# Patient Record
Sex: FEMALE | Race: WHITE | HISPANIC OR LATINO | Employment: OTHER | URBAN - METROPOLITAN AREA
[De-identification: names, ages, dates, MRNs, and addresses within clinical notes are randomized per-mention and may not be internally consistent; named-entity substitution may affect disease eponyms.]

---

## 2017-01-17 ENCOUNTER — GENERIC CONVERSION - ENCOUNTER (OUTPATIENT)
Dept: OTHER | Facility: OTHER | Age: 82
End: 2017-01-17

## 2017-01-31 ENCOUNTER — GENERIC CONVERSION - ENCOUNTER (OUTPATIENT)
Dept: OTHER | Facility: OTHER | Age: 82
End: 2017-01-31

## 2017-02-06 ENCOUNTER — GENERIC CONVERSION - ENCOUNTER (OUTPATIENT)
Dept: OTHER | Facility: OTHER | Age: 82
End: 2017-02-06

## 2017-10-09 ENCOUNTER — APPOINTMENT (OUTPATIENT)
Dept: LAB | Facility: CLINIC | Age: 82
End: 2017-10-09
Payer: MEDICARE

## 2017-10-09 ENCOUNTER — ALLSCRIPTS OFFICE VISIT (OUTPATIENT)
Dept: OTHER | Facility: OTHER | Age: 82
End: 2017-10-09

## 2017-10-09 ENCOUNTER — TRANSCRIBE ORDERS (OUTPATIENT)
Dept: LAB | Facility: CLINIC | Age: 82
End: 2017-10-09

## 2017-10-09 DIAGNOSIS — E03.9 MYXEDEMA HEART DISEASE: ICD-10-CM

## 2017-10-09 DIAGNOSIS — I10 ESSENTIAL HYPERTENSION, MALIGNANT: ICD-10-CM

## 2017-10-09 DIAGNOSIS — D64.9 ANEMIA: ICD-10-CM

## 2017-10-09 DIAGNOSIS — E78.5 HYPERLIPIDEMIA, UNSPECIFIED HYPERLIPIDEMIA TYPE: ICD-10-CM

## 2017-10-09 DIAGNOSIS — I10 ESSENTIAL (PRIMARY) HYPERTENSION: ICD-10-CM

## 2017-10-09 DIAGNOSIS — D64.3 OTHER SIDEROBLASTIC ANEMIA (HCC): Primary | ICD-10-CM

## 2017-10-09 DIAGNOSIS — D64.3 OTHER SIDEROBLASTIC ANEMIA (HCC): ICD-10-CM

## 2017-10-09 DIAGNOSIS — I51.9 MYXEDEMA HEART DISEASE: ICD-10-CM

## 2017-10-09 DIAGNOSIS — E03.9 HYPOTHYROIDISM: ICD-10-CM

## 2017-10-09 DIAGNOSIS — E78.5 HYPERLIPIDEMIA: ICD-10-CM

## 2017-10-09 LAB
ALBUMIN SERPL BCP-MCNC: 3.6 G/DL (ref 3.5–5)
ALP SERPL-CCNC: 96 U/L (ref 46–116)
ALT SERPL W P-5'-P-CCNC: 22 U/L (ref 12–78)
ANION GAP SERPL CALCULATED.3IONS-SCNC: 7 MMOL/L (ref 4–13)
AST SERPL W P-5'-P-CCNC: 29 U/L (ref 5–45)
BASOPHILS # BLD AUTO: 0.02 THOUSANDS/ΜL (ref 0–0.1)
BASOPHILS NFR BLD AUTO: 0 % (ref 0–1)
BILIRUB DIRECT SERPL-MCNC: 0.12 MG/DL (ref 0–0.2)
BILIRUB SERPL-MCNC: 0.39 MG/DL (ref 0.2–1)
BUN SERPL-MCNC: 18 MG/DL (ref 5–25)
CALCIUM SERPL-MCNC: 9.5 MG/DL (ref 8.3–10.1)
CHLORIDE SERPL-SCNC: 106 MMOL/L (ref 100–108)
CHOLEST SERPL-MCNC: 127 MG/DL (ref 50–200)
CO2 SERPL-SCNC: 27 MMOL/L (ref 21–32)
CREAT SERPL-MCNC: 1.22 MG/DL (ref 0.6–1.3)
EOSINOPHIL # BLD AUTO: 0.64 THOUSAND/ΜL (ref 0–0.61)
EOSINOPHIL NFR BLD AUTO: 8 % (ref 0–6)
ERYTHROCYTE [DISTWIDTH] IN BLOOD BY AUTOMATED COUNT: 13.8 % (ref 11.6–15.1)
FERRITIN SERPL-MCNC: 32 NG/ML (ref 8–388)
FOLATE SERPL-MCNC: >20 NG/ML (ref 3.1–17.5)
GFR SERPL CREATININE-BSD FRML MDRD: 42 ML/MIN/1.73SQ M
GLUCOSE P FAST SERPL-MCNC: 88 MG/DL (ref 65–99)
HCT VFR BLD AUTO: 34.9 % (ref 34.8–46.1)
HDLC SERPL-MCNC: 75 MG/DL (ref 40–60)
HGB BLD-MCNC: 11.5 G/DL (ref 11.5–15.4)
IRON SERPL-MCNC: 50 UG/DL (ref 50–170)
LDLC SERPL CALC-MCNC: 22 MG/DL (ref 0–100)
LYMPHOCYTES # BLD AUTO: 1.23 THOUSANDS/ΜL (ref 0.6–4.47)
LYMPHOCYTES NFR BLD AUTO: 16 % (ref 14–44)
MCH RBC QN AUTO: 30.6 PG (ref 26.8–34.3)
MCHC RBC AUTO-ENTMCNC: 33 G/DL (ref 31.4–37.4)
MCV RBC AUTO: 93 FL (ref 82–98)
MONOCYTES # BLD AUTO: 0.76 THOUSAND/ΜL (ref 0.17–1.22)
MONOCYTES NFR BLD AUTO: 10 % (ref 4–12)
NEUTROPHILS # BLD AUTO: 5.1 THOUSANDS/ΜL (ref 1.85–7.62)
NEUTS SEG NFR BLD AUTO: 66 % (ref 43–75)
NRBC BLD AUTO-RTO: 0 /100 WBCS
PLATELET # BLD AUTO: 197 THOUSANDS/UL (ref 149–390)
PMV BLD AUTO: 11.8 FL (ref 8.9–12.7)
POTASSIUM SERPL-SCNC: 4.6 MMOL/L (ref 3.5–5.3)
PROT SERPL-MCNC: 7.6 G/DL (ref 6.4–8.2)
RBC # BLD AUTO: 3.76 MILLION/UL (ref 3.81–5.12)
SODIUM SERPL-SCNC: 140 MMOL/L (ref 136–145)
TRIGL SERPL-MCNC: 151 MG/DL
TSH SERPL DL<=0.05 MIU/L-ACNC: 0.25 UIU/ML (ref 0.36–3.74)
VIT B12 SERPL-MCNC: 498 PG/ML (ref 100–900)
WBC # BLD AUTO: 7.77 THOUSAND/UL (ref 4.31–10.16)

## 2017-10-09 PROCEDURE — 83540 ASSAY OF IRON: CPT

## 2017-10-09 PROCEDURE — 82746 ASSAY OF FOLIC ACID SERUM: CPT

## 2017-10-09 PROCEDURE — 36415 COLL VENOUS BLD VENIPUNCTURE: CPT

## 2017-10-09 PROCEDURE — 84443 ASSAY THYROID STIM HORMONE: CPT

## 2017-10-09 PROCEDURE — 85025 COMPLETE CBC W/AUTO DIFF WBC: CPT

## 2017-10-09 PROCEDURE — 80048 BASIC METABOLIC PNL TOTAL CA: CPT

## 2017-10-09 PROCEDURE — 82607 VITAMIN B-12: CPT

## 2017-10-09 PROCEDURE — 82728 ASSAY OF FERRITIN: CPT

## 2017-10-09 PROCEDURE — 80076 HEPATIC FUNCTION PANEL: CPT

## 2017-10-09 PROCEDURE — 80061 LIPID PANEL: CPT

## 2017-10-10 NOTE — PROGRESS NOTES
Assessment  1  Anemia (285 9) (D64 9)   2  Anxiety (300 00) (F41 9)   3  Chronic kidney disease (585 9) (N18 9)   4  Encounter for preventive health examination (V70 0) (Z00 00)   5  Hypothyroidism (244 9) (E03 9)   6  Hypertension (401 9) (I10)   7  Hyperlipidemia (272 4) (E78 5)    Plan  Anemia    · Ferrous Sulfate 325 (65 Fe) MG Oral Tablet; TAKE 1 TABLET DAILY AS DIRECTED  GERD without esophagitis    · RaNITidine HCl - 150 MG Oral Tablet  Health Maintenance    · Aspirin 81 MG Oral Tablet Chewable; take 1 tablet by mouth every day  Hypothyroidism    · From  Levothyroxine Sodium 75 MCG Oral Tablet TAKE 1 TABLET DAILY To Levothyroxine  Sodium 50 MCG Oral Tablet take 1 tablet by mouth every day  Screening for genitourinary condition    · *VB - Urinary Incontinence Screen (Dx Z13 89 Screen for UI); Status:Complete;   Done: 70YTL9332  09:40AM    Discussion/Summary  Discussion Summary:   Anemia with history of AVMs she is status post hospitalization in January which required blood transfusion, EGD colonoscopy she had refused capsule endoscopy  She's not had blood work since his hospitalization so we will check labs today follow up any abnormal continue iron supplement  kidney disease check BMP today  following with psychiatry  she is off Zantac and omeprazole I recommend she go back on either or she has omeprazole at home she will restart this  stable on current medication  check labs follow up any abnormal  check TSH  Chief Complaint  Chief Complaint Free Text Note Form: Reestablish care      History of Present Illness  HPI: Patient had lived in [place] for the past yearJanuary she started to develop substernal chest pain and shortness of breath she went to the hospital she had profound anemia she did have blood transfusion she had EGD colonoscopy found to have a mild colitis, reflux and AVMs  has not had any further blood work since that hospitalizationlonger taking PPI but she is taking her iron once dailyhome blood pressuresthat she is back in town she may follow up with psychiatry      Review of Systems  Complete-Female:   Constitutional: No fever, no chills, feels well, no tiredness, no recent weight gain or weight loss  Eyes: No complaints of eye pain, no red eyes, no eyesight problems, no discharge, no dry eyes, no itching of eyes  ENT: no complaints of earache, no loss of hearing, no nose bleeds, no nasal discharge, no sore throat, no hoarseness  Cardiovascular: No complaints of slow heart rate, no fast heart rate, no chest pain, no palpitations, no leg claudication, no lower extremity edema  Respiratory: No complaints of shortness of breath, no wheezing, no cough, no SOB on exertion, no orthopnea, no PND  Gastrointestinal: No complaints of abdominal pain, no constipation, no nausea or vomiting, no diarrhea, no bloody stools  Genitourinary: No complaints of dysuria, no incontinence, no pelvic pain, no dysmenorrhea, no vaginal discharge or bleeding  Musculoskeletal: No complaints of arthralgias, no myalgias, no joint swelling or stiffness, no limb pain or swelling  Integumentary: No complaints of skin rash or lesions, no itching, no skin wounds, no breast pain or lump  Neurological: No complaints of headache, no confusion, no convulsions, no numbness, no dizziness or fainting, no tingling, no limb weakness, no difficulty walking  Psychiatric: Not suicidal, no sleep disturbance, no anxiety or depression, no change in personality, no emotional problems  Endocrine: No complaints of proptosis, no hot flashes, no muscle weakness, no deepening of the voice, no feelings of weakness  Hematologic/Lymphatic: No complaints of swollen glands, no swollen glands in the neck, does not bleed easily, does not bruise easily  Active Problems  1  Abnormal blood sugar (790 29) (R73 09)   2  Advance directive discussed with patient (V65 49) (Z71 89)   3  Allergic rhinitis (477 9) (J30 9)   4   Anemia (285 9) (D64 9)   5  Anxiety (300 00) (F41 9)   6  Arthralgia (719 40) (M25 50)   7  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)   8  Bug bite (919 4,E906 4) (W57 XXXA)   9  Chest pain (786 50) (R07 9)   10  Chronic kidney disease (585 9) (N18 9)   11  Colitis (558 9) (K52 9)   12  Conjunctivitis (372 30) (H10 9)   13  Elevated sedimentation rate (790 1) (R70 0)   14  Encounter for screening mammogram for malignant neoplasm of breast (V76 12) (Z12 31)   15  Exertional dyspnea (786 09) (R06 09)   16  Fatigue (780 79) (R53 83)   17  Gait disturbance (781 2) (R26 9)   18  Gallstone (574 20) (K80 20)   19  GERD without esophagitis (530 81) (K21 9)   20  Headache (784 0) (R51)   21  Hyperlipidemia (272 4) (E78 5)   22  Hypertension (401 9) (I10)   23  Hypothyroidism (244 9) (E03 9)   24  Irritable bowel syndrome (564 1) (K58 9)   25  Myalgia And Myositis (729 1)   26  Need for revaccination (V05 9) (Z23)   27  Palpitations (785 1) (R00 2)   28  Pelvic pain in female (625 9) (R10 2)   29  Postmenopausal atrophic vaginitis (627 3) (N95 2)   30  Rib pain on left side (786 50) (R07 81)   31  Scalp irritation (709 9) (R23 8)   32  Screening for genitourinary condition (V81 6) (Z13 89)   33  Symptoms Referable To Multiple Joints (719 69)   34  Urine frequency (788 41) (R35 0)   35  Vitamin D deficiency (268 9) (E55 9)    Past Medical History  1  History of Benign essential hypertension (401 1) (I10)   2  History of Bereavement, uncomplicated (I40 41) (G55 9)   3  History of Fibromyalgia (729 1) (M79 7)   4  History of dizziness (V13 89) (Z87 898)   5  History of fatigue (V13 89) (Z87 898)   6  History of nail disorders (V13 3) (Z87 2)   7  History of Parkinson's disease (V12 49) (Z86 69)   8  History of transient cerebral ischemia (V12 54) (Z86 73)   9  History of Malaise (780 79) (R53 81)   10  History of Nicotine dependence (305 1) (F17 200)   11   History of Palpitations (785 1) (R00 2)  Active Problems And Past Medical History Reviewed: The active problems and past medical history were reviewed and updated today  Surgical History  1  History of Appendectomy   2  History of Hysterectomy   3  History of Tubal Ligation  Surgical History Reviewed: The surgical history was reviewed and updated today  Family History  Mother    1  Family history of Hypertension (V17 49)   2  Family history of Ischemic Stroke (V17 1)  Father    3  Family history of Heart Disease (V17 49)  Family History Reviewed: The family history was reviewed and updated today  Social History   · Denied: Alcohol use   · Former smoker (V15 82) (Y26 461)   · Living Independently With Spouse   · No drug use  Social History Reviewed: The social history was reviewed and updated today  Current Meds   1  Align CAPS; 1 TAB DAILY Recorded   2  ALPRAZolam 0 5 MG Oral Tablet; take 1 tablet daily as needed Recorded   3  Aspir-Lois 325 MG TBEC; 1-2tab when needed Recorded   4  Atenolol 50 MG Oral Tablet; take 1 tablet by mouth once daily; Therapy: 26SVG4756 to (Tana Cho)  Requested for: 02OVD7077; Last Rx:52Fzb7990   Ordered   5  Clobetasol Propionate 0 05 % External Ointment; APPLY SPARINGLY TO AFFECTED AREA(S) TWICE   DAILY; Therapy: 16HCQ5207 to (Last Rx:43Kug3084)  Requested for: 81EQI3619 Ordered   6  Crestor 10 MG Oral Tablet; Take 1 tablet daily; Therapy: 29JPN7903 to (Evaluate:96Psd9236)  Requested for: 94Bxb0742; Last Rx:33Lji3663   Ordered   7  [employer] Fiber Gummies CHEW; 1 tab  Twice a day Recorded   8  Imipramine HCl - 10 MG Oral Tablet; 1 po qd, Dr Tramaine Cristina; Therapy: 88Xof7201 to (Last Rx:76Ebf9176) Ordered   9  Levothyroxine Sodium 75 MCG Oral Tablet; TAKE 1 TABLET DAILY  Requested for: 40GGC6435; Last   Rx:98Qfo8721 Ordered   10  Lisinopril 10 MG Oral Tablet; Therapy: 19KUT0928 to Recorded   11  Lisinopril 5 MG Oral Tablet; Therapy: 48LJC5535 to Recorded   12   Omeprazole 20 MG Oral Capsule Delayed Release; TAKE 1 CAPSULE qod alternating w/ zantac; Therapy: 46JGE6238 to (Last Rx:93Cpb0411)  Requested for: 11Wvm2841 Ordered   13  RaNITidine HCl - 150 MG Oral Tablet; TAKE 1 TABLET 1-2 TIMES A DAY AS NEEDED; Therapy: 40WNH0524 to (Evaluate:37Vvq7758); Last Rx:02Mar2016 Ordered   14  Tylenol 325 MG Oral Tablet; TAKE 1 TO 2 TABLETS EVERY 6 HOURS AS NEEDED Recorded   15  Vitamin D3 2000 UNIT Oral Tablet; 1 PO QD; Therapy: 21SJZ1758 to (Last Rx:31Mfx7522) Ordered  Medication List Reviewed: The medication list was reviewed and updated today  Allergies  1  No Known Drug Allergies  2  Other    Vitals  Vital Signs    Recorded: 20FCS7239 09:41AM   Heart Rate 60   Respiration 12   Systolic 347   Diastolic 66   Height 4 ft 8 75 in   Weight 118 lb 2 oz   BMI Calculated 25 79   BSA Calculated 1 43     Physical Exam    Constitutional   General appearance: No acute distress, well appearing and well nourished  Pulmonary   Respiratory effort: No increased work of breathing or signs of respiratory distress  Auscultation of lungs: Clear to auscultation  Cardiovascular   Auscultation of heart: Normal rate and rhythm, normal S1 and S2, without murmurs  Examination of extremities for edema and/or varicosities: Normal     Abdomen   Abdomen: Non-tender, no masses  Lymphatic   Palpation of lymph nodes in neck: No lymphadenopathy  Musculoskeletal   Gait and station: Normal     Skin   Skin and subcutaneous tissue: Normal without rashes or lesions  Neurologic   Cranial nerves: Cranial nerves 2-12 intact  Results/Data  PHQ-2 Adult Depression Screening 22DOO8563 09:45AM User, Ahs     Test Name Result Flag Reference   PHQ-2 Adult Depression Score 0     Over the last two weeks, how often have you been bothered by any of the following problems?   Little interest or pleasure in doing things: Not at all - 0  Feeling down, depressed, or hopeless: Not at all - 0   PHQ-2 Adult Depression Screening Negative       Falls Risk Assessment (Dx Z13 89 Screen for Neurologic Disorder) 10NHS3718 09:45AM User, Ahs     Test Name Result Flag Reference   Falls Risk      No falls in the past year     *VB - Urinary Incontinence Screen (Dx Z13 89 Screen for UI) 88FDP3638 09:40AM Carlene Lefort     Test Name Result Flag Reference   Urinary Incontinence Assessment 14IXH0878         Health Management  Health Maintenance   * MAMMO SCREENING BILATERAL W CAD; every 1 year; Last 66Vtr1820; Next Due: 90Apa4748;   Overdue    Signatures   Electronically signed by : Villa Ng; Oct  9 2017 10:07AM EST                       (Author)    Electronically signed by : ANDREA Alejandre ; Oct  9 2017  5:59PM EST

## 2017-11-28 ENCOUNTER — APPOINTMENT (OUTPATIENT)
Dept: LAB | Facility: CLINIC | Age: 82
End: 2017-11-28
Payer: MEDICARE

## 2017-11-28 DIAGNOSIS — E03.9 HYPOTHYROIDISM: ICD-10-CM

## 2017-11-28 LAB
T4 FREE SERPL-MCNC: 0.82 NG/DL (ref 0.76–1.46)
TSH SERPL DL<=0.05 MIU/L-ACNC: 5.94 UIU/ML (ref 0.36–3.74)

## 2017-11-28 PROCEDURE — 84443 ASSAY THYROID STIM HORMONE: CPT

## 2017-11-28 PROCEDURE — 84439 ASSAY OF FREE THYROXINE: CPT

## 2017-11-28 PROCEDURE — 36415 COLL VENOUS BLD VENIPUNCTURE: CPT

## 2017-12-28 DIAGNOSIS — R07.9 CHEST PAIN: ICD-10-CM

## 2017-12-28 DIAGNOSIS — R06.02 SHORTNESS OF BREATH: ICD-10-CM

## 2017-12-28 DIAGNOSIS — E03.9 HYPOTHYROIDISM: ICD-10-CM

## 2018-01-09 ENCOUNTER — APPOINTMENT (OUTPATIENT)
Dept: LAB | Facility: CLINIC | Age: 83
End: 2018-01-09
Payer: MEDICARE

## 2018-01-09 ENCOUNTER — ALLSCRIPTS OFFICE VISIT (OUTPATIENT)
Dept: OTHER | Facility: OTHER | Age: 83
End: 2018-01-09

## 2018-01-09 ENCOUNTER — TRANSCRIBE ORDERS (OUTPATIENT)
Dept: LAB | Facility: CLINIC | Age: 83
End: 2018-01-09

## 2018-01-09 DIAGNOSIS — E03.9 HYPOTHYROIDISM: ICD-10-CM

## 2018-01-09 DIAGNOSIS — R07.9 CHEST PAIN: ICD-10-CM

## 2018-01-09 LAB
CK SERPL-CCNC: 113 U/L (ref 26–192)
TROPONIN I SERPL-MCNC: <0.02 NG/ML
TSH SERPL DL<=0.05 MIU/L-ACNC: 1.92 UIU/ML (ref 0.36–3.74)

## 2018-01-09 PROCEDURE — 84443 ASSAY THYROID STIM HORMONE: CPT

## 2018-01-09 PROCEDURE — 36415 COLL VENOUS BLD VENIPUNCTURE: CPT

## 2018-01-09 PROCEDURE — 84484 ASSAY OF TROPONIN QUANT: CPT

## 2018-01-09 PROCEDURE — 82550 ASSAY OF CK (CPK): CPT

## 2018-01-10 ENCOUNTER — GENERIC CONVERSION - ENCOUNTER (OUTPATIENT)
Dept: INTERNAL MEDICINE CLINIC | Facility: CLINIC | Age: 83
End: 2018-01-10

## 2018-01-10 NOTE — PROGRESS NOTES
Assessment   1  Costochondritis (733 6) (M94 0)   2  Chest pain (786 50) (R07 9)   3  Hypothyroidism (244 9) (E03 9)    Plan   Chest pain    · (1) CK (CPK); Status:Active; Requested IUI:74UXZ5462;    · (1) TROPONIN I; Status:Active; Requested RYX:93NRV8684;    · EKG/ECG- POC; Status:Active - Perform Order; Requested JRM:[id number];   Costochondritis    · Aleve 220 MG Oral Tablet; TAKE 1 TABLET EVERY 12 HOURS AS NEEDED    Discussion/Summary      ANXIETY SHE IS SCHEDULED TO SEE PSYCHIATRY TOMORROW SHE FOLLOWED WITH HER THERAPIST TODAY   CHECK TSH CALL WITH RESULTS     CHEST SYMPTOMS WITH A SENSATION OF DREAD AND DOOM EKG OBTAINED IN THE OFFICE WITH Q NOTED IN 1 AND AVL WHICH IS NEW SINCE 06/2015 CONTINUE BABY ASPIRIN CHECK TROPONINS AND REFER TO CARDIOLOGY IF ANY TIME IS WORSENING OF SYMPTOMS SHE SHOULD GO TO THE EMERGENCY ROOM   RIB PAIN LIKELY COSTOCHONDRITIS RECOMMEND WARM COMPRESSES ORIGINALLY HAD RECOMMENDED ALEVE BUT I REVIEWED OLD RECORDS INDICATING SHE SHOULD AVOID NSAIDS SECONDARY TO GI IRRITATION  FOR NOW WARM COMPRESSES TOPICAL AGENTS AND TYLENOL  Chief Complaint   JUST NOT FEELING WELL      History of Present Illness   HPI: PATIENT IS HERE WITH HER DAUGHTER AND HER GRANDSON SHE IS VERY VAGUE IN ANSWERING QUESTIONS THE GRANDSON IS ASKED TO LEAVE AND SHE STATES THAT THIS LIVING SITUATION IS VERY STRESSFUL SHE LIVES WITH HER GRANDSON IT WAS SUPPOSED TO JUST BE HER AND HIM AND NOW HIS A ROLE FRIEND AND HER 3YEAR-OLD DAUGHTER ARE ESSENTIALLY IN THE HOUSE AND IT IS QUITE STRESSFUL FOR HER  SHE SAW HER THERAPIST TODAY AND SHE IS SCHEDULED TO SEE PSYCHIATRY TOMORROW BUT SHE HAD THIS EPISODE WHERE SHE HAD A HEAVINESS IN HER CHEST AND SHE HAD A SENSE OF DOOM AND RED SHE HAD SOME SHORTNESS OF BREATH THAT ONLY LASTED SEVERAL SECONDS AND THEN IT RESOLVED  Review of Systems        Constitutional: No fever, no chills, feels well, no tiredness, no recent weight gain or loss        ENT: no ear ache, no loss of hearing, no nosebleeds or nasal discharge, no sore throat or hoarseness  Cardiovascular: no complaints of slow or fast heart rate, no chest pain, no palpitations, no leg claudication or lower extremity edema  Respiratory: no complaints of shortness of breath, no wheezing, no dyspnea on exertion, no orthopnea or PND  Breasts: no complaints of breast pain, breast lump or nipple discharge  Gastrointestinal: no complaints of abdominal pain, no constipation, no nausea or diarrhea, no vomiting, no bloody stools  Genitourinary: no complaints of dysuria, no incontinence, no pelvic pain, no dysmenorrhea, no vaginal discharge or abnormal vaginal bleeding  Musculoskeletal: no complaints of arthralgia, no myalgia, no joint swelling or stiffness, no limb pain or swelling  Integumentary: no complaints of skin rash or lesion, no itching or dry skin, no skin wounds  Neurological: no complaints of headache, no confusion, no numbness or tingling, no dizziness or fainting  Active Problems   1  Abnormal blood sugar (790 29) (R73 09)   2  Advance directive discussed with patient (V65 49) (Z71 89)   3  Allergic rhinitis (477 9) (J30 9)   4  Anemia (285 9) (D64 9)   5  Anxiety (300 00) (F41 9)   6  Arthralgia (719 40) (M25 50)   7  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)   8  Bug bite (919 4,E906 4) (W57 XXXA)   9  Chest pain (786 50) (R07 9)   10  Chronic kidney disease (585 9) (N18 9)   11  Colitis (558 9) (K52 9)   12  Conjunctivitis (372 30) (H10 9)   13  Costochondritis (733 6) (M94 0)   14  Elevated sedimentation rate (790 1) (R70 0)   15  Encounter for screening mammogram for malignant neoplasm of breast (V76 12)      (Z12 31)   16  Exertional dyspnea (786 09) (R06 09)   17  Fatigue (780 79) (R53 83)   18  Gait disturbance (781 2) (R26 9)   19  Gallstone (574 20) (K80 20)   20  GERD without esophagitis (530 81) (K21 9)   21  Headache (784 0) (R51)   22   Hyperlipidemia (272 4) (E78 5) 23  Hypertension (401 9) (I10)   24  Hypothyroidism (244 9) (E03 9)   25  Irritable bowel syndrome (564 1) (K58 9)   26  Myalgia And Myositis (729 1)   27  Need for influenza vaccination (V04 81) (Z23)   28  Need for revaccination (V05 9) (Z23)   29  Palpitations (785 1) (R00 2)   30  Pelvic pain in female (625 9) (R10 2)   31  Postmenopausal atrophic vaginitis (627 3) (N95 2)   32  Rib pain on left side (786 50) (R07 81)   33  Scalp irritation (709 9) (R23 8)   34  Screening for genitourinary condition (V81 6) (Z13 89)   35  Symptoms Referable To Multiple Joints (719 69)   36  Urine frequency (788 41) (R35 0)   37  Vitamin D deficiency (268 9) (E55 9)    Past Medical History   Active Problems And Past Medical History Reviewed: The active problems and past medical history were reviewed and updated today  Surgical History   Surgical History Reviewed: The surgical history was reviewed and updated today  Social History    · Denied: Alcohol use   · Former smoker (V15 82) (J70 056)   · Living Independently With Spouse   · No drug use  The social history was reviewed and updated today  Family History   Family History Reviewed: The family history was reviewed and updated today  Current Meds    1  Align CAPS; 1 TAB DAILY Recorded   2  ALPRAZolam 0 5 MG Oral Tablet; take 1 tablet daily as needed Recorded   3  Aspirin 81 MG Oral Tablet Chewable; take 1 tablet by mouth every day; Therapy: 45WQA4340 to 763 2200); Last Rx:09Oct2017 Ordered   4  Atenolol 50 MG Oral Tablet; take 1 tablet by mouth once daily; Therapy: 52RMO4961 to (Quinn Taylor)  Requested for: 18RPA8077; Last     Rx:55Gsx6954 Ordered   5  Clobetasol Propionate 0 05 % External Ointment; APPLY SPARINGLY TO AFFECTED     AREA(S) TWICE DAILY; Therapy: 57TEU1685 to (Last Rx:25Pch4935)  Requested for: 02FYW7869 Ordered   6  Crestor 10 MG Oral Tablet; Take 1 tablet daily;      Therapy: 68NIY4628 to (Evaluate:43Rnu7768)  Requested for: 12Wxg2756; Last     Rx:66Ffo9309 Ordered   7  [employer] Fiber Gummies CHEW; 1 tab  Twice a day Recorded   8  Ferrous Sulfate 325 (65 Fe) MG Oral Tablet; TAKE 1 TABLET DAILY AS DIRECTED; Therapy: 15ENZ9650 to (Evaluate:08Nov2017); Last Rx:09Oct2017 Ordered   9  Imipramine HCl - 10 MG Oral Tablet; 1 po qd, Dr Alana Munoz; Therapy: 89Jjf6446 to (Last Rx:49Wdx7030) Ordered   10  Levothyroxine Sodium 50 MCG Oral Tablet; take 1 tablet by mouth every day and 1/2 tab       on sundays  Requested for: 29Nov2017; Last Rx:29Nov2017 Ordered   11  Lisinopril 10 MG Oral Tablet; Take 1 tablet daily; Therapy: 11JHM5163 to (Gudelia Chacko)  Requested for: 05FXT8645; Last      Rx:51Doj4105 Ordered   12  Lisinopril 5 MG Oral Tablet; TAKE 1 TABLET DAILY AS DIRECTED; Therapy: 59GAJ3567 to (Nancy Harrison)  Requested for: 54UUG5560; Last      Rx:05Hpi4695 Ordered   13  Omeprazole 20 MG Oral Capsule Delayed Release; TAKE 1 CAPSULE qod alternating w/      zantac; Therapy: 51ILU9829 to (Last Rx:19Qsl5332)  Requested for: 95Zfh7111 Ordered   14  Tylenol 325 MG Oral Tablet; TAKE 1 TO 2 TABLETS EVERY 6 HOURS AS NEEDED      Recorded   15  Vitamin D3 2000 UNIT Oral Tablet; 1 PO QD; Therapy: 42RAV2986 to (Last Rx:41Ykg4531) Ordered     The medication list was reviewed and updated today  Allergies   1  No Known Drug Allergies  2  Other    Vitals    Recorded: 91APH5910 03:03PM   Heart Rate 71   Systolic 047   Diastolic 70   Height 4 ft 8 75 in   Weight 120 lb 6 oz   BMI Calculated 26 28   BSA Calculated 1 44   O2 Saturation 98     Physical Exam        Constitutional      General appearance: No acute distress, well appearing and well nourished  Eyes      Conjunctiva and lids: No swelling, erythema or discharge  Pulmonary      Respiratory effort: No increased work of breathing or signs of respiratory distress  Auscultation of lungs: Clear to auscultation  Cardiovascular      Auscultation of heart: Normal rate and rhythm, normal S1 and S2, without murmurs  Examination of extremities for edema and/or varicosities: Normal        Abdomen      Abdomen: Non-tender, no masses  Lymphatic      Palpation of lymph nodes in neck: No lymphadenopathy  Musculoskeletal      Gait and station: Normal        Skin      Skin and subcutaneous tissue: Normal without rashes or lesions            Signatures    Electronically signed by : Shawanda Brumfield, [address] [facility] [address]; Jan 9 2018  4:58PM EST                       (Author)     Electronically signed by : Corinne Grams, M D ; Jan 9 2018 10:45PM EST

## 2018-01-11 ENCOUNTER — ALLSCRIPTS OFFICE VISIT (OUTPATIENT)
Dept: OTHER | Facility: OTHER | Age: 83
End: 2018-01-11

## 2018-01-11 NOTE — PROGRESS NOTES
Assessment    1  Chronic kidney disease (585 9) (N18 9)   2  Anxiety (300 00) (F41 9)   3  Anemia (285 9) (D64 9)   4  Hyperlipidemia (272 4) (E78 5)   5  Hypertension (401 9) (I10)   6  Hypothyroidism (244 9) (E03 9)   7  Irritable bowel syndrome (564 1) (K58 9)   8  GERD (gastroesophageal reflux disease) (530 81) (K21 9)   9  Gallstone (574 20) (K80 20)    Plan  GERD (gastroesophageal reflux disease)    · Omeprazole 20 MG Oral Capsule Delayed Release; TAKE 1 CAPSULE Daily  Hypertension    · Atenolol 50 MG Oral Tablet; take 1 tablet by mouth once daily  Rib pain on left side    · Meloxicam 7 5 MG Oral Tablet    Discussion/Summary  Discussion Summary:   Lab data reviewed in detail and compared to prior  Postprandial abdominal pain is of unclear etiology-could represent dyspepsia, peptic ulcer disease, biliary colic as patient has known gallbladder stones 1 4 cm and gallbladder sludge as evidenced by ultrasound in June of 2015, will start by using antacids to see if this alleviates the symptoms, if symptoms fail to improve I would recommend surgical consultation, consideration for cholecystectomy or further testing  Hypertension and hyperlipidemia and hypothyroidism are stable on present regimen    Flank pain with history of scoliosis to be evaluated by Dr Alize Mendoza later this week    Followup here in 3 weeks to review abdominal symptomatology, six-month labs followup  Chief Complaint  Chief Complaint Chronic Condition [facility]: Patient is here today for follow up of chronic conditions described in HPI  History of Present Illness  HPI: Still w/ L flank pains, going to see Dr Alize Mendoza  Now w/ b/l upper quadrant pains  Not limiting sx, disco dancing over weekend  CKD-not taking any nsaids  Not drinking much water  Still very anxious, adjusting to living w/ dgtr  Dr Aury Pathak following, usually taking 3 xanax per day  Notes heartburn, worse after meds  TAking tums on occasion w/ some relief   No dysphagia, melena or change in bm  Review of Systems  Complete-Female:   Constitutional: No fever, no chills, feels well, no tiredness, no recent weight gain or weight loss  Eyes: No complaints of eye pain, no red eyes, no eyesight problems, no discharge, no dry eyes, no itching of eyes  Cardiovascular: No complaints of slow heart rate, no fast heart rate, no chest pain, no palpitations, no leg claudication, no lower extremity edema  Respiratory: No complaints of shortness of breath, no wheezing, no cough, no SOB on exertion, no orthopnea, no PND  Gastrointestinal: as noted in HPI  Genitourinary: No complaints of dysuria, no incontinence, no pelvic pain, no dysmenorrhea, no vaginal discharge or bleeding  Musculoskeletal: arthralgias, but no joint swelling, no myalgias and no joint stiffness  Integumentary: No complaints of skin rash or lesions, no itching, no skin wounds, no breast pain or lump  Neurological: No complaints of headache, no confusion, no convulsions, no numbness, no dizziness or fainting, no tingling, no limb weakness, no difficulty walking  Psychiatric: Not suicidal, no sleep disturbance, no anxiety or depression, no change in personality, no emotional problems  Endocrine: No complaints of proptosis, no hot flashes, no muscle weakness, no deepening of the voice, no feelings of weakness  Hematologic/Lymphatic: No complaints of swollen glands, no swollen glands in the neck, does not bleed easily, does not bruise easily  Active Problems    1  Abnormal blood sugar (790 29) (R73 09)   2  Allergic rhinitis (477 9) (J30 9)   3  Anemia (285 9) (D64 9)   4  Anxiety (300 00) (F41 9)   5  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)   6  Bug bite (919 4,E906 4) (T14 8,W57  XXXA)   7  Chest pain (786 50) (R07 9)   8  Chronic kidney disease (585 9) (N18 9)   9  Colitis (558 9) (K52 9)   10  Conjunctivitis (372 30) (H10 9)   11  Exertional dyspnea (786 09) (R06 09)   12   Gait disturbance (781 2) (R26 9) 13  Gallstone (574 20) (K80 20)   14  GERD (gastroesophageal reflux disease) (530 81) (K21 9)   15  Headache (784 0) (R51)   16  Hyperlipidemia (272 4) (E78 5)   17  Hypertension (401 9) (I10)   18  Hypothyroidism (244 9) (E03 9)   19  Irritable bowel syndrome (564 1) (K58 9)   20  Myalgia And Myositis (729 1)   21  Palpitations (785 1) (R00 2)   22  Rib pain on left side (786 50) (R07 81)   23  Scalp irritation (709 9) (L98 9)   24  Symptoms Referable To Multiple Joints (719 69)   25  Urine frequency (788 41) (R35 0)   26  Vitamin D deficiency (268 9) (E55 9)    Past Medical History    1  History of Benign essential hypertension (401 1) (I10)   2  History of Bereavement, uncomplicated (K27 47) (Z94 5)   3  History of Fibromyalgia (729 1) (M79 7)   4  History of dizziness (V13 89) (Z87 898)   5  History of fatigue (V13 89) (Z87 898)   6  History of nail disorders (V13 3) (Z87 2)   7  History of Parkinson's disease (V12 49) (Z86 69)   8  History of transient cerebral ischemia (V12 54) (Z86 73)   9  History of Malaise (780 79) (R53 81)   10  History of Nicotine dependence (305 1) (F17 200)   11  History of Palpitations (785 1) (R00 2)  Active Problems And Past Medical History Reviewed: The active problems and past medical history were reviewed and updated today  Surgical History    1  History of Appendectomy   2  History of Hysterectomy   3  History of Tubal Ligation  Surgical History Reviewed: The surgical history was reviewed and updated today  Family History    1  Family history of Hypertension (V17 49)   2  Family history of Ischemic Stroke (V17 1)    3  Family history of Heart Disease (V17 49)  Family History Reviewed: The family history was reviewed and updated today  Social History    · Denied: Alcohol use   · Former smoker (V15 82) (V33 623)   · Living Independently With Spouse   · No drug use  Social History Reviewed: The social history was reviewed and updated today        Current Meds   1  Align CAPS; 1 TAB DAILY Recorded   2  ALPRAZolam 0 5 MG Oral Tablet; take 1 tablet daily as needed Recorded   3  Aspir-Lois 325 MG TBEC; 1-2tab when needed Recorded   4  Atenolol 50 MG Oral Tablet; take 1 tablet by mouth once daily; Therapy: 82ELT6485 to (Evaluate:30Jan2016)  Requested for: 35Pkn4694; Last Rx:02Sep2015   Ordered   5  Clobetasol Propionate 0 05 % External Cream; APPLY SPARINGLY TO AFFECTED AREA(S) TWICE   DAILY; Therapy: 76LFX0732 to (Last Rx:56Iym9379)  Requested for: 99VRD6784 Ordered   6  Crestor 10 MG Oral Tablet; take 1 tablet by mouth once daily; Therapy: 03YER8280 to (Evaluate:72Jfh7884)  Requested for: 10Aug2015; Last Rx:10Aug2015   Ordered   7  [employer] Fiber Gummies CHEW; 1 tab  Twice a day Recorded   8  Imipramine HCl - 10 MG Oral Tablet; 1 po qod, Dr Mona Vides; Therapy: 17Sep2014 to (Last Rx:17Sep2014) Ordered   9  Meloxicam 7 5 MG Oral Tablet; Take 1 tablet twice daily as needed; Therapy: 00GSE1520 to (Evaluate:11Khb4283)  Requested for: 07TFU1797; Last Rx:15Jan2016   Ordered   10  Synthroid 50 MCG Oral Tablet (Levothyroxine Sodium); take 1 tab daily x only 6 days a week     Requested for: 67COC3876; Last Rx:15Jan2016 Ordered   11  Tylenol 325 MG Oral Tablet; TAKE 1 TO 2 TABLETS EVERY 6 HOURS AS NEEDED Recorded  Medication List Reviewed: The medication list was reviewed and updated today  Allergies    1  No Known Drug Allergies    2  Other    Vitals  Vital Signs [Data Includes: Current Encounter]    Recorded: [id number] 02:55PM   Heart Rate 78   Respiration 12   Systolic 122   Diastolic 68   Height 4 ft 10 in   Weight 123 lb 4 00 oz   BMI Calculated 25 76   BSA Calculated 1 48     Physical Exam    Constitutional   General appearance: No acute distress, well appearing and well nourished  Eyes   Conjunctiva and lids: No swelling, erythema or discharge      Ears, Nose, Mouth, and Throat   External inspection of ears and nose: Normal     Oropharynx: Normal with no erythema, edema, exudate or lesions  Pulmonary   Respiratory effort: No increased work of breathing or signs of respiratory distress  Auscultation of lungs: Clear to auscultation  Cardiovascular   Auscultation of heart: Normal rate and rhythm, normal S1 and S2, without murmurs  Examination of extremities for edema and/or varicosities: Normal     Carotid pulses: Normal     Abdomen   Abdomen: Non-tender, no masses  Liver and spleen: No hepatomegaly or splenomegaly  Lymphatic   Palpation of lymph nodes in neck: No lymphadenopathy  Neurologic   Cranial nerves: Cranial nerves 2-12 intact      Psychiatric   Orientation to person, place, and time: Normal     Mood and affect: Normal          Signatures   Electronically signed by : ANDREA Peace ; Feb 1 2016  3:34PM EST                       (Author)

## 2018-01-12 NOTE — PROGRESS NOTES
Assessment   1  Exertional shortness of breath (786 05) (R06 02)   2  Hyperlipidemia (272 4) (E78 5)   3  Hypertension (401 9) (I10)   4  Anxiety (300 00) (F41 9)   5  Abnormal EKG (794 31) (R94 31)    Plan    · ECHO COMPLETE WITH CONTRAST IF INDICATED; Status:Hold For - Scheduling;    Requested PGM:63FVT5602; Perform:[facility]; Due:11Jan2019;Ordered;For:Exertional shortness of breath; Ordered By:Arina Sorensen;   · NM MYOCARDIAL PERFUSION SPECT (RX STRESS AND/OR REST); Status:Hold For -    Scheduling; Requested ADR:50VJU9450; Perform:[facility]; Due:11Jan2019;Ordered;For:Exertional shortness of breath; Ordered By:Arina Sorensen; Discussion/Summary      Patient with history of hypertension, hyperlipidemia presents with complaints increased shortness of breath above baseline  She was recently seen by PCP, EKG was performed at that time which showed normal sinus rhythm with new Q-waves in 1 and aVL  given patient's above complaints as well as risk factors, would recommend pharmacological nuclear stress test to evaluate for ischemic etiology does not been done in some time  Likewise, will obtain echocardiogram to evaluate LVEF and status of valves  Signs and symptoms of ACS/ CAD reviewed with patient  Patient advised to report any concerns  Will see patient for follow-up in a few weeks following testing to discuss results  Risk factor modification reviewed  Patient to follow up with PCP  The patient, patient's family was counseled regarding instructions for management,-- risk factor reductions,-- patient and family education,-- impressions  Patient is able to Self-Care  Educational resources provided: Please see discussion summary  Chief Complaint   Chest pain and abnormal EKG      History of Present Illness   HPI: Patient With history of hypertension, hyperlipidemia, hypothyroidism, anxiety presents as referral from PCP   She was recently seen by PCP with complaints of chest pain, EKG was performed which revealed new Q-waves in 1 and aVL  patient today is admitting increased shortness of breath above baseline with activity  Patient also admits to palpitations associated with panic attacks  Patient has headache, usually when she wakes up in the morning  She reports that she has been told that her blood pressure is typically within normal limits  She denies prior history of CAD /mi, arrhythmia, valvular disorder  She had stress test 10/2016 which was negative for evidence ischemia  Review of Systems           Cardiac: no chest pain,-- no fainting/blackouts,-- no signs of swelling-- and-- no syncope/fainting  Skin: No complaints of nonhealing sores or skin rash  Genitourinary: no blood in urine-- and-- no kidney problems      Psychological: anxiety-- and-- panic attacks  General: No complaints of trouble sleeping, lack of energy, fatigue, appetite changes, weight changes, fever, frequent infections, or night sweats  Respiratory: No complaints of shortness of breath, cough with sputum, or wheezing  HEENT: No complaints of serious problems, hearing problems, nose problems, throat problems, or snoring  Gastrointestinal: No complaints of liver problems, nausea, vomiting, heartburn, constipation, bloody stools, diarrhea, problems swallowing, adbominal pain, or rectal bleeding  Hematologic: No complaints of bleeding disorders, anemia, blood clots, or excessive brusing  Neurological: headaches, but-- no weakness-- and-- no dizziness      Musculoskeletal: arthritis, but-- No complaints of arthritis, back pain, or painfull swelling  ROS reviewed  Active Problems   1  Abnormal blood sugar (790 29) (R73 09)   2  Advance directive discussed with patient (V65 49) (Z71 89)   3  Allergic rhinitis (477 9) (J30 9)   4  Anemia (285 9) (D64 9)   5  Anxiety (300 00) (F41 9)   6  Arthralgia (719 40) (M25 50)   7   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)   8  Bug bite (919 4,E906 4) (W57 XXXA)   9  Chest pain (786 50) (R07 9)   10  Chronic kidney disease (585 9) (N18 9)   11  Colitis (558 9) (K52 9)   12  Conjunctivitis (372 30) (H10 9)   13  Costochondritis (733 6) (M94 0)   14  Elevated sedimentation rate (790 1) (R70 0)   15  Encounter for screening mammogram for malignant neoplasm of breast (V76 12)      (Z12 31)   16  Exertional dyspnea (786 09) (R06 09)   17  Fatigue (780 79) (R53 83)   18  Gait disturbance (781 2) (R26 9)   19  Gallstone (574 20) (K80 20)   20  GERD without esophagitis (530 81) (K21 9)   21  Headache (784 0) (R51)   22  Hyperlipidemia (272 4) (E78 5)   23  Hypertension (401 9) (I10)   24  Hypothyroidism (244 9) (E03 9)   25  Irritable bowel syndrome (564 1) (K58 9)   26  Myalgia And Myositis (729 1)   27  Need for influenza vaccination (V04 81) (Z23)   28  Need for revaccination (V05 9) (Z23)   29  Palpitations (785 1) (R00 2)   30  Pelvic pain in female (625 9) (R10 2)   31  Postmenopausal atrophic vaginitis (627 3) (N95 2)   32  Rib pain on left side (786 50) (R07 81)   33  Scalp irritation (709 9) (R23 8)   34  Screening for genitourinary condition (V81 6) (Z13 89)   35  Symptoms Referable To Multiple Joints (719 69)   36  Urine frequency (788 41) (R35 0)   37   Vitamin D deficiency (268 9) (E55 9)    Past Medical History    · History of Benign essential hypertension (401 1) (I10)   · History of Bereavement, uncomplicated (F97 10) (U32 5)   · History of Fibromyalgia (729 1) (M79 7)   · History of dizziness (V13 89) (G86 753)   · History of fatigue (V13 89) (B89 995)   · History of nail disorders (V13 3) (Z87 2)   · History of Parkinson's disease (V12 49) (Z86 69)   · History of transient cerebral ischemia (V12 54) (Z86 73)   · History of Malaise (780 79) (R53 81)   · History of Nicotine dependence (305 1) (F17 200)   · History of Palpitations (785 1) (R00 2)     The active problems and past medical history were reviewed and updated today       Surgical History    · History of Appendectomy   · History of Hysterectomy   · History of Tubal Ligation     The surgical history was reviewed and updated today  Family History   Mother    · Family history of Hypertension (V17 49)   · Family history of Ischemic Stroke (V17 1)  Father    · Family history of Heart Disease (V17 49)     The family history was reviewed and updated today  Social History    · Denied: Alcohol use   · Former smoker (V15 82) (Z72 297)   · Living Independently With Spouse   · No drug use  The social history was reviewed and updated today  Current Meds    1  Align CAPS; 1 TAB DAILY Recorded   2  ALPRAZolam 0 5 MG Oral Tablet; take 1 tablet daily as needed Recorded   3  Aspirin 81 MG Oral Tablet Chewable; take 1 tablet by mouth every day; Therapy: 33BVL3843 to 988 76 832); Last Rx:09Oct2017 Ordered   4  Atenolol 50 MG Oral Tablet; take 1 tablet by mouth once daily; Therapy: 80SRH3997 to (Raymundo Fam)  Requested for: 36PFM7709; Last     Rx:19Fjo1488 Ordered   5  Crestor 10 MG Oral Tablet; Take 1 tablet daily; Therapy: 39OGN7466 to (Evaluate:21Wxg5207)  Requested for: 91Rpm0471; Last     Rx:08Gto2995 Ordered   6  [employer] Fiber Gummies CHEW; 1 tab  Twice a day Recorded   7  Ferrous Sulfate 325 (65 Fe) MG Oral Tablet; TAKE 1 TABLET DAILY AS DIRECTED; Therapy: 85VGL4716 to (Evaluate:08Nov2017); Last Rx:53Sls7573 Ordered   8  Imipramine HCl - 10 MG Oral Tablet; 1 po qd, Dr Chrystal Fernandez; Therapy: 18Sqf7378 to (Last Rx:67Khl7126) Ordered   9  Levothyroxine Sodium 50 MCG Oral Tablet; take 1 tablet by mouth every day and 1/2 tab      on sundays  Requested for: 14Jal2046; Last Rx:29Nov2017 Ordered   10  Lisinopril 10 MG Oral Tablet; Take 1 tablet daily; Therapy: 80TIN4667 to (Tiana Quintana)  Requested for: 45VLJ5540; Last      Rx:17Inx6089 Ordered   11  Lisinopril 5 MG Oral Tablet; TAKE 1 TABLET DAILY AS DIRECTED;       Therapy: 56RUB6594 to (Samantha Escamilla)  Requested for: 70GEJ5560; Last      Rx:74Ogy2699 Ordered   12  Tylenol 325 MG Oral Tablet; TAKE 1 TO 2 TABLETS EVERY 6 HOURS AS NEEDED      Recorded   13  Vitamin D3 2000 UNIT Oral Tablet; 1 PO QD; Therapy: 65WBB8079 to (Last Rx:52Szz1679) Ordered     The medication list was reviewed and updated today  Allergies   1  No Known Drug Allergies  2  Other    Vitals   Signs   Heart Rate: 78  Systolic: 350  Diastolic: 72  Height: 4 ft 8 8 in  Weight: 119 lb 8 0 oz  BMI Calculated: 26 04  BSA Calculated: 1 44  O2 Saturation: 98    Physical Exam        Constitutional      General appearance: No acute distress, well appearing and well nourished  Eyes      Conjunctiva and Sclera examination: Conjunctiva pink, sclera anicteric  Ears, Nose, Mouth, and Throat - Oropharynx: Clear, nares are clear, mucous membranes are moist       Neck      Neck and thyroid: Normal, supple, trachea midline, no thyromegaly  Pulmonary      Respiratory effort: No increased work of breathing or signs of respiratory distress  Auscultation of lungs: Clear to auscultation, no rales, no rhonchi, no wheezing, good air movement  Cardiovascular      Auscultation of heart: Normal rate and rhythm, normal S1 and S2, no murmurs  Carotid pulses: Normal, 2+ bilaterally  Examination of extremities for edema and/or varicosities: Normal        Abdomen      Abdomen: Non-tender and no distention  Musculoskeletal Gait and station: Normal gait  -- Digits and nails: Normal without clubbing or cyanosis  Skin - Skin and subcutaneous tissue: Normal without rashes or lesions  Skin is warm and well perfused, normal turgor  Neurologic - Speech: Normal        Psychiatric - Orientation to person, place, and time: Normal -- Mood and affect: Normal       Future Appointments      Date/Time Provider Specialty Site   02/07/2018 03:00 PM ANDREA Hernandez   Cardiology [address] [place]     Attending Note   Collaborating Physician Note: Collaborating Note: I interviewed and examined the patient,-- I supervised the Advanced Practitioner-- and-- I agree with the Advanced Practitioner note        Signatures    Electronically signed by : Haritha Coley [facility]; Jan 11 2018 11:38AM EST                       (Author)     Electronically signed by : ANDREA Gaston ; Jan 11 2018  3:34PM EST                       (Author)

## 2018-01-13 VITALS
HEART RATE: 60 BPM | HEIGHT: 57 IN | RESPIRATION RATE: 12 BRPM | DIASTOLIC BLOOD PRESSURE: 66 MMHG | SYSTOLIC BLOOD PRESSURE: 124 MMHG | BODY MASS INDEX: 25.48 KG/M2 | WEIGHT: 118.13 LBS

## 2018-01-19 ENCOUNTER — HOSPITAL ENCOUNTER (OUTPATIENT)
Dept: NON INVASIVE DIAGNOSTICS | Facility: CLINIC | Age: 83
Discharge: HOME | End: 2018-01-19
Payer: MEDICARE

## 2018-01-19 ENCOUNTER — GENERIC CONVERSION - ENCOUNTER (OUTPATIENT)
Dept: CARDIOLOGY CLINIC | Facility: CLINIC | Age: 83
End: 2018-01-19

## 2018-01-19 DIAGNOSIS — R06.02 SHORTNESS OF BREATH: ICD-10-CM

## 2018-01-19 LAB
ARRHY DURING EX: NORMAL
CHEST PAIN STATEMENT: NORMAL
MAX DIASTOLIC BP: 76 MMHG
MAX HEART RATE: 93 BPM
MAX PREDICTED HEART RATE: 140 BPM
MAX. SYSTOLIC BP: 138 MMHG
PROTOCOL NAME: NORMAL
REASON FOR TERMINATION: NORMAL
TARGET HR FORMULA: NORMAL
TEST INDICATION: NORMAL
TIME IN EXERCISE PHASE: NORMAL

## 2018-01-19 PROCEDURE — 93017 CV STRESS TEST TRACING ONLY: CPT

## 2018-01-19 PROCEDURE — A9502 TC99M TETROFOSMIN: HCPCS

## 2018-01-19 PROCEDURE — 78452 HT MUSCLE IMAGE SPECT MULT: CPT

## 2018-01-19 RX ADMIN — REGADENOSON 0.4 MG: 0.08 INJECTION, SOLUTION INTRAVENOUS at 13:36

## 2018-01-22 ENCOUNTER — GENERIC CONVERSION - ENCOUNTER (OUTPATIENT)
Dept: OTHER | Facility: OTHER | Age: 83
End: 2018-01-22

## 2018-01-23 VITALS
SYSTOLIC BLOOD PRESSURE: 138 MMHG | OXYGEN SATURATION: 98 % | DIASTOLIC BLOOD PRESSURE: 70 MMHG | HEART RATE: 71 BPM | BODY MASS INDEX: 25.97 KG/M2 | WEIGHT: 120.38 LBS | HEIGHT: 57 IN

## 2018-01-23 VITALS
HEIGHT: 57 IN | DIASTOLIC BLOOD PRESSURE: 72 MMHG | OXYGEN SATURATION: 98 % | HEART RATE: 78 BPM | SYSTOLIC BLOOD PRESSURE: 110 MMHG | BODY MASS INDEX: 25.78 KG/M2 | WEIGHT: 119.5 LBS

## 2018-01-24 NOTE — RESULT NOTES
Verified Results  NM MYOCARDIAL PERFUSION SPECT (RX STRESS AND/OR REST) 67JRG1870 12:06PM Paul Archer Order Number: [id number]    - Patient Instructions: To schedule this appointment, please contact Central Scheduling at [phone]  Test Name Result Flag Reference   NM MYOCARDIAL PERFUSION SPECT (RX STRESS AND/OR REST) (Report)     [address] [place], [address] [place] [address]   [phone]     Rest/Stress Gated SPECT Myocardial Perfusion Imaging After Regadenoson     Patient: Sánchez Yoder   MR number: [id number]   Account number: [de-identified]   : 1935   Age: [de-identified] years   Gender: Female   Status: Outpatient   Location: [facility]   Height: 56 8 in   Weight: 119 lb   BP: 138/ 78 mmHg     Allergies: ALLERGIES NOT ON FILE     Diagnosis: R06 02 - Shortness of breath     Primary Physician: Jena Meraz MD   Interpreting Physician: Haydee Kaur MD   Referring Physician: Haydee Kaur MD   Technician: Jihan MORALES, BA, AART(N)   Group: [facility]   Other: Rosa Huber MS, CCT     INDICATIONS: Detection of CAD     HISTORY: The patient is a [de-identified]year old  female  Chest pain status: no chest pain  Other symptoms: dyspnea of recent onset  Coronary artery disease risk factors: dyslipidemia, hypertension, and post-menopausal state  Cardiovascular   history: transient ischemic attack  Co-morbidity: history of lung disease  Medications: a beta blocker, an ACE inhibitor/ARB, aspirin, a lipid lowering agent, and thyroid medications  REST ECG: Normal sinus rhythm  Normal baseline ECG  PROCEDURE: The study was performed at [address] [place] [address]  A regadenoson infusion pharmacologic stress test was performed  Gated SPECT myocardial perfusion imaging was performed after stress and at rest  Systolic blood pressure   was 138 mmHg, at the start of the study   Diastolic blood pressure was 78 mmHg, at the start of the study  The heart rate was 60 bpm, at the start of the study  Regadenoson protocol:   HR bpm SBP mmHg DBP mmHg   Baseline 60 138 76   Immediate 90 118 64   1 min 88 -- --   2 min 84 136 62   No medications or fluids given  STRESS SUMMARY: Duration of pharmacologic stress was 3 min  Maximal heart rate during stress was 90 bpm  The rate-pressure product for the peak heart rate and blood pressure was 06828  There was no chest pain during stress  The stress test   was terminated due to protocol completion  The stress ECG was negative for ischemia and normal  There were no stress arrhythmias or conduction abnormalities  ISOTOPE ADMINISTRATION:   Resting isotope administration Stress isotope administration   Agent Tetrofosmin Tetrofosmin   Dose 10 81 mCi 32 8 mCi   Date 01/19/2018 01/19/2018   Injection-image interval 30 min 45 min     The radiopharmaceutical was injected at the peak effect of pharmacologic stress  MYOCARDIAL PERFUSION IMAGING:   The image quality was good  Left ventricular size was normal      PERFUSION DEFECTS:   - There were no perfusion defects  GATED SPECT:   Left ventricular ejection fraction was within normal limits by visual estimate  LVEF 70%  There was no left ventricular regional abnormality  SUMMARY:   - Stress results: There was no chest pain during stress  - ECG conclusions: The stress ECG was negative for ischemia and normal    - Perfusion imaging: There were no perfusion defects    - Gated SPECT: Left ventricular ejection fraction was within normal limits by visual estimate  LVEF 70%  There was no left ventricular regional abnormality  IMPRESSIONS: Normal study  Myocardial perfusion imaging was normal at rest and with stress   Left ventricular systolic function was normal      Prepared and signed by     Param Blancas MD   Signed 01/20/2018 59:78:81

## 2018-01-25 ENCOUNTER — HOSPITAL ENCOUNTER (OUTPATIENT)
Dept: NON INVASIVE DIAGNOSTICS | Facility: CLINIC | Age: 83
Discharge: HOME | End: 2018-01-25
Payer: MEDICARE

## 2018-01-25 DIAGNOSIS — R06.02 SHORTNESS OF BREATH: ICD-10-CM

## 2018-01-25 PROCEDURE — 93306 TTE W/DOPPLER COMPLETE: CPT | Performed by: INTERNAL MEDICINE

## 2018-01-25 PROCEDURE — C8929 TTE W OR WO FOL WCON,DOPPLER: HCPCS

## 2018-01-25 RX ADMIN — PERFLUTREN 2 ML/MIN: 6.52 INJECTION, SUSPENSION INTRAVENOUS at 14:56

## 2018-02-06 ENCOUNTER — OFFICE VISIT (OUTPATIENT)
Dept: INTERNAL MEDICINE CLINIC | Facility: CLINIC | Age: 83
End: 2018-02-06
Payer: MEDICARE

## 2018-02-06 ENCOUNTER — LAB (OUTPATIENT)
Dept: LAB | Facility: CLINIC | Age: 83
End: 2018-02-06
Payer: MEDICARE

## 2018-02-06 DIAGNOSIS — R10.11 RIGHT UPPER QUADRANT ABDOMINAL PAIN: ICD-10-CM

## 2018-02-06 DIAGNOSIS — R10.11 RIGHT UPPER QUADRANT ABDOMINAL PAIN: Primary | ICD-10-CM

## 2018-02-06 LAB
ALBUMIN SERPL BCP-MCNC: 4 G/DL (ref 3.5–5)
ALP SERPL-CCNC: 96 U/L (ref 46–116)
ALT SERPL W P-5'-P-CCNC: 30 U/L (ref 12–78)
AMYLASE SERPL-CCNC: 129 IU/L (ref 25–115)
ANION GAP SERPL CALCULATED.3IONS-SCNC: 5 MMOL/L (ref 4–13)
AST SERPL W P-5'-P-CCNC: 31 U/L (ref 5–45)
BASOPHILS # BLD AUTO: 0.02 THOUSANDS/ΜL (ref 0–0.1)
BASOPHILS NFR BLD AUTO: 0 % (ref 0–1)
BILIRUB DIRECT SERPL-MCNC: 0.12 MG/DL (ref 0–0.2)
BILIRUB SERPL-MCNC: 0.29 MG/DL (ref 0.2–1)
BUN SERPL-MCNC: 15 MG/DL (ref 5–25)
CALCIUM SERPL-MCNC: 9.1 MG/DL (ref 8.3–10.1)
CHLORIDE SERPL-SCNC: 105 MMOL/L (ref 100–108)
CO2 SERPL-SCNC: 28 MMOL/L (ref 21–32)
CREAT SERPL-MCNC: 1.16 MG/DL (ref 0.6–1.3)
EOSINOPHIL # BLD AUTO: 0.84 THOUSAND/ΜL (ref 0–0.61)
EOSINOPHIL NFR BLD AUTO: 13 % (ref 0–6)
ERYTHROCYTE [DISTWIDTH] IN BLOOD BY AUTOMATED COUNT: 14.3 % (ref 11.6–15.1)
GFR SERPL CREATININE-BSD FRML MDRD: 45 ML/MIN/1.73SQ M
GLUCOSE SERPL-MCNC: 99 MG/DL (ref 65–140)
HCT VFR BLD AUTO: 33.9 % (ref 34.8–46.1)
HGB BLD-MCNC: 11.3 G/DL (ref 11.5–15.4)
LIPASE SERPL-CCNC: 208 U/L (ref 73–393)
LYMPHOCYTES # BLD AUTO: 1.68 THOUSANDS/ΜL (ref 0.6–4.47)
LYMPHOCYTES NFR BLD AUTO: 26 % (ref 14–44)
MCH RBC QN AUTO: 30.9 PG (ref 26.8–34.3)
MCHC RBC AUTO-ENTMCNC: 33.3 G/DL (ref 31.4–37.4)
MCV RBC AUTO: 93 FL (ref 82–98)
MONOCYTES # BLD AUTO: 0.55 THOUSAND/ΜL (ref 0.17–1.22)
MONOCYTES NFR BLD AUTO: 9 % (ref 4–12)
NEUTROPHILS # BLD AUTO: 3.34 THOUSANDS/ΜL (ref 1.85–7.62)
NEUTS SEG NFR BLD AUTO: 52 % (ref 43–75)
NRBC BLD AUTO-RTO: 0 /100 WBCS
PLATELET # BLD AUTO: 208 THOUSANDS/UL (ref 149–390)
PMV BLD AUTO: 11.1 FL (ref 8.9–12.7)
POTASSIUM SERPL-SCNC: 4.5 MMOL/L (ref 3.5–5.3)
PROT SERPL-MCNC: 7.9 G/DL (ref 6.4–8.2)
RBC # BLD AUTO: 3.66 MILLION/UL (ref 3.81–5.12)
SODIUM SERPL-SCNC: 138 MMOL/L (ref 136–145)
WBC # BLD AUTO: 6.44 THOUSAND/UL (ref 4.31–10.16)

## 2018-02-06 PROCEDURE — 99213 OFFICE O/P EST LOW 20 MIN: CPT | Performed by: NURSE PRACTITIONER

## 2018-02-06 PROCEDURE — 36415 COLL VENOUS BLD VENIPUNCTURE: CPT

## 2018-02-06 PROCEDURE — 80048 BASIC METABOLIC PNL TOTAL CA: CPT

## 2018-02-06 PROCEDURE — 80076 HEPATIC FUNCTION PANEL: CPT

## 2018-02-06 PROCEDURE — 82150 ASSAY OF AMYLASE: CPT

## 2018-02-06 PROCEDURE — 85025 COMPLETE CBC W/AUTO DIFF WBC: CPT

## 2018-02-06 PROCEDURE — 83690 ASSAY OF LIPASE: CPT

## 2018-02-06 RX ORDER — OCTISALATE, AVOBENZONE, HOMOSALATE, AND OCTOCRYLENE 29.4; 29.4; 49; 25.48 MG/ML; MG/ML; MG/ML; MG/ML
1 LOTION TOPICAL DAILY
COMMUNITY

## 2018-02-06 RX ORDER — ROSUVASTATIN CALCIUM 10 MG/1
TABLET, COATED ORAL
COMMUNITY
Start: 2016-10-18 | End: 2018-02-27 | Stop reason: SDUPTHER

## 2018-02-06 RX ORDER — LISINOPRIL 10 MG/1
1 TABLET ORAL DAILY
COMMUNITY
Start: 2017-10-09 | End: 2018-03-08 | Stop reason: SDUPTHER

## 2018-02-06 RX ORDER — ASPIRIN 81 MG/1
81 TABLET, CHEWABLE ORAL DAILY
COMMUNITY
Start: 2017-10-09 | End: 2018-12-11

## 2018-02-06 RX ORDER — LISINOPRIL 5 MG/1
5 TABLET ORAL DAILY
Status: ON HOLD | COMMUNITY
Start: 2017-03-09 | End: 2018-04-04

## 2018-02-06 RX ORDER — ATENOLOL 50 MG/1
TABLET ORAL
COMMUNITY
Start: 2016-10-19 | End: 2018-02-27 | Stop reason: SDUPTHER

## 2018-02-06 RX ORDER — ACETAMINOPHEN 325 MG/1
1-2 TABLET ORAL EVERY 6 HOURS PRN
COMMUNITY

## 2018-02-06 RX ORDER — ALPRAZOLAM 0.5 MG/1
0.5 TABLET ORAL DAILY PRN
COMMUNITY
End: 2021-12-04

## 2018-02-06 RX ORDER — LEVOTHYROXINE SODIUM 0.05 MG/1
50 TABLET ORAL DAILY
COMMUNITY
End: 2018-03-08 | Stop reason: SDUPTHER

## 2018-02-06 RX ORDER — FERROUS SULFATE 325(65) MG
1 TABLET ORAL DAILY
COMMUNITY
Start: 2017-10-09

## 2018-02-06 RX ORDER — IMIPRAMINE HYDROCHLORIDE 10 MG/1
10 TABLET, FILM COATED ORAL
COMMUNITY
Start: 2014-09-17 | End: 2018-05-30 | Stop reason: SDUPTHER

## 2018-02-06 NOTE — PROGRESS NOTES
Assessment/Plan:   Abdominal pain right upper quadrant with history of gallstones  We will check labs and ultrasound as noted above of any time she has worsening symptoms she should go to the emergency room           Subjective:     Patient ID: Amy Bustos is a [de-identified] y o  female  For the last 1-2 weeks patient has been having some stomach issues  She has been having acid come back in her throat  She is having stabbing pains to the right upper abdomen  No change in her bowels  She does have history of gallstones  No actual vomiting  Review of Systems   Constitutional: Positive for appetite change  Negative for chills and diaphoresis  HENT: Negative  Eyes: Negative  Respiratory: Negative  Cardiovascular: Negative  Gastrointestinal: Positive for abdominal distention, abdominal pain and nausea  Negative for diarrhea and vomiting  Genitourinary: Negative for dysuria, flank pain and frequency  Musculoskeletal: Negative  Neurological: Negative  Objective:     Physical Exam   Constitutional: She is oriented to person, place, and time  She appears well-developed and well-nourished  HENT:   Head: Normocephalic and atraumatic  Eyes: Conjunctivae are normal  Pupils are equal, round, and reactive to light  Neck: Normal range of motion  Neck supple  Cardiovascular: Normal rate, regular rhythm and normal heart sounds  Pulmonary/Chest: Effort normal and breath sounds normal    Abdominal: She exhibits distension  There is tenderness  There is guarding  Neurological: She is alert and oriented to person, place, and time  Skin: Skin is warm and dry  She is not diaphoretic

## 2018-02-06 NOTE — PATIENT INSTRUCTIONS
Abdominal pain right upper quadrant with history of gallstones    We will check labs and ultrasound as noted above of any time she has worsening symptoms she should go to the emergency room

## 2018-02-08 ENCOUNTER — TELEPHONE (OUTPATIENT)
Dept: INTERNAL MEDICINE CLINIC | Facility: CLINIC | Age: 83
End: 2018-02-08

## 2018-02-08 ENCOUNTER — TRANSCRIBE ORDERS (OUTPATIENT)
Dept: RADIOLOGY | Facility: CLINIC | Age: 83
End: 2018-02-08

## 2018-02-09 ENCOUNTER — HOSPITAL ENCOUNTER (OUTPATIENT)
Dept: ULTRASOUND IMAGING | Facility: CLINIC | Age: 83
Discharge: HOME | End: 2018-02-09
Payer: MEDICARE

## 2018-02-09 PROCEDURE — 76705 ECHO EXAM OF ABDOMEN: CPT

## 2018-02-14 ENCOUNTER — OFFICE VISIT (OUTPATIENT)
Dept: CARDIOLOGY CLINIC | Facility: CLINIC | Age: 83
End: 2018-02-14
Payer: MEDICARE

## 2018-02-14 VITALS
WEIGHT: 121.8 LBS | HEART RATE: 85 BPM | HEIGHT: 58 IN | OXYGEN SATURATION: 98 % | DIASTOLIC BLOOD PRESSURE: 72 MMHG | SYSTOLIC BLOOD PRESSURE: 144 MMHG | BODY MASS INDEX: 25.57 KG/M2

## 2018-02-14 DIAGNOSIS — F41.9 ANXIETY: ICD-10-CM

## 2018-02-14 DIAGNOSIS — R07.89 CHEST DISCOMFORT: Primary | ICD-10-CM

## 2018-02-14 DIAGNOSIS — R94.31 ABNORMAL EKG: ICD-10-CM

## 2018-02-14 PROCEDURE — 99213 OFFICE O/P EST LOW 20 MIN: CPT | Performed by: INTERNAL MEDICINE

## 2018-02-14 NOTE — PROGRESS NOTES
Cardiology Follow Up    Rylee Gloria  1935  [id number]  [address] [place] [id number]    1  Chest discomfort     2  Anxiety     3  Abnormal EKG       Chief Complaint   Patient presents with    Results       Interval History:  Patient presents for follow-up visit  Patient denies any history of chest pain  Does have some shortness of breath which is stable  Patient also has significant anxiety  She states that she has been compliant with all her present medications  No new complaints otherwise  Patient Active Problem List   Diagnosis    Chest discomfort    Anxiety    Abnormal EKG     No past medical history on file  Social History     Social History    Marital status:      Spouse name: N/A    Number of children: N/A    Years of education: N/A     Occupational History    Not on file  Social History Main Topics    Smoking status: Former Smoker    Smokeless tobacco: Never Used    Alcohol use Not on file    Drug use: Unknown    Sexual activity: Not on file     Other Topics Concern    Not on file     Social History Narrative    No narrative on file      No family history on file  No past surgical history on file      Current Outpatient Prescriptions:     acetaminophen (TYLENOL) 325 mg tablet, Take 1-2 tablets by mouth every 6 (six) hours as needed, Disp: , Rfl:     ALPRAZolam (XANAX) 0 5 mg tablet, Take 1 tablet by mouth daily as needed, Disp: , Rfl:     aspirin 81 mg chewable tablet, Chew 1 tablet daily, Disp: , Rfl:     atenolol (TENORMIN) 50 mg tablet, , Disp: , Rfl:     ferrous sulfate 325 (65 Fe) mg tablet, Take 1 tablet by mouth daily, Disp: , Rfl:     imipramine (TOFRANIL) 10 mg tablet, Take by mouth, Disp: , Rfl:     levothyroxine 50 mcg tablet, Take by mouth, Disp: , Rfl:     lisinopril (ZESTRIL) 10 mg tablet, Take 1 tablet by mouth daily, Disp: , Rfl:     lisinopril (ZESTRIL) 5 mg tablet, , Disp: , Rfl:     Probiotic Product (ALIGN) 4 MG CAPS, Take 1 tablet by mouth daily, Disp: , Rfl:     rosuvastatin (CRESTOR) 10 MG tablet, , Disp: , Rfl:   Allergies   Allergen Reactions    Other      Annotation - [id number]: ANTIDEPRESSANTS       Labs:  Lab on 02/06/2018   Component Date Value    Total Bilirubin 02/06/2018 0 29     Bilirubin, Direct 02/06/2018 0 12     Alkaline Phosphatase 02/06/2018 96     AST 02/06/2018 31     ALT 02/06/2018 30     Total Protein 02/06/2018 7 9     Albumin 02/06/2018 4 0     Amylase 02/06/2018 129*    WBC 02/06/2018 6 44     RBC 02/06/2018 3 66*    Hemoglobin 02/06/2018 11 3*    Hematocrit 02/06/2018 33 9*    MCV 02/06/2018 93     MCH 02/06/2018 30 9     MCHC 02/06/2018 33 3     RDW 02/06/2018 14 3     MPV 02/06/2018 11 1     Platelets 05/54/2923 208     nRBC 02/06/2018 0     Neutrophils Relative 02/06/2018 52     Lymphocytes Relative 02/06/2018 26     Monocytes Relative 02/06/2018 9     Eosinophils Relative 02/06/2018 13*    Basophils Relative 02/06/2018 0     Neutrophils Absolute 02/06/2018 3 34     Lymphocytes Absolute 02/06/2018 1 68     Monocytes Absolute 02/06/2018 0 55     Eosinophils Absolute 02/06/2018 0 84*    Basophils Absolute 02/06/2018 0 02     Sodium 02/06/2018 138     Potassium 02/06/2018 4 5     Chloride 02/06/2018 105     CO2 02/06/2018 28     Anion Gap 02/06/2018 5     BUN 02/06/2018 15     Creatinine 02/06/2018 1 16     Glucose 02/06/2018 99     Calcium 02/06/2018 9 1     eGFR 02/06/2018 45     Lipase 02/06/2018 [address] Outpatient Visit on 01/19/2018   Component Date Value    Protocol Name 01/19/2018 LEXISCAN-SIT     Time In Exercise Phase 01/19/2018 00:03:00     MAX   SYSTOLIC BP 47/11/5038 550     Max Diastolic Bp 85/04/8487 76     Max Heart Rate 01/19/2018 93     Max Predicted Heart Rate 01/19/2018 140     Reason for Termination 01/19/2018 Protocol Complete  Test Indication 2018 SOB     Target Hr Formular 2018 (220 - Age)*85%     Arrhy During Ex 2018 none     Chest Pain Statement 2018 none    Appointment on 2018   Component Date Value    TSH 3RD GENERATON 2018 1 920    Appointment on 2018   Component Date Value    Total CK 2018 113     Troponin I 2018 <0 02      Imaging: Nm Procedure (historical)    Result Date: 2018  Narrative: [address] [place], [address] [place] [address]  [phone]  Rest/Stress Gated SPECT Myocardial Perfusion Imaging After Regadenoson  Patient: Ida Rendon  MR number: [id number]  Account number: [de-identified]  : 1935  Age: [de-identified] years  Gender: Female  Status: Outpatient  Location: [facility]  Height: 56 8 in  Weight: 119 lb  BP: 138/ 78 mmHg  Allergies: ALLERGIES NOT ON FILE  Diagnosis: R06 02 - Shortness of breath  Primary Physician:  Mega Schreiber MD  Interpreting Physician:  Dyana Mix MD  Referring Physician:  Dyana Mix MD  Technician:  Joaquim Lombardo BS, BA, AART(N)  Group:  [facility]  Other:  Kevin Andrews MS, CCT  INDICATIONS: Detection of CAD  HISTORY: The patient is a [de-identified]year old  female  Chest pain status: no chest pain  Other symptoms: dyspnea of recent onset  Coronary artery disease risk factors: dyslipidemia, hypertension, and post-menopausal state  Cardiovascular  history: transient ischemic attack  Co-morbidity: history of lung disease  Medications: a beta blocker, an ACE inhibitor/ARB, aspirin, a lipid lowering agent, and thyroid medications  REST ECG: Normal sinus rhythm  Normal baseline ECG  PROCEDURE: The study was performed at [address] [place] [address]  A regadenoson infusion pharmacologic stress test was performed   Gated SPECT myocardial perfusion imaging was performed after stress and at rest  Systolic blood pressure  was 138 mmHg, at the start of the study  Diastolic blood pressure was 78 mmHg, at the start of the study  The heart rate was 60 bpm, at the start of the study  Regadenoson protocol:  HR bpm SBP mmHg DBP mmHg  Baseline 60 138 76  Immediate 90 118 64  1 min 88 -- --  2 min 84 136 62  No medications or fluids given  STRESS SUMMARY: Duration of pharmacologic stress was 3 min  Maximal heart rate during stress was 90 bpm  The rate-pressure product for the peak heart rate and blood pressure was 59729  There was no chest pain during stress  The stress test  was terminated due to protocol completion  The stress ECG was negative for ischemia and normal  There were no stress arrhythmias or conduction abnormalities  ISOTOPE ADMINISTRATION:  Resting isotope administration Stress isotope administration  Agent Tetrofosmin Tetrofosmin  Dose 10 81 mCi 32 8 mCi  Date 01/19/2018 01/19/2018  Injection-image interval 30 min 45 min  The radiopharmaceutical was injected at the peak effect of pharmacologic stress  MYOCARDIAL PERFUSION IMAGING:  The image quality was good  Left ventricular size was normal   PERFUSION DEFECTS:  -  There were no perfusion defects  GATED SPECT:  Left ventricular ejection fraction was within normal limits by visual estimate  LVEF 70%  There was no left ventricular regional abnormality  SUMMARY:  -  Stress results: There was no chest pain during stress  -  ECG conclusions: The stress ECG was negative for ischemia and normal   -  Perfusion imaging: There were no perfusion defects   -  Gated SPECT: Left ventricular ejection fraction was within normal limits by visual estimate  LVEF 70%  There was no left ventricular regional abnormality  IMPRESSIONS: Normal study  Myocardial perfusion imaging was normal at rest and with stress   Left ventricular systolic function was normal   Prepared and signed by  Cade Cason MD  Signed 01/20/2018 22:07:23    Us Abdomen Limited    Result Date: 2/9/2018  Narrative: RIGHT UPPER QUADRANT ULTRASOUND INDICATION:  46-year-old female, post prandial pain, cholelithiasis COMPARISON:  None  TECHNIQUE:   Real-time ultrasound of the right upper quadrant was performed with a curvilinear transducer with both volumetric sweeps and still imaging techniques  FINDINGS: PANCREAS:  Visualized portions of the pancreas are within normal limits  AORTA AND IVC:  Visualized portions are normal for patient age  LIVER: Size:  Within normal range  The liver measures 12 4 cm in the midclavicular line  Contour:  Surface contour is smooth  Parenchyma:  Echogenicity and echotexture are within normal limits  No evidence of suspicious mass  Limited imaging of the main portal vein shows it to be patent and hepatopetal   BILIARY: The gallbladder is normal in caliber  No wall thickening or pericholecystic fluid  Single large gallstone in the fundus No sonographic Meehan's sign  No intrahepatic biliary dilatation  CBD measures 3 mm  No choledocholithiasis  KIDNEY: Right kidney measures 9 cm  Within normal limits  ASCITES:   None       Impression: Cholelithiasis without evidence of acute cholecystitis Findings personally provided to the cell phone voicemail of Rekhajose angel Octaviano at approximately 11:45 AM, 2/9/2018 Workstation performed: [id number]       Review of Systems:  Review of Systems   REVIEW OF SYSTEMS:  Constitutional:  Denies fever or chills   Eyes:  Denies change in visual acuity   HENT:  Denies nasal congestion or sore throat   Respiratory:  Denies cough or shortness of breath   Cardiovascular:  Denies chest pain or edema   GI:  Denies abdominal pain, nausea, vomiting, bloody stools or diarrhea   :  Denies dysuria, frequency, difficulty in micturition and nocturia  Musculoskeletal:  Denies back pain or joint pain   Neurologic:  Denies headache, focal weakness or sensory changes   Endocrine:  Denies polyuria or polydipsia   Lymphatic:  Denies swollen glands   Psychiatric:   anxiety   /72   Pulse 85   Ht 4' 10" (1 473 m)   Wt 55 2 kg (121 lb 12 8 oz)   SpO2 98%   BMI 25 46 kg/m²     Physical Exam:  Physical Exam   PHYSICAL EXAM:  General:  Patient is not in acute distress   Head: Normocephalic, Atraumatic  HEENT:  Both pupils normal-size atraumatic, normocephalic, nonicteric  Neck:  JVP not raised  Trachea central  No carotid bruit  Respiratory:  normal breath sounds no crackles  no rhonchi  Cardiovascular:  Regular rate and rhythm no S3 no murmurs  GI:  Abdomen soft nontender  No organomegaly  Lymphatic:  No cervical or inguinal lymphadenopathy  Neurologic:  Patient is awake alert, oriented   Grossly nonfocal    Discussion/Summary:  Patient with atypical chest pain  Patient had an echocardiogram which showed normal ejection fraction with no significant valvular abnormalities  Patient also had a pharmacological nuclear stress test which showed no evidence of ischemia with normal ejection fraction  Results of these 2 studies were discussed with patient and family  Sensitivity and specificity also discussed with patient and family  Symptoms to watch out from cardiac standpoint which would indicate the need for further cardiac evaluation including considerations for cardiac catheterization discussed  Follow-up in 6 months  Follow-up with primary care physician

## 2018-02-16 ENCOUNTER — OFFICE VISIT (OUTPATIENT)
Dept: INTERNAL MEDICINE CLINIC | Facility: CLINIC | Age: 83
End: 2018-02-16
Payer: MEDICARE

## 2018-02-16 VITALS
SYSTOLIC BLOOD PRESSURE: 138 MMHG | WEIGHT: 123 LBS | DIASTOLIC BLOOD PRESSURE: 60 MMHG | HEART RATE: 72 BPM | BODY MASS INDEX: 25.71 KG/M2

## 2018-02-16 DIAGNOSIS — Z12.39 SCREENING BREAST EXAMINATION: ICD-10-CM

## 2018-02-16 DIAGNOSIS — K80.20 CALCULUS OF GALLBLADDER WITHOUT CHOLECYSTITIS WITHOUT OBSTRUCTION: Primary | ICD-10-CM

## 2018-02-16 PROCEDURE — 99213 OFFICE O/P EST LOW 20 MIN: CPT | Performed by: NURSE PRACTITIONER

## 2018-02-16 NOTE — PATIENT INSTRUCTIONS
Single gallstone with intermittent right upper abdominal pain nausea and vomiting symptoms are currently resolved  We had discussion in regards to just monitoring symptoms versus surgery evaluation  She would like to get the opinion of her surgeon  Referral made    If any time she has upper abdominal pain nausea vomiting fever she should seek medical attention immediately

## 2018-02-16 NOTE — PROGRESS NOTES
Assessment/Plan:  Single gallstone with intermittent right upper abdominal pain nausea and vomiting symptoms are currently resolved  We had discussion in regards to just monitoring symptoms versus surgery evaluation  She would like to get the opinion of her surgeon  Referral made  If any time she has upper abdominal pain nausea vomiting fever she should seek medical attention immediately         Diagnoses and all orders for this visit:    Calculus of gallbladder without cholecystitis without obstruction  -     Ambulatory referral to General Surgery; Future    Screening breast examination  -     Mammo screening bilateral w 3d & cad; Future          Subjective:      Patient ID: Hodan Calero is a [de-identified] y o  female  Here to review ultrasound  Sx are not resolved        The following portions of the patient's history were reviewed and updated as appropriate: allergies, current medications, past family history, past medical history, past social history, past surgical history and problem list     Review of Systems   Constitutional: Negative for appetite change, chills, diaphoresis, fatigue, fever and unexpected weight change  HENT: Negative for postnasal drip and sneezing  Eyes: Negative for visual disturbance  Respiratory: Negative for chest tightness and shortness of breath  Cardiovascular: Negative for chest pain, palpitations and leg swelling  Gastrointestinal: Negative for abdominal pain and blood in stool  Endocrine: Negative for cold intolerance, heat intolerance, polydipsia, polyphagia and polyuria  Genitourinary: Negative for difficulty urinating, dysuria, frequency and urgency  Musculoskeletal: Negative for arthralgias and myalgias  Skin: Negative for rash and wound  Neurological: Negative for dizziness, weakness, light-headedness and headaches  Hematological: Negative for adenopathy  Psychiatric/Behavioral: Negative for confusion, dysphoric mood and sleep disturbance   The patient is not nervous/anxious  Objective:      /60   Pulse 72   Wt 55 8 kg (123 lb)   BMI 25 71 kg/m²          Physical Exam   Constitutional: She is oriented to person, place, and time  She appears well-developed  No distress  HENT:   Head: Normocephalic and atraumatic  Nose: Nose normal    Mouth/Throat: Oropharynx is clear and moist    Eyes: Conjunctivae and EOM are normal  Pupils are equal, round, and reactive to light  Neck: Normal range of motion  Neck supple  No JVD present  No tracheal deviation present  No thyromegaly present  Cardiovascular: Normal rate, regular rhythm, normal heart sounds and intact distal pulses  Exam reveals no gallop and no friction rub  No murmur heard  Pulmonary/Chest: Effort normal and breath sounds normal  No respiratory distress  She has no wheezes  She has no rales  Abdominal: Soft  Bowel sounds are normal  She exhibits no distension  There is no tenderness  Musculoskeletal: Normal range of motion  She exhibits no edema  Lymphadenopathy:     She has no cervical adenopathy  Neurological: She is alert and oriented to person, place, and time  No cranial nerve deficit  Skin: Skin is warm and dry  No rash noted  She is not diaphoretic  Psychiatric: She has a normal mood and affect   Her behavior is normal  Judgment and thought content normal

## 2018-02-27 ENCOUNTER — HOSPITAL ENCOUNTER (EMERGENCY)
Facility: HOSPITAL | Age: 83
Discharge: HOME | End: 2018-02-27
Attending: EMERGENCY MEDICINE | Admitting: EMERGENCY MEDICINE
Payer: MEDICARE

## 2018-02-27 ENCOUNTER — APPOINTMENT (EMERGENCY)
Dept: CT IMAGING | Facility: HOSPITAL | Age: 83
End: 2018-02-27
Payer: MEDICARE

## 2018-02-27 ENCOUNTER — APPOINTMENT (EMERGENCY)
Dept: RADIOLOGY | Facility: HOSPITAL | Age: 83
End: 2018-02-27
Payer: MEDICARE

## 2018-02-27 VITALS
RESPIRATION RATE: 20 BRPM | OXYGEN SATURATION: 100 % | BODY MASS INDEX: 24.87 KG/M2 | WEIGHT: 119 LBS | SYSTOLIC BLOOD PRESSURE: 124 MMHG | TEMPERATURE: 97.9 F | HEART RATE: 72 BPM | DIASTOLIC BLOOD PRESSURE: 56 MMHG

## 2018-02-27 DIAGNOSIS — R53.83 FATIGUE: ICD-10-CM

## 2018-02-27 DIAGNOSIS — I10 ESSENTIAL HYPERTENSION: Primary | ICD-10-CM

## 2018-02-27 DIAGNOSIS — E78.49 OTHER HYPERLIPIDEMIA: ICD-10-CM

## 2018-02-27 DIAGNOSIS — E86.0 DEHYDRATION: Primary | ICD-10-CM

## 2018-02-27 LAB
ALBUMIN SERPL BCP-MCNC: 3.7 G/DL (ref 3.5–5)
ALP SERPL-CCNC: 94 U/L (ref 46–116)
ALT SERPL W P-5'-P-CCNC: 26 U/L (ref 12–78)
ANION GAP SERPL CALCULATED.3IONS-SCNC: 8 MMOL/L (ref 4–13)
AST SERPL W P-5'-P-CCNC: 26 U/L (ref 5–45)
ATRIAL RATE: 68 BPM
BACTERIA UR QL AUTO: ABNORMAL /HPF
BASOPHILS # BLD AUTO: 0.03 THOUSANDS/ΜL (ref 0–0.1)
BASOPHILS NFR BLD AUTO: 0 % (ref 0–1)
BILIRUB DIRECT SERPL-MCNC: 0.08 MG/DL (ref 0–0.2)
BILIRUB SERPL-MCNC: 0.3 MG/DL (ref 0.2–1)
BILIRUB UR QL STRIP: NEGATIVE
BUN SERPL-MCNC: 17 MG/DL (ref 5–25)
CALCIUM SERPL-MCNC: 9 MG/DL (ref 8.3–10.1)
CHLORIDE SERPL-SCNC: 103 MMOL/L (ref 100–108)
CLARITY UR: CLEAR
CO2 SERPL-SCNC: 27 MMOL/L (ref 21–32)
COLOR UR: YELLOW
CREAT SERPL-MCNC: 1.34 MG/DL (ref 0.6–1.3)
EOSINOPHIL # BLD AUTO: 0.77 THOUSAND/ΜL (ref 0–0.61)
EOSINOPHIL NFR BLD AUTO: 11 % (ref 0–6)
ERYTHROCYTE [DISTWIDTH] IN BLOOD BY AUTOMATED COUNT: 13.9 % (ref 11.6–15.1)
GFR SERPL CREATININE-BSD FRML MDRD: 37 ML/MIN/1.73SQ M
GLUCOSE SERPL-MCNC: 91 MG/DL (ref 65–140)
GLUCOSE UR STRIP-MCNC: NEGATIVE MG/DL
HCT VFR BLD AUTO: 32.3 % (ref 34.8–46.1)
HGB BLD-MCNC: 10.5 G/DL (ref 11.5–15.4)
HGB UR QL STRIP.AUTO: ABNORMAL
KETONES UR STRIP-MCNC: NEGATIVE MG/DL
LACTATE SERPL-SCNC: 0.8 MMOL/L (ref 0.5–2)
LEUKOCYTE ESTERASE UR QL STRIP: NEGATIVE
LIPASE SERPL-CCNC: 248 U/L (ref 73–393)
LYMPHOCYTES # BLD AUTO: 1.76 THOUSANDS/ΜL (ref 0.6–4.47)
LYMPHOCYTES NFR BLD AUTO: 25 % (ref 14–44)
MCH RBC QN AUTO: 30.1 PG (ref 26.8–34.3)
MCHC RBC AUTO-ENTMCNC: 32.5 G/DL (ref 31.4–37.4)
MCV RBC AUTO: 93 FL (ref 82–98)
MONOCYTES # BLD AUTO: 0.8 THOUSAND/ΜL (ref 0.17–1.22)
MONOCYTES NFR BLD AUTO: 11 % (ref 4–12)
NEUTROPHILS # BLD AUTO: 3.76 THOUSANDS/ΜL (ref 1.85–7.62)
NEUTS SEG NFR BLD AUTO: 53 % (ref 43–75)
NITRITE UR QL STRIP: NEGATIVE
NON-SQ EPI CELLS URNS QL MICRO: ABNORMAL /HPF
NRBC BLD AUTO-RTO: 0 /100 WBCS
P AXIS: 41 DEGREES
PH UR STRIP.AUTO: 6.5 [PH] (ref 4.5–8)
PLATELET # BLD AUTO: 182 THOUSANDS/UL (ref 149–390)
PMV BLD AUTO: 9.8 FL (ref 8.9–12.7)
POTASSIUM SERPL-SCNC: 4.4 MMOL/L (ref 3.5–5.3)
PR INTERVAL: 154 MS
PROT SERPL-MCNC: 7.4 G/DL (ref 6.4–8.2)
PROT UR STRIP-MCNC: NEGATIVE MG/DL
QRS AXIS: 12 DEGREES
QRSD INTERVAL: 88 MS
QT INTERVAL: 428 MS
QTC INTERVAL: 455 MS
RBC # BLD AUTO: 3.49 MILLION/UL (ref 3.81–5.12)
RBC #/AREA URNS AUTO: ABNORMAL /HPF
SODIUM SERPL-SCNC: 138 MMOL/L (ref 136–145)
SP GR UR STRIP.AUTO: <=1.005 (ref 1–1.03)
T WAVE AXIS: 30 DEGREES
TROPONIN I SERPL-MCNC: <0.02 NG/ML
UROBILINOGEN UR QL STRIP.AUTO: 0.2 E.U./DL
VENTRICULAR RATE: 68 BPM
WBC # BLD AUTO: 7.13 THOUSAND/UL (ref 4.31–10.16)
WBC #/AREA URNS AUTO: ABNORMAL /HPF

## 2018-02-27 PROCEDURE — 93010 ELECTROCARDIOGRAM REPORT: CPT | Performed by: INTERNAL MEDICINE

## 2018-02-27 PROCEDURE — 81001 URINALYSIS AUTO W/SCOPE: CPT | Performed by: PHYSICIAN ASSISTANT

## 2018-02-27 PROCEDURE — 85025 COMPLETE CBC W/AUTO DIFF WBC: CPT | Performed by: PHYSICIAN ASSISTANT

## 2018-02-27 PROCEDURE — 71046 X-RAY EXAM CHEST 2 VIEWS: CPT

## 2018-02-27 PROCEDURE — 93005 ELECTROCARDIOGRAM TRACING: CPT

## 2018-02-27 PROCEDURE — 96360 HYDRATION IV INFUSION INIT: CPT

## 2018-02-27 PROCEDURE — 83690 ASSAY OF LIPASE: CPT | Performed by: PHYSICIAN ASSISTANT

## 2018-02-27 PROCEDURE — 83605 ASSAY OF LACTIC ACID: CPT | Performed by: PHYSICIAN ASSISTANT

## 2018-02-27 PROCEDURE — 99285 EMERGENCY DEPT VISIT HI MDM: CPT

## 2018-02-27 PROCEDURE — 36415 COLL VENOUS BLD VENIPUNCTURE: CPT | Performed by: PHYSICIAN ASSISTANT

## 2018-02-27 PROCEDURE — 80048 BASIC METABOLIC PNL TOTAL CA: CPT | Performed by: PHYSICIAN ASSISTANT

## 2018-02-27 PROCEDURE — 84484 ASSAY OF TROPONIN QUANT: CPT | Performed by: PHYSICIAN ASSISTANT

## 2018-02-27 PROCEDURE — 80076 HEPATIC FUNCTION PANEL: CPT | Performed by: PHYSICIAN ASSISTANT

## 2018-02-27 PROCEDURE — 74176 CT ABD & PELVIS W/O CONTRAST: CPT

## 2018-02-27 RX ORDER — ROSUVASTATIN CALCIUM 10 MG/1
10 TABLET, COATED ORAL DAILY
Qty: 90 TABLET | Refills: 3 | Status: SHIPPED | OUTPATIENT
Start: 2018-02-27 | End: 2018-05-30 | Stop reason: SDUPTHER

## 2018-02-27 RX ORDER — ATENOLOL 50 MG/1
50 TABLET ORAL DAILY
Qty: 90 TABLET | Refills: 3 | Status: SHIPPED | OUTPATIENT
Start: 2018-02-27 | End: 2018-05-30 | Stop reason: SDUPTHER

## 2018-02-27 RX ADMIN — SODIUM CHLORIDE 1000 ML: 0.9 INJECTION, SOLUTION INTRAVENOUS at 17:10

## 2018-02-27 NOTE — ED PROVIDER NOTES
History  Chief Complaint   Patient presents with    Weakness - Generalized     Pt presents to ER with c/o's feeling weak & drained over the last few days, pt also reports lower back pressure that radiates across into her pelvis & also some nausea  Healthy appearing frail elderly female presents in no distress  82-year-old female here for evaluation of generalized fatigue over the past 3 days  Associate with mild suprapubic pain  Decreased urinary frequency  Intermittent nausea but no vomiting  No difficulty walking  No slurred speech  No headache lightheadedness or dizziness  No chest pain or shortness of breath  She states she does feels very run down  She has had poor p o  intake  She lives with grandson who is been trying to encourage hydration  She denies any fevers or chills  No recent travels traumas or surgeries  She does note that she has a history of gallstones and she is scheduled to have an evaluation to have the gallbladder removed states the pain feels a bit different  Nothing she does makes the pain better or worse          History provided by:  Patient   used: No    Fatigue   Severity:  Mild  Onset quality:  Gradual  Duration:  3 days  Timing:  Constant  Progression:  Unchanged  Chronicity:  New  Context: not alcohol use, not allergies, not change in medication, not decreased sleep, not dehydration, not drug use, not increased activity, not pinched nerve, not recent infection, not stress and not urinary tract infection    Relieved by:  Nothing  Worsened by:  Nothing  Ineffective treatments:  None tried  Associated symptoms: abdominal pain    Associated symptoms: no anorexia, no aphasia, no arthralgias, no ataxia, no chest pain, no cough, no diarrhea, no difficulty walking, no dizziness, no drooling, no dysphagia, no dysuria, no numbness in extremities, no falls, no fever, no foul-smelling urine, no frequency, no headaches, no hematochezia, no lethargy, no loss of consciousness, no melena, no myalgias, no nausea, no near-syncope, no seizures, no sensory-motor deficit, no shortness of breath, no stroke symptoms, no syncope, no urgency, no vision change and no vomiting    Risk factors: no anemia, no congestive heart failure, no coronary artery disease, no diabetes, no excessive menstruation, no family hx of stroke, no heart disease, no neurologic disease, no new medications and no recent stressors        Prior to Admission Medications   Prescriptions Last Dose Informant Patient Reported? Taking? ALPRAZolam (XANAX) 0 5 mg tablet  Self Yes Yes   Sig: Take 1 tablet by mouth daily as needed   Probiotic Product (ALIGN) 4 MG CAPS  Self Yes Yes   Sig: Take 1 tablet by mouth daily   acetaminophen (TYLENOL) 325 mg tablet  Self Yes Yes   Sig: Take 1-2 tablets by mouth every 6 (six) hours as needed   aspirin 81 mg chewable tablet  Self Yes Yes   Sig: Chew 1 tablet daily   atenolol (TENORMIN) 50 mg tablet   No Yes   Sig: Take 1 tablet (50 mg total) by mouth daily   ferrous sulfate 325 (65 Fe) mg tablet  Self Yes Yes   Sig: Take 1 tablet by mouth daily   imipramine (TOFRANIL) 10 mg tablet  Self Yes Yes   Sig: Take 10 mg by mouth 4 (four) times a week     levothyroxine 50 mcg tablet  Self Yes Yes   Sig: Take 50 mcg by mouth daily     lisinopril (ZESTRIL) 10 mg tablet  Self Yes Yes   Sig: Take 1 tablet by mouth daily   lisinopril (ZESTRIL) 5 mg tablet  Self Yes Yes   Sig: Take 5 mg by mouth daily     rosuvastatin (CRESTOR) 10 MG tablet   No Yes   Sig: Take 1 tablet (10 mg total) by mouth daily      Facility-Administered Medications: None       Past Medical History:   Diagnosis Date    Disease of thyroid gland     GERD (gastroesophageal reflux disease)     Hyperlipidemia     Hypertension        Past Surgical History:   Procedure Laterality Date    APPENDECTOMY      HYSTERECTOMY         History reviewed  No pertinent family history    I have reviewed and agree with the history as documented  Social History   Substance Use Topics    Smoking status: Former Smoker    Smokeless tobacco: Never Used    Alcohol use No        Review of Systems   Constitutional: Positive for fatigue  Negative for activity change, appetite change, chills, diaphoresis, fever and unexpected weight change  HENT: Negative for congestion, drooling, rhinorrhea, sinus pressure, sore throat and trouble swallowing  Eyes: Negative for photophobia and visual disturbance  Respiratory: Negative for apnea, cough, choking, chest tightness, shortness of breath, wheezing and stridor  Cardiovascular: Negative for chest pain, palpitations, leg swelling, syncope and near-syncope  Gastrointestinal: Positive for abdominal pain  Negative for abdominal distention, anorexia, blood in stool, constipation, diarrhea, dysphagia, hematochezia, melena, nausea and vomiting  Genitourinary: Negative for decreased urine volume, difficulty urinating, dysuria, enuresis, flank pain, frequency, hematuria and urgency  Musculoskeletal: Negative for arthralgias, falls, myalgias, neck pain and neck stiffness  Skin: Negative for color change, pallor, rash and wound  Allergic/Immunologic: Negative  Neurological: Negative for dizziness, tremors, seizures, loss of consciousness, syncope, weakness, light-headedness, numbness and headaches  Hematological: Negative  Psychiatric/Behavioral: Negative  All other systems reviewed and are negative        Physical Exam  ED Triage Vitals [02/27/18 1621]   Temperature Pulse Respirations Blood Pressure SpO2   97 9 °F (36 6 °C) 65 16 (!) 193/86 100 %      Temp Source Heart Rate Source Patient Position - Orthostatic VS BP Location FiO2 (%)   Oral Monitor Sitting Right arm --      Pain Score       --           Orthostatic Vital Signs  Vitals:    02/27/18 1621 02/27/18 1815   BP: (!) 193/86 164/70   Pulse: 65 73   Patient Position - Orthostatic VS: Sitting Lying       Physical Exam Constitutional: She is oriented to person, place, and time  She appears well-developed and well-nourished  Non-toxic appearance  She does not have a sickly appearance  She does not appear ill  No distress  HENT:   Head: Normocephalic and atraumatic  Eyes: EOM and lids are normal  Pupils are equal, round, and reactive to light  Neck: Normal range of motion  Neck supple  Cardiovascular: Normal rate, regular rhythm, S1 normal, S2 normal, normal heart sounds, intact distal pulses and normal pulses  Exam reveals no gallop, no distant heart sounds, no friction rub and no decreased pulses  No murmur heard  Pulses:       Radial pulses are 2+ on the right side, and 2+ on the left side  Pulmonary/Chest: Effort normal and breath sounds normal  No accessory muscle usage  No apnea, no tachypnea and no bradypnea  No respiratory distress  She has no decreased breath sounds  She has no wheezes  She has no rhonchi  She has no rales  Abdominal: Soft  Normal appearance and bowel sounds are normal  She exhibits no distension and no mass  There is tenderness  There is no rigidity, no rebound and no guarding  No hernia  She has no abdominal tenderness "it just hurts"   Musculoskeletal: Normal range of motion  She exhibits no edema, tenderness or deformity  Neurological: She is alert and oriented to person, place, and time  No cranial nerve deficit  GCS eye subscore is 4  GCS verbal subscore is 5  GCS motor subscore is 6  GCS 15  AAOx3  No focal neuro deficits  CN II-XII grossly intact  Speech normal, no aphasia or dysarthria  No pronator drift  Cerebellar function normal  Finger to nose normal  PERRL  EOMI  Peripheral vision intact  No nystagmus  Upper and lower extremity strength 5/5 through   strength 5/5 b/l  Gross sensation intact b/l  Skin: Skin is warm, dry and intact  No rash noted  She is not diaphoretic  No erythema  No pallor     Psychiatric: Her speech is normal    Nursing note and vitals reviewed  ED Medications  Medications   sodium chloride 0 9 % bolus 1,000 mL (0 mL Intravenous Stopped 2/27/18 1831)       Diagnostic Studies  Results Reviewed     Procedure Component Value Units Date/Time    Troponin I [[id number]]  (Normal) Collected:  02/27/18 1706    Lab Status:  Final result Specimen:  Blood from Arm, Right Updated:  02/27/18 1743     Troponin I <0 02 ng/mL     Narrative:         [employer] Chemistry analyzer 99% cutoff is > 0 04 ng/mL in network labs    o cTnI 99% cutoff is useful only when applied to patients in the clinical setting of myocardial ischemia  o cTnI 99% cutoff should be interpreted in the context of clinical history, ECG findings and possibly cardiac imaging to establish correct diagnosis  o cTnI 99% cutoff may be suggestive but clearly not indicative of a coronary event without the clinical setting of myocardial ischemia  Lactic acid, plasma [[id number]]  (Normal) Collected:  02/27/18 1706    Lab Status:  Final result Specimen:  Blood from Arm, Right Updated:  02/27/18 1743     LACTIC ACID 0 8 mmol/L     Narrative:         Result may be elevated if tourniquet was used during collection      Hepatic function panel [[id number]]  (Normal) Collected:  02/27/18 1706    Lab Status:  Final result Specimen:  Blood from Arm, Right Updated:  02/27/18 1737     Total Bilirubin 0 30 mg/dL      Bilirubin, Direct 0 08 mg/dL      Alkaline Phosphatase 94 U/L      AST 26 U/L      ALT 26 U/L      Total Protein 7 4 g/dL      Albumin 3 7 g/dL     Lipase [[id number]]  (Normal) Collected:  02/27/18 1706    Lab Status:  Final result Specimen:  Blood from Arm, Right Updated:  02/27/18 1737     Lipase 248 u/L     Basic metabolic panel [[id number]]  (Abnormal) Collected:  02/27/18 1706    Lab Status:  Final result Specimen:  Blood from Arm, Right Updated:  02/27/18 1736     Sodium 138 mmol/L      Potassium 4 4 mmol/L      Chloride 103 mmol/L      CO2 27 mmol/L      Anion Gap 8 mmol/L      BUN 17 mg/dL Creatinine 1 34 (H) mg/dL      Glucose 91 mg/dL      Calcium 9 0 mg/dL      eGFR 37 ml/min/1 73sq m     Narrative:         National Kidney Disease Education Program recommendations are as follows:  GFR calculation is accurate only with a steady state creatinine  Chronic Kidney disease less than 60 ml/min/1 73 sq  meters  Kidney failure less than 15 ml/min/1 73 sq  meters      Urine Microscopic [[id number]]  (Abnormal) Collected:  02/27/18 1711    Lab Status:  Final result Specimen:  Urine from Urine, Clean Catch Updated:  02/27/18 1734     RBC, UA 2-4 (A) /hpf      WBC, UA 0-1 (A) /hpf      Epithelial Cells Occasional /hpf      Bacteria, UA Occasional /hpf     UA w Reflex to Microscopic w Reflex to Culture [[id number]]  (Abnormal) Collected:  02/27/18 1711    Lab Status:  Final result Specimen:  Urine from Urine, Clean Catch Updated:  02/27/18 1721     Color, UA Yellow     Clarity, UA Clear     Specific Gravity, UA <=1 005     pH, UA 6 5     Leukocytes, UA Negative     Nitrite, UA Negative     Protein, UA Negative mg/dl      Glucose, UA Negative mg/dl      Ketones, UA Negative mg/dl      Urobilinogen, UA 0 2 E U /dl      Bilirubin, UA Negative     Blood, UA Small (A)    CBC and differential [[id number]]  (Abnormal) Collected:  02/27/18 1706    Lab Status:  Final result Specimen:  Blood from Arm, Right Updated:  02/27/18 1718     WBC 7 13 Thousand/uL      RBC 3 49 (L) Million/uL      Hemoglobin 10 5 (L) g/dL      Hematocrit 32 3 (L) %      MCV 93 fL      MCH 30 1 pg      MCHC 32 5 g/dL      RDW 13 9 %      MPV 9 8 fL      Platelets 198 Thousands/uL      nRBC 0 /100 WBCs      Neutrophils Relative 53 %      Lymphocytes Relative 25 %      Monocytes Relative 11 %      Eosinophils Relative 11 (H) %      Basophils Relative 0 %      Neutrophils Absolute 3 76 Thousands/µL      Lymphocytes Absolute 1 76 Thousands/µL      Monocytes Absolute 0 80 Thousand/µL      Eosinophils Absolute 0 77 (H) Thousand/µL      Basophils Absolute 0 03 Thousands/µL                  XR chest 2 views   ED Interpretation by Maryellen Mortimer, PA-C (02/27 1850)   No acute pulmonary abnormalities  Final Result by Laquita Bajwa MD (02/27 1906)      No acute cardiopulmonary disease  Workstation performed: [id number]         CT abdomen pelvis wo contrast   Final Result by Laquita Bajwa MD (02/27 1814)      1  Cholelithiasis without evidence of acute cholecystitis  2   No evidence of acute abnormality in the abdomen or pelvis  Workstation performed: [id number]                    Procedures  ECG 12 Lead Documentation  Date/Time: 2/27/2018 5:10 PM  Performed by: Brenda Arcos  Authorized by: Jered Haile     Indications / Diagnosis:  Generalized weakness  ECG reviewed by me, the ED Provider: yes    Patient location:  ED  Previous ECG:     Previous ECG:  Unavailable    Comparison to cardiac monitor: Yes    Interpretation:     Interpretation: normal    Quality:     Tracing quality:  Limited by artifact  Rate:     ECG rate:  68    ECG rate assessment: normal    Rhythm:     Rhythm: sinus rhythm    Ectopy:     Ectopy: none    QRS:     QRS axis:  Normal    QRS intervals:  Normal  Conduction:     Conduction: normal    ST segments:     ST segments:  Normal  T waves:     T waves: normal             Phone Contacts  ED Phone Contact    ED Course  ED Course as of Feb 27 1949   Tue Feb 27, 2018   1934 Pt states she feels much better  She is ambulating in department without difficulty  Would like to go home            HEART Risk Score    Flowsheet Row Most Recent Value   History  0 Filed at: 02/27/2018 1949   ECG  0 Filed at: 02/27/2018 1949   Age  2 Filed at: 02/27/2018 1949   Risk Factors  1 Filed at: 02/27/2018 1949   Troponin  0 Filed at: 02/27/2018 1949   Heart Score Risk Calculator   History  0 Filed at: 02/27/2018 1949   ECG  0 Filed at: 02/27/2018 1949   Age  2 Filed at: 02/27/2018 1949   Risk Factors  1 Filed at: 02/27/2018 1949 Troponin  0 Filed at: 02/27/2018 1949   HEART Score  3 Filed at: 02/27/2018 1949   HEART Score  3 Filed at: 02/27/2018 1949                            [facility]  Number of Diagnoses or Management Options  Dehydration: new and requires workup  Fatigue: new and requires workup  Diagnosis management comments: DDx including but not limited to: metabolic abnormality, dehydration, viral illness, anemia, ACS, MI, thyroid disease, intracranial process, other infectious process including UTI  Plan: Cardiac workup  Hydration  Urine dip  dispo pending  CT a/p  Amount and/or Complexity of Data Reviewed  Clinical lab tests: ordered and reviewed  Tests in the radiology section of CPT®: ordered and reviewed  Independent visualization of images, tracings, or specimens: yes    Risk of Complications, Morbidity, and/or Mortality  Presenting problems: moderate  Management options: low  General comments: [de-identified] y/o female with fatigue  She feels much better with IV fluids  Symptoms greatly improved  Mild EVAN  Remainder of lab evaluation is unremarkable  Vitals unremarkable  No pain at this time  She has close follow up  Do not suspect TIA/CVA  Doubt intracranial etiology  She is ambulating without difficulty  Urine with occasional bacteria  Will follow up on culture  Return parameters provided  Pt understands and agrees with plan  Pt lives with grandson and can easily return if symptoms worsen  Patient Progress  Patient progress: stable    CritCare Time    Disposition  Final diagnoses:   Dehydration   Fatigue     Time reflects when diagnosis was documented in both MDM as applicable and the Disposition within this note     Time User Action Codes Description Comment    2/27/2018  7:35 PM Jessica Mckeon Add [E86 0] Dehydration     2/27/2018  7:35 PM Jessica Mckeon Add [R53 83] Fatigue       ED Disposition     ED Disposition Condition Comment    Discharge  Neomia Garza discharge to home/self care      Condition at discharge: Good        Follow-up Information     Follow up With Specialties Details Why Contact Info    Angi Marshall MD Internal Medicine Call to schedule an appointment in 3-5 days for a follow up appointment [address]  [phone]          Patient's Medications   Discharge Prescriptions    No medications on file     No discharge procedures on file      ED Provider  Electronically Signed by           Huan Brandon PA-C  02/27/18 [facility]01 [facility]  02/27/18 9264

## 2018-02-28 ENCOUNTER — OFFICE VISIT (OUTPATIENT)
Dept: INTERNAL MEDICINE CLINIC | Facility: CLINIC | Age: 83
End: 2018-02-28
Payer: MEDICARE

## 2018-02-28 VITALS
HEART RATE: 64 BPM | RESPIRATION RATE: 12 BRPM | BODY MASS INDEX: 25.48 KG/M2 | WEIGHT: 121.4 LBS | DIASTOLIC BLOOD PRESSURE: 70 MMHG | HEIGHT: 58 IN | SYSTOLIC BLOOD PRESSURE: 124 MMHG

## 2018-02-28 DIAGNOSIS — K80.20 CALCULUS OF GALLBLADDER WITHOUT CHOLECYSTITIS WITHOUT OBSTRUCTION: ICD-10-CM

## 2018-02-28 DIAGNOSIS — N17.9 ACUTE KIDNEY INJURY (HCC): Primary | ICD-10-CM

## 2018-02-28 DIAGNOSIS — G45.9 TRANSIENT CEREBRAL ISCHEMIA, UNSPECIFIED TYPE: ICD-10-CM

## 2018-02-28 PROCEDURE — 99214 OFFICE O/P EST MOD 30 MIN: CPT | Performed by: INTERNAL MEDICINE

## 2018-02-28 NOTE — PATIENT INSTRUCTIONS
Word-finding issues -the symptoms are concerning to me for transient ischemic attack  Patient has multiple vascular risk factors, echo and nuclear stress test were reviewed from last month and unremarkable, will check CT of brain as well as carotid ultrasound  Continue anti-platelet therapy with aspirin for now  Acute kidney injury -check comprehensive metabolic panel in about 2 weeks, keep well-hydrated    Cholelithiasis without cholecystitis -it appears she is having intermittent symptoms that could be related to transient obstruction of cystic duct, I agree with surgical consultation  Patient advised to avoid high fat meals

## 2018-02-28 NOTE — PROGRESS NOTES
Assessment/Plan:    No problem-specific Assessment & Plan notes found for this encounter  Diagnoses and all orders for this visit:    Acute kidney injury ([address] )  -     Comprehensive metabolic panel; Future    Transient cerebral ischemia, unspecified type  -     CT head wo contrast; Future  -     VAS carotid complete study; Future    Calculus of gallbladder without cholecystitis without obstruction    Other orders  -     Corn Dextrin (EQL FIBER SUPPLEMENT PO); Take by mouth        Patient Instructions    Word-finding issues -the symptoms are concerning to me for transient ischemic attack  Patient has multiple vascular risk factors, echo and nuclear stress test were reviewed from last month and unremarkable, will check CT of brain as well as carotid ultrasound  Continue anti-platelet therapy with aspirin for now  Acute kidney injury -check comprehensive metabolic panel in about 2 weeks, keep well-hydrated    Cholelithiasis without cholecystitis -it appears she is having intermittent symptoms that could be related to transient obstruction of cystic duct, I agree with surgical consultation  Patient advised to avoid high fat meals  Subjective:      Patient ID: Nash Holguin is a [de-identified] y o  female  Patient is here to follow up ER evaluation yesterday  She states that she woke and had breakfast and was not feeling well so she went to bed and when she woke she had difficulty word finding  She said she knew what she wanted to say but she could not say it  She went to the emergency department was evaluated with chest x-ray and CT of the abdomen and pelvis  CT abdomen pelvis was notable for 2 cm gallstone without evidence of cholecystitis  Blood workup was notable for mild increase in serum creatinine from baseline up to 1 3  Patient was told she was dehydrated, though serum sodium was normal at 138  She denies any recent change in diet and there has been no nausea or vomiting    She says her urine is clear without color  She had workup several weeks ago for right upper quadrant pain and nausea and this gallstone was found  She is having some postprandial right upper quadrant pain and nausea and some reflux as well  She does have appointment scheduled to see Dr Adriane Iglesias for surgical evaluation on March 13th  Her word-finding issue improved  She does get left-sided frontal headaches  She is not having visual disturbance, dysarthria, facial droop, focal numbness, tingling or weakness  The following portions of the patient's history were reviewed and updated as appropriate: allergies, current medications, past family history, past medical history, past social history, past surgical history and problem list     Review of Systems   Constitutional: Negative for appetite change, chills, diaphoresis, fatigue, fever and unexpected weight change  HENT: Negative for congestion, hearing loss and rhinorrhea  Eyes: Negative for visual disturbance  Respiratory: Negative for cough, chest tightness, shortness of breath and wheezing  Cardiovascular: Negative for chest pain, palpitations and leg swelling  Gastrointestinal: Positive for abdominal pain  Negative for blood in stool  Endocrine: Negative for cold intolerance, heat intolerance, polydipsia and polyuria  Genitourinary: Negative for difficulty urinating, dysuria, frequency and urgency  Musculoskeletal: Negative for arthralgias and myalgias  Skin: Negative for rash  Neurological: Positive for speech difficulty and headaches  Negative for dizziness, weakness and light-headedness  Hematological: Does not bruise/bleed easily  Psychiatric/Behavioral: Negative for dysphoric mood and sleep disturbance           Objective:      /70 (BP Location: Left arm, Patient Position: Sitting)   Pulse 64   Resp 12   Ht 4' 10" (1 473 m)   Wt 55 1 kg (121 lb 6 4 oz)   BMI 25 37 kg/m²          Physical Exam   Constitutional: She is oriented to person, place, and time  She appears well-developed and well-nourished  HENT:   Head: Normocephalic and atraumatic  Nose: Nose normal    Mouth/Throat: Oropharynx is clear and moist    Eyes: Conjunctivae and EOM are normal  Pupils are equal, round, and reactive to light  No scleral icterus  Neck: Normal range of motion  Neck supple  No JVD present  No tracheal deviation present  No thyromegaly present  Cardiovascular: Normal rate, regular rhythm and intact distal pulses  Exam reveals no gallop and no friction rub  No murmur heard  Pulmonary/Chest: Effort normal and breath sounds normal  No respiratory distress  She has no wheezes  She has no rales  Abdominal: Soft  Bowel sounds are normal  She exhibits no distension and no mass  There is tenderness  There is no rebound and no guarding  Mild right upper quadrant tenderness to palpation   Musculoskeletal: She exhibits no edema or tenderness  Lymphadenopathy:     She has no cervical adenopathy  Neurological: She is alert and oriented to person, place, and time  No cranial nerve deficit  Skin: Skin is warm and dry  No rash noted  No erythema  Psychiatric: She has a normal mood and affect   Her behavior is normal  Judgment and thought content normal

## 2018-02-28 NOTE — DISCHARGE INSTRUCTIONS
Return to the Emergency Department sooner if increased pain, fever, vomiting, difficulty breathing, rash  Dehydration   WHAT YOU NEED TO KNOW:   Dehydration is a condition that develops when your body does not have enough fluid  You may become dehydrated if you do not drink enough water or lose too much fluid  Fluid loss may also cause loss of electrolytes (minerals), such as sodium  DISCHARGE INSTRUCTIONS:   Return to the emergency department if:   · You have a seizure  · You are confused or cannot think clearly  · You are extremely sleepy, or another person cannot wake you  · You become dizzy or faint when you stand  · You are not able to urinate  · You have trouble breathing  · You have a fast or irregular heartbeat  · Your hands or feet are cold, or your face is pale  Contact your healthcare provider if:   · You have trouble drinking liquids because you are vomiting  · Your symptoms get worse  · You have a fever  · You feel very weak or tired  · You have questions or concerns about your condition or care  Follow up with your healthcare provider as directed:  Write down your questions so you remember to ask them during your visits  Prevent or manage dehydration:   · Drink liquids as directed  Liquids that contain water, sugar, and minerals can help your body hold in fluid and help prevent dehydration  Drink liquids throughout the day, not just when you feel thirsty  Men should drink about 3 liters (13 eight-ounce cups) of liquid each day  Women should drink about 2 liters (9 eight-ounce cups) of liquid each day  Drink even more liquid if you will be outdoors, in the sun for a long time, or exercising  · Stay cool  Limit the time you spend outdoors during the hottest part of the day  Dress in lightweight clothes  · Keep track of how often you urinate  If you urinate less than usual or your urine is darker, drink more liquids    © 2017 [employer] Information is for End User's use only and may not be sold, redistributed or otherwise used for commercial purposes  All illustrations and images included in CareNotes® are the copyrighted property of [employer]  or [employer]  The above information is an  only  It is not intended as medical advice for individual conditions or treatments  Talk to your doctor, nurse or pharmacist before following any medical regimen to see if it is safe and effective for you  Fatigue   WHAT YOU NEED TO KNOW:   Fatigue is mental and physical exhaustion that does not get better with rest  Fatigue may make daily activities difficult or cause extreme sleepiness  It is normal to feel tired sometimes, but long-term fatigue may be a sign of serious illness  DISCHARGE INSTRUCTIONS:   Seek care immediately if:   · You have chest pain  · You have difficulty breathing  Contact your healthcare provider if:   · You have a cough that gets worse, or does not go away  · You see blood in your urine or bowel movement  · You have numbness or tingling around your mouth or in an arm or leg  · You faint, feel dizzy, or have vision changes  · You have swelling in your lymph nodes  · You are a woman and have vaginal bleeding that is not normal for you, or is not expected  · You lose weight without trying, or you have trouble eating  · You feel weak or have muscle pain  · You have pain or swelling in your joints  · You have questions or concerns about your condition or care  Follow up with your healthcare provider as directed: You may need more tests  Your healthcare provider may also refer you to a specialist  Write down your questions so you remember to ask them during your visits  Manage fatigue:   · Keep a fatigue diary  Include anything that makes you feel more tired or less tired  Bring the diary with you to follow-up visits with your provider      · Exercise as directed  Exercise can help you feel more alert  Exercise can also help you manage stress or relieve depression  Try to get at least 30 minutes of exercise most days of the week  · Keep a regular sleep schedule  Go to bed and wake up at the same times every day  Limit naps to 1 hour each day  A nap can improve fatigue, but a long nap may make it harder to go to sleep at night  · Plan and limit your activities  Limit the number of activities such as shopping and cleaning you do each day  If possible, try to spread out your trips throughout the week  Plan ahead so you are not rushing to get something done  Only do activities that you have the energy to complete  Take breaks between activities  Ask for help if you need it  Another person may be able to drive you or help with daily activities  · Eat a variety of healthy foods  Healthy foods include fruits, vegetables, whole-grain breads, low-fat dairy products, beans, lean meats, and fish  Good nutrition can help manage fatigue  · Limit caffeine and alcohol  These can make it difficult to fall or stay asleep  Women should limit alcohol to 1 drink a day  Men should limit alcohol to 2 drinks a day  A drink of alcohol is 12 ounces of beer, 5 ounces of wine, or 1½ ounces of liquor  Ask our healthcare provider how much caffeine is safe for you  · Do not smoke  Nicotine and other chemicals in cigarettes and cigars can cause lung damage and increase fatigue  Ask your healthcare provider for information if you currently smoke and need help to quit  E-cigarettes or smokeless tobacco still contain nicotine  Talk to your healthcare provider before you use these products  © 2017 [facility] Information is for End User's use only and may not be sold, redistributed or otherwise used for commercial purposes  All illustrations and images included in CareNotes® are the copyrighted property of [employer]  or [employer]    The above information is an  only  It is not intended as medical advice for individual conditions or treatments  Talk to your doctor, nurse or pharmacist before following any medical regimen to see if it is safe and effective for you

## 2018-03-06 ENCOUNTER — HOSPITAL ENCOUNTER (OUTPATIENT)
Dept: CT IMAGING | Facility: HOSPITAL | Age: 83
Discharge: HOME | End: 2018-03-06
Attending: INTERNAL MEDICINE
Payer: MEDICARE

## 2018-03-06 ENCOUNTER — HOSPITAL ENCOUNTER (OUTPATIENT)
Dept: ULTRASOUND IMAGING | Facility: HOSPITAL | Age: 83
Discharge: HOME | End: 2018-03-06
Attending: INTERNAL MEDICINE
Payer: MEDICARE

## 2018-03-06 DIAGNOSIS — G45.9 TRANSIENT CEREBRAL ISCHEMIA, UNSPECIFIED TYPE: ICD-10-CM

## 2018-03-06 PROCEDURE — 70450 CT HEAD/BRAIN W/O DYE: CPT

## 2018-03-06 PROCEDURE — 93880 EXTRACRANIAL BILAT STUDY: CPT

## 2018-03-06 PROCEDURE — 93880 EXTRACRANIAL BILAT STUDY: CPT | Performed by: SURGERY

## 2018-03-07 NOTE — PROGRESS NOTES
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)   7  Bug bite (919 4,E906 4) (W57 XXXA)   8  Chest pain (786 50) (R07 9)   9  Chronic kidney disease (585 9) (N18 9)   10  Colitis (558 9) (K52 9)   11  Conjunctivitis (372 30) (H10 9)   12  Elevated sedimentation rate (790 1) (R70 0)   13  Encounter for screening mammogram for malignant neoplasm of breast (V76 12)    (Z12 31)   14  Exertional dyspnea (786 09) (R06 09)   15  Fatigue (780 79) (R53 83)   16  Gait disturbance (781 2) (R26 9)   17  Gallstone (574 20) (K80 20)   18  GERD without esophagitis (530 81) (K21 9)   19  Headache (784 0) (R51)   20  Hyperlipidemia (272 4) (E78 5)   21  Hypertension (401 9) (I10)   22  Hypothyroidism (244 9) (E03 9)   23  Irritable bowel syndrome (564 1) (K58 9)   24  Myalgia And Myositis (729 1)   25  Need for revaccination (V05 9) (Z23)   26  Palpitations (785 1) (R00 2)   27  Pelvic pain in female (625 9) (R10 2)   28  Postmenopausal atrophic vaginitis (627 3) (N95 2)   29  Rib pain on left side (786 50) (R07 81)   30  Scalp irritation (709 9) (R23 8)   31  Symptoms Referable To Multiple Joints (719 69)   32  Urine frequency (788 41) (R35 0)   33  Vitamin D deficiency (268 9) (E53 9)    Immunizations  Influenza --- Aury Majano: 25-Oct-2010; Series2: 01-Sep-2015   PPSV --- Aury He: 11-Jan-2016   Tdap --- Aury He: 02-Jun-2015   Zoster --- Aury Majano: Never     Current Meds   1  Align CAPS; 1 TAB DAILY   2  ALPRAZolam 0 5 MG Oral Tablet; take 1 tablet daily as needed   3  Aspir-Lois 325 MG TBEC; 1-2tab when needed   4  Atenolol 50 MG Oral Tablet; take 1 tablet by mouth once daily   5  Clobetasol Propionate 0 05 % External Ointment; APPLY SPARINGLY TO AFFECTED   AREA(S) TWICE DAILY   6  Crestor 10 MG Oral Tablet; Take 1 tablet daily   7  [employer] Fiber Gummies CHEW; 1 tab  Twice a day   8  Imipramine HCl - 10 MG Oral Tablet; 1 po qd, Dr Cintia Quesada   9  Levothyroxine Sodium 75 MCG Oral Tablet; TAKE 1 TABLET DAILY   10  Omeprazole 20 MG Oral Capsule Delayed Release; TAKE 1 CAPSULE qod alternating w/    zantac   11  RaNITidine HCl - 150 MG Oral Tablet; TAKE 1 TABLET 1-2 TIMES A DAY AS NEEDED   12  Tylenol 325 MG Oral Tablet; TAKE 1 TO 2 TABLETS EVERY 6 HOURS AS NEEDED   13  Vitamin D3 2000 UNIT Oral Tablet; 1 PO QD    Allergies    1  No Known Drug Allergies    2   Other    Signatures   Electronically signed by : ANDREA Iverson ; Aug 18 2017 12:22PM EST

## 2018-03-08 DIAGNOSIS — I10 ESSENTIAL HYPERTENSION: ICD-10-CM

## 2018-03-08 DIAGNOSIS — E03.9 ACQUIRED HYPOTHYROIDISM: Primary | ICD-10-CM

## 2018-03-08 RX ORDER — LEVOTHYROXINE SODIUM 0.05 MG/1
50 TABLET ORAL DAILY
Qty: 90 TABLET | Refills: 3 | Status: SHIPPED | OUTPATIENT
Start: 2018-03-08 | End: 2018-06-13 | Stop reason: SDUPTHER

## 2018-03-08 RX ORDER — LISINOPRIL 10 MG/1
10 TABLET ORAL DAILY
Qty: 90 TABLET | Refills: 0 | Status: SHIPPED | OUTPATIENT
Start: 2018-03-08 | End: 2018-06-13 | Stop reason: SDUPTHER

## 2018-03-13 ENCOUNTER — OFFICE VISIT (OUTPATIENT)
Dept: SURGERY | Facility: CLINIC | Age: 83
End: 2018-03-13
Payer: MEDICARE

## 2018-03-13 VITALS
TEMPERATURE: 98 F | BODY MASS INDEX: 27.24 KG/M2 | HEIGHT: 56 IN | HEART RATE: 66 BPM | SYSTOLIC BLOOD PRESSURE: 126 MMHG | WEIGHT: 121.07 LBS | DIASTOLIC BLOOD PRESSURE: 68 MMHG

## 2018-03-13 DIAGNOSIS — K80.10 CHRONIC CHOLECYSTITIS WITH CALCULUS: Primary | ICD-10-CM

## 2018-03-13 PROCEDURE — 99204 OFFICE O/P NEW MOD 45 MIN: CPT | Performed by: SURGERY

## 2018-03-13 RX ORDER — CEFAZOLIN SODIUM 1 G/3ML
1000 INJECTION, POWDER, FOR SOLUTION INTRAMUSCULAR; INTRAVENOUS
Status: CANCELLED | OUTPATIENT
Start: 2018-03-14 | End: 2018-03-15

## 2018-03-13 RX ORDER — SODIUM CHLORIDE, SODIUM LACTATE, POTASSIUM CHLORIDE, CALCIUM CHLORIDE 600; 310; 30; 20 MG/100ML; MG/100ML; MG/100ML; MG/100ML
125 INJECTION, SOLUTION INTRAVENOUS CONTINUOUS
Qty: 250 ML | Refills: 0 | Status: SHIPPED | OUTPATIENT
Start: 2018-03-13 | End: 2018-03-29

## 2018-03-13 RX ORDER — HEPARIN SODIUM 5000 [USP'U]/ML
5000 INJECTION, SOLUTION INTRAVENOUS; SUBCUTANEOUS
Status: CANCELLED | OUTPATIENT
Start: 2018-03-14 | End: 2018-03-15

## 2018-03-13 NOTE — PROGRESS NOTES
Consult- General Surgery   Myron Alcaraz [de-identified] y o  female MRN: [id number]  Unit/Bed#:  Encounter: [id number]    Assessment/Plan     Assessment:  Symptomatic gallstones, last attack was last night  Plan:  I advised the patient to undergo laparoscopic cholecystectomy with cholangiograms, possible open in the near future  I discussed the operative procedure, risks, benefits and alternatives with the patient, she understood and agreed to proceed  History of Present Illness     HPI:  I had the pleasure of seeing Myron Alcaraz in the office today accompanied by her daughter for evaluation of symptomatic gallstones  The patient is a pleasant [de-identified] y o  female who has been experiencing right upper quadrant abdominal pain, sharp in nature, on and off for several years, associated with mild nausea and after fatty food ingestion, without fever, chills, change in the color of the urine or stool  The patient went to the emergency room over a week ago and CT scan of the abdomen and pelvis revealed 2 cm gallstone  There was no evidence of acute cholecystitis at that time  Review of Systems   Constitutional: Negative for chills and fever  HENT: Negative for nosebleeds and sore throat  Eyes: Negative for pain and discharge  Respiratory: Negative for cough and shortness of breath  Cardiovascular: Negative for chest pain and palpitations  Gastrointestinal:        As per history of present illness  Endocrine: Negative for cold intolerance and heat intolerance  Genitourinary: Negative for dysuria and hematuria  Neurological: Negative for seizures and headaches  Hematological: Negative for adenopathy  Does not bruise/bleed easily  Psychiatric/Behavioral: Negative for confusion  The patient is not nervous/anxious          Historical Information   Past Medical History:   Diagnosis Date    Disease of thyroid gland     GERD (gastroesophageal reflux disease)     Hyperlipidemia     Hypertension      Past Surgical History:   Procedure Laterality Date    APPENDECTOMY      HYSTERECTOMY       Social History   History   Alcohol Use No     History   Drug Use No     History   Smoking Status    Former Smoker   Smokeless Tobacco    Never Used     Family History:   Family History   Problem Relation Age of Onset    Stroke Mother     Heart disease Father        Meds/Allergies   PTA meds:   Cannot display prior to admission medications because the patient has not been admitted in this contact  Allergies   Allergen Reactions    Other      Annotation - [id number]: ANTIDEPRESSANTS       Objective   First Vitals:   @VSFIRST2(5,8,6,7,9,11,14,10:FIRST)@    Current Vitals:   Blood Pressure: 126/68 (03/13/18 1334)  Pulse: 66 (03/13/18 1334)  Temperature: 98 °F (36 7 °C) (03/13/18 1334)  Temp Source: Oral (03/13/18 1334)  Height: 4' 8" (142 2 cm) (03/13/18 1334)  Weight - Scale: 54 9 kg (121 lb 1 1 oz) (03/13/18 1334)    [unfilled]    Invasive Devices          No matching active lines, drains, or airways          Physical Exam   Constitutional: She is oriented to person, place, and time  She appears well-developed and well-nourished  No distress  HENT:   Head: Normocephalic  Mouth/Throat: No oropharyngeal exudate  Eyes: Pupils are equal, round, and reactive to light  No scleral icterus  Neck: Normal range of motion  Neck supple  No thyromegaly present  Cardiovascular: Normal rate and regular rhythm  No murmur heard  Pulmonary/Chest: Effort normal and breath sounds normal  No respiratory distress  Abdominal: Soft  Bowel sounds are normal  She exhibits no mass  There is no tenderness  Musculoskeletal: She exhibits no edema or tenderness  Lymphadenopathy:     She has no cervical adenopathy  Neurological: She is alert and oriented to person, place, and time  No cranial nerve deficit  Skin: Skin is warm and dry  No erythema  Psychiatric: She has a normal mood and affect  Her behavior is normal          Imaging:  I have personally reviewed pertinent reports  EKG, Pathology, and Other Studies: I have personally reviewed pertinent reports  CT scan of the abdomen and pelvis, FINDINGS:     ABDOMEN     LOWER CHEST:  Mild dependent subsegmental atelectasis in the lower lobes  Lungs otherwise clear  Large hiatal hernia  Mural thickening in the distal esophagus, suggesting esophagitis      LIVER/BILIARY TREE:  Unremarkable      GALLBLADDER:  2 cm calcified gallstone  Gallbladder wall normal in thickness  No pericholecystic fluid      SPLEEN:  Unremarkable      PANCREAS:  Unremarkable      ADRENAL GLANDS:  Unremarkable      KIDNEYS/URETERS:  Unremarkable  No hydronephrosis  No calculi      STOMACH AND BOWEL:  Portion of the gastric fundus in a hiatal hernia  Stomach otherwise unremarkable  Small intestine normal in appearance  Diverticulosis in the sigmoid colon without evidence of diverticulitis  Colon otherwise unremarkable      APPENDIX:  No findings to suggest appendicitis  Appendectomy by history      ABDOMINOPELVIC CAVITY: No lymphadenopathy or mass  No ascites  No extraluminal gas      VESSELS:  Unremarkable for patient's age  No aortic aneurysm      PELVIS     REPRODUCTIVE ORGANS:  Post hysterectomy      URINARY BLADDER:  Unremarkable      ABDOMINAL WALL/INGUINAL REGIONS:  Small fat-containing umbilical hernia  Otherwise intact      OSSEOUS STRUCTURES:  No acute fracture or destructive osseous lesion      IMPRESSION:     1  Cholelithiasis without evidence of acute cholecystitis  2   No evidence of acute abnormality in the abdomen or pelvis

## 2018-03-14 PROBLEM — K80.10 CHRONIC CHOLECYSTITIS WITH CALCULUS: Status: ACTIVE | Noted: 2018-03-14

## 2018-03-16 ENCOUNTER — OFFICE VISIT (OUTPATIENT)
Dept: LAB | Facility: HOSPITAL | Age: 83
End: 2018-03-16
Attending: SURGERY
Payer: MEDICARE

## 2018-03-16 ENCOUNTER — APPOINTMENT (OUTPATIENT)
Dept: LAB | Facility: HOSPITAL | Age: 83
End: 2018-03-16
Attending: INTERNAL MEDICINE
Payer: MEDICARE

## 2018-03-16 DIAGNOSIS — K80.10 CHRONIC CHOLECYSTITIS WITH CALCULUS: ICD-10-CM

## 2018-03-16 DIAGNOSIS — N17.9 ACUTE KIDNEY INJURY (HCC): ICD-10-CM

## 2018-03-16 LAB
ALBUMIN SERPL BCP-MCNC: 3.6 G/DL (ref 3.5–5)
ALP SERPL-CCNC: 85 U/L (ref 46–116)
ALT SERPL W P-5'-P-CCNC: 25 U/L (ref 12–78)
ANION GAP SERPL CALCULATED.3IONS-SCNC: 10 MMOL/L (ref 4–13)
AST SERPL W P-5'-P-CCNC: 23 U/L (ref 5–45)
BILIRUB SERPL-MCNC: 0.3 MG/DL (ref 0.2–1)
BUN SERPL-MCNC: 16 MG/DL (ref 5–25)
CALCIUM SERPL-MCNC: 9 MG/DL (ref 8.3–10.1)
CHLORIDE SERPL-SCNC: 104 MMOL/L (ref 100–108)
CO2 SERPL-SCNC: 26 MMOL/L (ref 21–32)
CREAT SERPL-MCNC: 1.36 MG/DL (ref 0.6–1.3)
GFR SERPL CREATININE-BSD FRML MDRD: 37 ML/MIN/1.73SQ M
GLUCOSE SERPL-MCNC: 94 MG/DL (ref 65–140)
POTASSIUM SERPL-SCNC: 4.2 MMOL/L (ref 3.5–5.3)
PROT SERPL-MCNC: 7.3 G/DL (ref 6.4–8.2)
SODIUM SERPL-SCNC: 140 MMOL/L (ref 136–145)

## 2018-03-16 PROCEDURE — 93005 ELECTROCARDIOGRAM TRACING: CPT

## 2018-03-16 PROCEDURE — 36415 COLL VENOUS BLD VENIPUNCTURE: CPT

## 2018-03-16 PROCEDURE — 80053 COMPREHEN METABOLIC PANEL: CPT

## 2018-03-20 LAB
ATRIAL RATE: 72 BPM
P AXIS: 52 DEGREES
PR INTERVAL: 164 MS
QRS AXIS: 7 DEGREES
QRSD INTERVAL: 84 MS
QT INTERVAL: 418 MS
QTC INTERVAL: 457 MS
T WAVE AXIS: 52 DEGREES
VENTRICULAR RATE: 72 BPM

## 2018-03-20 PROCEDURE — 93010 ELECTROCARDIOGRAM REPORT: CPT | Performed by: INTERNAL MEDICINE

## 2018-03-26 ENCOUNTER — ANESTHESIA EVENT (OUTPATIENT)
Dept: PERIOP | Facility: HOSPITAL | Age: 83
DRG: 419 | End: 2018-03-26
Payer: MEDICARE

## 2018-03-27 NOTE — PRE-PROCEDURE INSTRUCTIONS
Pre-Surgery Instructions:   Medication Instructions    acetaminophen (TYLENOL) 325 mg tablet Patient was instructed by Physician and understands   ALPRAZolam (XANAX) 0 5 mg tablet Patient was instructed by Physician and understands   aspirin 81 mg chewable tablet Patient was instructed by Physician and understands   atenolol (TENORMIN) 50 mg tablet Patient was instructed by Physician and understands   Corn Dextrin (EQL FIBER SUPPLEMENT PO) Patient was instructed by Physician and understands   ferrous sulfate 325 (65 Fe) mg tablet Patient was instructed by Physician and understands   imipramine (TOFRANIL) 10 mg tablet Patient was instructed by Physician and understands   levothyroxine 50 mcg tablet Patient was instructed by Physician and understands   lisinopril (ZESTRIL) 10 mg tablet Patient was instructed by Physician and understands   Probiotic Product (ALIGN) 4 MG CAPS Patient was instructed by Physician and understands   rosuvastatin (CRESTOR) 10 MG tablet Patient was instructed by Physician and understands

## 2018-03-28 RX ORDER — CLOBETASOL PROPIONATE 0.5 MG/G
OINTMENT TOPICAL 2 TIMES DAILY
COMMUNITY
Start: 2016-07-19 | End: 2018-06-08

## 2018-03-28 RX ORDER — ACETAMINOPHEN 160 MG
TABLET,DISINTEGRATING ORAL DAILY
COMMUNITY
Start: 2016-07-13 | End: 2018-12-11

## 2018-03-28 RX ORDER — OMEPRAZOLE 20 MG/1
CAPSULE, DELAYED RELEASE ORAL
COMMUNITY
Start: 2016-02-01 | End: 2018-03-29

## 2018-03-29 ENCOUNTER — OFFICE VISIT (OUTPATIENT)
Dept: INTERNAL MEDICINE CLINIC | Facility: CLINIC | Age: 83
End: 2018-03-29
Payer: MEDICARE

## 2018-03-29 VITALS
WEIGHT: 121.4 LBS | SYSTOLIC BLOOD PRESSURE: 110 MMHG | HEART RATE: 64 BPM | DIASTOLIC BLOOD PRESSURE: 70 MMHG | RESPIRATION RATE: 12 BRPM | HEIGHT: 56 IN | BODY MASS INDEX: 27.31 KG/M2

## 2018-03-29 DIAGNOSIS — R70.0 ESR RAISED: ICD-10-CM

## 2018-03-29 DIAGNOSIS — E55.9 VITAMIN D DEFICIENCY: ICD-10-CM

## 2018-03-29 DIAGNOSIS — N18.30 STAGE 3 CHRONIC KIDNEY DISEASE (HCC): ICD-10-CM

## 2018-03-29 DIAGNOSIS — Z01.818 PREOP EXAMINATION: ICD-10-CM

## 2018-03-29 DIAGNOSIS — F41.9 ANXIETY: ICD-10-CM

## 2018-03-29 DIAGNOSIS — I10 ESSENTIAL HYPERTENSION: ICD-10-CM

## 2018-03-29 DIAGNOSIS — E03.9 ACQUIRED HYPOTHYROIDISM: ICD-10-CM

## 2018-03-29 DIAGNOSIS — E78.2 MIXED HYPERLIPIDEMIA: ICD-10-CM

## 2018-03-29 DIAGNOSIS — K21.9 GERD WITHOUT ESOPHAGITIS: ICD-10-CM

## 2018-03-29 DIAGNOSIS — D50.8 OTHER IRON DEFICIENCY ANEMIA: ICD-10-CM

## 2018-03-29 DIAGNOSIS — K80.20 CALCULUS OF GALLBLADDER WITHOUT CHOLECYSTITIS WITHOUT OBSTRUCTION: Primary | ICD-10-CM

## 2018-03-29 PROCEDURE — 99214 OFFICE O/P EST MOD 30 MIN: CPT | Performed by: INTERNAL MEDICINE

## 2018-03-29 NOTE — PROGRESS NOTES
Assessment/Plan:    No problem-specific Assessment & Plan notes found for this encounter  Diagnoses and all orders for this visit:    Calculus of gallbladder without cholecystitis without obstruction    Anxiety    GERD without esophagitis    Acquired hypothyroidism  -     TSH, 3rd generation with T4 reflex; Future    Essential hypertension  -     Uric acid; Future  -     Hemoglobin A1c; Future    Stage 3 chronic kidney disease    Vitamin D deficiency  -     Vitamin D 25 hydroxy; Future    Other iron deficiency anemia  -     Iron Panel; Future  -     Ferritin; Future    Mixed hyperlipidemia  -     Lipid panel; Future  -     Comprehensive metabolic panel; Future  -     CBC; Future    ESR raised  -     SEDIMENTATION RATE (HISTORICAL); Future    Preop examination    Other orders  -     clobetasol (TEMOVATE) 0 05 % ointment; Apply topically 2 (two) times a day  -     Discontinue: omeprazole (PriLOSEC) 20 mg delayed release capsule; Take by mouth  -     Cholecalciferol (VITAMIN D3) 2000 units capsule; Take by mouth daily        Patient Instructions   TIA workup reviewed and unremarkable, patient has remained stable, still with some mild memory impairment, predominantly not able to find words and names  Continue to modify risk factors  Blood pressure remained stable, lipids have been stable on high dose statin     Preoperative evaluation -I see no medical contraindication to proceeding with planned gallbladder surgery next week  Chest x-ray from February, stress test and echocardiogram from January, lab work all reviewed and stable  Complete blood work as ordered for early June and follow-up subsequently  Subjective:      Patient ID: Steve Yates is a [de-identified] y o  female  Feeling well, no new complaints  No further s/sx tia, having more memory issues w/ names and word finding  Scheduled for gb surgery next week             The following portions of the patient's history were reviewed and updated as appropriate: allergies, current medications, past family history, past medical history, past social history, past surgical history and problem list     Review of Systems   Constitutional: Negative for appetite change, chills, diaphoresis, fatigue, fever and unexpected weight change  HENT: Negative for congestion, hearing loss and rhinorrhea  Eyes: Negative for visual disturbance  Respiratory: Negative for cough, chest tightness, shortness of breath and wheezing  Cardiovascular: Negative for chest pain, palpitations and leg swelling  Gastrointestinal: Negative for abdominal pain and blood in stool  Endocrine: Negative for cold intolerance, heat intolerance, polydipsia and polyuria  Genitourinary: Negative for difficulty urinating, dysuria, frequency and urgency  Musculoskeletal: Negative for arthralgias and myalgias  Skin: Negative for rash  Neurological: Positive for speech difficulty  Negative for dizziness, weakness, light-headedness and headaches  Hematological: Does not bruise/bleed easily  Psychiatric/Behavioral: Negative for dysphoric mood and sleep disturbance  Objective:      /70 (BP Location: Left arm, Patient Position: Sitting)   Pulse 64   Resp 12   Ht 4' 8" (1 422 m)   Wt 55 1 kg (121 lb 6 4 oz)   BMI 27 22 kg/m²          Physical Exam   Constitutional: She is oriented to person, place, and time  She appears well-developed and well-nourished  HENT:   Head: Normocephalic and atraumatic  Nose: Nose normal    Mouth/Throat: Oropharynx is clear and moist    Eyes: Conjunctivae and EOM are normal  Pupils are equal, round, and reactive to light  No scleral icterus  Neck: Normal range of motion  Neck supple  No JVD present  No tracheal deviation present  No thyromegaly present  Cardiovascular: Normal rate, regular rhythm and intact distal pulses  Exam reveals no gallop and no friction rub  No murmur heard    Pulmonary/Chest: Effort normal and breath sounds normal  No respiratory distress  She has no wheezes  She has no rales  Abdominal: Soft  Bowel sounds are normal    Musculoskeletal: She exhibits no edema or tenderness  Lymphadenopathy:     She has no cervical adenopathy  Neurological: She is alert and oriented to person, place, and time  No cranial nerve deficit  Skin: Skin is warm and dry  No rash noted  No erythema  Psychiatric: She has a normal mood and affect   Her behavior is normal  Judgment and thought content normal

## 2018-03-29 NOTE — PATIENT INSTRUCTIONS
TIA workup reviewed and unremarkable, patient has remained stable, still with some mild memory impairment, predominantly not able to find words and names  Continue to modify risk factors  Blood pressure remained stable, lipids have been stable on high dose statin     Preoperative evaluation -I see no medical contraindication to proceeding with planned gallbladder surgery next week  Chest x-ray from February, stress test and echocardiogram from January, lab work all reviewed and stable  Complete blood work as ordered for early June and follow-up subsequently

## 2018-04-02 ENCOUNTER — TELEPHONE (OUTPATIENT)
Dept: INTERNAL MEDICINE CLINIC | Facility: CLINIC | Age: 83
End: 2018-04-02

## 2018-04-04 ENCOUNTER — ANESTHESIA (OUTPATIENT)
Dept: PERIOP | Facility: HOSPITAL | Age: 83
DRG: 419 | End: 2018-04-04
Payer: MEDICARE

## 2018-04-04 ENCOUNTER — HOSPITAL ENCOUNTER (INPATIENT)
Facility: HOSPITAL | Age: 83
LOS: 1 days | Discharge: HOME | DRG: 419 | End: 2018-04-06
Attending: SURGERY | Admitting: SURGERY
Payer: MEDICARE

## 2018-04-04 ENCOUNTER — APPOINTMENT (OUTPATIENT)
Dept: RADIOLOGY | Facility: HOSPITAL | Age: 83
DRG: 419 | End: 2018-04-04
Payer: MEDICARE

## 2018-04-04 DIAGNOSIS — K80.10 CHRONIC CHOLECYSTITIS WITH CALCULUS: ICD-10-CM

## 2018-04-04 DIAGNOSIS — N18.9 CHRONIC KIDNEY DISEASE, UNSPECIFIED CKD STAGE: ICD-10-CM

## 2018-04-04 DIAGNOSIS — D50.9 IRON DEFICIENCY ANEMIA, UNSPECIFIED IRON DEFICIENCY ANEMIA TYPE: ICD-10-CM

## 2018-04-04 DIAGNOSIS — F41.9 ANXIETY: Primary | ICD-10-CM

## 2018-04-04 DIAGNOSIS — E03.9 ACQUIRED HYPOTHYROIDISM: ICD-10-CM

## 2018-04-04 DIAGNOSIS — I10 ESSENTIAL HYPERTENSION: ICD-10-CM

## 2018-04-04 PROCEDURE — 47563 LAPARO CHOLECYSTECTOMY/GRAPH: CPT | Performed by: PHYSICIAN ASSISTANT

## 2018-04-04 PROCEDURE — BF00YZZ PLAIN RADIOGRAPHY OF BILE DUCTS USING OTHER CONTRAST: ICD-10-PCS | Performed by: SURGERY

## 2018-04-04 PROCEDURE — 0FT44ZZ RESECTION OF GALLBLADDER, PERCUTANEOUS ENDOSCOPIC APPROACH: ICD-10-PCS | Performed by: SURGERY

## 2018-04-04 PROCEDURE — 88304 TISSUE EXAM BY PATHOLOGIST: CPT | Performed by: PATHOLOGY

## 2018-04-04 PROCEDURE — 99225 PR SBSQ OBSERVATION CARE/DAY 25 MINUTES: CPT | Performed by: NURSE PRACTITIONER

## 2018-04-04 PROCEDURE — 74300 X-RAY BILE DUCTS/PANCREAS: CPT

## 2018-04-04 PROCEDURE — 47563 LAPARO CHOLECYSTECTOMY/GRAPH: CPT | Performed by: SURGERY

## 2018-04-04 RX ORDER — ACETAMINOPHEN 325 MG/1
650 TABLET ORAL EVERY 6 HOURS PRN
Status: DISCONTINUED | OUTPATIENT
Start: 2018-04-04 | End: 2018-04-06 | Stop reason: HOSPADM

## 2018-04-04 RX ORDER — FENTANYL CITRATE 50 UG/ML
INJECTION, SOLUTION INTRAMUSCULAR; INTRAVENOUS AS NEEDED
Status: DISCONTINUED | OUTPATIENT
Start: 2018-04-04 | End: 2018-04-04 | Stop reason: SURG

## 2018-04-04 RX ORDER — ONDANSETRON 2 MG/ML
4 INJECTION INTRAMUSCULAR; INTRAVENOUS ONCE AS NEEDED
Status: DISCONTINUED | OUTPATIENT
Start: 2018-04-04 | End: 2018-04-04 | Stop reason: HOSPADM

## 2018-04-04 RX ORDER — BUPIVACAINE HYDROCHLORIDE 2.5 MG/ML
INJECTION, SOLUTION EPIDURAL; INFILTRATION; INTRACAUDAL AS NEEDED
Status: DISCONTINUED | OUTPATIENT
Start: 2018-04-04 | End: 2018-04-04 | Stop reason: HOSPADM

## 2018-04-04 RX ORDER — SODIUM CHLORIDE, SODIUM LACTATE, POTASSIUM CHLORIDE, CALCIUM CHLORIDE 600; 310; 30; 20 MG/100ML; MG/100ML; MG/100ML; MG/100ML
125 INJECTION, SOLUTION INTRAVENOUS CONTINUOUS
Status: DISCONTINUED | OUTPATIENT
Start: 2018-04-04 | End: 2018-04-04

## 2018-04-04 RX ORDER — IMIPRAMINE HYDROCHLORIDE 10 MG/1
10 TABLET, FILM COATED ORAL
Status: DISCONTINUED | OUTPATIENT
Start: 2018-04-05 | End: 2018-04-06 | Stop reason: HOSPADM

## 2018-04-04 RX ORDER — HEPARIN SODIUM 5000 [USP'U]/ML
5000 INJECTION, SOLUTION INTRAVENOUS; SUBCUTANEOUS
Status: COMPLETED | OUTPATIENT
Start: 2018-04-04 | End: 2018-04-04

## 2018-04-04 RX ORDER — HEPARIN SODIUM 5000 [USP'U]/ML
5000 INJECTION, SOLUTION INTRAVENOUS; SUBCUTANEOUS
Status: SHIPPED | OUTPATIENT
Start: 2018-04-04 | End: 2018-04-05

## 2018-04-04 RX ORDER — MAGNESIUM HYDROXIDE 1200 MG/15ML
LIQUID ORAL AS NEEDED
Status: DISCONTINUED | OUTPATIENT
Start: 2018-04-04 | End: 2018-04-04 | Stop reason: HOSPADM

## 2018-04-04 RX ORDER — HYDRALAZINE HYDROCHLORIDE 20 MG/ML
5 INJECTION INTRAMUSCULAR; INTRAVENOUS EVERY 6 HOURS PRN
Status: DISCONTINUED | OUTPATIENT
Start: 2018-04-04 | End: 2018-04-06 | Stop reason: HOSPADM

## 2018-04-04 RX ORDER — ROSUVASTATIN CALCIUM 10 MG/1
10 TABLET, COATED ORAL DAILY
Status: DISCONTINUED | OUTPATIENT
Start: 2018-04-05 | End: 2018-04-06 | Stop reason: HOSPADM

## 2018-04-04 RX ORDER — METOCLOPRAMIDE HYDROCHLORIDE 5 MG/ML
10 INJECTION INTRAMUSCULAR; INTRAVENOUS ONCE AS NEEDED
Status: DISCONTINUED | OUTPATIENT
Start: 2018-04-04 | End: 2018-04-04 | Stop reason: HOSPADM

## 2018-04-04 RX ORDER — FENTANYL CITRATE/PF 50 MCG/ML
50 SYRINGE (ML) INJECTION
Status: DISCONTINUED | OUTPATIENT
Start: 2018-04-04 | End: 2018-04-04 | Stop reason: HOSPADM

## 2018-04-04 RX ORDER — ROCURONIUM BROMIDE 10 MG/ML
INJECTION, SOLUTION INTRAVENOUS AS NEEDED
Status: DISCONTINUED | OUTPATIENT
Start: 2018-04-04 | End: 2018-04-04 | Stop reason: SURG

## 2018-04-04 RX ORDER — GLYCOPYRROLATE 0.2 MG/ML
INJECTION INTRAMUSCULAR; INTRAVENOUS AS NEEDED
Status: DISCONTINUED | OUTPATIENT
Start: 2018-04-04 | End: 2018-04-04 | Stop reason: SURG

## 2018-04-04 RX ORDER — LEVOTHYROXINE SODIUM 0.05 MG/1
50 TABLET ORAL DAILY
Status: DISCONTINUED | OUTPATIENT
Start: 2018-04-05 | End: 2018-04-06 | Stop reason: HOSPADM

## 2018-04-04 RX ORDER — PROPOFOL 10 MG/ML
INJECTION, EMULSION INTRAVENOUS AS NEEDED
Status: DISCONTINUED | OUTPATIENT
Start: 2018-04-04 | End: 2018-04-04 | Stop reason: SURG

## 2018-04-04 RX ORDER — LIDOCAINE HYDROCHLORIDE 10 MG/ML
INJECTION, SOLUTION INFILTRATION; PERINEURAL AS NEEDED
Status: DISCONTINUED | OUTPATIENT
Start: 2018-04-04 | End: 2018-04-04 | Stop reason: SURG

## 2018-04-04 RX ORDER — HYDROCODONE BITARTRATE AND ACETAMINOPHEN 5; 325 MG/1; MG/1
1 TABLET ORAL EVERY 4 HOURS PRN
Status: DISCONTINUED | OUTPATIENT
Start: 2018-04-04 | End: 2018-04-05

## 2018-04-04 RX ORDER — ONDANSETRON 2 MG/ML
4 INJECTION INTRAMUSCULAR; INTRAVENOUS EVERY 6 HOURS PRN
Status: DISCONTINUED | OUTPATIENT
Start: 2018-04-04 | End: 2018-04-06 | Stop reason: HOSPADM

## 2018-04-04 RX ORDER — ATENOLOL 50 MG/1
50 TABLET ORAL DAILY
Status: DISCONTINUED | OUTPATIENT
Start: 2018-04-05 | End: 2018-04-06 | Stop reason: HOSPADM

## 2018-04-04 RX ORDER — CEFAZOLIN SODIUM 1 G/3ML
1000 INJECTION, POWDER, FOR SOLUTION INTRAMUSCULAR; INTRAVENOUS
Status: SHIPPED | OUTPATIENT
Start: 2018-04-04 | End: 2018-04-05

## 2018-04-04 RX ORDER — SODIUM CHLORIDE, SODIUM LACTATE, POTASSIUM CHLORIDE, CALCIUM CHLORIDE 600; 310; 30; 20 MG/100ML; MG/100ML; MG/100ML; MG/100ML
50 INJECTION, SOLUTION INTRAVENOUS CONTINUOUS
Status: DISCONTINUED | OUTPATIENT
Start: 2018-04-04 | End: 2018-04-05

## 2018-04-04 RX ORDER — EPHEDRINE SULFATE 50 MG/ML
INJECTION, SOLUTION INTRAVENOUS AS NEEDED
Status: DISCONTINUED | OUTPATIENT
Start: 2018-04-04 | End: 2018-04-04 | Stop reason: SURG

## 2018-04-04 RX ORDER — MORPHINE SULFATE 2 MG/ML
1 INJECTION, SOLUTION INTRAMUSCULAR; INTRAVENOUS EVERY 2 HOUR PRN
Status: DISCONTINUED | OUTPATIENT
Start: 2018-04-04 | End: 2018-04-06 | Stop reason: HOSPADM

## 2018-04-04 RX ORDER — ALPRAZOLAM 0.5 MG/1
0.5 TABLET ORAL DAILY PRN
Status: DISCONTINUED | OUTPATIENT
Start: 2018-04-04 | End: 2018-04-06 | Stop reason: HOSPADM

## 2018-04-04 RX ORDER — ONDANSETRON 2 MG/ML
INJECTION INTRAMUSCULAR; INTRAVENOUS AS NEEDED
Status: DISCONTINUED | OUTPATIENT
Start: 2018-04-04 | End: 2018-04-04 | Stop reason: SURG

## 2018-04-04 RX ADMIN — HEPARIN SODIUM 5000 UNITS: 5000 INJECTION, SOLUTION INTRAVENOUS; SUBCUTANEOUS at 07:51

## 2018-04-04 RX ADMIN — DEXAMETHASONE SODIUM PHOSPHATE 10 MG: 10 INJECTION INTRAMUSCULAR; INTRAVENOUS at 08:05

## 2018-04-04 RX ADMIN — ACETAMINOPHEN 650 MG: 325 TABLET, FILM COATED ORAL at 16:18

## 2018-04-04 RX ADMIN — LIDOCAINE HYDROCHLORIDE 50 MG: 10 INJECTION, SOLUTION INFILTRATION; PERINEURAL at 07:59

## 2018-04-04 RX ADMIN — ONDANSETRON 4 MG: 2 INJECTION INTRAMUSCULAR; INTRAVENOUS at 08:51

## 2018-04-04 RX ADMIN — SODIUM CHLORIDE, SODIUM LACTATE, POTASSIUM CHLORIDE, AND CALCIUM CHLORIDE 125 ML/HR: .6; .31; .03; .02 INJECTION, SOLUTION INTRAVENOUS at 07:39

## 2018-04-04 RX ADMIN — FENTANYL CITRATE 25 MCG: 50 INJECTION, SOLUTION INTRAMUSCULAR; INTRAVENOUS at 08:11

## 2018-04-04 RX ADMIN — HYDROCODONE BITARTRATE AND ACETAMINOPHEN 1 TABLET: 5; 325 TABLET ORAL at 18:26

## 2018-04-04 RX ADMIN — SODIUM CHLORIDE, SODIUM LACTATE, POTASSIUM CHLORIDE, AND CALCIUM CHLORIDE 75 ML/HR: .6; .31; .03; .02 INJECTION, SOLUTION INTRAVENOUS at 14:01

## 2018-04-04 RX ADMIN — GLYCOPYRROLATE 0.4 MG: 0.2 INJECTION, SOLUTION INTRAMUSCULAR; INTRAVENOUS at 08:15

## 2018-04-04 RX ADMIN — FENTANYL CITRATE 50 MCG: 50 INJECTION, SOLUTION INTRAMUSCULAR; INTRAVENOUS at 09:22

## 2018-04-04 RX ADMIN — EPHEDRINE SULFATE 10 MG: 50 INJECTION, SOLUTION INTRAMUSCULAR; INTRAVENOUS; SUBCUTANEOUS at 08:14

## 2018-04-04 RX ADMIN — HYDROMORPHONE HYDROCHLORIDE 0.5 MG: 1 INJECTION, SOLUTION INTRAMUSCULAR; INTRAVENOUS; SUBCUTANEOUS at 09:43

## 2018-04-04 RX ADMIN — CEFAZOLIN SODIUM 1000 MG: 2 SOLUTION INTRAVENOUS at 08:05

## 2018-04-04 RX ADMIN — PROPOFOL 120 MG: 10 INJECTION, EMULSION INTRAVENOUS at 07:59

## 2018-04-04 RX ADMIN — ALPRAZOLAM 0.5 MG: 0.5 TABLET ORAL at 21:43

## 2018-04-04 RX ADMIN — FENTANYL CITRATE 50 MCG: 50 INJECTION, SOLUTION INTRAMUSCULAR; INTRAVENOUS at 09:32

## 2018-04-04 RX ADMIN — FENTANYL CITRATE 75 MCG: 50 INJECTION, SOLUTION INTRAMUSCULAR; INTRAVENOUS at 07:59

## 2018-04-04 RX ADMIN — HYDROCODONE BITARTRATE AND ACETAMINOPHEN 1 TABLET: 5; 325 TABLET ORAL at 13:59

## 2018-04-04 RX ADMIN — ROCURONIUM BROMIDE 30 MG: 10 INJECTION INTRAVENOUS at 07:59

## 2018-04-04 RX ADMIN — SODIUM CHLORIDE, SODIUM LACTATE, POTASSIUM CHLORIDE, AND CALCIUM CHLORIDE 75 ML/HR: .6; .31; .03; .02 INJECTION, SOLUTION INTRAVENOUS at 10:05

## 2018-04-04 RX ADMIN — GLYCOPYRROLATE 0.4 MG: 0.2 INJECTION, SOLUTION INTRAMUSCULAR; INTRAVENOUS at 08:51

## 2018-04-04 RX ADMIN — NEOSTIGMINE METHYLSULFATE 2 MG: 1 INJECTION, SOLUTION INTRAMUSCULAR; INTRAVENOUS; SUBCUTANEOUS at 08:51

## 2018-04-04 NOTE — ANESTHESIA PREPROCEDURE EVALUATION
Review of Systems/Medical History  Patient summary reviewed  Chart reviewed      Cardiovascular  EKG reviewed, Hyperlipidemia, Hypertension ,    Pulmonary  Negative pulmonary ROS        GI/Hepatic    GERD ,        Kidney disease CKD, Chronic kidney disease stage 3,        Endo/Other  History of thyroid disease , hypothyroidism,      GYN  Negative gynecology ROS          Hematology  Anemia anemia of chronic disease,     Musculoskeletal       Neurology   Psychology         ECHO 1/28/18    LEFT VENTRICLE: Size was normal  Ejection fraction was estimated to be 65 %  There were no regional wall motion abnormalities  There was mild concentric hypertrophy  DOPPLER: There was an increased relative contribution of atrial  contraction to ventricular filling      RIGHT VENTRICLE: The size was normal  Systolic function was normal  Wall thickness was normal  DOPPLER: Estimated peak pressure was at least 30 mmHg      LEFT ATRIUM: The atrium was mildly dilated      RIGHT ATRIUM: Size was normal      MITRAL VALVE: There was moderate annular calcification  DOPPLER: There was trace regurgitation      AORTIC VALVE: The valve was probably trileaflet  Leaflets exhibited mild calcification  The valve was not well visualized  DOPPLER: There was no evidence for stenosis      TRICUSPID VALVE: The valve structure was normal  There was normal leaflet separation  DOPPLER: The transtricuspid velocity was within the normal range  There was no evidence for stenosis  There was mild regurgitation      PULMONIC VALVE: Leaflets exhibited normal thickness, no calcification, and normal cuspal separation  DOPPLER: The transpulmonic velocity was within the normal range  There was no regurgitation      PERICARDIUM: There was no pericardial effusion   The pericardium was normal in appearance      AORTA: The root exhibited normal size      Lab Results   Component Value Date    WBC 7 13 02/27/2018    HGB 10 5 (L) 02/27/2018    HCT 32 3 (L) 02/27/2018 MCV 93 02/27/2018     02/27/2018     Lab Results   Component Value Date     03/16/2018    K 4 2 03/16/2018     03/16/2018    CO2 26 03/16/2018    ANIONGAP 10 03/16/2018    BUN 16 03/16/2018    CREATININE 1 36 (H) 03/16/2018    GLUCOSE 94 03/16/2018    GLUF 88 10/09/2017    CALCIUM 9 0 03/16/2018    AST 23 03/16/2018    ALT 25 03/16/2018    ALKPHOS 85 03/16/2018    PROT 7 3 03/16/2018    BILITOT 0 30 03/16/2018    EGFR 37 03/16/2018     Lab Results   Component Value Date    CKTOTAL 113 01/09/2018    TROPONINI <0 02 02/27/2018       Physical Exam    Airway    Mallampati score: III  TM Distance: >3 FB  Neck ROM: full     Dental   Comment: edentulous, upper dentures and lower dentures,     Cardiovascular  Rhythm: regular, Rate: normal,     Pulmonary  Breath sounds clear to auscultation,     Other Findings        Anesthesia Plan  ASA Score- 3     Anesthesia Type- general with ASA Monitors  Additional Monitors:   Airway Plan: ETT  Plan Factors-Patient not instructed to abstain from smoking on day of procedure       Induction- intravenous  Postoperative Plan- Plan for postoperative opioid use  Planned trial extubation    Informed Consent- Anesthetic plan and risks discussed with patient  I personally reviewed this patient with the CRNA  Discussed and agreed on the Anesthesia Plan with the CRNA  Rosa Lau

## 2018-04-04 NOTE — ANESTHESIA POSTPROCEDURE EVALUATION
Post-Op Assessment Note      CV Status:  Stable    Mental Status:  Alert and awake    Hydration Status:  Euvolemic    PONV Controlled:  Controlled    Airway Patency:  Patent    Post Op Vitals Reviewed: Yes          Staff: CRNA           /79 (04/04/18 0913)    Temp 99 2 °F (37 3 °C) (04/04/18 0913)    Pulse 76 (04/04/18 0913)   Resp 22 (04/04/18 0913)    SpO2 99 % (04/04/18 0913)

## 2018-04-04 NOTE — PROGRESS NOTES
Patient walked approximately 40 feet in hallway   6:39 PM    ________________________________________      Attempted to ambulate patient, she refused stating she did not feel comfortable walking because she was still so "groggy" from the anesthesia and that we could try a little later

## 2018-04-04 NOTE — H&P (VIEW-ONLY)
Consult- General Surgery   Bethanie Carson [de-identified] y o  female MRN: [id number]  Unit/Bed#:  Encounter: [id number]    Assessment/Plan     Assessment:  Symptomatic gallstones, last attack was last night  Plan:  I advised the patient to undergo laparoscopic cholecystectomy with cholangiograms, possible open in the near future  I discussed the operative procedure, risks, benefits and alternatives with the patient, she understood and agreed to proceed  History of Present Illness     HPI:  I had the pleasure of seeing Bethanie Carson in the office today accompanied by her daughter for evaluation of symptomatic gallstones  The patient is a pleasant [de-identified] y o  female who has been experiencing right upper quadrant abdominal pain, sharp in nature, on and off for several years, associated with mild nausea and after fatty food ingestion, without fever, chills, change in the color of the urine or stool  The patient went to the emergency room over a week ago and CT scan of the abdomen and pelvis revealed 2 cm gallstone  There was no evidence of acute cholecystitis at that time  Review of Systems   Constitutional: Negative for chills and fever  HENT: Negative for nosebleeds and sore throat  Eyes: Negative for pain and discharge  Respiratory: Negative for cough and shortness of breath  Cardiovascular: Negative for chest pain and palpitations  Gastrointestinal:        As per history of present illness  Endocrine: Negative for cold intolerance and heat intolerance  Genitourinary: Negative for dysuria and hematuria  Neurological: Negative for seizures and headaches  Hematological: Negative for adenopathy  Does not bruise/bleed easily  Psychiatric/Behavioral: Negative for confusion  The patient is not nervous/anxious          Historical Information   Past Medical History:   Diagnosis Date    Disease of thyroid gland     GERD (gastroesophageal reflux disease)     Hyperlipidemia     Hypertension      Past Surgical History:   Procedure Laterality Date    APPENDECTOMY      HYSTERECTOMY       Social History   History   Alcohol Use No     History   Drug Use No     History   Smoking Status    Former Smoker   Smokeless Tobacco    Never Used     Family History:   Family History   Problem Relation Age of Onset    Stroke Mother     Heart disease Father        Meds/Allergies   PTA meds:   Cannot display prior to admission medications because the patient has not been admitted in this contact  Allergies   Allergen Reactions    Other      Annotation - [id number]: ANTIDEPRESSANTS       Objective   First Vitals:   @VSFIRST2(5,8,6,7,9,11,14,10:FIRST)@    Current Vitals:   Blood Pressure: 126/68 (03/13/18 1334)  Pulse: 66 (03/13/18 1334)  Temperature: 98 °F (36 7 °C) (03/13/18 1334)  Temp Source: Oral (03/13/18 1334)  Height: 4' 8" (142 2 cm) (03/13/18 1334)  Weight - Scale: 54 9 kg (121 lb 1 1 oz) (03/13/18 1334)    [unfilled]    Invasive Devices          No matching active lines, drains, or airways          Physical Exam   Constitutional: She is oriented to person, place, and time  She appears well-developed and well-nourished  No distress  HENT:   Head: Normocephalic  Mouth/Throat: No oropharyngeal exudate  Eyes: Pupils are equal, round, and reactive to light  No scleral icterus  Neck: Normal range of motion  Neck supple  No thyromegaly present  Cardiovascular: Normal rate and regular rhythm  No murmur heard  Pulmonary/Chest: Effort normal and breath sounds normal  No respiratory distress  Abdominal: Soft  Bowel sounds are normal  She exhibits no mass  There is no tenderness  Musculoskeletal: She exhibits no edema or tenderness  Lymphadenopathy:     She has no cervical adenopathy  Neurological: She is alert and oriented to person, place, and time  No cranial nerve deficit  Skin: Skin is warm and dry  No erythema  Psychiatric: She has a normal mood and affect  Her behavior is normal          Imaging:  I have personally reviewed pertinent reports  EKG, Pathology, and Other Studies: I have personally reviewed pertinent reports  CT scan of the abdomen and pelvis, FINDINGS:     ABDOMEN     LOWER CHEST:  Mild dependent subsegmental atelectasis in the lower lobes  Lungs otherwise clear  Large hiatal hernia  Mural thickening in the distal esophagus, suggesting esophagitis      LIVER/BILIARY TREE:  Unremarkable      GALLBLADDER:  2 cm calcified gallstone  Gallbladder wall normal in thickness  No pericholecystic fluid      SPLEEN:  Unremarkable      PANCREAS:  Unremarkable      ADRENAL GLANDS:  Unremarkable      KIDNEYS/URETERS:  Unremarkable  No hydronephrosis  No calculi      STOMACH AND BOWEL:  Portion of the gastric fundus in a hiatal hernia  Stomach otherwise unremarkable  Small intestine normal in appearance  Diverticulosis in the sigmoid colon without evidence of diverticulitis  Colon otherwise unremarkable      APPENDIX:  No findings to suggest appendicitis  Appendectomy by history      ABDOMINOPELVIC CAVITY: No lymphadenopathy or mass  No ascites  No extraluminal gas      VESSELS:  Unremarkable for patient's age  No aortic aneurysm      PELVIS     REPRODUCTIVE ORGANS:  Post hysterectomy      URINARY BLADDER:  Unremarkable      ABDOMINAL WALL/INGUINAL REGIONS:  Small fat-containing umbilical hernia  Otherwise intact      OSSEOUS STRUCTURES:  No acute fracture or destructive osseous lesion      IMPRESSION:     1  Cholelithiasis without evidence of acute cholecystitis  2   No evidence of acute abnormality in the abdomen or pelvis

## 2018-04-04 NOTE — OP NOTE
OPERATIVE REPORT  PATIENT NAME: Anisa Delatorre    :  1935  MRN: [id number]  Pt Location: [facility] OR ROOM 04    SURGERY DATE: 2018    Surgeon(s) and Role:     * Raj Reynolds MD - Primary     * Kamala Bernabe PA-C - Assisting    Preop Diagnosis:  Chronic cholecystitis with calculus [K80 10]    Post-Op Diagnosis Codes:     * Chronic cholecystitis with calculus [K80 10]    Procedure(s) (LRB):  LAPAROSCOPIC CHOLECYSTECTOMY WITH IOC (N/A)    Specimen(s):  ID Type Source Tests Collected by Time Destination   1 : GALLBLADDER Tissue Gallbladder TISSUE EXAM Raj Reynolds MD 2018 0830        Estimated Blood Loss:   Minimal    Drains:       Anesthesia Type:   General    Operative Indications:  Chronic cholecystitis with calculus [K80 10]  [de-identified] year female complaining of epigastric and right upper quadrant abdominal pain  Ultrasound revealed gallstones  In light of the above the patient was advised to undergo laparoscopic cholecystectomy  Operative Findings: The gallbladder was somewhat distended, with no evidence of thickened wall, with adhesions to the surrounding fatty tissue  Intraoperative cholangiograms failed to reveal any proximal distal filling defects and the contrast went freely into the small bowel  The patient was oozing from the dissection and a small injury to the liver which was controlled with cautery and Surgicel  The rest of the abdominal cavity showed no evidence of inflammatory or neoplastic process  The liver surface appeared normal     Complications:   None    Procedure and Technique:  The patient was identified and she was placed in the operating table in a supine position  After adequate anesthesia induction and satisfactory endotracheal intubation the abdomen was prepped and draped under sterile usual fashion with ChloraPrep  The abdominal wall was elevated with towel clips, an incision was made through the umbilicus and 5 mm trocar was introduced   After verifying the position and the abdomen was insufflated with CO2  After obtaining adequate pneumoperitoneum the scope was advanced and exploration was performed with above findings  11 mm trocar was placed in the epigastric area and 5 mm trocar in the midclavicular and anterior axillary line under direct vision  The fundus of the gallbladder was grasped and pulled towards the right shoulder  All the adhesions were carefully taken down,between the surrounding fatty tissue and gallbladder using the dissector and cautery  The infundibulum of the gallbladder was grasped and pulled towards the right side  The cystic duct was identified, dissected and  stapled distally  An incision was made over the cystic duct with the scissors  Cholangiogram catheter was inserted through a separate stab wound and inserted into the cystic duct and secured with the Endo Clip  Intraoperative cholangiogram was performed with the help of the C-arm with the above findings  The cholangiogram catheter was removed and the cystic duct was stapled proximally ×2 and then divided with scissors  The cystic artery was also identified, dissected, stapled proximally and distally and then divided with scissors  The gallbladder was removed from the gallbladder fossa using electrocautery and the hook  The gallbladder was retrieved through the epigastric port site with the help of the Endo Catch  The abdominal cavity was copiously irrigated with saline solution  The gallbladder fossa was dry but there was some oozing coming from the liver, right next to the gallbladder fossa which was controlled with cautery and Surgicel    The cystic duct and cystic artery were inspected with no evidence of bile leak or bleeding respectively  The ports were removed under direct vision without evidence of bleeding from the abdominal wall  The epigastric port site fascia was closed with 0 Vicryl in an interrupted figure-of-eight fashion   The subcutaneous tissue was infiltrated with 0 25% Marcaine and the skin was closed with a 4-0 Vicryl in an interrupted significant fashion  Sterile dressings were applied  At the end of the case instrument, needles, sponges counts were correct  The patient tolerated the procedure well and then he was transferred to recovery room in a stable conditions       I was present for the entire procedure, A qualified resident physician was not available and A physician assistant was required during the procedure for retraction tissue handling,dissection and suturing    Patient Disposition:  PACU     SIGNATURE: Neli Lewis MD  DATE: April 4, 2018  TIME: 8:56 AM

## 2018-04-05 LAB
ERYTHROCYTE [DISTWIDTH] IN BLOOD BY AUTOMATED COUNT: 13.9 % (ref 11.6–15.1)
HCT VFR BLD AUTO: 29.4 % (ref 34.8–46.1)
HCT VFR BLD AUTO: 32.7 % (ref 34.8–46.1)
HGB BLD-MCNC: 10.5 G/DL (ref 11.5–15.4)
HGB BLD-MCNC: 9.7 G/DL (ref 11.5–15.4)
INR PPP: 1.1 (ref 0.86–1.16)
MCH RBC QN AUTO: 30.4 PG (ref 26.8–34.3)
MCHC RBC AUTO-ENTMCNC: 33 G/DL (ref 31.4–37.4)
MCV RBC AUTO: 92 FL (ref 82–98)
PLATELET # BLD AUTO: 155 THOUSANDS/UL (ref 149–390)
PMV BLD AUTO: 9.9 FL (ref 8.9–12.7)
PROTHROMBIN TIME: 14.5 SECONDS (ref 12.1–14.4)
RBC # BLD AUTO: 3.19 MILLION/UL (ref 3.81–5.12)
WBC # BLD AUTO: 12.78 THOUSAND/UL (ref 4.31–10.16)

## 2018-04-05 PROCEDURE — 85014 HEMATOCRIT: CPT | Performed by: PHYSICIAN ASSISTANT

## 2018-04-05 PROCEDURE — 85027 COMPLETE CBC AUTOMATED: CPT | Performed by: PHYSICIAN ASSISTANT

## 2018-04-05 PROCEDURE — 85610 PROTHROMBIN TIME: CPT | Performed by: SURGERY

## 2018-04-05 PROCEDURE — 99225 PR SBSQ OBSERVATION CARE/DAY 25 MINUTES: CPT | Performed by: INTERNAL MEDICINE

## 2018-04-05 PROCEDURE — 85018 HEMOGLOBIN: CPT | Performed by: PHYSICIAN ASSISTANT

## 2018-04-05 RX ORDER — HYDROCODONE BITARTRATE AND ACETAMINOPHEN 5; 325 MG/1; MG/1
1 TABLET ORAL EVERY 4 HOURS PRN
Status: DISCONTINUED | OUTPATIENT
Start: 2018-04-05 | End: 2018-04-06 | Stop reason: HOSPADM

## 2018-04-05 RX ORDER — LISINOPRIL 10 MG/1
10 TABLET ORAL DAILY
Status: DISCONTINUED | OUTPATIENT
Start: 2018-04-05 | End: 2018-04-06 | Stop reason: HOSPADM

## 2018-04-05 RX ADMIN — HYDROCODONE BITARTRATE AND ACETAMINOPHEN 1 TABLET: 5; 325 TABLET ORAL at 15:17

## 2018-04-05 RX ADMIN — HYDROCODONE BITARTRATE AND ACETAMINOPHEN 1 TABLET: 5; 325 TABLET ORAL at 00:20

## 2018-04-05 RX ADMIN — ATENOLOL 50 MG: 50 TABLET ORAL at 09:07

## 2018-04-05 RX ADMIN — IMIPRAMINE HYDROCHLORIDE 10 MG: 10 TABLET, FILM COATED ORAL at 09:08

## 2018-04-05 RX ADMIN — ROSUVASTATIN CALCIUM 10 MG: 10 TABLET ORAL at 09:08

## 2018-04-05 RX ADMIN — HYDROCODONE BITARTRATE AND ACETAMINOPHEN 1 TABLET: 5; 325 TABLET ORAL at 09:07

## 2018-04-05 RX ADMIN — ALPRAZOLAM 0.5 MG: 0.5 TABLET ORAL at 09:07

## 2018-04-05 RX ADMIN — LEVOTHYROXINE SODIUM 50 MCG: 50 TABLET ORAL at 05:50

## 2018-04-05 NOTE — DISCHARGE INSTRUCTIONS
Follow-up  Call the Surgical office to make appointment for 2 weeks with Dr Maya Banks  [phone]  Make an appointment to follow up with your primary care physician in the next 1-2 weeks  Call the office if you have increased pain not relieved with pain medicine  Call the office if you have a fever,redness, the wound opens up, you have pus draining from your incision  Wound care  You may remove the gauze dressing on 4/6/18  Do not remove the steri-strip tape  If you have Steri-Strips, leave them in place  You may take them off in 5 days  If they fall off on their own, this is okay  Activity  No driving until seen in the office  No driving while taking pain meds  No heavy lifting or strenuous exercise until you are cleared in the surgery office     Shower  You may shower and get incisions wet, wash with soap and water, do not scrub, rinse, and pat dry  No baths, swimming pools, or hot tubs  Medication  If you do not need strong pain medicine you may take Tylenol  Do not take acetaminophen (Tylenol) WITH  pain meds that contain acetaminophen  Take one or the other  Do not exceed more than 4000 mg of acetaminophen in 24 hours or 3000 mg if you have liver disease  If you were given an antibiotic take it until it is finished

## 2018-04-05 NOTE — PROGRESS NOTES
Progress Note - Herve Patel [de-identified] y o  female MRN: [id number]  Unit/Bed#: -01 Encounter: [id number]    Subjective:   Patient is feeling generally well this morning, she rates her pain at a 4/10  She denies nausea or vomiting  She has passed gas but has not had bowel movement  She denies fevers or chills  She has been urinating without dysuria with increased volume related to IV fluids  She denies chest pain, shortness of breath, palpitations, PND, orthopnea, dizziness or lightheadedness  She is tolerating clear liquids, she had a sore throat last night and was unable to swallow a tuna fish sandwich  All other ROS are negative  Objective:   Vitals: Blood pressure 141/65, pulse 81, temperature 98 3 °F (36 8 °C), temperature source Oral, resp  rate 18, height 4' 9" (1 448 m), weight 54 3 kg (119 lb 9 6 oz), SpO2 96 %, not currently breastfeeding  ,Body mass index is 25 88 kg/m²    SPO2 RA Rest    Flowsheet Row Admission (Current) from 4/4/2018 in [address] [place] [address] 2nd Floor Med Surg Unit   SpO2  96 %   SpO2 Activity  At Rest   O2 Device  None (Room air)   O2 Flow Rate  2 L/min        I&O:   Intake/Output Summary (Last 24 hours) at 04/05/18 0802  Last data filed at 04/04/18 1930   Gross per 24 hour   Intake             1580 ml   Output                0 ml   Net             1580 ml         Physical Exam:       General Appearance:    Alert, cooperative, no distress   Head:    Normocephalic, without obvious abnormality, atraumatic   Eyes:    PERRL, conjunctiva/corneas clear, EOM's intact       Nose:   Moist mucous membranes, no drainage or sinus tenderness   Throat:   No tenderness, no exudates   Neck:   Supple, symmetrical, trachea midline, no JVD   Lungs:     Clear to auscultation bilaterally, respirations unlabored   Heart:    Regular rate and rhythm, S1 and S2 normal, no murmur, rub   or gallop   Abdomen: Soft, diffuse minimal tenderness to palpation, positive bowel sounds, no masses, no organomegaly, trocar sites with intact dressing   Extremities:  No pedal edema, calf tenderness  Distal pulses palpable  Neurologic:     CNII-XII intact        Invasive Devices     Peripheral Intravenous Line            Peripheral IV 04/04/18 Left Forearm 1 day                      Social History  reviewed  Family History   Problem Relation Age of Onset    Stroke Mother     Heart disease Father     reviewed    Meds:  Current Facility-Administered Medications   Medication Dose Route Frequency Provider Last Rate Last Dose    acetaminophen (TYLENOL) tablet 650 mg  650 mg Oral Q6H PRN Raciel Sheldon PA-C   650 mg at 04/04/18 1618    ALPRAZolam (XANAX) tablet 0 5 mg  0 5 mg Oral Daily PRN Raciel Sheldon PA-C   0 5 mg at 04/04/18 2143    atenolol (TENORMIN) tablet 50 mg  50 mg Oral Daily Kamala Sheldon PA-C        hydrALAZINE (APRESOLINE) injection 5 mg  5 mg Intravenous Q6H PRN AMIE Marvin        HYDROcodone-acetaminophen (NORCO) 5-325 mg per tablet 1 tablet  1 tablet Oral Q4H PRN Cara Dill PA-C   1 tablet at 04/05/18 0020    imipramine (TOFRANIL) tablet 10 mg  10 mg Oral Once per day on Sun Tue Thu Sat Raciel Sheldon PA-C        lactated ringers infusion  75 mL/hr Intravenous Continuous Raciel Sheldon PA-C 75 mL/hr at 04/04/18 1401 75 mL/hr at 04/04/18 1401    levothyroxine tablet 50 mcg  50 mcg Oral Daily Raciel Sheldon PA-C   50 mcg at 04/05/18 0550    morphine injection 1 mg  1 mg Intravenous Q2H PRN Kamala Sheldon PA-C        ondansetron [facility]) injection 4 mg  4 mg Intravenous Q6H PRN Raciel Sheldon PA-C        rosuvastatin (CRESTOR) tablet 10 mg  10 mg Oral Daily Kamala Sheldon PA-C          Prescriptions Prior to Admission   Medication    acetaminophen (TYLENOL) 325 mg tablet    ALPRAZolam (XANAX) 0 5 mg tablet    aspirin 81 mg chewable tablet    atenolol (TENORMIN) 50 mg tablet    Corn Dextrin (EQL FIBER SUPPLEMENT PO)    ferrous sulfate 325 (65 Fe) mg tablet    imipramine (TOFRANIL) 10 mg tablet    levothyroxine 50 mcg tablet    lisinopril (ZESTRIL) 10 mg tablet    Probiotic Product (ALIGN) 4 MG CAPS    rosuvastatin (CRESTOR) 10 MG tablet    Cholecalciferol (VITAMIN D3) 2000 units capsule    clobetasol (TEMOVATE) 0 05 % ointment       Labs:    Results from last 7 days  Lab Units 04/05/18  0546   WBC Thousand/uL 12 78*   HEMOGLOBIN g/dL 9 7*   HEMATOCRIT % 29 4*   PLATELETS Thousands/uL 155           Invalid input(s): LABALBU  Lab Results   Component Value Date    TROPONINI <0 02 02/27/2018    TROPONINI <0 02 01/09/2018    CKTOTAL 113 01/09/2018    CKTOTAL 60 04/06/2016       Results from last 7 days  Lab Units 04/05/18  0546   INR  1 10     Lab Results   Component Value Date    URINECX No Growth <1000 cfu/mL 04/06/2016       Imaging:  No results found for this or any previous visit  Results for orders placed during the hospital encounter of 02/27/18   XR chest 2 views    Narrative CHEST     INDICATION: cough, weakness    COMPARISON:  January 18, 2016    EXAM PERFORMED/VIEWS:  XR CHEST PA & LATERAL  The frontal view was performed utilizing dual energy radiographic technique  FINDINGS:    Heart normal in size  Moderate-sized hiatal hernia  Pulmonary vessels unremarkable  The lungs are clear  No pneumothorax or pleural effusion  Scoliosis  Imaged bones otherwise unremarkable  Impression No acute cardiopulmonary disease  Workstation performed: [id number]         VTE Pharmacologic Prophylaxis:   SCDs      Code Status:   Level 1 - Full Code    Assessment:  Principal Problem:    Chronic cholecystitis with calculus  Active Problems:    Hypertension    Chronic kidney disease    Disease of thyroid gland  Resolved Problems:    * No resolved hospital problems  *      Plan:  · Hypertension-blood pressure is a bit elevated today, likely related to IV fluids as well as being off lisinopril    Resume lisinopril upon discharge  · Hyperlipidemia-continue rosuvastatin  · Peripheral vascular disease-resume aspirin  · Chronic cholecystitis with gallstones status post cholecystectomy-patient appears stable, further management per surgical team, patient appears medically stable for discharge at this time with recommendations as above        Rayna Carrizales MD  4/5/2018,8:02 AM

## 2018-04-05 NOTE — CONSULTS
Consult- Melanie Little 1935, [de-identified] y o  female MRN: [id number]    Unit/Bed#: -01 Encounter: [id number]    Primary Care Provider: Eleanor Davidson MD   Date and time admitted to hospital: 4/4/2018  6:57 AM  Inpatient consult to Internal Medicine  Consult performed by: Carolina Wilson ordered by: Zechariah Gómez        * Chronic cholecystitis with calculus   Assessment & Plan    · General Surgery Following  · S/P laparoscopic cholecystectomy with IOC  Disease of thyroid gland   Assessment & Plan    · Monitor TSH   · Continue levothyroxine  Chronic kidney disease   Assessment & Plan    · Acute on chronic  · Gentle hydration, Will monitor Kidney function  Hypertension   Assessment & Plan    · Continue Atenolol, Hold Lisinopril secondary to EVAN  VTE Prophylaxis: Enoxaparin (Lovenox)  / sequential compression device     Recommendations for Discharge:  · Follow up with surgical and PCP at discharge  Counseling / Coordination of Care Time: 30 minutes  Greater than 50% of total time spent on patient counseling and coordination of care  Collaboration of Care: Were Recommendations Directly Discussed with Primary Treatment Team? - Yes     History of Present Illness:    Melanie Little is a [de-identified] y o  female who is originally admitted to the general surgery service on 4/4/2018 due to abdominal pain, chronic  Findings include Chronic cholecystitis with calculus  We are consulted for medical management of hypertension, thyroid disease  Currently patient is status post laparoscopic cholecystectomy  She continues to complain of generalized abdominal discomfort, worsen at the right upper quadrant with palpation  She denies nausea, vomiting, diarrhea, constipation, fever or chills  She denies chest pain or shortness of breath  Review of Systems:    Review of Systems   Gastrointestinal: Positive for abdominal pain (Generalized tenderness, more so right upper quadrant  )  All other systems reviewed and are negative  Past Medical and Surgical History:     Past Medical History:   Diagnosis Date    Disease of thyroid gland     GERD (gastroesophageal reflux disease)     Hyperlipidemia     Hypertension        Past Surgical History:   Procedure Laterality Date    APPENDECTOMY      HYSTERECTOMY      CA LAP,CHOLECYSTECTOMY/GRAPH N/A 4/4/2018    Procedure: LAPAROSCOPIC CHOLECYSTECTOMY WITH IOC;  Surgeon: Raj Reynolds MD;  Location: [facility];  Service: General       Meds/Allergies:    all medications and allergies reviewed    Allergies: Allergies   Allergen Reactions    Other Drowsiness     Annotation - [id number]: ANTIDEPRESSANTS       Social History:     Marital Status:     Substance Use History:   History   Alcohol Use No     History   Smoking Status    Former Smoker   Smokeless Tobacco    Never Used     History   Drug Use No       Family History:    non-contributory    Physical Exam:     Vitals:   Blood Pressure: 159/73 (04/04/18 1930)  Pulse: 95 (04/04/18 1930)  Temperature: 97 6 °F (36 4 °C) (04/04/18 1930)  Temp Source: Oral (04/04/18 1930)  Respirations: 18 (04/04/18 1930)  Height: 4' 9" (144 8 cm) (04/04/18 0708)  Weight - Scale: 54 3 kg (119 lb 9 6 oz) (04/04/18 0708)  SpO2: 93 % (04/04/18 1930)    Physical Exam   Constitutional: She is oriented to person, place, and time  She appears well-developed and well-nourished  HENT:   Head: Normocephalic and atraumatic  Neck: Normal range of motion  Neck supple  Cardiovascular: Normal rate, regular rhythm and normal heart sounds  Pulmonary/Chest: Effort normal  No accessory muscle usage  No respiratory distress  She has no decreased breath sounds  She exhibits no tenderness  Abdominal: Soft  Bowel sounds are normal  She exhibits no distension  There is tenderness (Generalized, worsen at right upper quadrant area  )  Musculoskeletal: Normal range of motion  She exhibits no edema or tenderness  Neurological: She is alert and oriented to person, place, and time  Skin: Skin is warm and dry  Psychiatric: She has a normal mood and affect  Her behavior is normal    Nursing note and vitals reviewed  Additional Data:     Lab Results: I have personally reviewed pertinent reports  Invalid input(s): LABALBU          Lab Results   Component Value Date/Time    HGBA1C 6 0 (H) 01/27/2016 10:55 AM       Imaging: I have personally reviewed pertinent reports  XR cholangiogram intraoperative    (Results Pending)       EKG, Pathology, and Other Studies Reviewed on Admission:   · EKG:  None    ** Please Note: This note has been constructed using a voice recognition system   **

## 2018-04-05 NOTE — CASE MANAGEMENT
Initial Clinical Review    Age/Sex: [de-identified] y o  female    Surgery Date: 4/4    Procedure: LAPAROSCOPIC CHOLECYSTECTOMY WITH IOC     Anesthesia:     Admission Orders: Date/Time/Statement: N/A @ N/A     Orders Placed This Encounter   Procedures    Place in Observation     Standing Status:   Standing     Number of Occurrences:   1     Order Specific Question:   Admitting Physician     Answer:   Fifi Aragon [[id number]]     Order Specific Question:   Level of Care     Answer:   Med Surg [16]       Vital Signs: /65 (BP Location: Right arm)   Pulse 81   Temp 98 3 °F (36 8 °C) (Oral)   Resp 18   Ht 4' 9" (1 448 m)   Wt 54 3 kg (119 lb 9 6 oz)   SpO2 96%   Breastfeeding? No   BMI 25 88 kg/m²     Diet:        Diet Orders            Start     Ordered    04/04/18 1400  Diet Regular; Regular House; Lo Fat  Diet effective 1400     Comments:  Advance as tolerated   Question Answer Comment   Diet Type Regular    Regular Regular House    Other Restriction(s): Lo Fat    RD to adjust diet per protocol? No        04/04/18 0922          Mobility: up and OOB as tolerated, ambulate patient    DVT Prophylaxis: SCD's    Pain Control:   Pain Medications             acetaminophen (TYLENOL) 325 mg tablet Take 1-2 tablets by mouth every 6 (six) hours as needed    aspirin 81 mg chewable tablet Chew 81 mg daily      imipramine (TOFRANIL) 10 mg tablet Take 10 mg by mouth 4 (four) times a week            Operative findings: The gallbladder was somewhat distended, with no evidence of thickened wall, with adhesions to the surrounding fatty tissue  Intraoperative cholangiograms failed to reveal any proximal distal filling defects and the contrast went freely into the small bowel  The patient was oozing from the dissection and a small injury to the liver which was controlled with cautery and Surgicel  The rest of the abdominal cavity showed no evidence of inflammatory or neoplastic process    The liver surface appeared normal     Scheduled Meds:  Current Facility-Administered Medications:  acetaminophen 650 mg Oral Q6H PRN Patricia Sheldon PA-C    ALPRAZolam 0 5 mg Oral Daily PRN Patricia Sheldon PA-C    atenolol 50 mg Oral Daily Kamala Sheldon PA-C    hydrALAZINE 5 mg Intravenous Q6H PRN AMIE Marvin    HYDROcodone-acetaminophen 1 tablet Oral Q4H PRN Elly Sol MD    imipramine 10 mg Oral Once per day on Sun Tue Thu Sat Patricia Sheldon PA-C    lactated ringers 75 mL/hr Intravenous Continuous Patricia Sheldon PA-C Last Rate: 75 mL/hr (04/04/18 1401)   levothyroxine 50 mcg Oral Daily Kamala Sheldon PA-C    morphine injection 1 mg Intravenous Q2H PRN Patricia Sheldon PA-C    ondansetron 4 mg Intravenous Q6H PRN Patricia Sheldon PA-C    rosuvastatin 10 mg Oral Daily Kamala Sheldon PA-C      Continuous Infusions:  lactated ringers 75 mL/hr Last Rate: 75 mL/hr (04/04/18 1401)     PRN Meds:   acetaminophen    ALPRAZolam    hydrALAZINE    HYDROcodone-acetaminophen    morphine injection    ondansetron     CBC 4/5  Lab Units 04/05/18  0546   WBC Thousand/uL 12 78*   HEMOGLOBIN g/dL 9 7*   HEMATOCRIT % 29 4*   PLATELETS Thousands/uL 155     Consult internal medicine   =========================================================================  Internal medicine progress note 4/4        * Chronic cholecystitis with calculus   Assessment & Plan     · General Surgery Following  · S/P laparoscopic cholecystectomy with IOC          Disease of thyroid gland   Assessment & Plan     · Monitor TSH   · Continue levothyroxine            Chronic kidney disease   Assessment & Plan     · Acute on chronic    · Gentle hydration, Will monitor Kidney function            Hypertension   Assessment & Plan     · Continue Atenolol, Hold Lisinopril secondary to EVAN                 VTE Prophylaxis: Enoxaparin (Lovenox)  / sequential compression device

## 2018-04-05 NOTE — POST OP PROGRESS NOTES
Progress Note -Surgery PA  Sally Figueroa [de-identified] y o  female MRN: [id number]  Unit/Bed#: -01 Encounter: [id number]      Assessment/Plan   POD #1, laparoscopic cholecystectomy  - hemoglobin 9 7, rechecked later in the day and 10 5  -  tolerating regular diet  No nausea or vomiting   - some abdominal postsurgical pain, appropriate and controlled  - patient states she feels a little weak when she stands up foot is okay when she is up moving around  - no flatus or bowel movement  Afebrile and stable  Hypertension  HLD  GERD  Hypothyroidism    Plan:  Continue with regular diet  Physical therapy evaluate and treat  DVT prophylaxis  Encourage ambulation and out of bed  Colace  Status from a tree 10 times an hour  Tentative discharge later today or tomorrow morning  Continue with medical management per Internal Medicine, appreciate recommendations  Leave dressings in place, they may be removed tomorrow      ______________________________________________________________________    Subjective:   80-year-old female satisfactory post laparoscopic cholecystectomy  She is feeling well with some incisional pain  Office in ambulating and urinating without difficulty although she does say that she has some weakness upon standing  Tolerating a regular diet with no nausea or vomiting  Denies fever or chills, chest pain, shortness of breath, palpitations, confusion, dizziness  Objective:    Vitals:  /65 (BP Location: Right arm)   Pulse 81   Temp 98 3 °F (36 8 °C) (Oral)   Resp 18   Ht 4' 9" (1 448 m)   Wt 54 3 kg (119 lb 9 6 oz)   SpO2 96%   Breastfeeding? No   BMI 25 88 kg/m²     I/Os:  I/O last 3 completed shifts: In: 1776 [P O :480; I V :1100]  Out: -     No intake/output data recorded      Invasive Devices     Peripheral Intravenous Line            Peripheral IV 04/04/18 Left Forearm 1 day                Medications:  Current Facility-Administered Medications   Medication Dose Route Frequency    acetaminophen (TYLENOL) tablet 650 mg  650 mg Oral Q6H PRN    ALPRAZolam (XANAX) tablet 0 5 mg  0 5 mg Oral Daily PRN    atenolol (TENORMIN) tablet 50 mg  50 mg Oral Daily    hydrALAZINE (APRESOLINE) injection 5 mg  5 mg Intravenous Q6H PRN    HYDROcodone-acetaminophen (NORCO) 5-325 mg per tablet 1 tablet  1 tablet Oral Q4H PRN    imipramine (TOFRANIL) tablet 10 mg  10 mg Oral Once per day on Sun Tue Thu Sat    lactated ringers infusion  75 mL/hr Intravenous Continuous    levothyroxine tablet 50 mcg  50 mcg Oral Daily    morphine injection 1 mg  1 mg Intravenous Q2H PRN    ondansetron (ZOFRAN) injection 4 mg  4 mg Intravenous Q6H PRN    rosuvastatin (CRESTOR) tablet 10 mg  10 mg Oral Daily                 Lab Results and Cultures:   CBC with diff:   Lab Results   Component Value Date    WBC 12 78 (H) 04/05/2018    HGB 9 7 (L) 04/05/2018    HCT 29 4 (L) 04/05/2018    MCV 92 04/05/2018     04/05/2018    MCH 30 4 04/05/2018    MCHC 33 0 04/05/2018    RDW 13 9 04/05/2018    MPV 9 9 04/05/2018    NRBC 0 02/27/2018         Coags:   Lab Results   Component Value Date    INR 1 10 04/05/2018          Physical Exam:  General Appearance:    Alert and orientated x 3, cooperative, no distress, appears stated age   Lungs:     Clear to auscultation bilaterally, respirations unlabored, no wheezes    Heart:    Regular rate and rhythm, S1 and S2 normal, no murmur   Abdomen:    Normoactive BS, soft, mild incisional tenderness, non rigid, no masses, no palpated organomegaly  Wound/Dressing:  Dressings in place, clean and dry, no surrounding erythema or fluctuance  Extremities:  Extremities normal, no calf tenderness, no cyanosis or edema   Pulses:   2+ and symmetric all extremities   Skin:   Skin color, texture, turgor normal, no rashes   Neurologic:   CNII-XII intact, normal strength, affect appropriate       Imaging:  No results found      VTE Pharmacologic Prophylaxis: Reason for no pharmacologic prophylaxis intra operative bleeding  VTE Mechanical Prophylaxis: sequential compression device    Michael Tipton PA-C   4/5/2018

## 2018-04-06 VITALS
OXYGEN SATURATION: 95 % | SYSTOLIC BLOOD PRESSURE: 169 MMHG | DIASTOLIC BLOOD PRESSURE: 70 MMHG | TEMPERATURE: 98.5 F | HEIGHT: 57 IN | HEART RATE: 72 BPM | BODY MASS INDEX: 25.8 KG/M2 | RESPIRATION RATE: 18 BRPM | WEIGHT: 119.6 LBS

## 2018-04-06 LAB
ANION GAP SERPL CALCULATED.3IONS-SCNC: 7 MMOL/L (ref 4–13)
BASOPHILS # BLD AUTO: 0.03 THOUSANDS/ΜL (ref 0–0.1)
BASOPHILS NFR BLD AUTO: 0 % (ref 0–1)
BUN SERPL-MCNC: 20 MG/DL (ref 5–25)
CALCIUM SERPL-MCNC: 8.9 MG/DL (ref 8.3–10.1)
CHLORIDE SERPL-SCNC: 105 MMOL/L (ref 100–108)
CO2 SERPL-SCNC: 27 MMOL/L (ref 21–32)
CREAT SERPL-MCNC: 1.2 MG/DL (ref 0.6–1.3)
EOSINOPHIL # BLD AUTO: 0.37 THOUSAND/ΜL (ref 0–0.61)
EOSINOPHIL NFR BLD AUTO: 4 % (ref 0–6)
ERYTHROCYTE [DISTWIDTH] IN BLOOD BY AUTOMATED COUNT: 14.5 % (ref 11.6–15.1)
GFR SERPL CREATININE-BSD FRML MDRD: 43 ML/MIN/1.73SQ M
GLUCOSE SERPL-MCNC: 88 MG/DL (ref 65–140)
HCT VFR BLD AUTO: 26.6 % (ref 34.8–46.1)
HCT VFR BLD AUTO: 31.1 % (ref 34.8–46.1)
HGB BLD-MCNC: 10 G/DL (ref 11.5–15.4)
HGB BLD-MCNC: 8.6 G/DL (ref 11.5–15.4)
LYMPHOCYTES # BLD AUTO: 1.91 THOUSANDS/ΜL (ref 0.6–4.47)
LYMPHOCYTES NFR BLD AUTO: 18 % (ref 14–44)
MCH RBC QN AUTO: 30.4 PG (ref 26.8–34.3)
MCHC RBC AUTO-ENTMCNC: 32.3 G/DL (ref 31.4–37.4)
MCV RBC AUTO: 94 FL (ref 82–98)
MONOCYTES # BLD AUTO: 0.86 THOUSAND/ΜL (ref 0.17–1.22)
MONOCYTES NFR BLD AUTO: 8 % (ref 4–12)
NEUTROPHILS # BLD AUTO: 7.3 THOUSANDS/ΜL (ref 1.85–7.62)
NEUTS SEG NFR BLD AUTO: 70 % (ref 43–75)
NRBC BLD AUTO-RTO: 0 /100 WBCS
PLATELET # BLD AUTO: 143 THOUSANDS/UL (ref 149–390)
PMV BLD AUTO: 10.6 FL (ref 8.9–12.7)
POTASSIUM SERPL-SCNC: 4.1 MMOL/L (ref 3.5–5.3)
RBC # BLD AUTO: 2.83 MILLION/UL (ref 3.81–5.12)
SODIUM SERPL-SCNC: 139 MMOL/L (ref 136–145)
WBC # BLD AUTO: 10.49 THOUSAND/UL (ref 4.31–10.16)

## 2018-04-06 PROCEDURE — 85018 HEMOGLOBIN: CPT | Performed by: INTERNAL MEDICINE

## 2018-04-06 PROCEDURE — 85014 HEMATOCRIT: CPT | Performed by: INTERNAL MEDICINE

## 2018-04-06 PROCEDURE — G8978 MOBILITY CURRENT STATUS: HCPCS

## 2018-04-06 PROCEDURE — 80048 BASIC METABOLIC PNL TOTAL CA: CPT | Performed by: INTERNAL MEDICINE

## 2018-04-06 PROCEDURE — 99232 SBSQ HOSP IP/OBS MODERATE 35: CPT | Performed by: INTERNAL MEDICINE

## 2018-04-06 PROCEDURE — 85025 COMPLETE CBC W/AUTO DIFF WBC: CPT | Performed by: PHYSICIAN ASSISTANT

## 2018-04-06 PROCEDURE — 99024 POSTOP FOLLOW-UP VISIT: CPT | Performed by: PHYSICIAN ASSISTANT

## 2018-04-06 PROCEDURE — G8979 MOBILITY GOAL STATUS: HCPCS

## 2018-04-06 PROCEDURE — 97163 PT EVAL HIGH COMPLEX 45 MIN: CPT

## 2018-04-06 RX ORDER — FERROUS SULFATE 325(65) MG
325 TABLET ORAL
Status: DISCONTINUED | OUTPATIENT
Start: 2018-04-06 | End: 2018-04-06 | Stop reason: HOSPADM

## 2018-04-06 RX ORDER — DOCUSATE SODIUM 100 MG/1
100 CAPSULE, LIQUID FILLED ORAL 2 TIMES DAILY
Status: DISCONTINUED | OUTPATIENT
Start: 2018-04-06 | End: 2018-04-06 | Stop reason: HOSPADM

## 2018-04-06 RX ORDER — HYDROCODONE BITARTRATE AND ACETAMINOPHEN 5; 325 MG/1; MG/1
1 TABLET ORAL EVERY 4 HOURS PRN
Qty: 12 TABLET | Refills: 0 | Status: SHIPPED | OUTPATIENT
Start: 2018-04-06 | End: 2018-04-09

## 2018-04-06 RX ORDER — DOCUSATE SODIUM 100 MG/1
100 CAPSULE, LIQUID FILLED ORAL 2 TIMES DAILY PRN
Qty: 10 CAPSULE | Refills: 0 | Status: SHIPPED | OUTPATIENT
Start: 2018-04-06 | End: 2018-06-08

## 2018-04-06 RX ADMIN — LEVOTHYROXINE SODIUM 50 MCG: 50 TABLET ORAL at 06:22

## 2018-04-06 RX ADMIN — ATENOLOL 50 MG: 50 TABLET ORAL at 08:39

## 2018-04-06 RX ADMIN — FERROUS SULFATE TAB 325 MG (65 MG ELEMENTAL FE) 325 MG: 325 (65 FE) TAB at 08:39

## 2018-04-06 RX ADMIN — LISINOPRIL 10 MG: 10 TABLET ORAL at 08:39

## 2018-04-06 RX ADMIN — HYDROCODONE BITARTRATE AND ACETAMINOPHEN 1 TABLET: 5; 325 TABLET ORAL at 09:19

## 2018-04-06 RX ADMIN — DOCUSATE SODIUM 100 MG: 100 CAPSULE, LIQUID FILLED ORAL at 12:25

## 2018-04-06 RX ADMIN — ROSUVASTATIN CALCIUM 10 MG: 10 TABLET ORAL at 08:39

## 2018-04-06 NOTE — POST OP PROGRESS NOTES
Progress Note -Surgery KYA Padilla [de-identified] y o  female MRN: [id number]  Unit/Bed#: -01 Encounter: [id number]      Assessment/Plan   Assessment/Plan   POD #1, laparoscopic cholecystectomy  - H/H 8 6 will recheck this afternoon; no dizziness, lightheadedness, heart palpitations, headaches  -  tolerating regular diet  No nausea or vomiting   - dressings are remove, incisions clean/dry and intact  - patient feeling better this morning and stronger  Feels okay to go home  - no flatus or bowel movement  Afebrile and stable  Hypertension  HLD  GERD  Hypothyroidism     Plan:  Continue with regular diet  Check h/H this afternoon  Colace BID  IS  Tentative discharge today given H&H is stable    ______________________________________________________________________    Subjective:   Patient feeling well and more stable today  Continues to tolerate diet  No nausea or vomiting, no fever chills  Passing flatus but no bowel movement    Objective:       Vitals:  /70 (BP Location: Right arm)   Pulse 72   Temp 98 5 °F (36 9 °C) (Oral)   Resp 18   Ht 4' 9" (1 448 m)   Wt 54 3 kg (119 lb 9 6 oz)   SpO2 95%   Breastfeeding? No   BMI 25 88 kg/m²     I/Os:  I/O last 3 completed shifts:   In: 1200 [P O :1200]  Out: -     I/O this shift:  In: 360 [P O :360]  Out: -     Invasive Devices     Peripheral Intravenous Line            Peripheral IV 04/04/18 Left Forearm 2 days                Medications:  Current Facility-Administered Medications   Medication Dose Route Frequency    acetaminophen (TYLENOL) tablet 650 mg  650 mg Oral Q6H PRN    ALPRAZolam (XANAX) tablet 0 5 mg  0 5 mg Oral Daily PRN    atenolol (TENORMIN) tablet 50 mg  50 mg Oral Daily    docusate sodium (COLACE) capsule 100 mg  100 mg Oral BID    ferrous sulfate tablet 325 mg  325 mg Oral Daily With Breakfast    hydrALAZINE (APRESOLINE) injection 5 mg  5 mg Intravenous Q6H PRN    HYDROcodone-acetaminophen (NORCO) 5-325 mg per tablet 1 tablet  1 tablet Oral Q4H PRN    imipramine (TOFRANIL) tablet 10 mg  10 mg Oral Once per day on Sun Tue Thu Sat    levothyroxine tablet 50 mcg  50 mcg Oral Daily    lisinopril (ZESTRIL) tablet 10 mg  10 mg Oral Daily    morphine injection 1 mg  1 mg Intravenous Q2H PRN    ondansetron (ZOFRAN) injection 4 mg  4 mg Intravenous Q6H PRN    rosuvastatin (CRESTOR) tablet 10 mg  10 mg Oral Daily                 Lab Results and Cultures:   CBC with diff:   Lab Results   Component Value Date    WBC 10 49 (H) 04/06/2018    HGB 8 6 (L) 04/06/2018    HCT 26 6 (L) 04/06/2018    MCV 94 04/06/2018     (L) 04/06/2018    MCH 30 4 04/06/2018    MCHC 32 3 04/06/2018    RDW 14 5 04/06/2018    MPV 10 6 04/06/2018    NRBC 0 04/06/2018       BMP/CMP:  Lab Results   Component Value Date     04/06/2018     09/10/2015    K 4 1 04/06/2018    K 3 8 09/10/2015     04/06/2018     09/10/2015    CO2 27 04/06/2018    CO2 28 09/10/2015    ANIONGAP 7 04/06/2018    ANIONGAP 6 09/10/2015    BUN 20 04/06/2018    BUN 15 09/10/2015    CREATININE 1 20 04/06/2018    CREATININE 1 14 09/10/2015    GLUCOSE 88 04/06/2018    GLUCOSE 85 09/10/2015    CALCIUM 8 9 04/06/2018    CALCIUM 9 1 09/10/2015    AST 23 03/16/2018    AST 13 09/10/2015    ALT 25 03/16/2018    ALT 17 09/10/2015    ALKPHOS 85 03/16/2018    ALKPHOS 96 09/10/2015    PROT 7 3 03/16/2018    PROT 7 6 10/01/2015    PROT 7 6 09/10/2015    BILITOT 0 30 03/16/2018    BILITOT 0 54 09/10/2015    EGFR 43 04/06/2018           Physical Exam  General Appearance:    Alert and orientated x 3, cooperative, no distress, appears stated age   Lungs:     Clear to auscultation bilaterally, respirations unlabored, no wheezes    Heart:    Regular rate and rhythm, S1 and S2 normal, no murmur   Abdomen:    Normoactive BS, soft, mild tenderness at incision, non rigid, no masses, no palpated organomegaly  Wound/Dressing:  C/d/i, no drainage, no erythema   Extremities:  Extremities normal, no calf tenderness, no cyanosis or edema   Pulses:   2+ and symmetric all extremities   Skin:   Skin color, texture, turgor normal, no rashes   Neurologic:   CNII-XII intact, normal strength, affect appropriate       Imaging:  Xr Cholangiogram Intraoperative    Result Date: 4/6/2018  Impression: No filling defects  Please see procedure report for further details   Workstation performed: [id number]       VTE Pharmacologic Prophylaxis: Enoxaparin (Lovenox)  VTE Mechanical Prophylaxis: sequential compression device    Juan Francisco Lemons PA-C   4/6/2018

## 2018-04-06 NOTE — PHYSICIAN ADVISOR
Current patient class: Observation  The patient is currently on Hospital Day: 2      The patient was admitted to the hospital at N/A on N/A for the following diagnosis:  Chronic cholecystitis with calculus [K80 10]       There is documentation in the medical record of an expected length of stay of at least 2 midnights  The patient is therefore expected to satisfy the 2 midnight benchmark and given the 2 midnight presumption is appropriate for INPATIENT ADMISSION  Given this expectation of a satisfying stay, CMS instructs us that the patient is most often appropriate for inpatient admission under part A provided medical necessity is documented in the chart  After review of the relevant documentation, labs, vital signs and test results, the patient is appropriate for INPATIENT ADMISSION  Admission to the hospital as an inpatient is a complex decision making process which requires the practitioner to consider the patients presenting complaint, history and physical examination and all relevant testing  With this in mind, in this case, the patient was deemed appropriate for INPATIENT ADMISSION  After review of the documentation and testing available at the time of the admission I concur with this clinical determination of medical necessity  Rationale is as follows: The patient is a [de-identified] yrs old Female who presented to the ED at 4/4/2018  6:57 AM with a chief complaint of cholelithiasis  The patient will remain in the hospital for at least a 2nd midnight for continued evaluation and management of right upper quadrant pain, with comorbid conditions including hypertension  Patient has not passed any stool, and continues to require monitoring postoperatively  The patient is [de-identified]years old, does have comorbid conditions, and will satisfy the 2 midnight benchmark  Given this, the patient is appropriate for inpatient admission      The patients vitals on arrival were ED Triage Vitals   Temperature Pulse Respirations Blood Pressure SpO2   04/04/18 0730 04/04/18 0730 04/04/18 0730 04/04/18 0730 04/04/18 0730   97 8 °F (36 6 °C) 66 18 157/66 99 %      Temp Source Heart Rate Source Patient Position - Orthostatic VS BP Location FiO2 (%)   04/04/18 0730 04/04/18 0730 04/04/18 1030 04/04/18 1030 --   Temporal Monitor Lying Right arm       Pain Score       04/04/18 0913       1           Past Medical History:   Diagnosis Date    Disease of thyroid gland     GERD (gastroesophageal reflux disease)     Hyperlipidemia     Hypertension      Past Surgical History:   Procedure Laterality Date    APPENDECTOMY      HYSTERECTOMY      CO LAP,CHOLECYSTECTOMY/GRAPH N/A 4/4/2018    Procedure: LAPAROSCOPIC CHOLECYSTECTOMY WITH IOC;  Surgeon: Dominga Tee MD;  Location: [facility];  Service: General           Consults have been placed to:   IP CONSULT TO INTERNAL MEDICINE    Vitals:    04/04/18 1930 04/04/18 2300 04/05/18 0700 04/05/18 1500   BP: 159/73  141/65 161/70   BP Location: Left arm  Right arm Left arm   Pulse: 95 92 81 76   Resp: 18 18 18 18   Temp: 97 6 °F (36 4 °C) 97 9 °F (36 6 °C) 98 3 °F (36 8 °C) 97 9 °F (36 6 °C)   TempSrc: Oral Oral Oral Oral   SpO2: 93%  96% 98%   Weight:       Height:           Most recent labs:    Recent Labs      04/05/18   0546  04/05/18   1347   WBC  12 78*   --    HGB  9 7*  10 5*   HCT  29 4*  32 7*   PLT  155   --    INR  1 10   --        Scheduled Meds:  Current Facility-Administered Medications:  acetaminophen 650 mg Oral Q6H PRN Kamala Sheldon PA-C   ALPRAZolam 0 5 mg Oral Daily PRN Kamala Sheldon PA-C   atenolol 50 mg Oral Daily Kamala Sheldon PA-C   hydrALAZINE 5 mg Intravenous Q6H PRN AMIE Marvin   HYDROcodone-acetaminophen 1 tablet Oral Q4H PRN Ifrah Gaspar MD   imipramine 10 mg Oral Once per day on Sun Tue Thu Sat Samuel Diana Sheldon PA-C   levothyroxine 50 mcg Oral Daily Kamala Sheldon PA-C   lisinopril 10 mg Oral Daily Kenyatta Adame MD   morphine injection 1 mg Intravenous Q2H PRN Citlaly Sheldon PA-C   ondansetron 4 mg Intravenous Q6H PRN Citlaly Sheldon PA-C   rosuvastatin 10 mg Oral Daily Kamala Sheldon PA-C     Continuous Infusions:   PRN Meds:   acetaminophen    ALPRAZolam    hydrALAZINE    HYDROcodone-acetaminophen    morphine injection    ondansetron    Surgical procedures (if appropriate):  Procedure(s):  LAPAROSCOPIC CHOLECYSTECTOMY WITH IOC

## 2018-04-06 NOTE — PROGRESS NOTES
Progress Note - Sally Figueroa [de-identified] y o  female MRN: [id number]  Unit/Bed#: -01 Encounter: [id number]    Subjective:   Feeling generally well this morning  She still has not moved her bowels but is tolerating a diet without nausea or vomiting  She did pass flatus yesterday  She was evaluated along with PT  She is ambulating independently in the hallway without any chest pain, shortness of breath, dizziness, weakness, lightheadedness  She has not had fevers or chills  She denies dysuria  All other ROS are negative  Objective:   Vitals: Blood pressure 169/70, pulse 72, temperature 98 5 °F (36 9 °C), temperature source Oral, resp  rate 18, height 4' 9" (1 448 m), weight 54 3 kg (119 lb 9 6 oz), SpO2 95 %, not currently breastfeeding  ,Body mass index is 25 88 kg/m²  SPO2 RA Rest    Flowsheet Row Admission (Current) from 4/4/2018 in [facility] 2nd Floor Med Surg Unit   SpO2  95 %   SpO2 Activity  At Rest   O2 Device  None (Room air)   O2 Flow Rate  2 L/min        I&O:   Intake/Output Summary (Last 24 hours) at 04/06/18 0801  Last data filed at 04/05/18 2300   Gross per 24 hour   Intake              960 ml   Output                0 ml   Net              960 ml         Physical Exam:       General Appearance:    Alert, cooperative, no distress   Head:    Normocephalic, without obvious abnormality, atraumatic   Eyes:    PERRL, conjunctiva/corneas clear, EOM's intact               Neck:   Supple, symmetrical, trachea midline, no JVD   Lungs:     Clear to auscultation bilaterally, respirations unlabored   Heart:    Regular rate and rhythm, S1 and S2 normal, no murmur, rub   or gallop   Abdomen: Soft, minimal diffuse tenderness to palpation, positive bowel sounds   Extremities:  No pedal edema, calf tenderness  Distal pulses palpable  Neurologic:     CNII-XII intact        Invasive Devices     Peripheral Intravenous Line            Peripheral IV 04/04/18 Left Forearm 2 days Social History  reviewed  Family History   Problem Relation Age of Onset    Stroke Mother     Heart disease Father     reviewed    Meds:  Current Facility-Administered Medications   Medication Dose Route Frequency Provider Last Rate Last Dose    acetaminophen (TYLENOL) tablet 650 mg  650 mg Oral Q6H PRN Austyn Sheldon PA-C   650 mg at 04/04/18 1618    ALPRAZolam Gayleen Finch) tablet 0 5 mg  0 5 mg Oral Daily PRN Austyn Sheldon PA-C   0 5 mg at 04/05/18 5192    atenolol (TENORMIN) tablet 50 mg  50 mg Oral Daily Austyn Sheldon PA-C   50 mg at 04/05/18 9877    ferrous sulfate tablet 325 mg  325 mg Oral Daily With Breakfast Becca Guzman MD        hydrALAZINE (APRESOLINE) injection 5 mg  5 mg Intravenous Q6H PRN AMIE Marvin        HYDROcodone-acetaminophen (NORCO) 5-325 mg per tablet 1 tablet  1 tablet Oral Q4H PRN Rona Harkins MD   1 tablet at 04/05/18 1517    imipramine (TOFRANIL) tablet 10 mg  10 mg Oral Once per day on Sun Tue Thu Sat Austyn Sheldon PA-C   10 mg at 04/05/18 2882    levothyroxine tablet 50 mcg  50 mcg Oral Daily Austyn Sheldon PA-C   50 mcg at 04/06/18 5096    lisinopril (ZESTRIL) tablet 10 mg  10 mg Oral Daily Becca Guzman MD        morphine injection 1 mg  1 mg Intravenous Q2H PRN Austyn Sheldon PA-C        ondansetron [facility]) injection 4 mg  4 mg Intravenous Q6H PRN Austyn Sheldon PA-C        rosuvastatin (CRESTOR) tablet 10 mg  10 mg Oral Daily Austyn Sheldon PA-C   10 mg at 04/05/18 0908      Prescriptions Prior to Admission   Medication    acetaminophen (TYLENOL) 325 mg tablet    ALPRAZolam (XANAX) 0 5 mg tablet    aspirin 81 mg chewable tablet    atenolol (TENORMIN) 50 mg tablet    Corn Dextrin (EQL FIBER SUPPLEMENT PO)    ferrous sulfate 325 (65 Fe) mg tablet    imipramine (TOFRANIL) 10 mg tablet    levothyroxine 50 mcg tablet    lisinopril (ZESTRIL) 10 mg tablet    Probiotic Product (ALIGN) 4 MG CAPS    rosuvastatin (CRESTOR) 10 MG tablet    Cholecalciferol (VITAMIN D3) 2000 units capsule    clobetasol (TEMOVATE) 0 05 % ointment       Labs:    Results from last 7 days  Lab Units 04/06/18  0453 04/05/18  1347 04/05/18  0546   WBC Thousand/uL 10 49*  --  12 78*   HEMOGLOBIN g/dL 8 6* 10 5* 9 7*   HEMATOCRIT % 26 6* 32 7* 29 4*   PLATELETS Thousands/uL 143*  --  155   NEUTROS PCT % 70  --   --    LYMPHS PCT % 18  --   --    MONOS PCT % 8  --   --    EOS PCT % 4  --   --        Results from last 7 days  Lab Units 04/06/18  0453   SODIUM mmol/L 139   POTASSIUM mmol/L 4 1   CHLORIDE mmol/L 105   CO2 mmol/L 27   BUN mg/dL 20   CREATININE mg/dL 1 20   CALCIUM mg/dL 8 9   GLUCOSE RANDOM mg/dL 88     Lab Results   Component Value Date    TROPONINI <0 02 02/27/2018    TROPONINI <0 02 01/09/2018    CKTOTAL 113 01/09/2018    CKTOTAL 60 04/06/2016       Results from last 7 days  Lab Units 04/05/18  0546   INR  1 10     Lab Results   Component Value Date    URINECX No Growth <1000 cfu/mL 04/06/2016       Imaging:  No results found for this or any previous visit  Results for orders placed during the hospital encounter of 02/27/18   XR chest 2 views    Narrative CHEST     INDICATION: cough, weakness    COMPARISON:  January 18, 2016    EXAM PERFORMED/VIEWS:  XR CHEST PA & LATERAL  The frontal view was performed utilizing dual energy radiographic technique  FINDINGS:    Heart normal in size  Moderate-sized hiatal hernia  Pulmonary vessels unremarkable  The lungs are clear  No pneumothorax or pleural effusion  Scoliosis  Imaged bones otherwise unremarkable  Impression No acute cardiopulmonary disease          Workstation performed: [id number]         VTE Pharmacologic Prophylaxis: Sequential pneumatic compression stocking      Code Status:   Level 1 - Full Code    Assessment:  Principal Problem:    Chronic cholecystitis with calculus  Active Problems:    Hypertension    Chronic kidney disease    Disease of thyroid gland  Resolved Problems:    * No resolved hospital problems  *      Plan:  · Postoperative day 2 status post laparoscopic cholecystectomy-tolerating diet, disposition per surgical team with recommendations as follows:  · Anemia-baseline hemoglobin approximately 10 5  She remains hemodynamically stable however hemoglobin down to 8 6 this morning  Patient has chronic iron deficiency coupled with anemia of chronic kidney disease  She has history of AVMs  She was admitted for severe anemia January 2017 with unremarkable EGD and colonoscopy  She declined capsule endoscopy  She has been on oral iron since that time  I resumed oral iron  I would recommend repeating hemoglobin and hematocrit prior to discharge to assure she is not trending downward and rule out postoperative bleeding    · Hypertension-I ordered lisinopril to be resumed last night however she declined, it will be started again this morning  · Chronic kidney disease-serum creatinine has actually improved likely secondary to postoperative hydration  · PVD-resume aspirin when hemoglobin stable, continue rosuvastatin      Pavel Harry MD  4/6/2018,8:01 AM

## 2018-04-06 NOTE — PLAN OF CARE
Problem: PHYSICAL THERAPY ADULT  Goal: Performs mobility at highest level of function for planned discharge setting  See evaluation for individualized goals  Treatment/Interventions: Functional transfer training, LE strengthening/ROM, Elevations, Therapeutic exercise, Endurance training, Patient/family training, Equipment eval/education, Bed mobility, Gait training, Spoke to nursing, Spoke to MD, Spoke to case management  Equipment Recommended:  (TBD, anticipate none)       See flowsheet documentation for full assessment, interventions and recommendations  Prognosis: Good  Problem List: Decreased strength, Decreased endurance, Impaired balance, Decreased mobility, Decreased skin integrity, Pain  Assessment: Pt is [de-identified] y o  female seen for PT evaluation on 4/6/2018 s/p admit to [facility] on 4/4/2018 w/ Chronic cholecystitis with calculus  Pt presents for laparoscopic cholecystectomy with IOC on 4/4/18  PT consulted to assess pt's functional mobility and d/c needs  Order placed for PT eval and tx, w/ up and OOB as tolerated and ambulate patient order  Performed at least 2 patient identifiers during session: Name and wristband  Comorbidities affecting pt's physical performance at time of assessment include: disease of thyroid gland, GERD, HLD, HTN, chronic anemia, CKD stage 3  PTA, pt was independent w/ all functional mobility w/ no AD/DME, ambulates unrestricted distances and all terrain, has 3 SWETA, lives w/ grandson in 2 (1st floor set up) level home and retired  Personal factors affecting pt at time of IE include: stairs to enter home   Please find objective findings from PT assessment regarding body systems outlined above with impairments and limitations including weakness, impaired balance, decreased endurance, gait deviations, pain and decreased skin integrity, as well as mobility assessment (need for close SBA-CGA assist w/ all phases of mobility when usually ambulating independently and need for cueing for mobility technique)  The following objective measures performed on IE also reveal limitations: Barthel Index: 70/100 and Modified Cory: 2 (slight disability)  Pt's clinical presentation is currently unstable/unpredictable seen in pt's presentation of need for input for task focus and mobility technique, need for SBA-CGA assist w/ all phases of mobility when usually mobilizing independently, post surgical abdominal pain impacting overall mobility status, unstable medical status and trending downward Hgb (8 6)  Pt to benefit from continued PT tx to address deficits as defined above and maximize level of functional independent mobility and consistency  From PT/mobility standpoint, recommendation at time of d/c would be Home PT with family support vs no further PT needs pending progress in order to facilitate return to PLOF  Barriers to Discharge: None     Recommendation: Home PT, Home with family support     PT - OK to Discharge: No    See flowsheet documentation for full assessment

## 2018-04-06 NOTE — PROGRESS NOTES
Progress Note - General Surgery   Compa Padilla [de-identified] y o  female MRN: [id number]  Unit/Bed#: -Greg Encounter: [id number]    Assessment:  Status post laparoscopic cholecystectomy, improved  Anemia most likely dilutional, stable    Plan:  Stable from the surgical standpoint  Check hemoglobin later today if it is stable patient may be discharged home  Subjective/Objective   Chief Complaint:  Incisional pain    Subjective:  Denies any nausea, vomiting, chills or fever    Objective:     Blood pressure 169/70, pulse 72, temperature 98 5 °F (36 9 °C), temperature source Oral, resp  rate 18, height 4' 9" (1 448 m), weight 54 3 kg (119 lb 9 6 oz), SpO2 95 %, not currently breastfeeding  ,Body mass index is 25 88 kg/m²  Intake/Output Summary (Last 24 hours) at 04/06/18 1235  Last data filed at 04/06/18 0900   Gross per 24 hour   Intake             1080 ml   Output                0 ml   Net             1080 ml       Invasive Devices     Peripheral Intravenous Line            Peripheral IV 04/04/18 Left Forearm 2 days                Physical Exam:   Abdomen is sotf, nondistended and mildly tender over the incisions      Lab, Imaging and other studies:  CBC:   Lab Results   Component Value Date    WBC 10 49 (H) 04/06/2018    HGB 8 6 (L) 04/06/2018    HCT 26 6 (L) 04/06/2018    MCV 94 04/06/2018     (L) 04/06/2018    MCH 30 4 04/06/2018    MCHC 32 3 04/06/2018    RDW 14 5 04/06/2018    MPV 10 6 04/06/2018    NRBC 0 04/06/2018   , CMP:   Lab Results   Component Value Date     04/06/2018    K 4 1 04/06/2018     04/06/2018    CO2 27 04/06/2018    ANIONGAP 7 04/06/2018    BUN 20 04/06/2018    CREATININE 1 20 04/06/2018    GLUCOSE 88 04/06/2018    CALCIUM 8 9 04/06/2018    EGFR 43 04/06/2018     VTE Pharmacologic Prophylaxis: Sequential compression device (Venodyne)   VTE Mechanical Prophylaxis: sequential compression device

## 2018-04-06 NOTE — PHYSICAL THERAPY NOTE
Physical Therapy Evaluation     Patient's Name: Rick Wilson    Admitting Diagnosis  Chronic cholecystitis with calculus [K80 10]    Problem List  Patient Active Problem List   Diagnosis    Chest discomfort    Anxiety    Abnormal EKG    Gallstone    Chronic cholecystitis with calculus    Vitamin D deficiency    Irritable bowel syndrome    Iron deficiency anemia    Hypothyroidism    Hypertension    Hyperlipidemia    GERD without esophagitis    ESR raised    Chronic kidney disease    Disease of thyroid gland       Past Medical History  Past Medical History:   Diagnosis Date    Disease of thyroid gland     GERD (gastroesophageal reflux disease)     Hyperlipidemia     Hypertension        Past Surgical History  Past Surgical History:   Procedure Laterality Date    APPENDECTOMY      HYSTERECTOMY      [place] LAP,CHOLECYSTECTOMY/GRAPH N/A 4/4/2018    Procedure: LAPAROSCOPIC CHOLECYSTECTOMY WITH IOC;  Surgeon: Jaylon Ballard MD;  Location: [facility];  Service: General        04/06/18 0740   Note Type   Note type Eval/Treat   Pain Assessment   Pain Assessment 0-10   Pain Score 5   Pain Type Surgical pain   Pain Location Abdomen   Pain Orientation Right   Home Living   Type of [address] [place] [address] Two level;Performs ADLs on one level; Able to live on main level with bedroom/bathroom;Stairs to enter with rails  (1st floor set up, 3 short SWETA)   Bathroom Shower/Tub Tub/shower unit   Bathroom Toilet Standard   Bathroom Equipment (no DME at baseline)   [address] (no AD at baseline)   Prior Function   Level of [place] Independent with ADLs and functional mobility   Lives With Other (Comment)  (grandson 34 y/o)   [address] Help From Family   ADL Assistance Independent   IADLs Independent  (grandson also cooks; splits with grandson)   Falls in the last 6 months 0  (pt does not recall any history of falls)   Vocational Retired  ()   Comments (-) driving  also has 2 dtrs and 1 son that live locally  Pt noting her dtr typically comes over during the day, but she manages on her own  Restrictions/Precautions   Weight Bearing Precautions Per Order No   Braces or Orthoses (none per pt)   Other Precautions Bed Alarm; Chair Alarm; Fall Risk;Pain   General   Additional Pertinent History Pt reporting she ambulated in hallway yesterday without difficulty  No lightheadedness of or dizziness reported at this time  Family/Caregiver Present No   Cognition   Overall Cognitive Status WFL   Arousal/Participation Alert   Orientation Level Oriented X4   Memory Within functional limits   Following Commands Follows all commands and directions without difficulty   Comments pt agreeable to PT IE   RUE Assessment   RUE Assessment WFL   LUE Assessment   LUE Assessment WFL   RLE Assessment   RLE Assessment WFL  (4/5)   LLE Assessment   LLE Assessment WFL  (4/5)   Coordination   Movements are Fluid and Coordinated 1   Sensation WFL   Light Touch   RLE Light Touch Grossly intact  (chronic neuropathy B "toes")   LLE Light Touch Grossly intact  (chronic neuropathy B "toes")   Bed Mobility   Supine to Sit 4  Minimal assistance  (CGA)   Additional items Assist x 1;HOB elevated; Bedrails; Increased time required;Verbal cues   Additional Comments educated on log roll technique   Transfers   Sit to Stand 5  Supervision   Additional items Assist x 1; Armrests; Increased time required;Verbal cues   Stand to Sit 5  Supervision   Additional items Assist x 1; Armrests; Increased time required;Verbal cues   Ambulation/Elevation   Gait pattern Decreased foot clearance; Short stride;Narrow NAVI   Gait Assistance 5  Supervision   Additional items Assist x 1;Verbal cues   Assistive Device None   Distance 100'   Stair Management Assistance Not tested   Balance   Static Sitting Good   Dynamic Sitting Good   Static Standing Good   Dynamic Standing Fair +   Ambulatory Fair +   Endurance Deficit   Endurance Deficit Yes Activity Tolerance   Activity Tolerance Patient limited by fatigue;Patient tolerated treatment well   Medical Staff Made Aware yes spoke to Dr Gaurang Knight  Spoke to Subhash Kumar Dr regarding PT recs   Nurse Made Aware yes, RN Kerrie verbalized pt appropriate to see, made aware of session outcome/recs   Assessment   Prognosis Good   Problem List Decreased strength;Decreased endurance; Impaired balance;Decreased mobility; Decreased skin integrity;Pain   Assessment Pt is [de-identified] y o  female seen for PT evaluation on 4/6/2018 [facility]/ Chronic cholecystitis with calculus  Pt presents for laparoscopic cholecystectomy with IOC on 4/4/18  PT consulted to assess pt's functional mobility and d/c needs  Order placed for PT eval and tx, w/ up and OOB as tolerated and ambulate patient order  Performed at least 2 patient identifiers during session: Name and wristband  Comorbidities affecting pt's physical performance at time of assessment include: disease of thyroid gland, GERD, HLD, HTN, chronic anemia, CKD stage 3  PTA, pt was independent w/ all functional mobility w/ no AD/DME, ambulates unrestricted distances and all terrain, has 3 SWETA, lives w/ grandson in 2 (1st floor set up) level home and retired  Personal factors affecting pt at time of IE include: stairs to enter home  Please find objective findings from PT assessment regarding body systems outlined above with impairments and limitations including weakness, impaired balance, decreased endurance, gait deviations, pain and decreased skin integrity, as well as mobility assessment (need for close SBA-CGA assist w/ all phases of mobility when usually ambulating independently and need for cueing for mobility technique)  The following objective measures performed on IE also reveal limitations: Barthel Index: 70/100 and Modified [place]: 2 (slight disability)   Pt's clinical presentation is currently unstable/unpredictable seen in pt's presentation of need for input for task focus and mobility technique, need for SBA-CGA assist w/ all phases of mobility when usually mobilizing independently, post surgical abdominal pain impacting overall mobility status, unstable medical status and trending downward Hgb (8 6)  Pt to benefit from continued PT tx to address deficits as defined above and maximize level of functional independent mobility and consistency  From PT/mobility standpoint, recommendation at time of d/c would be Home PT with family support vs no further PT needs pending progress in order to facilitate return to PLOF  Barriers to Discharge None   Goals   Patient Goals to return home   STG Expiration Date 04/16/18   Short Term Goal #1 In 7-10 days: Increase bilateral LE strength 1/2 grade to facilitate independent mobility, Perform all bed mobility tasks independently to decrease caregiver burden, Perform all transfers independently to improve independence, Ambulate > 300 ft  with least restrictive assistive device independently w/o LOB and w/ normalized gait pattern 100% of the time, Navigate 3 stairs independently with unilateral handrail to facilitate return to previous living environment, Increase all balance 1/2 grade to decrease risk for falls, Tolerate 4 hr OOB to faciliate upright tolerance and Improve Barthel Index score to 85 or greater to facilitate independence   Treatment Day 0   Plan   Treatment/Interventions Functional transfer training;LE strengthening/ROM; Elevations; Therapeutic exercise; Endurance training;Patient/family training;Equipment eval/education; Bed mobility;Gait training;Spoke to nursing;Spoke to MD;Spoke to case management   PT Frequency 2-3x/wk   Recommendation   Recommendation Home PT; Home with family support   Equipment Recommended (TBD, anticipate none)   PT - OK to Discharge No   Additional Comments Pt to demonstrate consistency with level surface mobility and clear stairs prior to safe d/c disposition home     Modified [place] Scale Modified Cory Scale 2   Barthel Index   Feeding 10   Bathing 5   Grooming Score 5   Dressing Score 10   Bladder Score 10   Bowels Score 10   Toilet Use Score 10   Transfers (Bed/Chair) Score 10   Mobility (Level Surface) Score 0   Stairs Score 0   Barthel Index Score 70         Amy Goldsmith, PT, DPT

## 2018-04-06 NOTE — DISCHARGE SUMMARY
Discharge Summary - Mikaela Cuevas [de-identified] y o  female MRN: [id number]    Unit/Bed#: -01 Encounter: [id number]      Admission Date: 4/4/2018   Discharge Date: 04/06/18    Admitting Diagnosis:  Chronic cholecystitis with calculus [K80 10]  Discharge Diagnoses: Principal Problem:    Chronic cholecystitis with calculus  Active Problems:    Hypertension    Chronic kidney disease    Disease of thyroid gland      Consultations:  Internal medicine for medical management    Procedures Performed:   Laparoscopic cholecystectomy    HPI:  Mikaela Cuevas is an  74-year-old female admitted for observation after laparoscopic cholecystectomy with cholangiogram with Dr Alexander Sr:  Patient underwent a laparoscopic cholecystectomy on 4/4/2018 with Dr Ian Hatfield  She tolerated procedure well but had some intraoperative bleeding and was admitted for observation  Patient the tolerated her diet well and was passing flatus  She was afebrile and vitals were stable throughout her stay  The patient's hemoglobin was 9 7 on postoperative day 1  She was tolerating a regular diet and passing flatus but did feel a little weak  Physical therapy consult was ordered  Repeat hemoglobin on postop day 1 was 10 5  On postop day 2 patient's hemoglobin was 8 6  Patient showed no signs or symptoms of blood loss or anemia  Patient had been on fluids and fluids were discontinued  Patient was feeling stronger as well  Her repeat hemoglobin on the afternoon of postoperative day 2 was 10  Patient was discharged home and instructed to follow up with Dr Ian Hatfield in 2 weeks  She was instructed to call with any other questions or concerns  She was also instructed that she could take Colace to help with bowel movements that she does take a regular stool softener at home  Patient was discharged with pain medication  All questions were answered to the patient's satisfaction and she was in understanding    See H and P for full details of admission and op note  Patient was seen and examined prior to discharge  Please see postoperative progress note from 4/6/2018  Condition at Discharge: good       Discharge instructions/Information to patient and family:   See after visit summary for information provided to patient and family  Post op instructions reviewed with patient and/or family  Pain medication and Colace RX given for discharge  Provisions for Follow-Up Care:  See after visit summary for information related to follow-up care and any pertinent home health orders  Disposition: Home    Planned Readmission: No    Discharge Statement   I spent 25 minutes discharging the patient  This time was spent on the day of discharge  I had direct contact with the patient on the day of discharge  Additional documentation is required if more than 30 minutes were spent on discharge  Discharge Medications:  See after visit summary for reconciled discharge medications provided to patient and family          Signature:   Leopoldo Berlin, PA-C  Date: 4/6/2018 Time: 2:52 PM

## 2018-04-09 ENCOUNTER — TRANSITIONAL CARE MANAGEMENT (OUTPATIENT)
Dept: INTERNAL MEDICINE CLINIC | Facility: CLINIC | Age: 83
End: 2018-04-09

## 2018-04-10 ENCOUNTER — TELEPHONE (OUTPATIENT)
Dept: INTERNAL MEDICINE CLINIC | Facility: CLINIC | Age: 83
End: 2018-04-10

## 2018-04-10 ENCOUNTER — OFFICE VISIT (OUTPATIENT)
Dept: INTERNAL MEDICINE CLINIC | Facility: CLINIC | Age: 83
End: 2018-04-10
Payer: MEDICARE

## 2018-04-10 VITALS
BODY MASS INDEX: 25.88 KG/M2 | SYSTOLIC BLOOD PRESSURE: 110 MMHG | HEART RATE: 74 BPM | DIASTOLIC BLOOD PRESSURE: 60 MMHG | WEIGHT: 119.6 LBS

## 2018-04-10 DIAGNOSIS — K80.10 CHRONIC CHOLECYSTITIS WITH CALCULUS: Primary | ICD-10-CM

## 2018-04-10 PROCEDURE — 99496 TRANSJ CARE MGMT HIGH F2F 7D: CPT | Performed by: NURSE PRACTITIONER

## 2018-04-10 NOTE — TELEPHONE ENCOUNTER
Patient would like to talk to nurse about gull bladder removed on 4/04/2018    Please patient at [phone]

## 2018-04-10 NOTE — PATIENT INSTRUCTIONS
Patient is status post cholecystectomy laparoscopically she is doing well  Lap sites are all clean and dry no evidence of infection there is 1 area with a small irritated area around the puncture site I recommend keeping it open to air  She is scheduled to see surgeon on the 19th

## 2018-04-10 NOTE — PROGRESS NOTES
Assessment/Plan:     Patient is status post cholecystectomy laparoscopically she is doing well  Lap sites are all clean and dry no evidence of infection there is 1 area with a small irritated area around the puncture site I recommend keeping it open to air  She is scheduled to see surgeon on the 19th Diagnoses and all orders for this visit:    Chronic cholecystitis with calculus         Subjective:     Patient ID: Leonel Villanueva is a [de-identified] y o  female  Patient had laparoscopic cholecystectomy last Wednesday she was hospitalized for 2 nights because of drainage  She was not discharged with antibiotics only pain medicine  She is doing well in those regards  She is up moving around  She is passing gas no bowel movement yet poor appetite  No fevers chills on the right upper abdomen there is a small puncture site with a small amount of drainage  She wanted        Review of Systems   Constitutional: Negative for appetite change, chills, diaphoresis, fatigue, fever and unexpected weight change  HENT: Negative for postnasal drip and sneezing  Eyes: Negative for visual disturbance  Respiratory: Negative for chest tightness and shortness of breath  Cardiovascular: Negative for chest pain, palpitations and leg swelling  Gastrointestinal: Negative for abdominal pain and blood in stool  Endocrine: Negative for cold intolerance, heat intolerance, polydipsia, polyphagia and polyuria  Genitourinary: Negative for difficulty urinating, dysuria, frequency and urgency  Musculoskeletal: Negative for arthralgias and myalgias  Skin: Negative for rash and wound  Neurological: Negative for dizziness, weakness, light-headedness and headaches  Hematological: Negative for adenopathy  Psychiatric/Behavioral: Negative for confusion, dysphoric mood and sleep disturbance  The patient is not nervous/anxious  Objective:     Physical Exam   Constitutional: She is oriented to person, place, and time   She appears well-developed  No distress  HENT:   Head: Normocephalic and atraumatic  Nose: Nose normal    Mouth/Throat: Oropharynx is clear and moist    Eyes: Conjunctivae and EOM are normal  Pupils are equal, round, and reactive to light  Neck: Normal range of motion  Neck supple  No JVD present  No tracheal deviation present  No thyromegaly present  Cardiovascular: Normal rate, regular rhythm, normal heart sounds and intact distal pulses  Exam reveals no gallop and no friction rub  No murmur heard  Pulmonary/Chest: Effort normal and breath sounds normal  No respiratory distress  She has no wheezes  She has no rales  Abdominal: Soft  Bowel sounds are normal  She exhibits no distension  There is no tenderness  Puncture sites noted to the right upper abdomen there is a small ecchymosis around the puncture site with an area that is just granulating tissue  No actual drainage   Musculoskeletal: Normal range of motion  She exhibits no edema  Lymphadenopathy:     She has no cervical adenopathy  Neurological: She is alert and oriented to person, place, and time  No cranial nerve deficit  Skin: Skin is warm and dry  No rash noted  She is not diaphoretic  Psychiatric: She has a normal mood and affect  Her behavior is normal  Judgment and thought content normal          Vitals:    04/10/18 1608   BP: 110/60   Pulse: 74   Weight: 54 3 kg (119 lb 9 6 oz)       Transitional Care Management Review:  Simba Gordon is a [de-identified] y o  female here for TCM follow up       During the TCM phone call patient stated:    Date and time hospital follow up call was made:  4/9/2018  4:27 PM  Hospital care reviewed:  Records reviewed  [facility]  Date of admission:  4/4/18  Date of discharge:  4/6/18  Diagnosis:  cholecystectomy  Were the patients medicaitons reviewed and updated:  Yes  Current symptoms:  Incisional pain, Upper abdominal pain  Upper abdominal pain severity:  Mild  Upper abdominal pain onset:  Ongoing  Incisional pain severity:  Mild  Scheduled for follow up?:  Yes  Referrals needed:  Dr Dulce Christiansen, surgeon  Did you obtain your prescribed medications:  Yes  Do you need help managing your perscriptions or medications:  No  Living Arrangements:  Family members  Are you recieving outpatient services:  No  Counseling:  Patient  Counseling topics:  Importance of RX compliance             AMIE Reynolds

## 2018-04-10 NOTE — TELEPHONE ENCOUNTER
I spoke w/ pt and she said she had gall bladder removed and incision has some redness and is oozing  I advised she call the surgeons office (dr Sakshi Mckeon) but pt said she talked to someone from here yesterday, wasn't sure who and I dont see phone encounter   She said her daughter scheduled apt to see felipa today at 4 and she wants to come in to see her

## 2018-04-19 ENCOUNTER — OFFICE VISIT (OUTPATIENT)
Dept: SURGERY | Facility: CLINIC | Age: 83
End: 2018-04-19

## 2018-04-19 VITALS
WEIGHT: 118.4 LBS | RESPIRATION RATE: 17 BRPM | DIASTOLIC BLOOD PRESSURE: 72 MMHG | HEART RATE: 70 BPM | TEMPERATURE: 98.4 F | HEIGHT: 57 IN | SYSTOLIC BLOOD PRESSURE: 122 MMHG | BODY MASS INDEX: 25.55 KG/M2

## 2018-04-19 DIAGNOSIS — Z48.89 POSTOPERATIVE VISIT: Primary | ICD-10-CM

## 2018-04-19 PROCEDURE — 99024 POSTOP FOLLOW-UP VISIT: CPT | Performed by: SURGERY

## 2018-04-19 RX ORDER — AMOXICILLIN AND CLAVULANATE POTASSIUM 875; 125 MG/1; MG/1
1 TABLET, FILM COATED ORAL EVERY 12 HOURS SCHEDULED
Qty: 14 TABLET | Refills: 0 | Status: SHIPPED | OUTPATIENT
Start: 2018-04-19 | End: 2018-04-26

## 2018-04-19 NOTE — PROGRESS NOTES
Postop follow-up- General Surgery   Chaog Gomez [de-identified] y o  female MRN: [id number]  Unit/Bed#:  Encounter: [id number]    Assessment/Plan     Assessment:  The patient is status post laparoscopic cholecystectomy for symptomatic gallstones  The patient is clinically improved  She has redness around 1 of the lateral incisions  Plan:  I advised the patient to use warm compresses in to apply Neosporin ointment to the wound  I will also add empiric antibiotic  She will follow up my office next week  History of Present Illness     HPI:   I had the pleasure of seeing Chago Gomez in the office today accompanied by her daughter for 1st postop follow-up after laparoscopic cholecystectomy for symptomatic gallstones  The patient complains of redness around 1 of the incisions sites on the lateral aspect  Denies having any fever, chills, nausea, vomiting, diarrhea, constipation or urinary symptoms  Review of Systems: The rest of the review of system total of 10 were negative except for the HPI      Historical Information   Past Medical History:   Diagnosis Date    Disease of thyroid gland     GERD (gastroesophageal reflux disease)     Hyperlipidemia     Hypertension      Past Surgical History:   Procedure Laterality Date    APPENDECTOMY      HYSTERECTOMY      [place] LAP,CHOLECYSTECTOMY/GRAPH N/A 4/4/2018    Procedure: LAPAROSCOPIC CHOLECYSTECTOMY WITH IOC;  Surgeon: Lennox Hillock, MD;  Location: [facility];  Service: General     Social History   History   Alcohol Use No     History   Drug Use No     History   Smoking Status    Former Smoker   Smokeless Tobacco    Never Used     Family History:   Family History   Problem Relation Age of Onset    Stroke Mother     Heart disease Father        Meds/Allergies   all medications and allergies reviewed  Allergies   Allergen Reactions    Other Drowsiness     Annotation - [id number]: ANTIDEPRESSANTS       Objective     Physical Exam:  The abdomen is soft, nondistended and nontender  One of the lateral incision appeared to be red without any evidence of drainage with a dry eschar  The rest of the incisions are completely healed  Lab Results:    Case Report   Surgical Pathology Report                         Case: [id number]                                    Authorizing Provider: Bishop Earl MD         Collected:           04/04/2018 0830               Ordering Location:     [facility] Received:            04/04/2018 0913                                      Operating Room                                                                Pathologist:           Barry Stack MD                                                                 Specimen:    Gallbladder, GALLBLADDER                                                                   Final Diagnosis   A  Gallbladder (cholecystectomy):     - Minimal chronic cholecystitis and cholelithiasis  - No dysplasia or malignancy identified     Electronically signed by Barry Stack MD on 4/9/2018 at 10:51 AM

## 2018-04-24 ENCOUNTER — OFFICE VISIT (OUTPATIENT)
Dept: SURGERY | Facility: CLINIC | Age: 83
End: 2018-04-24

## 2018-04-24 VITALS
SYSTOLIC BLOOD PRESSURE: 122 MMHG | HEIGHT: 57 IN | WEIGHT: 121 LBS | TEMPERATURE: 97.7 F | RESPIRATION RATE: 18 BRPM | BODY MASS INDEX: 26.1 KG/M2 | DIASTOLIC BLOOD PRESSURE: 66 MMHG | HEART RATE: 81 BPM

## 2018-04-24 DIAGNOSIS — Z48.89 POSTOPERATIVE VISIT: Primary | ICD-10-CM

## 2018-04-24 PROCEDURE — 99024 POSTOP FOLLOW-UP VISIT: CPT | Performed by: SURGERY

## 2018-04-24 NOTE — PROGRESS NOTES
is aPostop follow-up- General Surgery   Sally Figueroa [de-identified] y o  female MRN: [id number]  Unit/Bed#:  Encounter: [id number]    Assessment/Plan     Assessment:  The patient is status post laparoscopic cholecystectomy for symptomatic gallstones  Nonhealing wound from the right side of the abdomen, improved  No evidence of infection  Plan:  The patient will continue with local wound care and she will return to my office in 1 month  History of Present Illness     HPI:  I had the pleasure of seeing Sally Figueroa in the office today for 2nd postop follow-up after laparoscopic cholecystectomy and nonhealing wound from the right side of the abdomen  She offers no complaints at this time  Review of Systems: The rest of the review of system total of 10 were negative except for the HPI      Historical Information   Past Medical History:   Diagnosis Date    Disease of thyroid gland     GERD (gastroesophageal reflux disease)     Hyperlipidemia     Hypertension      Past Surgical History:   Procedure Laterality Date    APPENDECTOMY      HYSTERECTOMY      [place] LAP,CHOLECYSTECTOMY/GRAPH N/A 4/4/2018    Procedure: LAPAROSCOPIC CHOLECYSTECTOMY WITH IOC;  Surgeon: Piedad Silva MD;  Location: [facility];  Service: General     Social History   History   Alcohol Use No     History   Drug Use No     History   Smoking Status    Former Smoker   Smokeless Tobacco    Never Used     Family History: non-contributory    Meds/Allergies   all medications and allergies reviewed  Allergies   Allergen Reactions    Other Drowsiness     Annotation - [id number]: ANTIDEPRESSANTS       Objective     Current Vitals:   Blood Pressure: 122/66 (04/24/18 1401)  Pulse: 81 (04/24/18 1401)  Temperature: 97 7 °F (36 5 °C) (04/24/18 1401)  Temp Source: Oral (04/24/18 1401)  Respirations: 18 (04/24/18 1401)  Height: 4' 9" (144 8 cm) (04/24/18 1401)  Weight - Scale: 54 9 kg (121 lb) (04/24/18 1401)      Physical Exam:  The abdomen is soft, nondistended and nontender  The port sent incision is healing well with no evidence of infection  The skin is healing well

## 2018-05-24 ENCOUNTER — OFFICE VISIT (OUTPATIENT)
Dept: SURGERY | Facility: CLINIC | Age: 83
End: 2018-05-24

## 2018-05-24 VITALS
TEMPERATURE: 98.3 F | WEIGHT: 121.04 LBS | RESPIRATION RATE: 17 BRPM | DIASTOLIC BLOOD PRESSURE: 68 MMHG | HEIGHT: 57 IN | SYSTOLIC BLOOD PRESSURE: 118 MMHG | HEART RATE: 74 BPM | BODY MASS INDEX: 26.11 KG/M2

## 2018-05-24 DIAGNOSIS — Z48.89 POSTOPERATIVE VISIT: Primary | ICD-10-CM

## 2018-05-24 PROCEDURE — 99024 POSTOP FOLLOW-UP VISIT: CPT | Performed by: SURGERY

## 2018-05-24 NOTE — PROGRESS NOTES
Post-Op Follow Up- General Surgery   Albin Bustamante [de-identified] y o  female MRN: [id number]  Unit/Bed#:  Encounter: [id number]    Assessment/Plan     Assessment:  The patient is status post laparoscopic cholecystectomy for symptomatic gallstones, improved  The wound from the right side of the abdomen a completely healed  Plan:  The patient is discharged from my care and I will be glad to see her if any problem arises in the future  The patient will be referred back to her primary care physician for the rib pain  History of Present Illness     HPI:  I had the pleasure of seeing Albin Bustamante in the office today accompanied by her daughter for follow-up after laparoscopic cholecystectomy  The patient complains of mild discomfort on the right side of the abdomen, radiated to her back specially when laying on the side  The patient woke up from sleep with the pain  She usually has pain similar to this related to the ribs         Historical Information   Past Medical History:   Diagnosis Date    Disease of thyroid gland     GERD (gastroesophageal reflux disease)     Hyperlipidemia     Hypertension      Past Surgical History:   Procedure Laterality Date    APPENDECTOMY      HYSTERECTOMY      [place] LAP,CHOLECYSTECTOMY/GRAPH N/A 4/4/2018    Procedure: LAPAROSCOPIC CHOLECYSTECTOMY WITH IOC;  Surgeon: Amber Quinones MD;  Location: [facility];  Service: General     Social History   History   Alcohol Use No     History   Drug Use No     History   Smoking Status    Former Smoker   Smokeless Tobacco    Never Used     Family History: non-contributory    Meds/Allergies   all medications and allergies reviewed     Current Outpatient Prescriptions:     acetaminophen (TYLENOL) 325 mg tablet, Take 1-2 tablets by mouth every 6 (six) hours as needed, Disp: , Rfl:     ALPRAZolam (XANAX) 0 5 mg tablet, Take 0 5 mg by mouth daily as needed  , Disp: , Rfl:     aspirin 81 mg chewable tablet, Chew 81 mg daily  , Disp: , Rfl:     atenolol (TENORMIN) 50 mg tablet, Take 1 tablet (50 mg total) by mouth daily, Disp: 90 tablet, Rfl: 3    Cholecalciferol (VITAMIN D3) 2000 units capsule, Take by mouth daily, Disp: , Rfl:     clobetasol (TEMOVATE) 0 05 % ointment, Apply topically 2 (two) times a day, Disp: , Rfl:     Corn Dextrin (EQL FIBER SUPPLEMENT PO), Take 1 application by mouth  , Disp: , Rfl:     docusate sodium (COLACE) 100 mg capsule, Take 1 capsule (100 mg total) by mouth 2 (two) times a day as needed for constipation, Disp: 10 capsule, Rfl: 0    ferrous sulfate 325 (65 Fe) mg tablet, Take 1 tablet by mouth daily, Disp: , Rfl:     imipramine (TOFRANIL) 10 mg tablet, Take 10 mg by mouth 4 (four) times a week  , Disp: , Rfl:     levothyroxine 50 mcg tablet, Take 1 tablet (50 mcg total) by mouth daily, Disp: 90 tablet, Rfl: 3    lisinopril (ZESTRIL) 10 mg tablet, Take 1 tablet (10 mg total) by mouth daily (Patient taking differently: Take 10 mg by mouth daily  ), Disp: 90 tablet, Rfl: 0    Probiotic Product (ALIGN) 4 MG CAPS, Take 1 tablet by mouth daily, Disp: , Rfl:     rosuvastatin (CRESTOR) 10 MG tablet, Take 1 tablet (10 mg total) by mouth daily, Disp: 90 tablet, Rfl: 3  Allergies   Allergen Reactions    Other Drowsiness     Annotation - [id number]: ANTIDEPRESSANTS       Objective     Current Vitals:   Blood Pressure: 118/68 (05/24/18 1309)  Pulse: 74 (05/24/18 1309)  Temperature: 98 3 °F (36 8 °C) (05/24/18 1309)  Temp Source: Oral (05/24/18 1309)  Respirations: 17 (05/24/18 1309)  Height: 4' 9" (144 8 cm) (05/24/18 1309)  Weight - Scale: 54 9 kg (121 lb 0 6 oz) (05/24/18 1309)      Invasive Devices          No matching active lines, drains, or airways          Physical Exam:  The abdomen is soft, nondistended and nontender  Incisions are completely healed without evidence of incisional hernia or infection  The patient does have tenderness over the right rib edge  Imaging: I have personally reviewed pertinent reports  Procedure: Xr Cholangiogram Intraoperative    Result Date: 4/6/2018  Narrative: INTRAOPERATIVE CHOLANGIOGRAM INDICATION: Chronic cholecystitis with calculus COMPARISON: Abdomen and pelvic CT from 2/27/2018  IMAGES:  54 FLUOROSCOPY TIME:   8 SECONDS CONTRAST:  5 mL of Omnipaque 240 FINDINGS: Intraoperative cholangiogram was performed  CBD is normal caliber  No persistent filling defects are identified  Osseous and soft tissue detail limited by technique  Impression: No filling defects  Please see procedure report for further details   Workstation performed: [id number]     EKG, Pathology, and Other Studies: I have personally reviewed pertinent films in PACS

## 2018-05-30 DIAGNOSIS — F41.9 ANXIETY: Primary | ICD-10-CM

## 2018-05-30 DIAGNOSIS — E78.49 OTHER HYPERLIPIDEMIA: ICD-10-CM

## 2018-05-30 DIAGNOSIS — I10 ESSENTIAL HYPERTENSION: ICD-10-CM

## 2018-05-30 RX ORDER — ROSUVASTATIN CALCIUM 10 MG/1
10 TABLET, COATED ORAL DAILY
Qty: 90 TABLET | Refills: 3 | Status: SHIPPED | OUTPATIENT
Start: 2018-05-30 | End: 2018-08-22 | Stop reason: SDUPTHER

## 2018-05-30 RX ORDER — IMIPRAMINE HYDROCHLORIDE 10 MG/1
10 TABLET, FILM COATED ORAL
Qty: 90 TABLET | Refills: 3 | Status: SHIPPED | OUTPATIENT
Start: 2018-05-31 | End: 2020-05-19 | Stop reason: SDUPTHER

## 2018-05-30 RX ORDER — ATENOLOL 50 MG/1
50 TABLET ORAL DAILY
Qty: 90 TABLET | Refills: 3 | Status: SHIPPED | OUTPATIENT
Start: 2018-05-30 | End: 2018-08-22 | Stop reason: SDUPTHER

## 2018-05-31 ENCOUNTER — TELEPHONE (OUTPATIENT)
Dept: INTERNAL MEDICINE CLINIC | Facility: CLINIC | Age: 83
End: 2018-05-31

## 2018-05-31 NOTE — TELEPHONE ENCOUNTER
Imipramine    Patient has been taking 3 x's per day for a long time and now the new RX says 4 x's a week  Digeo Mcclain She wants clarification on which it should be

## 2018-06-01 ENCOUNTER — TRANSCRIBE ORDERS (OUTPATIENT)
Dept: LAB | Facility: CLINIC | Age: 83
End: 2018-06-01

## 2018-06-01 ENCOUNTER — APPOINTMENT (OUTPATIENT)
Dept: LAB | Facility: CLINIC | Age: 83
End: 2018-06-01
Payer: MEDICARE

## 2018-06-01 DIAGNOSIS — I10 ESSENTIAL HYPERTENSION, MALIGNANT: Primary | ICD-10-CM

## 2018-06-01 DIAGNOSIS — I10 ESSENTIAL HYPERTENSION: ICD-10-CM

## 2018-06-01 DIAGNOSIS — R70.0 ELEVATED SEDIMENTATION RATE: ICD-10-CM

## 2018-06-01 DIAGNOSIS — I10 ESSENTIAL HYPERTENSION, MALIGNANT: ICD-10-CM

## 2018-06-01 DIAGNOSIS — E78.2 MIXED HYPERLIPIDEMIA: ICD-10-CM

## 2018-06-01 DIAGNOSIS — R70.0 ESR RAISED: ICD-10-CM

## 2018-06-01 DIAGNOSIS — D50.8 OTHER IRON DEFICIENCY ANEMIA: ICD-10-CM

## 2018-06-01 DIAGNOSIS — E03.9 ACQUIRED HYPOTHYROIDISM: ICD-10-CM

## 2018-06-01 DIAGNOSIS — E55.9 VITAMIN D DEFICIENCY: ICD-10-CM

## 2018-06-01 LAB
25(OH)D3 SERPL-MCNC: 17.3 NG/ML (ref 30–100)
ALBUMIN SERPL BCP-MCNC: 3.6 G/DL (ref 3.5–5)
ALP SERPL-CCNC: 85 U/L (ref 46–116)
ALT SERPL W P-5'-P-CCNC: 20 U/L (ref 12–78)
ANION GAP SERPL CALCULATED.3IONS-SCNC: 6 MMOL/L (ref 4–13)
AST SERPL W P-5'-P-CCNC: 25 U/L (ref 5–45)
BILIRUB SERPL-MCNC: 0.36 MG/DL (ref 0.2–1)
BUN SERPL-MCNC: 9 MG/DL (ref 5–25)
CALCIUM SERPL-MCNC: 9.2 MG/DL (ref 8.3–10.1)
CHLORIDE SERPL-SCNC: 107 MMOL/L (ref 100–108)
CHOLEST SERPL-MCNC: 129 MG/DL (ref 50–200)
CO2 SERPL-SCNC: 27 MMOL/L (ref 21–32)
CREAT SERPL-MCNC: 1.24 MG/DL (ref 0.6–1.3)
ERYTHROCYTE [DISTWIDTH] IN BLOOD BY AUTOMATED COUNT: 14.3 % (ref 11.6–15.1)
ERYTHROCYTE [SEDIMENTATION RATE] IN BLOOD: 56 MM/HOUR (ref 0–20)
EST. AVERAGE GLUCOSE BLD GHB EST-MCNC: 97 MG/DL
FERRITIN SERPL-MCNC: 20 NG/ML (ref 8–388)
GFR SERPL CREATININE-BSD FRML MDRD: 41 ML/MIN/1.73SQ M
GLUCOSE P FAST SERPL-MCNC: 91 MG/DL (ref 65–99)
HBA1C MFR BLD: 5 % (ref 4.2–6.3)
HCT VFR BLD AUTO: 32.9 % (ref 34.8–46.1)
HDLC SERPL-MCNC: 78 MG/DL (ref 40–60)
HGB BLD-MCNC: 10.5 G/DL (ref 11.5–15.4)
IRON SATN MFR SERPL: 9 %
IRON SERPL-MCNC: 34 UG/DL (ref 50–170)
LDLC SERPL CALC-MCNC: 31 MG/DL (ref 0–100)
MCH RBC QN AUTO: 30.5 PG (ref 26.8–34.3)
MCHC RBC AUTO-ENTMCNC: 31.9 G/DL (ref 31.4–37.4)
MCV RBC AUTO: 96 FL (ref 82–98)
NONHDLC SERPL-MCNC: 51 MG/DL
PLATELET # BLD AUTO: 190 THOUSANDS/UL (ref 149–390)
PMV BLD AUTO: 11.2 FL (ref 8.9–12.7)
POTASSIUM SERPL-SCNC: 4.1 MMOL/L (ref 3.5–5.3)
PROT SERPL-MCNC: 7.6 G/DL (ref 6.4–8.2)
RBC # BLD AUTO: 3.44 MILLION/UL (ref 3.81–5.12)
SODIUM SERPL-SCNC: 140 MMOL/L (ref 136–145)
T4 FREE SERPL-MCNC: 0.93 NG/DL (ref 0.76–1.46)
TIBC SERPL-MCNC: 364 UG/DL (ref 250–450)
TRIGL SERPL-MCNC: 101 MG/DL
TSH SERPL DL<=0.05 MIU/L-ACNC: 4.43 UIU/ML (ref 0.36–3.74)
URATE SERPL-MCNC: 4.4 MG/DL (ref 2–6.8)
WBC # BLD AUTO: 5.44 THOUSAND/UL (ref 4.31–10.16)

## 2018-06-01 PROCEDURE — 83540 ASSAY OF IRON: CPT

## 2018-06-01 PROCEDURE — 84443 ASSAY THYROID STIM HORMONE: CPT

## 2018-06-01 PROCEDURE — 85027 COMPLETE CBC AUTOMATED: CPT

## 2018-06-01 PROCEDURE — 83550 IRON BINDING TEST: CPT

## 2018-06-01 PROCEDURE — 83036 HEMOGLOBIN GLYCOSYLATED A1C: CPT

## 2018-06-01 PROCEDURE — 82306 VITAMIN D 25 HYDROXY: CPT

## 2018-06-01 PROCEDURE — 84550 ASSAY OF BLOOD/URIC ACID: CPT

## 2018-06-01 PROCEDURE — 84439 ASSAY OF FREE THYROXINE: CPT

## 2018-06-01 PROCEDURE — 85652 RBC SED RATE AUTOMATED: CPT

## 2018-06-01 PROCEDURE — 80061 LIPID PANEL: CPT

## 2018-06-01 PROCEDURE — 80053 COMPREHEN METABOLIC PANEL: CPT

## 2018-06-01 PROCEDURE — 36415 COLL VENOUS BLD VENIPUNCTURE: CPT

## 2018-06-01 PROCEDURE — 82728 ASSAY OF FERRITIN: CPT

## 2018-06-01 NOTE — TELEPHONE ENCOUNTER
I didn't initiate script and only would have refilled what was sent to me to sign  If still seeing Dr Miracle Treadwell, I would appreciate if she contacted him to refill in dose he desires

## 2018-06-01 NOTE — TELEPHONE ENCOUNTER
Message from 5/30 pt requested and med was pended w/ these directions by MA  Spoke w/ pt and she will contact dr Hilda Cantu    Also spoke w/ pharm and notified clarification/refill should come from dr Hilda Cantu

## 2018-06-08 ENCOUNTER — OFFICE VISIT (OUTPATIENT)
Dept: INTERNAL MEDICINE CLINIC | Facility: CLINIC | Age: 83
End: 2018-06-08
Payer: MEDICARE

## 2018-06-08 VITALS
SYSTOLIC BLOOD PRESSURE: 132 MMHG | HEART RATE: 78 BPM | RESPIRATION RATE: 12 BRPM | HEIGHT: 57 IN | BODY MASS INDEX: 25.97 KG/M2 | DIASTOLIC BLOOD PRESSURE: 66 MMHG | WEIGHT: 120.4 LBS

## 2018-06-08 DIAGNOSIS — N18.30 STAGE 3 CHRONIC KIDNEY DISEASE (HCC): ICD-10-CM

## 2018-06-08 DIAGNOSIS — D50.9 IRON DEFICIENCY ANEMIA, UNSPECIFIED IRON DEFICIENCY ANEMIA TYPE: ICD-10-CM

## 2018-06-08 DIAGNOSIS — M94.0 COSTOCHONDRITIS: ICD-10-CM

## 2018-06-08 DIAGNOSIS — L30.9 DERMATITIS: ICD-10-CM

## 2018-06-08 DIAGNOSIS — K58.9 IRRITABLE BOWEL SYNDROME, UNSPECIFIED TYPE: Primary | ICD-10-CM

## 2018-06-08 DIAGNOSIS — R73.01 IMPAIRED FASTING GLUCOSE: ICD-10-CM

## 2018-06-08 DIAGNOSIS — E03.9 ACQUIRED HYPOTHYROIDISM: ICD-10-CM

## 2018-06-08 DIAGNOSIS — F41.9 ANXIETY: ICD-10-CM

## 2018-06-08 DIAGNOSIS — E78.2 MIXED HYPERLIPIDEMIA: ICD-10-CM

## 2018-06-08 DIAGNOSIS — Q27.30 AVM (ARTERIOVENOUS MALFORMATION): ICD-10-CM

## 2018-06-08 DIAGNOSIS — E55.9 VITAMIN D DEFICIENCY: ICD-10-CM

## 2018-06-08 DIAGNOSIS — I10 ESSENTIAL HYPERTENSION: ICD-10-CM

## 2018-06-08 PROBLEM — K80.10 CHRONIC CHOLECYSTITIS WITH CALCULUS: Status: RESOLVED | Noted: 2018-03-14 | Resolved: 2018-06-08

## 2018-06-08 PROCEDURE — 99214 OFFICE O/P EST MOD 30 MIN: CPT | Performed by: INTERNAL MEDICINE

## 2018-06-08 RX ORDER — CLOBETASOL PROPIONATE 0.5 MG/G
OINTMENT TOPICAL 2 TIMES DAILY
Qty: 30 G | Refills: 0 | Status: SHIPPED | OUTPATIENT
Start: 2018-06-08 | End: 2019-03-29

## 2018-06-08 NOTE — PATIENT INSTRUCTIONS
Lab data reviewed in detail in compared to prior     HypertensionAnd hyperlipidemia are well controlled on present regimen     Chronic kidney disease stage 3 is stable with he GFR 41  Avoid nonsteroidal anti-inflammatory drugs like Advil and Aleve  Iron deficiency anemia with history of AVMs on recent colonoscopy -continue with oral iron    Anxiety on imipramine under care of Dr Paras Terrell    Vitamin-D deficiency -doubt whether the vitamin-D supplement has anything to do with the bowels, try to get back on some vitamin-D and contact me if you are having issues    Hypothyroid -take a whole tablet of levothyroxine every day    Costochondritis - pathophysiology discussed-try Tylenol and heating pad as needed, notify me if symptoms worsen or fail to improve over the next several weeks  Routine follow-up after labs in 6 months, sooner as needed

## 2018-06-08 NOTE — PROGRESS NOTES
Assessment/Plan:    Patient Instructions    Lab data reviewed in detail in compared to prior     HypertensionAnd hyperlipidemia are well controlled on present regimen     Chronic kidney disease stage 3 is stable with he GFR 41  Avoid nonsteroidal anti-inflammatory drugs like Advil and Aleve  Iron deficiency anemia with history of AVMs on recent colonoscopy -continue with oral iron    Anxiety on imipramine under care of Dr Miracle Treadwell    Vitamin-D deficiency -doubt whether the vitamin-D supplement has anything to do with the bowels, try to get back on some vitamin-D and contact me if you are having issues    Hypothyroid -take a whole tablet of levothyroxine every day    Costochondritis - pathophysiology discussed-try Tylenol and heating pad as needed, notify me if symptoms worsen or fail to improve over the next several weeks  Routine follow-up after labs in 6 months, sooner as needed  Diagnoses and all orders for this visit:    Irritable bowel syndrome, unspecified type    Acquired hypothyroidism  -     TSH, 3rd generation with T4 reflex; Future    Essential hypertension  -     CBC; Future  -     Comprehensive metabolic panel; Future    Stage 3 chronic kidney disease    Anxiety    Vitamin D deficiency    Iron deficiency anemia, unspecified iron deficiency anemia type  -     Ferritin; Future  -     Iron Panel; Future    Mixed hyperlipidemia  -     Lipid panel; Future    AVM (arteriovenous malformation)    Costochondritis    Impaired fasting glucose  -     Hemoglobin A1c (w/out EAG); Future    Dermatitis  -     clobetasol (TEMOVATE) 0 05 % ointment; Apply topically 2 (two) times a day         Subjective:      Patient ID: Mikaela Cuevas is a [de-identified] y o  female    Feeling well, here w/ Gemma Quseada  Notes pain in back and r ant ribs since surgery in April  HTN/HPL-taking rx as directed, no home bp's  IBS-stopped vit D d/t ? Causing fecal urgency  Anxiety-stable w/ imipramine/xanax     Id a- patient had EGD and colonoscopy January 17, 2017 notable for AVMs in the proximal right colon and cecum          Current Outpatient Prescriptions:     acetaminophen (TYLENOL) 325 mg tablet, Take 1-2 tablets by mouth every 6 (six) hours as needed, Disp: , Rfl:     ALPRAZolam (XANAX) 0 5 mg tablet, Take 0 5 mg by mouth daily as needed  , Disp: , Rfl:     aspirin 81 mg chewable tablet, Chew 81 mg daily  , Disp: , Rfl:     atenolol (TENORMIN) 50 mg tablet, Take 1 tablet (50 mg total) by mouth daily, Disp: 90 tablet, Rfl: 3    Cholecalciferol (VITAMIN D3) 2000 units capsule, Take by mouth daily, Disp: , Rfl:     Corn Dextrin (EQL FIBER SUPPLEMENT PO), Take 1 application by mouth  , Disp: , Rfl:     ferrous sulfate 325 (65 Fe) mg tablet, Take 1 tablet by mouth daily, Disp: , Rfl:     imipramine (TOFRANIL) 10 mg tablet, Take 1 tablet (10 mg total) by mouth 4 (four) times a week, Disp: 90 tablet, Rfl: 3    levothyroxine 50 mcg tablet, Take 1 tablet (50 mcg total) by mouth daily, Disp: 90 tablet, Rfl: 3    lisinopril (ZESTRIL) 10 mg tablet, Take 1 tablet (10 mg total) by mouth daily (Patient taking differently: Take 10 mg by mouth daily  ), Disp: 90 tablet, Rfl: 0    Probiotic Product (ALIGN) 4 MG CAPS, Take 1 tablet by mouth daily, Disp: , Rfl:     rosuvastatin (CRESTOR) 10 MG tablet, Take 1 tablet (10 mg total) by mouth daily, Disp: 90 tablet, Rfl: 3    clobetasol (TEMOVATE) 0 05 % ointment, Apply topically 2 (two) times a day, Disp: 30 g, Rfl: 0    Recent Results (from the past 1008 hour(s))   Vitamin D 25 hydroxy    Collection Time: 06/01/18 11:29 AM   Result Value Ref Range    Vit D, 25-Hydroxy 17 3 (L) 30 0 - 100 0 ng/mL   Uric acid    Collection Time: 06/01/18 11:29 AM   Result Value Ref Range    Uric Acid 4 4 2 0 - 6 8 mg/dL   TSH, 3rd generation with T4 reflex    Collection Time: 06/01/18 11:29 AM   Result Value Ref Range    TSH 3RD GENERATON 4 430 (H) 0 358 - 3 740 uIU/mL   Lipid panel    Collection Time: 06/01/18 11:29 AM   Result Value Ref Range    Cholesterol 129 50 - 200 mg/dL    Triglycerides 101 <=150 mg/dL    HDL, Direct 78 (H) 40 - 60 mg/dL    LDL Calculated 31 0 - 100 mg/dL    Non-HDL-Chol (CHOL-HDL) 51 mg/dl   Comprehensive metabolic panel    Collection Time: 06/01/18 11:29 AM   Result Value Ref Range    Sodium 140 136 - 145 mmol/L    Potassium 4 1 3 5 - 5 3 mmol/L    Chloride 107 100 - 108 mmol/L    CO2 27 21 - 32 mmol/L    Anion Gap 6 4 - 13 mmol/L    BUN 9 5 - 25 mg/dL    Creatinine 1 24 0 60 - 1 30 mg/dL    Glucose, Fasting 91 65 - 99 mg/dL    Calcium 9 2 8 3 - 10 1 mg/dL    AST 25 5 - 45 U/L    ALT 20 12 - 78 U/L    Alkaline Phosphatase 85 46 - 116 U/L    Total Protein 7 6 6 4 - 8 2 g/dL    Albumin 3 6 3 5 - 5 0 g/dL    Total Bilirubin 0 36 0 20 - 1 00 mg/dL    eGFR 41 ml/min/1 73sq m   CBC    Collection Time: 06/01/18 11:29 AM   Result Value Ref Range    WBC 5 44 4 31 - 10 16 Thousand/uL    RBC 3 44 (L) 3 81 - 5 12 Million/uL    Hemoglobin 10 5 (L) 11 5 - 15 4 g/dL    Hematocrit 32 9 (L) 34 8 - 46 1 %    MCV 96 82 - 98 fL    MCH 30 5 26 8 - 34 3 pg    MCHC 31 9 31 4 - 37 4 g/dL    RDW 14 3 11 6 - 15 1 %    Platelets 419 446 - 716 Thousands/uL    MPV 11 2 8 9 - 12 7 fL   Sedimentation rate, automated    Collection Time: 06/01/18 11:29 AM   Result Value Ref Range    Sed Rate 56 (H) 0 - 20 mm/hour   HEMOGLOBIN A1C W/ EAG ESTIMATION    Collection Time: 06/01/18 11:29 AM   Result Value Ref Range    Hemoglobin A1C 5 0 4 2 - 6 3 %    EAG 97 mg/dl   Iron Saturation %    Collection Time: 06/01/18 11:29 AM   Result Value Ref Range    Iron Saturation 9 %    TIBC 364 250 - 450 ug/dL    Iron 34 (L) 50 - 170 ug/dL   Ferritin    Collection Time: 06/01/18 11:29 AM   Result Value Ref Range    Ferritin 20 8 - 388 ng/mL   T4, free    Collection Time: 06/01/18 11:29 AM   Result Value Ref Range    Free T4 0 93 0 76 - 1 46 ng/dL       The following portions of the patient's history were reviewed and updated as appropriate: allergies, current medications, past family history, past medical history, past social history, past surgical history and problem list      Review of Systems   Constitutional: Negative for appetite change, chills, diaphoresis, fatigue, fever and unexpected weight change  HENT: Negative for congestion, hearing loss and rhinorrhea  Eyes: Negative for visual disturbance  Respiratory: Negative for cough, chest tightness, shortness of breath and wheezing  Cardiovascular: Positive for chest pain  Negative for palpitations and leg swelling  Gastrointestinal: Negative for abdominal pain and blood in stool  Endocrine: Negative for cold intolerance, heat intolerance, polydipsia and polyuria  Genitourinary: Negative for difficulty urinating, dysuria, frequency and urgency  Musculoskeletal: Negative for arthralgias and myalgias  Skin: Negative for rash  Neurological: Negative for dizziness, weakness, light-headedness and headaches  Hematological: Does not bruise/bleed easily  Psychiatric/Behavioral: Negative for dysphoric mood and sleep disturbance  Objective:      Vitals:    06/08/18 1320   BP: 132/66   Pulse: 78   Resp: 12          Physical Exam   Constitutional: She is oriented to person, place, and time  She appears well-developed and well-nourished  HENT:   Head: Normocephalic and atraumatic  Nose: Nose normal    Mouth/Throat: Oropharynx is clear and moist    Eyes: Conjunctivae and EOM are normal  Pupils are equal, round, and reactive to light  No scleral icterus  Neck: Normal range of motion  Neck supple  No JVD present  No tracheal deviation present  No thyromegaly present  Cardiovascular: Normal rate, regular rhythm and intact distal pulses  Exam reveals no gallop and no friction rub  No murmur heard  Pulmonary/Chest: Effort normal and breath sounds normal  No respiratory distress  She has no wheezes  She has no rales  Abdominal: Soft   Bowel sounds are normal  She exhibits no distension and no mass  There is tenderness  There is no rebound and no guarding  Tender to palpate bilateral costal margin   Musculoskeletal: She exhibits no edema or tenderness  Lymphadenopathy:     She has no cervical adenopathy  Neurological: She is alert and oriented to person, place, and time  No cranial nerve deficit  Skin: Skin is warm and dry  No rash noted  No erythema  Psychiatric: She has a normal mood and affect   Her behavior is normal  Judgment and thought content normal

## 2018-06-13 DIAGNOSIS — E03.9 ACQUIRED HYPOTHYROIDISM: ICD-10-CM

## 2018-06-13 DIAGNOSIS — I10 ESSENTIAL HYPERTENSION: ICD-10-CM

## 2018-06-13 RX ORDER — LISINOPRIL 10 MG/1
10 TABLET ORAL DAILY
Qty: 90 TABLET | Refills: 3 | Status: SHIPPED | OUTPATIENT
Start: 2018-06-13 | End: 2019-06-19 | Stop reason: SDUPTHER

## 2018-06-13 RX ORDER — LEVOTHYROXINE SODIUM 0.05 MG/1
50 TABLET ORAL DAILY
Qty: 90 TABLET | Refills: 3 | Status: SHIPPED | OUTPATIENT
Start: 2018-06-13 | End: 2019-06-19 | Stop reason: SDUPTHER

## 2018-06-13 NOTE — TELEPHONE ENCOUNTER
Lisinopril 10 mg-take 1 tablet by mouth daily  Levothyroxine 50 mcg tablet-take 1 tablet by mouth daily    Please send to [phone]

## 2018-07-26 ENCOUNTER — APPOINTMENT (EMERGENCY)
Dept: CT IMAGING | Facility: HOSPITAL | Age: 83
End: 2018-07-26
Payer: MEDICARE

## 2018-07-26 ENCOUNTER — HOSPITAL ENCOUNTER (EMERGENCY)
Facility: HOSPITAL | Age: 83
Discharge: HOME | End: 2018-07-27
Attending: EMERGENCY MEDICINE | Admitting: EMERGENCY MEDICINE
Payer: MEDICARE

## 2018-07-26 DIAGNOSIS — R10.13 ACUTE EPIGASTRIC PAIN: Primary | ICD-10-CM

## 2018-07-26 DIAGNOSIS — K44.9 HIATAL HERNIA: ICD-10-CM

## 2018-07-26 LAB
ALBUMIN SERPL BCP-MCNC: 4 G/DL (ref 3.5–5)
ALP SERPL-CCNC: 86 U/L (ref 46–116)
ALT SERPL W P-5'-P-CCNC: 22 U/L (ref 12–78)
ANION GAP SERPL CALCULATED.3IONS-SCNC: 8 MMOL/L (ref 4–13)
AST SERPL W P-5'-P-CCNC: 27 U/L (ref 5–45)
BACTERIA UR QL AUTO: ABNORMAL /HPF
BASOPHILS # BLD AUTO: 0.04 THOUSANDS/ΜL (ref 0–0.1)
BASOPHILS NFR BLD AUTO: 1 % (ref 0–1)
BILIRUB DIRECT SERPL-MCNC: 0.11 MG/DL (ref 0–0.2)
BILIRUB SERPL-MCNC: 0.4 MG/DL (ref 0.2–1)
BILIRUB UR QL STRIP: NEGATIVE
BUN SERPL-MCNC: 15 MG/DL (ref 5–25)
CALCIUM SERPL-MCNC: 9 MG/DL (ref 8.3–10.1)
CHLORIDE SERPL-SCNC: 99 MMOL/L (ref 100–108)
CLARITY UR: CLEAR
CO2 SERPL-SCNC: 26 MMOL/L (ref 21–32)
COLOR UR: ABNORMAL
CREAT SERPL-MCNC: 1.31 MG/DL (ref 0.6–1.3)
EOSINOPHIL # BLD AUTO: 0.63 THOUSAND/ΜL (ref 0–0.61)
EOSINOPHIL NFR BLD AUTO: 8 % (ref 0–6)
ERYTHROCYTE [DISTWIDTH] IN BLOOD BY AUTOMATED COUNT: 13.9 % (ref 11.6–15.1)
GFR SERPL CREATININE-BSD FRML MDRD: 38 ML/MIN/1.73SQ M
GLUCOSE SERPL-MCNC: 94 MG/DL (ref 65–140)
GLUCOSE UR STRIP-MCNC: NEGATIVE MG/DL
HCT VFR BLD AUTO: 34.3 % (ref 34.8–46.1)
HGB BLD-MCNC: 11.4 G/DL (ref 11.5–15.4)
HGB UR QL STRIP.AUTO: ABNORMAL
IMM GRANULOCYTES # BLD AUTO: 0.01 THOUSAND/UL (ref 0–0.2)
IMM GRANULOCYTES NFR BLD AUTO: 0 % (ref 0–2)
KETONES UR STRIP-MCNC: NEGATIVE MG/DL
LACTATE SERPL-SCNC: 1 MMOL/L (ref 0.5–2)
LEUKOCYTE ESTERASE UR QL STRIP: ABNORMAL
LIPASE SERPL-CCNC: 257 U/L (ref 73–393)
LYMPHOCYTES # BLD AUTO: 2.03 THOUSANDS/ΜL (ref 0.6–4.47)
LYMPHOCYTES NFR BLD AUTO: 27 % (ref 14–44)
MCH RBC QN AUTO: 30 PG (ref 26.8–34.3)
MCHC RBC AUTO-ENTMCNC: 33.2 G/DL (ref 31.4–37.4)
MCV RBC AUTO: 90 FL (ref 82–98)
MONOCYTES # BLD AUTO: 0.58 THOUSAND/ΜL (ref 0.17–1.22)
MONOCYTES NFR BLD AUTO: 8 % (ref 4–12)
NEUTROPHILS # BLD AUTO: 4.2 THOUSANDS/ΜL (ref 1.85–7.62)
NEUTS SEG NFR BLD AUTO: 56 % (ref 43–75)
NITRITE UR QL STRIP: NEGATIVE
NON-SQ EPI CELLS URNS QL MICRO: ABNORMAL /HPF
NRBC BLD AUTO-RTO: 0 /100 WBCS
PH UR STRIP.AUTO: 6 [PH] (ref 4.5–8)
PLATELET # BLD AUTO: 163 THOUSANDS/UL (ref 149–390)
PMV BLD AUTO: 9.7 FL (ref 8.9–12.7)
POTASSIUM SERPL-SCNC: 3.6 MMOL/L (ref 3.5–5.3)
PROT SERPL-MCNC: 8 G/DL (ref 6.4–8.2)
PROT UR STRIP-MCNC: NEGATIVE MG/DL
RBC # BLD AUTO: 3.8 MILLION/UL (ref 3.81–5.12)
RBC #/AREA URNS AUTO: ABNORMAL /HPF
SODIUM SERPL-SCNC: 133 MMOL/L (ref 136–145)
SP GR UR STRIP.AUTO: <=1.005 (ref 1–1.03)
TROPONIN I SERPL-MCNC: <0.02 NG/ML
UROBILINOGEN UR QL STRIP.AUTO: 0.2 E.U./DL
WBC # BLD AUTO: 7.49 THOUSAND/UL (ref 4.31–10.16)
WBC #/AREA URNS AUTO: ABNORMAL /HPF

## 2018-07-26 PROCEDURE — 80048 BASIC METABOLIC PNL TOTAL CA: CPT | Performed by: EMERGENCY MEDICINE

## 2018-07-26 PROCEDURE — 85025 COMPLETE CBC W/AUTO DIFF WBC: CPT | Performed by: EMERGENCY MEDICINE

## 2018-07-26 PROCEDURE — 93005 ELECTROCARDIOGRAM TRACING: CPT

## 2018-07-26 PROCEDURE — 96361 HYDRATE IV INFUSION ADD-ON: CPT

## 2018-07-26 PROCEDURE — 84484 ASSAY OF TROPONIN QUANT: CPT | Performed by: EMERGENCY MEDICINE

## 2018-07-26 PROCEDURE — 83605 ASSAY OF LACTIC ACID: CPT | Performed by: EMERGENCY MEDICINE

## 2018-07-26 PROCEDURE — 96375 TX/PRO/DX INJ NEW DRUG ADDON: CPT

## 2018-07-26 PROCEDURE — 74176 CT ABD & PELVIS W/O CONTRAST: CPT

## 2018-07-26 PROCEDURE — 83690 ASSAY OF LIPASE: CPT | Performed by: EMERGENCY MEDICINE

## 2018-07-26 PROCEDURE — 36415 COLL VENOUS BLD VENIPUNCTURE: CPT | Performed by: EMERGENCY MEDICINE

## 2018-07-26 PROCEDURE — 80076 HEPATIC FUNCTION PANEL: CPT | Performed by: EMERGENCY MEDICINE

## 2018-07-26 PROCEDURE — 96374 THER/PROPH/DIAG INJ IV PUSH: CPT

## 2018-07-26 PROCEDURE — 81001 URINALYSIS AUTO W/SCOPE: CPT | Performed by: EMERGENCY MEDICINE

## 2018-07-26 RX ORDER — ONDANSETRON 2 MG/ML
4 INJECTION INTRAMUSCULAR; INTRAVENOUS ONCE
Status: COMPLETED | OUTPATIENT
Start: 2018-07-26 | End: 2018-07-26

## 2018-07-26 RX ADMIN — HYDROMORPHONE HYDROCHLORIDE 0.5 MG: 1 INJECTION, SOLUTION INTRAMUSCULAR; INTRAVENOUS; SUBCUTANEOUS at 22:40

## 2018-07-26 RX ADMIN — SODIUM CHLORIDE 1000 ML: 0.9 INJECTION, SOLUTION INTRAVENOUS at 21:58

## 2018-07-26 RX ADMIN — ONDANSETRON 4 MG: 2 INJECTION INTRAMUSCULAR; INTRAVENOUS at 21:59

## 2018-07-27 VITALS
BODY MASS INDEX: 26.03 KG/M2 | HEIGHT: 58 IN | HEART RATE: 84 BPM | SYSTOLIC BLOOD PRESSURE: 156 MMHG | WEIGHT: 124 LBS | DIASTOLIC BLOOD PRESSURE: 63 MMHG | TEMPERATURE: 98 F | RESPIRATION RATE: 18 BRPM | OXYGEN SATURATION: 97 %

## 2018-07-27 LAB
ATRIAL RATE: 73 BPM
ATRIAL RATE: 74 BPM
P AXIS: 48 DEGREES
P AXIS: 59 DEGREES
PR INTERVAL: 152 MS
PR INTERVAL: 156 MS
QRS AXIS: 15 DEGREES
QRS AXIS: 21 DEGREES
QRSD INTERVAL: 88 MS
QRSD INTERVAL: 90 MS
QT INTERVAL: 430 MS
QT INTERVAL: 430 MS
QTC INTERVAL: 473 MS
QTC INTERVAL: 477 MS
T WAVE AXIS: 30 DEGREES
T WAVE AXIS: 39 DEGREES
TROPONIN I SERPL-MCNC: <0.02 NG/ML
VENTRICULAR RATE: 73 BPM
VENTRICULAR RATE: 74 BPM

## 2018-07-27 PROCEDURE — 36415 COLL VENOUS BLD VENIPUNCTURE: CPT | Performed by: EMERGENCY MEDICINE

## 2018-07-27 PROCEDURE — 96361 HYDRATE IV INFUSION ADD-ON: CPT

## 2018-07-27 PROCEDURE — 93010 ELECTROCARDIOGRAM REPORT: CPT | Performed by: INTERNAL MEDICINE

## 2018-07-27 PROCEDURE — 99284 EMERGENCY DEPT VISIT MOD MDM: CPT

## 2018-07-27 PROCEDURE — 84484 ASSAY OF TROPONIN QUANT: CPT | Performed by: EMERGENCY MEDICINE

## 2018-07-27 RX ORDER — SUCRALFATE ORAL 1 G/10ML
1000 SUSPENSION ORAL ONCE
Status: COMPLETED | OUTPATIENT
Start: 2018-07-27 | End: 2018-07-27

## 2018-07-27 RX ORDER — FAMOTIDINE 20 MG/1
20 TABLET, FILM COATED ORAL ONCE
Status: COMPLETED | OUTPATIENT
Start: 2018-07-27 | End: 2018-07-27

## 2018-07-27 RX ORDER — OMEPRAZOLE 20 MG/1
20 CAPSULE, DELAYED RELEASE ORAL DAILY
Qty: 14 CAPSULE | Refills: 0 | Status: SHIPPED | OUTPATIENT
Start: 2018-07-27 | End: 2018-07-30

## 2018-07-27 RX ADMIN — SUCRALFATE 1000 MG: 1 SUSPENSION ORAL at 01:18

## 2018-07-27 RX ADMIN — FAMOTIDINE 20 MG: 20 TABLET, FILM COATED ORAL at 01:17

## 2018-07-27 NOTE — ED PROVIDER NOTES
History  Chief Complaint   Patient presents with    Abdominal Pain     Pt brought in by [facility]ard for abdominal pain x 3 hours; denies nausea, vomiting and diarrhea  Pt states pain wraps arround her ribs,       History provided by:  Patient   used: No    Abdominal Pain   Pain location:  Epigastric  Pain quality: burning    Pain radiates to:  Does not radiate  Pain severity:  Moderate  Onset quality:  Sudden  Duration:  4 hours  Timing:  Constant  Progression:  Improving  Chronicity:  New  Relieved by:  Nothing  Worsened by:  Nothing  Ineffective treatments:  None tried  Associated symptoms: no chest pain, no cough, no diarrhea, no dysuria, no fatigue, no fever, no nausea, no shortness of breath, no sore throat and no vomiting        Prior to Admission Medications   Prescriptions Last Dose Informant Patient Reported? Taking?    ALPRAZolam (XANAX) 0 5 mg tablet  Self Yes No   Sig: Take 0 5 mg by mouth daily as needed     Cholecalciferol (VITAMIN D3) 2000 units capsule  Self Yes No   Sig: Take by mouth daily   Corn Dextrin (EQL FIBER SUPPLEMENT PO)  Self Yes No   Sig: Take 1 application by mouth     Probiotic Product (ALIGN) 4 MG CAPS  Self Yes No   Sig: Take 1 tablet by mouth daily   acetaminophen (TYLENOL) 325 mg tablet  Self Yes No   Sig: Take 1-2 tablets by mouth every 6 (six) hours as needed   aspirin 81 mg chewable tablet  Self Yes No   Sig: Chew 81 mg daily     atenolol (TENORMIN) 50 mg tablet  Self No No   Sig: Take 1 tablet (50 mg total) by mouth daily   clobetasol (TEMOVATE) 0 05 % ointment   No No   Sig: Apply topically 2 (two) times a day   ferrous sulfate 325 (65 Fe) mg tablet  Self Yes No   Sig: Take 1 tablet by mouth daily   imipramine (TOFRANIL) 10 mg tablet  Self No No   Sig: Take 1 tablet (10 mg total) by mouth 4 (four) times a week   levothyroxine 50 mcg tablet   No No   Sig: Take 1 tablet (50 mcg total) by mouth daily   lisinopril (ZESTRIL) 10 mg tablet   No No   Sig: Take 1 tablet (10 mg total) by mouth daily   rosuvastatin (CRESTOR) 10 MG tablet  Self No No   Sig: Take 1 tablet (10 mg total) by mouth daily      Facility-Administered Medications: None       Past Medical History:   Diagnosis Date    Benign essential hypertension     Last assessed: 9/11/14    Disease of thyroid gland     Fibromyalgia     GERD (gastroesophageal reflux disease)     History of nail disorders     Hyperlipidemia     Hypertension     Parkinson's disease ([address] )     Transient cerebral ischemia        Past Surgical History:   Procedure Laterality Date    APPENDECTOMY      HYSTERECTOMY      UT LAP,CHOLECYSTECTOMY/GRAPH N/A 4/4/2018    Procedure: LAPAROSCOPIC CHOLECYSTECTOMY WITH IOC;  Surgeon: Valdo Leonardo MD;  Location: [facility];  Service: General    TUBAL LIGATION         Family History   Problem Relation Age of Onset    Stroke Mother         ischemic stroke    Hypertension Mother     Heart disease Father      I have reviewed and agree with the history as documented  Social History   Substance Use Topics    Smoking status: Former Smoker    Smokeless tobacco: Never Used    Alcohol use No        Review of Systems   Constitutional: Negative for activity change, appetite change, fatigue, fever and unexpected weight change  HENT: Negative for congestion, hearing loss, rhinorrhea, sore throat and voice change  Eyes: Negative for pain and visual disturbance  Respiratory: Negative for apnea, cough, chest tightness and shortness of breath  Cardiovascular: Negative for chest pain  Gastrointestinal: Positive for abdominal pain  Negative for blood in stool, diarrhea, nausea and vomiting  Endocrine: Negative for polyphagia and polyuria  Genitourinary: Negative for difficulty urinating, dysuria, flank pain, frequency and urgency  Musculoskeletal: Negative for back pain, gait problem, neck pain and neck stiffness  Skin: Negative for color change and rash     Allergic/Immunologic: Negative for immunocompromised state  Neurological: Negative for dizziness, syncope, speech difficulty, light-headedness, numbness and headaches  Hematological: Does not bruise/bleed easily  Psychiatric/Behavioral: Negative for confusion and decreased concentration  Physical Exam  Physical Exam   Constitutional: She is oriented to person, place, and time  She appears well-developed and well-nourished  HENT:   Head: Normocephalic and atraumatic  Eyes: Conjunctivae and EOM are normal  Pupils are equal, round, and reactive to light  No scleral icterus  Neck: Normal range of motion  Neck supple  No tracheal deviation present  Cardiovascular: Normal rate and regular rhythm  Pulmonary/Chest: Effort normal and breath sounds normal  No respiratory distress  Abdominal: Soft  She exhibits no distension  There is no tenderness  There is no rebound and no guarding  Musculoskeletal: Normal range of motion  She exhibits no tenderness or deformity  Lymphadenopathy:     She has no cervical adenopathy  Neurological: She is alert and oriented to person, place, and time  No gross focal sensory or motor deficits   Skin: Skin is warm and dry  No rash noted  She is not diaphoretic  Psychiatric: She has a normal mood and affect  Vitals reviewed        Vital Signs  ED Triage Vitals [07/26/18 2057]   Temperature Pulse Respirations Blood Pressure SpO2   97 6 °F (36 4 °C) 73 18 (!) 214/85 99 %      Temp Source Heart Rate Source Patient Position - Orthostatic VS BP Location FiO2 (%)   Oral Monitor Lying Right arm --      Pain Score       8           Vitals:    07/26/18 2057 07/26/18 2310 07/27/18 0120   BP: (!) 214/85 (!) 193/83 156/63   Pulse: 73 79 84   Patient Position - Orthostatic VS: Lying Lying Lying       Visual Acuity      ED Medications  Medications   HYDROmorphone (DILAUDID) injection 0 5 mg (0 5 mg Intravenous Not Given 7/26/18 2242)   sodium chloride 0 9 % bolus 1,000 mL (0 mL Intravenous Stopped 7/27/18 0159)   ondansetron (ZOFRAN) injection 4 mg (4 mg Intravenous Given 7/26/18 2159)   HYDROmorphone (DILAUDID) injection 0 5 mg (0 5 mg Intravenous Given 7/26/18 2240)   sucralfate (CARAFATE) oral suspension 1,000 mg (1,000 mg Oral Given 7/27/18 0118)   famotidine (PEPCID) tablet 20 mg (20 mg Oral Given 7/27/18 0117)       Diagnostic Studies  Results Reviewed     Procedure Component Value Units Date/Time    Troponin I [[id number]]  (Normal) Collected:  07/27/18 0127    Lab Status:  Final result Specimen:  Blood from Arm, Right Updated:  07/27/18 0149     Troponin I <0 02 ng/mL     Lactic acid, plasma [[id number]]  (Normal) Collected:  07/26/18 2157    Lab Status:  Final result Specimen:  Blood from Arm, Right Updated:  07/26/18 2234     LACTIC ACID 1 0 mmol/L     Narrative:         Result may be elevated if tourniquet was used during collection  Troponin I [[id number]]  (Normal) Collected:  07/26/18 2157    Lab Status:  Final result Specimen:  Blood from Arm, Right Updated:  07/26/18 2234     Troponin I <0 02 ng/mL     Basic metabolic panel [[id number]]  (Abnormal) Collected:  07/26/18 2157    Lab Status:  Final result Specimen:  Blood from Arm, Right Updated:  07/26/18 2229     Sodium 133 (L) mmol/L      Potassium 3 6 mmol/L      Chloride 99 (L) mmol/L      CO2 26 mmol/L      Anion Gap 8 mmol/L      BUN 15 mg/dL      Creatinine 1 31 (H) mg/dL      Glucose 94 mg/dL      Calcium 9 0 mg/dL      eGFR 38 ml/min/1 73sq m     Narrative:         National Kidney Disease Education Program recommendations are as follows:  GFR calculation is accurate only with a steady state creatinine  Chronic Kidney disease less than 60 ml/min/1 73 sq  meters  Kidney failure less than 15 ml/min/1 73 sq  meters      Hepatic function panel [[id number]]  (Normal) Collected:  07/26/18 2157    Lab Status:  Final result Specimen:  Blood from Arm, Right Updated:  07/26/18 2229     Total Bilirubin 0 40 mg/dL      Bilirubin, Direct 0 11 mg/dL Alkaline Phosphatase 86 U/L      AST 27 U/L      ALT 22 U/L      Total Protein 8 0 g/dL      Albumin 4 0 g/dL     Lipase [[id number]]  (Normal) Collected:  07/26/18 2157    Lab Status:  Final result Specimen:  Blood from Arm, Right Updated:  07/26/18 2229     Lipase 257 u/L     Urine Microscopic [[id number]]  (Abnormal) Collected:  07/26/18 2157    Lab Status:  Final result Specimen:  Urine from Urine, Clean Catch Updated:  07/26/18 2220     RBC, UA 0-1 (A) /hpf      WBC, UA 2-4 (A) /hpf      Epithelial Cells None Seen /hpf      Bacteria, UA None Seen /hpf     UA w Reflex to Microscopic [[id number]]  (Abnormal) Collected:  07/26/18 2157    Lab Status:  Final result Specimen:  Urine from Urine, Clean Catch Updated:  07/26/18 2208     Color, UA Light Yellow     Clarity, UA Clear     Specific Gravity, UA <=1 005     pH, UA 6 0     Leukocytes, UA Trace (A)     Nitrite, UA Negative     Protein, UA Negative mg/dl      Glucose, UA Negative mg/dl      Ketones, UA Negative mg/dl      Urobilinogen, UA 0 2 E U /dl      Bilirubin, UA Negative     Blood, UA Small (A)    CBC and differential [[id number]]  (Abnormal) Collected:  07/26/18 2157    Lab Status:  Final result Specimen:  Blood from Arm, Right Updated:  07/26/18 2207     WBC 7 49 Thousand/uL      RBC 3 80 (L) Million/uL      Hemoglobin 11 4 (L) g/dL      Hematocrit 34 3 (L) %      MCV 90 fL      MCH 30 0 pg      MCHC 33 2 g/dL      RDW 13 9 %      MPV 9 7 fL      Platelets 585 Thousands/uL      nRBC 0 /100 WBCs      Neutrophils Relative 56 %      Immat GRANS % 0 %      Lymphocytes Relative 27 %      Monocytes Relative 8 %      Eosinophils Relative 8 (H) %      Basophils Relative 1 %      Neutrophils Absolute 4 20 Thousands/µL      Immature Grans Absolute 0 01 Thousand/uL      Lymphocytes Absolute 2 03 Thousands/µL      Monocytes Absolute 0 58 Thousand/µL      Eosinophils Absolute 0 63 (H) Thousand/µL      Basophils Absolute 0 04 Thousands/µL                  CT abdomen pelvis wo contrast   Final Result by Hakeem Newby MD (07/26 0798)      Moderate to large hiatal hernia  Correlate for gastroesophageal reflux  Workstation performed: [id number]                    Procedures  ECG 12 Lead Documentation  Date/Time: 7/26/2018 10:40 PM  Performed by: Catalina Valentin  Authorized by: Catalina Valentin     ECG reviewed by me, the ED Provider: yes    Patient location:  ED  Previous ECG:     Previous ECG:  Compared to current    Comparison ECG info:  March 2018    Similarity:  No change  Interpretation:     Interpretation: normal    Rate:     ECG rate assessment: normal    Rhythm:     Rhythm: sinus rhythm    Ectopy:     Ectopy: none    QRS:     QRS axis:  Normal    QRS intervals:  Normal  Conduction:     Conduction: normal    ST segments:     ST segments:  Normal  T waves:     T waves: normal             Phone Contacts  ED Phone Contact    ED Course                               MDM  Number of Diagnoses or Management Options  Acute epigastric pain: new and requires workup  Hiatal hernia: new and requires workup  Diagnosis management comments: 79-year-old female presented for evaluation of epigastric pain that part started about 4 hours prior to presentation  Patient described moderate to severe burning pain  No associated shortness of breath, nausea, or diaphoresis  Denied chest pain or shortness of breath  No radiation to the back  Laboratory studies to rule out biliary obstructive disease or pancreatitis were unremarkable  CT scan of the abdomen and pelvis demonstrated a large hiatal hernia  The EKG and troponin were negative for signs of ACS  I re-evaluated the patient after symptomatic treatment and she reported good relief of her symptoms  She did note that she had previously been on proton pump inhibitors for a few years after her hiatal hernia was initially diagnosed  She is not currently taking them    A repeat troponin performed at a 3 hour interval was negative  Will discharge with PPI, recommend follow up with PCP  Discussed return precautions  Amount and/or Complexity of Data Reviewed  Clinical lab tests: reviewed and ordered  Tests in the radiology section of CPT®: reviewed and ordered  Review and summarize past medical records: yes  Independent visualization of images, tracings, or specimens: yes      CritCare Time    Disposition  Final diagnoses:   Acute epigastric pain   Hiatal hernia     Time reflects when diagnosis was documented in both MDM as applicable and the Disposition within this note     Time User Action Codes Description Comment    7/27/2018  1:52 AM Neftaly Brisk Add [R10 13] Acute epigastric pain     7/27/2018  1:53 AM Neftaly Brisk Add [K44 9] Hiatal hernia       ED Disposition     ED Disposition Condition Comment    Discharge  Marella Foot discharge to home/self care  Condition at discharge: Good        Follow-up Information     Follow up With Specialties Details Why Contact Info    Jennifer Greco MD Internal Medicine, Palliative Care  Please follow-up with your primary care physician in the next 5 to 7 days if your symptoms do not improve  If you have new or worsening symptoms please call your doctor or return to the emergency department   [address] [place] [id number]  [phone]            Discharge Medication List as of 7/27/2018  1:56 AM      START taking these medications    Details   omeprazole (PriLOSEC) 20 mg delayed release capsule Take 1 capsule (20 mg total) by mouth daily for 14 days, Starting Fri 7/27/2018, Until Fri 8/10/2018, Print         CONTINUE these medications which have NOT CHANGED    Details   acetaminophen (TYLENOL) 325 mg tablet Take 1-2 tablets by mouth every 6 (six) hours as needed, Historical Med      ALPRAZolam (XANAX) 0 5 mg tablet Take 0 5 mg by mouth daily as needed  , Historical Med      aspirin 81 mg chewable tablet Chew 81 mg daily  , Starting Mon 10/9/2017, Historical Med      atenolol (TENORMIN) 50 mg tablet Take 1 tablet (50 mg total) by mouth daily, Starting Wed 5/30/2018, Normal      Cholecalciferol (VITAMIN D3) 2000 units capsule Take by mouth daily, Starting Wed 7/13/2016, Historical Med      clobetasol (TEMOVATE) 0 05 % ointment Apply topically 2 (two) times a day, Starting Fri 6/8/2018, Normal      Corn Dextrin (EQL FIBER SUPPLEMENT PO) Take 1 application by mouth  , Historical Med      ferrous sulfate 325 (65 Fe) mg tablet Take 1 tablet by mouth daily, Starting Mon 10/9/2017, Historical Med      imipramine (TOFRANIL) 10 mg tablet Take 1 tablet (10 mg total) by mouth 4 (four) times a week, Starting Thu 5/31/2018, Normal      levothyroxine 50 mcg tablet Take 1 tablet (50 mcg total) by mouth daily, Starting Wed 6/13/2018, Normal      lisinopril (ZESTRIL) 10 mg tablet Take 1 tablet (10 mg total) by mouth daily, Starting Wed 6/13/2018, Normal      Probiotic Product (ALIGN) 4 MG CAPS Take 1 tablet by mouth daily, Historical Med      rosuvastatin (CRESTOR) 10 MG tablet Take 1 tablet (10 mg total) by mouth daily, Starting Wed 5/30/2018, Normal           No discharge procedures on file      ED Provider  Electronically Signed by           Elayne Kirby MD  07/27/18 6761

## 2018-07-27 NOTE — DISCHARGE INSTRUCTIONS
Abdominal Pain   WHAT YOU NEED TO KNOW:   Abdominal pain can be dull, achy, or sharp  You may have pain in one area of your abdomen, or in your entire abdomen  Your pain may be caused by a condition such as constipation, food sensitivity or poisoning, infection, or a blockage  Abdominal pain can also be from a hernia, appendicitis, or an ulcer  Liver, gallbladder, or kidney conditions can also cause abdominal pain  The cause of your abdominal pain may be unknown  DISCHARGE INSTRUCTIONS:   Return to the emergency department if:   · You have new chest pain or shortness of breath  · You have pulsing pain in your upper abdomen or lower back that suddenly becomes constant  · Your pain is in the right lower abdominal area and worsens with movement  · You have a fever over 100 4°F (38°C) or shaking chills  · You are vomiting and cannot keep food or liquids down  · Your pain does not improve or gets worse over the next 8 to 12 hours  · You see blood in your vomit or bowel movements, or they look black and tarry  · Your skin or the whites of your eyes turn yellow  · You are a woman and have a large amount of vaginal bleeding that is not your monthly period  Contact your healthcare provider if:   · You have pain in your lower back  · You are a man and have pain in your testicles  · You have pain when you urinate  · You have questions or concerns about your condition or care  Follow up with your healthcare provider within 24 hours or as directed:  Write down your questions so you remember to ask them during your visits  Medicines:   · Medicines  may be given to calm your stomach and prevent vomiting or to decrease pain  Ask how to take pain medicine safely  · Take your medicine as directed  Contact your healthcare provider if you think your medicine is not helping or if you have side effects  Tell him of her if you are allergic to any medicine   Keep a list of the medicines, vitamins, and herbs you take  Include the amounts, and when and why you take them  Bring the list or the pill bottles to follow-up visits  Carry your medicine list with you in case of an emergency  © 2017 [employer] Information is for End User's use only and may not be sold, redistributed or otherwise used for commercial purposes  All illustrations and images included in CareNotes® are the copyrighted property of [employer]  or [employer]  The above information is an  only  It is not intended as medical advice for individual conditions or treatments  Talk to your doctor, nurse or pharmacist before following any medical regimen to see if it is safe and effective for you

## 2018-07-30 ENCOUNTER — OFFICE VISIT (OUTPATIENT)
Dept: INTERNAL MEDICINE CLINIC | Facility: CLINIC | Age: 83
End: 2018-07-30
Payer: MEDICARE

## 2018-07-30 VITALS
SYSTOLIC BLOOD PRESSURE: 140 MMHG | HEIGHT: 58 IN | BODY MASS INDEX: 25.02 KG/M2 | HEART RATE: 68 BPM | DIASTOLIC BLOOD PRESSURE: 74 MMHG | WEIGHT: 119.2 LBS

## 2018-07-30 DIAGNOSIS — D64.9 ANEMIA, UNSPECIFIED TYPE: ICD-10-CM

## 2018-07-30 DIAGNOSIS — G47.62 NOCTURNAL LEG CRAMPS: ICD-10-CM

## 2018-07-30 DIAGNOSIS — E87.1 HYPONATREMIA: Primary | ICD-10-CM

## 2018-07-30 DIAGNOSIS — K44.9 HIATAL HERNIA: ICD-10-CM

## 2018-07-30 DIAGNOSIS — M79.89 PAIN AND SWELLING OF LOWER EXTREMITY, LEFT: ICD-10-CM

## 2018-07-30 DIAGNOSIS — M79.605 PAIN AND SWELLING OF LOWER EXTREMITY, LEFT: ICD-10-CM

## 2018-07-30 PROCEDURE — 99214 OFFICE O/P EST MOD 30 MIN: CPT | Performed by: INTERNAL MEDICINE

## 2018-07-30 RX ORDER — PANTOPRAZOLE SODIUM 40 MG/1
40 TABLET, DELAYED RELEASE ORAL DAILY
Qty: 30 TABLET | Refills: 1 | Status: SHIPPED | OUTPATIENT
Start: 2018-07-30 | End: 2018-12-11

## 2018-07-30 NOTE — PATIENT INSTRUCTIONS
Pain and swelling of left lower extremity -check ultrasound to rule out deep vein thrombosis    Nocturnal leg cramps -these can be related to electrolyte imbalance as she was noted to be hyponatremic, complete lab workup as ordered  Additionally she has history of mild anemia, will check iron panel and ferritin as well  Epigastric discomfort with evidence of moderate sized hiatal hernia -patient prefers Protonix to Prilosec, will change prescription, consider GI evaluation if this fails to alleviate symptoms        Follow-up in 2 weeks, sooner as needed

## 2018-07-30 NOTE — PROGRESS NOTES
Assessment/Plan:    Patient Instructions    Pain and swelling of left lower extremity -check ultrasound to rule out deep vein thrombosis    Nocturnal leg cramps -these can be related to electrolyte imbalance as she was noted to be hyponatremic, complete lab workup as ordered  Additionally she has history of mild anemia, will check iron panel and ferritin as well  Epigastric discomfort with evidence of moderate sized hiatal hernia -patient prefers Protonix to Prilosec, will change prescription, consider GI evaluation if this fails to alleviate symptoms  Diagnoses and all orders for this visit:    Hyponatremia  -     Basic metabolic panel; Future  -     TSH, 3rd generation with Free T4 reflex; Future  -     Sodium, urine, random  -     Osmolality, urine  -     Osmolality; Future  -     Magnesium; Future  -     Phosphorus; Future    Nocturnal leg cramps  -     Ferritin; Future  -     Iron Panel; Future  -     Magnesium; Future  -     Phosphorus; Future    Pain and swelling of lower extremity, left  -     VAS lower limb venous duplex study, unilateral/limited; Future    Anemia, unspecified type  -     Ferritin; Future  -     Iron Panel; Future    Hiatal hernia  -     pantoprazole (PROTONIX) 40 mg tablet; Take 1 tablet (40 mg total) by mouth daily for 30 days         Subjective:      Patient ID: Faraz Goodwin is a [de-identified] y o  female      Patient presents for evaluation of left lower extremity pain and swelling  She has noted some swelling and discomfort in the upper thigh off and on for several weeks, 2 nights ago she had intense swelling and redness predominantly of the left lower extremity  The night prior to that she had nausea, vomiting and diarrhea  2 nights before that she had been seen in the emergency department for epigastric pain that comes on usually in the evening and is sometimes alleviated by Tums or drinking some soda to bring up gas  She was put on Prilosec but has only gotten 2 doses so far  In the past she use Protonix which seemed to work better  She was noted on lab work in the emergency department to be hyponatremic  She also suffers significant leg cramps at night  Imaging workup in the CT with abdomen and pelvis CT scan was notable for moderate to large hiatal hernia            Current Outpatient Prescriptions:     acetaminophen (TYLENOL) 325 mg tablet, Take 1-2 tablets by mouth every 6 (six) hours as needed, Disp: , Rfl:     ALPRAZolam (XANAX) 0 5 mg tablet, Take 0 5 mg by mouth daily as needed  , Disp: , Rfl:     aspirin 81 mg chewable tablet, Chew 81 mg daily  , Disp: , Rfl:     atenolol (TENORMIN) 50 mg tablet, Take 1 tablet (50 mg total) by mouth daily, Disp: 90 tablet, Rfl: 3    clobetasol (TEMOVATE) 0 05 % ointment, Apply topically 2 (two) times a day, Disp: 30 g, Rfl: 0    Corn Dextrin (EQL FIBER SUPPLEMENT PO), Take 1 application by mouth  , Disp: , Rfl:     ferrous sulfate 325 (65 Fe) mg tablet, Take 1 tablet by mouth daily, Disp: , Rfl:     imipramine (TOFRANIL) 10 mg tablet, Take 1 tablet (10 mg total) by mouth 4 (four) times a week, Disp: 90 tablet, Rfl: 3    levothyroxine 50 mcg tablet, Take 1 tablet (50 mcg total) by mouth daily, Disp: 90 tablet, Rfl: 3    lisinopril (ZESTRIL) 10 mg tablet, Take 1 tablet (10 mg total) by mouth daily, Disp: 90 tablet, Rfl: 3    Probiotic Product (ALIGN) 4 MG CAPS, Take 1 tablet by mouth daily, Disp: , Rfl:     rosuvastatin (CRESTOR) 10 MG tablet, Take 1 tablet (10 mg total) by mouth daily, Disp: 90 tablet, Rfl: 3    Cholecalciferol (VITAMIN D3) 2000 units capsule, Take by mouth daily, Disp: , Rfl:     pantoprazole (PROTONIX) 40 mg tablet, Take 1 tablet (40 mg total) by mouth daily for 30 days, Disp: 30 tablet, Rfl: 1    Recent Results (from the past 1008 hour(s))   Basic metabolic panel    Collection Time: 07/26/18  9:57 PM   Result Value Ref Range    Sodium 133 (L) 136 - 145 mmol/L    Potassium 3 6 3 5 - 5 3 mmol/L    Chloride 99 (L) 100 - 108 mmol/L    CO2 26 21 - 32 mmol/L    Anion Gap 8 4 - 13 mmol/L    BUN 15 5 - 25 mg/dL    Creatinine 1 31 (H) 0 60 - 1 30 mg/dL    Glucose 94 65 - 140 mg/dL    Calcium 9 0 8 3 - 10 1 mg/dL    eGFR 38 ml/min/1 73sq m   Hepatic function panel    Collection Time: 07/26/18  9:57 PM   Result Value Ref Range    Total Bilirubin 0 40 0 20 - 1 00 mg/dL    Bilirubin, Direct 0 11 0 00 - 0 20 mg/dL    Alkaline Phosphatase 86 46 - 116 U/L    AST 27 5 - 45 U/L    ALT 22 12 - 78 U/L    Total Protein 8 0 6 4 - 8 2 g/dL    Albumin 4 0 3 5 - 5 0 g/dL   CBC and differential    Collection Time: 07/26/18  9:57 PM   Result Value Ref Range    WBC 7 49 4 31 - 10 16 Thousand/uL    RBC 3 80 (L) 3 81 - 5 12 Million/uL    Hemoglobin 11 4 (L) 11 5 - 15 4 g/dL    Hematocrit 34 3 (L) 34 8 - 46 1 %    MCV 90 82 - 98 fL    MCH 30 0 26 8 - 34 3 pg    MCHC 33 2 31 4 - 37 4 g/dL    RDW 13 9 11 6 - 15 1 %    MPV 9 7 8 9 - 12 7 fL    Platelets 388 194 - 342 Thousands/uL    nRBC 0 /100 WBCs    Neutrophils Relative 56 43 - 75 %    Immat GRANS % 0 0 - 2 %    Lymphocytes Relative 27 14 - 44 %    Monocytes Relative 8 4 - 12 %    Eosinophils Relative 8 (H) 0 - 6 %    Basophils Relative 1 0 - 1 %    Neutrophils Absolute 4 20 1 85 - 7 62 Thousands/µL    Immature Grans Absolute 0 01 0 00 - 0 20 Thousand/uL    Lymphocytes Absolute 2 03 0 60 - 4 47 Thousands/µL    Monocytes Absolute 0 58 0 17 - 1 22 Thousand/µL    Eosinophils Absolute 0 63 (H) 0 00 - 0 61 Thousand/µL    Basophils Absolute 0 04 0 00 - 0 10 Thousands/µL   Lipase    Collection Time: 07/26/18  9:57 PM   Result Value Ref Range    Lipase 257 73 - 393 u/L   Lactic acid, plasma    Collection Time: 07/26/18  9:57 PM   Result Value Ref Range    LACTIC ACID 1 0 0 5 - 2 0 mmol/L   Troponin I    Collection Time: 07/26/18  9:57 PM   Result Value Ref Range    Troponin I <0 02 <=0 04 ng/mL   UA w Reflex to Microscopic    Collection Time: 07/26/18  9:57 PM   Result Value Ref Range    Color, UA Light Yellow Clarity, UA Clear     Specific Gravity, UA <=1 005 1 003 - 1 030    pH, UA 6 0 4 5 - 8 0    Leukocytes, UA Trace (A) Negative    Nitrite, UA Negative Negative    Protein, UA Negative Negative mg/dl    Glucose, UA Negative Negative mg/dl    Ketones, UA Negative Negative mg/dl    Urobilinogen, UA 0 2 0 2, 1 0 E U /dl E U /dl    Bilirubin, UA Negative Negative    Blood, UA Small (A) Negative   Urine Microscopic    Collection Time: 07/26/18  9:57 PM   Result Value Ref Range    RBC, UA 0-1 (A) None Seen, 0-5 /hpf    WBC, UA 2-4 (A) None Seen, 0-5, 5-55, 5-65 /hpf    Epithelial Cells None Seen None Seen, Occasional /hpf    Bacteria, UA None Seen None Seen, Occasional /hpf   ECG 12 lead    Collection Time: 07/26/18 10:38 PM   Result Value Ref Range    Ventricular Rate 74 BPM    Atrial Rate 74 BPM    GA Interval 152 ms    QRSD Interval 88 ms    QT Interval 430 ms    QTC Interval 477 ms    P Axis 48 degrees    QRS Axis 15 degrees    T Wave Axis 30 degrees   ECG 12 lead    Collection Time: 07/26/18 11:11 PM   Result Value Ref Range    Ventricular Rate 73 BPM    Atrial Rate 73 BPM    GA Interval 156 ms    QRSD Interval 90 ms    QT Interval 430 ms    QTC Interval 473 ms    P [place] 59 degrees    QRS Axis 21 degrees    T Wave Axis 39 degrees   Troponin I    Collection Time: 07/27/18  1:27 AM   Result Value Ref Range    Troponin I <0 02 <=0 04 ng/mL       The following portions of the patient's history were reviewed and updated as appropriate: allergies, current medications, past family history, past medical history, past social history, past surgical history and problem list      Review of Systems   Constitutional: Negative for appetite change, chills, diaphoresis, fatigue, fever and unexpected weight change  HENT: Negative for congestion, hearing loss and rhinorrhea  Eyes: Negative for visual disturbance  Respiratory: Negative for cough, chest tightness, shortness of breath and wheezing      Cardiovascular: Positive for leg swelling  Negative for chest pain and palpitations  Gastrointestinal: Positive for abdominal pain  Negative for blood in stool  Endocrine: Negative for cold intolerance, heat intolerance, polydipsia and polyuria  Genitourinary: Negative for difficulty urinating, dysuria, frequency and urgency  Musculoskeletal: Positive for myalgias  Negative for arthralgias  Skin: Negative for rash  Neurological: Negative for dizziness, weakness, light-headedness and headaches  Hematological: Does not bruise/bleed easily  Psychiatric/Behavioral: Negative for dysphoric mood and sleep disturbance  Objective:      Vitals:    07/30/18 1719   BP: 140/74   Pulse: 68          Physical Exam   Constitutional: She is oriented to person, place, and time  She appears well-developed and well-nourished  HENT:   Head: Normocephalic and atraumatic  Nose: Nose normal    Mouth/Throat: Oropharynx is clear and moist    Eyes: Conjunctivae and EOM are normal  Pupils are equal, round, and reactive to light  No scleral icterus  Neck: Normal range of motion  Neck supple  No JVD present  No tracheal deviation present  No thyromegaly present  Cardiovascular: Normal rate, regular rhythm and intact distal pulses  Exam reveals no gallop and no friction rub  No murmur heard  Pulmonary/Chest: Effort normal and breath sounds normal  No respiratory distress  She has no wheezes  She has no rales  Musculoskeletal: She exhibits no edema or deformity  No current swelling, erythema, tenderness or palpable cord   Lymphadenopathy:     She has no cervical adenopathy  Neurological: She is alert and oriented to person, place, and time  No cranial nerve deficit  Skin: Skin is warm and dry  No rash noted  No erythema  No pallor  Psychiatric: She has a normal mood and affect   Her behavior is normal  Judgment and thought content normal

## 2018-07-31 ENCOUNTER — APPOINTMENT (OUTPATIENT)
Dept: LAB | Facility: HOSPITAL | Age: 83
End: 2018-07-31
Attending: INTERNAL MEDICINE
Payer: MEDICARE

## 2018-07-31 ENCOUNTER — HOSPITAL ENCOUNTER (OUTPATIENT)
Dept: ULTRASOUND IMAGING | Facility: HOSPITAL | Age: 83
Discharge: HOME | End: 2018-07-31
Attending: INTERNAL MEDICINE
Payer: MEDICARE

## 2018-07-31 DIAGNOSIS — E87.1 HYPONATREMIA: ICD-10-CM

## 2018-07-31 DIAGNOSIS — D64.9 ANEMIA, UNSPECIFIED TYPE: ICD-10-CM

## 2018-07-31 DIAGNOSIS — M79.89 PAIN AND SWELLING OF LOWER EXTREMITY, LEFT: ICD-10-CM

## 2018-07-31 DIAGNOSIS — G47.62 NOCTURNAL LEG CRAMPS: ICD-10-CM

## 2018-07-31 DIAGNOSIS — M79.605 PAIN AND SWELLING OF LOWER EXTREMITY, LEFT: ICD-10-CM

## 2018-07-31 LAB
ANION GAP SERPL CALCULATED.3IONS-SCNC: 7 MMOL/L (ref 4–13)
BUN SERPL-MCNC: 18 MG/DL (ref 5–25)
CALCIUM SERPL-MCNC: 9.5 MG/DL (ref 8.3–10.1)
CHLORIDE SERPL-SCNC: 102 MMOL/L (ref 100–108)
CO2 SERPL-SCNC: 28 MMOL/L (ref 21–32)
CREAT SERPL-MCNC: 1.36 MG/DL (ref 0.6–1.3)
FERRITIN SERPL-MCNC: 32 NG/ML (ref 8–388)
GFR SERPL CREATININE-BSD FRML MDRD: 37 ML/MIN/1.73SQ M
GLUCOSE SERPL-MCNC: 103 MG/DL (ref 65–140)
IRON SATN MFR SERPL: 17 %
IRON SERPL-MCNC: 59 UG/DL (ref 50–170)
MAGNESIUM SERPL-MCNC: 1.7 MG/DL (ref 1.6–2.6)
OSMOLALITY UR/SERPL-RTO: 291 MMOL/KG (ref 282–298)
OSMOLALITY UR: 152 MMOL/KG
PHOSPHATE SERPL-MCNC: 4.1 MG/DL (ref 2.3–4.1)
POTASSIUM SERPL-SCNC: 3.9 MMOL/L (ref 3.5–5.3)
SODIUM 24H UR-SCNC: 23 MOL/L
SODIUM SERPL-SCNC: 137 MMOL/L (ref 136–145)
TIBC SERPL-MCNC: 340 UG/DL (ref 250–450)
TSH SERPL DL<=0.05 MIU/L-ACNC: 2.33 UIU/ML (ref 0.36–3.74)

## 2018-07-31 PROCEDURE — 84300 ASSAY OF URINE SODIUM: CPT | Performed by: INTERNAL MEDICINE

## 2018-07-31 PROCEDURE — 93971 EXTREMITY STUDY: CPT | Performed by: SURGERY

## 2018-07-31 PROCEDURE — 83540 ASSAY OF IRON: CPT

## 2018-07-31 PROCEDURE — 83935 ASSAY OF URINE OSMOLALITY: CPT | Performed by: INTERNAL MEDICINE

## 2018-07-31 PROCEDURE — 93971 EXTREMITY STUDY: CPT

## 2018-07-31 PROCEDURE — 83735 ASSAY OF MAGNESIUM: CPT

## 2018-07-31 PROCEDURE — 83550 IRON BINDING TEST: CPT

## 2018-07-31 PROCEDURE — 36415 COLL VENOUS BLD VENIPUNCTURE: CPT

## 2018-07-31 PROCEDURE — 80048 BASIC METABOLIC PNL TOTAL CA: CPT

## 2018-07-31 PROCEDURE — 83930 ASSAY OF BLOOD OSMOLALITY: CPT

## 2018-07-31 PROCEDURE — 84100 ASSAY OF PHOSPHORUS: CPT

## 2018-07-31 PROCEDURE — 84443 ASSAY THYROID STIM HORMONE: CPT

## 2018-07-31 PROCEDURE — 82728 ASSAY OF FERRITIN: CPT

## 2018-08-16 ENCOUNTER — OFFICE VISIT (OUTPATIENT)
Dept: INTERNAL MEDICINE CLINIC | Facility: CLINIC | Age: 83
End: 2018-08-16
Payer: MEDICARE

## 2018-08-16 VITALS
HEIGHT: 58 IN | BODY MASS INDEX: 25.11 KG/M2 | RESPIRATION RATE: 12 BRPM | SYSTOLIC BLOOD PRESSURE: 124 MMHG | DIASTOLIC BLOOD PRESSURE: 80 MMHG | WEIGHT: 119.6 LBS | HEART RATE: 64 BPM

## 2018-08-16 DIAGNOSIS — N18.30 STAGE 3 CHRONIC KIDNEY DISEASE (HCC): Primary | ICD-10-CM

## 2018-08-16 DIAGNOSIS — I87.2 VENOUS INSUFFICIENCY: ICD-10-CM

## 2018-08-16 DIAGNOSIS — I10 ESSENTIAL HYPERTENSION: ICD-10-CM

## 2018-08-16 DIAGNOSIS — D50.9 IRON DEFICIENCY ANEMIA, UNSPECIFIED IRON DEFICIENCY ANEMIA TYPE: ICD-10-CM

## 2018-08-16 PROCEDURE — 99214 OFFICE O/P EST MOD 30 MIN: CPT | Performed by: INTERNAL MEDICINE

## 2018-08-16 NOTE — PROGRESS NOTES
Assessment/Plan:    Patient Instructions   Lab data reviewed in detail, venous ultrasound reviewed labs compared prior    Chronic kidney disease stage 3-there has been gradual decline in filtration rate  Blood pressure is adequately controlled, patient advised to continue to keep well hydrated and avoid nephrotoxin substances such as nonsteroidal anti-inflammatory drugs like Advil and Aleve  Venous insufficiency with swelling at the end of the day-pathophysiology discussed, no evidence for deep vein thrombosis, no significant varicosities  Patient advised to follow low-sodium diet, keep the legs elevated above the heart 30 min at a time 3 or 4 times daily as needed to control swelling  Can consider compression stockings if this alone is not adequate  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 on oral iron    Routine follow-up after labs in December, sooner as needed  Diagnoses and all orders for this visit:    Stage 3 chronic kidney disease    Iron deficiency anemia, unspecified iron deficiency anemia type    Venous insufficiency    Essential hypertension         Subjective:      Patient ID: Lorenzo Coon is a [de-identified] y o  female    Concerned w/ leg swelling at the end of the day  No more cramping  Also concerned w/ dark veins  No cp/sob/pnd/orthopnea      LARRY-tolerating iron              Current Outpatient Prescriptions:     acetaminophen (TYLENOL) 325 mg tablet, Take 1-2 tablets by mouth every 6 (six) hours as needed, Disp: , Rfl:     ALPRAZolam (XANAX) 0 5 mg tablet, Take 0 5 mg by mouth daily as needed  , Disp: , Rfl:     aspirin 81 mg chewable tablet, Chew 81 mg daily  , Disp: , Rfl:     atenolol (TENORMIN) 50 mg tablet, Take 1 tablet (50 mg total) by mouth daily, Disp: 90 tablet, Rfl: 3    clobetasol (TEMOVATE) 0 05 % ointment, Apply topically 2 (two) times a day, Disp: 30 g, Rfl: 0    Corn Dextrin (EQL FIBER SUPPLEMENT PO), Take 1 application by mouth  , Disp: , Rfl:    ferrous sulfate 325 (65 Fe) mg tablet, Take 1 tablet by mouth daily, Disp: , Rfl:     imipramine (TOFRANIL) 10 mg tablet, Take 1 tablet (10 mg total) by mouth 4 (four) times a week, Disp: 90 tablet, Rfl: 3    levothyroxine 50 mcg tablet, Take 1 tablet (50 mcg total) by mouth daily, Disp: 90 tablet, Rfl: 3    lisinopril (ZESTRIL) 10 mg tablet, Take 1 tablet (10 mg total) by mouth daily, Disp: 90 tablet, Rfl: 3    Probiotic Product (ALIGN) 4 MG CAPS, Take 1 tablet by mouth daily, Disp: , Rfl:     rosuvastatin (CRESTOR) 10 MG tablet, Take 1 tablet (10 mg total) by mouth daily, Disp: 90 tablet, Rfl: 3    Cholecalciferol (VITAMIN D3) 2000 units capsule, Take by mouth daily, Disp: , Rfl:     pantoprazole (PROTONIX) 40 mg tablet, Take 1 tablet (40 mg total) by mouth daily for 30 days (Patient not taking: Reported on 8/16/2018 ), Disp: 30 tablet, Rfl: 1    Recent Results (from the past 1008 hour(s))   Basic metabolic panel    Collection Time: 07/26/18  9:57 PM   Result Value Ref Range    Sodium 133 (L) 136 - 145 mmol/L    Potassium 3 6 3 5 - 5 3 mmol/L    Chloride 99 (L) 100 - 108 mmol/L    CO2 26 21 - 32 mmol/L    Anion Gap 8 4 - 13 mmol/L    BUN 15 5 - 25 mg/dL    Creatinine 1 31 (H) 0 60 - 1 30 mg/dL    Glucose 94 65 - 140 mg/dL    Calcium 9 0 8 3 - 10 1 mg/dL    eGFR 38 ml/min/1 73sq m   Hepatic function panel    Collection Time: 07/26/18  9:57 PM   Result Value Ref Range    Total Bilirubin 0 40 0 20 - 1 00 mg/dL    Bilirubin, Direct 0 11 0 00 - 0 20 mg/dL    Alkaline Phosphatase 86 46 - 116 U/L    AST 27 5 - 45 U/L    ALT 22 12 - 78 U/L    Total Protein 8 0 6 4 - 8 2 g/dL    Albumin 4 0 3 5 - 5 0 g/dL   CBC and differential    Collection Time: 07/26/18  9:57 PM   Result Value Ref Range    WBC 7 49 4 31 - 10 16 Thousand/uL    RBC 3 80 (L) 3 81 - 5 12 Million/uL    Hemoglobin 11 4 (L) 11 5 - 15 4 g/dL    Hematocrit 34 3 (L) 34 8 - 46 1 %    MCV 90 82 - 98 fL    MCH 30 0 26 8 - 34 3 pg    MCHC 33 2 31 4 - 37 4 g/dL RDW 13 9 11 6 - 15 1 %    MPV 9 7 8 9 - 12 7 fL    Platelets 832 439 - 841 Thousands/uL    nRBC 0 /100 WBCs    Neutrophils Relative 56 43 - 75 %    Immat GRANS % 0 0 - 2 %    Lymphocytes Relative 27 14 - 44 %    Monocytes Relative 8 4 - 12 %    Eosinophils Relative 8 (H) 0 - 6 %    Basophils Relative 1 0 - 1 %    Neutrophils Absolute 4 20 1 85 - 7 62 Thousands/µL    Immature Grans Absolute 0 01 0 00 - 0 20 Thousand/uL    Lymphocytes Absolute 2 03 0 60 - 4 47 Thousands/µL    Monocytes Absolute 0 58 0 17 - 1 22 Thousand/µL    Eosinophils Absolute 0 63 (H) 0 00 - 0 61 Thousand/µL    Basophils Absolute 0 04 0 00 - 0 10 Thousands/µL   Lipase    Collection Time: 07/26/18  9:57 PM   Result Value Ref Range    Lipase 257 73 - 393 u/L   Lactic acid, plasma    Collection Time: 07/26/18  9:57 PM   Result Value Ref Range    LACTIC ACID 1 0 0 5 - 2 0 mmol/L   Troponin I    Collection Time: 07/26/18  9:57 PM   Result Value Ref Range    Troponin I <0 02 <=0 04 ng/mL   UA w Reflex to Microscopic    Collection Time: 07/26/18  9:57 PM   Result Value Ref Range    Color, UA Light Yellow     Clarity, UA Clear     Specific Gravity, UA <=1 005 1 003 - 1 030    pH, UA 6 0 4 5 - 8 0    Leukocytes, UA Trace (A) Negative    Nitrite, UA Negative Negative    Protein, UA Negative Negative mg/dl    Glucose, UA Negative Negative mg/dl    Ketones, UA Negative Negative mg/dl    Urobilinogen, UA 0 2 0 2, 1 0 E U /dl E U /dl    Bilirubin, UA Negative Negative    Blood, UA Small (A) Negative   Urine Microscopic    Collection Time: 07/26/18  9:57 PM   Result Value Ref Range    RBC, UA 0-1 (A) None Seen, 0-5 /hpf    WBC, UA 2-4 (A) None Seen, 0-5, 5-55, 5-65 /hpf    Epithelial Cells None Seen None Seen, Occasional /hpf    Bacteria, UA None Seen None Seen, Occasional /hpf   ECG 12 lead    Collection Time: 07/26/18 10:38 PM   Result Value Ref Range    Ventricular Rate 74 BPM    Atrial Rate 74 BPM    SD Interval 152 ms    QRSD Interval 88 ms    QT Interval 430 ms    QTC Interval 477 ms    P Axis 48 degrees    QRS Axis 15 degrees    T Wave Axis 30 degrees   ECG 12 lead    Collection Time: 07/26/18 11:11 PM   Result Value Ref Range    Ventricular Rate 73 BPM    Atrial Rate 73 BPM    MS Interval 156 ms    QRSD Interval 90 ms    QT Interval 430 ms    QTC Interval 473 ms    P [place] 59 degrees    QRS Axis 21 degrees    T Wave Axis 39 degrees   Troponin I    Collection Time: 07/27/18  1:27 AM   Result Value Ref Range    Troponin I <0 02 <=0 04 ng/mL   Basic metabolic panel    Collection Time: 07/31/18  1:36 PM   Result Value Ref Range    Sodium 137 136 - 145 mmol/L    Potassium 3 9 3 5 - 5 3 mmol/L    Chloride 102 100 - 108 mmol/L    CO2 28 21 - 32 mmol/L    Anion Gap 7 4 - 13 mmol/L    BUN 18 5 - 25 mg/dL    Creatinine 1 36 (H) 0 60 - 1 30 mg/dL    Glucose 103 65 - 140 mg/dL    Calcium 9 5 8 3 - 10 1 mg/dL    eGFR 37 ml/min/1 73sq m   TSH, 3rd generation with Free T4 reflex    Collection Time: 07/31/18  1:36 PM   Result Value Ref Range    TSH 3RD GENERATON 2 332 0 358 - 3 740 uIU/mL   Osmolality    Collection Time: 07/31/18  1:36 PM   Result Value Ref Range    Osmolality Serum 291 282 - 298 mmol/KG   Magnesium    Collection Time: 07/31/18  1:36 PM   Result Value Ref Range    Magnesium 1 7 1 6 - 2 6 mg/dL   Phosphorus    Collection Time: 07/31/18  1:36 PM   Result Value Ref Range    Phosphorus 4 1 2 3 - 4 1 mg/dL   Sodium, urine, random    Collection Time: 07/31/18  2:00 PM   Result Value Ref Range    Sodium, Ur 23    Osmolality, urine    Collection Time: 07/31/18  2:00 PM   Result Value Ref Range    Osmolality, Ur 152 (L) 250 - 900 mmol/KG   Iron Saturation %    Collection Time: 07/31/18  2:00 PM   Result Value Ref Range    Iron Saturation 17 %    TIBC 340 250 - 450 ug/dL    Iron 59 50 - 170 ug/dL   Ferritin    Collection Time: 07/31/18  2:00 PM   Result Value Ref Range    Ferritin 32 8 - 388 ng/mL       The following portions of the patient's history were reviewed and updated as appropriate: allergies, current medications, past family history, past medical history, past social history, past surgical history and problem list      Review of Systems   Constitutional: Negative for appetite change, chills, diaphoresis, fatigue, fever and unexpected weight change  HENT: Negative for congestion, hearing loss and rhinorrhea  Eyes: Negative for visual disturbance  Respiratory: Negative for cough, chest tightness, shortness of breath and wheezing  Cardiovascular: Positive for leg swelling  Negative for chest pain and palpitations  Gastrointestinal: Negative for abdominal pain and blood in stool  Endocrine: Negative for cold intolerance, heat intolerance, polydipsia and polyuria  Genitourinary: Negative for difficulty urinating, dysuria, frequency and urgency  Musculoskeletal: Positive for myalgias  Negative for arthralgias  Skin: Negative for rash  Neurological: Negative for dizziness, weakness, light-headedness and headaches  Hematological: Does not bruise/bleed easily  Psychiatric/Behavioral: Negative for dysphoric mood and sleep disturbance  Objective:      Vitals:    08/16/18 1535   BP: 124/80   Pulse: 64   Resp: 12          Physical Exam   Constitutional: She is oriented to person, place, and time  She appears well-developed and well-nourished  HENT:   Head: Normocephalic and atraumatic  Nose: Nose normal    Mouth/Throat: Oropharynx is clear and moist    Eyes: Conjunctivae and EOM are normal  Pupils are equal, round, and reactive to light  No scleral icterus  Neck: Normal range of motion  Neck supple  No JVD present  No tracheal deviation present  No thyromegaly present  Cardiovascular: Normal rate, regular rhythm and intact distal pulses  Exam reveals no gallop and no friction rub  No murmur heard  Pulmonary/Chest: Effort normal and breath sounds normal  No respiratory distress  She has no wheezes  She has no rales  Musculoskeletal: She exhibits no edema or deformity  No edema, no significant varicose or spider veins  Lymphadenopathy:     She has no cervical adenopathy  Neurological: She is alert and oriented to person, place, and time  No cranial nerve deficit  Skin: Skin is warm and dry  No rash noted  No erythema  No pallor  Psychiatric: She has a normal mood and affect   Her behavior is normal  Judgment and thought content normal

## 2018-08-16 NOTE — PATIENT INSTRUCTIONS
Lab data reviewed in detail, venous ultrasound reviewed labs compared prior    Chronic kidney disease stage 3-there has been gradual decline in filtration rate  Blood pressure is adequately controlled, patient advised to continue to keep well hydrated and avoid nephrotoxin substances such as nonsteroidal anti-inflammatory drugs like Advil and Aleve  Venous insufficiency with swelling at the end of the day-pathophysiology discussed, no evidence for deep vein thrombosis, no significant varicosities  Patient advised to follow low-sodium diet, keep the legs elevated above the heart 30 min at a time 3 or 4 times daily as needed to control swelling  Can consider compression stockings if this alone is not adequate  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 on oral iron    Routine follow-up after labs in December, sooner as needed

## 2018-08-22 DIAGNOSIS — E78.49 OTHER HYPERLIPIDEMIA: ICD-10-CM

## 2018-08-22 DIAGNOSIS — I10 ESSENTIAL HYPERTENSION: ICD-10-CM

## 2018-08-22 RX ORDER — ROSUVASTATIN CALCIUM 10 MG/1
10 TABLET, COATED ORAL DAILY
Qty: 90 TABLET | Refills: 0 | Status: SHIPPED | OUTPATIENT
Start: 2018-08-22 | End: 2019-05-20 | Stop reason: SDUPTHER

## 2018-08-22 RX ORDER — ATENOLOL 50 MG/1
50 TABLET ORAL DAILY
Qty: 90 TABLET | Refills: 0 | Status: SHIPPED | OUTPATIENT
Start: 2018-08-22 | End: 2019-06-10 | Stop reason: SDUPTHER

## 2018-08-22 NOTE — TELEPHONE ENCOUNTER
Pt want to know if there is a diet list she can have on what to eat and not since she has kidney diseases   Please advise pt

## 2018-08-28 ENCOUNTER — OFFICE VISIT (OUTPATIENT)
Dept: CARDIOLOGY CLINIC | Facility: CLINIC | Age: 83
End: 2018-08-28
Payer: MEDICARE

## 2018-08-28 VITALS
DIASTOLIC BLOOD PRESSURE: 70 MMHG | HEIGHT: 58 IN | SYSTOLIC BLOOD PRESSURE: 122 MMHG | OXYGEN SATURATION: 97 % | HEART RATE: 59 BPM | WEIGHT: 116.4 LBS | BODY MASS INDEX: 24.43 KG/M2

## 2018-08-28 DIAGNOSIS — R94.31 ABNORMAL EKG: Primary | ICD-10-CM

## 2018-08-28 DIAGNOSIS — I10 ESSENTIAL HYPERTENSION: ICD-10-CM

## 2018-08-28 DIAGNOSIS — F41.9 ANXIETY: ICD-10-CM

## 2018-08-28 PROCEDURE — 99213 OFFICE O/P EST LOW 20 MIN: CPT | Performed by: INTERNAL MEDICINE

## 2018-08-28 NOTE — PROGRESS NOTES
Cardiology Follow Up    Paul Gordillo  1935  [id number]  [facility]  [address] [facility] [address] [phone]    1  Abnormal EKG     2  Anxiety     3  Essential hypertension       Chief Complaint   Patient presents with    Follow-up     Interval History: Patient presents for follow-up  She reports that she has recently been diagnosed with stage 3 CKD  She admits that she does not stay very hydrated  She denies use of NSAIDS  She denies chest pain, shortness of breath, palpitations, edema  She denies any exertional symptoms out of the ordinary--- no change in functional capacity  Patient Active Problem List   Diagnosis    Chest discomfort    Anxiety    Abnormal EKG    Vitamin D deficiency    Irritable bowel syndrome    Iron deficiency anemia    Hypothyroidism    Hypertension    Hyperlipidemia    GERD without esophagitis    ESR raised    Chronic kidney disease    AVM (arteriovenous malformation)    Venous insufficiency     Past Medical History:   Diagnosis Date    Benign essential hypertension     Last assessed: 9/11/14    Disease of thyroid gland     Fibromyalgia     GERD (gastroesophageal reflux disease)     History of nail disorders     Hyperlipidemia     Hypertension     Palpitations     Parkinson's disease ([address] )     Transient cerebral ischemia      Social History     Social History    Marital status:       Spouse name: N/A    Number of children: N/A    Years of education: N/A     Occupational History    retired       Social History Main Topics    Smoking status: Former Smoker     Packs/day: 0 00    Smokeless tobacco: Never Used      Comment: 1 pack a week     Alcohol use No    Drug use: No    Sexual activity: No     Other Topics Concern    Not on file     Social History Narrative    Living independently with spouse          Family History   Problem Relation Age of Onset    Stroke Mother         ischemic stroke    Hypertension Mother     Heart disease Father      Past Surgical History:   Procedure Laterality Date    APPENDECTOMY      HYSTERECTOMY      VT LAP,CHOLECYSTECTOMY/GRAPH N/A 4/4/2018    Procedure: LAPAROSCOPIC CHOLECYSTECTOMY WITH IOC;  Surgeon: Phoenix Reid MD;  [facility];  Service: General    TUBAL LIGATION         Current Outpatient Prescriptions:     acetaminophen (TYLENOL) 325 mg tablet, Take 1-2 tablets by mouth every 6 (six) hours as needed, Disp: , Rfl:     ALPRAZolam (XANAX) 0 5 mg tablet, Take 0 5 mg by mouth daily as needed  , Disp: , Rfl:     aspirin 81 mg chewable tablet, Chew 81 mg daily  , Disp: , Rfl:     atenolol (TENORMIN) 50 mg tablet, Take 1 tablet (50 mg total) by mouth daily, Disp: 90 tablet, Rfl: 0    Cholecalciferol (VITAMIN D3) 2000 units capsule, Take by mouth daily, Disp: , Rfl:     clobetasol (TEMOVATE) 0 05 % ointment, Apply topically 2 (two) times a day, Disp: 30 g, Rfl: 0    Corn Dextrin (EQL FIBER SUPPLEMENT PO), Take 1 application by mouth  , Disp: , Rfl:     ferrous sulfate 325 (65 Fe) mg tablet, Take 1 tablet by mouth daily, Disp: , Rfl:     imipramine (TOFRANIL) 10 mg tablet, Take 1 tablet (10 mg total) by mouth 4 (four) times a week, Disp: 90 tablet, Rfl: 3    levothyroxine 50 mcg tablet, Take 1 tablet (50 mcg total) by mouth daily, Disp: 90 tablet, Rfl: 3    lisinopril (ZESTRIL) 10 mg tablet, Take 1 tablet (10 mg total) by mouth daily, Disp: 90 tablet, Rfl: 3    pantoprazole (PROTONIX) 40 mg tablet, Take 1 tablet (40 mg total) by mouth daily for 30 days, Disp: 30 tablet, Rfl: 1    Probiotic Product (ALIGN) 4 MG CAPS, Take 1 tablet by mouth daily, Disp: , Rfl:     rosuvastatin (CRESTOR) 10 MG tablet, Take 1 tablet (10 mg total) by mouth daily, Disp: 90 tablet, Rfl: 0  Allergies   Allergen Reactions    Other Drowsiness     [facility] - [id number]: ANTIDEPRESSANTS       Labs:  Lab Results   Component Value Date     07/31/2018     09/10/2015    K 3 9 07/31/2018    K 3 8 09/10/2015     07/31/2018     09/10/2015    CO2 28 07/31/2018    CO2 28 09/10/2015    BUN 18 07/31/2018    BUN 15 09/10/2015    CREATININE 1 36 (H) 07/31/2018    CREATININE 1 14 09/10/2015    GLUCOSE 85 09/10/2015    CALCIUM 9 5 07/31/2018    CALCIUM 9 1 09/10/2015     Imaging: Vas Lower Limb Venous Duplex Study, Unilateral/limited    Result Date: 7/31/2018  Narrative:  [facility] REPORT [facility]INICAL: Indications: Left Limb Pain [M79 609]  Left Swelling of Limb [R22 4]  The patient presents with left leg pain and swelling x a few days  Risk Factors The patient has no history of DVT  FINDINGS:  Segment  Right            Left              Impression       Impression       CFV      Normal (Patent)  Normal (Patent)     CONCLUSION:  Impression: RIGHT LOWER LIMB LIMITED: Evaluation shows no evidence of thrombus in the common femoral vein  Doppler evaluation shows a normal response to augmentation maneuvers  LEFT LOWER LIMB: No evidence of acute or chronic deep vein thrombosis No evidence of superficial thrombophlebitis noted  Doppler evaluation shows a normal response to augmentation maneuvers  Popliteal, posterior tibial and peroneal arterial Doppler waveforms are triphasic  SIGNATURE: Electronically Signed by: Viktor Paul on 2018-07-31 05:22:59 PM      Review of Systems:  Review of Systems   Constitutional: Negative for activity change, diaphoresis, fatigue, fever and unexpected weight change  HENT: Negative for nosebleeds and trouble swallowing  Eyes: Negative for visual disturbance  Respiratory: Negative for cough, chest tightness, shortness of breath and wheezing  Cardiovascular: Negative for chest pain, palpitations and leg swelling  Gastrointestinal: Negative for abdominal pain, anal bleeding, blood in stool and nausea     Endocrine: Negative for cold intolerance and heat intolerance  Genitourinary: Negative for decreased urine volume, frequency and hematuria  Musculoskeletal: Negative for myalgias  Skin: Negative for color change and wound  Neurological: Negative for dizziness, syncope, weakness, light-headedness and headaches  Psychiatric/Behavioral: Negative for sleep disturbance  The patient is nervous/anxious  Physical Exam:  /70   Pulse 59   Ht 4' 10" (1 473 m)   Wt 52 8 kg (116 lb 6 4 oz)   SpO2 97%   BMI 24 33 kg/m²     Physical Exam   Constitutional: She is oriented to person, place, and time  She appears well-developed and well-nourished  No distress  HENT:   Head: Normocephalic and atraumatic  Eyes: Conjunctivae are normal  No scleral icterus  Neck: Neck supple  No JVD present  Cardiovascular: Normal rate and regular rhythm  No murmur heard  Pulmonary/Chest: Effort normal and breath sounds normal  No respiratory distress  She has no wheezes  She has no rales  Abdominal: She exhibits no distension  There is no tenderness  Musculoskeletal: She exhibits no edema  Neurological: She is alert and oriented to person, place, and time  Skin: Skin is warm and dry  No rash noted  She is not diaphoretic  Psychiatric: She has a normal mood and affect  Discussion/Summary:    Patient with history of hypertension, anxiety, newly diagnosed stage III CKD presents for follow-up  She is doing overall well from cardiac standpoint  She is without symptoms suggestive of angina at this time  Previous echocardiogram and stress test from 01/2018 reviewed  Sensitivity and specificity of stress testing once again reiterated  Signs and symptoms to watch out for from cardiac standpoint which would indicate the need for further testing discussed  Patient to continue to follow up with PCP  Importance of sodium restriction reviewed  Encouraged gentle hydration

## 2018-12-06 ENCOUNTER — TRANSCRIBE ORDERS (OUTPATIENT)
Dept: LAB | Facility: CLINIC | Age: 83
End: 2018-12-06

## 2018-12-06 ENCOUNTER — APPOINTMENT (OUTPATIENT)
Dept: LAB | Facility: CLINIC | Age: 83
End: 2018-12-06
Payer: MEDICARE

## 2018-12-06 DIAGNOSIS — E78.2 MIXED HYPERLIPIDEMIA: ICD-10-CM

## 2018-12-06 DIAGNOSIS — E03.9 ACQUIRED HYPOTHYROIDISM: ICD-10-CM

## 2018-12-06 DIAGNOSIS — R73.01 IMPAIRED FASTING GLUCOSE: Primary | ICD-10-CM

## 2018-12-06 DIAGNOSIS — I10 ESSENTIAL HYPERTENSION: ICD-10-CM

## 2018-12-06 DIAGNOSIS — D50.9 IRON DEFICIENCY ANEMIA, UNSPECIFIED IRON DEFICIENCY ANEMIA TYPE: ICD-10-CM

## 2018-12-06 DIAGNOSIS — R70.0 ELEVATED SEDIMENTATION RATE: Primary | ICD-10-CM

## 2018-12-06 DIAGNOSIS — R73.01 IMPAIRED FASTING GLUCOSE: ICD-10-CM

## 2018-12-06 LAB
ALBUMIN SERPL BCP-MCNC: 3.8 G/DL (ref 3.5–5)
ALP SERPL-CCNC: 90 U/L (ref 46–116)
ALT SERPL W P-5'-P-CCNC: 16 U/L (ref 12–78)
ANION GAP SERPL CALCULATED.3IONS-SCNC: 7 MMOL/L (ref 4–13)
AST SERPL W P-5'-P-CCNC: 19 U/L (ref 5–45)
BILIRUB SERPL-MCNC: 0.35 MG/DL (ref 0.2–1)
BUN SERPL-MCNC: 14 MG/DL (ref 5–25)
CALCIUM SERPL-MCNC: 9 MG/DL (ref 8.3–10.1)
CHLORIDE SERPL-SCNC: 105 MMOL/L (ref 100–108)
CHOLEST SERPL-MCNC: 125 MG/DL (ref 50–200)
CO2 SERPL-SCNC: 27 MMOL/L (ref 21–32)
CREAT SERPL-MCNC: 1.28 MG/DL (ref 0.6–1.3)
ERYTHROCYTE [DISTWIDTH] IN BLOOD BY AUTOMATED COUNT: 13.5 % (ref 11.6–15.1)
EST. AVERAGE GLUCOSE BLD GHB EST-MCNC: 114 MG/DL
FERRITIN SERPL-MCNC: 29 NG/ML (ref 8–388)
GFR SERPL CREATININE-BSD FRML MDRD: 39 ML/MIN/1.73SQ M
GLUCOSE SERPL-MCNC: 88 MG/DL (ref 65–140)
HBA1C MFR BLD: 5.6 % (ref 4.2–6.3)
HCT VFR BLD AUTO: 33.9 % (ref 34.8–46.1)
HDLC SERPL-MCNC: 71 MG/DL (ref 40–60)
HGB BLD-MCNC: 10.9 G/DL (ref 11.5–15.4)
IRON SATN MFR SERPL: 17 %
IRON SERPL-MCNC: 59 UG/DL (ref 50–170)
LDLC SERPL CALC-MCNC: 34 MG/DL (ref 0–100)
MCH RBC QN AUTO: 30.4 PG (ref 26.8–34.3)
MCHC RBC AUTO-ENTMCNC: 32.2 G/DL (ref 31.4–37.4)
MCV RBC AUTO: 95 FL (ref 82–98)
NONHDLC SERPL-MCNC: 54 MG/DL
PLATELET # BLD AUTO: 276 THOUSANDS/UL (ref 149–390)
PMV BLD AUTO: 10.7 FL (ref 8.9–12.7)
POTASSIUM SERPL-SCNC: 3.6 MMOL/L (ref 3.5–5.3)
PROT SERPL-MCNC: 7.8 G/DL (ref 6.4–8.2)
RBC # BLD AUTO: 3.58 MILLION/UL (ref 3.81–5.12)
SODIUM SERPL-SCNC: 139 MMOL/L (ref 136–145)
TIBC SERPL-MCNC: 351 UG/DL (ref 250–450)
TRIGL SERPL-MCNC: 100 MG/DL
TSH SERPL DL<=0.05 MIU/L-ACNC: 0.54 UIU/ML (ref 0.36–3.74)
WBC # BLD AUTO: 6.91 THOUSAND/UL (ref 4.31–10.16)

## 2018-12-06 PROCEDURE — 85027 COMPLETE CBC AUTOMATED: CPT

## 2018-12-06 PROCEDURE — 83540 ASSAY OF IRON: CPT

## 2018-12-06 PROCEDURE — 83036 HEMOGLOBIN GLYCOSYLATED A1C: CPT

## 2018-12-06 PROCEDURE — 36415 COLL VENOUS BLD VENIPUNCTURE: CPT

## 2018-12-06 PROCEDURE — 80061 LIPID PANEL: CPT

## 2018-12-06 PROCEDURE — 84443 ASSAY THYROID STIM HORMONE: CPT

## 2018-12-06 PROCEDURE — 82728 ASSAY OF FERRITIN: CPT

## 2018-12-06 PROCEDURE — 83550 IRON BINDING TEST: CPT

## 2018-12-06 PROCEDURE — 80053 COMPREHEN METABOLIC PANEL: CPT

## 2018-12-11 ENCOUNTER — OFFICE VISIT (OUTPATIENT)
Dept: INTERNAL MEDICINE CLINIC | Facility: CLINIC | Age: 83
End: 2018-12-11
Payer: MEDICARE

## 2018-12-11 VITALS
HEART RATE: 64 BPM | HEIGHT: 58 IN | SYSTOLIC BLOOD PRESSURE: 120 MMHG | WEIGHT: 114.6 LBS | BODY MASS INDEX: 24.05 KG/M2 | DIASTOLIC BLOOD PRESSURE: 74 MMHG | RESPIRATION RATE: 12 BRPM

## 2018-12-11 DIAGNOSIS — F41.9 ANXIETY: ICD-10-CM

## 2018-12-11 DIAGNOSIS — Z23 ENCOUNTER FOR IMMUNIZATION: ICD-10-CM

## 2018-12-11 DIAGNOSIS — D50.9 IRON DEFICIENCY ANEMIA, UNSPECIFIED IRON DEFICIENCY ANEMIA TYPE: ICD-10-CM

## 2018-12-11 DIAGNOSIS — E03.9 ACQUIRED HYPOTHYROIDISM: ICD-10-CM

## 2018-12-11 DIAGNOSIS — Q27.30 AVM (ARTERIOVENOUS MALFORMATION): ICD-10-CM

## 2018-12-11 DIAGNOSIS — R73.01 IMPAIRED FASTING GLUCOSE: ICD-10-CM

## 2018-12-11 DIAGNOSIS — I10 ESSENTIAL HYPERTENSION: Primary | ICD-10-CM

## 2018-12-11 DIAGNOSIS — E78.2 MIXED HYPERLIPIDEMIA: ICD-10-CM

## 2018-12-11 DIAGNOSIS — N18.30 STAGE 3 CHRONIC KIDNEY DISEASE (HCC): ICD-10-CM

## 2018-12-11 DIAGNOSIS — R26.9 NEUROLOGIC GAIT DYSFUNCTION: ICD-10-CM

## 2018-12-11 PROCEDURE — G0008 ADMIN INFLUENZA VIRUS VAC: HCPCS | Performed by: INTERNAL MEDICINE

## 2018-12-11 PROCEDURE — 99214 OFFICE O/P EST MOD 30 MIN: CPT | Performed by: INTERNAL MEDICINE

## 2018-12-11 PROCEDURE — 90662 IIV NO PRSV INCREASED AG IM: CPT | Performed by: INTERNAL MEDICINE

## 2018-12-11 NOTE — PROGRESS NOTES
Assessment/Plan:    Patient Instructions   Lab data reviewed in detail and compared prior    Hypertension stable on present regimen    Hyperlipidemia at goal on rosuvastatin    Iron deficiency anemia with history of AVMs-continue oral iron, discontinue aspirin as below  Cardiac risk factors include hypertension and hyperlipidemia as well as impaired fasting glucose with no prior history of CVA or myocardial infarction, carotid ultrasound from last year reviewed with less than 50% stenosis bilaterally  Based upon the Aspree trial we have discussed the implications and decided to discontinue aspirin especially in light of her known bleeding history  Anxiety is stable on imipramine and alprazolam    Hypothyroidism at goal on levothyroxine    Gait dysfunction-patient advised to walk with her cane until evaluated by PT for balance and gait training and follow their recommendations regarding ambulation and fall prevention  Flu shot today    Routine follow-up after labs in 6 months, sooner as needed  Diagnoses and all orders for this visit:    Essential hypertension  -     CBC and differential; Future  -     Comprehensive metabolic panel; Future  -     TSH, 3rd generation with Free T4 reflex; Future    AVM (arteriovenous malformation)    Stage 3 chronic kidney disease (HCC)    Acquired hypothyroidism    Anxiety    Iron deficiency anemia, unspecified iron deficiency anemia type  -     Iron Panel; Future  -     Ferritin; Future    Mixed hyperlipidemia  -     Lipid panel; Future    Impaired fasting glucose  -     Hemoglobin A1C; Future    Neurologic gait dysfunction  -     Ambulatory referral to Physical Therapy; Future         Subjective:      Patient ID: Hernan Mccloud is a 82 y o  female    Feeling well, here w/ granddgtr, Relda Gillespie  HTN/HPL-taking rx as directed, no home bp's  Anxiety-stable w/ imipramine/xanax up to bid     LARRY- patient had EGD and colonoscopy January 17, 2017 notable for AVMs in the proximal right colon and cecum, she's been tolerating iron qd w/o melana  GERD-stable w/o ppi  Notes balance isn't good related to neuropathy  No falls, refuses walker, occasionally uses cane             Current Outpatient Prescriptions:     acetaminophen (TYLENOL) 325 mg tablet, Take 1-2 tablets by mouth every 6 (six) hours as needed, Disp: , Rfl:     ALPRAZolam (XANAX) 0 5 mg tablet, Take 0 5 mg by mouth daily as needed  , Disp: , Rfl:     atenolol (TENORMIN) 50 mg tablet, Take 1 tablet (50 mg total) by mouth daily, Disp: 90 tablet, Rfl: 0    clobetasol (TEMOVATE) 0 05 % ointment, Apply topically 2 (two) times a day, Disp: 30 g, Rfl: 0    Corn Dextrin (EQL FIBER SUPPLEMENT PO), Take 1 application by mouth  , Disp: , Rfl:     ferrous sulfate 325 (65 Fe) mg tablet, Take 1 tablet by mouth daily, Disp: , Rfl:     imipramine (TOFRANIL) 10 mg tablet, Take 1 tablet (10 mg total) by mouth 4 (four) times a week, Disp: 90 tablet, Rfl: 3    levothyroxine 50 mcg tablet, Take 1 tablet (50 mcg total) by mouth daily, Disp: 90 tablet, Rfl: 3    lisinopril (ZESTRIL) 10 mg tablet, Take 1 tablet (10 mg total) by mouth daily, Disp: 90 tablet, Rfl: 3    Probiotic Product (ALIGN) 4 MG CAPS, Take 1 tablet by mouth daily, Disp: , Rfl:     rosuvastatin (CRESTOR) 10 MG tablet, Take 1 tablet (10 mg total) by mouth daily, Disp: 90 tablet, Rfl: 0    Recent Results (from the past 1008 hour(s))   CBC    Collection Time: 12/06/18  1:43 PM   Result Value Ref Range    WBC 6 91 4 31 - 10 16 Thousand/uL    RBC 3 58 (L) 3 81 - 5 12 Million/uL    Hemoglobin 10 9 (L) 11 5 - 15 4 g/dL    Hematocrit 33 9 (L) 34 8 - 46 1 %    MCV 95 82 - 98 fL    MCH 30 4 26 8 - 34 3 pg    MCHC 32 2 31 4 - 37 4 g/dL    RDW 13 5 11 6 - 15 1 %    Platelets 159 345 - 292 Thousands/uL    MPV 10 7 8 9 - 12 7 fL   Comprehensive metabolic panel    Collection Time: 12/06/18  1:43 PM   Result Value Ref Range    Sodium 139 136 - 145 mmol/L    Potassium 3 6 3 5 - 5 3 mmol/L    Chloride 105 100 - 108 mmol/L    CO2 27 21 - 32 mmol/L    ANION GAP 7 4 - 13 mmol/L    BUN 14 5 - 25 mg/dL    Creatinine 1 28 0 60 - 1 30 mg/dL    Glucose 88 65 - 140 mg/dL    Calcium 9 0 8 3 - 10 1 mg/dL    AST 19 5 - 45 U/L    ALT 16 12 - 78 U/L    Alkaline Phosphatase 90 46 - 116 U/L    Total Protein 7 8 6 4 - 8 2 g/dL    Albumin 3 8 3 5 - 5 0 g/dL    Total Bilirubin 0 35 0 20 - 1 00 mg/dL    eGFR 39 ml/min/1 73sq m   Lipid panel    Collection Time: 12/06/18  1:43 PM   Result Value Ref Range    Cholesterol 125 50 - 200 mg/dL    Triglycerides 100 <=150 mg/dL    HDL, Direct 71 (H) 40 - 60 mg/dL    LDL Calculated 34 0 - 100 mg/dL    Non-HDL-Chol (CHOL-HDL) 54 mg/dl   TSH, 3rd generation with T4 reflex    Collection Time: 12/06/18  1:43 PM   Result Value Ref Range    TSH 3RD GENERATON 0 542 0 358 - 3 740 uIU/mL   Hemoglobin A1C    Collection Time: 12/06/18  1:43 PM   Result Value Ref Range    Hemoglobin A1C 5 6 4 2 - 6 3 %     mg/dl   Iron Saturation %    Collection Time: 12/06/18  1:43 PM   Result Value Ref Range    Iron Saturation 17 %    TIBC 351 250 - 450 ug/dL    Iron 59 50 - 170 ug/dL   Ferritin    Collection Time: 12/06/18  1:43 PM   Result Value Ref Range    Ferritin 29 8 - 388 ng/mL       The following portions of the patient's history were reviewed and updated as appropriate: allergies, current medications, past family history, past medical history, past social history, past surgical history and problem list      Review of Systems   Constitutional: Negative for appetite change, chills, diaphoresis, fatigue, fever and unexpected weight change  HENT: Negative for congestion, hearing loss and rhinorrhea  Eyes: Negative for visual disturbance  Respiratory: Negative for cough, chest tightness, shortness of breath and wheezing  Cardiovascular: Negative for chest pain, palpitations and leg swelling  Gastrointestinal: Negative for abdominal pain and blood in stool     Endocrine: Negative for cold intolerance, heat intolerance, polydipsia and polyuria  Genitourinary: Negative for difficulty urinating, dysuria, frequency and urgency  Musculoskeletal: Positive for gait problem  Negative for arthralgias and myalgias  Skin: Negative for rash  Neurological: Negative for dizziness, weakness, light-headedness and headaches  Hematological: Does not bruise/bleed easily  Psychiatric/Behavioral: Negative for dysphoric mood and sleep disturbance  Objective:      Vitals:    12/11/18 1333   BP: 120/74   Pulse: 64   Resp: 12          Physical Exam   Constitutional: She is oriented to person, place, and time  She appears well-developed and well-nourished  HENT:   Head: Normocephalic and atraumatic  Nose: Nose normal    Mouth/Throat: Oropharynx is clear and moist    Eyes: Pupils are equal, round, and reactive to light  Conjunctivae and EOM are normal  No scleral icterus  Neck: Normal range of motion  Neck supple  No JVD present  No tracheal deviation present  No thyromegaly present  Cardiovascular: Normal rate, regular rhythm and intact distal pulses  Exam reveals no gallop and no friction rub  No murmur heard  Pulmonary/Chest: Effort normal and breath sounds normal  No respiratory distress  She has no wheezes  She has no rales  Musculoskeletal: She exhibits no edema or deformity  Lymphadenopathy:     She has no cervical adenopathy  Neurological: She is alert and oriented to person, place, and time  No cranial nerve deficit  Gait ataxic, minimal arm swing listing left   Skin: Skin is warm and dry  No rash noted  No erythema  No pallor  Psychiatric: She has a normal mood and affect   Her behavior is normal  Judgment and thought content normal

## 2018-12-11 NOTE — PATIENT INSTRUCTIONS
Lab data reviewed in detail and compared prior    Hypertension stable on present regimen    Hyperlipidemia at goal on rosuvastatin    Iron deficiency anemia with history of AVMs-continue oral iron, discontinue aspirin as below  Cardiac risk factors include hypertension and hyperlipidemia as well as impaired fasting glucose with no prior history of CVA or myocardial infarction, carotid ultrasound from last year reviewed with less than 50% stenosis bilaterally  Based upon the Aspree trial we have discussed the implications and decided to discontinue aspirin especially in light of her known bleeding history  Anxiety is stable on imipramine and alprazolam    Hypothyroidism at goal on levothyroxine    Gait dysfunction-patient advised to walk with her cane until evaluated by PT for balance and gait training and follow their recommendations regarding ambulation and fall prevention  Flu shot today    Routine follow-up after labs in 6 months, sooner as needed

## 2018-12-13 ENCOUNTER — TELEPHONE (OUTPATIENT)
Dept: INTERNAL MEDICINE CLINIC | Facility: CLINIC | Age: 83
End: 2018-12-13

## 2018-12-17 ENCOUNTER — TELEPHONE (OUTPATIENT)
Dept: OBGYN CLINIC | Age: 83
End: 2018-12-17

## 2018-12-17 DIAGNOSIS — B37.9 CANDIDIASIS: Primary | ICD-10-CM

## 2018-12-17 NOTE — TELEPHONE ENCOUNTER
Spoke with pt - she was last seen over 2 years ago with Donna Abebe for vaginal discharge - was given a cream    Symptoms started yesterday  She has external itching and burning when urine touches the skin  States it is sore to touch  Hard to come to the office due to transportation  Please advise  Thanks!

## 2018-12-17 NOTE — TELEPHONE ENCOUNTER
Ok, I found her in [employer]  I can send Terazol to the pharmacy but she will need to be seen if not better

## 2019-02-01 ENCOUNTER — OFFICE VISIT (OUTPATIENT)
Dept: CARDIOLOGY CLINIC | Facility: CLINIC | Age: 84
End: 2019-02-01
Payer: MEDICARE

## 2019-02-01 VITALS
WEIGHT: 112.6 LBS | SYSTOLIC BLOOD PRESSURE: 142 MMHG | BODY MASS INDEX: 23.63 KG/M2 | HEART RATE: 69 BPM | OXYGEN SATURATION: 98 % | HEIGHT: 58 IN | DIASTOLIC BLOOD PRESSURE: 80 MMHG

## 2019-02-01 DIAGNOSIS — I10 ESSENTIAL HYPERTENSION: Primary | ICD-10-CM

## 2019-02-01 DIAGNOSIS — R07.89 CHEST DISCOMFORT: ICD-10-CM

## 2019-02-01 DIAGNOSIS — F41.9 ANXIETY: ICD-10-CM

## 2019-02-01 PROCEDURE — 99213 OFFICE O/P EST LOW 20 MIN: CPT | Performed by: INTERNAL MEDICINE

## 2019-02-01 NOTE — PROGRESS NOTES
[facility] [place]  [address]  [facility]  [address]  Cardiology Follow Up    Aspen Ho  1935  [id number]      1  Essential hypertension     2  Anxiety     3  Chest discomfort         Chief Complaint   Patient presents with    Follow-up    Shortness of Breath     At times she has to catch her breath mostly at night while laying down       Interval History:  Patient presents for follow-up visit  Patient denies any history of chest pain shortness of breath  Patient denies any history of leg edema or orthopnea PND  No history of presyncope syncope  Patient states compliance with the present list of medications  Patient does have occasional episodes of atypical chest pain not suggestive of angina  Patient also has history of anxiety  Patient Active Problem List   Diagnosis    Chest discomfort    Anxiety    Abnormal EKG    Vitamin D deficiency    Irritable bowel syndrome    Iron deficiency anemia    Hypothyroidism    Hypertension    Hyperlipidemia    GERD without esophagitis    ESR raised    Chronic kidney disease    AVM (arteriovenous malformation)    Venous insufficiency    Impaired fasting glucose     Past Medical History:   Diagnosis Date    Benign essential hypertension     Last assessed: 9/11/14    Disease of thyroid gland     Fibromyalgia     GERD (gastroesophageal reflux disease)     History of nail disorders     Hyperlipidemia     Hypertension     Palpitations     Parkinson's disease ([facility] [address] )     Transient cerebral ischemia      Social History     Social History    Marital status:       Spouse name: N/A    Number of children: N/A    Years of education: N/A     Occupational History    retired       Social History Main Topics    Smoking status: Former Smoker     Packs/day: 0 00    Smokeless tobacco: Never Used      Comment: 1 pack a week     Alcohol use No    Drug use: No    Sexual activity: No     Other Topics Concern    Not on file     Social History Narrative    Living independently with spouse          Family History   Problem Relation Age of Onset   Aidee Adam Stroke Mother         ischemic stroke    Hypertension Mother     Heart disease Father      Past Surgical History:   Procedure Laterality Date    APPENDECTOMY      HYSTERECTOMY      MA LAP,CHOLECYSTECTOMY/GRAPH N/A 4/4/2018    Procedure: LAPAROSCOPIC CHOLECYSTECTOMY WITH IOC;  Surgeon: aRdha Flynn MD;  [facility];  Service: General    TUBAL LIGATION         Current Outpatient Prescriptions:     acetaminophen (TYLENOL) 325 mg tablet, Take 1-2 tablets by mouth every 6 (six) hours as needed, Disp: , Rfl:     ALPRAZolam (XANAX) 0 5 mg tablet, Take 0 5 mg by mouth daily as needed  , Disp: , Rfl:     atenolol (TENORMIN) 50 mg tablet, Take 1 tablet (50 mg total) by mouth daily, Disp: 90 tablet, Rfl: 0    Corn Dextrin (EQL FIBER SUPPLEMENT PO), Take 1 application by mouth  , Disp: , Rfl:     ferrous sulfate 325 (65 Fe) mg tablet, Take 1 tablet by mouth daily, Disp: , Rfl:     imipramine (TOFRANIL) 10 mg tablet, Take 1 tablet (10 mg total) by mouth 4 (four) times a week, Disp: 90 tablet, Rfl: 3    levothyroxine 50 mcg tablet, Take 1 tablet (50 mcg total) by mouth daily, Disp: 90 tablet, Rfl: 3    lisinopril (ZESTRIL) 10 mg tablet, Take 1 tablet (10 mg total) by mouth daily, Disp: 90 tablet, Rfl: 3    Probiotic Product (ALIGN) 4 MG CAPS, Take 1 tablet by mouth daily, Disp: , Rfl:     rosuvastatin (CRESTOR) 10 MG tablet, Take 1 tablet (10 mg total) by mouth daily, Disp: 90 tablet, Rfl: 0    clobetasol (TEMOVATE) 0 05 % ointment, Apply topically 2 (two) times a day (Patient not taking: Reported on 2/1/2019 ), Disp: 30 g, Rfl: 0    terconazole (TERAZOL 7) 0 4 % vaginal cream, Insert 1 applicator into the vagina daily at bedtime (Patient not taking: Reported on 2/1/2019 ), Disp: 45 g, Rfl: 0  Allergies   Allergen Reactions    Other Drowsiness     Annotation - [id number]: ANTIDEPRESSANTS       Labs:  Appointment on 12/06/2018   Component Date Value    WBC 12/06/2018 6 91     RBC 12/06/2018 3 58*    Hemoglobin 12/06/2018 10 9*    Hematocrit 12/06/2018 33 9*    MCV 12/06/2018 95     MCH 12/06/2018 30 4     MCHC 12/06/2018 32 2     RDW 12/06/2018 13 5     Platelets 32/61/3304 276     MPV 12/06/2018 10 7     Sodium 12/06/2018 139     Potassium 12/06/2018 3 6     Chloride 12/06/2018 105     CO2 12/06/2018 27     ANION GAP 12/06/2018 7     BUN 12/06/2018 14     Creatinine 12/06/2018 1 28     Glucose 12/06/2018 88     Calcium 12/06/2018 9 0     AST 12/06/2018 19     ALT 12/06/2018 16     Alkaline Phosphatase 12/06/2018 90     Total Protein 12/06/2018 7 8     Albumin 12/06/2018 3 8     Total Bilirubin 12/06/2018 0 35     eGFR 12/06/2018 39     Cholesterol 12/06/2018 125     Triglycerides 12/06/2018 100     HDL, Direct 12/06/2018 71*    LDL Calculated 12/06/2018 34     Non-HDL-Chol (CHOL-HDL) 12/06/2018 54     TSH 3RD GENERATON 12/06/2018 0 542     Hemoglobin A1C 12/06/2018 5 6     EAG 12/06/2018 114     Iron Saturation 12/06/2018 17     TIBC 12/06/2018 351     Iron 12/06/2018 59     Ferritin 12/06/2018 29      Imaging: No results found      Review of Systems:  Review of Systems   REVIEW OF SYSTEMS:  Constitutional:  Denies fever or chills   Eyes:  Denies change in visual acuity   HENT:  Denies nasal congestion or sore throat   Respiratory:  Denies cough or shortness of breath   Cardiovascular:  Denies chest pain or edema   GI:  Denies abdominal pain, nausea, vomiting, bloody stools or diarrhea   :  Denies dysuria, frequency, difficulty in micturition and nocturia  Musculoskeletal:  Denies back pain or joint pain   Neurologic:  Denies headache, focal weakness or sensory changes   Endocrine:  Denies polyuria or polydipsia   Lymphatic:  Denies swollen glands   Psychiatric:  anxiety     Physical Exam:    /80   Pulse 69   Ht 4' 10" (1 473 m)   Wt 51 1 kg (112 lb 9 6 oz)   SpO2 98%   BMI 23 53 kg/m²     Physical Exam   PHYSICAL EXAM:  General:  Patient is not in acute distress   Head: Normocephalic, Atraumatic  HEENT:  Both pupils normal-size atraumatic, normocephalic, nonicteric  Neck:  JVP not raised  Trachea central  No carotid bruit  Respiratory:  normal breath sounds no crackles  no rhonchi  Cardiovascular:  Regular rate and rhythm no S3 no murmurs  GI:  Abdomen soft nontender  No organomegaly  Lymphatic:  No cervical or inguinal lymphadenopathy  Neurologic:  Patient is awake alert, oriented   Grossly nonfocal    Discussion/Summary:  Patient overall doing well from a cardiovascular standpoint  Symptoms to watch out from cardiac standpoint which would indicate the need for further cardiac evaluation discussed  Emotional support provided to the patient with history of anxiety  Previous cardiovascular studies reviewed  Patient and family had a few questions which were answered  Follow-up in 6 months or earlier as needed  Follow-up with primary care physician

## 2019-03-21 ENCOUNTER — EVALUATION (OUTPATIENT)
Dept: PHYSICAL THERAPY | Facility: CLINIC | Age: 84
End: 2019-03-21
Payer: MEDICARE

## 2019-03-21 DIAGNOSIS — R26.9 NEUROLOGIC GAIT DYSFUNCTION: Primary | ICD-10-CM

## 2019-03-21 PROCEDURE — 97110 THERAPEUTIC EXERCISES: CPT | Performed by: PHYSICAL THERAPIST

## 2019-03-21 PROCEDURE — 97162 PT EVAL MOD COMPLEX 30 MIN: CPT | Performed by: PHYSICAL THERAPIST

## 2019-03-21 NOTE — PROGRESS NOTES
PT Evaluation     Today's date: 3/21/2019  Patient name: Rylee Gloria  : 1935  MRN: [id number]  Referring provider: Asiya Montalvo MD  Dx:   Encounter Diagnosis     ICD-10-CM    1  Neurologic gait dysfunction R26 9 Ambulatory referral to Physical Therapy                  Assessment  Assessment details: Rylee Gloria is a 82 y o  female who presents with impaired sensation and ambulatory dysfunction  Due to these impairments, Patient has difficulty performing a/iadls, recreational activities and engaging in social activities  Patient's clinical presentation is consistent with their referring diagnosis  Patient would benefit from skilled physical therapy to address their aforementioned impairments, improve their level of function and to improve their overall quality of life  Impairments: abnormal coordination, abnormal gait, abnormal muscle firing, abnormal or restricted ROM, abnormal movement, activity intolerance, impaired balance, impaired physical strength, lacks appropriate home exercise program, pain with function, safety issue, weight-bearing intolerance and poor posture   Understanding of Dx/Px/POC: good   Prognosis: fair    Goals  Short Term Goals: to be achieved in 4 weeks  1) Patient to be independent with basic HEP  2) Increase LE strength by 1/2 MMT grade in all deficient planes  3) Patient to negotiate steps with a step-to pattern without use of HR  4) Increase ambulatory tolerance to 10 minutes  Long Term Goals: to be achieved by discharge  1) FOTO equal to or greater than predicted  2) Patient to be independent with comprehensive HEP  3) Increase LE strength to 5/5 MMT grade in all planes to improve A/IADLS  4) Patient to negotiate steps with a reciprocal pattern with use of Hr  5) Increase ambulatory tolerance to 20 minutes    6) Pt improve TUG score by at least 5 seconds  7) Pt improve 5 time sit to stand score by at least 5 seconds  8) Pt improve DGI score to >14    Plan  Patient would benefit from: skilled PT  Referral necessary: No  Planned modality interventions: biofeedback, cryotherapy, hydrotherapy, TENS and unattended electrical stimulation  Planned therapy interventions: activity modification, ADL retraining, balance, balance/weight bearing training, behavior modification, body mechanics training, functional ROM exercises, gait training, home exercise program, IADL retraining, joint mobilization, manual therapy, massage, neuromuscular re-education, patient education, strengthening, stretching, therapeutic activities, therapeutic exercise and transfer training  Frequency: 2x week  Duration in weeks: 6  Treatment plan discussed with: patient        Subjective Evaluation    History of Present Illness  Mechanism of injury: Pt presents today due to issues with her balance  She notes that just last Saturday she fell twice when she tripped going into the [employer]  She does have neuropathy and because of that she will "drag" her feet  She will utilize a [facility] when she is walking on uneven terrain  She denies any dizziness  She reports weakness in both legs, and feels that it limits her from standing  She is only able to walk through a few aisles            Recurrent probem    Quality of life: good    Pain  No pain reported    Social Support  Steps to enter house: no  Stairs in house: no   Lives in: apartment  Lives with: alone    Patient Goals  Patient goal: Pt would like work on her balance and walking         Objective     Strength/Myotome Testing     Left Hip   Planes of Motion   Flexion: 4  Extension: 4  Abduction: 4  Adduction: 4+    Right Hip   Planes of Motion   Flexion: 4  Extension: 4  Abduction: 4  Adduction: 4+    Left Knee   Flexion: 4  Extension: 4-    Right Knee   Flexion: 4  Extension: 4-    Left Ankle/Foot   Dorsiflexion: 4    Right Ankle/Foot   Dorsiflexion: 4    Additional Strength Details  Gait- absent heel strike, decreased step and stride length, decreased nevaeh    Sit to stand- requires assistance from UE's    5 time sit to stand- 30 96 sec, unable to perform without UE assistance  TU 29 sec  DGI: 10/24      Flowsheet Rows      Most Recent Value   PT/OT G-Codes   Current Score  51   Projected Score  63          Precautions: GERD, hypothyroidism, HTN, AVM/TIA, anxiety, FMS, peripheral neuropathy    Daily Treatment Diary     Manual                                                                                   Exercise Diary              bike             Repeated sit to stand 2x5            Heel raises 20x            Standing hip abd 10x ea            Standing hip ext/flx             SLS             Tandem stance             Static stance on foam EC             biodex LOS             Gait training             Step ups             Lateral step ups                                                                                                                         Modalities

## 2019-03-25 ENCOUNTER — OFFICE VISIT (OUTPATIENT)
Dept: PHYSICAL THERAPY | Facility: CLINIC | Age: 84
End: 2019-03-25
Payer: MEDICARE

## 2019-03-25 DIAGNOSIS — R26.9 NEUROLOGIC GAIT DYSFUNCTION: Primary | ICD-10-CM

## 2019-03-25 PROCEDURE — 97112 NEUROMUSCULAR REEDUCATION: CPT | Performed by: PHYSICAL THERAPIST

## 2019-03-25 PROCEDURE — 97110 THERAPEUTIC EXERCISES: CPT | Performed by: PHYSICAL THERAPIST

## 2019-03-25 NOTE — PROGRESS NOTES
Daily Note     Today's date: 3/25/2019  Patient name: Rylee Gloria  : 1935  MRN: [id number]  Referring provider: Asiya Montalvo MD  Dx:   Encounter Diagnosis     ICD-10-CM    1  Neurologic gait dysfunction R26 9                   Subjective: Pt notes that today is a "slow day" for her, which she attributes to the weather  Objective: See treatment diary below      Assessment: Tolerated treatment fair  Patient demonstrated fatigue post treatment, would benefit from continued PT and required CG assist during foam balance exercises, and also required close S during amublation t/o the clinic, particularly during turns  Focused on ambulating with heel toe gait pattern, and demonstrated improvement in her overall postural stability  Plan: Continue per plan of care  Progress treatment as tolerated        Precautions: GERD, hypothyroidism, HTN, AVM/TIA, anxiety, FMS, peripheral neuropathy    Daily Treatment Diary     Manual                                                                                   Exercise Diary   3/25           bike  5'           Repeated sit to stand 2x5 2x5           Heel raises 20x 20x           Standing hip abd 10x ea 10x           Standing hip ext/flx  10x           SLS  10"x5 ea           Tandem stance  30"x3 ea           Static stance on foam EC  30"x3; 30" NBOS           biodex LOS  NV           Gait training  5'           Step ups  NV           Lateral step ups  3 laps           Cone step over  NV           Seated DF  20x                                                                                             Modalities

## 2019-03-28 ENCOUNTER — OFFICE VISIT (OUTPATIENT)
Dept: PHYSICAL THERAPY | Facility: CLINIC | Age: 84
End: 2019-03-28
Payer: MEDICARE

## 2019-03-28 DIAGNOSIS — R26.9 NEUROLOGIC GAIT DYSFUNCTION: Primary | ICD-10-CM

## 2019-03-28 PROCEDURE — 97112 NEUROMUSCULAR REEDUCATION: CPT

## 2019-03-28 PROCEDURE — 97110 THERAPEUTIC EXERCISES: CPT

## 2019-03-28 NOTE — PROGRESS NOTES
Daily Note     Today's date: 3/28/2019  Patient name: Neymar Burton  : 1935  MRN: [id number]  Referring provider: Jennifer Angeles MD  Dx:   Encounter Diagnosis     ICD-10-CM    1  Neurologic gait dysfunction R26 9                   Subjective: Pt states today is not a good day  Objective: See treatment diary below  Precautions: GERD, hypothyroidism, HTN, AVM/TIA, anxiety, FMS, peripheral neuropathy    Daily Treatment Diary     Manual                                                                                   Exercise Diary   3/25 3/28          bike  5' 5'          Repeated sit to stand 2x5 2x5 2x5          Heel raises 20x 20x x20          Standing hip abd 10x ea 10x x15          Standing hip ext/flx  10x x10          SLS  10"x5 ea 10"x5 ea          Tandem stance  30"x3 ea 30"x3 ea          Static stance on foam EC  30"x3; 30" NBOS 15"x3 NBOS          biodex LOS  NV           Gait training  5' 5'          Step ups  NV x10 6"          Lateral step ups  3 laps 6" x10 ea          Cone step over  NV 4 laps          Seated DF  20x x20                                                                                            Modalities                                                         Assessment: Pt presenting with increased postural sway with NBOS EC on foam surface  3 LOB present to posterior R lateral side, therapist assist provided to self correct  Pt demonstrating decreased step length and LE clearance during gait  Verbal cuing provided in order to promote increased foot clearance and step length  Pt would benefit from continued PT in order to improve static balance and gait mechanics for increased safety during ADLs  Plan: Continue per plan of care

## 2019-03-29 ENCOUNTER — APPOINTMENT (OUTPATIENT)
Dept: LAB | Facility: CLINIC | Age: 84
End: 2019-03-29
Payer: MEDICARE

## 2019-03-29 ENCOUNTER — OFFICE VISIT (OUTPATIENT)
Dept: INTERNAL MEDICINE CLINIC | Facility: CLINIC | Age: 84
End: 2019-03-29
Payer: MEDICARE

## 2019-03-29 VITALS
DIASTOLIC BLOOD PRESSURE: 70 MMHG | SYSTOLIC BLOOD PRESSURE: 130 MMHG | HEART RATE: 64 BPM | BODY MASS INDEX: 23.68 KG/M2 | HEIGHT: 58 IN | WEIGHT: 112.8 LBS | RESPIRATION RATE: 12 BRPM

## 2019-03-29 DIAGNOSIS — R51.9 SCALP TENDERNESS: ICD-10-CM

## 2019-03-29 DIAGNOSIS — R05.9 COUGH: Primary | ICD-10-CM

## 2019-03-29 DIAGNOSIS — R59.1 LYMPHADENOPATHY: ICD-10-CM

## 2019-03-29 LAB
ALBUMIN SERPL BCP-MCNC: 4.2 G/DL (ref 3.5–5)
ALP SERPL-CCNC: 90 U/L (ref 46–116)
ALT SERPL W P-5'-P-CCNC: 19 U/L (ref 12–78)
ANION GAP SERPL CALCULATED.3IONS-SCNC: 5 MMOL/L (ref 4–13)
AST SERPL W P-5'-P-CCNC: 24 U/L (ref 5–45)
BASOPHILS # BLD AUTO: 0.03 THOUSANDS/ΜL (ref 0–0.1)
BASOPHILS NFR BLD AUTO: 0 % (ref 0–1)
BILIRUB SERPL-MCNC: 0.55 MG/DL (ref 0.2–1)
BUN SERPL-MCNC: 22 MG/DL (ref 5–25)
CALCIUM SERPL-MCNC: 9.9 MG/DL (ref 8.3–10.1)
CHLORIDE SERPL-SCNC: 103 MMOL/L (ref 100–108)
CO2 SERPL-SCNC: 26 MMOL/L (ref 21–32)
CREAT SERPL-MCNC: 1.35 MG/DL (ref 0.6–1.3)
EOSINOPHIL # BLD AUTO: 0.71 THOUSAND/ΜL (ref 0–0.61)
EOSINOPHIL NFR BLD AUTO: 9 % (ref 0–6)
ERYTHROCYTE [DISTWIDTH] IN BLOOD BY AUTOMATED COUNT: 13.6 % (ref 11.6–15.1)
ERYTHROCYTE [SEDIMENTATION RATE] IN BLOOD: 44 MM/HOUR (ref 0–20)
GFR SERPL CREATININE-BSD FRML MDRD: 37 ML/MIN/1.73SQ M
GLUCOSE SERPL-MCNC: 96 MG/DL (ref 65–140)
HCT VFR BLD AUTO: 38.2 % (ref 34.8–46.1)
HGB BLD-MCNC: 12.2 G/DL (ref 11.5–15.4)
IMM GRANULOCYTES # BLD AUTO: 0.02 THOUSAND/UL (ref 0–0.2)
IMM GRANULOCYTES NFR BLD AUTO: 0 % (ref 0–2)
LDH SERPL-CCNC: 188 U/L (ref 81–234)
LYMPHOCYTES # BLD AUTO: 1.57 THOUSANDS/ΜL (ref 0.6–4.47)
LYMPHOCYTES NFR BLD AUTO: 21 % (ref 14–44)
MCH RBC QN AUTO: 30.2 PG (ref 26.8–34.3)
MCHC RBC AUTO-ENTMCNC: 31.9 G/DL (ref 31.4–37.4)
MCV RBC AUTO: 95 FL (ref 82–98)
MONOCYTES # BLD AUTO: 0.7 THOUSAND/ΜL (ref 0.17–1.22)
MONOCYTES NFR BLD AUTO: 9 % (ref 4–12)
NEUTROPHILS # BLD AUTO: 4.57 THOUSANDS/ΜL (ref 1.85–7.62)
NEUTS SEG NFR BLD AUTO: 61 % (ref 43–75)
NRBC BLD AUTO-RTO: 0 /100 WBCS
PLATELET # BLD AUTO: 216 THOUSANDS/UL (ref 149–390)
PMV BLD AUTO: 11.3 FL (ref 8.9–12.7)
POTASSIUM SERPL-SCNC: 4.4 MMOL/L (ref 3.5–5.3)
PROT SERPL-MCNC: 8.2 G/DL (ref 6.4–8.2)
RBC # BLD AUTO: 4.04 MILLION/UL (ref 3.81–5.12)
SODIUM SERPL-SCNC: 134 MMOL/L (ref 136–145)
WBC # BLD AUTO: 7.6 THOUSAND/UL (ref 4.31–10.16)

## 2019-03-29 PROCEDURE — 85025 COMPLETE CBC W/AUTO DIFF WBC: CPT

## 2019-03-29 PROCEDURE — 80053 COMPREHEN METABOLIC PANEL: CPT

## 2019-03-29 PROCEDURE — 83615 LACTATE (LD) (LDH) ENZYME: CPT

## 2019-03-29 PROCEDURE — 85652 RBC SED RATE AUTOMATED: CPT

## 2019-03-29 PROCEDURE — 99214 OFFICE O/P EST MOD 30 MIN: CPT | Performed by: INTERNAL MEDICINE

## 2019-03-29 PROCEDURE — 36415 COLL VENOUS BLD VENIPUNCTURE: CPT

## 2019-03-29 NOTE — PATIENT INSTRUCTIONS
Cough is of unclear etiology, lung exam is clear, no symptoms of postnasal drip  Try Mucinex DM over-the-counter twice daily  Check pulmonary function test and chest x-ray given history of tobacco abuse  Consider bronchodilators based upon results        Scalp tenderness-check esr    Cervical lymphadenopathy-likely reactive, check cbc, cmp and ldh    Follow-up after labs in June, sooner as needed

## 2019-03-29 NOTE — PROGRESS NOTES
Assessment/Plan:    Diagnoses and all orders for this visit:    Cough  -     XR chest pa & lateral; Future  -     Complete pulmonary function test; Future    Scalp tenderness  -     Sedimentation rate, automated; Future    Lymphadenopathy  -     CBC and differential; Future  -     Comprehensive metabolic panel; Future  -     Sedimentation rate, automated; Future  -     LD,Blood; Future         Patient Instructions   Cough is of unclear etiology, lung exam is clear, no symptoms of postnasal drip  Try Mucinex DM over-the-counter twice daily  Check pulmonary function test and chest x-ray given history of tobacco abuse  Consider bronchodilators based upon results  Scalp tenderness-check esr    Cervical lymphadenopathy-likely reactive, check cbc, cmp and ldh    Follow-up after labs in June, sooner as needed      Subjective:      Patient ID: Mary Do is a 82 y o  female    Patient presents for evaluation of cough and neck pain  She states she had been coughing for several months  Cough is minimally productive without fevers or chills  She had been a light smoker, about a pack per week until about 5 years ago  She has no recent pulmonary function tests, chest x-ray February of 2018 was unremarkable  She notes earlier this week she was out taking a walk and had 2 sharp jabs of pain in her left anterior neck that resolved spontaneously  She also notes some soreness on the right side of her neck  She notes that she has been hoarse off and on  She has also noticed left scalp tenderness that is worsening as well as diffuse arthralgias and hand pains          Current Outpatient Medications:     acetaminophen (TYLENOL) 325 mg tablet, Take 1-2 tablets by mouth every 6 (six) hours as needed, Disp: , Rfl:     ALPRAZolam (XANAX) 0 5 mg tablet, Take 0 5 mg by mouth daily as needed  , Disp: , Rfl:     atenolol (TENORMIN) 50 mg tablet, Take 1 tablet (50 mg total) by mouth daily, Disp: 90 tablet, Rfl: 0    Corn Dextrin (EQL FIBER SUPPLEMENT PO), Take 1 application by mouth  , Disp: , Rfl:     ferrous sulfate 325 (65 Fe) mg tablet, Take 1 tablet by mouth daily, Disp: , Rfl:     imipramine (TOFRANIL) 10 mg tablet, Take 1 tablet (10 mg total) by mouth 4 (four) times a week, Disp: 90 tablet, Rfl: 3    levothyroxine 50 mcg tablet, Take 1 tablet (50 mcg total) by mouth daily, Disp: 90 tablet, Rfl: 3    lisinopril (ZESTRIL) 10 mg tablet, Take 1 tablet (10 mg total) by mouth daily, Disp: 90 tablet, Rfl: 3    Probiotic Product (ALIGN) 4 MG CAPS, Take 1 tablet by mouth daily, Disp: , Rfl:     rosuvastatin (CRESTOR) 10 MG tablet, Take 1 tablet (10 mg total) by mouth daily, Disp: 90 tablet, Rfl: 0    No results found for this or any previous visit (from the past 1008 hour(s))  The following portions of the patient's history were reviewed and updated as appropriate: allergies, current medications, past family history, past medical history, past social history, past surgical history and problem list      Review of Systems   Constitutional: Negative for appetite change, chills, diaphoresis, fatigue, fever and unexpected weight change  HENT: Negative for congestion, hearing loss and rhinorrhea  Eyes: Negative for visual disturbance  Respiratory: Positive for cough  Negative for chest tightness, shortness of breath and wheezing  Cardiovascular: Negative for chest pain, palpitations and leg swelling  Gastrointestinal: Negative for abdominal pain and blood in stool  Endocrine: Negative for cold intolerance, heat intolerance, polydipsia and polyuria  Genitourinary: Negative for difficulty urinating, dysuria, frequency and urgency  Musculoskeletal: Positive for neck pain  Negative for arthralgias and myalgias  Skin: Negative for rash  Neurological: Negative for dizziness, weakness, light-headedness and headaches  Hematological: Does not bruise/bleed easily     Psychiatric/Behavioral: Negative for dysphoric mood and sleep disturbance  Objective:      Vitals:    03/29/19 1400   BP: 130/70   Pulse: 64   Resp: 12          Physical Exam   Constitutional: She is oriented to person, place, and time  She appears well-developed and well-nourished  HENT:   Head: Normocephalic and atraumatic  Nose: Nose normal    Mouth/Throat: Oropharynx is clear and moist    Eyes: Pupils are equal, round, and reactive to light  Conjunctivae and EOM are normal  No scleral icterus  Neck: Normal range of motion  Neck supple  No JVD present  No tracheal deviation present  No thyromegaly present  Shotty, nonpathologic anterior cervical lymphadenopathy in the right superior anterior cervical chain   Cardiovascular: Normal rate, regular rhythm and intact distal pulses  Exam reveals no gallop and no friction rub  No murmur heard  No bruits   Pulmonary/Chest: Effort normal and breath sounds normal  No respiratory distress  She has no wheezes  She has no rales  Musculoskeletal: She exhibits no edema or deformity  Lymphadenopathy:     She has no cervical adenopathy  Neurological: She is alert and oriented to person, place, and time  No cranial nerve deficit  Skin: Skin is warm and dry  No rash noted  No erythema  No pallor  Psychiatric: She has a normal mood and affect   Her behavior is normal  Judgment and thought content normal

## 2019-03-30 ENCOUNTER — TELEPHONE (OUTPATIENT)
Dept: INTERNAL MEDICINE CLINIC | Facility: CLINIC | Age: 84
End: 2019-03-30

## 2019-04-01 ENCOUNTER — APPOINTMENT (OUTPATIENT)
Dept: PHYSICAL THERAPY | Facility: CLINIC | Age: 84
End: 2019-04-01
Payer: MEDICARE

## 2019-04-02 ENCOUNTER — TELEPHONE (OUTPATIENT)
Dept: INTERNAL MEDICINE CLINIC | Facility: CLINIC | Age: 84
End: 2019-04-02

## 2019-04-02 ENCOUNTER — APPOINTMENT (OUTPATIENT)
Dept: RADIOLOGY | Facility: CLINIC | Age: 84
End: 2019-04-02
Payer: MEDICARE

## 2019-04-02 DIAGNOSIS — R05.9 COUGH: ICD-10-CM

## 2019-04-02 PROCEDURE — 71046 X-RAY EXAM CHEST 2 VIEWS: CPT

## 2019-04-04 ENCOUNTER — OFFICE VISIT (OUTPATIENT)
Dept: PHYSICAL THERAPY | Facility: CLINIC | Age: 84
End: 2019-04-04
Payer: MEDICARE

## 2019-04-04 DIAGNOSIS — R26.9 NEUROLOGIC GAIT DYSFUNCTION: Primary | ICD-10-CM

## 2019-04-04 PROCEDURE — 97110 THERAPEUTIC EXERCISES: CPT

## 2019-04-04 PROCEDURE — 97112 NEUROMUSCULAR REEDUCATION: CPT

## 2019-04-08 ENCOUNTER — OFFICE VISIT (OUTPATIENT)
Dept: PHYSICAL THERAPY | Facility: CLINIC | Age: 84
End: 2019-04-08
Payer: MEDICARE

## 2019-04-08 DIAGNOSIS — R26.9 NEUROLOGIC GAIT DYSFUNCTION: Primary | ICD-10-CM

## 2019-04-08 PROCEDURE — 97112 NEUROMUSCULAR REEDUCATION: CPT

## 2019-04-11 ENCOUNTER — OFFICE VISIT (OUTPATIENT)
Dept: PHYSICAL THERAPY | Facility: CLINIC | Age: 84
End: 2019-04-11
Payer: MEDICARE

## 2019-04-11 DIAGNOSIS — R26.9 NEUROLOGIC GAIT DYSFUNCTION: Primary | ICD-10-CM

## 2019-04-11 PROCEDURE — 97110 THERAPEUTIC EXERCISES: CPT | Performed by: PHYSICAL THERAPIST

## 2019-04-11 PROCEDURE — 97116 GAIT TRAINING THERAPY: CPT | Performed by: PHYSICAL THERAPIST

## 2019-04-11 PROCEDURE — 97112 NEUROMUSCULAR REEDUCATION: CPT | Performed by: PHYSICAL THERAPIST

## 2019-04-15 ENCOUNTER — OFFICE VISIT (OUTPATIENT)
Dept: PHYSICAL THERAPY | Facility: CLINIC | Age: 84
End: 2019-04-15
Payer: MEDICARE

## 2019-04-15 DIAGNOSIS — R26.9 NEUROLOGIC GAIT DYSFUNCTION: Primary | ICD-10-CM

## 2019-04-15 PROCEDURE — 97110 THERAPEUTIC EXERCISES: CPT

## 2019-04-15 PROCEDURE — 97112 NEUROMUSCULAR REEDUCATION: CPT

## 2019-04-15 PROCEDURE — 97116 GAIT TRAINING THERAPY: CPT

## 2019-04-17 ENCOUNTER — HOSPITAL ENCOUNTER (OUTPATIENT)
Dept: PULMONOLOGY | Facility: HOSPITAL | Age: 84
Discharge: HOME | End: 2019-04-17
Attending: INTERNAL MEDICINE
Payer: MEDICARE

## 2019-04-17 DIAGNOSIS — R05.9 COUGH: ICD-10-CM

## 2019-04-17 PROCEDURE — 94060 EVALUATION OF WHEEZING: CPT

## 2019-04-17 PROCEDURE — 94727 GAS DIL/WSHOT DETER LNG VOL: CPT

## 2019-04-17 PROCEDURE — 94729 DIFFUSING CAPACITY: CPT | Performed by: INTERNAL MEDICINE

## 2019-04-17 PROCEDURE — 94760 N-INVAS EAR/PLS OXIMETRY 1: CPT

## 2019-04-17 PROCEDURE — 94726 PLETHYSMOGRAPHY LUNG VOLUMES: CPT | Performed by: INTERNAL MEDICINE

## 2019-04-17 PROCEDURE — 94060 EVALUATION OF WHEEZING: CPT | Performed by: INTERNAL MEDICINE

## 2019-04-17 PROCEDURE — 94729 DIFFUSING CAPACITY: CPT

## 2019-04-17 RX ORDER — ALBUTEROL SULFATE 2.5 MG/3ML
2.5 SOLUTION RESPIRATORY (INHALATION) ONCE
Status: COMPLETED | OUTPATIENT
Start: 2019-04-17 | End: 2019-04-17

## 2019-04-17 RX ADMIN — ALBUTEROL SULFATE 2.5 MG: 2.5 SOLUTION RESPIRATORY (INHALATION) at 14:09

## 2019-04-18 ENCOUNTER — EVALUATION (OUTPATIENT)
Dept: PHYSICAL THERAPY | Facility: CLINIC | Age: 84
End: 2019-04-18
Payer: MEDICARE

## 2019-04-18 DIAGNOSIS — R26.9 NEUROLOGIC GAIT DYSFUNCTION: Primary | ICD-10-CM

## 2019-04-18 PROCEDURE — 97112 NEUROMUSCULAR REEDUCATION: CPT | Performed by: PHYSICAL THERAPIST

## 2019-04-18 PROCEDURE — 97110 THERAPEUTIC EXERCISES: CPT | Performed by: PHYSICAL THERAPIST

## 2019-04-22 ENCOUNTER — OFFICE VISIT (OUTPATIENT)
Dept: PHYSICAL THERAPY | Facility: CLINIC | Age: 84
End: 2019-04-22
Payer: MEDICARE

## 2019-04-22 DIAGNOSIS — R26.9 NEUROLOGIC GAIT DYSFUNCTION: Primary | ICD-10-CM

## 2019-04-22 PROCEDURE — 97110 THERAPEUTIC EXERCISES: CPT

## 2019-04-22 PROCEDURE — 97116 GAIT TRAINING THERAPY: CPT

## 2019-04-22 PROCEDURE — 97112 NEUROMUSCULAR REEDUCATION: CPT

## 2019-04-25 ENCOUNTER — OFFICE VISIT (OUTPATIENT)
Dept: PHYSICAL THERAPY | Facility: CLINIC | Age: 84
End: 2019-04-25
Payer: MEDICARE

## 2019-04-25 DIAGNOSIS — R26.9 NEUROLOGIC GAIT DYSFUNCTION: Primary | ICD-10-CM

## 2019-04-25 PROCEDURE — 97112 NEUROMUSCULAR REEDUCATION: CPT | Performed by: PHYSICAL THERAPIST

## 2019-04-25 PROCEDURE — 97110 THERAPEUTIC EXERCISES: CPT | Performed by: PHYSICAL THERAPIST

## 2019-04-29 ENCOUNTER — OFFICE VISIT (OUTPATIENT)
Dept: PHYSICAL THERAPY | Facility: CLINIC | Age: 84
End: 2019-04-29
Payer: MEDICARE

## 2019-04-29 DIAGNOSIS — R26.9 NEUROLOGIC GAIT DYSFUNCTION: Primary | ICD-10-CM

## 2019-04-29 PROCEDURE — 97110 THERAPEUTIC EXERCISES: CPT

## 2019-04-29 PROCEDURE — 97112 NEUROMUSCULAR REEDUCATION: CPT

## 2019-05-06 ENCOUNTER — OFFICE VISIT (OUTPATIENT)
Dept: INTERNAL MEDICINE CLINIC | Facility: CLINIC | Age: 84
End: 2019-05-06
Payer: MEDICARE

## 2019-05-06 VITALS
WEIGHT: 112.6 LBS | SYSTOLIC BLOOD PRESSURE: 140 MMHG | DIASTOLIC BLOOD PRESSURE: 78 MMHG | HEIGHT: 58 IN | BODY MASS INDEX: 23.63 KG/M2 | HEART RATE: 80 BPM | RESPIRATION RATE: 12 BRPM

## 2019-05-06 DIAGNOSIS — N18.30 STAGE 3 CHRONIC KIDNEY DISEASE (HCC): ICD-10-CM

## 2019-05-06 DIAGNOSIS — Z12.39 SCREENING FOR BREAST CANCER: ICD-10-CM

## 2019-05-06 DIAGNOSIS — R73.01 IMPAIRED FASTING GLUCOSE: ICD-10-CM

## 2019-05-06 DIAGNOSIS — Z78.0 MENOPAUSE: ICD-10-CM

## 2019-05-06 DIAGNOSIS — Z00.00 WELL ADULT EXAM: Primary | ICD-10-CM

## 2019-05-06 DIAGNOSIS — Q27.30 AVM (ARTERIOVENOUS MALFORMATION): ICD-10-CM

## 2019-05-06 DIAGNOSIS — E78.2 MIXED HYPERLIPIDEMIA: ICD-10-CM

## 2019-05-06 DIAGNOSIS — I10 ESSENTIAL HYPERTENSION: ICD-10-CM

## 2019-05-06 DIAGNOSIS — E03.9 ACQUIRED HYPOTHYROIDISM: ICD-10-CM

## 2019-05-06 DIAGNOSIS — F41.9 ANXIETY: ICD-10-CM

## 2019-05-06 DIAGNOSIS — D50.9 IRON DEFICIENCY ANEMIA, UNSPECIFIED IRON DEFICIENCY ANEMIA TYPE: ICD-10-CM

## 2019-05-06 PROCEDURE — 99214 OFFICE O/P EST MOD 30 MIN: CPT | Performed by: INTERNAL MEDICINE

## 2019-05-06 PROCEDURE — G0439 PPPS, SUBSEQ VISIT: HCPCS | Performed by: INTERNAL MEDICINE

## 2019-05-08 ENCOUNTER — TELEPHONE (OUTPATIENT)
Dept: INTERNAL MEDICINE CLINIC | Facility: CLINIC | Age: 84
End: 2019-05-08

## 2019-05-14 ENCOUNTER — HOSPITAL ENCOUNTER (OUTPATIENT)
Dept: MAMMOGRAPHY | Facility: CLINIC | Age: 84
Discharge: HOME | End: 2019-05-14
Payer: MEDICARE

## 2019-05-14 VITALS — WEIGHT: 113 LBS | BODY MASS INDEX: 23.72 KG/M2 | HEIGHT: 58 IN

## 2019-05-14 DIAGNOSIS — Z12.39 SCREENING FOR BREAST CANCER: ICD-10-CM

## 2019-05-14 DIAGNOSIS — Z78.0 MENOPAUSE: ICD-10-CM

## 2019-05-14 PROCEDURE — 77080 DXA BONE DENSITY AXIAL: CPT

## 2019-05-14 PROCEDURE — 77067 SCR MAMMO BI INCL CAD: CPT

## 2019-05-14 PROCEDURE — 77063 BREAST TOMOSYNTHESIS BI: CPT

## 2019-05-17 ENCOUNTER — OFFICE VISIT (OUTPATIENT)
Dept: INTERNAL MEDICINE CLINIC | Facility: CLINIC | Age: 84
End: 2019-05-17
Payer: MEDICARE

## 2019-05-17 VITALS
HEART RATE: 68 BPM | HEIGHT: 58 IN | DIASTOLIC BLOOD PRESSURE: 64 MMHG | BODY MASS INDEX: 23.51 KG/M2 | SYSTOLIC BLOOD PRESSURE: 120 MMHG | RESPIRATION RATE: 12 BRPM | WEIGHT: 112 LBS

## 2019-05-17 DIAGNOSIS — R05.9 COUGH: Primary | ICD-10-CM

## 2019-05-17 PROBLEM — J45.20 MILD INTERMITTENT ASTHMA WITHOUT COMPLICATION: Status: ACTIVE | Noted: 2019-05-17

## 2019-05-17 PROCEDURE — 99214 OFFICE O/P EST MOD 30 MIN: CPT | Performed by: NURSE PRACTITIONER

## 2019-05-17 RX ORDER — ALBUTEROL SULFATE 90 UG/1
2 AEROSOL, METERED RESPIRATORY (INHALATION) EVERY 6 HOURS PRN
Qty: 1 INHALER | Refills: 5 | Status: SHIPPED | OUTPATIENT
Start: 2019-05-17 | End: 2020-03-13 | Stop reason: CLARIF

## 2019-05-17 RX ORDER — FLUTICASONE PROPIONATE 50 MCG
1 SPRAY, SUSPENSION (ML) NASAL DAILY
Qty: 1 BOTTLE | Refills: 1 | Status: SHIPPED | OUTPATIENT
Start: 2019-05-17 | End: 2019-10-08 | Stop reason: ALTCHOICE

## 2019-05-20 DIAGNOSIS — E78.49 OTHER HYPERLIPIDEMIA: ICD-10-CM

## 2019-05-20 RX ORDER — ROSUVASTATIN CALCIUM 10 MG/1
10 TABLET, COATED ORAL DAILY
Qty: 90 TABLET | Refills: 1 | Status: SHIPPED | OUTPATIENT
Start: 2019-05-20 | End: 2019-11-18 | Stop reason: SDUPTHER

## 2019-06-10 ENCOUNTER — OFFICE VISIT (OUTPATIENT)
Dept: INTERNAL MEDICINE CLINIC | Facility: CLINIC | Age: 84
End: 2019-06-10
Payer: MEDICARE

## 2019-06-10 ENCOUNTER — APPOINTMENT (OUTPATIENT)
Dept: LAB | Facility: CLINIC | Age: 84
End: 2019-06-10
Payer: MEDICARE

## 2019-06-10 VITALS
HEART RATE: 74 BPM | BODY MASS INDEX: 23.55 KG/M2 | RESPIRATION RATE: 12 BRPM | TEMPERATURE: 98.1 F | WEIGHT: 112.2 LBS | SYSTOLIC BLOOD PRESSURE: 138 MMHG | HEIGHT: 58 IN | DIASTOLIC BLOOD PRESSURE: 76 MMHG

## 2019-06-10 DIAGNOSIS — I10 ESSENTIAL HYPERTENSION: ICD-10-CM

## 2019-06-10 DIAGNOSIS — R73.01 IMPAIRED FASTING GLUCOSE: ICD-10-CM

## 2019-06-10 DIAGNOSIS — E78.2 MIXED HYPERLIPIDEMIA: ICD-10-CM

## 2019-06-10 DIAGNOSIS — D50.9 IRON DEFICIENCY ANEMIA, UNSPECIFIED IRON DEFICIENCY ANEMIA TYPE: ICD-10-CM

## 2019-06-10 DIAGNOSIS — R10.13 EPIGASTRIC PAIN: Primary | ICD-10-CM

## 2019-06-10 LAB
ALBUMIN SERPL BCP-MCNC: 4 G/DL (ref 3.5–5)
ALP SERPL-CCNC: 83 U/L (ref 46–116)
ALT SERPL W P-5'-P-CCNC: 18 U/L (ref 12–78)
ANION GAP SERPL CALCULATED.3IONS-SCNC: 4 MMOL/L (ref 4–13)
AST SERPL W P-5'-P-CCNC: 22 U/L (ref 5–45)
BASOPHILS # BLD AUTO: 0.03 THOUSANDS/ΜL (ref 0–0.1)
BASOPHILS NFR BLD AUTO: 1 % (ref 0–1)
BILIRUB SERPL-MCNC: 0.37 MG/DL (ref 0.2–1)
BUN SERPL-MCNC: 12 MG/DL (ref 5–25)
CALCIUM SERPL-MCNC: 9.3 MG/DL (ref 8.3–10.1)
CHLORIDE SERPL-SCNC: 101 MMOL/L (ref 100–108)
CHOLEST SERPL-MCNC: 156 MG/DL (ref 50–200)
CO2 SERPL-SCNC: 26 MMOL/L (ref 21–32)
CREAT SERPL-MCNC: 1.39 MG/DL (ref 0.6–1.3)
EOSINOPHIL # BLD AUTO: 0.71 THOUSAND/ΜL (ref 0–0.61)
EOSINOPHIL NFR BLD AUTO: 16 % (ref 0–6)
ERYTHROCYTE [DISTWIDTH] IN BLOOD BY AUTOMATED COUNT: 13.4 % (ref 11.6–15.1)
EST. AVERAGE GLUCOSE BLD GHB EST-MCNC: 100 MG/DL
FERRITIN SERPL-MCNC: 49 NG/ML (ref 8–388)
GFR SERPL CREATININE-BSD FRML MDRD: 36 ML/MIN/1.73SQ M
GLUCOSE P FAST SERPL-MCNC: 91 MG/DL (ref 65–99)
HBA1C MFR BLD: 5.1 % (ref 4.2–6.3)
HCT VFR BLD AUTO: 33.3 % (ref 34.8–46.1)
HDLC SERPL-MCNC: 70 MG/DL (ref 40–60)
HGB BLD-MCNC: 11 G/DL (ref 11.5–15.4)
IMM GRANULOCYTES # BLD AUTO: 0.01 THOUSAND/UL (ref 0–0.2)
IMM GRANULOCYTES NFR BLD AUTO: 0 % (ref 0–2)
IRON SATN MFR SERPL: 16 %
IRON SERPL-MCNC: 52 UG/DL (ref 50–170)
LDLC SERPL CALC-MCNC: 62 MG/DL (ref 0–100)
LYMPHOCYTES # BLD AUTO: 1.19 THOUSANDS/ΜL (ref 0.6–4.47)
LYMPHOCYTES NFR BLD AUTO: 27 % (ref 14–44)
MCH RBC QN AUTO: 31 PG (ref 26.8–34.3)
MCHC RBC AUTO-ENTMCNC: 33 G/DL (ref 31.4–37.4)
MCV RBC AUTO: 94 FL (ref 82–98)
MONOCYTES # BLD AUTO: 0.37 THOUSAND/ΜL (ref 0.17–1.22)
MONOCYTES NFR BLD AUTO: 8 % (ref 4–12)
NEUTROPHILS # BLD AUTO: 2.11 THOUSANDS/ΜL (ref 1.85–7.62)
NEUTS SEG NFR BLD AUTO: 48 % (ref 43–75)
NONHDLC SERPL-MCNC: 86 MG/DL
NRBC BLD AUTO-RTO: 0 /100 WBCS
PLATELET # BLD AUTO: 194 THOUSANDS/UL (ref 149–390)
PMV BLD AUTO: 10.5 FL (ref 8.9–12.7)
POTASSIUM SERPL-SCNC: 4.5 MMOL/L (ref 3.5–5.3)
PROT SERPL-MCNC: 7.4 G/DL (ref 6.4–8.2)
RBC # BLD AUTO: 3.55 MILLION/UL (ref 3.81–5.12)
SODIUM SERPL-SCNC: 131 MMOL/L (ref 136–145)
TIBC SERPL-MCNC: 317 UG/DL (ref 250–450)
TRIGL SERPL-MCNC: 122 MG/DL
TSH SERPL DL<=0.05 MIU/L-ACNC: 0.53 UIU/ML (ref 0.36–3.74)
WBC # BLD AUTO: 4.42 THOUSAND/UL (ref 4.31–10.16)

## 2019-06-10 PROCEDURE — 36415 COLL VENOUS BLD VENIPUNCTURE: CPT

## 2019-06-10 PROCEDURE — 83540 ASSAY OF IRON: CPT

## 2019-06-10 PROCEDURE — 80053 COMPREHEN METABOLIC PANEL: CPT

## 2019-06-10 PROCEDURE — 82728 ASSAY OF FERRITIN: CPT

## 2019-06-10 PROCEDURE — 85025 COMPLETE CBC W/AUTO DIFF WBC: CPT

## 2019-06-10 PROCEDURE — 84443 ASSAY THYROID STIM HORMONE: CPT

## 2019-06-10 PROCEDURE — 83550 IRON BINDING TEST: CPT

## 2019-06-10 PROCEDURE — 80061 LIPID PANEL: CPT

## 2019-06-10 PROCEDURE — 83036 HEMOGLOBIN GLYCOSYLATED A1C: CPT

## 2019-06-10 PROCEDURE — 99213 OFFICE O/P EST LOW 20 MIN: CPT | Performed by: INTERNAL MEDICINE

## 2019-06-10 RX ORDER — ATENOLOL 50 MG/1
50 TABLET ORAL DAILY
Qty: 90 TABLET | Refills: 0 | Status: SHIPPED | OUTPATIENT
Start: 2019-06-10 | End: 2019-09-19 | Stop reason: SDUPTHER

## 2019-06-11 ENCOUNTER — TELEPHONE (OUTPATIENT)
Dept: INTERNAL MEDICINE CLINIC | Facility: CLINIC | Age: 84
End: 2019-06-11

## 2019-06-14 ENCOUNTER — TELEPHONE (OUTPATIENT)
Dept: OTHER | Facility: OTHER | Age: 84
End: 2019-06-14

## 2019-06-19 ENCOUNTER — OFFICE VISIT (OUTPATIENT)
Dept: INTERNAL MEDICINE CLINIC | Facility: CLINIC | Age: 84
End: 2019-06-19
Payer: MEDICARE

## 2019-06-19 VITALS
RESPIRATION RATE: 12 BRPM | HEIGHT: 58 IN | WEIGHT: 111 LBS | SYSTOLIC BLOOD PRESSURE: 128 MMHG | HEART RATE: 60 BPM | BODY MASS INDEX: 23.3 KG/M2 | DIASTOLIC BLOOD PRESSURE: 70 MMHG

## 2019-06-19 DIAGNOSIS — N18.30 STAGE 3 CHRONIC KIDNEY DISEASE (HCC): ICD-10-CM

## 2019-06-19 DIAGNOSIS — F41.9 ANXIETY: ICD-10-CM

## 2019-06-19 DIAGNOSIS — R73.01 IMPAIRED FASTING GLUCOSE: ICD-10-CM

## 2019-06-19 DIAGNOSIS — J45.20 MILD INTERMITTENT ASTHMA WITHOUT COMPLICATION: ICD-10-CM

## 2019-06-19 DIAGNOSIS — M81.0 AGE-RELATED OSTEOPOROSIS WITHOUT CURRENT PATHOLOGICAL FRACTURE: ICD-10-CM

## 2019-06-19 DIAGNOSIS — Q27.30 AVM (ARTERIOVENOUS MALFORMATION): ICD-10-CM

## 2019-06-19 DIAGNOSIS — D50.9 IRON DEFICIENCY ANEMIA, UNSPECIFIED IRON DEFICIENCY ANEMIA TYPE: ICD-10-CM

## 2019-06-19 DIAGNOSIS — E78.2 MIXED HYPERLIPIDEMIA: ICD-10-CM

## 2019-06-19 DIAGNOSIS — E03.9 ACQUIRED HYPOTHYROIDISM: ICD-10-CM

## 2019-06-19 DIAGNOSIS — K58.9 IRRITABLE BOWEL SYNDROME, UNSPECIFIED TYPE: Primary | ICD-10-CM

## 2019-06-19 DIAGNOSIS — I10 ESSENTIAL HYPERTENSION: ICD-10-CM

## 2019-06-19 PROCEDURE — 99215 OFFICE O/P EST HI 40 MIN: CPT | Performed by: INTERNAL MEDICINE

## 2019-06-19 RX ORDER — RISEDRONATE SODIUM 150 MG/1
150 TABLET, FILM COATED ORAL
Qty: 3 TABLET | Refills: 3 | Status: SHIPPED | OUTPATIENT
Start: 2019-06-19 | End: 2019-10-08 | Stop reason: ALTCHOICE

## 2019-06-19 RX ORDER — LISINOPRIL 10 MG/1
10 TABLET ORAL DAILY
Qty: 90 TABLET | Refills: 3 | Status: SHIPPED | OUTPATIENT
Start: 2019-06-19 | End: 2020-07-10 | Stop reason: SDUPTHER

## 2019-06-19 RX ORDER — LEVOTHYROXINE SODIUM 0.05 MG/1
50 TABLET ORAL DAILY
Qty: 90 TABLET | Refills: 3 | Status: SHIPPED | OUTPATIENT
Start: 2019-06-19 | End: 2020-06-11

## 2019-08-13 ENCOUNTER — APPOINTMENT (OUTPATIENT)
Dept: RADIOLOGY | Facility: CLINIC | Age: 84
End: 2019-08-13
Payer: MEDICARE

## 2019-08-13 ENCOUNTER — TELEPHONE (OUTPATIENT)
Dept: INTERNAL MEDICINE CLINIC | Facility: CLINIC | Age: 84
End: 2019-08-13

## 2019-08-13 ENCOUNTER — OFFICE VISIT (OUTPATIENT)
Dept: INTERNAL MEDICINE CLINIC | Facility: CLINIC | Age: 84
End: 2019-08-13
Payer: MEDICARE

## 2019-08-13 VITALS
SYSTOLIC BLOOD PRESSURE: 132 MMHG | DIASTOLIC BLOOD PRESSURE: 70 MMHG | WEIGHT: 108.4 LBS | RESPIRATION RATE: 14 BRPM | BODY MASS INDEX: 22.75 KG/M2 | TEMPERATURE: 97.5 F | HEART RATE: 68 BPM | HEIGHT: 58 IN

## 2019-08-13 DIAGNOSIS — R19.4 CHANGE IN BOWEL HABITS: Primary | ICD-10-CM

## 2019-08-13 DIAGNOSIS — R19.4 CHANGE IN BOWEL HABITS: ICD-10-CM

## 2019-08-13 PROCEDURE — 74022 RADEX COMPL AQT ABD SERIES: CPT

## 2019-08-13 PROCEDURE — 99213 OFFICE O/P EST LOW 20 MIN: CPT | Performed by: INTERNAL MEDICINE

## 2019-08-13 NOTE — PATIENT INSTRUCTIONS
Change in bowel habits with diarrhea alternating with constipation since taking 1 Actonel, I am not certain that this is related  May be related to irritable bowel syndrome  Must rule out fecal impaction due to iron use and advanced age  Will check abdominal flat Goddard to assess stool in rectum and make further recommendations based upon this  Notably lab workup from June reviewed and unremarkable including normal thyroid

## 2019-08-13 NOTE — TELEPHONE ENCOUNTER
Patient saw Dr Vargas today, stated that she forgot her instructions  When should she start taking the Miralax?     Patient call back # [phone]

## 2019-08-13 NOTE — PROGRESS NOTES
Assessment/Plan:    Diagnoses and all orders for this visit:    Change in bowel habits  -     XR abdomen obstruction series; Future         Patient Instructions   Change in bowel habits with diarrhea alternating with constipation since taking 1 Actonel, I am not certain that this is related  May be related to irritable bowel syndrome  Must rule out fecal impaction due to iron use and advanced age  Will check abdominal flat Goddard to assess stool in rectum and make further recommendations based upon this  Notably lab workup from June reviewed and unremarkable including normal thyroid  Subjective:      Patient ID: Simba Gordon is a 82 y o  female    Patient presents for evaluation of diarrhea alternating with constipation  She notes that her bowels had been moving fairly regularly with her combination of probiotic and fiber gum ease up until late June  She notes taking her 1st Actonel on June 23rd and several days after that developed upset stomach with abdominal cramping and diarrhea with fecal incontinence  Since that time she has had intermittent abdominal cramping followed by urgency and diarrhea where sometimes she will not make it to the toilet  Rarely has she had to sit in strain to have a small hard bowel movement  She does continue on oral iron daily  She has not had fevers, chills though she has had some weight loss  She has not had any foreign travel or recent antibiotics  She has had no melena or hematochezia and no mucus in the stool  She does not feel abdominal pain or bloating and her appetite is fairly stable  Last colonoscopy was January of 2017 notable for diverticulosis an AVMs          Current Outpatient Medications:     acetaminophen (TYLENOL) 325 mg tablet, Take 1-2 tablets by mouth every 6 (six) hours as needed, Disp: , Rfl:     ALPRAZolam (XANAX) 0 5 mg tablet, Take 0 5 mg by mouth daily as needed  , Disp: , Rfl:     atenolol (TENORMIN) 50 mg tablet, Take 1 tablet (50 mg total) by mouth daily, Disp: 90 tablet, Rfl: 0    Calcium Carbonate (CALTRATE 600) 1500 (600 Ca) MG TABS, 1 po bid, Disp: , Rfl: 0    ferrous sulfate 325 (65 Fe) mg tablet, Take 1 tablet by mouth daily, Disp: , Rfl:     imipramine (TOFRANIL) 10 mg tablet, Take 1 tablet (10 mg total) by mouth 4 (four) times a week, Disp: 90 tablet, Rfl: 3    levothyroxine 50 mcg tablet, Take 1 tablet (50 mcg total) by mouth daily, Disp: 90 tablet, Rfl: 3    lisinopril (ZESTRIL) 10 mg tablet, Take 1 tablet (10 mg total) by mouth daily, Disp: 90 tablet, Rfl: 3    Probiotic Product (ALIGN) 4 MG CAPS, Take 1 tablet by mouth daily, Disp: , Rfl:     rosuvastatin (CRESTOR) 10 MG tablet, Take 1 tablet (10 mg total) by mouth daily, Disp: 90 tablet, Rfl: 1    albuterol (PROVENTIL HFA,VENTOLIN HFA) 90 mcg/act inhaler, Inhale 2 puffs every 6 (six) hours as needed for wheezing or shortness of breath (Patient not taking: Reported on 8/13/2019), Disp: 1 Inhaler, Rfl: 5    fluticasone (FLONASE) 50 mcg/act nasal spray, 1 spray into each nostril daily (Patient not taking: Reported on 8/13/2019), Disp: 1 Bottle, Rfl: 1    risedronate (ACTONEL) 150 MG tablet, Take 1 tablet (150 mg total) by mouth every 30 (thirty) days with water on empty stomach, nothing by mouth or lie down for next 30 minutes  (Patient not taking: Reported on 8/13/2019), Disp: 3 tablet, Rfl: 3    No results found for this or any previous visit (from the past 1008 hour(s))  The following portions of the patient's history were reviewed and updated as appropriate: allergies, current medications, past family history, past medical history, past social history, past surgical history and problem list      Review of Systems   Constitutional: Negative for appetite change, chills, diaphoresis, fatigue, fever and unexpected weight change  HENT: Negative for congestion, hearing loss and rhinorrhea  Eyes: Negative for visual disturbance     Respiratory: Negative for cough, chest tightness, shortness of breath and wheezing  Cardiovascular: Negative for chest pain, palpitations and leg swelling  Gastrointestinal: Positive for constipation and diarrhea  Negative for abdominal pain and blood in stool  Endocrine: Negative for cold intolerance, heat intolerance, polydipsia and polyuria  Genitourinary: Negative for difficulty urinating, dysuria, frequency and urgency  Musculoskeletal: Negative for arthralgias and myalgias  Skin: Negative for rash  Neurological: Negative for dizziness, weakness, light-headedness and headaches  Hematological: Does not bruise/bleed easily  Psychiatric/Behavioral: Negative for dysphoric mood and sleep disturbance  Objective:      Vitals:    08/13/19 0928   BP: 132/70   Pulse: 68   Resp: 14   Temp: 97 5 °F (36 4 °C)          Physical Exam   Constitutional: She is oriented to person, place, and time  She appears well-developed and well-nourished  HENT:   Head: Normocephalic and atraumatic  Nose: Nose normal    Mouth/Throat: Oropharynx is clear and moist    Eyes: Pupils are equal, round, and reactive to light  Conjunctivae and EOM are normal  No scleral icterus  Neck: Normal range of motion  Neck supple  No JVD present  No tracheal deviation present  No thyromegaly present  Cardiovascular: Normal rate, regular rhythm and intact distal pulses  Exam reveals no gallop and no friction rub  No murmur heard  Pulmonary/Chest: Effort normal and breath sounds normal  No respiratory distress  She has no wheezes  She has no rales  Abdominal: Soft  Bowel sounds are normal  She exhibits no distension and no mass  There is no tenderness  There is no rebound and no guarding  No significant stool in the rectal vault, there were several very small firm nodules   Musculoskeletal: She exhibits no edema or tenderness  Lymphadenopathy:     She has no cervical adenopathy  Neurological: She is alert and oriented to person, place, and time   No cranial nerve deficit  Skin: Skin is warm and dry  No rash noted  No erythema  Psychiatric: She has a normal mood and affect   Her behavior is normal  Judgment and thought content normal

## 2019-08-22 ENCOUNTER — OFFICE VISIT (OUTPATIENT)
Dept: INTERNAL MEDICINE CLINIC | Facility: CLINIC | Age: 84
End: 2019-08-22
Payer: MEDICARE

## 2019-08-22 VITALS
SYSTOLIC BLOOD PRESSURE: 124 MMHG | HEIGHT: 58 IN | BODY MASS INDEX: 22.84 KG/M2 | WEIGHT: 108.8 LBS | HEART RATE: 60 BPM | RESPIRATION RATE: 12 BRPM | DIASTOLIC BLOOD PRESSURE: 60 MMHG

## 2019-08-22 DIAGNOSIS — R05.9 COUGH: ICD-10-CM

## 2019-08-22 DIAGNOSIS — R19.4 CHANGE IN BOWEL HABITS: Primary | ICD-10-CM

## 2019-08-22 PROCEDURE — 99213 OFFICE O/P EST LOW 20 MIN: CPT | Performed by: INTERNAL MEDICINE

## 2019-08-22 NOTE — PROGRESS NOTES
Assessment/Plan:    Diagnoses and all orders for this visit:    Change in bowel habits    Cough         Patient Instructions   Abdominal obstruction series reviewed with patient  There was large fecal burden present throughout the colon as well as some air-fluid levels in the right colon  Bowel function discussed as well as the pathophysiology of constipation and the utility of fiber as a bulking agent as well as MiraLax as an osmotic agent were discussed  I have recommended daily fiber with lots of water  Titrate MiraLax a half to a whole dose as needed to keep regular  Cough with normal lung exam may be related to allergic rhinitis, try Mucinex DM over-the-counter, consider over-the-counter antihistamine such as Claritin if you are having nasal congestion, postnasal drip, itchy eyes, sneezing  Also can consider nasal steroid if antihistamine alone is ineffective  Contact me for persistent cough, fever, shortness of breath  Routine follow-up after labs in December, sooner as needed  Subjective:      Patient ID: Saray Alexander is a 82 y o  female    Patient returns with daughter Leo Lee to follow-up alternating bowel movements  She states that 1 or 2 days after she was here last time her bowel situation completely normalized and she has not needed to use anything in order to induce a bowel movement  She has been fairly regular with no blood or mucus in the stool  She notes persistent cough perhaps related to postnasal drip          Current Outpatient Medications:     acetaminophen (TYLENOL) 325 mg tablet, Take 1-2 tablets by mouth every 6 (six) hours as needed, Disp: , Rfl:     ALPRAZolam (XANAX) 0 5 mg tablet, Take 0 5 mg by mouth daily as needed  , Disp: , Rfl:     atenolol (TENORMIN) 50 mg tablet, Take 1 tablet (50 mg total) by mouth daily, Disp: 90 tablet, Rfl: 0    Calcium Carbonate (CALTRATE 600) 1500 (600 Ca) MG TABS, 1 po bid, Disp: , Rfl: 0    ferrous sulfate 325 (65 Fe) mg tablet, Take 1 tablet by mouth daily, Disp: , Rfl:     imipramine (TOFRANIL) 10 mg tablet, Take 1 tablet (10 mg total) by mouth 4 (four) times a week, Disp: 90 tablet, Rfl: 3    levothyroxine 50 mcg tablet, Take 1 tablet (50 mcg total) by mouth daily, Disp: 90 tablet, Rfl: 3    lisinopril (ZESTRIL) 10 mg tablet, Take 1 tablet (10 mg total) by mouth daily, Disp: 90 tablet, Rfl: 3    Probiotic Product (ALIGN) 4 MG CAPS, Take 1 tablet by mouth daily, Disp: , Rfl:     rosuvastatin (CRESTOR) 10 MG tablet, Take 1 tablet (10 mg total) by mouth daily, Disp: 90 tablet, Rfl: 1    albuterol (PROVENTIL HFA,VENTOLIN HFA) 90 mcg/act inhaler, Inhale 2 puffs every 6 (six) hours as needed for wheezing or shortness of breath (Patient not taking: Reported on 8/13/2019), Disp: 1 Inhaler, Rfl: 5    fluticasone (FLONASE) 50 mcg/act nasal spray, 1 spray into each nostril daily (Patient not taking: Reported on 8/13/2019), Disp: 1 Bottle, Rfl: 1    risedronate (ACTONEL) 150 MG tablet, Take 1 tablet (150 mg total) by mouth every 30 (thirty) days with water on empty stomach, nothing by mouth or lie down for next 30 minutes  (Patient not taking: Reported on 8/13/2019), Disp: 3 tablet, Rfl: 3    No results found for this or any previous visit (from the past 1008 hour(s))  The following portions of the patient's history were reviewed and updated as appropriate: allergies, current medications, past family history, past medical history, past social history, past surgical history and problem list      Review of Systems   Constitutional: Negative for appetite change, chills, diaphoresis, fatigue, fever and unexpected weight change  HENT: Negative for congestion, hearing loss and rhinorrhea  Eyes: Negative for visual disturbance  Respiratory: Negative for cough, chest tightness, shortness of breath and wheezing  Cardiovascular: Negative for chest pain, palpitations and leg swelling     Gastrointestinal: Negative for abdominal pain and blood in stool  Endocrine: Negative for cold intolerance, heat intolerance, polydipsia and polyuria  Genitourinary: Negative for difficulty urinating, dysuria, frequency and urgency  Musculoskeletal: Negative for arthralgias and myalgias  Skin: Negative for rash  Neurological: Negative for dizziness, weakness, light-headedness and headaches  Hematological: Does not bruise/bleed easily  Psychiatric/Behavioral: Negative for dysphoric mood and sleep disturbance  Objective:      Vitals:    08/22/19 1028   BP: 124/60   Pulse: 60   Resp: 12          Physical Exam   Constitutional: She appears well-developed and well-nourished  Cardiovascular: Normal rate and regular rhythm  Exam reveals no gallop and no friction rub  No murmur heard    Pulmonary/Chest: Effort normal and breath sounds normal

## 2019-09-19 DIAGNOSIS — I10 ESSENTIAL HYPERTENSION: ICD-10-CM

## 2019-09-19 RX ORDER — ATENOLOL 50 MG/1
50 TABLET ORAL DAILY
Qty: 90 TABLET | Refills: 0 | Status: SHIPPED | OUTPATIENT
Start: 2019-09-19 | End: 2019-10-01

## 2019-10-01 ENCOUNTER — OFFICE VISIT (OUTPATIENT)
Dept: INTERNAL MEDICINE CLINIC | Facility: CLINIC | Age: 84
End: 2019-10-01
Payer: MEDICARE

## 2019-10-01 VITALS
OXYGEN SATURATION: 97 % | DIASTOLIC BLOOD PRESSURE: 76 MMHG | SYSTOLIC BLOOD PRESSURE: 168 MMHG | HEART RATE: 65 BPM | HEIGHT: 58 IN | BODY MASS INDEX: 23.26 KG/M2 | WEIGHT: 110.8 LBS

## 2019-10-01 DIAGNOSIS — R10.84 GENERALIZED ABDOMINAL PAIN: ICD-10-CM

## 2019-10-01 DIAGNOSIS — Z23 ENCOUNTER FOR IMMUNIZATION: Primary | ICD-10-CM

## 2019-10-01 DIAGNOSIS — F41.9 ANXIETY: ICD-10-CM

## 2019-10-01 DIAGNOSIS — I10 ESSENTIAL HYPERTENSION: ICD-10-CM

## 2019-10-01 PROCEDURE — 99214 OFFICE O/P EST MOD 30 MIN: CPT | Performed by: INTERNAL MEDICINE

## 2019-10-01 PROCEDURE — G0008 ADMIN INFLUENZA VIRUS VAC: HCPCS | Performed by: INTERNAL MEDICINE

## 2019-10-01 PROCEDURE — 93000 ELECTROCARDIOGRAM COMPLETE: CPT | Performed by: INTERNAL MEDICINE

## 2019-10-01 PROCEDURE — 90662 IIV NO PRSV INCREASED AG IM: CPT | Performed by: INTERNAL MEDICINE

## 2019-10-01 RX ORDER — ATENOLOL 50 MG/1
50 TABLET ORAL 2 TIMES DAILY
Qty: 180 TABLET | Refills: 3 | Status: SHIPPED | OUTPATIENT
Start: 2019-10-01 | End: 2020-11-13 | Stop reason: SDUPTHER

## 2019-10-01 NOTE — PROGRESS NOTES
Assessment/Plan:    Diagnoses and all orders for this visit:    Encounter for immunization  -     influenza vaccine, [id number], high-dose, PF 0 5 mL (FLUZONE HIGH-DOSE)    Essential hypertension  -     POCT ECG  -     atenolol (TENORMIN) 50 mg tablet; Take 1 tablet (50 mg total) by mouth 2 (two) times a day    Generalized abdominal pain    Anxiety         Patient Instructions    Accelerated hypertension with history of anxiety-increase atenolol to 50 mg twice daily, continue lisinopril 10 mg daily, follow home blood pressures and follow up here in 1 week  EKG performed in the office normal sinus rhythm with  axis deviation, no ischemic change  Abdominal pain -resolved this is likely related to cream based sauce with gastrointestinal distress, no further workup at this time, avoid creamy and rich foods  Subjective:      Patient ID: Darrion Moise is a 82 y o  female     Patient presents with her granddaughter today for evaluation of hypertension  She states that last week she was at her son's house and she felt funny in the head and she took her blood pressure and was 141 systolic, she has not taken her blood pressure since then   3 days ago she when out to dinner in [place] and had chicken with a cream sauce and woke up at 3:00 a m  In the morning with intense mid ache epigastric pain which resolved after she drank some mineral water but then she had explosive diarrhea with fecal incontinence about 2 hours later and subsequently she has not had any more abdominal pain  She denies any fevers, chills, nausea, vomiting, melena or hematochezia  She is not having any chest pain but does note stable exertion related dyspnea          Current Outpatient Medications:     acetaminophen (TYLENOL) 325 mg tablet, Take 1-2 tablets by mouth every 6 (six) hours as needed, Disp: , Rfl:     ALPRAZolam (XANAX) 0 5 mg tablet, Take 0 5 mg by mouth daily as needed  , Disp: , Rfl:     atenolol (TENORMIN) 50 mg tablet, Take 1 tablet (50 mg total) by mouth 2 (two) times a day, Disp: 180 tablet, Rfl: 3    ferrous sulfate 325 (65 Fe) mg tablet, Take 1 tablet by mouth daily, Disp: , Rfl:     imipramine (TOFRANIL) 10 mg tablet, Take 1 tablet (10 mg total) by mouth 4 (four) times a week, Disp: 90 tablet, Rfl: 3    levothyroxine 50 mcg tablet, Take 1 tablet (50 mcg total) by mouth daily, Disp: 90 tablet, Rfl: 3    lisinopril (ZESTRIL) 10 mg tablet, Take 1 tablet (10 mg total) by mouth daily, Disp: 90 tablet, Rfl: 3    Probiotic Product (ALIGN) 4 MG CAPS, Take 1 tablet by mouth daily, Disp: , Rfl:     rosuvastatin (CRESTOR) 10 MG tablet, Take 1 tablet (10 mg total) by mouth daily, Disp: 90 tablet, Rfl: 1    albuterol (PROVENTIL HFA,VENTOLIN HFA) 90 mcg/act inhaler, Inhale 2 puffs every 6 (six) hours as needed for wheezing or shortness of breath (Patient not taking: Reported on 8/13/2019), Disp: 1 Inhaler, Rfl: 5    Calcium Carbonate (CALTRATE 600) 1500 (600 Ca) MG TABS, 1 po bid (Patient not taking: Reported on 10/1/2019), Disp: , Rfl: 0    fluticasone (FLONASE) 50 mcg/act nasal spray, 1 spray into each nostril daily (Patient not taking: Reported on 8/13/2019), Disp: 1 Bottle, Rfl: 1    risedronate (ACTONEL) 150 MG tablet, Take 1 tablet (150 mg total) by mouth every 30 (thirty) days with water on empty stomach, nothing by mouth or lie down for next 30 minutes  (Patient not taking: Reported on 8/13/2019), Disp: 3 tablet, Rfl: 3    No results found for this or any previous visit (from the past 1008 hour(s))  The following portions of the patient's history were reviewed and updated as appropriate: allergies, current medications, past family history, past medical history, past social history, past surgical history and problem list      Review of Systems   Constitutional: Negative for appetite change, chills, diaphoresis, fatigue, fever and unexpected weight change  HENT: Negative for congestion, hearing loss and rhinorrhea      Eyes: Negative for visual disturbance  Respiratory: Positive for shortness of breath  Negative for cough, chest tightness and wheezing  Cardiovascular: Negative for chest pain, palpitations and leg swelling  Gastrointestinal: Positive for abdominal pain  Negative for blood in stool  Endocrine: Negative for cold intolerance, heat intolerance, polydipsia and polyuria  Genitourinary: Negative for difficulty urinating, dysuria, frequency and urgency  Musculoskeletal: Negative for arthralgias and myalgias  Skin: Negative for rash  Neurological: Negative for dizziness, weakness, light-headedness and headaches  Hematological: Does not bruise/bleed easily  Psychiatric/Behavioral: Negative for dysphoric mood and sleep disturbance  Objective:      Vitals:    10/01/19 1635   BP: 168/76   Pulse:    SpO2:           Physical Exam   Constitutional: She is oriented to person, place, and time  She appears well-developed and well-nourished  HENT:   Head: Normocephalic and atraumatic  Nose: Nose normal    Mouth/Throat: Oropharynx is clear and moist    Eyes: Pupils are equal, round, and reactive to light  Conjunctivae and EOM are normal  No scleral icterus  Neck: Normal range of motion  Neck supple  No JVD present  No tracheal deviation present  No thyromegaly present  Cardiovascular: Normal rate, regular rhythm and intact distal pulses  Exam reveals no gallop and no friction rub  No murmur heard  Pulmonary/Chest: Effort normal and breath sounds normal  No respiratory distress  She has no wheezes  She has no rales  Abdominal: Soft  Bowel sounds are normal  She exhibits no distension and no mass  There is no tenderness  There is no rebound and no guarding  Musculoskeletal: She exhibits no edema or tenderness  Lymphadenopathy:     She has no cervical adenopathy  Neurological: She is alert and oriented to person, place, and time  No cranial nerve deficit  Skin: Skin is warm and dry   No rash noted  No erythema  Psychiatric: She has a normal mood and affect   Her behavior is normal  Judgment and thought content normal

## 2019-10-01 NOTE — PATIENT INSTRUCTIONS
Accelerated hypertension with history of anxiety-increase atenolol to 50 mg twice daily, continue lisinopril 10 mg daily, follow home blood pressures and follow up here in 1 week  EKG performed in the office normal sinus rhythm with  axis deviation, no ischemic change  Abdominal pain -resolved this is likely related to cream based sauce with gastrointestinal distress, no further workup at this time, avoid creamy and rich foods

## 2019-10-08 ENCOUNTER — OFFICE VISIT (OUTPATIENT)
Dept: INTERNAL MEDICINE CLINIC | Facility: CLINIC | Age: 84
End: 2019-10-08
Payer: MEDICARE

## 2019-10-08 VITALS
WEIGHT: 113.2 LBS | DIASTOLIC BLOOD PRESSURE: 78 MMHG | RESPIRATION RATE: 14 BRPM | HEART RATE: 72 BPM | BODY MASS INDEX: 23.76 KG/M2 | HEIGHT: 58 IN | SYSTOLIC BLOOD PRESSURE: 138 MMHG

## 2019-10-08 DIAGNOSIS — I10 ESSENTIAL HYPERTENSION: Primary | ICD-10-CM

## 2019-10-08 PROCEDURE — 99213 OFFICE O/P EST LOW 20 MIN: CPT | Performed by: NURSE PRACTITIONER

## 2019-10-08 NOTE — PATIENT INSTRUCTIONS
Hypertension    Now at goal with increased dose of atenolol continue lisinopril follow back in  December for routine visit

## 2019-10-08 NOTE — PROGRESS NOTES
Assessment/Plan:    Patient Instructions    Hypertension  Now at goal with increased dose of atenolol continue lisinopril follow back in  December for routine visit         Diagnoses and all orders for this visit:    Essential hypertension         Subjective:      Patient ID: Lolis Palomo is a 82 y o  female     Patient was noted to have elevated blood pressure, followed up with primary care to increase atenolol to 50 milligrams twice a day continued on lisinopril, home blood pressures running anywhere from 60/99 systolic to 214 diastolic 89X to 84E        Current Outpatient Medications:     acetaminophen (TYLENOL) 325 mg tablet, Take 1-2 tablets by mouth every 6 (six) hours as needed, Disp: , Rfl:     albuterol (PROVENTIL HFA,VENTOLIN HFA) 90 mcg/act inhaler, Inhale 2 puffs every 6 (six) hours as needed for wheezing or shortness of breath, Disp: 1 Inhaler, Rfl: 5    ALPRAZolam (XANAX) 0 5 mg tablet, Take 0 5 mg by mouth daily as needed  , Disp: , Rfl:     atenolol (TENORMIN) 50 mg tablet, Take 1 tablet (50 mg total) by mouth 2 (two) times a day, Disp: 180 tablet, Rfl: 3    Calcium Carbonate (CALTRATE 600) 1500 (600 Ca) MG TABS, 1 po bid, Disp: , Rfl: 0    ferrous sulfate 325 (65 Fe) mg tablet, Take 1 tablet by mouth daily, Disp: , Rfl:     imipramine (TOFRANIL) 10 mg tablet, Take 1 tablet (10 mg total) by mouth 4 (four) times a week, Disp: 90 tablet, Rfl: 3    levothyroxine 50 mcg tablet, Take 1 tablet (50 mcg total) by mouth daily, Disp: 90 tablet, Rfl: 3    lisinopril (ZESTRIL) 10 mg tablet, Take 1 tablet (10 mg total) by mouth daily, Disp: 90 tablet, Rfl: 3    Probiotic Product (ALIGN) 4 MG CAPS, Take 1 tablet by mouth daily, Disp: , Rfl:     rosuvastatin (CRESTOR) 10 MG tablet, Take 1 tablet (10 mg total) by mouth daily, Disp: 90 tablet, Rfl: 1    No results found for this or any previous visit (from the past 1008 hour(s))      The following portions of the patient's history were reviewed and updated as appropriate: allergies, current medications, past family history, past medical history, past social history, past surgical history and problem list      Review of Systems   Constitutional: Negative for appetite change, chills, diaphoresis, fatigue, fever and unexpected weight change  HENT: Negative for postnasal drip and sneezing  Eyes: Negative for visual disturbance  Respiratory: Negative for chest tightness and shortness of breath  Cardiovascular: Negative for chest pain, palpitations and leg swelling  Gastrointestinal: Negative for abdominal pain and blood in stool  Endocrine: Negative for cold intolerance, heat intolerance, polydipsia, polyphagia and polyuria  Genitourinary: Negative for difficulty urinating, dysuria, frequency and urgency  Musculoskeletal: Negative for arthralgias and myalgias  Skin: Negative for rash and wound  Neurological: Negative for dizziness, weakness, light-headedness and headaches  Hematological: Negative for adenopathy  Psychiatric/Behavioral: Negative for confusion, dysphoric mood and sleep disturbance  The patient is not nervous/anxious  Objective:      /78 (BP Location: Left arm, Patient Position: Sitting, Cuff Size: Standard)   Pulse 72   Resp 14   Ht 4' 10" (1 473 m)   Wt 51 3 kg (113 lb 3 2 oz)   BMI 23 66 kg/m²        Physical Exam   Constitutional: She is oriented to person, place, and time  She appears well-developed  No distress  HENT:   Head: Normocephalic and atraumatic  Nose: Nose normal    Mouth/Throat: Oropharynx is clear and moist    Eyes: Pupils are equal, round, and reactive to light  Conjunctivae and EOM are normal    Neck: Normal range of motion  Neck supple  No JVD present  No tracheal deviation present  No thyromegaly present  Cardiovascular: Normal rate, regular rhythm, normal heart sounds and intact distal pulses  Exam reveals no gallop and no friction rub  No murmur heard    Pulmonary/Chest: Effort normal and breath sounds normal  No respiratory distress  She has no wheezes  She has no rales  Abdominal: Soft  Bowel sounds are normal  She exhibits no distension  There is no tenderness  Musculoskeletal: Normal range of motion  She exhibits no edema  Lymphadenopathy:     She has no cervical adenopathy  Neurological: She is alert and oriented to person, place, and time  No cranial nerve deficit  Skin: Skin is warm and dry  No rash noted  She is not diaphoretic  Psychiatric: She has a normal mood and affect   Her behavior is normal  Judgment and thought content normal

## 2019-10-16 ENCOUNTER — APPOINTMENT (INPATIENT)
Dept: MRI IMAGING | Facility: HOSPITAL | Age: 84
DRG: 552 | End: 2019-10-16
Payer: MEDICARE

## 2019-10-16 ENCOUNTER — HOSPITAL ENCOUNTER (INPATIENT)
Facility: HOSPITAL | Age: 84
LOS: 2 days | Discharge: HOME HEALTH | DRG: 552 | End: 2019-10-18
Attending: EMERGENCY MEDICINE | Admitting: INTERNAL MEDICINE
Payer: MEDICARE

## 2019-10-16 ENCOUNTER — APPOINTMENT (EMERGENCY)
Dept: CT IMAGING | Facility: HOSPITAL | Age: 84
DRG: 552 | End: 2019-10-16
Payer: MEDICARE

## 2019-10-16 ENCOUNTER — APPOINTMENT (EMERGENCY)
Dept: RADIOLOGY | Facility: HOSPITAL | Age: 84
DRG: 552 | End: 2019-10-16
Payer: MEDICARE

## 2019-10-16 ENCOUNTER — APPOINTMENT (INPATIENT)
Dept: RADIOLOGY | Facility: HOSPITAL | Age: 84
DRG: 552 | End: 2019-10-16
Payer: MEDICARE

## 2019-10-16 DIAGNOSIS — S22.089A: Primary | ICD-10-CM

## 2019-10-16 DIAGNOSIS — S22.078D OTHER CLOSED FRACTURE OF TENTH THORACIC VERTEBRA WITH ROUTINE HEALING, SUBSEQUENT ENCOUNTER: ICD-10-CM

## 2019-10-16 DIAGNOSIS — S62.653A NONDISPLACED FRACTURE OF MIDDLE PHALANX OF LEFT MIDDLE FINGER, INITIAL ENCOUNTER FOR CLOSED FRACTURE: ICD-10-CM

## 2019-10-16 PROBLEM — S62.92XD CLOSED FRACTURE OF LEFT HAND WITH ROUTINE HEALING: Status: ACTIVE | Noted: 2019-10-16
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
## 2019-10-16 PROBLEM — S22.079D: Status: ACTIVE | Noted: 2019-10-16
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
## 2019-10-16 LAB
ALBUMIN SERPL BCP-MCNC: 3.7 G/DL (ref 3.5–5)
ALP SERPL-CCNC: 92 U/L (ref 46–116)
ALT SERPL W P-5'-P-CCNC: 24 U/L (ref 12–78)
ANION GAP SERPL CALCULATED.3IONS-SCNC: 9 MMOL/L (ref 4–13)
AST SERPL W P-5'-P-CCNC: 34 U/L (ref 5–45)
BASOPHILS # BLD AUTO: 0.02 THOUSANDS/ΜL (ref 0–0.1)
BASOPHILS NFR BLD AUTO: 0 % (ref 0–1)
BILIRUB SERPL-MCNC: 0.4 MG/DL (ref 0.2–1)
BUN SERPL-MCNC: 19 MG/DL (ref 5–25)
CALCIUM SERPL-MCNC: 9.3 MG/DL (ref 8.3–10.1)
CHLORIDE SERPL-SCNC: 103 MMOL/L (ref 100–108)
CO2 SERPL-SCNC: 27 MMOL/L (ref 21–32)
CREAT SERPL-MCNC: 1.39 MG/DL (ref 0.6–1.3)
EOSINOPHIL # BLD AUTO: 0.64 THOUSAND/ΜL (ref 0–0.61)
EOSINOPHIL NFR BLD AUTO: 10 % (ref 0–6)
ERYTHROCYTE [DISTWIDTH] IN BLOOD BY AUTOMATED COUNT: 12.9 % (ref 11.6–15.1)
GFR SERPL CREATININE-BSD FRML MDRD: 36 ML/MIN/1.73SQ M
GLUCOSE SERPL-MCNC: 103 MG/DL (ref 65–140)
HCT VFR BLD AUTO: 34.8 % (ref 34.8–46.1)
HGB BLD-MCNC: 11.7 G/DL (ref 11.5–15.4)
IMM GRANULOCYTES # BLD AUTO: 0.05 THOUSAND/UL (ref 0–0.2)
IMM GRANULOCYTES NFR BLD AUTO: 1 % (ref 0–2)
INR PPP: 0.99 (ref 0.84–1.19)
LYMPHOCYTES # BLD AUTO: 1.34 THOUSANDS/ΜL (ref 0.6–4.47)
LYMPHOCYTES NFR BLD AUTO: 22 % (ref 14–44)
MCH RBC QN AUTO: 31.6 PG (ref 26.8–34.3)
MCHC RBC AUTO-ENTMCNC: 33.6 G/DL (ref 31.4–37.4)
MCV RBC AUTO: 94 FL (ref 82–98)
MONOCYTES # BLD AUTO: 0.5 THOUSAND/ΜL (ref 0.17–1.22)
MONOCYTES NFR BLD AUTO: 8 % (ref 4–12)
NEUTROPHILS # BLD AUTO: 3.62 THOUSANDS/ΜL (ref 1.85–7.62)
NEUTS SEG NFR BLD AUTO: 59 % (ref 43–75)
NRBC BLD AUTO-RTO: 0 /100 WBCS
PLATELET # BLD AUTO: 172 THOUSANDS/UL (ref 149–390)
PLATELET # BLD AUTO: 179 THOUSANDS/UL (ref 149–390)
PMV BLD AUTO: 9.8 FL (ref 8.9–12.7)
PMV BLD AUTO: 9.8 FL (ref 8.9–12.7)
POTASSIUM SERPL-SCNC: 4.1 MMOL/L (ref 3.5–5.3)
PROT SERPL-MCNC: 7.7 G/DL (ref 6.4–8.2)
PROTHROMBIN TIME: 13.1 SECONDS (ref 11.6–14.5)
RBC # BLD AUTO: 3.7 MILLION/UL (ref 3.81–5.12)
SODIUM SERPL-SCNC: 139 MMOL/L (ref 136–145)
WBC # BLD AUTO: 6.17 THOUSAND/UL (ref 4.31–10.16)

## 2019-10-16 PROCEDURE — 70450 CT HEAD/BRAIN W/O DYE: CPT

## 2019-10-16 PROCEDURE — 1123F ACP DISCUSS/DSCN MKR DOCD: CPT | Performed by: ORTHOPAEDIC SURGERY

## 2019-10-16 PROCEDURE — 2W3KX1Z IMMOBILIZATION OF LEFT FINGER USING SPLINT: ICD-10-PCS | Performed by: EMERGENCY MEDICINE

## 2019-10-16 PROCEDURE — 72146 MRI CHEST SPINE W/O DYE: CPT

## 2019-10-16 PROCEDURE — 85025 COMPLETE CBC W/AUTO DIFF WBC: CPT | Performed by: EMERGENCY MEDICINE

## 2019-10-16 PROCEDURE — 73140 X-RAY EXAM OF FINGER(S): CPT

## 2019-10-16 PROCEDURE — 74177 CT ABD & PELVIS W/CONTRAST: CPT

## 2019-10-16 PROCEDURE — 36415 COLL VENOUS BLD VENIPUNCTURE: CPT | Performed by: EMERGENCY MEDICINE

## 2019-10-16 PROCEDURE — 99223 1ST HOSP IP/OBS HIGH 75: CPT | Performed by: FAMILY MEDICINE

## 2019-10-16 PROCEDURE — 71260 CT THORAX DX C+: CPT

## 2019-10-16 PROCEDURE — 85049 AUTOMATED PLATELET COUNT: CPT | Performed by: FAMILY MEDICINE

## 2019-10-16 PROCEDURE — 99285 EMERGENCY DEPT VISIT HI MDM: CPT

## 2019-10-16 PROCEDURE — 85610 PROTHROMBIN TIME: CPT | Performed by: EMERGENCY MEDICINE

## 2019-10-16 PROCEDURE — 72125 CT NECK SPINE W/O DYE: CPT

## 2019-10-16 PROCEDURE — 71110 X-RAY EXAM RIBS BIL 3 VIEWS: CPT

## 2019-10-16 PROCEDURE — 99285 EMERGENCY DEPT VISIT HI MDM: CPT | Performed by: EMERGENCY MEDICINE

## 2019-10-16 PROCEDURE — 80053 COMPREHEN METABOLIC PANEL: CPT | Performed by: EMERGENCY MEDICINE

## 2019-10-16 RX ORDER — ALPRAZOLAM 0.5 MG/1
0.5 TABLET ORAL
Status: DISCONTINUED | OUTPATIENT
Start: 2019-10-16 | End: 2019-10-18 | Stop reason: HOSPADM

## 2019-10-16 RX ORDER — B-COMPLEX WITH VITAMIN C
1 TABLET ORAL
Status: DISCONTINUED | OUTPATIENT
Start: 2019-10-17 | End: 2019-10-18 | Stop reason: HOSPADM

## 2019-10-16 RX ORDER — LEVOTHYROXINE SODIUM 0.05 MG/1
50 TABLET ORAL DAILY
Status: DISCONTINUED | OUTPATIENT
Start: 2019-10-17 | End: 2019-10-18 | Stop reason: HOSPADM

## 2019-10-16 RX ORDER — ALBUTEROL SULFATE 90 UG/1
2 AEROSOL, METERED RESPIRATORY (INHALATION) EVERY 6 HOURS PRN
Status: DISCONTINUED | OUTPATIENT
Start: 2019-10-16 | End: 2019-10-18 | Stop reason: HOSPADM

## 2019-10-16 RX ORDER — FERROUS SULFATE 325(65) MG
325 TABLET ORAL DAILY
Status: DISCONTINUED | OUTPATIENT
Start: 2019-10-17 | End: 2019-10-18 | Stop reason: HOSPADM

## 2019-10-16 RX ORDER — LISINOPRIL 10 MG/1
10 TABLET ORAL DAILY
Status: DISCONTINUED | OUTPATIENT
Start: 2019-10-17 | End: 2019-10-18 | Stop reason: HOSPADM

## 2019-10-16 RX ORDER — HEPARIN SODIUM 5000 [USP'U]/ML
5000 INJECTION, SOLUTION INTRAVENOUS; SUBCUTANEOUS EVERY 8 HOURS SCHEDULED
Status: DISCONTINUED | OUTPATIENT
Start: 2019-10-16 | End: 2019-10-18 | Stop reason: HOSPADM

## 2019-10-16 RX ORDER — ACETAMINOPHEN 325 MG/1
650 TABLET ORAL EVERY 6 HOURS PRN
Status: DISCONTINUED | OUTPATIENT
Start: 2019-10-16 | End: 2019-10-17

## 2019-10-16 RX ORDER — SACCHAROMYCES BOULARDII 250 MG
250 CAPSULE ORAL 2 TIMES DAILY
Status: DISCONTINUED | OUTPATIENT
Start: 2019-10-16 | End: 2019-10-18 | Stop reason: HOSPADM

## 2019-10-16 RX ORDER — ATORVASTATIN CALCIUM 40 MG/1
40 TABLET, FILM COATED ORAL
Status: DISCONTINUED | OUTPATIENT
Start: 2019-10-16 | End: 2019-10-18 | Stop reason: HOSPADM

## 2019-10-16 RX ORDER — ATENOLOL 50 MG/1
50 TABLET ORAL 2 TIMES DAILY
Status: DISCONTINUED | OUTPATIENT
Start: 2019-10-16 | End: 2019-10-18 | Stop reason: HOSPADM

## 2019-10-16 RX ORDER — IMIPRAMINE HYDROCHLORIDE 10 MG/1
10 TABLET, FILM COATED ORAL
Status: DISCONTINUED | OUTPATIENT
Start: 2019-10-17 | End: 2019-10-18 | Stop reason: HOSPADM

## 2019-10-16 RX ADMIN — ATENOLOL 50 MG: 50 TABLET ORAL at 20:04

## 2019-10-16 RX ADMIN — ATORVASTATIN CALCIUM 40 MG: 40 TABLET, FILM COATED ORAL at 20:03

## 2019-10-16 RX ADMIN — IOHEXOL 100 ML: 350 INJECTION, SOLUTION INTRAVENOUS at 15:24

## 2019-10-16 RX ADMIN — Medication 250 MG: at 20:04

## 2019-10-16 RX ADMIN — HEPARIN SODIUM 5000 UNITS: 5000 INJECTION INTRAVENOUS; SUBCUTANEOUS at 22:51

## 2019-10-16 RX ADMIN — MORPHINE SULFATE 2 MG: 2 INJECTION, SOLUTION INTRAMUSCULAR; INTRAVENOUS at 20:06

## 2019-10-16 NOTE — ED PROVIDER NOTES
H&P Exam - Trauma   Jah Vernon 82 y o  female MRN: [id number]  Unit/Bed#: /-01 Encounter: [id number]    Assessment/Plan   Trauma Alert: Trauma Acuity: Trauma Evaluation  Model of Arrival: Mode of Arrival: BLS via    Trauma Team: Current Providers  Attending Provider: Yosvany Vega DO  Attending Provider: Ree Mckeon MD  Attending Provider: Corey Charles DO  ED Technician: Tania Hudson  Registered Nurse: Alba Starr RN  ED Technician: [facility]  ED Technician: Rupert Nguyen  Registered Nurse: Petrona Bartholomew RN  Consulting Physician: Eli Fabian PA-C  Registered Nurse: Samantha Erwin RN  ED Technician: Eli Molina  Registered Nurse: Parth Garza RN  Physical Therapist: June Zelaya PT  ED Technician: [facility]  Unit Clerk: [address]  Registered Nurse: Mireille Caro RN  Patient Care Assistant: Alisa Landau  Unit Clerk: Elberta Courts  Respiratory Therapist: RT Indra  Registered Nurse: Kamille Laureano RN  Patient Care Assistant: Eli Fabian  Consultants: None    Trauma Active Problems:  Trip and fall in bathtub    Trauma Plan: CT head and neck chest abdomen and pelvis    Chief Complaint:   Chief Complaint   Patient presents with    Fall     pt slipped in bath tub, landing on R ribs, + head strike, no LOC  c/o R rib pain, L middle finger pain  denies blood thinners, denies headache at this time  pt ambulatory on EMS arrival        History of Present Illness   HPI:  Jah Vernon is a 82 y o  female who presents with injuries from a slip and fall in a bathtub    Mechanism:           HPI  Review of Systems    Historical Information     Immunizations:   Immunization History   Administered Date(s) Administered    INFLUENZA 11/09/2007, 09/01/2015, 10/09/2017    Influenza Split High Dose Preservative Free IM 10/09/2017    Influenza TIV (IM) 10/25/2010, 09/01/2015    Influenza, high dose seasonal 0 5 mL 12/11/2018, 10/01/2019    Pneumococcal Conjugate 13-Valent 2015, 2016    Pneumococcal Polysaccharide PPV23 10/02/2003    Tdap 2015    Zoster 1937       Past Medical History:   Diagnosis Date    Benign essential hypertension     Last assessed: 14    Disease of thyroid gland     Fibromyalgia     GERD (gastroesophageal reflux disease)     History of nail disorders     Hyperlipidemia     Hypertension     Palpitations     Parkinson's disease ([address] )     Transient cerebral ischemia        Family History   Problem Relation Age of Onset    Stroke Mother         ischemic stroke    Hypertension Mother     Heart disease Father      Past Surgical History:   Procedure Laterality Date    APPENDECTOMY      HYSTERECTOMY  2010    MT LAP,CHOLECYSTECTOMY/GRAPH N/A 2018    Procedure: LAPAROSCOPIC CHOLECYSTECTOMY WITH IOC;  Surgeon: Ludwin Harvey MD;  Location: [facility];  Service: General    TUBAL LIGATION         Social History     Socioeconomic History    Marital status:       Spouse name: None    Number of children: None    Years of education: None    Highest education level: None   Occupational History    Occupation: retired    Social Needs    Financial resource strain: None    Food insecurity:     Worry: None     Inability: None    Transportation needs:     Medical: None     Non-medical: None   Tobacco Use    Smoking status: Former Smoker     Packs/day: 0 10     Years: 50 00     Pack years: 5 00     Types: Cigarettes     Last attempt to quit: 2013     Years since quittin 7    Smokeless tobacco: Never Used    Tobacco comment: 1 pack a week    Substance and Sexual Activity    Alcohol use: Never     Alcohol/week: 0 0 standard drinks     Frequency: Never     Drinks per session: Patient refused     Binge frequency: Never     Comment: 0    Drug use: No    Sexual activity: Not Currently   Lifestyle    Physical activity:     Days per week: 2 days     Minutes per session: 50 min    Stress: Not at all   Relationships    Social connections:     Talks on phone: None     Gets together: None     Attends Quaker service: None     Active member of club or organization: None     Attends meetings of clubs or organizations: None     Relationship status: None    Intimate partner violence:     Fear of current or ex partner: None     Emotionally abused: None     Physically abused: None     Forced sexual activity: None   Other Topics Concern    None   Social History Narrative    Living independently with spouse       Family History:   Family History   Problem Relation Age of Onset    Stroke Mother         ischemic stroke    Hypertension Mother     Heart disease Father        Meds/Allergies   Prior to Admission Medications   Prescriptions Last Dose Informant Patient Reported? Taking?    ALPRAZolam (XANAX) 0 5 mg tablet  Self Yes Yes   Sig: Take 0 5 mg by mouth daily as needed     Calcium Carbonate (CALTRATE 600) 1500 (600 Ca) MG TABS Not Taking at Unknown time Self No No   Si po bid   Patient not taking: Reported on 10/16/2019   Probiotic Product (ALIGN) 4 MG CAPS  Self Yes Yes   Sig: Take 1 tablet by mouth daily   acetaminophen (TYLENOL) 325 mg tablet  Self Yes Yes   Sig: Take 1-2 tablets by mouth every 6 (six) hours as needed   albuterol (PROVENTIL HFA,VENTOLIN HFA) 90 mcg/act inhaler  Self No Yes   Sig: Inhale 2 puffs every 6 (six) hours as needed for wheezing or shortness of breath   atenolol (TENORMIN) 50 mg tablet  Self No Yes   Sig: Take 1 tablet (50 mg total) by mouth 2 (two) times a day   ferrous sulfate 325 (65 Fe) mg tablet  Self Yes Yes   Sig: Take 1 tablet by mouth daily   imipramine (TOFRANIL) 10 mg tablet  Self No Yes   Sig: Take 1 tablet (10 mg total) by mouth 4 (four) times a week   levothyroxine 50 mcg tablet  Self No Yes   Sig: Take 1 tablet (50 mcg total) by mouth daily   lisinopril (ZESTRIL) 10 mg tablet  Self No Yes   Sig: Take 1 tablet (10 mg total) by mouth daily   rosuvastatin (CRESTOR) 10 MG tablet  Self No Yes   Sig: Take 1 tablet (10 mg total) by mouth daily      Facility-Administered Medications: None       Allergies   Allergen Reactions    Other Drowsiness     Annotation - [id number]: ANTIDEPRESSANTS       PHYSICAL EXAM    PE limited by: Nothing    Objective   Vitals:   First set: Temperature: 97 8 °F (36 6 °C) (10/16/19 1450)  Pulse: 78 (10/16/19 1450)  Respirations: 18 (10/16/19 1450)  Blood Pressure: (!) 196/85 (10/16/19 1450)  SpO2: 100 % (10/16/19 1450)    Primary Survey:   (A) Airway: Patent  (B) Breathing: without difficulty  (C) Circulation: Pulses:   normal  (D) Disabliity:  GCS Total:  15  (E) Expose:  Completed    Secondary Survey: (Click on Physical Exam tab above)  Physical Exam   Constitutional: She is oriented to person, place, and time  She appears well-developed and well-nourished  HENT:   Head: Normocephalic and atraumatic  Right Ear: External ear normal    Left Ear: External ear normal    Eyes: Conjunctivae and EOM are normal    Neck: No JVD present  No tracheal deviation present  No thyromegaly present  Cardiovascular: Normal rate  Pulmonary/Chest: Effort normal and breath sounds normal  No stridor  Abdominal: Soft  She exhibits no distension and no mass  There is no tenderness  There is no guarding  No hernia  Musculoskeletal: Normal range of motion  She exhibits no edema, tenderness or deformity  Lymphadenopathy:     She has no cervical adenopathy  Neurological: She is alert and oriented to person, place, and time  Skin: Skin is warm  No rash noted  No erythema  No pallor  Psychiatric: She has a normal mood and affect  Her behavior is normal    Nursing note and vitals reviewed        Invasive Devices     Peripheral Intravenous Line            Peripheral IV 10/16/19 Left Antecubital 1 day                Lab Results:   Results Reviewed     Procedure Component Value Units Date/Time    Basic metabolic panel [[id number]] (Abnormal) Collected:  10/17/19 0650    Lab Status:  Final result Specimen:  Blood from Arm, Left Updated:  10/17/19 0711     Sodium 140 mmol/L      Potassium 4 5 mmol/L      Chloride 107 mmol/L      CO2 22 mmol/L      ANION GAP 11 mmol/L      BUN 13 mg/dL      Creatinine 1 00 mg/dL      Glucose 99 mg/dL      Calcium 8 1 mg/dL      eGFR 53 ml/min/1 73sq m     Narrative:       [place] guidelines for Chronic Kidney Disease (CKD):     Stage 1 with normal or high GFR (GFR > 90 mL/min/1 73 square meters)    Stage 2 Mild CKD (GFR = 60-89 mL/min/1 73 square meters)    Stage 3A Moderate CKD (GFR = 45-59 mL/min/1 73 square meters)    Stage 3B Moderate CKD (GFR = 30-44 mL/min/1 73 square meters)    Stage 4 Severe CKD (GFR = 15-29 mL/min/1 73 square meters)    Stage 5 End Stage CKD (GFR <15 mL/min/1 73 square meters)  Note: GFR calculation is accurate only with a steady state creatinine    CBC (With Platelets) [[id number]]  (Normal) Collected:  10/17/19 0548    Lab Status:  Final result Specimen:  Blood from Arm, Left Updated:  10/17/19 0554     WBC 8 41 Thousand/uL      RBC 4 12 Million/uL      Hemoglobin 12 7 g/dL      Hematocrit 38 9 %      MCV 94 fL      MCH 30 8 pg      MCHC 32 6 g/dL      RDW 13 1 %      Platelets 225 Thousands/uL      MPV 9 8 fL     Platelet count [[id number]]  (Normal) Collected:  10/16/19 2324    Lab Status:  Final result Specimen:  Blood from Arm, Left Updated:  10/16/19 2329     Platelets 562 Thousands/uL      MPV 9 8 fL     Comprehensive metabolic panel [[id number]]  (Abnormal) Collected:  10/16/19 1520    Lab Status:  Final result Specimen:  Blood from Arm, Right Updated:  10/16/19 1546     Sodium 139 mmol/L      Potassium 4 1 mmol/L      Chloride 103 mmol/L      CO2 27 mmol/L      ANION GAP 9 mmol/L      BUN 19 mg/dL      Creatinine 1 39 mg/dL      Glucose 103 mg/dL      Calcium 9 3 mg/dL      AST 34 U/L      ALT 24 U/L      Alkaline Phosphatase 92 U/L      Total Protein 7 7 g/dL      Albumin 3 7 g/dL      Total Bilirubin 0 40 mg/dL      eGFR 36 ml/min/1 73sq m     Narrative:       National Kidney Disease Foundation guidelines for Chronic Kidney Disease (CKD):     Stage 1 with normal or high GFR (GFR > 90 mL/min/1 73 square meters)    Stage 2 Mild CKD (GFR = 60-89 mL/min/1 73 square meters)    Stage 3A Moderate CKD (GFR = 45-59 mL/min/1 73 square meters)    Stage 3B Moderate CKD (GFR = 30-44 mL/min/1 73 square meters)    Stage 4 Severe CKD (GFR = 15-29 mL/min/1 73 square meters)    Stage 5 End Stage CKD (GFR <15 mL/min/1 73 square meters)  Note: GFR calculation is accurate only with a steady state creatinine    Protime-INR [[id number]]  (Normal) Collected:  10/16/19 1520    Lab Status:  Final result Specimen:  Blood from Arm, Right Updated:  10/16/19 1539     Protime 13 1 seconds      INR 0 99    CBC and differential [[id number]]  (Abnormal) Collected:  10/16/19 1520    Lab Status:  Final result Specimen:  Blood from Arm, Right Updated:  10/16/19 1526     WBC 6 17 Thousand/uL      RBC 3 70 Million/uL      Hemoglobin 11 7 g/dL      Hematocrit 34 8 %      MCV 94 fL      MCH 31 6 pg      MCHC 33 6 g/dL      RDW 12 9 %      MPV 9 8 fL      Platelets 273 Thousands/uL      nRBC 0 /100 WBCs      Neutrophils Relative 59 %      Immat GRANS % 1 %      Lymphocytes Relative 22 %      Monocytes Relative 8 %      Eosinophils Relative 10 %      Basophils Relative 0 %      Neutrophils Absolute 3 62 Thousands/µL      Immature Grans Absolute 0 05 Thousand/uL      Lymphocytes Absolute 1 34 Thousands/µL      Monocytes Absolute 0 50 Thousand/µL      Eosinophils Absolute 0 64 Thousand/µL      Basophils Absolute 0 02 Thousands/µL                  Imaging Studies:   Direct to CT: Yes  XR chest pa & lateral   Final Result by Cristi Reaves MD (10/17 6278)      No acute cardiopulmonary disease  Hiatus hernia  No evidence of opaque foreign body              Workstation performed: [id number]         MRI thoracic spine wo contrast   Final Result by Elgin Lugo MD (23/25 1293)      Recent compression fracture of the T10 vertebral body estimated 10-15% loss of vertebral body height without retropulsion of bone or significant canal stenosis  Workstation performed: [id number]         XR ribs bilateral 3 vw   Final Result by Colletta Riser, MD (10/17 1261)      No evidence of rib fractures  Workstation performed: [id number]         CT head without contrast   Final Result by Claudio Barnes MD (10/16 1556)      No acute intracranial abnormality  Mild to moderate chronic small vessel ischemic changes  Workstation performed: [id number]         CT spine cervical without contrast   Final Result by Claudio Barnes MD (10/16 1603)      No cervical spine fracture or traumatic malalignment  Workstation performed: [id number]         CT chest abdomen pelvis w contrast   Final Result by Claudio Barnes MD (10/16 1635)      No evidence of solid organ injury  Suspected acute nondisplaced compression fracture involving the superior aspect of the T10 vertebral body  There is no retropulsion at the posterior aspect of the T10 vertebral body into the spinal canal   An MRI of the thoracic spine could be performed    for further evaluation  Mild diffuse emphysematous lung changes  No pneumothorax  No acute intra-abdominal abnormality  No free air or free fluid  Moderate large hiatal hernia  Workstation performed: [id number]         XR finger third digit-middle LEFT   Final Result by Claudio Barnes MD (30/41 8520)      Acute nondisplaced fracture of the bilateral lower base of the left 3rd middle phalanx              Workstation performed: [id number]             Other Studies: xray left middle finger    Code Status: Level 3 - DNAR and DNI  Advance Directive and Living Will:      Power of :    POLST: Procedures  Procedures         ED Course         MDM    Disposition  [facility] Transfer: No  Final diagnoses:   Spinal fracture of T12 vertebra ([address] )     Time reflects when diagnosis was documented in both MDM as applicable and the Disposition within this note     Time User Action Codes Description Comment    10/16/2019  7:06 PM Mando Guerrier Add [F04 [id number]] Spinal fracture of T12 vertebra [facility])       ED Disposition     None      Follow-up Information    None       Current Discharge Medication List      CONTINUE these medications which have NOT CHANGED    Details   acetaminophen (TYLENOL) 325 mg tablet Take 1-2 tablets by mouth every 6 (six) hours as needed      albuterol (PROVENTIL HFA,VENTOLIN HFA) 90 mcg/act inhaler Inhale 2 puffs every 6 (six) hours as needed for wheezing or shortness of breath  Qty: 1 Inhaler, Refills: 5    Comments: Substitution to a formulary equivalent within the same pharmaceutical class is authorized    Associated Diagnoses: Cough      ALPRAZolam (XANAX) 0 5 mg tablet Take 0 5 mg by mouth daily as needed        atenolol (TENORMIN) 50 mg tablet Take 1 tablet (50 mg total) by mouth 2 (two) times a day  Qty: 180 tablet, Refills: 3    Associated Diagnoses: Essential hypertension      ferrous sulfate 325 (65 Fe) mg tablet Take 1 tablet by mouth daily      imipramine (TOFRANIL) 10 mg tablet Take 1 tablet (10 mg total) by mouth 4 (four) times a week  Qty: 90 tablet, Refills: 3    Associated Diagnoses: Anxiety      levothyroxine 50 mcg tablet Take 1 tablet (50 mcg total) by mouth daily  Qty: 90 tablet, Refills: 3    Associated Diagnoses: Acquired hypothyroidism      lisinopril (ZESTRIL) 10 mg tablet Take 1 tablet (10 mg total) by mouth daily  Qty: 90 tablet, Refills: 3    Associated Diagnoses: Essential hypertension      Probiotic Product (ALIGN) 4 MG CAPS Take 1 tablet by mouth daily      rosuvastatin (CRESTOR) 10 MG tablet Take 1 tablet (10 mg total) by mouth daily  Qty: 90 tablet, Refills: 1    Associated Diagnoses: Other hyperlipidemia      Calcium Carbonate (CALTRATE 600) 1500 (600 Ca) MG TABS 1 po bid  Refills: 0    Associated Diagnoses: Age-related osteoporosis without current pathological fracture           No discharge procedures on file        ED Provider  Electronically Signed by         Kaylen Butler DO  10/17/19 7276

## 2019-10-16 NOTE — ASSESSMENT & PLAN NOTE
Patient started Actonel in June  Currently, presents with vertebral fracture  Continue osteoporosis management

## 2019-10-16 NOTE — ED NOTES
SLIM at bedside     Lou Landeros, [address] [facility] [place]  10/16/19 1910

## 2019-10-16 NOTE — ASSESSMENT & PLAN NOTE
Status post mechanical fall around 1 o'clock today  Resulted in a T10 fracture as well as left 3rd middle phalanx fracture  Orthopedic consult  Pain management  PT/OT consult

## 2019-10-16 NOTE — ASSESSMENT & PLAN NOTE
CT of the thoracic spine revealed: "Suspected acute nondisplaced compression fracture involving the superior aspect of the T10 vertebral body  There is no retropulsion at the posterior aspect of the T10 vertebral body into the spinal canal "  Will proceed with an MRI of the T-spine to rule out spinal cord involvement  Orthopedic surgery evaluation  Pain management

## 2019-10-16 NOTE — ASSESSMENT & PLAN NOTE
Acute nondisplaced fracture of the bilateral lower base of the left 3rd middle phalanx  Will provide pain management  Consult Orthopedics

## 2019-10-16 NOTE — H&P
H&P- Rafal Lezama 1935, 82 y o  female MRN: [id number]    Unit/Bed#: ED 15 Encounter: [id number]    Primary Care Provider: Angel Antoine MD   Date and time admitted to hospital: 10/16/2019  2:45 PM        Fall as cause of accidental injury in home as place of occurrence  Assessment & Plan  Status post mechanical fall around 1 o'clock today  Resulted in a T10 fracture as well as left 3rd middle phalanx fracture  Orthopedic consult  Pain management  PT/OT consult  Closed fracture of tenth thoracic vertebra with routine healing  Assessment & Plan  CT of the thoracic spine revealed: "Suspected acute nondisplaced compression fracture involving the superior aspect of the T10 vertebral body  There is no retropulsion at the posterior aspect of the T10 vertebral body into the spinal canal "  Will proceed with an MRI of the T-spine to rule out spinal cord involvement  Orthopedic surgery evaluation  Pain management  Nondisplaced fracture of middle phalanx of left middle finger, initial encounter for closed fracture  Assessment & Plan  Acute nondisplaced fracture of the bilateral lower base of the left 3rd middle phalanx  Will provide pain management  Consult Orthopedics  Age-related osteoporosis without current pathological fracture  Assessment & Plan  Patient started Actonel in June  Currently, presents with vertebral fracture  Continue osteoporosis management  Chronic kidney disease  Assessment & Plan  Creatinine is stable at the baseline    Hypertension  Assessment & Plan  Continue home medications  Anxiety  Assessment & Plan  Will avoid benzodiazepines at this time due to potential risk of falling  I have spoken with an ER physician who was assessing the patient and he is agreeable to place a splint to immobilize the left middle finger with a fracture      VTE Prophylaxis: Heparin  / sequential compression device   Code Status: DNR  POLST: POLST is not applicable to this patient  Discussion with family: daughter    Anticipated Length of Stay:  Patient will be admitted on an Inpatient basis with an anticipated length of stay of  At least 2 midnights  Justification for Hospital Stay:  Thoracic spine fracture    Total Time for Visit, including Counseling / Coordination of Care: 45 minutes  Greater than 50% of this total time spent on direct patient counseling and coordination of care  Chief Complaint:   Status post fall    History of Present Illness:    Cb Rajan is a 82 y o  female who presents after mechanical fall that happened around 1:00 p m  Today  It happened in her house at the time when she was reaching for something on the shelf in her bathroom  She states that she fell backwards and hit her back against the wall  Currently, she is complaining of rib pain bilaterally mainlyradiating to the back  She denies chest pain, dizziness, syncope, shortness of breath, urinary changes  She does state that she has chronic neuropathy of her lower extremities, "which make her feet her feet curl'  Review of Systems:    Review of Systems   Constitutional: Positive for activity change  Negative for appetite change, fatigue and fever  HENT: Negative for congestion  Eyes: Negative for discharge  Respiratory: Negative for shortness of breath  Cardiovascular: Negative for chest pain and leg swelling  Gastrointestinal: Negative for abdominal pain  Genitourinary: Negative for dysuria and flank pain  Musculoskeletal: Positive for arthralgias and back pain  Negative for gait problem  Skin: Negative for pallor  Neurological: Negative for weakness, light-headedness and numbness  Psychiatric/Behavioral: Negative for behavioral problems         Past Medical and Surgical History:     Past Medical History:   Diagnosis Date    Benign essential hypertension     Last assessed: 9/11/14    Disease of thyroid gland     Fibromyalgia     GERD (gastroesophageal reflux disease)     History of nail disorders     Hyperlipidemia     Hypertension     Palpitations     Parkinson's disease ([facility] [address] )     Transient cerebral ischemia        Past Surgical History:   Procedure Laterality Date    APPENDECTOMY      HYSTERECTOMY  01/01/2010    [place] LAP,CHOLECYSTECTOMY/GRAPH N/A 4/4/2018    Procedure: LAPAROSCOPIC CHOLECYSTECTOMY WITH IOC;  Surgeon: Cristian Soriano MD;  Location: [facility];  Service: General    TUBAL LIGATION         Meds/Allergies:    Prior to Admission medications    Medication Sig Start Date End Date Taking? Authorizing Provider   acetaminophen (TYLENOL) 325 mg tablet Take 1-2 tablets by mouth every 6 (six) hours as needed    Historical Provider, MD   albuterol (PROVENTIL HFA,VENTOLIN HFA) 90 mcg/act inhaler Inhale 2 puffs every 6 (six) hours as needed for wheezing or shortness of breath 5/17/19   AMIE Godwin   ALPRAZolam Davene Novel) 0 5 mg tablet Take 0 5 mg by mouth daily as needed      Historical Provider, MD   atenolol (TENORMIN) 50 mg tablet Take 1 tablet (50 mg total) by mouth 2 (two) times a day 10/1/19   Ewelina Peñaloza MD   Calcium Carbonate (CALTRATE 600) 1500 (600 Ca) MG TABS 1 po bid 6/19/19   Ewelina Peñaloza MD   ferrous sulfate 325 (65 Fe) mg tablet Take 1 tablet by mouth daily 10/9/17   Historical Provider, MD   imipramine (TOFRANIL) 10 mg tablet Take 1 tablet (10 mg total) by mouth 4 (four) times a week 5/31/18   Ewelina Peñaloza MD   levothyroxine 50 mcg tablet Take 1 tablet (50 mcg total) by mouth daily 6/19/19   Ewelina Peñaloza MD   lisinopril (ZESTRIL) 10 mg tablet Take 1 tablet (10 mg total) by mouth daily 6/19/19   Ewelina Peñaloza MD   Probiotic Product (ALIGN) 4 MG CAPS Take 1 tablet by mouth daily    Historical Provider, MD   rosuvastatin (CRESTOR) 10 MG tablet Take 1 tablet (10 mg total) by mouth daily 5/20/19   AMIE Godwin     I have reviewed home medications using allscripts  Allergies:    Allergies   Allergen Reactions    Other Drowsiness     Annotation - [id number]: ANTIDEPRESSANTS       Social History:     Marital Status:    Occupation:retired  Patient Pre-hospital Living Situation: home  Patient Pre-hospital Level of Mobility: ambulates with a cane  Patient Pre-hospital Diet Restrictions: low salt  Substance Use History:   Social History     Substance and Sexual Activity   Alcohol Use No     Social History     Tobacco Use   Smoking Status Former Smoker    Packs/day: 0 10    Years: 50 00    Pack years: 5 00    Types: Cigarettes    Last attempt to quit:     Years since quittin 7   Smokeless Tobacco Never Used   Tobacco Comment    1 pack a week      Social History     Substance and Sexual Activity   Drug Use No       Family History:    non-contributory    Physical Exam:     Vitals:   Blood Pressure: 162/74 (10/16/19 1900)  Pulse: 71 (10/16/19 1900)  Temperature: 97 8 °F (36 6 °C) (10/16/19 145)  Respirations: 20 (10/16/19 1900)  Weight - Scale: 51 5 kg (113 lb 8 6 oz) (10/16/19 145)  SpO2: 100 % (10/16/19 1900)    Physical Exam   Constitutional: She appears well-developed and well-nourished  Cardiovascular: Normal rate and regular rhythm  Pulmonary/Chest: Effort normal and breath sounds normal    Abdominal: Soft  She exhibits no distension  Musculoskeletal: She exhibits no edema  Arms:       Hands:  Neurological: She is alert  She has normal strength  No cranial nerve deficit  Skin: Skin is warm  No erythema  Psychiatric: She has a normal mood and affect  Her behavior is normal            Additional Data:     Lab Results: I have personally reviewed pertinent reports        Results from last 7 days   Lab Units 10/16/19  1520   WBC Thousand/uL 6 17   HEMOGLOBIN g/dL 11 7   HEMATOCRIT % 34 8   PLATELETS Thousands/uL 172   NEUTROS PCT % 59   LYMPHS PCT % 22   MONOS PCT % 8   EOS PCT % 10*     Results from last 7 days   Lab Units 10/16/19  1520   SODIUM mmol/L 139   POTASSIUM mmol/L 4 1   CHLORIDE mmol/L 103   CO2 mmol/L 27   BUN mg/dL 19   CREATININE mg/dL 1 39*   ANION GAP mmol/L 9   CALCIUM mg/dL 9 3   ALBUMIN g/dL 3 7   TOTAL BILIRUBIN mg/dL 0 40   ALK PHOS U/L 92   ALT U/L 24   AST U/L 34   GLUCOSE RANDOM mg/dL 103     Results from last 7 days   Lab Units 10/16/19  1520   INR  0 99                   Imaging: I have personally reviewed pertinent reports  CT head without contrast   Final Result by Vaibhav Kemp MD (10/16 6845)      No acute intracranial abnormality  Mild to moderate chronic small vessel ischemic changes  Workstation performed: [id number]         CT spine cervical without contrast   Final Result by Vaibhav Kemp MD (10/16 1601)      No cervical spine fracture or traumatic malalignment  Workstation performed: [id number]         CT chest abdomen pelvis w contrast   Final Result by Vaibhav Kemp MD (10/16 1635)      No evidence of solid organ injury  Suspected acute nondisplaced compression fracture involving the superior aspect of the T10 vertebral body  There is no retropulsion at the posterior aspect of the T10 vertebral body into the spinal canal   An MRI of the thoracic spine could be performed    for further evaluation  Mild diffuse emphysematous lung changes  No pneumothorax  No acute intra-abdominal abnormality  No free air or free fluid  Moderate large hiatal hernia  Workstation performed: [id number]         XR finger third digit-middle LEFT   Final Result by Vaibhav Kemp MD (14/21 0605)      Acute nondisplaced fracture of the bilateral lower base of the left 3rd middle phalanx  Workstation performed: [id number]         MRI inpatient order    (Results Pending)       EKG, Pathology, and Other Studies Reviewed on Admission:   ·     Allscripts / Epic Records Reviewed: Yes     ** Please Note: This note has been constructed using a voice recognition system   **

## 2019-10-17 ENCOUNTER — APPOINTMENT (INPATIENT)
Dept: RADIOLOGY | Facility: HOSPITAL | Age: 84
DRG: 552 | End: 2019-10-17
Payer: MEDICARE

## 2019-10-17 LAB
ANION GAP SERPL CALCULATED.3IONS-SCNC: 11 MMOL/L (ref 4–13)
ATRIAL RATE: 74 BPM
ATRIAL RATE: 78 BPM
BUN SERPL-MCNC: 13 MG/DL (ref 5–25)
CALCIUM SERPL-MCNC: 8.1 MG/DL (ref 8.3–10.1)
CHLORIDE SERPL-SCNC: 107 MMOL/L (ref 100–108)
CO2 SERPL-SCNC: 22 MMOL/L (ref 21–32)
CREAT SERPL-MCNC: 1 MG/DL (ref 0.6–1.3)
ERYTHROCYTE [DISTWIDTH] IN BLOOD BY AUTOMATED COUNT: 13.1 % (ref 11.6–15.1)
GFR SERPL CREATININE-BSD FRML MDRD: 53 ML/MIN/1.73SQ M
GLUCOSE SERPL-MCNC: 99 MG/DL (ref 65–140)
HCT VFR BLD AUTO: 38.9 % (ref 34.8–46.1)
HGB BLD-MCNC: 12.7 G/DL (ref 11.5–15.4)
MCH RBC QN AUTO: 30.8 PG (ref 26.8–34.3)
MCHC RBC AUTO-ENTMCNC: 32.6 G/DL (ref 31.4–37.4)
MCV RBC AUTO: 94 FL (ref 82–98)
P AXIS: 54 DEGREES
P AXIS: 72 DEGREES
PLATELET # BLD AUTO: 206 THOUSANDS/UL (ref 149–390)
PMV BLD AUTO: 9.8 FL (ref 8.9–12.7)
POTASSIUM SERPL-SCNC: 4.5 MMOL/L (ref 3.5–5.3)
PR INTERVAL: 152 MS
PR INTERVAL: 162 MS
QRS AXIS: 1 DEGREES
QRS AXIS: 33 DEGREES
QRSD INTERVAL: 106 MS
QRSD INTERVAL: 82 MS
QT INTERVAL: 396 MS
QT INTERVAL: 428 MS
QTC INTERVAL: 451 MS
QTC INTERVAL: 475 MS
RBC # BLD AUTO: 4.12 MILLION/UL (ref 3.81–5.12)
SODIUM SERPL-SCNC: 140 MMOL/L (ref 136–145)
T WAVE AXIS: 24 DEGREES
T WAVE AXIS: 60 DEGREES
VENTRICULAR RATE: 74 BPM
VENTRICULAR RATE: 78 BPM
WBC # BLD AUTO: 8.41 THOUSAND/UL (ref 4.31–10.16)

## 2019-10-17 PROCEDURE — G8996 SWALLOW CURRENT STATUS: HCPCS

## 2019-10-17 PROCEDURE — G8997 SWALLOW GOAL STATUS: HCPCS

## 2019-10-17 PROCEDURE — 80048 BASIC METABOLIC PNL TOTAL CA: CPT | Performed by: FAMILY MEDICINE

## 2019-10-17 PROCEDURE — 97163 PT EVAL HIGH COMPLEX 45 MIN: CPT

## 2019-10-17 PROCEDURE — 93010 ELECTROCARDIOGRAM REPORT: CPT | Performed by: INTERNAL MEDICINE

## 2019-10-17 PROCEDURE — G8979 MOBILITY GOAL STATUS: HCPCS

## 2019-10-17 PROCEDURE — 71046 X-RAY EXAM CHEST 2 VIEWS: CPT

## 2019-10-17 PROCEDURE — 99232 SBSQ HOSP IP/OBS MODERATE 35: CPT | Performed by: INTERNAL MEDICINE

## 2019-10-17 PROCEDURE — 93005 ELECTROCARDIOGRAM TRACING: CPT

## 2019-10-17 PROCEDURE — 92610 EVALUATE SWALLOWING FUNCTION: CPT

## 2019-10-17 PROCEDURE — G8978 MOBILITY CURRENT STATUS: HCPCS

## 2019-10-17 PROCEDURE — G8998 SWALLOW D/C STATUS: HCPCS

## 2019-10-17 PROCEDURE — 36415 COLL VENOUS BLD VENIPUNCTURE: CPT | Performed by: FAMILY MEDICINE

## 2019-10-17 PROCEDURE — 85027 COMPLETE CBC AUTOMATED: CPT | Performed by: FAMILY MEDICINE

## 2019-10-17 RX ORDER — ACETAMINOPHEN 325 MG/1
975 TABLET ORAL EVERY 6 HOURS SCHEDULED
Status: DISCONTINUED | OUTPATIENT
Start: 2019-10-17 | End: 2019-10-18 | Stop reason: HOSPADM

## 2019-10-17 RX ORDER — TRAMADOL HYDROCHLORIDE 50 MG/1
50 TABLET ORAL EVERY 6 HOURS PRN
Status: DISCONTINUED | OUTPATIENT
Start: 2019-10-17 | End: 2019-10-18 | Stop reason: HOSPADM

## 2019-10-17 RX ADMIN — ALPRAZOLAM 0.5 MG: 0.5 TABLET ORAL at 10:33

## 2019-10-17 RX ADMIN — Medication 250 MG: at 08:31

## 2019-10-17 RX ADMIN — ATENOLOL 50 MG: 50 TABLET ORAL at 08:31

## 2019-10-17 RX ADMIN — ACETAMINOPHEN 975 MG: 325 TABLET, FILM COATED ORAL at 13:17

## 2019-10-17 RX ADMIN — HEPARIN SODIUM 5000 UNITS: 5000 INJECTION INTRAVENOUS; SUBCUTANEOUS at 05:40

## 2019-10-17 RX ADMIN — LEVOTHYROXINE SODIUM 50 MCG: 50 TABLET ORAL at 08:30

## 2019-10-17 RX ADMIN — ACETAMINOPHEN 975 MG: 325 TABLET, FILM COATED ORAL at 23:08

## 2019-10-17 RX ADMIN — HEPARIN SODIUM 5000 UNITS: 5000 INJECTION INTRAVENOUS; SUBCUTANEOUS at 14:25

## 2019-10-17 RX ADMIN — HEPARIN SODIUM 5000 UNITS: 5000 INJECTION INTRAVENOUS; SUBCUTANEOUS at 21:11

## 2019-10-17 RX ADMIN — TRAMADOL HYDROCHLORIDE 50 MG: 50 TABLET, FILM COATED ORAL at 21:11

## 2019-10-17 RX ADMIN — Medication 250 MG: at 17:48

## 2019-10-17 RX ADMIN — LISINOPRIL 10 MG: 10 TABLET ORAL at 08:31

## 2019-10-17 RX ADMIN — ACETAMINOPHEN 975 MG: 325 TABLET, FILM COATED ORAL at 17:48

## 2019-10-17 RX ADMIN — IMIPRAMINE HYDROCHLORIDE 10 MG: 10 TABLET, FILM COATED ORAL at 08:31

## 2019-10-17 RX ADMIN — ATENOLOL 50 MG: 50 TABLET ORAL at 17:48

## 2019-10-17 RX ADMIN — FERROUS SULFATE TAB 325 MG (65 MG ELEMENTAL FE) 325 MG: 325 (65 FE) TAB at 08:30

## 2019-10-17 RX ADMIN — OYSTER SHELL CALCIUM WITH VITAMIN D 1 TABLET: 500; 200 TABLET, FILM COATED ORAL at 08:30

## 2019-10-17 RX ADMIN — ATORVASTATIN CALCIUM 40 MG: 40 TABLET, FILM COATED ORAL at 17:48

## 2019-10-17 RX ADMIN — MORPHINE SULFATE 2 MG: 2 INJECTION, SOLUTION INTRAMUSCULAR; INTRAVENOUS at 02:05

## 2019-10-17 NOTE — ED NOTES
Pt reported she woke up and began feeling chest pain  Nurse notified admit Dr oJse Rodriguez Dr  came to bedside to assess patient  Nurse took EKG- NSR  No further orders at this time       Dale Mart RN  10/17/19 1337

## 2019-10-17 NOTE — SPEECH THERAPY NOTE
Speech-Language Pathology Bedside Swallow Evaluation      Patient Name: Camille CHAVEZ'S Date: 10/17/2019     Problem List  Principal Problem:    Closed fracture of tenth thoracic vertebra with routine healing  Active Problems:    Anxiety    Hypertension    Chronic kidney disease    Age-related osteoporosis without current pathological fracture    Nondisplaced fracture of middle phalanx of left middle finger, initial encounter for closed fracture    Fall as cause of accidental injury in home as place of occurrence      Past Medical History  Past Medical History:   Diagnosis Date    Benign essential hypertension     Last assessed: 9/11/14    Disease of thyroid gland     Fibromyalgia     GERD (gastroesophageal reflux disease)     History of nail disorders     Hyperlipidemia     Hypertension     Palpitations     Parkinson's disease ([facility] [address] )     Transient cerebral ischemia        Past Surgical History  Past Surgical History:   Procedure Laterality Date    APPENDECTOMY      HYSTERECTOMY  01/01/2010    [place] LAP,CHOLECYSTECTOMY/GRAPH N/A 4/4/2018    Procedure: LAPAROSCOPIC CHOLECYSTECTOMY WITH IOC;  Surgeon: Ray Avila MD;  Location: [facility] OR;  Service: General    TUBAL LIGATION         Summary   Pt presented with functional appearing oral and pharyngeal stage swallowing skills with materials administered today (applesauce, shaylee crackers, 3 tylenol and thin liquid by individual and successive sips)    Recommended Diet: regular diet and thin liquids   Recommended Form of Meds: best tolerated (cut if large (pt was provided with pill cutter and )        Current Medical Status  Camille Hidalgo is a 82 y o  female who presents after mechanical fall 10/16    DX:   Fall as cause of accidental injury in home   Closed fracture of tenth thoracic vertebra with routine healing  Nondisplaced fracture of middle phalanx of left middle finger, initial encounter for closed fx  Age-related osteoporosis without current pathological fracture  CKD, htn, anxiety  Clinical Swallowing Evaluation requested at this time (pt s/p choking spell with pill)    Past medical history:  Please see H&P for details    Special Studies of 10/16  CT of head: No acute intracranial abnormality  Mild to moderate chronic small vessel ischemic changes  CT of cervical spine: No cervical spine fracture or traumatic malalignment  CT of chest/abdomen/pelvis: No evidence of solid organ injury  Suspected acute nondisplaced compression fracture involving the superior aspect of the T10 vertebral body  There is no retropulsion at the posterior aspect of the T10 vertebral body into the spinal canal   An MRI of the thoracic spine could be performed for further evaluation  Mild diffuse emphysematous lung changes  No pneumothorax  No acute intra-abdominal abnormality  No free air or free fluid  Moderate large hiatal hernia  CXR: The lungs are clear  No pleural effusions  No rib fractures  CXR repeated today s/p choking episode (results pending)  Social/Education/Vocational Hx:  Pt lived in a apt for the elderly  Is   2 daughters were present for today's evaluation    Swallow Information   Current Symptoms/Concerns: choking incident  Current Diet: regular diet and thin liquids   Baseline Diet: regular diet and thin liquids      Baseline Assessment   Behavior/Cognition: alert  Speech/Language Status: able to participate in conversation  Patient Positioning: upright in bed  Pain Status/Interventions/Response to Interventions:   No report of or nonverbal indications of pain         Swallow Mechanism Exam  Facial: symmetrical  Labial: WFL  Lingual: WFL  Velum: symmetrical  Mandible: adequate ROM  Dentition: edentulous and full dentures  Vocal quality:clear/adequate   Respiratory Status: on RA with O2 sat of 96%    Consistencies Assessed and Performance   Consistencies Administered: thin liquids, puree, soft solids and pills/3 tylenol w/water    Oral Stage: WFL  Mastication was adequate with the materials administered today  Bolus formation and transfer were functional with no significant oral residue noted  No overt s/s reduced oral control  Pharyngeal Stage: [id number]    Swallow Mechanics:  Swallowing initiation appeared prompt  Laryngeal rise was palpated and judged to be within functional limits  No coughing, throat clearing, change in vocal quality or respiratory status noted today       Esophageal Concerns: large hiatal hernia (discussed reflux precautions)    Summary and Recommendations (see above)    Results Reviewed with: patient, RN and family     Treatment Recommended: No tx recommended

## 2019-10-17 NOTE — ED NOTES
Dr Vargas at bedside      [facility], [address] [facility]  10/17/19 4631

## 2019-10-17 NOTE — QUICK NOTE
Informed by nurse that patient was complaining of chest pain  Went to evaluate patient  On my exam, she was lying in bed, comfortable, in no apparent distress  She states that she woke up and then started noticing chest pain  The chest pain itself did not actually wake her up  On exam, her chest pain is reproducible with palpation  She was notably tender on exam   She denies dizziness/lightheadedness, shortness of breath, nausea, vomiting, diaphoresis  EKG demonstrated sinus rhythm, no ischemic changes  Will monitor for now  If pain refractory to Tylenol, will try alternate therapy

## 2019-10-17 NOTE — PLAN OF CARE
Problem: Potential for Falls  Goal: Patient will remain free of falls  Description  INTERVENTIONS:  - Assess patient frequently for physical needs  -  Identify cognitive and physical deficits and behaviors that affect risk of falls    -  [place] fall precautions as indicated by assessment   - Educate patient/family on patient safety including physical limitations  - Instruct patient to call for assistance with activity based on assessment  - Modify environment to reduce risk of injury  - Consider OT/PT consult to assist with strengthening/mobility  Outcome: Progressing

## 2019-10-17 NOTE — PROGRESS NOTES
Progress Note - Milly Hopkins 82 y o  female MRN: [id number]  Unit/Bed#: ED 15 Encounter: [id number]    Subjective:   I was called by RN, Ger Pinto who noted that she took all of her pills fine this morning until she took her calcium supplement and she seemed to choke on it  She had some wheezing noted before taking her pills but oxygen saturation dropped a few points after taking the pill  Patient subsequently drank some water and states she feels better but it felt like the pill was stuck in her esophagus, not in her lungs  She denies any pain in her back at rest   She denies any shortness of breath or palpitations  She denies any fevers or chills  She has no nausea or vomiting  She has no dysuria  She states that she was feeling fine prior to her fall  She was about to take a shower and had some close hanging on the shower head and reached for the  and lost her footing and fell backwards striking her back in her hand resulting in the fractures  Prior to the fall there was no headache, dizziness, chest pain, shortness of breath, palpitations, visual disturbance, focal numbness, tingling or weakness  All other ROS are negative  Objective:   Vitals: Blood pressure 151/67, pulse 73, temperature 97 8 °F (36 6 °C), resp  rate 16, weight 51 5 kg (113 lb 8 6 oz), SpO2 98 %, not currently breastfeeding  ,Body mass index is 23 73 kg/m²    SPO2 RA Rest      ED from 10/16/2019 in [facility] Emergency Department   SpO2  98 %   SpO2 Activity  At Rest   O2 Device  None (Room air)   O2 Flow Rate  --        I&O: No intake or output data in the 24 hours ending 10/17/19 1105      Physical Exam:       General Appearance:    Alert, cooperative, no distress   Head:    Normocephalic, without obvious abnormality, atraumatic   Eyes:    PERRL, conjunctiva/corneas clear, EOM's intact       Nose:   Moist mucous membranes, no drainage or sinus tenderness   Throat:   No tenderness, no exudates   Neck: Supple, symmetrical, trachea midline, no JVD   Lungs:     Scattered wheezes predominantly in the right mid and upper lung field, no stridor, respirations unlabored   Heart:    Regular rate and rhythm, S1 and S2 normal, no murmur, rub   or gallop   Abdomen: Soft, non-tender, positive bowel sounds, no masses, no organomegaly   Extremities:  No pedal edema, calf tenderness  Distal pulses palpable  Neurologic:     CNII-XII intact        Invasive Devices     Peripheral Intravenous Line            Peripheral IV 10/16/19 Left Antecubital less than 1 day                      Social History  reviewed  Family History   Problem Relation Age of Onset    Stroke Mother         ischemic stroke    Hypertension Mother     Heart disease Father     reviewed    Meds:  Current Facility-Administered Medications   Medication Dose Route Frequency Provider Last Rate Last Dose    acetaminophen (TYLENOL) tablet 975 mg  975 mg Oral Q6H [address] Wendy Monday, MD        albuterol (PROVENTIL HFA,VENTOLIN HFA) inhaler 2 puff  2 puff Inhalation Q6H PRN Alla Anne MD        ALPRAZolam Isabella Ree) tablet 0 5 mg  0 5 mg Oral HS PRN Alla Anne MD        atenolol (TENORMIN) tablet 50 mg  50 mg Oral BID Alla Anne MD   50 mg at 10/17/19 0831    atorvastatin (LIPITOR) tablet 40 mg  40 mg Oral Daily With Stephon Flores MD   40 mg at 10/16/19 2003    calcium carbonate-vitamin D (OSCAL-D) 500 mg-200 units per tablet 1 tablet  1 tablet Oral Daily With Breakfast Alla Anne MD   1 tablet at 10/17/19 0830    ferrous sulfate tablet 325 mg  325 mg Oral Daily Alla Anne MD   325 mg at 10/17/19 0830    heparin (porcine) subcutaneous injection 5,000 Units  5,000 Units Subcutaneous [facility] Alla Anne MD   5,000 Units at 10/17/19 0540    imipramine (TOFRANIL) tablet 10 mg  10 mg Oral Once per day on Sun Tue Thu Sat Alla Anne MD   10 mg at 10/17/19 0831    levothyroxine tablet 50 mcg  50 mcg Oral Daily lAla Anne MD   50 mcg at 10/17/19 0830    lisinopril (ZESTRIL) tablet 10 mg  10 mg Oral Daily Corazon De León MD   10 mg at 10/17/19 0831    morphine injection 2 mg  2 mg Intravenous Q4H PRN Corazon De León MD   2 mg at 10/17/19 0205    saccharomyces boulardii (FLORASTOR) capsule 250 mg  250 mg Oral BID Corazon De León MD   250 mg at 10/17/19 0831    traMADol (ULTRAM) tablet 50 mg  50 mg Oral Q6H PRN Austin Tee MD         Current Outpatient Medications   Medication Sig Dispense Refill    acetaminophen (TYLENOL) 325 mg tablet Take 1-2 tablets by mouth every 6 (six) hours as needed      albuterol (PROVENTIL HFA,VENTOLIN HFA) 90 mcg/act inhaler Inhale 2 puffs every 6 (six) hours as needed for wheezing or shortness of breath 1 Inhaler 5    ALPRAZolam (XANAX) 0 5 mg tablet Take 0 5 mg by mouth daily as needed        atenolol (TENORMIN) 50 mg tablet Take 1 tablet (50 mg total) by mouth 2 (two) times a day 180 tablet 3    ferrous sulfate 325 (65 Fe) mg tablet Take 1 tablet by mouth daily      imipramine (TOFRANIL) 10 mg tablet Take 1 tablet (10 mg total) by mouth 4 (four) times a week 90 tablet 3    levothyroxine 50 mcg tablet Take 1 tablet (50 mcg total) by mouth daily 90 tablet 3    lisinopril (ZESTRIL) 10 mg tablet Take 1 tablet (10 mg total) by mouth daily 90 tablet 3    Probiotic Product (ALIGN) 4 MG CAPS Take 1 tablet by mouth daily      rosuvastatin (CRESTOR) 10 MG tablet Take 1 tablet (10 mg total) by mouth daily 90 tablet 1    Calcium Carbonate (CALTRATE 600) 1500 (600 Ca) MG TABS 1 po bid (Patient not taking: Reported on 10/16/2019)  0        (Not in a hospital admission)    Labs:  Results from last 7 days   Lab Units 10/17/19  0548 10/16/19  2324 10/16/19  1520   WBC Thousand/uL 8 41  --  6 17   HEMOGLOBIN g/dL 12 7  --  11 7   HEMATOCRIT % 38 9  --  34 8   PLATELETS Thousands/uL 206 179 172   NEUTROS PCT %  --   --  59   LYMPHS PCT %  --   --  22   MONOS PCT %  --   --  8   EOS PCT %  --   --  10* Results from last 7 days   Lab Units 10/17/19  0650 10/16/19  1520   POTASSIUM mmol/L 4 5 4 1   CHLORIDE mmol/L 107 103   CO2 mmol/L 22 27   BUN mg/dL 13 19   CREATININE mg/dL 1 00 1 39*   CALCIUM mg/dL 8 1* 9 3   ALK PHOS U/L  --  92   ALT U/L  --  24   AST U/L  --  34     Lab Results   Component Value Date    TROPONINI <0 02 07/27/2018    TROPONINI <0 02 07/26/2018    TROPONINI <0 02 02/27/2018    CKTOTAL 113 01/09/2018    CKTOTAL 60 04/06/2016     Results from last 7 days   Lab Units 10/16/19  1520   INR  0 99     Lab Results   Component Value Date    URINECX No Growth <1000 cfu/mL 04/06/2016       Imaging:  No results found for this or any previous visit  Results for orders placed in visit on 04/02/19   XR chest pa & lateral    Narrative CHEST     INDICATION:   R05: Cough  Congestion  COMPARISON:  2/27/2018    EXAM PERFORMED/VIEWS:  XR CHEST PA & LATERAL      FINDINGS:    Cardiac silhouette size within normal limits  There is a small hiatal hernia  The lungs are clear  No pneumothorax or pleural effusion  Thoracolumbar scoliosis noted      Impression No acute pulmonary disease        Workstation performed: [id number]         VTE Pharmacologic Prophylaxis: Heparin      Code Status:   Level 3 - DNAR and DNI    Assessment:  Principal Problem:    Closed fracture of tenth thoracic vertebra with routine healing  Active Problems:    Anxiety    Hypertension    Chronic kidney disease    Age-related osteoporosis without current pathological fracture    Nondisplaced fracture of middle phalanx of left middle finger, initial encounter for closed fracture    Fall as cause of accidental injury in home as place of occurrence  Resolved Problems:    * No resolved hospital problems  *      Plan:  Closed fracture of 10 thoracic vertebrae-MRI reviewed, no retropulsion or spinal canal impingement  Tylenol around the clock for analgesia with tramadol p r n     Avoid NSAIDs given chronic kidney disease    Await orthopedic evaluation and recommendations regarding brace  PT and OT evals pnd    Questionable aspiration versus dysphagia-check chest x-ray, speech therapy bedside swallow evaluation  Monitor oxygen saturations  Continue albuterol nebulizer as needed    Chronic kidney disease stage 3-creatinine has improved overnight, continue to monitor and avoid NSAIDs    Left middle finger fracture-finger is splinted, orthopedic consultation pending    Hypertension stable on present regimen    Anxiety-continue outpatient regimen    Disposition-not currently stable for discharge pending evaluations above, hopeful for deep DC home with services tomorrow if stable      Yaron Saunders MD  10/17/2019,9:22 AM

## 2019-10-17 NOTE — ED NOTES
1  CC: fall, pt was trauma eval upon arrival, pt slipped and fell in bathtub     2  Is this admission due to an injury? Yes    3  Orientation status: AxO's 4    4  Abnormal labs, assessment, vitals: fracture of T10 vertebra, fracture to middle left finger     5  Medications/drips: N/A    6  Last time narcotics given: morphine 2mg at 205 am, tylenol given 1320    7  IV lines, drains, etc : 20 LAC     8  Isolation status: N/A    9  Skin:intact    10   Ambulation status: ambulatory with assist of 1, pt was evaluated by PT     11  ED phone number [phone]     Rylee Sanches[address]  10/17/19 6440

## 2019-10-17 NOTE — ED NOTES
Pt with difficulty swallowing oscal pill, believes it may be stuck    Admitting physician notified, will continue to monitor      Staci Caballero RN  10/17/19 0392

## 2019-10-17 NOTE — SOCIAL WORK
CM was consulted for placement  Pt presented to ED due to fall causing fracture  CM met w/ pt and family bedside  Pt resides alone in [facility] ([employer] [facility] [employer])  All ADLs are completed on 1-level  Pt ambulates w/ cane at baseline - denies use of any other DME  Pt is independent w/ ADLs at baseline  Pt denies any Hx of VNA, inpt rehab, and SA  Pt does take antidepressants as Rx  Pt uses [facility] and denies any barriers to obtaining Rx  Pt states she did go to OP PT w/ [facility] previously  Pt's dtr Naheed Spence) is supportive in care plan  Pt sees Dr Javon Morse for PCP  CM reviewed discharge planning process including the following: identifying help at home, patient preference for discharge planning needs, pharmacy preference, and availability of treatment team to discuss questions or concerns patient and/or family may have regarding understanding medications and recognizing signs and symptoms once discharged  CM also encouraged patient to follow up with all recommended appointments after discharge  Patient advised of importance for patient and family to participate in managing patients medical well being  CM name and role reviewed  Discharge Checklist reviewed and CM will continue to monitor for progress toward discharge goals in nursing and provider rounds  PT unable to see pt until cleared by ortho  CM to f/u as needed

## 2019-10-17 NOTE — PLAN OF CARE
Problem: PHYSICAL THERAPY ADULT  Goal: Performs mobility at highest level of function for planned discharge setting  See evaluation for individualized goals  Description  Treatment/Interventions: Functional transfer training, LE strengthening/ROM, Therapeutic exercise, Endurance training, Patient/family training, Equipment eval/education, Bed mobility, Gait training, Spoke to nursing          See flowsheet documentation for full assessment, interventions and recommendations  Note:   Prognosis: Good  Problem List: Decreased strength, Decreased endurance, Impaired balance, Decreased mobility  Assessment: pt is an 82y/o f who presents to [facility] c T10 vertebral fx + L 3rd finger fx s/p fall  per MD, ok to perform PT eval prior to ortho c/s  PMH significant for anxiety, IBS, GERD, + AVM  pt instructed on limited WB L hand 2* 3rd phalanx fx  at baseline, pt mod (I) c functional mobility c SPC  resides c spouse in senior high rise apt c elevator access  educated on back safety precautions + log roll technique c bed mobility  presents c deficits in strength, balance, gait quality, + activity tolerance noted in PT exam above requiring min (A)x1 to complete OOB mobility tasks  Barthel Index 65/100  ambulated 50'x2 c HHAx1 c seated rest for toileting  would benefit from skilled PT to maximize functional mobility + return home safely  upon d/c, recommend pt return home c family support + hhpt once medically cleared by MD  PT eval of high complexity 2* unstable med status c pt requiring ongoing medical management s/p fall  pending speech eval  pt c multiple co-morbidities + mobility deficits above requiring min (A)x1  also awaiting ortho c/s regarding above fxs  Barriers to Discharge: None     Recommendation: Home PT, Home with family support     PT - OK to Discharge: Yes    See flowsheet documentation for full assessment

## 2019-10-17 NOTE — PHYSICAL THERAPY NOTE
PT Evaluation (22min)  (9:30-9:52)    Past Medical History:   Diagnosis Date    Benign essential hypertension     Last assessed: 9/11/14    Disease of thyroid gland     Fibromyalgia     GERD (gastroesophageal reflux disease)     History of nail disorders     Hyperlipidemia     Hypertension     Palpitations     Parkinson's disease ([facility] [address] )     Transient cerebral ischemia         10/17/19 0934   Note Type   Note type Eval only   Pain Assessment   Pain Assessment No/denies pain   Home Living   Type of Home Apartment   Home Layout One level;Elevator   Bathroom Shower/Tub Tub/shower unit   Bathroom Toilet Standard   Bathroom Equipment Grab bars in shower;Grab bars around toilet   [address]   Prior Function   Level of [place] Independent with ADLs and functional mobility  (ambulates c SPC)   Lives With Alone   Receives Help From Family   ADL Assistance Independent   IADLs Needs assistance   Falls in the last 6 months 1 to 4  (2)   Vocational Retired   Comments family (A) c transportation   Restrictions/Precautions   Wells Chuck Bearing Precautions Per Order   (pt educated on limited WB L hand 2* middle phalanx fx)   Other Precautions Bed Alarm; Chair Alarm;Telemetry; Fall Risk;Spinal precautions  (back safety precautions for jt protection)   General   Additional Pertinent History pt presents to [facility] c T10 closed vertebral fx + nondisplaced fx middle phalanx L finger s/p fall  pending ortho c/s  per alber KEENE to perform PT eval prior to ortho  Family/Caregiver Present No   Cognition   Orientation Level Oriented X4   RUE Assessment   RUE Assessment WFL  (4/5)   LUE Assessment   LUE Assessment WFL  (4/5)   RLE Assessment   RLE Assessment WFL  (4/5)   LLE Assessment   LLE Assessment WFL  (4/5)   Coordination   Sensation WFL   Bed Mobility   Rolling R 5  Supervision   Additional items Bedrails; Increased time required;Verbal cues  (cues for log roll technique)   Supine to Sit 5  Supervision   Additional items Bedrails; Increased time required;Verbal cues  (cues for log roll technique)   Sit to Supine 5  Supervision   Additional items Increased time required;Verbal cues  (cues for log roll technique)   Transfers   Sit to Stand 4  Minimal assistance   Additional items Assist x 1;Verbal cues; Increased time required  (CG (A)x1)   Stand to Sit 4  Minimal assistance   Additional items Assist x 1;Verbal cues; Increased time required  (CG (A)x1)   Ambulation/Elevation   Gait pattern Narrow NAVI; Decreased foot clearance; Inconsistent nevaeh   Gait Assistance 4  Minimal assist   Additional items Assist x 1;Verbal cues   Assistive Device Other (Comment)  (HHA x1)   Distance 50'x2 c seated rest for toileting   Stair Management Assistance Not tested   Balance   Static Sitting Good   Dynamic Sitting Good   Static Standing Fair +   Dynamic Standing Fair +   Ambulatory Fair   Higher level balance   (Tinetti score: 20/28 (moderate fall risk))   Activity Tolerance   Activity Tolerance Patient limited by fatigue   Nurse Made Aware BERNARDO Rose aware pt ambulates (A)x1 c HHA  Assessment   Prognosis Good   Problem List Decreased strength;Decreased endurance; Impaired balance;Decreased mobility   Assessment pt is an 84y/o f who presents to [facility] c T10 vertebral fx + L 3rd finger fx s/p fall  per MD, ok to perform PT eval prior to ortho c/s  PMH significant for anxiety, IBS, GERD, + AVM  pt instructed on limited WB L hand 2* 3rd phalanx fx  at baseline, pt mod (I) c functional mobility c SPC  resides c spouse in senior high rise apt c elevator access  educated on back safety precautions + log roll technique c bed mobility  presents c deficits in strength, balance, gait quality, + activity tolerance noted in PT exam above requiring min (A)x1 to complete OOB mobility tasks  Barthel Index 65/100  ambulated 50'x2 c HHAx1 c seated rest for toileting   would benefit from skilled PT to maximize functional mobility + return home safely  upon d/c, recommend pt return home c family support + hhpt once medically cleared by MD  PT eval of high complexity 2* unstable med status c pt requiring ongoing medical management s/p fall  pending speech eval  pt c multiple co-morbidities + mobility deficits above requiring min (A)x1  also awaiting ortho c/s regarding above fxs  Barriers to Discharge None   Goals   Patient Goals "to get home to my "  STG Expiration Date 10/27/19   Short Term Goal #1 1  increase strength 1/2 grade to improve overall functional mobility, 2  perform bed mobility mod (I) to decrease caregiver burden, 3  perform transfers mod (I) to safely perform ADLs, 4  ambulate >150' mod (I) c least restrictive AD to safely navigate home environment   Plan   Treatment/Interventions Functional transfer training;LE strengthening/ROM; Therapeutic exercise; Endurance training;Patient/family training;Equipment eval/education; Bed mobility;Gait training;Spoke to nursing   PT Frequency Other (Comment)  (3-5x/wk)   Recommendation   Recommendation Home PT; Home with family support   PT - OK to Discharge Yes   Modified Dendron Scale   Modified Dendron Scale 4   Barthel Index   Feeding 5   Bathing 5   Grooming Score 5   Dressing Score 5   Bladder Score 10   Bowels Score 10   Toilet Use Score 5   Transfers (Bed/Chair) Score 10   Mobility (Level Surface) Score 10   Stairs Score 0   Barthel Index Score 65     Geri Gill, PT, DPT

## 2019-10-18 VITALS
DIASTOLIC BLOOD PRESSURE: 66 MMHG | WEIGHT: 113.54 LBS | HEIGHT: 58 IN | BODY MASS INDEX: 23.83 KG/M2 | TEMPERATURE: 97.7 F | RESPIRATION RATE: 16 BRPM | SYSTOLIC BLOOD PRESSURE: 139 MMHG | HEART RATE: 61 BPM | OXYGEN SATURATION: 97 %

## 2019-10-18 LAB
ALBUMIN SERPL BCP-MCNC: 3.7 G/DL (ref 3.5–5)
ALP SERPL-CCNC: 89 U/L (ref 46–116)
ALT SERPL W P-5'-P-CCNC: 60 U/L (ref 12–78)
ANION GAP SERPL CALCULATED.3IONS-SCNC: 10 MMOL/L (ref 4–13)
AST SERPL W P-5'-P-CCNC: 90 U/L (ref 5–45)
BASOPHILS # BLD AUTO: 0.02 THOUSANDS/ΜL (ref 0–0.1)
BASOPHILS NFR BLD AUTO: 0 % (ref 0–1)
BILIRUB SERPL-MCNC: 0.7 MG/DL (ref 0.2–1)
BUN SERPL-MCNC: 18 MG/DL (ref 5–25)
CALCIUM SERPL-MCNC: 9.7 MG/DL (ref 8.3–10.1)
CHLORIDE SERPL-SCNC: 101 MMOL/L (ref 100–108)
CO2 SERPL-SCNC: 27 MMOL/L (ref 21–32)
CREAT SERPL-MCNC: 1.46 MG/DL (ref 0.6–1.3)
EOSINOPHIL # BLD AUTO: 0.71 THOUSAND/ΜL (ref 0–0.61)
EOSINOPHIL NFR BLD AUTO: 11 % (ref 0–6)
ERYTHROCYTE [DISTWIDTH] IN BLOOD BY AUTOMATED COUNT: 13.1 % (ref 11.6–15.1)
GFR SERPL CREATININE-BSD FRML MDRD: 34 ML/MIN/1.73SQ M
GLUCOSE SERPL-MCNC: 100 MG/DL (ref 65–140)
HCT VFR BLD AUTO: 39 % (ref 34.8–46.1)
HGB BLD-MCNC: 13 G/DL (ref 11.5–15.4)
IMM GRANULOCYTES # BLD AUTO: 0.02 THOUSAND/UL (ref 0–0.2)
IMM GRANULOCYTES NFR BLD AUTO: 0 % (ref 0–2)
LYMPHOCYTES # BLD AUTO: 1.46 THOUSANDS/ΜL (ref 0.6–4.47)
LYMPHOCYTES NFR BLD AUTO: 22 % (ref 14–44)
MCH RBC QN AUTO: 31.3 PG (ref 26.8–34.3)
MCHC RBC AUTO-ENTMCNC: 33.3 G/DL (ref 31.4–37.4)
MCV RBC AUTO: 94 FL (ref 82–98)
MONOCYTES # BLD AUTO: 0.58 THOUSAND/ΜL (ref 0.17–1.22)
MONOCYTES NFR BLD AUTO: 9 % (ref 4–12)
NEUTROPHILS # BLD AUTO: 3.8 THOUSANDS/ΜL (ref 1.85–7.62)
NEUTS SEG NFR BLD AUTO: 58 % (ref 43–75)
NRBC BLD AUTO-RTO: 0 /100 WBCS
PLATELET # BLD AUTO: 195 THOUSANDS/UL (ref 149–390)
PMV BLD AUTO: 10.1 FL (ref 8.9–12.7)
POTASSIUM SERPL-SCNC: 4.5 MMOL/L (ref 3.5–5.3)
PROT SERPL-MCNC: 7.8 G/DL (ref 6.4–8.2)
RBC # BLD AUTO: 4.15 MILLION/UL (ref 3.81–5.12)
SODIUM SERPL-SCNC: 138 MMOL/L (ref 136–145)
WBC # BLD AUTO: 6.59 THOUSAND/UL (ref 4.31–10.16)

## 2019-10-18 PROCEDURE — 97760 ORTHOTIC MGMT&TRAING 1ST ENC: CPT

## 2019-10-18 PROCEDURE — 85025 COMPLETE CBC W/AUTO DIFF WBC: CPT | Performed by: INTERNAL MEDICINE

## 2019-10-18 PROCEDURE — 99222 1ST HOSP IP/OBS MODERATE 55: CPT | Performed by: ORTHOPAEDIC SURGERY

## 2019-10-18 PROCEDURE — 99239 HOSP IP/OBS DSCHRG MGMT >30: CPT | Performed by: INTERNAL MEDICINE

## 2019-10-18 PROCEDURE — 80053 COMPREHEN METABOLIC PANEL: CPT | Performed by: INTERNAL MEDICINE

## 2019-10-18 PROCEDURE — 26720 TREAT FINGER FRACTURE EACH: CPT | Performed by: ORTHOPAEDIC SURGERY

## 2019-10-18 PROCEDURE — 97116 GAIT TRAINING THERAPY: CPT

## 2019-10-18 RX ORDER — TRAMADOL HYDROCHLORIDE 50 MG/1
50 TABLET ORAL EVERY 6 HOURS PRN
Qty: 25 TABLET | Refills: 0 | Status: SHIPPED | OUTPATIENT
Start: 2019-10-18 | End: 2019-10-29

## 2019-10-18 RX ADMIN — LISINOPRIL 10 MG: 10 TABLET ORAL at 08:34

## 2019-10-18 RX ADMIN — FERROUS SULFATE TAB 325 MG (65 MG ELEMENTAL FE) 325 MG: 325 (65 FE) TAB at 08:35

## 2019-10-18 RX ADMIN — ACETAMINOPHEN 975 MG: 325 TABLET, FILM COATED ORAL at 05:47

## 2019-10-18 RX ADMIN — LEVOTHYROXINE SODIUM 50 MCG: 50 TABLET ORAL at 08:35

## 2019-10-18 RX ADMIN — HEPARIN SODIUM 5000 UNITS: 5000 INJECTION INTRAVENOUS; SUBCUTANEOUS at 05:47

## 2019-10-18 RX ADMIN — TRAMADOL HYDROCHLORIDE 50 MG: 50 TABLET, FILM COATED ORAL at 04:50

## 2019-10-18 RX ADMIN — Medication 250 MG: at 08:35

## 2019-10-18 RX ADMIN — ATENOLOL 50 MG: 50 TABLET ORAL at 08:34

## 2019-10-18 NOTE — PHYSICAL THERAPY NOTE
PT Progress Note (24min)  (10:10-10:34)       10/18/19 1034   Pain Assessment   Pain Assessment No/denies pain   Restrictions/Precautions   Weight Bearing Precautions Per Order Yes   LUE Weight Bearing Per Order NWB   Braces or Orthoses TLSO  (when ambulating for comfort)   Other Precautions Chair Alarm; Bed Alarm;Telemetry; Fall Risk;Pain;WBS;Spinal precautions  (NWB L UE; back safety precautions for jt protection)   General   Chart Reviewed Yes   Response to Previous Treatment Patient with no complaints from previous session  Family/Caregiver Present No   Cognition   Orientation Level Oriented X4   Subjective   Subjective pt agreeable to PT session  "I'm ready to go home today"  Bed Mobility   Rolling R 5  Supervision   Additional items HOB elevated; Bedrails; Increased time required;Verbal cues   Supine to Sit 5  Supervision   Additional items HOB elevated; Increased time required;Verbal cues   Additional Comments TLSO fitted c pt sitting EOB  Transfers   Sit to Stand 5  Supervision   Additional items Verbal cues; Increased time required   Stand to Sit 5  Supervision   Additional items Armrests; Increased time required;Verbal cues   Ambulation/Elevation   Gait pattern Narrow NAVI; Decreased foot clearance; Short stride   Gait Assistance 5  Supervision  (CG (A)x1)   Additional items Verbal cues   Assistive Device None; Other (Comment)  (occassional HHA on R)   Distance 180' + 25' c seated rest btwn trials   Stair Management Assistance Not tested   Balance   Static Sitting Good   Dynamic Sitting Good   Static Standing Fair +   Dynamic Standing Fair +   Ambulatory Fair   Activity Tolerance   Activity Tolerance Patient limited by fatigue   Nurse Made Aware RN Andra aware pt fitted for TLSO + ambulated (A)x1 in hallway  Assessment   Prognosis Good   Problem List Decreased strength;Decreased endurance; Impaired balance;Decreased mobility;Orthopedic restrictions;Pain   Assessment pt demonstrating progress c PT  fitted for TLSO while seated at EOB  demonstrated proper donning/doffing technique to pt c good understanding  completed mobility tasks c (S)/CG (A)  progressed ambulation distance to 180' c occassional HHA on R  able to tolerate sitting OOB in chair at end of session c chair alarm activated  will cont skilled PT to further maximize functional mobility + return home safely  upon d/c, recommend pt return home c hhpt  Barriers to Discharge None   Goals   Patient Goals "to visit c my family"  STG Expiration Date 10/27/19   PT Treatment Day 1   Plan   Treatment/Interventions Functional transfer training;LE strengthening/ROM; Therapeutic exercise; Endurance training;Patient/family training;Equipment eval/education; Bed mobility;Gait training;Spoke to nursing;Spoke to MD   Progress Progressing toward goals   PT Frequency Other (Comment)  (3-5x/wk)   Recommendation   Recommendation Home PT; Home with family support   PT - OK to Discharge Yes     Kristie Davis, PT, DPT

## 2019-10-18 NOTE — CONSULTS
Orthopedics   Wilbur Anguiano 82 y o  female MRN: [id number]  Unit/Bed#: -01      Chief Complaint:   Back Pain    HPI:   82 y  o female was admitted to the hospital on October 16, 2018 after a fall  MRI and CT scans positive for mild T10 compression fracture as well as the fracture of the left 3rd middle phalanx  Patient lying in bed comfortable in no acute distress  Has some pain centrally over the lower back with no radiating pain into the sides or down the legs  She denies any numbness or tingling in the upper lower extremity  Denies any fecal urinary incontinence  Pain with movement and with walking  She is using a walker now for ambulation  She normally uses a cane because of instability issues  Patient also injured her left hand sustaining a nondisplaced fracture of the left 3rd middle phalanx  She currently has a splint in place and has no pain with the splint on  Pain with movement of the finger  Denies numbness tingling in the upper extremity as well      Review Of Systems:   · Skin: Normal  · Neuro: See HPI  · Musculoskeletal: See HPI  · 14 point review of systems negative except as stated above     Past Medical History:   Past Medical History:   Diagnosis Date    Benign essential hypertension     Last assessed: 9/11/14    Disease of thyroid gland     Fibromyalgia     GERD (gastroesophageal reflux disease)     History of nail disorders     Hyperlipidemia     Hypertension     Palpitations     Parkinson's disease ([facility] [address] )     Transient cerebral ischemia        Past Surgical History:   Past Surgical History:   Procedure Laterality Date    APPENDECTOMY      HYSTERECTOMY  01/01/2010    MN LAP,CHOLECYSTECTOMY/GRAPH N/A 4/4/2018    Procedure: LAPAROSCOPIC CHOLECYSTECTOMY WITH IOC;  Surgeon: Jose Rdz MD;  Location: [facility];  Service: General    TUBAL LIGATION         Family History:  Family history reviewed and non-contributory  Family History   Problem Relation Age of Onset    Stroke Mother         ischemic stroke    Hypertension Mother     Heart disease Father        Social History:  Social History     Socioeconomic History    Marital status:      Spouse name: None    Number of children: None    Years of education: None    Highest education level: None   Occupational History    Occupation: retired    Social Needs    Financial resource strain: None    Food insecurity:     Worry: None     Inability: None    Transportation needs:     Medical: None     Non-medical: None   Tobacco Use    Smoking status: Former Smoker     Packs/day: 0 10     Years: 50 00     Pack years: 5 00     Types: Cigarettes     Last attempt to quit:      Years since quittin 7    Smokeless tobacco: Never Used    Tobacco comment: 1 pack a week    Substance and Sexual Activity    Alcohol use: Never     Alcohol/week: 0 0 standard drinks     Frequency: Never     Drinks per session: Patient refused     Binge frequency: Never     Comment: 0    Drug use: No    Sexual activity: Not Currently   Lifestyle    Physical activity:     Days per week: 2 days     Minutes per session: 50 min    Stress: Not at all   Relationships    Social connections:     Talks on phone: None     Gets together: None     Attends Hindu service: None     Active member of club or organization: None     Attends meetings of clubs or organizations: None     Relationship status: None    Intimate partner violence:     Fear of current or ex partner: None     Emotionally abused: None     Physically abused: None     Forced sexual activity: None   Other Topics Concern    None   Social History Narrative    Living independently with spouse       Allergies:    Allergies   Allergen Reactions    Other Drowsiness     Annotation - [id number]: ANTIDEPRESSANTS           Labs:  0   Lab Value Date/Time    HCT 39 0 10/18/2019 0436    HCT 38 9 10/17/2019 0548    HCT 34 8 10/16/2019 1520    HCT 32 8 (L) 09/10/2015 1138    HCT 34 6 (L) 07/07/2015 1138    HCT 36 8 01/22/2015 1054    HGB 13 0 10/18/2019 0436    HGB 12 7 10/17/2019 0548    HGB 11 7 10/16/2019 1520    HGB 10 9 (L) 09/10/2015 1138    HGB 11 6 07/07/2015 1138    HGB 12 3 01/22/2015 1054    INR 0 99 10/16/2019 1520    WBC 6 59 10/18/2019 0436    WBC 8 41 10/17/2019 0548    WBC 6 17 10/16/2019 1520    WBC 6 51 09/10/2015 1138    WBC 8 2 07/07/2015 1138    WBC 5 5 01/22/2015 1054    ESR 44 (H) 03/29/2019 1506    ESR 38 (H) 10/01/2015 1508    CRP <3 0 10/01/2015 1508       Meds:    Current Facility-Administered Medications:     acetaminophen (TYLENOL) tablet 975 mg, 975 mg, Oral, Q6H [facility], Matt Nickerson MD, 975 mg at 10/18/19 0547    albuterol (PROVENTIL HFA,VENTOLIN HFA) inhaler 2 puff, 2 puff, Inhalation, Q6H PRN, Marino Garcia MD    ALPRAZolam Arti Gautam) tablet 0 5 mg, 0 5 mg, Oral, HS PRN, Marino Garcia MD, 0 5 mg at 10/17/19 1033    atenolol (TENORMIN) tablet 50 mg, 50 mg, Oral, BID, Marino Garcia MD, 50 mg at 10/18/19 0834    atorvastatin (LIPITOR) tablet 40 mg, 40 mg, Oral, Daily With Yasmany Ornelas MD, 40 mg at 10/17/19 1748    calcium carbonate-vitamin D (OSCAL-D) 500 mg-200 units per tablet 1 tablet, 1 tablet, Oral, Daily With Breakfast, Marino Garcia MD, 1 tablet at 10/17/19 0830    ferrous sulfate tablet 325 mg, 325 mg, Oral, Daily, Marino Garcia MD, 325 mg at 10/18/19 0835    heparin (porcine) subcutaneous injection 5,000 Units, 5,000 Units, Subcutaneous, Q8H [facility], 5,000 Units at 10/18/19 0547 **AND** [COMPLETED] Platelet count, , , Once, Marino Garcia MD    imipramine (TOFRANIL) tablet 10 mg, 10 mg, Oral, Once per day on Sun Tue Thu Sat, Marino Garcia MD, 10 mg at 10/17/19 4597    levothyroxine tablet 50 mcg, 50 mcg, Oral, Daily, Marino Garcia MD, 50 mcg at 10/18/19 0835    lisinopril (ZESTRIL) tablet 10 mg, 10 mg, Oral, Daily, Marino Garcia MD, 10 mg at 10/18/19 0834    morphine injection 2 mg, 2 mg, Intravenous, Q4H PRN, Marino Garcia, MD, 2 mg at 10/17/19 0205    saccharomyces boulardii (FLORASTOR) capsule 250 mg, 250 mg, Oral, BID, Corazon De León MD, 250 mg at 10/18/19 0835    traMADol (ULTRAM) tablet 50 mg, 50 mg, Oral, Q6H PRN, Austin Tee MD, 50 mg at 10/18/19 0450    Blood Culture:   No results found for: BLOODCX    Wound Culture:   No results found for: WOUNDCULT    Ins and Outs:  I/O last 24 hours: In: 200 [P O :200]  Out: 400 [Urine:400]          Physical Exam:   /66   Pulse 61   Temp 97 7 °F (36 5 °C)   Resp 16   Ht 4' 10" (1 473 m)   Wt 51 5 kg (113 lb 8 6 oz)   SpO2 97%   BMI 23 73 kg/m²   Gen: Alert and oriented to person, place, time  HEENT: EOMI, eyes clear, moist mucus membranes, hearing intact  Respiratory: Bilateral chest rise  No audible wheezing found  Cardiovascular: Regular Rate and Rhythm  Abdomen: soft nontender/nondistended  Musculoskeletal: BACK  · Skin intact, no open lesions  · Tenderness with palpation over the thoracolumbar spine centrally and paraspinal muscles as well  · Negative straight leg bilaterally  · Sensation is intact to light touch bilateral lower extremities  · Normal strength lower extremity    Examination left hand  · Skin intact with no erythema  · Limited range of motion 3rd digit secondary to fracture  · Rest of the hand and wrist has full range of motion noted  · Sensation is intact to light touch    Radiology:   I personally reviewed the films  X-rays of the left hand shows an acute nondisplaced fracture of the volar base of the left 3rd middle phalanx    MRI of the thoracic spine shows minor depression of the T10 vertebral body with no significant bony retropulsion and no canal stenosis   _*_*_*_*_*_*_*_*_*_*_*_*_*_*_*_*_*_*_*_*_*_*_*_*_*_*_*_*_*_*_*_*_*_*_*_*_*_*_*_*_*    Assessment:   82 y  o female with recent fall and MRI evidence of mild T10 compression fracture  Patient also with a nondisplaced fracture of the left 3rd middle phalanx      Plan:   · Nonweightbearing left upper extremity  · Will keep splint on left 3rd finger  · Will order TLSO brace for compression fracture  · Physical therapy to fit brace  Patient to wear TLSO brace when up and ambulating may remove when sleeping and for therapy  · PT/OT evaluate and treat  · No further orthopedic treatment needed at this time  Patient may be discharged when medically stable    Patient to follow-up with doctor Moraes within a week      Kimber Nick PA-C

## 2019-10-18 NOTE — PLAN OF CARE
Problem: PHYSICAL THERAPY ADULT  Goal: Performs mobility at highest level of function for planned discharge setting  See evaluation for individualized goals  Description  Treatment/Interventions: Functional transfer training, LE strengthening/ROM, Therapeutic exercise, Endurance training, Patient/family training, Equipment eval/education, Bed mobility, Gait training, Spoke to nursing          See flowsheet documentation for full assessment, interventions and recommendations  Outcome: Progressing  Note:   Prognosis: Good  Problem List: Decreased strength, Decreased endurance, Impaired balance, Decreased mobility, Orthopedic restrictions, Pain  Assessment: pt demonstrating progress c PT  fitted for TLSO while seated at EOB  demonstrated proper donning/doffing technique to pt c good understanding  completed mobility tasks c (S)/CG (A)  progressed ambulation distance to 180' c occassional HHA on R  able to tolerate sitting OOB in chair at end of session c chair alarm activated  will cont skilled PT to further maximize functional mobility + return home safely  upon d/c, recommend pt return home c hhpt  Barriers to Discharge: None     Recommendation: Home PT, Home with family support     PT - OK to Discharge: Yes    See flowsheet documentation for full assessment

## 2019-10-18 NOTE — DISCHARGE SUMMARY
Discharge Summary - Marcus Trejo 82 y o  female MRN: [id number]  Unit/Bed#: -01 Encounter: [id number]    Admission Date:    10/16/2019   Discharge Date:   10/18/19   Admitting Diagnosis:   Head injury [S09 90XA]  Spinal fracture of T12 vertebra [facility]) [L05 373T]  Admitting Provider:   Sandoval Coates, DO  Discharge Provider:   Ke Short MD     Primary Care Physician at Discharge:   Ke Short MD,[phone]    HPI:   Marcus Trejo is a 82 y o  female who presents after mechanical fall that happened around 1:00 p m  Today  It happened in her house at the time when she was reaching for something on the shelf in her bathroom  She states that she fell backwards and hit her back against the wall  Currently, she is complaining of rib pain bilaterally mainlyradiating to the back  She denies chest pain, dizziness, syncope, shortness of breath, urinary changes  She does state that she has chronic neuropathy of her lower extremities, "which make her feet her feet curl'  Procedures Performed:   No orders of the defined types were placed in this encounter  Hospital Course:   Nondisplaced T10 fracture-analgesia was provided with Tylenol ATC and tramadol as needed  Patient was evaluated by Physical therapy and Orthopedics  She was fitted with a TLSO brace and cleared for discharge home with home physical therapy  MRI was performed revealing no central canal stenosis or retropulsion of the fracture  Left 3rd finger fracture was noted a peak follow-up arranged  Patient choked on a calcium tablet while in the emergency department  Speech therapy evaluation revealed no significant swallowing issues and she was cleared for regular diet with thin liquids  Chest x-ray was unremarkable  Other medical problems remained stable on outpatient regimen        Current Facility-Administered Medications:     acetaminophen (TYLENOL) tablet 975 mg, 975 mg, Oral, Q6H [facility], Ke Short MD, 975 mg at 10/18/19 0547    albuterol (PROVENTIL HFA,VENTOLIN HFA) inhaler 2 puff, 2 puff, Inhalation, Q6H PRN, Sherie Pino MD    ALPRAZolam Jeanette Lock) tablet 0 5 mg, 0 5 mg, Oral, HS PRN, Sherie Pino MD, 0 5 mg at 10/17/19 1033    atenolol (TENORMIN) tablet 50 mg, 50 mg, Oral, BID, Sherie Pino MD, 50 mg at 10/18/19 0834    atorvastatin (LIPITOR) tablet 40 mg, 40 mg, Oral, Daily With Cruzito Torres MD, 40 mg at 10/17/19 1748    calcium carbonate-vitamin D (OSCAL-D) 500 mg-200 units per tablet 1 tablet, 1 tablet, Oral, Daily With Breakfast, Sherie Pino MD, 1 tablet at 10/17/19 0830    ferrous sulfate tablet 325 mg, 325 mg, Oral, Daily, Sherie Pino MD, 325 mg at 10/18/19 0835    heparin (porcine) subcutaneous injection 5,000 Units, 5,000 Units, Subcutaneous, Q8H [facility], 5,000 Units at 10/18/19 0547 **AND** [COMPLETED] Platelet count, , , Once, Sherie Pino MD    imipramine (TOFRANIL) tablet 10 mg, 10 mg, Oral, Once per day on Sun Tue Thu Sat, Sherie Pino MD, 10 mg at 10/17/19 9186    levothyroxine tablet 50 mcg, 50 mcg, Oral, Daily, Sherie Pino MD, 50 mcg at 10/18/19 0835    lisinopril (ZESTRIL) tablet 10 mg, 10 mg, Oral, Daily, Sherie Pino MD, 10 mg at 10/18/19 0834    morphine injection 2 mg, 2 mg, Intravenous, Q4H PRN, Sherie Pino MD, 2 mg at 10/17/19 0205    saccharomyces boulardii (FLORASTOR) capsule 250 mg, 250 mg, Oral, BID, Sherie Pino MD, 250 mg at 10/18/19 0835    traMADol (ULTRAM) tablet 50 mg, 50 mg, Oral, Q6H PRN, James Leung MD, 50 mg at 10/18/19 045      Consulting Providers   Orthopedics    Significant Findings, Care, Treatment and Services Provided:     CT CHEST, ABDOMEN AND PELVIS WITH IV CONTRAST:  IMPRESSION:     No evidence of solid organ injury      Suspected acute nondisplaced compression fracture involving the superior aspect of the T10 vertebral body    There is no retropulsion at the posterior aspect of the T10 vertebral body into the spinal canal   An MRI of the thoracic spine could be performed   for further evaluation      Mild diffuse emphysematous lung changes  No pneumothorax      No acute intra-abdominal abnormality  No free air or free fluid      Moderate large hiatal hernia  MRI thoracic spine:IMPRESSION:     Recent compression fracture of the T10 vertebral body estimated 10-15% loss of vertebral body height without retropulsion of bone or significant canal stenosis        Chest x-ray:IMPRESSION:     No acute cardiopulmonary disease  Hiatus hernia      No evidence of opaque foreign body  Complications:     Physical Exam:  General Appearance:    Alert, cooperative, no distress   Head:    Normocephalic, without obvious abnormality, atraumatic   Eyes:    PERRL, conjunctiva/corneas clear, EOM's intact       Nose:   Moist mucous membranes, no drainage or sinus tenderness   Throat:   No tenderness, no exudates   Neck:   Supple, symmetrical, trachea midline, no JVD   Lungs:     Clear to auscultation bilaterally, respirations unlabored   Heart:    Regular rate and rhythm, S1 and S2 normal, 1/6 systolic murmur, no rub   or gallop   Abdomen: Soft, non-tender, positive bowel sounds, no masses, no organomegaly   Extremities:  No pedal edema, calf tenderness  Distal pulses palpable  Neurologic:     CNII-XII intact      Labs:   Lab Results   Component Value Date    WBC 6 59 10/18/2019    WBC 6 51 09/10/2015    RBC 4 15 10/18/2019    RBC 3 67 (L) 09/10/2015    HGB 13 0 10/18/2019    HGB 10 9 (L) 09/10/2015    HCT 39 0 10/18/2019    HCT 32 8 (L) 09/10/2015    MCV 94 10/18/2019    MCV 89 09/10/2015    MCH 31 3 10/18/2019    MCH 29 7 09/10/2015    RDW 13 1 10/18/2019    RDW 14 2 09/10/2015     10/18/2019     09/10/2015     Lab Results   Component Value Date    CREATININE 1 46 (H) 10/18/2019    CREATININE 1 14 09/10/2015    BUN 18 10/18/2019    BUN 15 09/10/2015     09/10/2015    K 4 5 10/18/2019    K 3 8 09/10/2015     10/18/2019     09/10/2015    CO2 27 10/18/2019    CO2 28 09/10/2015    GLUCOSE 85 09/10/2015    PROT 7 6 10/01/2015    PROT 7 6 09/10/2015    ALKPHOS 89 10/18/2019    ALKPHOS 96 09/10/2015    ALT 60 10/18/2019    ALT 17 09/10/2015    AST 90 (H) 10/18/2019    AST 13 09/10/2015    BILIDIR 0 11 07/26/2018    BILIDIR 0 16 09/10/2015       Treatments:  therapies: PT and OT    Discharge Diagnosis:   Principal Problem:    Closed fracture of tenth thoracic vertebra with routine healing  Active Problems:    Anxiety    Hypertension    Chronic kidney disease    Age-related osteoporosis without current pathological fracture    Nondisplaced fracture of middle phalanx of left middle finger, initial encounter for closed fracture    Fall as cause of accidental injury in home as place of occurrence  Resolved Problems:    * No resolved hospital problems  *      Condition at Discharge:   Good     Code Status: Level 3 - DNAR and DNI  Advance Directive and Living Will: <no information>  Power of :    POLST:      Discharge instructions/Information to patient and family:   See after visit summary for information provided to patient and family  Provisions for Follow-Up Care:  See after visit summary for information related to follow-up care and any pertinent home health orders  Disposition:   Home    Planned Readmission:   No    Discharge Statement   I spent 32 minutes discharging the patient  This time was spent on the day of discharge  I had direct contact with the patient on the day of discharge  Additional documentation is required if more than 30 minutes were spent on discharge  Greater than 50% of the total time was spent examining patient, answering all patient questions, arranging and discussing plan of care with patient as well as directly providing post-discharge instructions  Additional time then spent on discharge activities      Discharge Medications:  See after visit summary for reconciled discharge medications provided to patient and family        Hugo Joshua MD  10/18/2019,11:49 AM

## 2019-10-18 NOTE — SOCIAL WORK
LOS 2 GMLOS 2 9 per SLIM patient is cleared for discharge and will need hhc for PT  Referral is sent to [facility] for PT and OT services

## 2019-10-21 ENCOUNTER — TRANSITIONAL CARE MANAGEMENT (OUTPATIENT)
Dept: INTERNAL MEDICINE CLINIC | Facility: CLINIC | Age: 84
End: 2019-10-21

## 2019-10-21 ENCOUNTER — TELEPHONE (OUTPATIENT)
Dept: OBGYN CLINIC | Facility: HOSPITAL | Age: 84
End: 2019-10-21

## 2019-10-24 ENCOUNTER — TELEPHONE (OUTPATIENT)
Dept: INTERNAL MEDICINE CLINIC | Facility: CLINIC | Age: 84
End: 2019-10-24

## 2019-10-24 NOTE — TELEPHONE ENCOUNTER
She fell & injured vertebrae    Dr knows about it    Tramadol is not helping    Taking as needed    Today not helping at all and is asking if she can take tylenol in addition to tramadol ???  She will be seeing Dr Dallin Luciano tomorrow @ 8:30 & is In a lot of pain

## 2019-10-25 ENCOUNTER — APPOINTMENT (OUTPATIENT)
Dept: RADIOLOGY | Facility: CLINIC | Age: 84
End: 2019-10-25
Payer: MEDICARE

## 2019-10-25 ENCOUNTER — OFFICE VISIT (OUTPATIENT)
Dept: OBGYN CLINIC | Facility: CLINIC | Age: 84
End: 2019-10-25

## 2019-10-25 ENCOUNTER — TELEPHONE (OUTPATIENT)
Dept: INTERNAL MEDICINE CLINIC | Facility: CLINIC | Age: 84
End: 2019-10-25

## 2019-10-25 VITALS
SYSTOLIC BLOOD PRESSURE: 178 MMHG | BODY MASS INDEX: 23.09 KG/M2 | DIASTOLIC BLOOD PRESSURE: 96 MMHG | HEART RATE: 56 BPM | WEIGHT: 110 LBS | HEIGHT: 58 IN

## 2019-10-25 DIAGNOSIS — M79.645 PAIN OF FINGER OF LEFT HAND: ICD-10-CM

## 2019-10-25 DIAGNOSIS — S62.653D NONDISPLACED FRACTURE OF MIDDLE PHALANX OF LEFT MIDDLE FINGER, SUBSEQUENT ENCOUNTER FOR FRACTURE WITH ROUTINE HEALING: ICD-10-CM

## 2019-10-25 DIAGNOSIS — M54.6 THORACIC BACK PAIN, UNSPECIFIED BACK PAIN LATERALITY, UNSPECIFIED CHRONICITY: ICD-10-CM

## 2019-10-25 DIAGNOSIS — M54.6 THORACIC BACK PAIN, UNSPECIFIED BACK PAIN LATERALITY, UNSPECIFIED CHRONICITY: Primary | ICD-10-CM

## 2019-10-25 PROCEDURE — 72070 X-RAY EXAM THORAC SPINE 2VWS: CPT

## 2019-10-25 PROCEDURE — 99024 POSTOP FOLLOW-UP VISIT: CPT | Performed by: ORTHOPAEDIC SURGERY

## 2019-10-25 PROCEDURE — 73140 X-RAY EXAM OF FINGER(S): CPT

## 2019-10-25 RX ORDER — TRAVOPROST 0.004 %
DROPS OPHTHALMIC (EYE)
Refills: 0 | COMMUNITY
Start: 2019-10-05 | End: 2020-10-27 | Stop reason: ALTCHOICE

## 2019-10-25 NOTE — PROGRESS NOTES
HPI:  Patient is a 82y o  year old RHD female  who presents with chief complaint of Back Pain  Patient presents for evaluation of mid back problems  Symptoms have been present for 10 days  T10 nondisplaced fracture   Positive pain in thoracic (aching and dull in character; /10 in severity)  Initial inciting event: Fall backwards hitting back against a wall in her bathroom  , 10/16/19  Alleviating factors identifiable by the patient are rest, brace   Aggravating factors identifiable by the patient are all movements at this point  Treatments initiated by the patient: TLSO brace, Tylenol and Tramadol  The Tramadolminimally effective  However symptoms are improving  An Mri of the thoracic was performed revealing no central canal stenosis or retropulsion of the fracture  No neurologic complaints  She also has nondisplaced middle phalanx fracture of the left middle finger  This was a volar plate avulsion at the base of the middle phalanx  Nondisplaced  She presents with an aluminum splint today  She is accompanied with her daughter today       ROS:   General: No fever, no chills, no weight loss, no weight gain  HEENT:  No loss of hearing, no nose bleeds, no sore throat  Eyes:  No eye pain, no red eyes, no visual disturbance  Respiratory:  No cough, no shortness of breath, no wheezing  Cardiovascular:  No chest pain, no palpitations, no edema  GI: No abdominal pain, no nausea, no vomiting  Endocrine: No frequent urination, no excessive thirst  Urinary:  No dysuria, no hematuria, no incontinence  Musculoskeletal: see HPI and PE  Skin:  No rash, no wounds  Neurological:  No dizziness, no headache, no numbness  Psychiatric:  No difficulty concentrating, no depression, no suicide thoughts, no anxiety  Review of all other systems is negative    PMH:  Past Medical History:   Diagnosis Date    Benign essential hypertension     Last assessed: 9/11/14    Disease of thyroid gland     Fibromyalgia     GERD (gastroesophageal reflux disease)     History of nail disorders     Hyperlipidemia     Hypertension     Palpitations     Parkinson's disease ([address] )     Transient cerebral ischemia        PSH:  Past Surgical History:   Procedure Laterality Date    APPENDECTOMY      HYSTERECTOMY  01/01/2010    DE LAP,CHOLECYSTECTOMY/GRAPH N/A 4/4/2018    Procedure: LAPAROSCOPIC CHOLECYSTECTOMY WITH IOC;  Surgeon: Luis South MD;  Location: [facility];  Service: General    TUBAL LIGATION         Medications:  Current Outpatient Medications   Medication Sig Dispense Refill    acetaminophen (TYLENOL) 325 mg tablet Take 1-2 tablets by mouth every 6 (six) hours as needed      albuterol (PROVENTIL HFA,VENTOLIN HFA) 90 mcg/act inhaler Inhale 2 puffs every 6 (six) hours as needed for wheezing or shortness of breath 1 Inhaler 5    ALPRAZolam (XANAX) 0 5 mg tablet Take 0 5 mg by mouth daily as needed        atenolol (TENORMIN) 50 mg tablet Take 1 tablet (50 mg total) by mouth 2 (two) times a day 180 tablet 3    ferrous sulfate 325 (65 Fe) mg tablet Take 1 tablet by mouth daily      imipramine (TOFRANIL) 10 mg tablet Take 1 tablet (10 mg total) by mouth 4 (four) times a week 90 tablet 3    levothyroxine 50 mcg tablet Take 1 tablet (50 mcg total) by mouth daily 90 tablet 3    lisinopril (ZESTRIL) 10 mg tablet Take 1 tablet (10 mg total) by mouth daily 90 tablet 3    Probiotic Product (ALIGN) 4 MG CAPS Take 1 tablet by mouth daily      rosuvastatin (CRESTOR) 10 MG tablet Take 1 tablet (10 mg total) by mouth daily 90 tablet 1    traMADol (ULTRAM) 50 mg tablet Take 1 tablet (50 mg total) by mouth every 6 (six) hours as needed for moderate pain for up to 10 days 25 tablet 0    TRAVATAN Z 0 004 % ophthalmic solution   0    Calcium Carbonate (CALTRATE 600) 1500 (600 Ca) MG TABS 1 po bid (Patient not taking: Reported on 10/25/2019)  0     No current facility-administered medications for this visit  Allergies:   Allergies Allergen Reactions    Other Drowsiness     [facility] [id number]: ANTIDEPRESSANTS       Family History:  Family History   Problem Relation Age of Onset   Evangelina Lilly Stroke Mother         ischemic stroke    Hypertension Mother     Heart disease Father        Social History:  Social History     Occupational History    Occupation: retired    Tobacco Use    Smoking status: Former Smoker     Packs/day: 0 10     Years: 50 00     Pack years: 5 00     Types: Cigarettes     Last attempt to quit:      Years since quittin 8    Smokeless tobacco: Never Used    Tobacco comment: 1 pack a week    Substance and Sexual Activity    Alcohol use: Never     Alcohol/week: 0 0 standard drinks     Frequency: Never     Drinks per session: Patient refused     Binge frequency: Never     Comment: 0    Drug use: No    Sexual activity: Not Currently       Physical Exam:  General :  Alert, cooperative, no distress, appears stated age  Blood pressure (!) 178/96, pulse 56, height 4' 10" (1 473 m), weight 49 9 kg (110 lb), not currently breastfeeding  Head:  Normocephalic, without obvious abnormality, atraumatic   Eyes:  Conjunctiva/corneas clear, EOM's intact,   Ears: Both ears normal appearance, no hearing deficits  Nose: Nares normal, septum midline, no drainage    Neck: Supple,  trachea midline, no adenopathy, no tenderness, no mass   Back:   Symmetric, no curvature, ROM normal, no tenderness   Lungs:   Respirations unlabored   Chest Wall:  No tenderness or deformity   Extremities: Extremities normal, atraumatic, no cyanosis or edema      Pulses: 2+ and symmetric   Skin: Skin color, texture, turgor normal, no rashes or lesions      Neurologic: Normal           Back Exam     Tenderness   The patient is experiencing tenderness in the thoracic      Other   Sensation: normal  Gait: antalgic   Erythema: no back redness      Left Hand Exam     Tenderness   Left hand tenderness location: Minimal tenderness over the volar base of the middle phalanx      Range of Motion   The patient has normal left wrist ROM  Other   Erythema: absent  Sensation: normal  Pulse: present    Comments:  Mild ecchymosis there digit  Neurologic exam nonfocal     Imaging Studies: The following imaging studies were reviewed in office today  My findings are noted  An Mri of the thoracic was performed revealing no central canal stenosis or retropulsion of the fracture  Xrays of the left hand show:  Nondisplaced volar plate avulsion fracture at the base of the middle phalanx of the 3rd finger  X-rays of the thoracic spine reveal no change in height of the T10 vertebra  There is significant scoliosis  Assessment  Encounter Diagnoses   Name Primary?  Thoracic back pain, unspecified back pain laterality, unspecified chronicity Yes    Pain of finger of left hand     Nondisplaced fracture of middle phalanx of left middle finger, subsequent encounter for fracture with routine healing          Plan:      - Explained that these types of fractures normal take 2-3 months to heal  Healing correlates with her symptoms of pain  As the fracture heals the pain will become lessen  - She can ice the localized area  She can use heat contrast as well  - Use the TLSO brace when she is erect and/or when she has pain  No need to sleep in brace to use brace when lying down or if comfortable without it      Follow up in 4 weeks for re-evaluation  dorsal extension block splint applied  Patient is to use this for 1 more week to allow the volar plate injury to heal    - After 1 week she can d/c and do her own ROM and use hand as normal  - PRN for the finger    Scribe Attestation    I,:   Donna Martínez am acting as a scribe while in the presence of the attending physician :        I,:   Natasha Augustine MD personally performed the services described in this documentation    as scribed in my presence :

## 2019-10-25 NOTE — TELEPHONE ENCOUNTER
Kathy Vasquez 4 CALLED AND SAID THAT THE PHYSICAL THERAPY WILL BE  GOING OUT TO THE HOME ON Monday October 28, 2019

## 2019-10-29 ENCOUNTER — OFFICE VISIT (OUTPATIENT)
Dept: INTERNAL MEDICINE CLINIC | Facility: CLINIC | Age: 84
End: 2019-10-29
Payer: MEDICARE

## 2019-10-29 VITALS
HEART RATE: 64 BPM | BODY MASS INDEX: 23.38 KG/M2 | HEIGHT: 58 IN | WEIGHT: 111.4 LBS | RESPIRATION RATE: 12 BRPM | SYSTOLIC BLOOD PRESSURE: 120 MMHG | DIASTOLIC BLOOD PRESSURE: 76 MMHG
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
## 2019-10-29 DIAGNOSIS — S62.653A NONDISPLACED FRACTURE OF MIDDLE PHALANX OF LEFT MIDDLE FINGER, INITIAL ENCOUNTER FOR CLOSED FRACTURE: ICD-10-CM

## 2019-10-29 DIAGNOSIS — S22.078D OTHER CLOSED FRACTURE OF TENTH THORACIC VERTEBRA WITH ROUTINE HEALING, SUBSEQUENT ENCOUNTER: ICD-10-CM

## 2019-10-29 PROCEDURE — 99495 TRANSJ CARE MGMT MOD F2F 14D: CPT | Performed by: INTERNAL MEDICINE

## 2019-10-29 RX ORDER — TRAVOPROST 0.004 %
1 DROPS OPHTHALMIC (EYE)
Refills: 0 | Status: CANCELLED | OUTPATIENT
Start: 2019-10-29

## 2019-10-29 NOTE — PROGRESS NOTES
Assessment/Plan:     No problem-specific Assessment & Plan notes found for this encounter  Diagnoses and all orders for this visit:    Fall as cause of accidental injury in home as place of occurrence, subsequent encounter    Other closed fracture of tenth thoracic vertebra with routine healing, subsequent encounter    Nondisplaced fracture of middle phalanx of left middle finger, initial encounter for closed fracture    Other orders  -     Cancel: TRAVATAN Z 0 004 % ophthalmic solution; Administer 1 drop to both eyes Daily at 2am         Patient Instructions   Status post admission for fall at home without LOC with fracture of T10 with no retropulsion or central canal impingement and fracture of left 3rd phalanx healing well  Increase Tylenol to 1000 mg 3 times daily as needed for analgesia  Wear the TLSO brace for activities  Routine follow-up after labs in December, sooner as needed  Subjective:     Patient ID: Mino Segura is a 82 y o  female  T10 fx 10/16 s/p fall along w/ L 3rd finger fx  Seen by ortho 10/25  Removed splint  Wearing TLSO for activity, but didn't wear to this appt  Stopped tramadol b/c it caused constipation, now better w/ miralax  Now taking only 500 mg tylenol prn  Review of Systems   Constitutional: Negative for appetite change, chills, diaphoresis, fatigue, fever and unexpected weight change  HENT: Negative for congestion, hearing loss and rhinorrhea  Eyes: Negative for visual disturbance  Respiratory: Negative for cough, chest tightness, shortness of breath and wheezing  Cardiovascular: Negative for chest pain, palpitations and leg swelling  Gastrointestinal: Negative for abdominal pain and blood in stool  Endocrine: Negative for cold intolerance, heat intolerance, polydipsia and polyuria  Genitourinary: Negative for difficulty urinating, dysuria, frequency and urgency  Musculoskeletal: Positive for back pain   Negative for arthralgias and myalgias  Skin: Negative for rash  Neurological: Negative for dizziness, weakness, light-headedness and headaches  Hematological: Does not bruise/bleed easily  Psychiatric/Behavioral: Negative for dysphoric mood and sleep disturbance  Objective:     Physical Exam   Constitutional: She is oriented to person, place, and time  She appears well-developed and well-nourished  HENT:   Head: Normocephalic and atraumatic  Nose: Nose normal    Mouth/Throat: Oropharynx is clear and moist    Eyes: Pupils are equal, round, and reactive to light  Conjunctivae and EOM are normal  No scleral icterus  Neck: Normal range of motion  Neck supple  No JVD present  No tracheal deviation present  No thyromegaly present  Cardiovascular: Normal rate, regular rhythm and intact distal pulses  Exam reveals no gallop and no friction rub  No murmur heard  Pulmonary/Chest: Effort normal and breath sounds normal  No respiratory distress  She has no wheezes  She has no rales  Musculoskeletal: She exhibits no edema or deformity  Lymphadenopathy:     She has no cervical adenopathy  Neurological: She is alert and oriented to person, place, and time  No cranial nerve deficit  Skin: Skin is warm and dry  No rash noted  No erythema  No pallor  Psychiatric: She has a normal mood and affect  Her behavior is normal  Judgment and thought content normal          Vitals:    10/29/19 1036   BP: 120/76   BP Location: Left arm   Patient Position: Sitting   Pulse: 64   Resp: 12   Weight: 50 5 kg (111 lb 6 4 oz)   Height: 4' 10" (1 473 m)       Transitional Care Management Review:  Lolis Palomo is a 82 y o  female here for TCM follow up       During the TCM phone call patient stated:    TCM Call (since 9/28/2019)     Date and time call was made  10/21/2019 12:54 PM    Hospital care reviewed  Records reviewed    Patient was hospitialized at  [facility]    Date of Admission  10/16/19    Date of discharge  10/18/19 Diagnosis  Fall=Spinal fracture of T12 vertebra     Disposition  Home; Home health services    Were the patients medications reviewed and updated  No <img src='C:FILES (X86)    Current Symptoms  -- <img src='C:FILES (X86)      TCM Call (since 9/28/2019)     Post hospital issues  None    Scheduled for follow up?   Yes    Patients specialists  Other (comment)    Other specialists names  Hilda Bautista MD    Other specialists contcat #  [phone]    Did you obtain your prescribed medications  Yes <img src='C:FILES (X86)    Do you need help managing your prescriptions or medications  No    Is transportation to your appointment needed  No    I have advised the patient to call PCP with any new or worsening symptoms  Yo Arceo LPN    Are you recieving home care services  Yes    Types of home care services  Nurse visit; Home PT    Interperter language line needed  No    Counseling  Patient    Counseling topics  Importance of RX compliance          Lokesh Taylor MD

## 2019-10-30 ENCOUNTER — TELEPHONE (OUTPATIENT)
Dept: INTERNAL MEDICINE CLINIC | Facility: CLINIC | Age: 84
End: 2019-10-30

## 2019-10-30 NOTE — TELEPHONE ENCOUNTER
She should be on a calcium supplement, 1200 mg per day with  At least 800 international units of vitamin D3    The albuterol is as needed

## 2019-10-30 NOTE — TELEPHONE ENCOUNTER
She is there for phys therapy eval  And plans to do phys therapy, twice a week for next month    To build up her strength        Also said on pt's paper work from appt yesterday    it says she takes Calcium or Caltrate    Which patient said she isn't taking    Needs to know if she it suppose to be taking this ? ??    Her paperwork also says albuterol    Which patient said she never takes    Please call Laila back to clarify

## 2019-11-18 DIAGNOSIS — E78.49 OTHER HYPERLIPIDEMIA: ICD-10-CM

## 2019-11-18 RX ORDER — ROSUVASTATIN CALCIUM 10 MG/1
10 TABLET, COATED ORAL DAILY
Qty: 90 TABLET | Refills: 1 | Status: SHIPPED | OUTPATIENT
Start: 2019-11-18 | End: 2020-05-19 | Stop reason: SDUPTHER

## 2019-11-22 ENCOUNTER — OFFICE VISIT (OUTPATIENT)
Dept: OBGYN CLINIC | Facility: CLINIC | Age: 84
End: 2019-11-22

## 2019-11-22 ENCOUNTER — APPOINTMENT (OUTPATIENT)
Dept: RADIOLOGY | Facility: CLINIC | Age: 84
End: 2019-11-22
Payer: MEDICARE

## 2019-11-22 VITALS
HEART RATE: 59 BPM | WEIGHT: 110 LBS | HEIGHT: 58 IN | SYSTOLIC BLOOD PRESSURE: 154 MMHG | BODY MASS INDEX: 23.09 KG/M2 | DIASTOLIC BLOOD PRESSURE: 79 MMHG

## 2019-11-22 DIAGNOSIS — S62.653D NONDISPLACED FRACTURE OF MIDDLE PHALANX OF LEFT MIDDLE FINGER, SUBSEQUENT ENCOUNTER FOR FRACTURE WITH ROUTINE HEALING: ICD-10-CM

## 2019-11-22 DIAGNOSIS — M54.6 THORACIC BACK PAIN, UNSPECIFIED BACK PAIN LATERALITY, UNSPECIFIED CHRONICITY: ICD-10-CM

## 2019-11-22 DIAGNOSIS — S22.070D COMPRESSION FRACTURE OF T10 VERTEBRA WITH ROUTINE HEALING, SUBSEQUENT ENCOUNTER: ICD-10-CM

## 2019-11-22 DIAGNOSIS — S62.653D NONDISPLACED FRACTURE OF MIDDLE PHALANX OF LEFT MIDDLE FINGER, SUBSEQUENT ENCOUNTER FOR FRACTURE WITH ROUTINE HEALING: Primary | ICD-10-CM

## 2019-11-22 PROCEDURE — 73140 X-RAY EXAM OF FINGER(S): CPT

## 2019-11-22 PROCEDURE — 72070 X-RAY EXAM THORAC SPINE 2VWS: CPT

## 2019-11-22 PROCEDURE — 99024 POSTOP FOLLOW-UP VISIT: CPT | Performed by: ORTHOPAEDIC SURGERY

## 2019-11-22 NOTE — PROGRESS NOTES
Chief Complaint   Patient presents with    Middle Back - Pain, Fracture    Left Middle Finger - Follow-up, Fracture         Subjective   Patient here follow-up for the lower back and middle finger  Initial injury was from a fall on October 16, 2019  She did sustain a T 10 compression fracture and a left 3rd middle phalanx fracture  Patient states as far as her left middle finger goes she has no pain and full range of motion  She had remove the splint and is using it daily  Has intermittent swelling from time to time but much improved  She states her back feels better as well  She did not have much pain from the beginning  The TLSO brace was actually making it worse  She denies any current pain of the lower back  Denies any numbness tingling lower extremities        ROS:  Review of systems form reviewed November 22, 2019  General: no fever, no chills  Respiratory:  No coughing, shortness of breath or wheezing  Cardiovascular:  No chest pain, no palpitations  Musculoskeletal: see HPI and PE  SKIN:  No skin rash, no dry skin  Neurological:  No headaches, no confusion  Psychiatric:  No suicide thoughts, no anxiety, no depression  Review of all other systems is negative    Past Medical History:   Diagnosis Date    Benign essential hypertension     Last assessed: 9/11/14    Disease of thyroid gland     Fibromyalgia     GERD (gastroesophageal reflux disease)     History of nail disorders     Hyperlipidemia     Hypertension     Palpitations     Parkinson's disease ([facility] [address]     Transient cerebral ischemia        Current Outpatient Medications on File Prior to Visit   Medication Sig Dispense Refill    acetaminophen (TYLENOL) 325 mg tablet Take 1-2 tablets by mouth every 6 (six) hours as needed      albuterol (PROVENTIL HFA,VENTOLIN HFA) 90 mcg/act inhaler Inhale 2 puffs every 6 (six) hours as needed for wheezing or shortness of breath 1 Inhaler 5    ALPRAZolam (XANAX) 0 5 mg tablet Take 0 5 mg by mouth daily as needed        atenolol (TENORMIN) 50 mg tablet Take 1 tablet (50 mg total) by mouth 2 (two) times a day 180 tablet 3    ferrous sulfate 325 (65 Fe) mg tablet Take 1 tablet by mouth daily      imipramine (TOFRANIL) 10 mg tablet Take 1 tablet (10 mg total) by mouth 4 (four) times a week 90 tablet 3    levothyroxine 50 mcg tablet Take 1 tablet (50 mcg total) by mouth daily 90 tablet 3    lisinopril (ZESTRIL) 10 mg tablet Take 1 tablet (10 mg total) by mouth daily 90 tablet 3    Probiotic Product (ALIGN) 4 MG CAPS Take 1 tablet by mouth daily      rosuvastatin (CRESTOR) 10 MG tablet Take 1 tablet (10 mg total) by mouth daily 90 tablet 1    TRAVATAN Z 0 004 % ophthalmic solution   0    Calcium Carbonate (CALTRATE 600) 1500 (600 Ca) MG TABS 1 po bid (Patient not taking: Reported on 11/22/2019)  0     No current facility-administered medications on file prior to visit  Allergies   Allergen Reactions    Other Drowsiness     [facility] - [id number]: ANTIDEPRESSANTS         Physical Exam:    Vitals:    11/22/19 1409   BP: 154/79   Pulse: 59   Weight: 49 9 kg (110 lb)   Height: 4' 10" (1 473 m)       General Appearance:  Alert, cooperative, no distress, appears stated age   Lungs:   respirations unlabored   Heart:  Normal heart rate noted   Abdomen:   Soft, non-tender,  no masses   Extremities: Extremities normal, atraumatic, no cyanosis or edema   Pulses: 2+ and symmetric   Skin: Skin color, texture, turgor normal, no rashes or lesions   Neurologic: Normal         Ortho Exam  Examination lower back/midback shows skin intact no erythema  No tenderness with palpation over the lower thoracic and lumbar spine  Mild tenderness right paraspinal muscle lower back    Patient with normal range of motion    Left hand skin intact no erythema  Left middle finger with normal range of motion at DIP, PIP and MCP with no pain  Able to make complete fist  Sensation is intact to light touch median, ulnar and radial nerve distributions    Imaging  X-rays November 22, 2019 thoracic spine shows stable fracture of the T10 vertebra with no change in height    November 22, 2018 left hand healed nondisplaced volar plate avulsion fracture at the base of the middle phalanx 3rd finger    ASSESSMENT:    Lakeisha was seen today for pain, fracture, follow-up and fracture  Diagnoses and all orders for this visit:    Nondisplaced fracture of middle phalanx of left middle finger, subsequent encounter for fracture with routine healing  -     XR finger left third digit-middle; Future    Thoracic back pain, unspecified back pain laterality, unspecified chronicity  -     XR spine thoracic 2 vw; Future    Compression fracture of T10 vertebra with routine healing, subsequent encounter  -     XR spine thoracic 2 vw; Future          PLAN:  Patient with a healed left 3rd middle phalanx fracture as well as a T12 compression fracture  It has been five weeks now and patient feels much improved  She may discontinue the use of the TLSO brace, only wear as needed  She may start using her hand doing gentle strengthening  Patient may resume all her activities    Will follow up with us only as needed    Scribe Attestation    I,:   Kimber Nick PA-C am acting as a scribe while in the presence of the attending physician :        I,:   Jackie Sandhoff, MD personally performed the services described in this documentation    as scribed in my presence :

## 2019-12-13 ENCOUNTER — APPOINTMENT (OUTPATIENT)
Dept: LAB | Facility: CLINIC | Age: 84
End: 2019-12-13
Payer: MEDICARE

## 2019-12-13 DIAGNOSIS — R73.01 IMPAIRED FASTING GLUCOSE: ICD-10-CM

## 2019-12-13 DIAGNOSIS — I10 ESSENTIAL HYPERTENSION: ICD-10-CM

## 2019-12-13 DIAGNOSIS — E78.2 MIXED HYPERLIPIDEMIA: ICD-10-CM

## 2019-12-13 DIAGNOSIS — E03.9 ACQUIRED HYPOTHYROIDISM: ICD-10-CM

## 2019-12-13 LAB
ALBUMIN SERPL BCP-MCNC: 3.7 G/DL (ref 3.5–5)
ALP SERPL-CCNC: 101 U/L (ref 46–116)
ALT SERPL W P-5'-P-CCNC: 22 U/L (ref 12–78)
ANION GAP SERPL CALCULATED.3IONS-SCNC: 5 MMOL/L (ref 4–13)
AST SERPL W P-5'-P-CCNC: 24 U/L (ref 5–45)
BASOPHILS # BLD AUTO: 0.02 THOUSANDS/ΜL (ref 0–0.1)
BASOPHILS NFR BLD AUTO: 0 % (ref 0–1)
BILIRUB SERPL-MCNC: 0.48 MG/DL (ref 0.2–1)
BUN SERPL-MCNC: 13 MG/DL (ref 5–25)
CALCIUM SERPL-MCNC: 10.1 MG/DL (ref 8.3–10.1)
CHLORIDE SERPL-SCNC: 106 MMOL/L (ref 100–108)
CHOLEST SERPL-MCNC: 130 MG/DL (ref 50–200)
CO2 SERPL-SCNC: 26 MMOL/L (ref 21–32)
CREAT SERPL-MCNC: 1.17 MG/DL (ref 0.6–1.3)
EOSINOPHIL # BLD AUTO: 0.61 THOUSAND/ΜL (ref 0–0.61)
EOSINOPHIL NFR BLD AUTO: 10 % (ref 0–6)
ERYTHROCYTE [DISTWIDTH] IN BLOOD BY AUTOMATED COUNT: 14.4 % (ref 11.6–15.1)
EST. AVERAGE GLUCOSE BLD GHB EST-MCNC: 108 MG/DL
GFR SERPL CREATININE-BSD FRML MDRD: 43 ML/MIN/1.73SQ M
GLUCOSE P FAST SERPL-MCNC: 88 MG/DL (ref 65–99)
HBA1C MFR BLD: 5.4 % (ref 4.2–6.3)
HCT VFR BLD AUTO: 36.1 % (ref 34.8–46.1)
HDLC SERPL-MCNC: 68 MG/DL
HGB BLD-MCNC: 11.7 G/DL (ref 11.5–15.4)
IMM GRANULOCYTES # BLD AUTO: 0.01 THOUSAND/UL (ref 0–0.2)
IMM GRANULOCYTES NFR BLD AUTO: 0 % (ref 0–2)
LDLC SERPL CALC-MCNC: 43 MG/DL (ref 0–100)
LYMPHOCYTES # BLD AUTO: 1.42 THOUSANDS/ΜL (ref 0.6–4.47)
LYMPHOCYTES NFR BLD AUTO: 23 % (ref 14–44)
MCH RBC QN AUTO: 31.4 PG (ref 26.8–34.3)
MCHC RBC AUTO-ENTMCNC: 32.4 G/DL (ref 31.4–37.4)
MCV RBC AUTO: 97 FL (ref 82–98)
MONOCYTES # BLD AUTO: 0.49 THOUSAND/ΜL (ref 0.17–1.22)
MONOCYTES NFR BLD AUTO: 8 % (ref 4–12)
NEUTROPHILS # BLD AUTO: 3.53 THOUSANDS/ΜL (ref 1.85–7.62)
NEUTS SEG NFR BLD AUTO: 59 % (ref 43–75)
NONHDLC SERPL-MCNC: 62 MG/DL
NRBC BLD AUTO-RTO: 0 /100 WBCS
PLATELET # BLD AUTO: 195 THOUSANDS/UL (ref 149–390)
PMV BLD AUTO: 11.2 FL (ref 8.9–12.7)
POTASSIUM SERPL-SCNC: 3.9 MMOL/L (ref 3.5–5.3)
PROT SERPL-MCNC: 7.9 G/DL (ref 6.4–8.2)
RBC # BLD AUTO: 3.73 MILLION/UL (ref 3.81–5.12)
SODIUM SERPL-SCNC: 137 MMOL/L (ref 136–145)
TRIGL SERPL-MCNC: 93 MG/DL
TSH SERPL DL<=0.05 MIU/L-ACNC: 0.49 UIU/ML (ref 0.36–3.74)
WBC # BLD AUTO: 6.08 THOUSAND/UL (ref 4.31–10.16)

## 2019-12-13 PROCEDURE — 36415 COLL VENOUS BLD VENIPUNCTURE: CPT

## 2019-12-13 PROCEDURE — 85025 COMPLETE CBC W/AUTO DIFF WBC: CPT

## 2019-12-13 PROCEDURE — 83036 HEMOGLOBIN GLYCOSYLATED A1C: CPT

## 2019-12-13 PROCEDURE — 84443 ASSAY THYROID STIM HORMONE: CPT

## 2019-12-13 PROCEDURE — 80053 COMPREHEN METABOLIC PANEL: CPT

## 2019-12-13 PROCEDURE — 80061 LIPID PANEL: CPT

## 2019-12-18 ENCOUNTER — OFFICE VISIT (OUTPATIENT)
Dept: INTERNAL MEDICINE CLINIC | Facility: CLINIC | Age: 84
End: 2019-12-18
Payer: MEDICARE

## 2019-12-18 VITALS
HEART RATE: 64 BPM | SYSTOLIC BLOOD PRESSURE: 118 MMHG | BODY MASS INDEX: 22.25 KG/M2 | HEIGHT: 58 IN | RESPIRATION RATE: 12 BRPM | DIASTOLIC BLOOD PRESSURE: 64 MMHG | WEIGHT: 106 LBS

## 2019-12-18 DIAGNOSIS — J45.20 MILD INTERMITTENT ASTHMA WITHOUT COMPLICATION: ICD-10-CM

## 2019-12-18 DIAGNOSIS — D50.9 IRON DEFICIENCY ANEMIA, UNSPECIFIED IRON DEFICIENCY ANEMIA TYPE: ICD-10-CM

## 2019-12-18 DIAGNOSIS — N18.30 STAGE 3 CHRONIC KIDNEY DISEASE (HCC): ICD-10-CM

## 2019-12-18 DIAGNOSIS — E78.2 MIXED HYPERLIPIDEMIA: ICD-10-CM

## 2019-12-18 DIAGNOSIS — K21.9 GERD WITHOUT ESOPHAGITIS: ICD-10-CM

## 2019-12-18 DIAGNOSIS — S22.078D OTHER CLOSED FRACTURE OF TENTH THORACIC VERTEBRA WITH ROUTINE HEALING, SUBSEQUENT ENCOUNTER: ICD-10-CM

## 2019-12-18 DIAGNOSIS — K58.9 IRRITABLE BOWEL SYNDROME, UNSPECIFIED TYPE: Primary | ICD-10-CM

## 2019-12-18 DIAGNOSIS — Q27.30 AVM (ARTERIOVENOUS MALFORMATION): ICD-10-CM

## 2019-12-18 DIAGNOSIS — R73.01 IMPAIRED FASTING GLUCOSE: ICD-10-CM

## 2019-12-18 DIAGNOSIS — F41.9 ANXIETY: ICD-10-CM

## 2019-12-18 DIAGNOSIS — I10 ESSENTIAL HYPERTENSION: ICD-10-CM

## 2019-12-18 DIAGNOSIS — R53.83 FATIGUE, UNSPECIFIED TYPE: ICD-10-CM

## 2019-12-18 PROCEDURE — 99214 OFFICE O/P EST MOD 30 MIN: CPT | Performed by: INTERNAL MEDICINE

## 2019-12-18 NOTE — PROGRESS NOTES
Assessment/Plan:    Diagnoses and all orders for this visit:    Irritable bowel syndrome, unspecified type    GERD without esophagitis    Impaired fasting glucose  -     Hemoglobin A1C; Future    Mild intermittent asthma without complication    Essential hypertension  -     TSH, 3rd generation with Free T4 reflex; Future    AVM (arteriovenous malformation)    Other closed fracture of tenth thoracic vertebra with routine healing, subsequent encounter    Stage 3 chronic kidney disease (HCC)  -     CBC and differential; Future  -     Comprehensive metabolic panel; Future    Anxiety    Iron deficiency anemia, unspecified iron deficiency anemia type    Mixed hyperlipidemia  -     Lipid panel; Future    Fatigue, unspecified type         Patient Instructions   Lab data reviewed in detail and compared prior    Fatigue and somnolence-I explained this is at least in part related to medication with drowsiness being the 1  Common side effect of Xanax and imipramine  We discussed the balance between increased anxiety and sedation from medication  She should address this with her psychiatrist   Additionally, the atenolol could be leading to some fatigue  Hypertension stable on present regimen    Hyperlipidemia at goal on rosuvastatin    Chronic kidney disease stage 3 remained stable with GFR 43    Anxiety-following with Psychiatry as above    Asthma/COPD clinically stable    GERD stable without medication    Iron deficiency anemia secondary to AVMs with negative GI workup otherwise in 2017-continue on every other day iron  Routine follow-up after labs in 6 months, sooner as needed  Subjective:      Patient ID: Jah Vernon is a 82 y o  female    Feeling well, here w/ granddgtr, [place] Inks feeling tired and cold all the time  Back pain better s/p T12 fx  Following w/ ortho  HTN/HPL-taking rx as directed, no home bp's  Anxiety-stable w/ imipramine/xanax up to bid    Takes xanax first thing in am, then late afternoon/evening as needed  Following with Dr Callie Conner  LARRY- patient had EGD and colonoscopy January 17, 2017 notable for AVMs in the proximal right colon and cecum, she's been tolerating iron qd w/o melana  GERD-stable w/o ppi, not even needing tums anymore  IBS-notes loose bowels w/ increased stress w/ Kelli's recent surgery  Balance is stable since dealing PT, had one fall r/t toe getting caught in a grate  No injury          Current Outpatient Medications:     acetaminophen (TYLENOL) 325 mg tablet, Take 1-2 tablets by mouth every 6 (six) hours as needed, Disp: , Rfl:     ALPRAZolam (XANAX) 0 5 mg tablet, Take 0 5 mg by mouth daily as needed  , Disp: , Rfl:     atenolol (TENORMIN) 50 mg tablet, Take 1 tablet (50 mg total) by mouth 2 (two) times a day, Disp: 180 tablet, Rfl: 3    ferrous sulfate 325 (65 Fe) mg tablet, Take 1 tablet by mouth daily, Disp: , Rfl:     imipramine (TOFRANIL) 10 mg tablet, Take 1 tablet (10 mg total) by mouth 4 (four) times a week, Disp: 90 tablet, Rfl: 3    levothyroxine 50 mcg tablet, Take 1 tablet (50 mcg total) by mouth daily, Disp: 90 tablet, Rfl: 3    lisinopril (ZESTRIL) 10 mg tablet, Take 1 tablet (10 mg total) by mouth daily, Disp: 90 tablet, Rfl: 3    Probiotic Product (ALIGN) 4 MG CAPS, Take 1 tablet by mouth daily, Disp: , Rfl:     rosuvastatin (CRESTOR) 10 MG tablet, Take 1 tablet (10 mg total) by mouth daily, Disp: 90 tablet, Rfl: 1    TRAVATAN Z 0 004 % ophthalmic solution, , Disp: , Rfl: 0    albuterol (PROVENTIL HFA,VENTOLIN HFA) 90 mcg/act inhaler, Inhale 2 puffs every 6 (six) hours as needed for wheezing or shortness of breath (Patient not taking: Reported on 12/18/2019), Disp: 1 Inhaler, Rfl: 5    Calcium Carbonate (CALTRATE 600) 1500 (600 Ca) MG TABS, 1 po bid (Patient not taking: Reported on 11/22/2019), Disp: , Rfl: 0    Recent Results (from the past 1008 hour(s))   CBC and differential    Collection Time: 12/13/19 10:36 AM   Result Value Ref Range    WBC 6 08 4 31 - 10 16 Thousand/uL    RBC 3 73 (L) 3 81 - 5 12 Million/uL    Hemoglobin 11 7 11 5 - 15 4 g/dL    Hematocrit 36 1 34 8 - 46 1 %    MCV 97 82 - 98 fL    MCH 31 4 26 8 - 34 3 pg    MCHC 32 4 31 4 - 37 4 g/dL    RDW 14 4 11 6 - 15 1 %    MPV 11 2 8 9 - 12 7 fL    Platelets 216 150 - 387 Thousands/uL    nRBC 0 /100 WBCs    Neutrophils Relative 59 43 - 75 %    Immat GRANS % 0 0 - 2 %    Lymphocytes Relative 23 14 - 44 %    Monocytes Relative 8 4 - 12 %    Eosinophils Relative 10 (H) 0 - 6 %    Basophils Relative 0 0 - 1 %    Neutrophils Absolute 3 53 1 85 - 7 62 Thousands/µL    Immature Grans Absolute 0 01 0 00 - 0 20 Thousand/uL    Lymphocytes Absolute 1 42 0 60 - 4 47 Thousands/µL    Monocytes Absolute 0 49 0 17 - 1 22 Thousand/µL    Eosinophils Absolute 0 61 0 00 - 0 61 Thousand/µL    Basophils Absolute 0 02 0 00 - 0 10 Thousands/µL   Comprehensive metabolic panel    Collection Time: 12/13/19 10:36 AM   Result Value Ref Range    Sodium 137 136 - 145 mmol/L    Potassium 3 9 3 5 - 5 3 mmol/L    Chloride 106 100 - 108 mmol/L    CO2 26 21 - 32 mmol/L    ANION GAP 5 4 - 13 mmol/L    BUN 13 5 - 25 mg/dL    Creatinine 1 17 0 60 - 1 30 mg/dL    Glucose, Fasting 88 65 - 99 mg/dL    Calcium 10 1 8 3 - 10 1 mg/dL    AST 24 5 - 45 U/L    ALT 22 12 - 78 U/L    Alkaline Phosphatase 101 46 - 116 U/L    Total Protein 7 9 6 4 - 8 2 g/dL    Albumin 3 7 3 5 - 5 0 g/dL    Total Bilirubin 0 48 0 20 - 1 00 mg/dL    eGFR 43 ml/min/1 73sq m   Lipid panel    Collection Time: 12/13/19 10:36 AM   Result Value Ref Range    Cholesterol 130 50 - 200 mg/dL    Triglycerides 93 <=150 mg/dL    HDL, Direct 68 >=40 mg/dL    LDL Calculated 43 0 - 100 mg/dL    Non-HDL-Chol (CHOL-HDL) 62 mg/dl   Hemoglobin A1C    Collection Time: 12/13/19 10:36 AM   Result Value Ref Range    Hemoglobin A1C 5 4 4 2 - 6 3 %     mg/dl   TSH, 3rd generation with Free T4 reflex    Collection Time: 12/13/19 10:36 AM   Result Value Ref Range    TSH 3RD [facility] 0 488 0 358 - 3 740 uIU/mL       The following portions of the patient's history were reviewed and updated as appropriate: allergies, current medications, past family history, past medical history, past social history, past surgical history and problem list      Review of Systems   Constitutional: Negative for appetite change, chills, diaphoresis, fatigue, fever and unexpected weight change  HENT: Negative for congestion, hearing loss and rhinorrhea  Eyes: Negative for visual disturbance  Respiratory: Negative for cough, chest tightness, shortness of breath and wheezing  Cardiovascular: Negative for chest pain, palpitations and leg swelling  Gastrointestinal: Negative for abdominal pain and blood in stool  Endocrine: Negative for cold intolerance, heat intolerance, polydipsia and polyuria  Genitourinary: Negative for difficulty urinating, dysuria, frequency and urgency  Musculoskeletal: Negative for arthralgias and myalgias  Skin: Negative for rash  Neurological: Negative for dizziness, weakness, light-headedness and headaches  Hematological: Does not bruise/bleed easily  Psychiatric/Behavioral: Negative for dysphoric mood and sleep disturbance  Objective:      Vitals:    12/18/19 1246   BP: 118/64   Pulse: 64   Resp: 12          Physical Exam   Constitutional: She is oriented to person, place, and time  She appears well-developed and well-nourished  HENT:   Head: Normocephalic and atraumatic  Nose: Nose normal    Mouth/Throat: Oropharynx is clear and moist    Eyes: Pupils are equal, round, and reactive to light  Conjunctivae and EOM are normal  No scleral icterus  Neck: Normal range of motion  Neck supple  No JVD present  No tracheal deviation present  No thyromegaly present  Cardiovascular: Normal rate, regular rhythm and intact distal pulses  Exam reveals no gallop and no friction rub  No murmur heard    Pulmonary/Chest: Effort normal and breath sounds normal  No respiratory distress  She has no wheezes  She has no rales  Musculoskeletal: She exhibits no edema or deformity  Lymphadenopathy:     She has no cervical adenopathy  Neurological: She is alert and oriented to person, place, and time  No cranial nerve deficit  Skin: Skin is warm and dry  No rash noted  No erythema  No pallor  Psychiatric: She has a normal mood and affect   Her behavior is normal  Judgment and thought content normal

## 2019-12-18 NOTE — PATIENT INSTRUCTIONS
Lab data reviewed in detail and compared prior    Fatigue and somnolence-I explained this is at least in part related to medication with drowsiness being the 1  Common side effect of Xanax and imipramine  We discussed the balance between increased anxiety and sedation from medication  She should address this with her psychiatrist   Additionally, the atenolol could be leading to some fatigue  Hypertension stable on present regimen    Hyperlipidemia at goal on rosuvastatin    Chronic kidney disease stage 3 remained stable with GFR 43    Anxiety-following with Psychiatry as above    Asthma/COPD clinically stable    GERD stable without medication    Iron deficiency anemia secondary to AVMs with negative GI workup otherwise in 2017-continue on every other day iron  Routine follow-up after labs in 6 months, sooner as needed

## 2020-01-02 ENCOUNTER — OFFICE VISIT (OUTPATIENT)
Dept: CARDIOLOGY CLINIC | Facility: CLINIC | Age: 85
End: 2020-01-02
Payer: MEDICARE

## 2020-01-02 VITALS
DIASTOLIC BLOOD PRESSURE: 70 MMHG | HEART RATE: 63 BPM | BODY MASS INDEX: 22.25 KG/M2 | WEIGHT: 106 LBS | OXYGEN SATURATION: 97 % | SYSTOLIC BLOOD PRESSURE: 120 MMHG | HEIGHT: 58 IN

## 2020-01-02 DIAGNOSIS — F41.9 ANXIETY: ICD-10-CM

## 2020-01-02 DIAGNOSIS — E78.2 MIXED HYPERLIPIDEMIA: ICD-10-CM

## 2020-01-02 DIAGNOSIS — I10 ESSENTIAL HYPERTENSION: Primary | ICD-10-CM

## 2020-01-02 PROCEDURE — 99213 OFFICE O/P EST LOW 20 MIN: CPT | Performed by: INTERNAL MEDICINE

## 2020-01-02 NOTE — PROGRESS NOTES
[facility]  [address]  [place] [id number]  Cardiology Follow Up    Jah Saulo  1935  [id number]      1  Essential hypertension     2  Anxiety     3  Mixed hyperlipidemia         Chief Complaint   Patient presents with    Follow-up    Hypertension       Interval History:  Patient presents for follow-up visit  Patient denies any history of chest pain shortness of breath  Patient denies any history of leg edema or orthopnea PND  No history of presyncope syncope  Patient states compliance with the present list of medications  Patient does have anxiety  Patient Active Problem List   Diagnosis    Chest discomfort    Anxiety    Abnormal EKG    Vitamin D deficiency    Irritable bowel syndrome    Iron deficiency anemia    Hypothyroidism    Hypertension    Hyperlipidemia    GERD without esophagitis    ESR raised    Chronic kidney disease    AVM (arteriovenous malformation)    Venous insufficiency    Impaired fasting glucose    Cough    Mild intermittent asthma without complication    Age-related osteoporosis without current pathological fracture    Nondisplaced fracture of middle phalanx of left middle finger, initial encounter for closed fracture    Closed fracture of tenth thoracic vertebra with routine healing    Fall as cause of accidental injury in home as place of occurrence     Past Medical History:   Diagnosis Date    Benign essential hypertension     Last assessed: 9/11/14    Disease of thyroid gland     Fibromyalgia     GERD (gastroesophageal reflux disease)     History of nail disorders     Hyperlipidemia     Hypertension     Palpitations     Parkinson's disease ([facility] [address] )     Transient cerebral ischemia      Social History     Socioeconomic History    Marital status:       Spouse name: Not on file    Number of children: Not on file    Years of education: Not on file    Highest education level: Not on file Occupational History    Occupation: retired    Social Needs    Financial resource strain: Not on file    Food insecurity:     Worry: Not on file     Inability: Not on file   [employer] needs:     Medical: Not on file     Non-medical: Not on file   Tobacco Use    Smoking status: Former Smoker     Packs/day: 0 10     Years: 50 00     Pack years: 5 00     Types: Cigarettes     Start date:      Last attempt to quit: 2013     Years since quittin 0    Smokeless tobacco: Never Used    Tobacco comment: 1 pack a week    Substance and Sexual Activity    Alcohol use: Never     Alcohol/week: 0 0 standard drinks     Frequency: Never     Drinks per session: Patient refused     Binge frequency: Never     Comment: 0    Drug use: No    Sexual activity: Not Currently   Lifestyle    Physical activity:     Days per week: 2 days     Minutes per session: 50 min    Stress: Not at all   Relationships    Social connections:     Talks on phone: Not on file     Gets together: Not on file     Attends Rastafari service: Not on file     Active member of club or organization: Not on file     Attends meetings of clubs or organizations: Not on file     Relationship status: Not on file    Intimate partner violence:     Fear of current or ex partner: Not on file     Emotionally abused: Not on file     Physically abused: Not on file     Forced sexual activity: Not on file   Other Topics Concern    Not on file   Social History Narrative    Living independently with spouse      Family History   Problem Relation Age of Onset    Stroke Mother         ischemic stroke    Hypertension Mother     Heart disease Father      Past Surgical History:   Procedure Laterality Date    APPENDECTOMY      HYSTERECTOMY  2010    ND LAP,CHOLECYSTECTOMY/GRAPH N/A 2018    Procedure: LAPAROSCOPIC CHOLECYSTECTOMY WITH IOC;  Surgeon: Juan Pablo Bella MD;  Location: [facility] OR;  Service: General    TUBAL LIGATION         Current Outpatient Medications:     acetaminophen (TYLENOL) 325 mg tablet, Take 1-2 tablets by mouth every 6 (six) hours as needed, Disp: , Rfl:     albuterol (PROVENTIL HFA,VENTOLIN HFA) 90 mcg/act inhaler, Inhale 2 puffs every 6 (six) hours as needed for wheezing or shortness of breath, Disp: 1 Inhaler, Rfl: 5    ALPRAZolam (XANAX) 0 5 mg tablet, Take 0 5 mg by mouth daily as needed  , Disp: , Rfl:     atenolol (TENORMIN) 50 mg tablet, Take 1 tablet (50 mg total) by mouth 2 (two) times a day, Disp: 180 tablet, Rfl: 3    ferrous sulfate 325 (65 Fe) mg tablet, Take 1 tablet by mouth daily, Disp: , Rfl:     imipramine (TOFRANIL) 10 mg tablet, Take 1 tablet (10 mg total) by mouth 4 (four) times a week, Disp: 90 tablet, Rfl: 3    levothyroxine 50 mcg tablet, Take 1 tablet (50 mcg total) by mouth daily, Disp: 90 tablet, Rfl: 3    lisinopril (ZESTRIL) 10 mg tablet, Take 1 tablet (10 mg total) by mouth daily, Disp: 90 tablet, Rfl: 3    Probiotic Product (ALIGN) 4 MG CAPS, Take 1 tablet by mouth daily, Disp: , Rfl:     rosuvastatin (CRESTOR) 10 MG tablet, Take 1 tablet (10 mg total) by mouth daily, Disp: 90 tablet, Rfl: 1    TRAVATAN Z 0 004 % ophthalmic solution, , Disp: , Rfl: 0  Allergies   Allergen Reactions    Other Drowsiness     Annotation - [id number]: ANTIDEPRESSANTS       Labs:  Appointment on 12/13/2019   Component Date Value    WBC 12/13/2019 6 08     RBC 12/13/2019 3 73*    Hemoglobin 12/13/2019 11 7     Hematocrit 12/13/2019 36 1     MCV 12/13/2019 97     MCH 12/13/2019 31 4     MCHC 12/13/2019 32 4     RDW 12/13/2019 14 4     MPV 12/13/2019 11 2     Platelets 80/70/6592 195     nRBC 12/13/2019 0     Neutrophils Relative 12/13/2019 59     Immat GRANS % 12/13/2019 0     Lymphocytes Relative 12/13/2019 23     Monocytes Relative 12/13/2019 8     Eosinophils Relative 12/13/2019 10*    Basophils Relative 12/13/2019 0     Neutrophils Absolute 12/13/2019 3 53     Immature Grans Absolute 12/13/2019 0 01     Lymphocytes Absolute 12/13/2019 1 42     Monocytes Absolute 12/13/2019 0 49     Eosinophils Absolute 12/13/2019 0 61     Basophils Absolute 12/13/2019 0 02     Sodium 12/13/2019 137     Potassium 12/13/2019 3 9     Chloride 12/13/2019 106     CO2 12/13/2019 26     ANION GAP 12/13/2019 5     BUN 12/13/2019 13     Creatinine 12/13/2019 1 17     Glucose, Fasting 12/13/2019 88     Calcium 12/13/2019 10 1     AST 12/13/2019 24     ALT 12/13/2019 22     Alkaline Phosphatase 12/13/2019 101     Total Protein 12/13/2019 7 9     Albumin 12/13/2019 3 7     Total Bilirubin 12/13/2019 0 48     eGFR 12/13/2019 43     Cholesterol 12/13/2019 130     Triglycerides 12/13/2019 93     HDL, Direct 12/13/2019 68     LDL Calculated 12/13/2019 43     Non-HDL-Chol (CHOL-HDL) 12/13/2019 62     Hemoglobin A1C 12/13/2019 5 4     EAG 12/13/2019 108     TSH 3RD GENERATON 12/13/2019 0 488      Imaging: No results found  Review of Systems:  Review of Systems   REVIEW OF SYSTEMS:  Constitutional:  Denies fever or chills   Eyes:  Denies change in visual acuity   HENT:  Denies nasal congestion or sore throat   Respiratory:  Denies cough or shortness of breath   Cardiovascular:  Denies chest pain or edema   GI:  Denies abdominal pain, nausea, vomiting, bloody stools or diarrhea   :  Denies dysuria, frequency, difficulty in micturition and nocturia  Musculoskeletal:  Denies back pain or joint pain   Neurologic:  Denies headache, focal weakness or sensory changes   Endocrine:  Denies polyuria or polydipsia   Lymphatic:  Denies swollen glands   Psychiatric:   anxiety     Physical Exam:    /70   Pulse 63   Ht 4' 10" (1 473 m)   Wt 48 1 kg (106 lb)   SpO2 97%   BMI 22 15 kg/m²     Physical Exam   PHYSICAL EXAM:  General:  Patient is not in acute distress   Head: Normocephalic, Atraumatic  HEENT:  Both pupils normal-size atraumatic, normocephalic, nonicteric  Neck:  JVP not raised   Trachea central  No carotid bruit  Respiratory:  normal breath sounds no crackles  no rhonchi  Cardiovascular:  Regular rate and rhythm no S3 no murmurs  GI:  Abdomen soft nontender  No organomegaly  Lymphatic:  No cervical or inguinal lymphadenopathy  Neurologic:  Patient is awake alert, oriented   Grossly nonfocal  Extremities no edema    Discussion/Summary:  Patient overall doing well from a cardiovascular standpoint  Symptoms to watch out from cardiac standpoint which would indicate the need for further cardiac evaluation discussed  Previous cardiovascular studies reviewed  Emotional support provided to the patient with history of anxiety  Follow-up with primary care physician  Recent blood work done through primary care physician reviewed  Follow-up in 6 months or earlier as needed  Patient is agreeable with the plan of care

## 2020-01-13 NOTE — PATIENT INSTRUCTIONS
Abdominal obstruction series reviewed with patient  There was large fecal burden present throughout the colon as well as some air-fluid levels in the right colon  Bowel function discussed as well as the pathophysiology of constipation and the utility of fiber as a bulking agent as well as MiraLax as an osmotic agent were discussed  I have recommended daily fiber with lots of water  Titrate MiraLax a half to a whole dose as needed to keep regular  Cough with normal lung exam may be related to allergic rhinitis, try Mucinex DM over-the-counter, consider over-the-counter antihistamine such as Claritin if you are having nasal congestion, postnasal drip, itchy eyes, sneezing  Also can consider nasal steroid if antihistamine alone is ineffective  Contact me for persistent cough, fever, shortness of breath  Routine follow-up after labs in December, sooner as needed  warm

## 2020-02-05 ENCOUNTER — TELEPHONE (OUTPATIENT)
Dept: CARDIOLOGY CLINIC | Facility: CLINIC | Age: 85
End: 2020-02-05

## 2020-02-05 NOTE — TELEPHONE ENCOUNTER
Call transferred to me, pt is complaining of chest heaviness, some SOB on exertion and at rest and nausea  Pt is requesting to come in and see Dr Tamika Smith and wants an EKG  Offered pt an appt to see Dr Tamika Smith later this week in [place], pt declined  Pt's daughter will take pt to the Urgent care in [facility]

## 2020-03-13 ENCOUNTER — HOSPITAL ENCOUNTER (OUTPATIENT)
Dept: CT IMAGING | Facility: CLINIC | Age: 85
Discharge: HOME | End: 2020-03-13
Payer: MEDICARE

## 2020-03-13 ENCOUNTER — OFFICE VISIT (OUTPATIENT)
Dept: INTERNAL MEDICINE CLINIC | Facility: CLINIC | Age: 85
End: 2020-03-13
Payer: MEDICARE

## 2020-03-13 VITALS
SYSTOLIC BLOOD PRESSURE: 122 MMHG | RESPIRATION RATE: 14 BRPM | WEIGHT: 104.6 LBS | DIASTOLIC BLOOD PRESSURE: 62 MMHG | HEIGHT: 58 IN | BODY MASS INDEX: 21.96 KG/M2 | HEART RATE: 60 BPM

## 2020-03-13 DIAGNOSIS — R51.9 NONINTRACTABLE HEADACHE, UNSPECIFIED CHRONICITY PATTERN, UNSPECIFIED HEADACHE TYPE: Primary | ICD-10-CM

## 2020-03-13 DIAGNOSIS — R26.0 ATAXIC GAIT: ICD-10-CM

## 2020-03-13 DIAGNOSIS — R25.1 TREMOR: Primary | ICD-10-CM

## 2020-03-13 DIAGNOSIS — R27.0 ATAXIA: ICD-10-CM

## 2020-03-13 DIAGNOSIS — R25.1 TREMOR: ICD-10-CM

## 2020-03-13 DIAGNOSIS — R51.9 NONINTRACTABLE HEADACHE, UNSPECIFIED CHRONICITY PATTERN, UNSPECIFIED HEADACHE TYPE: ICD-10-CM

## 2020-03-13 PROCEDURE — 70450 CT HEAD/BRAIN W/O DYE: CPT

## 2020-03-13 PROCEDURE — 1160F RVW MEDS BY RX/DR IN RCRD: CPT | Performed by: NURSE PRACTITIONER

## 2020-03-13 PROCEDURE — 4040F PNEUMOC VAC/ADMIN/RCVD: CPT | Performed by: NURSE PRACTITIONER

## 2020-03-13 PROCEDURE — 3008F BODY MASS INDEX DOCD: CPT | Performed by: NURSE PRACTITIONER

## 2020-03-13 PROCEDURE — 3074F SYST BP LT 130 MM HG: CPT | Performed by: NURSE PRACTITIONER

## 2020-03-13 PROCEDURE — 3078F DIAST BP <80 MM HG: CPT | Performed by: NURSE PRACTITIONER

## 2020-03-13 PROCEDURE — 99214 OFFICE O/P EST MOD 30 MIN: CPT | Performed by: NURSE PRACTITIONER

## 2020-03-13 PROCEDURE — 1036F TOBACCO NON-USER: CPT | Performed by: NURSE PRACTITIONER

## 2020-03-13 NOTE — PATIENT INSTRUCTIONS
Patient with 2 episodes of severe headache followed by ataxic gait on physical exam she is noted to have facial droop, left pronator drift  Ataxic gait differential diagnosis is stroke additionally noted was a facial tremor cannot rule out Parkinson's but we will start with stat CT brain if negative refer to neurology for evaluation

## 2020-03-16 ENCOUNTER — TELEPHONE (OUTPATIENT)
Dept: NEUROLOGY | Facility: CLINIC | Age: 85
End: 2020-03-16

## 2020-03-16 ENCOUNTER — TELEPHONE (OUTPATIENT)
Dept: INTERNAL MEDICINE CLINIC | Facility: CLINIC | Age: 85
End: 2020-03-16

## 2020-03-16 NOTE — TELEPHONE ENCOUNTER
CORONAVIRUS SCREENING QUESTIONS:    Traveled out of country or state: No    Fever: NO    Cough: NO    Shortness of breath: No    Contact with someone w/coronavirus or possible exposure: Pt doesn't believe so

## 2020-03-16 NOTE — TELEPHONE ENCOUNTER
See how she is feeling and she can always make and an appt w/ degler sooner- maybe he can eval and see if he agrees with my thoughts about early parkinsons

## 2020-03-16 NOTE — TELEPHONE ENCOUNTER
Neurology is going to call and schedule the patient, they are looking at June for a new patient visit      She stated that would be the earliest they have but will keep her on a cancellation list     FYI

## 2020-03-16 NOTE — TELEPHONE ENCOUNTER
----- Message from Carlyn Ken, [address] sent at 3/13/2020  3:48 PM EDT -----  Her CT brain was ok no evidence of stroke- im going to put a referral in for neurology and we will try and get her an appt sooner than later

## 2020-03-16 NOTE — TELEPHONE ENCOUNTER
Best contact number for patient: [phone]    Emergency Contact name and number: Fernando Bello (daughter)     Referring provider and telephone number: Savannah Diane [phone]    Primary Care Provider Name and if affiliated with [facility]  Sangeeta's: Austin Tee [phone], YES    Reason for Appointment/Dx: Tremor and Ataxia Gait    Neurology Location patient would like to be seen: [facility] received? Yes                                                Records Received? Yes    Have you ever seen another Neurologist?         No    Insurance Information    Insurance Name:    ID/Policy #:    Secondary Insurance:    ID/Policy#: Workman's Comp/ Accident/ School  Information      Workman's Comp/Accident/School related?        No    If yes name of Insurance company:    Date of Injury:    Type of Injury:    [address] Name and Telephone Number:    Notes:                   Appointment date: 6/26/2020 with Dr Kamlesh Combs in [facility]

## 2020-05-14 ENCOUNTER — TELEPHONE (OUTPATIENT)
Dept: INTERNAL MEDICINE CLINIC | Facility: CLINIC | Age: 85
End: 2020-05-14

## 2020-05-14 ENCOUNTER — OFFICE VISIT (OUTPATIENT)
Dept: INTERNAL MEDICINE CLINIC | Facility: CLINIC | Age: 85
End: 2020-05-14
Payer: MEDICARE

## 2020-05-14 VITALS
TEMPERATURE: 97.2 F | OXYGEN SATURATION: 94 % | WEIGHT: 102.6 LBS | HEIGHT: 58 IN | SYSTOLIC BLOOD PRESSURE: 124 MMHG | DIASTOLIC BLOOD PRESSURE: 70 MMHG | BODY MASS INDEX: 21.54 KG/M2 | HEART RATE: 70 BPM

## 2020-05-14 DIAGNOSIS — G44.209 TENSION-TYPE HEADACHE, NOT INTRACTABLE, UNSPECIFIED CHRONICITY PATTERN: ICD-10-CM

## 2020-05-14 DIAGNOSIS — R05.9 COUGH: Primary | ICD-10-CM

## 2020-05-14 DIAGNOSIS — H69.83 EUSTACHIAN TUBE DYSFUNCTION, BILATERAL: ICD-10-CM

## 2020-05-14 PROBLEM — H69.93 EUSTACHIAN TUBE DYSFUNCTION, BILATERAL: Status: ACTIVE | Noted: 2020-05-14

## 2020-05-14 PROCEDURE — 3078F DIAST BP <80 MM HG: CPT | Performed by: NURSE PRACTITIONER

## 2020-05-14 PROCEDURE — 3008F BODY MASS INDEX DOCD: CPT | Performed by: NURSE PRACTITIONER

## 2020-05-14 PROCEDURE — 99213 OFFICE O/P EST LOW 20 MIN: CPT | Performed by: NURSE PRACTITIONER

## 2020-05-14 PROCEDURE — 3074F SYST BP LT 130 MM HG: CPT | Performed by: NURSE PRACTITIONER

## 2020-05-14 PROCEDURE — 1160F RVW MEDS BY RX/DR IN RCRD: CPT | Performed by: NURSE PRACTITIONER

## 2020-05-14 PROCEDURE — 1036F TOBACCO NON-USER: CPT | Performed by: NURSE PRACTITIONER

## 2020-05-14 PROCEDURE — 4040F PNEUMOC VAC/ADMIN/RCVD: CPT | Performed by: NURSE PRACTITIONER

## 2020-05-14 RX ORDER — CETIRIZINE HYDROCHLORIDE 10 MG/1
10 TABLET ORAL
COMMUNITY
End: 2020-06-11

## 2020-05-14 RX ORDER — LATANOPROST 50 UG/ML
SOLUTION/ DROPS OPHTHALMIC
COMMUNITY
Start: 2020-05-12

## 2020-05-14 RX ORDER — FLUTICASONE PROPIONATE 50 MCG
1 SPRAY, SUSPENSION (ML) NASAL
COMMUNITY
End: 2020-06-11

## 2020-05-19 DIAGNOSIS — F41.9 ANXIETY: ICD-10-CM

## 2020-05-19 DIAGNOSIS — E78.49 OTHER HYPERLIPIDEMIA: ICD-10-CM

## 2020-05-19 RX ORDER — ROSUVASTATIN CALCIUM 10 MG/1
10 TABLET, COATED ORAL DAILY
Qty: 90 TABLET | Refills: 1 | Status: SHIPPED | OUTPATIENT
Start: 2020-05-19 | End: 2020-11-13 | Stop reason: SDUPTHER

## 2020-05-19 RX ORDER — IMIPRAMINE HYDROCHLORIDE 10 MG/1
10 TABLET, FILM COATED ORAL
Qty: 90 TABLET | Refills: 3 | Status: SHIPPED | OUTPATIENT
Start: 2020-05-19 | End: 2021-09-12

## 2020-06-09 ENCOUNTER — TELEPHONE (OUTPATIENT)
Dept: INTERNAL MEDICINE CLINIC | Facility: CLINIC | Age: 85
End: 2020-06-09

## 2020-06-09 ENCOUNTER — APPOINTMENT (OUTPATIENT)
Dept: LAB | Facility: CLINIC | Age: 85
End: 2020-06-09
Payer: MEDICARE

## 2020-06-09 DIAGNOSIS — R73.01 IMPAIRED FASTING GLUCOSE: ICD-10-CM

## 2020-06-09 DIAGNOSIS — N18.30 STAGE 3 CHRONIC KIDNEY DISEASE (HCC): ICD-10-CM

## 2020-06-09 DIAGNOSIS — E78.2 MIXED HYPERLIPIDEMIA: ICD-10-CM

## 2020-06-09 DIAGNOSIS — I10 ESSENTIAL HYPERTENSION: ICD-10-CM

## 2020-06-09 LAB
ALBUMIN SERPL BCP-MCNC: 3.9 G/DL (ref 3.5–5)
ALP SERPL-CCNC: 90 U/L (ref 46–116)
ALT SERPL W P-5'-P-CCNC: 36 U/L (ref 12–78)
ANION GAP SERPL CALCULATED.3IONS-SCNC: 7 MMOL/L (ref 4–13)
AST SERPL W P-5'-P-CCNC: 32 U/L (ref 5–45)
BASOPHILS # BLD AUTO: 0.03 THOUSANDS/ΜL (ref 0–0.1)
BASOPHILS NFR BLD AUTO: 1 % (ref 0–1)
BILIRUB SERPL-MCNC: 0.51 MG/DL (ref 0.2–1)
BUN SERPL-MCNC: 15 MG/DL (ref 5–25)
CALCIUM SERPL-MCNC: 9.1 MG/DL (ref 8.3–10.1)
CHLORIDE SERPL-SCNC: 99 MMOL/L (ref 100–108)
CHOLEST SERPL-MCNC: 140 MG/DL (ref 50–200)
CO2 SERPL-SCNC: 24 MMOL/L (ref 21–32)
CREAT SERPL-MCNC: 1.27 MG/DL (ref 0.6–1.3)
EOSINOPHIL # BLD AUTO: 0.84 THOUSAND/ΜL (ref 0–0.61)
EOSINOPHIL NFR BLD AUTO: 16 % (ref 0–6)
ERYTHROCYTE [DISTWIDTH] IN BLOOD BY AUTOMATED COUNT: 13.7 % (ref 11.6–15.1)
EST. AVERAGE GLUCOSE BLD GHB EST-MCNC: 105 MG/DL
GFR SERPL CREATININE-BSD FRML MDRD: 39 ML/MIN/1.73SQ M
GLUCOSE P FAST SERPL-MCNC: 84 MG/DL (ref 65–99)
HBA1C MFR BLD: 5.3 %
HCT VFR BLD AUTO: 32.1 % (ref 34.8–46.1)
HDLC SERPL-MCNC: 69 MG/DL
HGB BLD-MCNC: 10.4 G/DL (ref 11.5–15.4)
IMM GRANULOCYTES # BLD AUTO: 0.01 THOUSAND/UL (ref 0–0.2)
IMM GRANULOCYTES NFR BLD AUTO: 0 % (ref 0–2)
LDLC SERPL CALC-MCNC: 51 MG/DL (ref 0–100)
LYMPHOCYTES # BLD AUTO: 1.45 THOUSANDS/ΜL (ref 0.6–4.47)
LYMPHOCYTES NFR BLD AUTO: 28 % (ref 14–44)
MCH RBC QN AUTO: 30.9 PG (ref 26.8–34.3)
MCHC RBC AUTO-ENTMCNC: 32.4 G/DL (ref 31.4–37.4)
MCV RBC AUTO: 95 FL (ref 82–98)
MONOCYTES # BLD AUTO: 0.45 THOUSAND/ΜL (ref 0.17–1.22)
MONOCYTES NFR BLD AUTO: 9 % (ref 4–12)
NEUTROPHILS # BLD AUTO: 2.48 THOUSANDS/ΜL (ref 1.85–7.62)
NEUTS SEG NFR BLD AUTO: 46 % (ref 43–75)
NONHDLC SERPL-MCNC: 71 MG/DL
NRBC BLD AUTO-RTO: 0 /100 WBCS
PLATELET # BLD AUTO: 193 THOUSANDS/UL (ref 149–390)
PMV BLD AUTO: 10.5 FL (ref 8.9–12.7)
POTASSIUM SERPL-SCNC: 4.3 MMOL/L (ref 3.5–5.3)
PROT SERPL-MCNC: 7.4 G/DL (ref 6.4–8.2)
RBC # BLD AUTO: 3.37 MILLION/UL (ref 3.81–5.12)
SODIUM SERPL-SCNC: 130 MMOL/L (ref 136–145)
T4 FREE SERPL-MCNC: 1.22 NG/DL (ref 0.76–1.46)
TRIGL SERPL-MCNC: 98 MG/DL
TSH SERPL DL<=0.05 MIU/L-ACNC: 0.17 UIU/ML (ref 0.36–3.74)
WBC # BLD AUTO: 5.26 THOUSAND/UL (ref 4.31–10.16)

## 2020-06-09 PROCEDURE — 36415 COLL VENOUS BLD VENIPUNCTURE: CPT

## 2020-06-09 PROCEDURE — 84439 ASSAY OF FREE THYROXINE: CPT

## 2020-06-09 PROCEDURE — 80053 COMPREHEN METABOLIC PANEL: CPT

## 2020-06-09 PROCEDURE — 84443 ASSAY THYROID STIM HORMONE: CPT

## 2020-06-09 PROCEDURE — 83036 HEMOGLOBIN GLYCOSYLATED A1C: CPT

## 2020-06-09 PROCEDURE — 85025 COMPLETE CBC W/AUTO DIFF WBC: CPT

## 2020-06-09 PROCEDURE — 80061 LIPID PANEL: CPT

## 2020-06-10 ENCOUNTER — TELEPHONE (OUTPATIENT)
Dept: INTERNAL MEDICINE CLINIC | Facility: CLINIC | Age: 85
End: 2020-06-10

## 2020-06-11 ENCOUNTER — OFFICE VISIT (OUTPATIENT)
Dept: INTERNAL MEDICINE CLINIC | Facility: CLINIC | Age: 85
End: 2020-06-11
Payer: MEDICARE

## 2020-06-11 VITALS
HEIGHT: 58 IN | SYSTOLIC BLOOD PRESSURE: 128 MMHG | RESPIRATION RATE: 12 BRPM | BODY MASS INDEX: 21.62 KG/M2 | HEART RATE: 70 BPM | TEMPERATURE: 98.6 F | DIASTOLIC BLOOD PRESSURE: 76 MMHG | WEIGHT: 103 LBS

## 2020-06-11 DIAGNOSIS — R10.13 EPIGASTRIC PAIN: Primary | ICD-10-CM

## 2020-06-11 DIAGNOSIS — E03.9 ACQUIRED HYPOTHYROIDISM: ICD-10-CM

## 2020-06-11 DIAGNOSIS — F41.9 ANXIETY: ICD-10-CM

## 2020-06-11 DIAGNOSIS — K58.9 IRRITABLE BOWEL SYNDROME, UNSPECIFIED TYPE: ICD-10-CM

## 2020-06-11 DIAGNOSIS — K21.9 GERD WITHOUT ESOPHAGITIS: ICD-10-CM

## 2020-06-11 DIAGNOSIS — N18.30 STAGE 3 CHRONIC KIDNEY DISEASE (HCC): ICD-10-CM

## 2020-06-11 DIAGNOSIS — I10 ESSENTIAL HYPERTENSION: ICD-10-CM

## 2020-06-11 DIAGNOSIS — Q27.30 AVM (ARTERIOVENOUS MALFORMATION): ICD-10-CM

## 2020-06-11 DIAGNOSIS — D50.9 IRON DEFICIENCY ANEMIA, UNSPECIFIED IRON DEFICIENCY ANEMIA TYPE: ICD-10-CM

## 2020-06-11 DIAGNOSIS — K44.9 HIATAL HERNIA: ICD-10-CM

## 2020-06-11 DIAGNOSIS — E78.2 MIXED HYPERLIPIDEMIA: ICD-10-CM

## 2020-06-11 PROCEDURE — 1036F TOBACCO NON-USER: CPT | Performed by: INTERNAL MEDICINE

## 2020-06-11 PROCEDURE — 99215 OFFICE O/P EST HI 40 MIN: CPT | Performed by: INTERNAL MEDICINE

## 2020-06-11 PROCEDURE — 3078F DIAST BP <80 MM HG: CPT | Performed by: INTERNAL MEDICINE

## 2020-06-11 PROCEDURE — 3074F SYST BP LT 130 MM HG: CPT | Performed by: INTERNAL MEDICINE

## 2020-06-11 PROCEDURE — 3008F BODY MASS INDEX DOCD: CPT | Performed by: INTERNAL MEDICINE

## 2020-06-11 PROCEDURE — 1160F RVW MEDS BY RX/DR IN RCRD: CPT | Performed by: INTERNAL MEDICINE

## 2020-06-11 PROCEDURE — 4040F PNEUMOC VAC/ADMIN/RCVD: CPT | Performed by: INTERNAL MEDICINE

## 2020-06-11 RX ORDER — LEVOTHYROXINE SODIUM 0.05 MG/1
TABLET ORAL
Qty: 90 TABLET | Refills: 3
Start: 2020-06-11 | End: 2020-07-10 | Stop reason: SDUPTHER

## 2020-06-11 RX ORDER — PANTOPRAZOLE SODIUM 40 MG/1
40 TABLET, DELAYED RELEASE ORAL
Qty: 30 TABLET | Refills: 5 | Status: SHIPPED | OUTPATIENT
Start: 2020-06-11 | End: 2020-11-13 | Stop reason: SDUPTHER

## 2020-06-25 ENCOUNTER — OFFICE VISIT (OUTPATIENT)
Dept: INTERNAL MEDICINE CLINIC | Facility: CLINIC | Age: 85
End: 2020-06-25
Payer: MEDICARE

## 2020-06-25 VITALS
HEART RATE: 74 BPM | BODY MASS INDEX: 21.66 KG/M2 | WEIGHT: 103.2 LBS | HEIGHT: 58 IN | DIASTOLIC BLOOD PRESSURE: 72 MMHG | RESPIRATION RATE: 14 BRPM | TEMPERATURE: 98.1 F | SYSTOLIC BLOOD PRESSURE: 124 MMHG

## 2020-06-25 DIAGNOSIS — I87.2 VENOUS INSUFFICIENCY: Primary | ICD-10-CM

## 2020-06-25 PROCEDURE — 4040F PNEUMOC VAC/ADMIN/RCVD: CPT | Performed by: NURSE PRACTITIONER

## 2020-06-25 PROCEDURE — 3008F BODY MASS INDEX DOCD: CPT | Performed by: NURSE PRACTITIONER

## 2020-06-25 PROCEDURE — 1160F RVW MEDS BY RX/DR IN RCRD: CPT | Performed by: NURSE PRACTITIONER

## 2020-06-25 PROCEDURE — 99213 OFFICE O/P EST LOW 20 MIN: CPT | Performed by: NURSE PRACTITIONER

## 2020-06-25 PROCEDURE — 3078F DIAST BP <80 MM HG: CPT | Performed by: NURSE PRACTITIONER

## 2020-06-25 PROCEDURE — 1036F TOBACCO NON-USER: CPT | Performed by: NURSE PRACTITIONER

## 2020-06-25 PROCEDURE — 3074F SYST BP LT 130 MM HG: CPT | Performed by: NURSE PRACTITIONER

## 2020-06-26 ENCOUNTER — CONSULT (OUTPATIENT)
Dept: NEUROLOGY | Facility: CLINIC | Age: 85
End: 2020-06-26
Payer: MEDICARE

## 2020-06-26 VITALS — WEIGHT: 105 LBS | BODY MASS INDEX: 21.95 KG/M2

## 2020-06-26 DIAGNOSIS — R27.8 SENSORY ATAXIA: ICD-10-CM

## 2020-06-26 DIAGNOSIS — G62.9 PERIPHERAL POLYNEUROPATHY: Primary | ICD-10-CM

## 2020-06-26 DIAGNOSIS — R20.2 PARESTHESIA: ICD-10-CM

## 2020-06-26 DIAGNOSIS — G25.0 ESSENTIAL TREMOR: ICD-10-CM

## 2020-06-26 PROCEDURE — 99204 OFFICE O/P NEW MOD 45 MIN: CPT | Performed by: PSYCHIATRY & NEUROLOGY

## 2020-07-08 ENCOUNTER — APPOINTMENT (OUTPATIENT)
Dept: LAB | Facility: CLINIC | Age: 85
End: 2020-07-08
Payer: MEDICARE

## 2020-07-08 DIAGNOSIS — D50.9 IRON DEFICIENCY ANEMIA, UNSPECIFIED IRON DEFICIENCY ANEMIA TYPE: ICD-10-CM

## 2020-07-08 DIAGNOSIS — G62.9 PERIPHERAL POLYNEUROPATHY: ICD-10-CM

## 2020-07-08 DIAGNOSIS — E03.9 ACQUIRED HYPOTHYROIDISM: ICD-10-CM

## 2020-07-08 DIAGNOSIS — Q27.30 AVM (ARTERIOVENOUS MALFORMATION): ICD-10-CM

## 2020-07-08 DIAGNOSIS — R20.2 PARESTHESIA: ICD-10-CM

## 2020-07-08 LAB
FERRITIN SERPL-MCNC: 69 NG/ML (ref 8–388)
HEMOCCULT STL QL IA: POSITIVE
IRON SATN MFR SERPL: 19 %
IRON SERPL-MCNC: 59 UG/DL (ref 50–170)
TIBC SERPL-MCNC: 310 UG/DL (ref 250–450)
TSH SERPL DL<=0.05 MIU/L-ACNC: 1.78 UIU/ML (ref 0.36–3.74)
VIT B12 SERPL-MCNC: 361 PG/ML (ref 100–900)

## 2020-07-08 PROCEDURE — 82607 VITAMIN B-12: CPT

## 2020-07-08 PROCEDURE — 36415 COLL VENOUS BLD VENIPUNCTURE: CPT

## 2020-07-08 PROCEDURE — 84443 ASSAY THYROID STIM HORMONE: CPT

## 2020-07-08 PROCEDURE — G0328 FECAL BLOOD SCRN IMMUNOASSAY: HCPCS

## 2020-07-08 PROCEDURE — 82728 ASSAY OF FERRITIN: CPT

## 2020-07-08 PROCEDURE — 83550 IRON BINDING TEST: CPT

## 2020-07-08 PROCEDURE — 83540 ASSAY OF IRON: CPT

## 2020-07-09 DIAGNOSIS — K92.1 MELENA: ICD-10-CM

## 2020-07-09 DIAGNOSIS — Q27.30 AVM (ARTERIOVENOUS MALFORMATION): ICD-10-CM

## 2020-07-09 DIAGNOSIS — R10.13 EPIGASTRIC PAIN: Primary | ICD-10-CM

## 2020-07-10 ENCOUNTER — TELEPHONE (OUTPATIENT)
Dept: GASTROENTEROLOGY | Facility: CLINIC | Age: 85
End: 2020-07-10

## 2020-07-10 ENCOUNTER — TELEPHONE (OUTPATIENT)
Dept: INTERNAL MEDICINE CLINIC | Facility: CLINIC | Age: 85
End: 2020-07-10

## 2020-07-10 DIAGNOSIS — E03.9 ACQUIRED HYPOTHYROIDISM: ICD-10-CM

## 2020-07-10 DIAGNOSIS — I10 ESSENTIAL HYPERTENSION: ICD-10-CM

## 2020-07-10 RX ORDER — LISINOPRIL 10 MG/1
10 TABLET ORAL DAILY
Qty: 90 TABLET | Refills: 3 | Status: SHIPPED | OUTPATIENT
Start: 2020-07-10 | End: 2021-07-06

## 2020-07-10 RX ORDER — LEVOTHYROXINE SODIUM 0.05 MG/1
TABLET ORAL
Qty: 90 TABLET | Refills: 3 | Status: SHIPPED | OUTPATIENT
Start: 2020-07-10 | End: 2021-08-24

## 2020-07-10 NOTE — TELEPHONE ENCOUNTER
----- Message from Chip Torres MD sent at 7/9/2020  9:50 PM EDT -----  Notify-there was evidence of blood in stool  I referred to GI

## 2020-07-10 NOTE — TELEPHONE ENCOUNTER
----- Message from Joni Smith MD sent at 7/10/2020  6:12 AM EDT -----  Please make sure she gets in asap with me or Jennifer for heme positive stool  ----- Message -----  From: Merissa Pérez MD  Sent: 7/9/2020   9:50 PM EDT  To: Joni Smith MD, #    Notify-there was evidence of blood in stool  I referred to GI

## 2020-07-10 NOTE — TELEPHONE ENCOUNTER
Refill : lisinopril (ZESTRIL) 10 mg tablet    levothyroxine 50 mcg tablet          # [phone]    Kareem aid # [phone]

## 2020-07-13 ENCOUNTER — OFFICE VISIT (OUTPATIENT)
Dept: GASTROENTEROLOGY | Facility: CLINIC | Age: 85
End: 2020-07-13
Payer: MEDICARE

## 2020-07-13 VITALS
WEIGHT: 104 LBS | TEMPERATURE: 97.3 F | BODY MASS INDEX: 21.83 KG/M2 | DIASTOLIC BLOOD PRESSURE: 60 MMHG | SYSTOLIC BLOOD PRESSURE: 132 MMHG | HEART RATE: 66 BPM | HEIGHT: 58 IN

## 2020-07-13 DIAGNOSIS — D64.9 ANEMIA, UNSPECIFIED TYPE: ICD-10-CM

## 2020-07-13 DIAGNOSIS — K55.20 AVM (ARTERIOVENOUS MALFORMATION) OF COLON: ICD-10-CM

## 2020-07-13 DIAGNOSIS — K92.1 BLOOD IN STOOL: Primary | ICD-10-CM

## 2020-07-13 DIAGNOSIS — R10.13 EPIGASTRIC PAIN: ICD-10-CM

## 2020-07-13 PROCEDURE — 3078F DIAST BP <80 MM HG: CPT | Performed by: PHYSICIAN ASSISTANT

## 2020-07-13 PROCEDURE — 1036F TOBACCO NON-USER: CPT | Performed by: PHYSICIAN ASSISTANT

## 2020-07-13 PROCEDURE — 3008F BODY MASS INDEX DOCD: CPT | Performed by: PHYSICIAN ASSISTANT

## 2020-07-13 PROCEDURE — 99203 OFFICE O/P NEW LOW 30 MIN: CPT | Performed by: PHYSICIAN ASSISTANT

## 2020-07-13 PROCEDURE — 3075F SYST BP GE 130 - 139MM HG: CPT | Performed by: PHYSICIAN ASSISTANT

## 2020-07-13 PROCEDURE — 1160F RVW MEDS BY RX/DR IN RCRD: CPT | Performed by: PHYSICIAN ASSISTANT

## 2020-07-13 PROCEDURE — 4040F PNEUMOC VAC/ADMIN/RCVD: CPT | Performed by: PHYSICIAN ASSISTANT

## 2020-07-13 NOTE — PROGRESS NOTES
[facility] - Outpatient Consultation  Arturo Domínguez 82 y o  female MRN: [id number]  Encounter: [id number]          ASSESSMENT AND PLAN:      1  Blood in stool  2  Epigastric pain  3  Anemia  4  AVM (arteriovenous malformation) of colon    Patient presents for a consult regarding heme positive stool  She has a normocytic anemia with recent HGB of 10 4  She also reports epigastric discomfort and intermittent diarrhea  She was started on a PPI course which she reports is helping her epigastric pain  Previously, in 2017 she had an endoscopic work up in [place] with an EGD and colonoscopy and was noted to have AVMs of the cecum and right proximal colon which were cauterized  Biopsies were negative for celiac and h pylori  EGD and colonoscopy to investigate  Continue the Protonix course  Monitoring of CBC per PCP     ______________________________________________________________________    HPI:  Patient is an 82-year-old female who presents to the office for an evaluation of heme-positive stool  Patient has a history of anemia and reports a prior investigation to include an EGD and colonoscopy in 2017 in [place]  She was noted to have AVMs in the cecum and right side of the colon which were cauterized  She also has a history of a hiatal hernia  She reports she did not have a capsule endoscopy at that time  She is on iron supplementation  Recent CBCs showed a hemoglobin of 10 4/normocytic anemia  She does admit to intermittent dark stools but believes this is generally when she takes Pepto-Bismol  She denies rectal bleeding  She does report intermittent diarrhea as well as epigastric abdominal discomfort  She denies nausea or vomiting  She denies heartburn and she denies dysphagia  She was started on a pantoprazole course for her epigastric discomfort which she states does seem to be helping her significantly        REVIEW OF SYSTEMS:    CONSTITUTIONAL: Denies any fever, chills, rigors, and weight loss  HEENT: No earache or tinnitus  Denies hearing loss or visual disturbances  CARDIOVASCULAR: No chest pain or palpitations  RESPIRATORY: Denies any cough, hemoptysis, shortness of breath or dyspnea on exertion  GASTROINTESTINAL: As noted in the History of Present Illness  GENITOURINARY: No problems with urination  Denies any hematuria or dysuria  NEUROLOGIC: No dizziness or vertigo, denies headaches  MUSCULOSKELETAL: Denies any muscle or joint pain  SKIN: Denies skin rashes or itching  ENDOCRINE: Denies excessive thirst  Denies intolerance to heat or cold  PSYCHOSOCIAL: Denies depression or anxiety  Denies any recent memory loss         Historical Information   Past Medical History:   Diagnosis Date    Benign essential hypertension     Last assessed: 14    Disease of thyroid gland     Fibromyalgia     GERD (gastroesophageal reflux disease)     History of nail disorders     Hyperlipidemia     Hypertension     Palpitations     [facility]     Transient cerebral ischemia      Past Surgical History:   Procedure Laterality Date    APPENDECTOMY      HYSTERECTOMY  2010    [place] LAP,CHOLECYSTECTOMY/GRAPH N/A 2018    Procedure: LAPAROSCOPIC CHOLECYSTECTOMY WITH IOC;  Surgeon: Gabriela Shukla MD;  [facility];  Service: General    TUBAL LIGATION       Social History   Social History     Substance and Sexual Activity   Alcohol Use Not Currently    Alcohol/week: 0 0 standard drinks    Comment: 0     Social History     Substance and Sexual Activity   Drug Use No     Social History     Tobacco Use   Smoking Status Former Smoker    Packs/day: 0 10    Years: 50 00    Pack years: 5 00    Types: Cigarettes    Start date:     Last attempt to quit: 2013    Years since quittin 5   Smokeless Tobacco Never Used   Tobacco Comment    1 pack a week      Family History   Problem Relation Age of Onset    Stroke Mother         ischemic stroke  Hypertension Mother     Heart disease Father        Meds/Allergies       Current Outpatient Medications:     acetaminophen (TYLENOL) 325 mg tablet    ALPRAZolam (XANAX) 0 5 mg tablet    atenolol (TENORMIN) 50 mg tablet    ferrous sulfate 325 (65 Fe) mg tablet    imipramine (TOFRANIL) 10 mg tablet    latanoprost (XALATAN) 0 005 % ophthalmic solution    levothyroxine 50 mcg tablet    lisinopril (ZESTRIL) 10 mg tablet    pantoprazole (PROTONIX) 40 mg tablet    Probiotic Product (ALIGN) 4 MG CAPS    rosuvastatin (CRESTOR) 10 MG tablet    TRAVATAN Z 0 004 % ophthalmic solution    Allergies   Allergen Reactions    Other Drowsiness     [facility] [id number]: ANTIDEPRESSANTS           Objective     Blood pressure 132/60, pulse 66, temperature (!) 97 3 °F (36 3 °C), height 4' 10" (1 473 m), weight 47 2 kg (104 lb), not currently breastfeeding  Body mass index is 21 74 kg/m²  PHYSICAL EXAM:      General Appearance:   Alert, cooperative, no distress   HEENT:   Normocephalic, atraumatic, anicteric      Neck:  Supple, symmetrical, trachea midline   Lungs:   Clear to auscultation bilaterally; no rales, rhonchi or wheezing; respirations unlabored    Heart[de-identified]   Regular rate and rhythm; + murmur, rub, or gallop  Abdomen:   Soft, non-tender, non-distended; normal bowel sounds; no masses, no organomegaly    Genitalia:   Deferred    Rectal:   Deferred    Extremities:  No cyanosis, clubbing or edema    Pulses:  2+ and symmetric    Skin:  No jaundice, rashes, or lesions    Lymph nodes:  No palpable cervical lymphadenopathy        Lab Results:   No visits with results within 1 Day(s) from this visit     Latest known visit with results is:   Appointment on 07/08/2020   Component Date Value    OCCULT BLD, FECAL IMMUNO* 07/08/2020 Positive*    TSH 3RD GENERATON 07/08/2020 1 780     Vitamin B-12 07/08/2020 361     Iron Saturation 07/08/2020 19     TIBC 07/08/2020 310     Iron 07/08/2020 59     Ferritin 07/08/2020 69          Radiology Results:   No results found

## 2020-07-21 ENCOUNTER — TELEPHONE (OUTPATIENT)
Dept: GASTROENTEROLOGY | Facility: CLINIC | Age: 85
End: 2020-07-21

## 2020-07-21 ENCOUNTER — TELEPHONE (OUTPATIENT)
Dept: INTERNAL MEDICINE CLINIC | Facility: CLINIC | Age: 85
End: 2020-07-21

## 2020-07-27 ENCOUNTER — LAB REQUISITION (OUTPATIENT)
Dept: LAB | Facility: HOSPITAL | Age: 85
End: 2020-07-27
Payer: MEDICARE

## 2020-07-27 DIAGNOSIS — K29.00 ACUTE GASTRITIS WITHOUT BLEEDING: ICD-10-CM

## 2020-07-27 PROCEDURE — 88305 TISSUE EXAM BY PATHOLOGIST: CPT | Performed by: PATHOLOGY

## 2020-08-02 ENCOUNTER — TELEPHONE (OUTPATIENT)
Dept: GASTROENTEROLOGY | Facility: CLINIC | Age: 85
End: 2020-08-02

## 2020-08-02 ENCOUNTER — TELEPHONE (OUTPATIENT)
Dept: OTHER | Facility: OTHER | Age: 85
End: 2020-08-02

## 2020-08-02 ENCOUNTER — TELEPHONE (OUTPATIENT)
Dept: GASTROENTEROLOGY | Facility: MEDICAL CENTER | Age: 85
End: 2020-08-02

## 2020-08-02 NOTE — TELEPHONE ENCOUNTER
Pt is calling because she has been unable to move her bowels since her colonoscopy on Monday July 27  Pt would like to know if she can take a laxative    Informed caller to call back if they do not receive a call back from a provider within 20-30 minutes, or symptoms worsen, if necessary go to the nearest Emergency Department

## 2020-08-02 NOTE — TELEPHONE ENCOUNTER
She called because she has not had any bowel movement since her colonoscopy a few days ago  She denies any abdominal pain or fever  She said she has not been taking any laxatives  I suggested MiraLax daily and to call back if there is no improvement in 1 to 2 days or if he develops any pain or fever

## 2020-08-05 ENCOUNTER — OFFICE VISIT (OUTPATIENT)
Dept: INTERNAL MEDICINE CLINIC | Facility: CLINIC | Age: 85
End: 2020-08-05
Payer: MEDICARE

## 2020-08-05 VITALS
TEMPERATURE: 98 F | HEART RATE: 60 BPM | SYSTOLIC BLOOD PRESSURE: 160 MMHG | BODY MASS INDEX: 21.66 KG/M2 | HEIGHT: 58 IN | DIASTOLIC BLOOD PRESSURE: 72 MMHG | WEIGHT: 103.2 LBS | RESPIRATION RATE: 12 BRPM

## 2020-08-05 DIAGNOSIS — K21.9 GERD WITHOUT ESOPHAGITIS: Primary | ICD-10-CM

## 2020-08-05 DIAGNOSIS — R73.01 IMPAIRED FASTING GLUCOSE: ICD-10-CM

## 2020-08-05 DIAGNOSIS — N18.30 STAGE 3 CHRONIC KIDNEY DISEASE (HCC): ICD-10-CM

## 2020-08-05 DIAGNOSIS — K58.9 IRRITABLE BOWEL SYNDROME, UNSPECIFIED TYPE: ICD-10-CM

## 2020-08-05 DIAGNOSIS — I10 ESSENTIAL HYPERTENSION: ICD-10-CM

## 2020-08-05 DIAGNOSIS — D64.9 ANEMIA, UNSPECIFIED TYPE: ICD-10-CM

## 2020-08-05 DIAGNOSIS — E03.9 ACQUIRED HYPOTHYROIDISM: ICD-10-CM

## 2020-08-05 PROCEDURE — 3077F SYST BP >= 140 MM HG: CPT | Performed by: INTERNAL MEDICINE

## 2020-08-05 PROCEDURE — 3008F BODY MASS INDEX DOCD: CPT | Performed by: INTERNAL MEDICINE

## 2020-08-05 PROCEDURE — 99214 OFFICE O/P EST MOD 30 MIN: CPT | Performed by: INTERNAL MEDICINE

## 2020-08-05 PROCEDURE — 1036F TOBACCO NON-USER: CPT | Performed by: INTERNAL MEDICINE

## 2020-08-05 PROCEDURE — 4040F PNEUMOC VAC/ADMIN/RCVD: CPT | Performed by: INTERNAL MEDICINE

## 2020-08-05 PROCEDURE — 1160F RVW MEDS BY RX/DR IN RCRD: CPT | Performed by: INTERNAL MEDICINE

## 2020-08-05 PROCEDURE — 3078F DIAST BP <80 MM HG: CPT | Performed by: INTERNAL MEDICINE

## 2020-08-05 NOTE — PATIENT INSTRUCTIONS
-results of colonoscopy and endoscopy from June 27 2020 were reviewed and discussed with the patient   -Blood pressure was initially elevated was recheck and came down to 122/78, patient was instructed to continue taking the same antihypertensive regimen of lisinopril and atenolol   -consuled to continue healthy diet and physical activity as tolerated, counseled on regard risk of falls  -with it order CBC, BMP, TSH with free T4 reflex, hemoglobin A1c and lipid profile as well as iron studies to be done on December on discuss at that time

## 2020-08-05 NOTE — ASSESSMENT & PLAN NOTE
TSH from June 2020 within normal limits  Patient currently taking levothyroxine 50 mcg 6 times a week, continue current treatment

## 2020-08-05 NOTE — ASSESSMENT & PLAN NOTE
History of irritable bowel syndrome  Patient underwent EGD and colonoscopy on July 27, 2020  Findings significant for large hiatal hernia and diverticulosis with no other significant findings  Negative for H pylori    Patient has been started on MiraLax

## 2020-08-05 NOTE — ASSESSMENT & PLAN NOTE
Patient blood pressure today's is elevated 911 systolic  Patient's blood pressure log book from home shows systolic blood pressure in the 166G and diastolic in the 10R  Repeated blood pressure at the end of the visit show /78  Patient is taking lisinopril 10 mg p o  Daily, atenolol 50 mg p o  B i d   Patient is following a healthy diet and is physically active  Continue antihypertensive management with lisinopril and atenolol

## 2020-08-05 NOTE — PROGRESS NOTES
Assessment/Plan:    Hypertension  Patient blood pressure today's is elevated 821 systolic  Patient's blood pressure log book from home shows systolic blood pressure in the 017C and diastolic in the 66P  Repeated blood pressure at the end of the visit show /78  Patient is taking lisinopril 10 mg p o  Daily, atenolol 50 mg p o  B i d   Patient is following a healthy diet and is physically active  Continue antihypertensive management with lisinopril and atenolol  Mild intermittent asthma without complication  Hemoglobin A1c 6 on December 2016, 5 3 on June 2020  Continue healthy diet and physical activity  Hypothyroidism  TSH from June 2020 within normal limits  Patient currently taking levothyroxine 50 mcg 6 times a week, continue current treatment  GERD without esophagitis  Patient taking pantoprazole 40 mg p o  Daily, denies GERD symptoms  Continue current treatment  Irritable bowel syndrome  History of irritable bowel syndrome  Patient underwent EGD and colonoscopy on July 27, 2020  Findings significant for large hiatal hernia and diverticulosis with no other significant findings  Negative for H pylori  Patient has been started on MiraLax       Diagnoses and all orders for this visit:    GERD without esophagitis    Irritable bowel syndrome, unspecified type    Acquired hypothyroidism  -     Comprehensive metabolic panel; Future  -     Lipid panel; Future  -     TSH, 3rd generation with Free T4 reflex; Future    Impaired fasting glucose  -     Hemoglobin A1C; Future    Essential hypertension    Stage 3 chronic kidney disease (HCC)    Anemia, unspecified type  -     Iron Panel (Includes Ferritin, Iron Sat%, Iron, and TIBC); Future  -     CBC and differential; Future          Subjective:      Patient ID: Deborah Madison is a 82 y o  female      Patient comes today follow-up to check blood work from June 2020 and also she would like to address chronic conditions including hypertension, hypothyroidism, read about worsening another comorbidities  Review of Systems   Constitutional: Negative  Negative for activity change  HENT: Negative  Eyes: Negative  Respiratory: Negative  Cardiovascular: Negative  Gastrointestinal: Negative  Endocrine: Negative  Genitourinary: Negative  Musculoskeletal: Positive for arthralgias and back pain  Allergic/Immunologic: Negative  Neurological: Negative  Hematological: Negative  Psychiatric/Behavioral: Negative  Objective:      /72 (BP Location: Left arm, Patient Position: Sitting)   Pulse 60   Temp 98 °F (36 7 °C) (Tympanic)   Resp 12   Ht 4' 10" (1 473 m)   Wt 46 8 kg (103 lb 3 2 oz)   BMI 21 57 kg/m²          Physical Exam   Constitutional: She is oriented to person, place, and time  HENT:   Head: Normocephalic and atraumatic  Eyes: Pupils are equal, round, and reactive to light  Conjunctivae are normal    Neck: Normal range of motion  Neck supple  No neck rigidity  Cardiovascular: Normal rate, regular rhythm, normal heart sounds and normal pulses  Exam reveals no gallop and no friction rub  No murmur heard  Pulmonary/Chest: Effort normal and breath sounds normal  No respiratory distress  She has no wheezes  She has no rhonchi  She has no rales  Abdominal: Soft  Normal appearance and bowel sounds are normal  She exhibits no distension  There is no abdominal tenderness  Musculoskeletal: Normal range of motion  General: No swelling, tenderness or deformity  Neurological: She is alert and oriented to person, place, and time  Skin: Skin is warm and dry  Capillary refill takes less than 2 seconds     Psychiatric: Mood normal

## 2020-08-06 ENCOUNTER — TELEPHONE (OUTPATIENT)
Dept: GASTROENTEROLOGY | Facility: CLINIC | Age: 85
End: 2020-08-06

## 2020-08-06 NOTE — TELEPHONE ENCOUNTER
Pt called she took the miralax as suggested and now she has loose stools and she would like a suggestion other then stopping the miralax, as she is going away this weekend

## 2020-09-30 ENCOUNTER — OFFICE VISIT (OUTPATIENT)
Dept: CARDIOLOGY CLINIC | Facility: CLINIC | Age: 85
End: 2020-09-30
Payer: MEDICARE

## 2020-09-30 VITALS
SYSTOLIC BLOOD PRESSURE: 154 MMHG | OXYGEN SATURATION: 98 % | HEIGHT: 58 IN | DIASTOLIC BLOOD PRESSURE: 62 MMHG | HEART RATE: 76 BPM | WEIGHT: 107 LBS | BODY MASS INDEX: 22.46 KG/M2

## 2020-09-30 DIAGNOSIS — F41.9 ANXIETY: ICD-10-CM

## 2020-09-30 DIAGNOSIS — I10 ESSENTIAL HYPERTENSION: Primary | ICD-10-CM

## 2020-09-30 DIAGNOSIS — E78.2 MIXED HYPERLIPIDEMIA: ICD-10-CM

## 2020-09-30 PROCEDURE — 99213 OFFICE O/P EST LOW 20 MIN: CPT | Performed by: INTERNAL MEDICINE

## 2020-09-30 NOTE — PROGRESS NOTES
[facility]  [address] [place] [address] [id number]  Cardiology Follow Up    Henrique Cook  1935  [id number]      1  Essential hypertension     2  Anxiety     3  Mixed hyperlipidemia         Chief Complaint   Patient presents with    Follow-up       Interval History:  Patient presents for follow-up visit  Patient has some discomfort in the chest which is unrelated to exertion  Patient does have history of anxiety  No history of leg edema orthopnea PND  Patient has arthritis and has generalized pain  She states that she has been compliant with all her present medications      Patient Active Problem List   Diagnosis    Chest discomfort    Anxiety    Abnormal EKG    Vitamin D deficiency    Irritable bowel syndrome    Iron deficiency anemia    Hypothyroidism    Hypertension    Hyperlipidemia    GERD without esophagitis    ESR raised    Chronic kidney disease    AVM (arteriovenous malformation)    Venous insufficiency    Impaired fasting glucose    Cough    Mild intermittent asthma without complication    Age-related osteoporosis without current pathological fracture    Nondisplaced fracture of middle phalanx of left middle finger, initial encounter for closed fracture    Closed fracture of tenth thoracic vertebra with routine healing    Fall as cause of accidental injury in home as place of occurrence    Atrophic vaginitis    Benign familial tremor    Eustachian tube dysfunction, bilateral    Tension-type headache, not intractable    Hiatal hernia     Past Medical History:   Diagnosis Date    Benign essential hypertension     Last assessed: 9/11/14    Disease of thyroid gland     Fibromyalgia     GERD (gastroesophageal reflux disease)     History of nail disorders     Hyperlipidemia     Hypertension     Palpitations     Parkinson's disease ([facility] )     Transient cerebral ischemia      Social History     Socioeconomic History    Marital status:       Spouse name: Not on file    Number of children: Not on file    Years of education: Not on file    Highest education level: Not on file   Occupational History    Occupation: retired    Social Needs    Financial resource strain: Not on file    Food insecurity     Worry: Not on file     Inability: Not on file   [employer] needs     Medical: Not on file     Non-medical: Not on file   Tobacco Use    Smoking status: Former Smoker     Packs/day: 0 10     Years: 50 00     Pack years: 5 00     Types: Cigarettes     Start date:      Last attempt to quit:      Years since quittin 7    Smokeless tobacco: Never Used    Tobacco comment: 1 pack a week    Substance and Sexual Activity    Alcohol use: Not Currently     Alcohol/week: 0 0 standard drinks     Comment: 0    Drug use: No    Sexual activity: Not Currently     Partners: Male   Lifestyle    Physical activity     Days per week: 2 days     Minutes per session: 50 min    Stress: Not at all   Relationships    Social connections     Talks on phone: Not on file     Gets together: Not on file     Attends Congregational service: Not on file     Active member of club or organization: Not on file     Attends meetings of clubs or organizations: Not on file     Relationship status: Not on file    Intimate partner violence     Fear of current or ex partner: Not on file     Emotionally abused: Not on file     Physically abused: Not on file     Forced sexual activity: Not on file   Other Topics Concern    Not on file   Social History Narrative    Living independently with spouse      Family History   Problem Relation Age of Onset    Stroke Mother         ischemic stroke    Hypertension Mother     Heart disease Father      Past Surgical History:   Procedure Laterality Date    APPENDECTOMY      EGD AND COLONOSCOPY  2020    HYSTERECTOMY  2010    [place] LAP,CHOLECYSTECTOMY/GRAPH N/A 2018    Procedure: LAPAROSCOPIC CHOLECYSTECTOMY WITH IOC;  Surgeon: Maria Aguayo MD;  [facility];  Service: General    TUBAL LIGATION         Current Outpatient Medications:     acetaminophen (TYLENOL) 325 mg tablet, Take 1-2 tablets by mouth every 6 (six) hours as needed, Disp: , Rfl:     ALPRAZolam (XANAX) 0 5 mg tablet, Take 0 5 mg by mouth daily as needed  , Disp: , Rfl:     atenolol (TENORMIN) 50 mg tablet, Take 1 tablet (50 mg total) by mouth 2 (two) times a day, Disp: 180 tablet, Rfl: 3    ferrous sulfate 325 (65 Fe) mg tablet, Take 1 tablet by mouth daily, Disp: , Rfl:     imipramine (TOFRANIL) 10 mg tablet, Take 1 tablet (10 mg total) by mouth 4 (four) times a week, Disp: 90 tablet, Rfl: 3    latanoprost (XALATAN) 0 005 % ophthalmic solution, instill 1 drop into both eyes nightly, Disp: , Rfl:     levothyroxine 50 mcg tablet, 50 UG 6 DAYS PER WEEK, Disp: 90 tablet, Rfl: 3    lisinopril (ZESTRIL) 10 mg tablet, Take 1 tablet (10 mg total) by mouth daily, Disp: 90 tablet, Rfl: 3    pantoprazole (PROTONIX) 40 mg tablet, Take 1 tablet (40 mg total) by mouth daily before breakfast, Disp: 30 tablet, Rfl: 5    Probiotic Product (ALIGN) 4 MG CAPS, Take 1 tablet by mouth daily, Disp: , Rfl:     rosuvastatin (CRESTOR) 10 MG tablet, Take 1 tablet (10 mg total) by mouth daily, Disp: 90 tablet, Rfl: 1    TRAVATAN Z 0 004 % ophthalmic solution, , Disp: , Rfl: 0    polyethylene glycol (GOLYTELY) 4000 mL solution, Take 4,000 mL by mouth once for 1 dose (Patient not taking: Reported on 8/4/2020), Disp: 4000 mL, Rfl: 0  Allergies   Allergen Reactions    Other Drowsiness     Annotation - [id number]: ANTIDEPRESSANTS       Labs:  No visits with results within 2 Month(s) from this visit     Latest known visit with results is:   Lab Requisition on 07/27/2020   Component Date Value    Case Report 07/27/2020                      Value:Surgical Pathology Report                         Case: [id number] Authorizing Provider:  Kelly Adams MD      Collected:           07/27/2020 0000              Ordering Location:     [address] [place] [address]      Received:            07/27/2020 [address] [place] [address] Specialty                                                                                  Laboratory                                                                   Pathologist:           Mary Kate Dunham MD                                                                 Specimens:   A) - Stomach, antrum                                                                                B) - Large Intestine, Right/Ascending Colon                                                         C) - Large Intestine, Left/Descending Colon                                                Final Diagnosis 07/27/2020                      Value: This result contains rich text formatting which cannot be displayed here   Additional Information 07/27/2020                      Value: This result contains rich text formatting which cannot be displayed here  [facility] Description 07/27/2020                      Value: This result contains rich text formatting which cannot be displayed here  Imaging: No results found      Review of Systems:  Review of Systems   REVIEW OF SYSTEMS:  Constitutional:  Denies fever or chills   Eyes:  Denies change in visual acuity   HENT:  Denies nasal congestion or sore throat   Respiratory:  Denies cough or shortness of breath   Cardiovascular:  Denies chest pain or edema   GI:  Denies abdominal pain, nausea, vomiting, bloody stools or diarrhea   :  Denies dysuria, frequency, difficulty in micturition and nocturia  Musculoskeletal:  DJD  Neurologic:  Denies headache, focal weakness or sensory changes   Endocrine:  Denies polyuria or polydipsia   Lymphatic:  Denies swollen glands   Psychiatric:   anxiety     Physical Exam:    /62   Pulse 76   Ht 4' 10" (1 473 m)   Wt 48 5 kg (107 lb)   SpO2 98%   BMI 22 36 kg/m²     Physical Exam   PHYSICAL EXAM:  General:  Patient is not in acute distress   Head: Normocephalic, Atraumatic  HEENT:  Both pupils normal-size atraumatic, normocephalic, nonicteric  Neck:  JVP not raised  Trachea central  No carotid bruit  Respiratory:  normal breath sounds no crackles  no rhonchi  Cardiovascular:  Regular rate and rhythm no S3 no murmurs  GI:  Abdomen soft nontender  No organomegaly  Lymphatic:  No cervical or inguinal lymphadenopathy  Neurologic:  Patient is awake alert, oriented   Grossly nonfocal  Extremities no edema    Discussion/Summary:  Patient with multiple medical problems who seems to be doing reasonably well from cardiac standpoint  Previous studies reviewed with patient  Medications reviewed and possible side effects discussed  concepts of cardiovascular disease , signs and symptoms of heart disease  Dietary and risk factor modification reinforced  All questions answered  Safety measures reviewed  Patient advised to report any problems prompting medical attention  Previous cardiovascular studies reviewed  Emotional support provided to the patient with history of anxiety

## 2020-10-13 ENCOUNTER — LAB (OUTPATIENT)
Dept: LAB | Facility: CLINIC | Age: 85
End: 2020-10-13
Payer: MEDICARE

## 2020-10-13 DIAGNOSIS — R20.2 PARESTHESIA: ICD-10-CM

## 2020-10-13 DIAGNOSIS — G62.9 PERIPHERAL POLYNEUROPATHY: ICD-10-CM

## 2020-10-13 LAB
ERYTHROCYTE [SEDIMENTATION RATE] IN BLOOD: 30 MM/HOUR (ref 0–29)
FOLATE SERPL-MCNC: 15.9 NG/ML (ref 3.1–17.5)

## 2020-10-13 PROCEDURE — 36415 COLL VENOUS BLD VENIPUNCTURE: CPT

## 2020-10-13 PROCEDURE — 86038 ANTINUCLEAR ANTIBODIES: CPT

## 2020-10-13 PROCEDURE — 86618 LYME DISEASE ANTIBODY: CPT

## 2020-10-13 PROCEDURE — 85652 RBC SED RATE AUTOMATED: CPT

## 2020-10-13 PROCEDURE — 84165 PROTEIN E-PHORESIS SERUM: CPT

## 2020-10-13 PROCEDURE — 84165 PROTEIN E-PHORESIS SERUM: CPT | Performed by: PATHOLOGY

## 2020-10-13 PROCEDURE — 86039 ANTINUCLEAR ANTIBODIES (ANA): CPT

## 2020-10-13 PROCEDURE — 82746 ASSAY OF FOLIC ACID SERUM: CPT

## 2020-10-14 LAB
ANA HOMOGEN SER QL IF: NORMAL
ANA HOMOGEN TITR SER: NORMAL {TITER}
B BURGDOR IGG+IGM SER-ACNC: 53
RYE IGE QN: POSITIVE

## 2020-10-15 LAB
ALBUMIN SERPL ELPH-MCNC: 4.48 G/DL (ref 3.5–5)
ALBUMIN SERPL ELPH-MCNC: 59.7 % (ref 52–65)
ALPHA1 GLOB SERPL ELPH-MCNC: 0.36 G/DL (ref 0.1–0.4)
ALPHA1 GLOB SERPL ELPH-MCNC: 4.8 % (ref 2.5–5)
ALPHA2 GLOB SERPL ELPH-MCNC: 0.86 G/DL (ref 0.4–1.2)
ALPHA2 GLOB SERPL ELPH-MCNC: 11.4 % (ref 7–13)
BETA GLOB ABNORMAL SERPL ELPH-MCNC: 0.41 G/DL (ref 0.4–0.8)
BETA1 GLOB SERPL ELPH-MCNC: 5.4 % (ref 5–13)
BETA2 GLOB SERPL ELPH-MCNC: 4.7 % (ref 2–8)
BETA2+GAMMA GLOB SERPL ELPH-MCNC: 0.35 G/DL (ref 0.2–0.5)
GAMMA GLOB ABNORMAL SERPL ELPH-MCNC: 1.05 G/DL (ref 0.5–1.6)
GAMMA GLOB SERPL ELPH-MCNC: 14 % (ref 12–22)
IGG/ALB SER: 1.48 {RATIO} (ref 1.1–1.8)
PROT PATTERN SERPL ELPH-IMP: NORMAL
PROT SERPL-MCNC: 7.5 G/DL (ref 6.4–8.2)

## 2020-10-27 ENCOUNTER — OFFICE VISIT (OUTPATIENT)
Dept: NEUROLOGY | Facility: CLINIC | Age: 85
End: 2020-10-27
Payer: MEDICARE

## 2020-10-27 VITALS
HEART RATE: 64 BPM | WEIGHT: 104 LBS | SYSTOLIC BLOOD PRESSURE: 150 MMHG | HEIGHT: 58 IN | BODY MASS INDEX: 21.83 KG/M2 | DIASTOLIC BLOOD PRESSURE: 72 MMHG

## 2020-10-27 DIAGNOSIS — G62.9 PERIPHERAL POLYNEUROPATHY: Primary | ICD-10-CM

## 2020-10-27 DIAGNOSIS — G25.0 ESSENTIAL TREMOR: ICD-10-CM

## 2020-10-27 DIAGNOSIS — R27.8 SENSORY ATAXIA: ICD-10-CM

## 2020-10-27 PROCEDURE — 99213 OFFICE O/P EST LOW 20 MIN: CPT | Performed by: PSYCHIATRY & NEUROLOGY

## 2020-11-02 ENCOUNTER — TELEMEDICINE (OUTPATIENT)
Dept: INTERNAL MEDICINE CLINIC | Facility: CLINIC | Age: 85
End: 2020-11-02
Payer: MEDICARE

## 2020-11-02 ENCOUNTER — TELEPHONE (OUTPATIENT)
Dept: INTERNAL MEDICINE CLINIC | Facility: CLINIC | Age: 85
End: 2020-11-02

## 2020-11-02 ENCOUNTER — LAB (OUTPATIENT)
Dept: LAB | Facility: CLINIC | Age: 85
End: 2020-11-02
Payer: MEDICARE

## 2020-11-02 VITALS — TEMPERATURE: 97 F | DIASTOLIC BLOOD PRESSURE: 56 MMHG | SYSTOLIC BLOOD PRESSURE: 113 MMHG

## 2020-11-02 DIAGNOSIS — M54.50 ACUTE BILATERAL LOW BACK PAIN WITHOUT SCIATICA: ICD-10-CM

## 2020-11-02 DIAGNOSIS — R10.32 LEFT LOWER QUADRANT ABDOMINAL PAIN: Primary | ICD-10-CM

## 2020-11-02 DIAGNOSIS — R10.32 LEFT LOWER QUADRANT ABDOMINAL PAIN: ICD-10-CM

## 2020-11-02 PROCEDURE — 81001 URINALYSIS AUTO W/SCOPE: CPT | Performed by: INTERNAL MEDICINE

## 2020-11-02 PROCEDURE — 85025 COMPLETE CBC W/AUTO DIFF WBC: CPT

## 2020-11-02 PROCEDURE — 36415 COLL VENOUS BLD VENIPUNCTURE: CPT

## 2020-11-02 PROCEDURE — 80053 COMPREHEN METABOLIC PANEL: CPT

## 2020-11-02 PROCEDURE — 99213 OFFICE O/P EST LOW 20 MIN: CPT | Performed by: INTERNAL MEDICINE

## 2020-11-03 ENCOUNTER — TELEPHONE (OUTPATIENT)
Dept: INTERNAL MEDICINE CLINIC | Facility: CLINIC | Age: 85
End: 2020-11-03

## 2020-11-03 LAB
ALBUMIN SERPL BCP-MCNC: 4.2 G/DL (ref 3.5–5)
ALP SERPL-CCNC: 99 U/L (ref 46–116)
ALT SERPL W P-5'-P-CCNC: 29 U/L (ref 12–78)
ANION GAP SERPL CALCULATED.3IONS-SCNC: 5 MMOL/L (ref 4–13)
AST SERPL W P-5'-P-CCNC: 25 U/L (ref 5–45)
BACTERIA UR QL AUTO: ABNORMAL /HPF
BASOPHILS # BLD AUTO: 0.04 THOUSANDS/ΜL (ref 0–0.1)
BASOPHILS NFR BLD AUTO: 1 % (ref 0–1)
BILIRUB SERPL-MCNC: 0.51 MG/DL (ref 0.2–1)
BILIRUB UR QL STRIP: NEGATIVE
BUN SERPL-MCNC: 16 MG/DL (ref 5–25)
CALCIUM SERPL-MCNC: 8.9 MG/DL (ref 8.3–10.1)
CHLORIDE SERPL-SCNC: 105 MMOL/L (ref 100–108)
CLARITY UR: CLEAR
CO2 SERPL-SCNC: 28 MMOL/L (ref 21–32)
COLOR UR: YELLOW
CREAT SERPL-MCNC: 1.28 MG/DL (ref 0.6–1.3)
EOSINOPHIL # BLD AUTO: 0.77 THOUSAND/ΜL (ref 0–0.61)
EOSINOPHIL NFR BLD AUTO: 12 % (ref 0–6)
ERYTHROCYTE [DISTWIDTH] IN BLOOD BY AUTOMATED COUNT: 13 % (ref 11.6–15.1)
GFR SERPL CREATININE-BSD FRML MDRD: 39 ML/MIN/1.73SQ M
GLUCOSE SERPL-MCNC: 76 MG/DL (ref 65–140)
GLUCOSE UR STRIP-MCNC: NEGATIVE MG/DL
HCT VFR BLD AUTO: 38.2 % (ref 34.8–46.1)
HGB BLD-MCNC: 12.3 G/DL (ref 11.5–15.4)
HGB UR QL STRIP.AUTO: ABNORMAL
HYALINE CASTS #/AREA URNS LPF: ABNORMAL /LPF
IMM GRANULOCYTES # BLD AUTO: 0.01 THOUSAND/UL (ref 0–0.2)
IMM GRANULOCYTES NFR BLD AUTO: 0 % (ref 0–2)
KETONES UR STRIP-MCNC: NEGATIVE MG/DL
LEUKOCYTE ESTERASE UR QL STRIP: ABNORMAL
LYMPHOCYTES # BLD AUTO: 1.4 THOUSANDS/ΜL (ref 0.6–4.47)
LYMPHOCYTES NFR BLD AUTO: 21 % (ref 14–44)
MCH RBC QN AUTO: 31.1 PG (ref 26.8–34.3)
MCHC RBC AUTO-ENTMCNC: 32.2 G/DL (ref 31.4–37.4)
MCV RBC AUTO: 97 FL (ref 82–98)
MONOCYTES # BLD AUTO: 0.74 THOUSAND/ΜL (ref 0.17–1.22)
MONOCYTES NFR BLD AUTO: 11 % (ref 4–12)
NEUTROPHILS # BLD AUTO: 3.74 THOUSANDS/ΜL (ref 1.85–7.62)
NEUTS SEG NFR BLD AUTO: 55 % (ref 43–75)
NITRITE UR QL STRIP: NEGATIVE
NON-SQ EPI CELLS URNS QL MICRO: ABNORMAL /HPF
NRBC BLD AUTO-RTO: 0 /100 WBCS
PH UR STRIP.AUTO: 6.5 [PH]
PLATELET # BLD AUTO: 153 THOUSANDS/UL (ref 149–390)
PMV BLD AUTO: 11.6 FL (ref 8.9–12.7)
POTASSIUM SERPL-SCNC: 4 MMOL/L (ref 3.5–5.3)
PROT SERPL-MCNC: 7.6 G/DL (ref 6.4–8.2)
PROT UR STRIP-MCNC: NEGATIVE MG/DL
RBC # BLD AUTO: 3.95 MILLION/UL (ref 3.81–5.12)
RBC #/AREA URNS AUTO: ABNORMAL /HPF
SODIUM SERPL-SCNC: 138 MMOL/L (ref 136–145)
SP GR UR STRIP.AUTO: 1.01 (ref 1–1.03)
UROBILINOGEN UR QL STRIP.AUTO: 0.2 E.U./DL
WBC # BLD AUTO: 6.7 THOUSAND/UL (ref 4.31–10.16)
WBC #/AREA URNS AUTO: ABNORMAL /HPF

## 2020-11-13 DIAGNOSIS — I10 ESSENTIAL HYPERTENSION: ICD-10-CM

## 2020-11-13 DIAGNOSIS — E78.49 OTHER HYPERLIPIDEMIA: ICD-10-CM

## 2020-11-13 DIAGNOSIS — K21.9 GERD WITHOUT ESOPHAGITIS: ICD-10-CM

## 2020-11-13 RX ORDER — PANTOPRAZOLE SODIUM 40 MG/1
40 TABLET, DELAYED RELEASE ORAL
Qty: 30 TABLET | Refills: 5 | Status: SHIPPED | OUTPATIENT
Start: 2020-11-13 | End: 2021-05-26

## 2020-11-13 RX ORDER — ROSUVASTATIN CALCIUM 10 MG/1
10 TABLET, COATED ORAL DAILY
Qty: 90 TABLET | Refills: 2 | Status: SHIPPED | OUTPATIENT
Start: 2020-11-13 | End: 2021-07-06

## 2020-11-13 RX ORDER — ATENOLOL 50 MG/1
50 TABLET ORAL 2 TIMES DAILY
Qty: 180 TABLET | Refills: 3 | Status: SHIPPED | OUTPATIENT
Start: 2020-11-13 | End: 2021-07-26 | Stop reason: DRUGHIGH

## 2020-11-20 ENCOUNTER — TELEPHONE (OUTPATIENT)
Dept: INTERNAL MEDICINE CLINIC | Facility: CLINIC | Age: 85
End: 2020-11-20

## 2020-12-04 ENCOUNTER — TELEPHONE (OUTPATIENT)
Dept: INTERNAL MEDICINE CLINIC | Facility: CLINIC | Age: 85
End: 2020-12-04

## 2020-12-08 ENCOUNTER — TELEPHONE (OUTPATIENT)
Dept: INTERNAL MEDICINE CLINIC | Facility: CLINIC | Age: 85
End: 2020-12-08

## 2020-12-08 ENCOUNTER — LAB (OUTPATIENT)
Dept: LAB | Facility: CLINIC | Age: 85
End: 2020-12-08
Payer: MEDICARE

## 2020-12-08 DIAGNOSIS — D64.9 ANEMIA, UNSPECIFIED TYPE: ICD-10-CM

## 2020-12-08 DIAGNOSIS — R73.01 IMPAIRED FASTING GLUCOSE: ICD-10-CM

## 2020-12-08 DIAGNOSIS — E03.9 ACQUIRED HYPOTHYROIDISM: ICD-10-CM

## 2020-12-08 LAB
ALBUMIN SERPL BCP-MCNC: 4.2 G/DL (ref 3.5–5)
ALP SERPL-CCNC: 96 U/L (ref 46–116)
ALT SERPL W P-5'-P-CCNC: 22 U/L (ref 12–78)
ANION GAP SERPL CALCULATED.3IONS-SCNC: 6 MMOL/L (ref 4–13)
AST SERPL W P-5'-P-CCNC: 24 U/L (ref 5–45)
BASOPHILS # BLD AUTO: 0.03 THOUSANDS/ΜL (ref 0–0.1)
BASOPHILS NFR BLD AUTO: 1 % (ref 0–1)
BILIRUB SERPL-MCNC: 0.62 MG/DL (ref 0.2–1)
BUN SERPL-MCNC: 20 MG/DL (ref 5–25)
CALCIUM SERPL-MCNC: 10.3 MG/DL (ref 8.3–10.1)
CHLORIDE SERPL-SCNC: 105 MMOL/L (ref 100–108)
CHOLEST SERPL-MCNC: 168 MG/DL (ref 50–200)
CO2 SERPL-SCNC: 26 MMOL/L (ref 21–32)
CREAT SERPL-MCNC: 1.31 MG/DL (ref 0.6–1.3)
EOSINOPHIL # BLD AUTO: 0.41 THOUSAND/ΜL (ref 0–0.61)
EOSINOPHIL NFR BLD AUTO: 9 % (ref 0–6)
ERYTHROCYTE [DISTWIDTH] IN BLOOD BY AUTOMATED COUNT: 13.5 % (ref 11.6–15.1)
EST. AVERAGE GLUCOSE BLD GHB EST-MCNC: 105 MG/DL
FERRITIN SERPL-MCNC: 137 NG/ML (ref 8–388)
GFR SERPL CREATININE-BSD FRML MDRD: 38 ML/MIN/1.73SQ M
GLUCOSE P FAST SERPL-MCNC: 93 MG/DL (ref 65–99)
HBA1C MFR BLD: 5.3 %
HCT VFR BLD AUTO: 37.6 % (ref 34.8–46.1)
HDLC SERPL-MCNC: 86 MG/DL
HGB BLD-MCNC: 12.4 G/DL (ref 11.5–15.4)
IMM GRANULOCYTES # BLD AUTO: 0.01 THOUSAND/UL (ref 0–0.2)
IMM GRANULOCYTES NFR BLD AUTO: 0 % (ref 0–2)
IRON SATN MFR SERPL: 23 %
IRON SERPL-MCNC: 77 UG/DL (ref 50–170)
LDLC SERPL CALC-MCNC: 59 MG/DL (ref 0–100)
LYMPHOCYTES # BLD AUTO: 1.09 THOUSANDS/ΜL (ref 0.6–4.47)
LYMPHOCYTES NFR BLD AUTO: 23 % (ref 14–44)
MCH RBC QN AUTO: 31.5 PG (ref 26.8–34.3)
MCHC RBC AUTO-ENTMCNC: 33 G/DL (ref 31.4–37.4)
MCV RBC AUTO: 95 FL (ref 82–98)
MONOCYTES # BLD AUTO: 0.47 THOUSAND/ΜL (ref 0.17–1.22)
MONOCYTES NFR BLD AUTO: 10 % (ref 4–12)
NEUTROPHILS # BLD AUTO: 2.84 THOUSANDS/ΜL (ref 1.85–7.62)
NEUTS SEG NFR BLD AUTO: 57 % (ref 43–75)
NONHDLC SERPL-MCNC: 82 MG/DL
NRBC BLD AUTO-RTO: 0 /100 WBCS
PLATELET # BLD AUTO: 169 THOUSANDS/UL (ref 149–390)
PMV BLD AUTO: 11.3 FL (ref 8.9–12.7)
POTASSIUM SERPL-SCNC: 4.3 MMOL/L (ref 3.5–5.3)
PROT SERPL-MCNC: 7.8 G/DL (ref 6.4–8.2)
RBC # BLD AUTO: 3.94 MILLION/UL (ref 3.81–5.12)
SODIUM SERPL-SCNC: 137 MMOL/L (ref 136–145)
TIBC SERPL-MCNC: 335 UG/DL (ref 250–450)
TRIGL SERPL-MCNC: 113 MG/DL
TSH SERPL DL<=0.05 MIU/L-ACNC: 3.3 UIU/ML (ref 0.36–3.74)
WBC # BLD AUTO: 4.85 THOUSAND/UL (ref 4.31–10.16)

## 2020-12-08 PROCEDURE — 80061 LIPID PANEL: CPT

## 2020-12-08 PROCEDURE — 83540 ASSAY OF IRON: CPT

## 2020-12-08 PROCEDURE — 85025 COMPLETE CBC W/AUTO DIFF WBC: CPT

## 2020-12-08 PROCEDURE — 83036 HEMOGLOBIN GLYCOSYLATED A1C: CPT

## 2020-12-08 PROCEDURE — 80053 COMPREHEN METABOLIC PANEL: CPT

## 2020-12-08 PROCEDURE — 83550 IRON BINDING TEST: CPT

## 2020-12-08 PROCEDURE — 36415 COLL VENOUS BLD VENIPUNCTURE: CPT

## 2020-12-08 PROCEDURE — 82728 ASSAY OF FERRITIN: CPT

## 2020-12-08 PROCEDURE — 84443 ASSAY THYROID STIM HORMONE: CPT

## 2020-12-09 ENCOUNTER — TELEPHONE (OUTPATIENT)
Dept: INTERNAL MEDICINE CLINIC | Facility: CLINIC | Age: 85
End: 2020-12-09

## 2020-12-14 ENCOUNTER — OFFICE VISIT (OUTPATIENT)
Dept: INTERNAL MEDICINE CLINIC | Facility: CLINIC | Age: 85
End: 2020-12-14
Payer: MEDICARE

## 2020-12-14 VITALS
WEIGHT: 107 LBS | RESPIRATION RATE: 12 BRPM | TEMPERATURE: 98.9 F | DIASTOLIC BLOOD PRESSURE: 70 MMHG | HEIGHT: 58 IN | SYSTOLIC BLOOD PRESSURE: 116 MMHG | BODY MASS INDEX: 22.46 KG/M2 | HEART RATE: 60 BPM

## 2020-12-14 DIAGNOSIS — E03.9 ACQUIRED HYPOTHYROIDISM: ICD-10-CM

## 2020-12-14 DIAGNOSIS — F41.9 ANXIETY: ICD-10-CM

## 2020-12-14 DIAGNOSIS — M81.0 AGE-RELATED OSTEOPOROSIS WITHOUT CURRENT PATHOLOGICAL FRACTURE: ICD-10-CM

## 2020-12-14 DIAGNOSIS — K21.9 GERD WITHOUT ESOPHAGITIS: ICD-10-CM

## 2020-12-14 DIAGNOSIS — I10 ESSENTIAL HYPERTENSION: ICD-10-CM

## 2020-12-14 DIAGNOSIS — D50.9 IRON DEFICIENCY ANEMIA, UNSPECIFIED IRON DEFICIENCY ANEMIA TYPE: ICD-10-CM

## 2020-12-14 DIAGNOSIS — N18.32 STAGE 3B CHRONIC KIDNEY DISEASE (HCC): ICD-10-CM

## 2020-12-14 DIAGNOSIS — Q27.30 AVM (ARTERIOVENOUS MALFORMATION): ICD-10-CM

## 2020-12-14 DIAGNOSIS — K58.9 IRRITABLE BOWEL SYNDROME, UNSPECIFIED TYPE: Primary | ICD-10-CM

## 2020-12-14 DIAGNOSIS — R73.01 IMPAIRED FASTING GLUCOSE: ICD-10-CM

## 2020-12-14 DIAGNOSIS — E78.2 MIXED HYPERLIPIDEMIA: ICD-10-CM

## 2020-12-14 PROCEDURE — 99214 OFFICE O/P EST MOD 30 MIN: CPT | Performed by: INTERNAL MEDICINE

## 2020-12-14 PROCEDURE — G0439 PPPS, SUBSEQ VISIT: HCPCS | Performed by: INTERNAL MEDICINE

## 2020-12-14 RX ORDER — TOBRAMYCIN AND DEXAMETHASONE 3; 1 MG/ML; MG/ML
SUSPENSION/ DROPS OPHTHALMIC
COMMUNITY
Start: 2020-11-25 | End: 2021-07-26 | Stop reason: ALTCHOICE

## 2020-12-28 ENCOUNTER — TELEPHONE (OUTPATIENT)
Dept: INTERNAL MEDICINE CLINIC | Facility: CLINIC | Age: 85
End: 2020-12-28

## 2021-02-08 DIAGNOSIS — I10 ESSENTIAL HYPERTENSION: Primary | ICD-10-CM

## 2021-02-08 RX ORDER — ATENOLOL 25 MG/1
TABLET ORAL
Qty: 360 TABLET | Refills: 3 | OUTPATIENT
Start: 2021-02-08

## 2021-02-08 NOTE — TELEPHONE ENCOUNTER
Spoke to the pharmacy per the pharmacy staff the medication was denied it and need to send it the back the 25 mg  Please Advise it  AH

## 2021-02-08 NOTE — TELEPHONE ENCOUNTER
Can you call this med in- I dont know why its so hard  I can't physically send it so please call it in

## 2021-02-08 NOTE — TELEPHONE ENCOUNTER
She gets 25mg tablets yet the message is telling that 50mg tabs are on backorder it doesn't make sense and it wont let me send it

## 2021-03-18 ENCOUNTER — IMMUNIZATIONS (OUTPATIENT)
Dept: FAMILY MEDICINE CLINIC | Facility: HOSPITAL | Age: 86
End: 2021-03-18

## 2021-03-18 DIAGNOSIS — Z23 ENCOUNTER FOR IMMUNIZATION: Primary | ICD-10-CM

## 2021-03-18 PROCEDURE — 0001A SARS-COV-2 / COVID-19 MRNA VACCINE (PFIZER-BIONTECH) 30 MCG: CPT

## 2021-03-18 PROCEDURE — 91300 SARS-COV-2 / COVID-19 MRNA VACCINE (PFIZER-BIONTECH) 30 MCG: CPT

## 2021-04-12 ENCOUNTER — IMMUNIZATIONS (OUTPATIENT)
Dept: FAMILY MEDICINE CLINIC | Facility: HOSPITAL | Age: 86
End: 2021-04-12

## 2021-04-12 DIAGNOSIS — Z23 ENCOUNTER FOR IMMUNIZATION: Primary | ICD-10-CM

## 2021-04-12 PROCEDURE — 0002A SARS-COV-2 / COVID-19 MRNA VACCINE (PFIZER-BIONTECH) 30 MCG: CPT

## 2021-04-12 PROCEDURE — 91300 SARS-COV-2 / COVID-19 MRNA VACCINE (PFIZER-BIONTECH) 30 MCG: CPT

## 2021-04-23 DIAGNOSIS — I10 ESSENTIAL HYPERTENSION: Primary | ICD-10-CM

## 2021-04-26 RX ORDER — ATENOLOL 25 MG/1
TABLET ORAL
Qty: 360 TABLET | Refills: 1 | Status: SHIPPED | OUTPATIENT
Start: 2021-04-26 | End: 2021-07-26 | Stop reason: SDUPTHER

## 2021-05-24 ENCOUNTER — OFFICE VISIT (OUTPATIENT)
Dept: INTERNAL MEDICINE CLINIC | Facility: CLINIC | Age: 86
End: 2021-05-24
Payer: MEDICARE

## 2021-05-24 ENCOUNTER — APPOINTMENT (OUTPATIENT)
Dept: LAB | Facility: HOSPITAL | Age: 86
End: 2021-05-24
Attending: INTERNAL MEDICINE
Payer: MEDICARE

## 2021-05-24 ENCOUNTER — HOSPITAL ENCOUNTER (OUTPATIENT)
Dept: RADIOLOGY | Facility: HOSPITAL | Age: 86
Discharge: HOME | End: 2021-05-24
Attending: INTERNAL MEDICINE
Payer: MEDICARE

## 2021-05-24 VITALS
WEIGHT: 106.6 LBS | SYSTOLIC BLOOD PRESSURE: 146 MMHG | BODY MASS INDEX: 22.37 KG/M2 | HEART RATE: 64 BPM | TEMPERATURE: 97.8 F | HEIGHT: 58 IN | OXYGEN SATURATION: 92 % | RESPIRATION RATE: 16 BRPM | DIASTOLIC BLOOD PRESSURE: 72 MMHG

## 2021-05-24 DIAGNOSIS — E78.2 MIXED HYPERLIPIDEMIA: ICD-10-CM

## 2021-05-24 DIAGNOSIS — D50.9 IRON DEFICIENCY ANEMIA, UNSPECIFIED IRON DEFICIENCY ANEMIA TYPE: ICD-10-CM

## 2021-05-24 DIAGNOSIS — R73.01 IMPAIRED FASTING GLUCOSE: ICD-10-CM

## 2021-05-24 DIAGNOSIS — E03.9 ACQUIRED HYPOTHYROIDISM: ICD-10-CM
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
## 2021-05-24 DIAGNOSIS — R27.0 ATAXIA: ICD-10-CM

## 2021-05-24 DIAGNOSIS — I10 ESSENTIAL HYPERTENSION: ICD-10-CM

## 2021-05-24 DIAGNOSIS — R29.898 WEAKNESS OF BOTH LOWER EXTREMITIES: ICD-10-CM
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
## 2021-05-24 DIAGNOSIS — Q27.30 AVM (ARTERIOVENOUS MALFORMATION): ICD-10-CM

## 2021-05-24 LAB
ALBUMIN SERPL BCP-MCNC: 3.9 G/DL (ref 3.5–5)
ALP SERPL-CCNC: 112 U/L (ref 46–116)
ALT SERPL W P-5'-P-CCNC: 24 U/L (ref 12–78)
ANION GAP SERPL CALCULATED.3IONS-SCNC: 10 MMOL/L (ref 4–13)
AST SERPL W P-5'-P-CCNC: 28 U/L (ref 5–45)
BASOPHILS # BLD AUTO: 0.02 THOUSANDS/ΜL (ref 0–0.1)
BASOPHILS NFR BLD AUTO: 0 % (ref 0–1)
BILIRUB SERPL-MCNC: 0.46 MG/DL (ref 0.2–1)
BUN SERPL-MCNC: 17 MG/DL (ref 5–25)
CALCIUM SERPL-MCNC: 9.5 MG/DL (ref 8.3–10.1)
CHLORIDE SERPL-SCNC: 98 MMOL/L (ref 100–108)
CHOLEST SERPL-MCNC: 151 MG/DL (ref 50–200)
CK SERPL-CCNC: 121 U/L (ref 26–192)
CO2 SERPL-SCNC: 27 MMOL/L (ref 21–32)
CREAT SERPL-MCNC: 1.28 MG/DL (ref 0.6–1.3)
CRP SERPL QL: <3 MG/L
EOSINOPHIL # BLD AUTO: 0.52 THOUSAND/ΜL (ref 0–0.61)
EOSINOPHIL NFR BLD AUTO: 7 % (ref 0–6)
ERYTHROCYTE [DISTWIDTH] IN BLOOD BY AUTOMATED COUNT: 12.7 % (ref 11.6–15.1)
ERYTHROCYTE [SEDIMENTATION RATE] IN BLOOD: 42 MM/HOUR (ref 0–29)
EST. AVERAGE GLUCOSE BLD GHB EST-MCNC: 105 MG/DL
FERRITIN SERPL-MCNC: 173 NG/ML (ref 8–388)
GFR SERPL CREATININE-BSD FRML MDRD: 39 ML/MIN/1.73SQ M
GLUCOSE SERPL-MCNC: 89 MG/DL (ref 65–140)
HBA1C MFR BLD: 5.3 %
HCT VFR BLD AUTO: 35.3 % (ref 34.8–46.1)
HDLC SERPL-MCNC: 69 MG/DL
HGB BLD-MCNC: 11.5 G/DL (ref 11.5–15.4)
IMM GRANULOCYTES # BLD AUTO: 0.02 THOUSAND/UL (ref 0–0.2)
IMM GRANULOCYTES NFR BLD AUTO: 0 % (ref 0–2)
IRON SATN MFR SERPL: 28 %
IRON SERPL-MCNC: 87 UG/DL (ref 50–170)
LDLC SERPL CALC-MCNC: 57 MG/DL (ref 0–100)
LYMPHOCYTES # BLD AUTO: 1.07 THOUSANDS/ΜL (ref 0.6–4.47)
LYMPHOCYTES NFR BLD AUTO: 15 % (ref 14–44)
MAGNESIUM SERPL-MCNC: 2 MG/DL (ref 1.6–2.6)
MCH RBC QN AUTO: 31.3 PG (ref 26.8–34.3)
MCHC RBC AUTO-ENTMCNC: 32.6 G/DL (ref 31.4–37.4)
MCV RBC AUTO: 96 FL (ref 82–98)
MONOCYTES # BLD AUTO: 0.55 THOUSAND/ΜL (ref 0.17–1.22)
MONOCYTES NFR BLD AUTO: 8 % (ref 4–12)
NEUTROPHILS # BLD AUTO: 4.87 THOUSANDS/ΜL (ref 1.85–7.62)
NEUTS SEG NFR BLD AUTO: 70 % (ref 43–75)
NONHDLC SERPL-MCNC: 82 MG/DL
NRBC BLD AUTO-RTO: 0 /100 WBCS
PHOSPHATE SERPL-MCNC: 4.1 MG/DL (ref 2.3–4.1)
PLATELET # BLD AUTO: 207 THOUSANDS/UL (ref 149–390)
PMV BLD AUTO: 10.1 FL (ref 8.9–12.7)
POTASSIUM SERPL-SCNC: 4.3 MMOL/L (ref 3.5–5.3)
PROT SERPL-MCNC: 8.1 G/DL (ref 6.4–8.2)
RBC # BLD AUTO: 3.68 MILLION/UL (ref 3.81–5.12)
SODIUM SERPL-SCNC: 135 MMOL/L (ref 136–145)
TIBC SERPL-MCNC: 314 UG/DL (ref 250–450)
TRIGL SERPL-MCNC: 123 MG/DL
TSH SERPL DL<=0.05 MIU/L-ACNC: 2.64 UIU/ML (ref 0.36–3.74)
WBC # BLD AUTO: 7.05 THOUSAND/UL (ref 4.31–10.16)

## 2021-05-24 PROCEDURE — 83540 ASSAY OF IRON: CPT

## 2021-05-24 PROCEDURE — 83550 IRON BINDING TEST: CPT

## 2021-05-24 PROCEDURE — 85652 RBC SED RATE AUTOMATED: CPT

## 2021-05-24 PROCEDURE — 36415 COLL VENOUS BLD VENIPUNCTURE: CPT

## 2021-05-24 PROCEDURE — 80061 LIPID PANEL: CPT

## 2021-05-24 PROCEDURE — 82728 ASSAY OF FERRITIN: CPT

## 2021-05-24 PROCEDURE — 82550 ASSAY OF CK (CPK): CPT

## 2021-05-24 PROCEDURE — 99214 OFFICE O/P EST MOD 30 MIN: CPT | Performed by: INTERNAL MEDICINE

## 2021-05-24 PROCEDURE — 84100 ASSAY OF PHOSPHORUS: CPT

## 2021-05-24 PROCEDURE — 71101 X-RAY EXAM UNILAT RIBS/CHEST: CPT

## 2021-05-24 PROCEDURE — 80053 COMPREHEN METABOLIC PANEL: CPT

## 2021-05-24 PROCEDURE — 83735 ASSAY OF MAGNESIUM: CPT

## 2021-05-24 PROCEDURE — 83036 HEMOGLOBIN GLYCOSYLATED A1C: CPT

## 2021-05-24 PROCEDURE — 85025 COMPLETE CBC W/AUTO DIFF WBC: CPT

## 2021-05-24 PROCEDURE — 73060 X-RAY EXAM OF HUMERUS: CPT

## 2021-05-24 PROCEDURE — 86140 C-REACTIVE PROTEIN: CPT

## 2021-05-24 PROCEDURE — 84443 ASSAY THYROID STIM HORMONE: CPT

## 2021-05-24 NOTE — PATIENT INSTRUCTIONS
Check labs and x-rays to rule out any fracture, metabolic or infectious etiology  Refer for physical therapy for balance and strength training  Follow-up 1 week, sooner as needed    Rollator ordered

## 2021-05-24 NOTE — PROGRESS NOTES
Assessment/Plan:    Diagnoses and all orders for this visit:    Fall, initial encounter  -     XR ribs left w pa chest min 3 views; Future  -     XR humerus left; Future  -     Walker  -     Ambulatory referral to pt/ot functional capacity evaluation; Future    Ataxia  -     Sedimentation rate, automated; Future  -     CK; Future  -     C-reactive protein; Future  -     Magnesium; Future  -     Phosphorus; Future  -     Walker  -     Ambulatory referral to pt/ot functional capacity evaluation; Future    Weakness of both lower extremities    Acquired hypothyroidism  -     TSH, 3rd generation with Free T4 reflex; Future    Impaired fasting glucose  -     Hemoglobin A1C; Future    Essential hypertension  -     CBC and differential; Future  -     Comprehensive metabolic panel; Future  -     Lipid panel; Future    AVM (arteriovenous malformation)    Mixed hyperlipidemia    Iron deficiency anemia, unspecified iron deficiency anemia type  -     Ferritin; Future  -     Iron Panel (Includes Ferritin, Iron Sat%, Iron, and TIBC); Future              Patient Instructions   Check labs and x-rays to rule out any fracture, metabolic or infectious etiology  Refer for physical therapy for balance and strength training  Follow-up 1 week, sooner as needed  Rollator ordered      Subjective:      Patient ID: Steve Yates is a 82 y o  female    She presents acutely with Mateo Katz for evaluation of lower extremity weakness  She notes left upper extremity pain and left rib pain after a fall approximately a month ago  She notes burning pain in the toes related to her neuropathy    She has been ambulating with a cane        Current Outpatient Medications:     acetaminophen (TYLENOL) 325 mg tablet, Take 1-2 tablets by mouth every 6 (six) hours as needed, Disp: , Rfl:     ALPRAZolam (XANAX) 0 5 mg tablet, Take 0 5 mg by mouth daily as needed  , Disp: , Rfl:     atenolol (TENORMIN) 50 mg tablet, Take 1 tablet (50 mg total) by mouth 2 (two) times a day, Disp: 180 tablet, Rfl: 3    ferrous sulfate 325 (65 Fe) mg tablet, Take 1 tablet by mouth daily, Disp: , Rfl:     imipramine (TOFRANIL) 10 mg tablet, Take 1 tablet (10 mg total) by mouth 4 (four) times a week, Disp: 90 tablet, Rfl: 3    latanoprost (XALATAN) 0 005 % ophthalmic solution, instill 1 drop into both eyes nightly, Disp: , Rfl:     levothyroxine 50 mcg tablet, 50 UG 6 DAYS PER WEEK, Disp: 90 tablet, Rfl: 3    lisinopril (ZESTRIL) 10 mg tablet, Take 1 tablet (10 mg total) by mouth daily, Disp: 90 tablet, Rfl: 3    pantoprazole (PROTONIX) 40 mg tablet, Take 1 tablet (40 mg total) by mouth daily before breakfast, Disp: 30 tablet, Rfl: 5    Probiotic Product (ALIGN) 4 MG CAPS, Take 1 tablet by mouth daily, Disp: , Rfl:     rosuvastatin (CRESTOR) 10 MG tablet, Take 1 tablet (10 mg total) by mouth daily, Disp: 90 tablet, Rfl: 2    tobramycin-dexamethasone (TOBRADEX) ophthalmic suspension, instill 1 drop into left eye four times a day, Disp: , Rfl:     atenolol (TENORMIN) 25 mg tablet, take 2 tablets by mouth twice a day (Patient not taking: Reported on 5/24/2021), Disp: 360 tablet, Rfl: 1    polyethylene glycol (GOLYTELY) 4000 mL solution, Take 4,000 mL by mouth once for 1 dose (Patient not taking: Reported on 8/4/2020), Disp: 4000 mL, Rfl: 0    No results found for this or any previous visit (from the past 1008 hour(s))  The following portions of the patient's history were reviewed and updated as appropriate: allergies, current medications, past family history, past medical history, past social history, past surgical history and problem list      Review of Systems   Constitutional: Negative for appetite change, chills, diaphoresis, fatigue, fever and unexpected weight change  HENT: Negative for congestion, hearing loss and rhinorrhea  Eyes: Negative for visual disturbance  Respiratory: Negative for cough, chest tightness, shortness of breath and wheezing  Cardiovascular: Negative for chest pain, palpitations and leg swelling  Gastrointestinal: Negative for abdominal pain and blood in stool  Endocrine: Negative for cold intolerance, heat intolerance, polydipsia and polyuria  Genitourinary: Negative for difficulty urinating, dysuria, frequency and urgency  Musculoskeletal: Positive for arthralgias, back pain and gait problem  Negative for myalgias  Skin: Negative for rash  Neurological: Positive for weakness  Negative for dizziness, light-headedness and headaches  Hematological: Does not bruise/bleed easily  Psychiatric/Behavioral: Negative for dysphoric mood and sleep disturbance  Objective:      Vitals:    05/24/21 1409   BP: 146/72   Pulse: 64   Resp: 16   Temp: 97 8 °F (36 6 °C)   SpO2: 92%          Physical Exam  Constitutional:       Appearance: She is well-developed  HENT:      Head: Normocephalic and atraumatic  Nose: Nose normal    Eyes:      General: No scleral icterus  Conjunctiva/sclera: Conjunctivae normal       Pupils: Pupils are equal, round, and reactive to light  Neck:      Musculoskeletal: Normal range of motion and neck supple  Thyroid: No thyromegaly  Vascular: No JVD  Trachea: No tracheal deviation  Cardiovascular:      Rate and Rhythm: Normal rate and regular rhythm  Heart sounds: No murmur  No friction rub  No gallop  Pulmonary:      Effort: Pulmonary effort is normal  No respiratory distress  Breath sounds: Normal breath sounds  No wheezing or rales  Musculoskeletal:         General: No deformity  Comments: Strength 4+ out of 5 bilateral proximal and distal upper extremity, 4+ out of 5 hip flexors bilaterally, gait is wide-based and unsteady  No ecchymosis or palpable bony deformity of the left humerus or ribs   Lymphadenopathy:      Cervical: No cervical adenopathy  Skin:     General: Skin is warm and dry  Coloration: Skin is not pale        Findings: No erythema or rash  Neurological:      Mental Status: She is alert and oriented to person, place, and time  Cranial Nerves: No cranial nerve deficit  Psychiatric:         Behavior: Behavior normal          Thought Content:  Thought content normal          Judgment: Judgment normal

## 2021-05-25 ENCOUNTER — TELEPHONE (OUTPATIENT)
Dept: INTERNAL MEDICINE CLINIC | Facility: CLINIC | Age: 86
End: 2021-05-25

## 2021-05-25 NOTE — TELEPHONE ENCOUNTER
----- Message from Omar Rincon MD sent at 5/25/2021  6:03 AM EDT -----  Notify-labs are all perfect, I will call back w/ xray results when available

## 2021-05-26 DIAGNOSIS — K21.9 GERD WITHOUT ESOPHAGITIS: ICD-10-CM

## 2021-05-26 RX ORDER — PANTOPRAZOLE SODIUM 40 MG/1
TABLET, DELAYED RELEASE ORAL
Qty: 30 TABLET | Refills: 5 | Status: SHIPPED | OUTPATIENT
Start: 2021-05-26 | End: 2021-10-06 | Stop reason: SDUPTHER

## 2021-06-10 ENCOUNTER — HOSPITAL ENCOUNTER (OUTPATIENT)
Facility: HOSPITAL | Age: 86
Setting detail: OBSERVATION
Discharge: HOME | DRG: 641 | End: 2021-06-11
Attending: EMERGENCY MEDICINE | Admitting: HOSPITALIST
Payer: MEDICARE

## 2021-06-10 ENCOUNTER — APPOINTMENT (EMERGENCY)
Dept: RADIOLOGY | Facility: HOSPITAL | Age: 86
DRG: 641 | End: 2021-06-10
Payer: MEDICARE

## 2021-06-10 DIAGNOSIS — R07.89 CHEST DISCOMFORT: Primary | ICD-10-CM

## 2021-06-10 DIAGNOSIS — R07.9 CHEST PAIN: ICD-10-CM

## 2021-06-10 DIAGNOSIS — R53.1 WEAKNESS: ICD-10-CM

## 2021-06-10 LAB
ALBUMIN SERPL BCP-MCNC: 3.9 G/DL (ref 3.5–5)
ALP SERPL-CCNC: 116 U/L (ref 46–116)
ALT SERPL W P-5'-P-CCNC: 32 U/L (ref 12–78)
ANION GAP SERPL CALCULATED.3IONS-SCNC: 10 MMOL/L (ref 4–13)
AST SERPL W P-5'-P-CCNC: 36 U/L (ref 5–45)
BACTERIA UR QL AUTO: ABNORMAL /HPF
BASOPHILS # BLD AUTO: 0.02 THOUSANDS/ΜL (ref 0–0.1)
BASOPHILS NFR BLD AUTO: 0 % (ref 0–1)
BILIRUB SERPL-MCNC: 0.48 MG/DL (ref 0.2–1)
BILIRUB UR QL STRIP: NEGATIVE
BUN SERPL-MCNC: 17 MG/DL (ref 5–25)
CALCIUM SERPL-MCNC: 9 MG/DL (ref 8.3–10.1)
CHLORIDE SERPL-SCNC: 100 MMOL/L (ref 100–108)
CLARITY UR: ABNORMAL
CO2 SERPL-SCNC: 26 MMOL/L (ref 21–32)
COLOR UR: ABNORMAL
CREAT SERPL-MCNC: 1.29 MG/DL (ref 0.6–1.3)
EOSINOPHIL # BLD AUTO: 0.57 THOUSAND/ΜL (ref 0–0.61)
EOSINOPHIL NFR BLD AUTO: 10 % (ref 0–6)
ERYTHROCYTE [DISTWIDTH] IN BLOOD BY AUTOMATED COUNT: 13.4 % (ref 11.6–15.1)
GFR SERPL CREATININE-BSD FRML MDRD: 38 ML/MIN/1.73SQ M
GLUCOSE SERPL-MCNC: 66 MG/DL (ref 65–140)
GLUCOSE UR STRIP-MCNC: NEGATIVE MG/DL
HCT VFR BLD AUTO: 33.4 % (ref 34.8–46.1)
HGB BLD-MCNC: 11 G/DL (ref 11.5–15.4)
HGB UR QL STRIP.AUTO: ABNORMAL
IMM GRANULOCYTES # BLD AUTO: 0.02 THOUSAND/UL (ref 0–0.2)
IMM GRANULOCYTES NFR BLD AUTO: 0 % (ref 0–2)
KETONES UR STRIP-MCNC: NEGATIVE MG/DL
LEUKOCYTE ESTERASE UR QL STRIP: ABNORMAL
LYMPHOCYTES # BLD AUTO: 1.06 THOUSANDS/ΜL (ref 0.6–4.47)
LYMPHOCYTES NFR BLD AUTO: 19 % (ref 14–44)
MCH RBC QN AUTO: 31.5 PG (ref 26.8–34.3)
MCHC RBC AUTO-ENTMCNC: 32.9 G/DL (ref 31.4–37.4)
MCV RBC AUTO: 96 FL (ref 82–98)
MONOCYTES # BLD AUTO: 0.58 THOUSAND/ΜL (ref 0.17–1.22)
MONOCYTES NFR BLD AUTO: 10 % (ref 4–12)
NEUTROPHILS # BLD AUTO: 3.39 THOUSANDS/ΜL (ref 1.85–7.62)
NEUTS SEG NFR BLD AUTO: 61 % (ref 43–75)
NITRITE UR QL STRIP: NEGATIVE
NON-SQ EPI CELLS URNS QL MICRO: ABNORMAL /HPF
NRBC BLD AUTO-RTO: 0 /100 WBCS
PH UR STRIP.AUTO: 5.5 [PH]
PLATELET # BLD AUTO: 151 THOUSANDS/UL (ref 149–390)
PLATELET # BLD AUTO: 152 THOUSANDS/UL (ref 149–390)
PMV BLD AUTO: 9.3 FL (ref 8.9–12.7)
PMV BLD AUTO: 9.5 FL (ref 8.9–12.7)
POTASSIUM SERPL-SCNC: 4.1 MMOL/L (ref 3.5–5.3)
PROT SERPL-MCNC: 7.5 G/DL (ref 6.4–8.2)
PROT UR STRIP-MCNC: NEGATIVE MG/DL
RBC # BLD AUTO: 3.49 MILLION/UL (ref 3.81–5.12)
RBC #/AREA URNS AUTO: ABNORMAL /HPF
SARS-COV-2 RNA RESP QL NAA+PROBE: NEGATIVE
SODIUM SERPL-SCNC: 136 MMOL/L (ref 136–145)
SP GR UR STRIP.AUTO: <=1.005 (ref 1–1.03)
TROPONIN I SERPL-MCNC: <0.02 NG/ML
TROPONIN I SERPL-MCNC: <0.02 NG/ML
UROBILINOGEN UR QL STRIP.AUTO: 0.2 E.U./DL
WBC # BLD AUTO: 5.64 THOUSAND/UL (ref 4.31–10.16)
WBC #/AREA URNS AUTO: ABNORMAL /HPF

## 2021-06-10 PROCEDURE — 99285 EMERGENCY DEPT VISIT HI MDM: CPT | Performed by: EMERGENCY MEDICINE

## 2021-06-10 PROCEDURE — 84484 ASSAY OF TROPONIN QUANT: CPT | Performed by: HOSPITALIST

## 2021-06-10 PROCEDURE — U0005 INFEC AGEN DETEC AMPLI PROBE: HCPCS | Performed by: EMERGENCY MEDICINE

## 2021-06-10 PROCEDURE — 71045 X-RAY EXAM CHEST 1 VIEW: CPT

## 2021-06-10 PROCEDURE — 1124F ACP DISCUSS-NO DSCNMKR DOCD: CPT | Performed by: EMERGENCY MEDICINE

## 2021-06-10 PROCEDURE — 80053 COMPREHEN METABOLIC PANEL: CPT | Performed by: EMERGENCY MEDICINE

## 2021-06-10 PROCEDURE — 84484 ASSAY OF TROPONIN QUANT: CPT | Performed by: EMERGENCY MEDICINE

## 2021-06-10 PROCEDURE — 87086 URINE CULTURE/COLONY COUNT: CPT | Performed by: EMERGENCY MEDICINE

## 2021-06-10 PROCEDURE — 85025 COMPLETE CBC W/AUTO DIFF WBC: CPT | Performed by: EMERGENCY MEDICINE

## 2021-06-10 PROCEDURE — 93005 ELECTROCARDIOGRAM TRACING: CPT

## 2021-06-10 PROCEDURE — U0003 INFECTIOUS AGENT DETECTION BY NUCLEIC ACID (DNA OR RNA); SEVERE ACUTE RESPIRATORY SYNDROME CORONAVIRUS 2 (SARS-COV-2) (CORONAVIRUS DISEASE [COVID-19]), AMPLIFIED PROBE TECHNIQUE, MAKING USE OF HIGH THROUGHPUT TECHNOLOGIES AS DESCRIBED BY CMS-2020-01-R: HCPCS | Performed by: EMERGENCY MEDICINE

## 2021-06-10 PROCEDURE — 81001 URINALYSIS AUTO W/SCOPE: CPT | Performed by: EMERGENCY MEDICINE

## 2021-06-10 PROCEDURE — 99285 EMERGENCY DEPT VISIT HI MDM: CPT

## 2021-06-10 PROCEDURE — 36415 COLL VENOUS BLD VENIPUNCTURE: CPT | Performed by: EMERGENCY MEDICINE

## 2021-06-10 PROCEDURE — 99220 PR INITIAL OBSERVATION CARE/DAY 70 MINUTES: CPT | Performed by: HOSPITALIST

## 2021-06-10 PROCEDURE — 85049 AUTOMATED PLATELET COUNT: CPT | Performed by: HOSPITALIST

## 2021-06-10 RX ORDER — LISINOPRIL 10 MG/1
10 TABLET ORAL DAILY
Status: DISCONTINUED | OUTPATIENT
Start: 2021-06-11 | End: 2021-06-11 | Stop reason: HOSPADM

## 2021-06-10 RX ORDER — ASPIRIN 81 MG/1
81 TABLET ORAL DAILY
Status: DISCONTINUED | OUTPATIENT
Start: 2021-06-11 | End: 2021-06-11 | Stop reason: HOSPADM

## 2021-06-10 RX ORDER — LATANOPROST 50 UG/ML
1 SOLUTION/ DROPS OPHTHALMIC
Status: DISCONTINUED | OUTPATIENT
Start: 2021-06-10 | End: 2021-06-11 | Stop reason: HOSPADM

## 2021-06-10 RX ORDER — IPRATROPIUM BROMIDE AND ALBUTEROL SULFATE 2.5; .5 MG/3ML; MG/3ML
3 SOLUTION RESPIRATORY (INHALATION) EVERY 4 HOURS PRN
Status: DISCONTINUED | OUTPATIENT
Start: 2021-06-10 | End: 2021-06-11 | Stop reason: HOSPADM

## 2021-06-10 RX ORDER — NITROGLYCERIN 0.4 MG/1
0.4 TABLET SUBLINGUAL
Status: DISCONTINUED | OUTPATIENT
Start: 2021-06-10 | End: 2021-06-11 | Stop reason: HOSPADM

## 2021-06-10 RX ORDER — LEVOTHYROXINE SODIUM 0.05 MG/1
50 TABLET ORAL
Status: DISCONTINUED | OUTPATIENT
Start: 2021-06-11 | End: 2021-06-11 | Stop reason: HOSPADM

## 2021-06-10 RX ORDER — ALPRAZOLAM 0.5 MG/1
0.5 TABLET ORAL DAILY PRN
Status: DISCONTINUED | OUTPATIENT
Start: 2021-06-10 | End: 2021-06-11 | Stop reason: HOSPADM

## 2021-06-10 RX ORDER — IMIPRAMINE HYDROCHLORIDE 10 MG/1
10 TABLET, FILM COATED ORAL
Status: DISCONTINUED | OUTPATIENT
Start: 2021-06-12 | End: 2021-06-11 | Stop reason: HOSPADM

## 2021-06-10 RX ORDER — ACETAMINOPHEN 325 MG/1
650 TABLET ORAL EVERY 6 HOURS PRN
Status: DISCONTINUED | OUTPATIENT
Start: 2021-06-10 | End: 2021-06-11 | Stop reason: HOSPADM

## 2021-06-10 RX ORDER — PANTOPRAZOLE SODIUM 40 MG/1
40 TABLET, DELAYED RELEASE ORAL
Status: DISCONTINUED | OUTPATIENT
Start: 2021-06-11 | End: 2021-06-11 | Stop reason: HOSPADM

## 2021-06-10 RX ORDER — FERROUS SULFATE 325(65) MG
325 TABLET ORAL DAILY
Status: DISCONTINUED | OUTPATIENT
Start: 2021-06-11 | End: 2021-06-11 | Stop reason: HOSPADM

## 2021-06-10 RX ORDER — ATENOLOL 50 MG/1
50 TABLET ORAL 2 TIMES DAILY
Status: DISCONTINUED | OUTPATIENT
Start: 2021-06-10 | End: 2021-06-11 | Stop reason: HOSPADM

## 2021-06-10 RX ORDER — ATORVASTATIN CALCIUM 40 MG/1
40 TABLET, FILM COATED ORAL
Status: DISCONTINUED | OUTPATIENT
Start: 2021-06-11 | End: 2021-06-11 | Stop reason: HOSPADM

## 2021-06-10 RX ADMIN — ATENOLOL 50 MG: 50 TABLET ORAL at 22:47

## 2021-06-10 RX ADMIN — LATANOPROST 1 DROP: 50 SOLUTION OPHTHALMIC at 22:50

## 2021-06-11 ENCOUNTER — APPOINTMENT (OUTPATIENT)
Dept: NON INVASIVE DIAGNOSTICS | Facility: HOSPITAL | Age: 86
DRG: 641 | End: 2021-06-11
Payer: MEDICARE

## 2021-06-11 VITALS
WEIGHT: 113.54 LBS | BODY MASS INDEX: 24.49 KG/M2 | TEMPERATURE: 97.9 F | HEART RATE: 98 BPM | OXYGEN SATURATION: 96 % | RESPIRATION RATE: 20 BRPM | SYSTOLIC BLOOD PRESSURE: 164 MMHG | HEIGHT: 57 IN | DIASTOLIC BLOOD PRESSURE: 70 MMHG

## 2021-06-11 LAB
ALBUMIN SERPL BCP-MCNC: 3.4 G/DL (ref 3.5–5)
ALP SERPL-CCNC: 96 U/L (ref 46–116)
ALT SERPL W P-5'-P-CCNC: 30 U/L (ref 12–78)
ANION GAP SERPL CALCULATED.3IONS-SCNC: 12 MMOL/L (ref 4–13)
AST SERPL W P-5'-P-CCNC: 38 U/L (ref 5–45)
ATRIAL RATE: 65 BPM
ATRIAL RATE: 66 BPM
BACTERIA UR CULT: NORMAL
BILIRUB SERPL-MCNC: 0.61 MG/DL (ref 0.2–1)
BUN SERPL-MCNC: 15 MG/DL (ref 5–25)
CALCIUM ALBUM COR SERPL-MCNC: 9.4 MG/DL (ref 8.3–10.1)
CALCIUM SERPL-MCNC: 8.9 MG/DL (ref 8.3–10.1)
CHLORIDE SERPL-SCNC: 103 MMOL/L (ref 100–108)
CO2 SERPL-SCNC: 24 MMOL/L (ref 21–32)
CREAT SERPL-MCNC: 1.22 MG/DL (ref 0.6–1.3)
ERYTHROCYTE [DISTWIDTH] IN BLOOD BY AUTOMATED COUNT: 13.2 % (ref 11.6–15.1)
GFR SERPL CREATININE-BSD FRML MDRD: 41 ML/MIN/1.73SQ M
GLUCOSE P FAST SERPL-MCNC: 85 MG/DL (ref 65–99)
GLUCOSE SERPL-MCNC: 85 MG/DL (ref 65–140)
HCT VFR BLD AUTO: 33.7 % (ref 34.8–46.1)
HGB BLD-MCNC: 11.1 G/DL (ref 11.5–15.4)
MAGNESIUM SERPL-MCNC: 1.8 MG/DL (ref 1.6–2.6)
MCH RBC QN AUTO: 31.3 PG (ref 26.8–34.3)
MCHC RBC AUTO-ENTMCNC: 32.9 G/DL (ref 31.4–37.4)
MCV RBC AUTO: 95 FL (ref 82–98)
P AXIS: 41 DEGREES
P AXIS: 52 DEGREES
PLATELET # BLD AUTO: 147 THOUSANDS/UL (ref 149–390)
PMV BLD AUTO: 9.5 FL (ref 8.9–12.7)
POTASSIUM SERPL-SCNC: 4.3 MMOL/L (ref 3.5–5.3)
PR INTERVAL: 150 MS
PR INTERVAL: 156 MS
PROT SERPL-MCNC: 6.9 G/DL (ref 6.4–8.2)
QRS AXIS: -6 DEGREES
QRS AXIS: 2 DEGREES
QRSD INTERVAL: 84 MS
QRSD INTERVAL: 84 MS
QT INTERVAL: 438 MS
QT INTERVAL: 452 MS
QTC INTERVAL: 459 MS
QTC INTERVAL: 470 MS
RBC # BLD AUTO: 3.55 MILLION/UL (ref 3.81–5.12)
SODIUM SERPL-SCNC: 139 MMOL/L (ref 136–145)
T WAVE AXIS: 49 DEGREES
T WAVE AXIS: 58 DEGREES
TROPONIN I SERPL-MCNC: <0.02 NG/ML
TSH SERPL DL<=0.05 MIU/L-ACNC: 1.79 UIU/ML (ref 0.36–3.74)
VENTRICULAR RATE: 65 BPM
VENTRICULAR RATE: 66 BPM
WBC # BLD AUTO: 5.85 THOUSAND/UL (ref 4.31–10.16)

## 2021-06-11 PROCEDURE — 99217 PR OBSERVATION CARE DISCHARGE MANAGEMENT: CPT | Performed by: NURSE PRACTITIONER

## 2021-06-11 PROCEDURE — 36415 COLL VENOUS BLD VENIPUNCTURE: CPT | Performed by: HOSPITALIST

## 2021-06-11 PROCEDURE — 83735 ASSAY OF MAGNESIUM: CPT | Performed by: HOSPITALIST

## 2021-06-11 PROCEDURE — 93010 ELECTROCARDIOGRAM REPORT: CPT | Performed by: INTERNAL MEDICINE

## 2021-06-11 PROCEDURE — 93306 TTE W/DOPPLER COMPLETE: CPT

## 2021-06-11 PROCEDURE — 84484 ASSAY OF TROPONIN QUANT: CPT | Performed by: HOSPITALIST

## 2021-06-11 PROCEDURE — 85027 COMPLETE CBC AUTOMATED: CPT | Performed by: HOSPITALIST

## 2021-06-11 PROCEDURE — 80053 COMPREHEN METABOLIC PANEL: CPT | Performed by: HOSPITALIST

## 2021-06-11 PROCEDURE — 84443 ASSAY THYROID STIM HORMONE: CPT | Performed by: HOSPITALIST

## 2021-06-11 PROCEDURE — 93306 TTE W/DOPPLER COMPLETE: CPT | Performed by: INTERNAL MEDICINE

## 2021-06-11 RX ORDER — ASPIRIN 81 MG/1
81 TABLET ORAL DAILY
Qty: 30 TABLET | Refills: 0 | Status: SHIPPED | OUTPATIENT
Start: 2021-06-12 | End: 2021-06-14 | Stop reason: HOSPADM

## 2021-06-11 RX ADMIN — ENOXAPARIN SODIUM 30 MG: 30 INJECTION SUBCUTANEOUS at 08:24

## 2021-06-11 RX ADMIN — PANTOPRAZOLE SODIUM 40 MG: 40 TABLET, DELAYED RELEASE ORAL at 08:23

## 2021-06-11 RX ADMIN — ALPRAZOLAM 0.5 MG: 0.5 TABLET ORAL at 08:27

## 2021-06-11 RX ADMIN — ASPIRIN 81 MG: 81 TABLET, COATED ORAL at 08:23

## 2021-06-11 RX ADMIN — ATENOLOL 50 MG: 50 TABLET ORAL at 08:27

## 2021-06-11 RX ADMIN — LISINOPRIL 10 MG: 10 TABLET ORAL at 08:23

## 2021-06-11 RX ADMIN — FERROUS SULFATE TAB 325 MG (65 MG ELEMENTAL FE) 325 MG: 325 (65 FE) TAB at 08:23

## 2021-06-11 RX ADMIN — LEVOTHYROXINE SODIUM 50 MCG: 50 TABLET ORAL at 08:27

## 2021-06-11 RX ADMIN — ACETAMINOPHEN 650 MG: 325 TABLET, FILM COATED ORAL at 00:22

## 2021-06-11 NOTE — PLAN OF CARE
Problem: PAIN - ADULT  Goal: Verbalizes/displays adequate comfort level or baseline comfort level  Description: Interventions:  - Encourage patient to monitor pain and request assistance  - Assess pain using appropriate pain scale  - Administer analgesics based on type and severity of pain and evaluate response  - Implement non-pharmacological measures as appropriate and evaluate response  - Consider cultural and social influences on pain and pain management  - Notify physician/advanced practitioner if interventions unsuccessful or patient reports new pain  6/11/2021 1138 by Josh Omalley RN  Outcome: Adequate for Discharge  6/11/2021 1138 by Josh Omalley RN  Outcome: Progressing

## 2021-06-11 NOTE — ED NOTES
2nd EKG done with 2nd trop and shown to [facility] provider     Nolvia Gonzales  06/10/21 7964

## 2021-06-11 NOTE — DISCHARGE SUMMARY
[address]     Discharge- Bear Cordova 1935, 82 y o  female MRN: [id number]  Unit/Bed#: ED 09 Encounter: [id number]  Primary Care Provider: Hugo Silva MD   Date and time admitted to hospital: 6/10/2021  6:40 PM    * Chest heaviness  Assessment & Plan  · Patient presented to the ED with complaints of heaviness, and dyspnea on exertion which began prior to admission  · Trop x 3 negative  · Continue Aspirin 81 mg  · Continue statin and atenolol  · DIEGO 3  · Echocardiogram pending  · Sublingual nitro as needed for pain; patient did not require any overnight      Mild intermittent asthma without complication  Assessment & Plan  · Follow up with PCP    AVM (arteriovenous malformation)  Assessment & Plan  · Chronic    Chronic kidney disease  Assessment & Plan  Lab Results   Component Value Date    EGFR 41 06/11/2021    EGFR 38 06/10/2021    EGFR 39 05/24/2021    CREATININE 1 22 06/11/2021    CREATININE 1 29 06/10/2021    CREATININE 1 28 05/24/2021     · Stable    GERD without esophagitis  Assessment & Plan  · Continue Protonix    Hyperlipidemia  Assessment & Plan  · Continue statin    Hypertension  Assessment & Plan  · Continue atenolol and lisinopril    Hypothyroidism  Assessment & Plan  · Continue levothyroxine    Iron deficiency anemia  Assessment & Plan  · Continue ferrous sulfate    Anxiety  Assessment & Plan  · Continue Xanax as needed    Resolved Problems  Date Reviewed: 6/11/2021    None        Discharging Physician / Practitioner: Irineo Moss  PCP: Hugo Silva MD  Admission Date:   Admission Orders (From admission, onward)     Ordered        06/10/21 2337  Place in Observation  Once         06/10/21 2047  Inpatient Admission  Once,   Status:  Canceled                   Discharge Date: 06/11/21    Consultations During Hospital Stay:  · None    Procedures Performed:   · None    Significant Findings / Test Results:   XR chest 1 view portable   Final Result by Yony Meyer MD (06/11 0840)      No acute cardiopulmonary disease  Workstation performed: [id number]         · Echocardiogram (6/11/21)    Incidental Findings:   · None    Test Results Pending at Discharge (will require follow up): · None     Outpatient Tests Requested:  · Follow up with PCP within 1 week  · Follow up with Dr Chuck Kent in July  Complications:  None    Reason for Admission: Chest heaviness    Hospital Course: Juju Colbert is a 82 y o  female patient who originally presented to the hospital on 6/10/2021 due to chest heaviness  Patient with past medical history of GERD, HLD, HTN, Anxiety/Depression and Parkinson's Disease  Patient with trop x 3 which were negative  Chest pain resolved  Vital signs have been stable  Patient is stable for discharge  Please see above list of diagnoses and related plan for additional information  Condition at Discharge: stable    Discharge Day Visit / Exam:   Subjective:  CC: "I feel better today, I just feel a little anxious "    Patient resting in bed  Denies any further chest pain, shortness of breath, palpitations, fever, or chills  Reports feeling a little anxious, does follow with a therapist     Filomena Jaffe: Blood Pressure: 164/70 (06/11/21 0823)  Pulse: 98 (06/11/21 0827)  Temperature: 97 9 °F (36 6 °C) (06/10/21 1843)  Temp Source: Oral (06/10/21 1843)  Respirations: 20 (06/11/21 0030)  Height: 4' 9" (144 8 cm) (06/10/21 1843)  Weight - Scale: 51 5 kg (113 lb 8 6 oz) (06/10/21 1843)  SpO2: 96 % (06/11/21 0030)     Exam:   Physical Exam  Vitals signs and nursing note reviewed  Constitutional:       Appearance: She is normal weight  She is not ill-appearing  Cardiovascular:      Rate and Rhythm: Normal rate and regular rhythm  Pulses: Normal pulses  Heart sounds: Normal heart sounds  Pulmonary:      Effort: Pulmonary effort is normal       Breath sounds: Normal breath sounds     Abdominal:      General: Bowel sounds are normal       Palpations: Abdomen is soft  Musculoskeletal: Normal range of motion  Skin:     General: Skin is warm and dry  Capillary Refill: Capillary refill takes less than 2 seconds  Neurological:      Mental Status: She is alert and oriented to person, place, and time  Psychiatric:         Mood and Affect: Mood normal        Discussion with Family: Updated  (daughter) via phone  Discharge instructions/Information to patient and family:   See after visit summary for information provided to patient and family  Provisions for Follow-Up Care:  See after visit summary for information related to follow-up care and any pertinent home health orders  Disposition:   Home    Planned Readmission: None     Discharge Statement:  I spent 35 minutes discharging the patient  This time was spent on the day of discharge  I had direct contact with the patient on the day of discharge  Greater than 50% of the total time was spent examining patient, answering all patient questions, arranging and discussing plan of care with patient as well as directly providing post-discharge instructions  Additional time then spent on discharge activities  Discharge Medications:  See after visit summary for reconciled discharge medications provided to patient and/or family        **Please Note: This note may have been constructed using a voice recognition system**

## 2021-06-11 NOTE — PHYSICIAN ADVISOR
Current patient class: Observation  The patient is currently on Hospital Day: 2 at [address]        The patient was admitted to the hospital  on N/A at N/A for the following diagnosis:  Leg pain [M79 [id number]]     After review of the relevant documentation, labs, vital signs and test results, the patient is most appropriate for OBSERVATION CLASS  The patient was initially admitted as an inpatient  The patient was later determined to be more appropriate for observation class based on the diagnosis, care plan, and anticipated length of stay in the hospital   The provider is in agreement with observation class and Code 44 conditions were met  Rationale is as follows: The patient is a 82 yrs   Female who presented to the ED at 6/10/2021  6:40 PM with a chief complaint of Weakness - Generalized (pt brought by ems; per family, pt has been weak for past 3 days; family states pt has been falling recently )   Patient also complained of this chest heaviness with some dyspnea on exertion  Patient was admitted for a chest pain rule out  Patient was initially admitted as an inpatient but then changed over to observation after the admitting provider did see the patient  Even in  the history and physical examination  the patient was to be admitted on an observation basis  Patient was changed observation status prior to us reviewing the chart however we do agree patient was most appropriate for observation status on admission based on admitting diagnosis and plan of care  Patient is likely to be discharged today  The patient was notified with Medicare observation letter  This case would constitute a code 40      The patients vitals on arrival were   ED Triage Vitals   Temperature Pulse Respirations Blood Pressure SpO2   06/10/21 1843 06/10/21 1843 06/10/21 1843 06/10/21 1843 06/10/21 1843   97 9 °F (36 6 °C) 67 16 (!) 174/88 100 %      Temp Source Heart Rate Source Patient Position - Orthostatic VS BP Location FiO2 (%)   06/10/21 1843 06/10/21 1843 06/10/21 1843 06/10/21 1843 --   Oral Monitor Lying Right arm       Pain Score       06/11/21 0022       6           Past Medical History:   Diagnosis Date    Benign essential hypertension     Last assessed: 9/11/14    Disease of thyroid gland     Fibromyalgia     GERD (gastroesophageal reflux disease)     History of nail disorders     Hyperlipidemia     Hypertension     Palpitations     Parkinson's disease ([address] )     Transient cerebral ischemia      Past Surgical History:   Procedure Laterality Date    APPENDECTOMY      EGD AND COLONOSCOPY  07/2020    HYSTERECTOMY  01/01/2010    GA LAP,CHOLECYSTECTOMY/GRAPH N/A 4/4/2018    Procedure: LAPAROSCOPIC CHOLECYSTECTOMY WITH IOC;  Surgeon: Jesús Ramirez MD;  Location: [facility];  Service: General    TUBAL LIGATION             Consults have been placed to:   IP CONSULT TO CASE MANAGEMENT    Vitals:    06/10/21 2247 06/11/21 0030 06/11/21 0823 06/11/21 0827   BP: 157/70 163/67 164/70    BP Location:  Right arm     Pulse: 67 63  98   Resp:  20     Temp:       TempSrc:       SpO2:  96%     Weight:       Height:           Most recent labs:    Recent Labs     06/11/21  0215 06/11/21  0457   WBC  --  5 85   HGB  --  11 1*   HCT  --  33 7*   PLT  --  147*   K  --  4 3   CALCIUM  --  8 9   BUN  --  15   CREATININE  --  1 22   TROPONINI <0 02  --    AST  --  38   ALT  --  30   ALKPHOS  --  96       Scheduled Meds:  Current Facility-Administered Medications   Medication Dose Route Frequency Provider Last Rate    acetaminophen  650 mg Oral Q6H PRN Taiwo Oropeza MD      ALPRAZolam  0 5 mg Oral Daily PRN Taiwo Oropeza MD      aspirin  81 mg Oral Daily Taiwo Oropeza MD      atenolol  50 mg Oral BID Taiwo Oropeza MD      atorvastatin  40 mg Oral Daily With Christine Curry MD      enoxaparin  30 mg Subcutaneous Daily Taiwo Oropeza MD      ferrous sulfate  325 mg Oral Daily MD Oliver Pa ON 6/12/2021] imipramine  10 mg Oral Once per day on Sun Tue Thu Sat Mona Goetz MD      ipratropium-albuterol  3 mL Nebulization Q4H PRN Mona Goetz MD      latanoprost  1 drop Both Eyes HS Mona Goetz MD      levothyroxine  50 mcg Oral Early Morning Mona Goetz MD      lisinopril  10 mg Oral Daily Mona Goetz MD      nitroglycerin  0 4 mg Sublingual Q5 Min PRN Mona Goetz MD      pantoprazole  40 mg Oral Daily Before Breakfast Mona Goetz MD       Continuous Infusions:   PRN Meds:   acetaminophen    ALPRAZolam    ipratropium-albuterol    nitroglycerin    Surgical procedures (if appropriate):

## 2021-06-11 NOTE — ASSESSMENT & PLAN NOTE
· Patient presented to the ED with complaints of heaviness, and dyspnea on exertion which began prior to admission  · Trop x 3 negative  · Continue Aspirin 81 mg  · Continue statin and atenolol  · DIEGO 3  · Echocardiogram pending  · Sublingual nitro as needed for pain; patient did not require any overnight

## 2021-06-11 NOTE — ED NOTES
Pt's granddaughter called for an update   Granddaughter, Maritza Christensen, can be reached at [phone]     Dayanara Gomes  06/11/21 3905

## 2021-06-11 NOTE — ASSESSMENT & PLAN NOTE
Lab Results   Component Value Date    EGFR 38 06/10/2021    EGFR 39 05/24/2021    EGFR 38 12/08/2020    CREATININE 1 29 06/10/2021    CREATININE 1 28 05/24/2021    CREATININE 1 31 (H) 12/08/2020     · Stable  · Monitor for now

## 2021-06-11 NOTE — H&P
[address] [facility], [place]  H&P- Tracy Ojeda 1935, 82 y o  female MRN: [id number]  Unit/Bed#: ED 09 Encounter: [id number]  Primary Care Provider: Iwona Gill MD   Date and time admitted to hospital: 6/10/2021  6:40 PM         [facility] - History and Physical:       Tracy Ojeda 82 y o  female MRN: [id number]  Unit/Bed#: ED 09 Encounter: [id number]  Admitting Physician: Carmen Alaniz MD  PCP: Iwona Gill MD  Date of Admission:  06/10/21        Assessment and Plan:     * Chest heaviness  Assessment & Plan  · Will need to rule out ACS  · Monitor serial troponins  · Start aspirin 81 mg  · Continue statin and atenolol  · Will order echocardiogram  · Sublingual nitro as needed for pain  · Consult to Cardiology placed    Mild intermittent asthma without complication  Assessment & Plan  · Will order a duo nebs as needed for shortness of breath    AVM (arteriovenous malformation)  Assessment & Plan  · Watch for bleeding    Chronic kidney disease  Assessment & Plan  Lab Results   Component Value Date    EGFR 38 06/10/2021    EGFR 39 05/24/2021    EGFR 38 12/08/2020    CREATININE 1 29 06/10/2021    CREATININE 1 28 05/24/2021    CREATININE 1 31 (H) 12/08/2020     · Stable  · Monitor for now    GERD without esophagitis  Assessment & Plan  · Continue Protonix    Hyperlipidemia  Assessment & Plan  · Continue statin    Hypertension  Assessment & Plan  · Continue atenolol and lisinopril    Hypothyroidism  Assessment & Plan  · Continue levothyroxine    Iron deficiency anemia  Assessment & Plan  · Continue ferrous sulfate    Anxiety  Assessment & Plan  · Continue Xanax as needed            VTE Prophylaxis: Enoxaparin (Lovenox)  / sequential compression device   Code Status: full      Anticipated Length of Stay:  Patient will be admitted on an Observation basis with an anticipated length of stay of  2 midnights     Justification for Hospital Stay:  Chest pain    Total Time for Visit, including Counseling / Coordination of Care: 30 minutes  Greater than 50% of this total time spent on direct patient counseling and coordination of care  Chief Complaint:   Chest pain    History of Present Illness:    Chen Woodward is a 82 y o  female who to the ED with complaints of chest heaviness which started today  The patient also reported dyspnea on exertion which started today and in happen while she walked across the room tonight  She also complained of feeling fatigued  The daughter reported to the ED physician that the patient turned gray at some point and was concerned  The patient stated upon arrival to the ED the chest heaviness improved and she does not feel short of breath at rest   She has chronic pain to the left shoulder and left arm and therefore was not sure if the chest pain is radiating down her arm  Review of Systems:    Review of Systems   Constitutional: Positive for fatigue  HENT: Negative  Eyes: Negative  Respiratory: Positive for shortness of breath  Cardiovascular: Positive for chest pain  Gastrointestinal: Negative  Endocrine: Negative  Genitourinary: Negative  Musculoskeletal: Positive for arthralgias  Skin: Negative  Neurological: Negative  Psychiatric/Behavioral: Negative          Past Medical and Surgical History:     Past Medical History:   Diagnosis Date    Benign essential hypertension     Last assessed: 9/11/14    Disease of thyroid gland     Fibromyalgia     GERD (gastroesophageal reflux disease)     History of nail disorders     Hyperlipidemia     Hypertension     Palpitations     Parkinson's disease ([facility] [address] )     Transient cerebral ischemia        Past Surgical History:   Procedure Laterality Date    APPENDECTOMY      EGD AND COLONOSCOPY  07/2020    HYSTERECTOMY  01/01/2010    HI LAP,CHOLECYSTECTOMY/GRAPH N/A 4/4/2018    Procedure: LAPAROSCOPIC CHOLECYSTECTOMY WITH IOC;  Surgeon: An Glover MD;  Location: [facility] OR; Service: General    TUBAL LIGATION         Meds/Allergies:    Prior to Admission medications    Medication Sig Start Date End Date Taking? Authorizing Provider   acetaminophen (TYLENOL) 325 mg tablet Take 1-2 tablets by mouth every 6 (six) hours as needed   Yes Historical Provider, MD   ALPRAZolam [employer]ine G[employer]) 0 5 mg tablet Take 0 5 mg by mouth daily as needed     Yes Historical Provider, MD   atenolol (TENORMIN) 25 mg tablet take 2 tablets by mouth twice a day  Patient taking differently: 25 mg 2 (two) times a day  4/26/21  Yes AMIE Cruz   ferrous sulfate 325 (65 Fe) mg tablet Take 1 tablet by mouth daily 10/9/17  Yes Historical Provider, MD   imipramine (TOFRANIL) 10 mg tablet Take 1 tablet (10 mg total) by mouth 4 (four) times a week 5/19/20  Yes AMIE Cruz   latanoprost (XALATAN) 0 005 % ophthalmic solution instill 1 drop into both eyes nightly 5/12/20  Yes Historical Provider, MD   levothyroxine 50 mcg tablet 50 UG 6 DAYS PER WEEK 7/10/20  Yes Muna Jovel MD   lisinopril (ZESTRIL) 10 mg tablet Take 1 tablet (10 mg total) by mouth daily 7/10/20  Yes Muna Jovel MD   pantoprazole (PROTONIX) 40 mg tablet take 1 tablet by mouth daily before breakfast 5/26/21  Yes Muna Jovel MD   Probiotic Product (ALIGN) 4 MG CAPS Take 1 tablet by mouth daily   Yes Historical Provider, MD   rosuvastatin (CRESTOR) 10 MG tablet Take 1 tablet (10 mg total) by mouth daily 11/13/20  Yes Muna Jovel MD   atenolol (TENORMIN) 50 mg tablet Take 1 tablet (50 mg total) by mouth 2 (two) times a day 11/13/20   Muna Jovel MD   polyethylene glycol (GOLYTELY) 4000 mL solution Take 4,000 mL by mouth once for 1 dose  Patient not taking: Reported on 8/4/2020 7/13/20 7/13/20  Will Holland PA-C   tobramycin-dexamethasone [facility]) ophthalmic suspension instill 1 drop into left eye four times a day 11/25/20   Historical Provider, MD     I have reviewed home medications with patient personally      Allergies: Allergies   Allergen Reactions    Other Drowsiness     Annotation - [id number]: ANTIDEPRESSANTS       Social History:     Marital Status:      Social History     Substance and Sexual Activity   Alcohol Use Not Currently    Alcohol/week: 0 0 standard drinks    Comment: 0     Social History     Tobacco Use   Smoking Status Former Smoker    Packs/day: 0 10    Years: 50 00    Pack years: 5 00    Types: Cigarettes    Start date:     Quit date:     Years since quittin 4   Smokeless Tobacco Never Used   Tobacco Comment    1 pack a week      Social History     Substance and Sexual Activity   Drug Use No       Family History:    non-contributory    Physical Exam:     Vitals:   Blood Pressure: 157/70 (06/10/21 2247)  Pulse: 67 (06/10/21 2247)  Temperature: 97 9 °F (36 6 °C) (06/10/21 1843)  Temp Source: Oral (06/10/21 1843)  Respirations: 22 (06/10/21 2230)  Height: 4' 9" (144 8 cm) (06/10/21 1843)  Weight - Scale: 51 5 kg (113 lb 8 6 oz) (06/10/21 1843)  SpO2: 99 % (06/10/21 2230)    Physical Exam  Constitutional:       Appearance: Normal appearance  HENT:      Head: Normocephalic and atraumatic  Eyes:      Extraocular Movements: Extraocular movements intact  Pupils: Pupils are equal, round, and reactive to light  Neck:      Musculoskeletal: Neck supple  Cardiovascular:      Rate and Rhythm: Normal rate and regular rhythm  Pulmonary:      Effort: Pulmonary effort is normal       Breath sounds: Normal breath sounds  Abdominal:      General: Bowel sounds are normal       Palpations: Abdomen is soft  Musculoskeletal: Normal range of motion  Skin:     General: Skin is warm and dry  Neurological:      Mental Status: She is alert and oriented to person, place, and time  Psychiatric:         Mood and Affect: Mood normal          Additional Data:     Lab Results: I have personally reviewed pertinent reports        Results from last 7 days   Lab Units 06/10/21  2215 06/10/21  2423 WBC Thousand/uL  --  5 64   HEMOGLOBIN g/dL  --  11 0*   HEMATOCRIT %  --  33 4*   PLATELETS Thousands/uL 152 151   NEUTROS PCT %  --  61   LYMPHS PCT %  --  19   MONOS PCT %  --  10   EOS PCT %  --  10*     Results from last 7 days   Lab Units 06/10/21  1907   SODIUM mmol/L 136   POTASSIUM mmol/L 4 1   CHLORIDE mmol/L 100   CO2 mmol/L 26   BUN mg/dL 17   CREATININE mg/dL 1 29   ANION GAP mmol/L 10   CALCIUM mg/dL 9 0   ALBUMIN g/dL 3 9   TOTAL BILIRUBIN mg/dL 0 48   ALK PHOS U/L 116   ALT U/L 32   AST U/L 36   GLUCOSE RANDOM mg/dL 66                       Imaging: I have personally reviewed pertinent reports  XR chest 1 view portable    (Results Pending)       EKG, Pathology, and Other Studies Reviewed on Admission:   · EKG:  Sinus rhythm  No ST elevation/depressions noted    Allscripts / Epic Records Reviewed: Yes     ** Please Note: This note has been constructed using a voice recognition system   **

## 2021-06-11 NOTE — ASSESSMENT & PLAN NOTE
· Will need to rule out ACS  · Monitor serial troponins  · Start aspirin 81 mg  · Continue statin and atenolol  · Will order echocardiogram  · Sublingual nitro as needed for pain  · Consult to Cardiology placed

## 2021-06-11 NOTE — ASSESSMENT & PLAN NOTE
Lab Results   Component Value Date    EGFR 41 06/11/2021    EGFR 38 06/10/2021    EGFR 39 05/24/2021    CREATININE 1 22 06/11/2021    CREATININE 1 29 06/10/2021    CREATININE 1 28 05/24/2021     · Stable

## 2021-06-11 NOTE — PLAN OF CARE

## 2021-06-11 NOTE — DISCHARGE INSTRUCTIONS
Chest Pain   WHAT YOU NEED TO KNOW:   Chest pain can be caused by a range of conditions, from not serious to life-threatening  Chest pain can be a symptom of a digestive problem, such as acid reflux or a stomach ulcer  An anxiety attack or a strong emotion, such as anger, can also cause chest pain  Infection, inflammation, or a fracture in the bones or cartilage in your chest can cause pain or discomfort  Sometimes chest pain or pressure is caused by poor blood flow to your heart (angina)  Chest pain may also be caused by life-threatening conditions such as a heart attack or blood clot in your lungs  DISCHARGE INSTRUCTIONS:   Call your local emergency number (911 in the [address] [facility]) or have someone call if:   · You have any of the following signs of a heart attack:      ? Squeezing, pressure, or pain in your chest    ? You may  also have any of the following:     § Discomfort or pain in your back, neck, jaw, stomach, or arm    § Shortness of breath    § Nausea or vomiting    § Lightheadedness or a sudden cold sweat      Seek care immediately if:   · You have chest discomfort that gets worse, even with medicine  · You cough or vomit blood  · Your bowel movements are black or bloody  · You cannot stop vomiting, or it hurts to swallow  Call your doctor if:   · You have questions or concerns about your condition or care  Medicines:   · Medicines  may be given to treat the cause of your chest pain  Examples include pain medicine, anxiety medicine, or medicines to increase blood flow to your heart  · Do not take certain medicines without asking your healthcare provider first   These include NSAIDs, herbal or vitamin supplements, or hormones (estrogen or progestin)  · Take your medicine as directed  Contact your healthcare provider if you think your medicine is not helping or if you have side effects  Tell him or her if you are allergic to any medicine   Keep a list of the medicines, vitamins, and herbs you take  Include the amounts, and when and why you take them  Bring the list or the pill bottles to follow-up visits  Carry your medicine list with you in case of an emergency  Healthy living tips: The following are general healthy guidelines  If the cause of your chest pain is known, your healthcare provider will give you specific guidelines to follow  · Do not smoke  Nicotine and other chemicals in cigarettes and cigars can cause lung and heart damage  Ask your healthcare provider for information if you currently smoke and need help to quit  E-cigarettes or smokeless tobacco still contain nicotine  Talk to your healthcare provider before you use these products  · Choose a variety of healthy foods as often as possible  Include fresh, frozen, or canned fruits and vegetables  Also include low-fat dairy products, fish, chicken (without skin), and lean meats  Your healthcare provider or a dietitian can help you create meal plans  You may need to avoid certain foods or drinks if your pain is caused by a digestion problem  · Lower your sodium (salt) intake  Limit foods that are high in sodium, such as canned foods, salty snacks, and cold cuts  If you add salt when you cook food, do not add more at the table  Choose low-sodium canned foods as much as possible  · Drink plenty of water every day  Water helps your body to control temperature and blood pressure  Ask your healthcare provider how much water you should drink every day  · Ask about activity  Your healthcare provider will tell you which activities to limit or avoid  Ask when you can drive, return to work, and have sex  Ask about the best exercise plan for you  · Maintain a healthy weight  Ask your healthcare provider what a healthy weight is for you  Ask him or her to help you create a safe weight loss plan if you are overweight  · Ask about vaccines you may need    Get the influenza (flu) vaccine every year as soon as recommended, usually in September or October  You may also need a pneumococcal vaccine to prevent pneumonia  The vaccine is usually given every 5 years, starting at age 72  Your healthcare provider can tell you if should get other vaccines, and when to get them  Follow up with your healthcare provider within 72 hours, or as directed: You may need to return for more tests to find the cause of your chest pain  You may be referred to a specialist, such as a cardiologist or gastroenterologist  Write down your questions so you remember to ask them during your visits  © Copyright [employer] 2021 Information is for End User's use only and may not be sold, redistributed or otherwise used for commercial purposes  All illustrations and images included in CareNotes® are the copyrighted property of [employer]  or Minerva Nelson  The above information is an  only  It is not intended as medical advice for individual conditions or treatments  Talk to your doctor, nurse or pharmacist before following any medical regimen to see if it is safe and effective for you

## 2021-06-12 ENCOUNTER — APPOINTMENT (EMERGENCY)
Dept: CT IMAGING | Facility: HOSPITAL | Age: 86
DRG: 641 | End: 2021-06-12
Payer: MEDICARE

## 2021-06-12 ENCOUNTER — HOSPITAL ENCOUNTER (INPATIENT)
Facility: HOSPITAL | Age: 86
LOS: 1 days | Discharge: HOME HEALTH | DRG: 641 | End: 2021-06-14
Attending: EMERGENCY MEDICINE | Admitting: INTERNAL MEDICINE
Payer: MEDICARE

## 2021-06-12 DIAGNOSIS — R51.9 ACUTE HEADACHE: ICD-10-CM

## 2021-06-12 DIAGNOSIS — R55 SYNCOPE: Primary | ICD-10-CM
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
## 2021-06-12 DIAGNOSIS — R51.9 ACUTE NONINTRACTABLE HEADACHE, UNSPECIFIED HEADACHE TYPE: ICD-10-CM

## 2021-06-12 DIAGNOSIS — E53.8 B12 DEFICIENCY: ICD-10-CM
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
## 2021-06-12 PROBLEM — R79.89 ELEVATED SERUM CREATININE: Status: ACTIVE | Noted: 2021-06-12

## 2021-06-12 LAB
ANION GAP SERPL CALCULATED.3IONS-SCNC: 9 MMOL/L (ref 4–13)
BASOPHILS # BLD AUTO: 0.03 THOUSANDS/ΜL (ref 0–0.1)
BASOPHILS NFR BLD AUTO: 1 % (ref 0–1)
BUN SERPL-MCNC: 19 MG/DL (ref 5–25)
CALCIUM SERPL-MCNC: 8.7 MG/DL (ref 8.3–10.1)
CHLORIDE SERPL-SCNC: 98 MMOL/L (ref 100–108)
CO2 SERPL-SCNC: 27 MMOL/L (ref 21–32)
CREAT SERPL-MCNC: 1.45 MG/DL (ref 0.6–1.3)
CRP SERPL QL: <3 MG/L
EOSINOPHIL # BLD AUTO: 0.56 THOUSAND/ΜL (ref 0–0.61)
EOSINOPHIL NFR BLD AUTO: 9 % (ref 0–6)
ERYTHROCYTE [DISTWIDTH] IN BLOOD BY AUTOMATED COUNT: 13.3 % (ref 11.6–15.1)
ERYTHROCYTE [SEDIMENTATION RATE] IN BLOOD: 25 MM/HOUR (ref 0–29)
GFR SERPL CREATININE-BSD FRML MDRD: 33 ML/MIN/1.73SQ M
GLUCOSE SERPL-MCNC: 67 MG/DL (ref 65–140)
HCT VFR BLD AUTO: 34.1 % (ref 34.8–46.1)
HGB BLD-MCNC: 11.3 G/DL (ref 11.5–15.4)
IMM GRANULOCYTES # BLD AUTO: 0.02 THOUSAND/UL (ref 0–0.2)
IMM GRANULOCYTES NFR BLD AUTO: 0 % (ref 0–2)
LYMPHOCYTES # BLD AUTO: 1.57 THOUSANDS/ΜL (ref 0.6–4.47)
LYMPHOCYTES NFR BLD AUTO: 24 % (ref 14–44)
MAGNESIUM SERPL-MCNC: 1.7 MG/DL (ref 1.6–2.6)
MCH RBC QN AUTO: 31.8 PG (ref 26.8–34.3)
MCHC RBC AUTO-ENTMCNC: 33.1 G/DL (ref 31.4–37.4)
MCV RBC AUTO: 96 FL (ref 82–98)
MONOCYTES # BLD AUTO: 0.76 THOUSAND/ΜL (ref 0.17–1.22)
MONOCYTES NFR BLD AUTO: 12 % (ref 4–12)
NEUTROPHILS # BLD AUTO: 3.58 THOUSANDS/ΜL (ref 1.85–7.62)
NEUTS SEG NFR BLD AUTO: 54 % (ref 43–75)
NRBC BLD AUTO-RTO: 0 /100 WBCS
PLATELET # BLD AUTO: 168 THOUSANDS/UL (ref 149–390)
PMV BLD AUTO: 10.1 FL (ref 8.9–12.7)
POTASSIUM SERPL-SCNC: 4.1 MMOL/L (ref 3.5–5.3)
RBC # BLD AUTO: 3.55 MILLION/UL (ref 3.81–5.12)
SODIUM SERPL-SCNC: 134 MMOL/L (ref 136–145)
TROPONIN I SERPL-MCNC: <0.02 NG/ML
TSH SERPL DL<=0.05 MIU/L-ACNC: 4.21 UIU/ML (ref 0.36–3.74)
WBC # BLD AUTO: 6.52 THOUSAND/UL (ref 4.31–10.16)

## 2021-06-12 PROCEDURE — 83735 ASSAY OF MAGNESIUM: CPT | Performed by: EMERGENCY MEDICINE

## 2021-06-12 PROCEDURE — 86140 C-REACTIVE PROTEIN: CPT | Performed by: EMERGENCY MEDICINE

## 2021-06-12 PROCEDURE — 84484 ASSAY OF TROPONIN QUANT: CPT | Performed by: EMERGENCY MEDICINE

## 2021-06-12 PROCEDURE — 99220 PR INITIAL OBSERVATION CARE/DAY 70 MINUTES: CPT | Performed by: PHYSICIAN ASSISTANT

## 2021-06-12 PROCEDURE — 84443 ASSAY THYROID STIM HORMONE: CPT | Performed by: EMERGENCY MEDICINE

## 2021-06-12 PROCEDURE — 70496 CT ANGIOGRAPHY HEAD: CPT

## 2021-06-12 PROCEDURE — 36415 COLL VENOUS BLD VENIPUNCTURE: CPT | Performed by: EMERGENCY MEDICINE

## 2021-06-12 PROCEDURE — 93005 ELECTROCARDIOGRAM TRACING: CPT

## 2021-06-12 PROCEDURE — 99285 EMERGENCY DEPT VISIT HI MDM: CPT

## 2021-06-12 PROCEDURE — 85652 RBC SED RATE AUTOMATED: CPT | Performed by: EMERGENCY MEDICINE

## 2021-06-12 PROCEDURE — 80048 BASIC METABOLIC PNL TOTAL CA: CPT | Performed by: EMERGENCY MEDICINE

## 2021-06-12 PROCEDURE — 99285 EMERGENCY DEPT VISIT HI MDM: CPT | Performed by: EMERGENCY MEDICINE

## 2021-06-12 PROCEDURE — 85025 COMPLETE CBC W/AUTO DIFF WBC: CPT | Performed by: EMERGENCY MEDICINE

## 2021-06-12 RX ORDER — MAGNESIUM HYDROXIDE/ALUMINUM HYDROXICE/SIMETHICONE 120; 1200; 1200 MG/30ML; MG/30ML; MG/30ML
30 SUSPENSION ORAL EVERY 6 HOURS PRN
Status: DISCONTINUED | OUTPATIENT
Start: 2021-06-12 | End: 2021-06-14 | Stop reason: HOSPADM

## 2021-06-12 RX ORDER — ACETAMINOPHEN 325 MG/1
650 TABLET ORAL EVERY 4 HOURS PRN
Status: DISCONTINUED | OUTPATIENT
Start: 2021-06-12 | End: 2021-06-14 | Stop reason: HOSPADM

## 2021-06-12 RX ORDER — ONDANSETRON 2 MG/ML
4 INJECTION INTRAMUSCULAR; INTRAVENOUS EVERY 6 HOURS PRN
Status: DISCONTINUED | OUTPATIENT
Start: 2021-06-12 | End: 2021-06-14 | Stop reason: HOSPADM

## 2021-06-12 RX ORDER — HEPARIN SODIUM 5000 [USP'U]/ML
5000 INJECTION, SOLUTION INTRAVENOUS; SUBCUTANEOUS EVERY 8 HOURS SCHEDULED
Status: DISCONTINUED | OUTPATIENT
Start: 2021-06-13 | End: 2021-06-14 | Stop reason: HOSPADM

## 2021-06-12 RX ORDER — SODIUM CHLORIDE 9 MG/ML
50 INJECTION, SOLUTION INTRAVENOUS CONTINUOUS
Status: DISCONTINUED | OUTPATIENT
Start: 2021-06-13 | End: 2021-06-13

## 2021-06-12 RX ADMIN — IOHEXOL 85 ML: 350 INJECTION, SOLUTION INTRAVENOUS at 21:35

## 2021-06-13 PROBLEM — G62.9 NEUROPATHY: Status: ACTIVE | Noted: 2021-06-13

## 2021-06-13 PROBLEM — E53.8 B12 DEFICIENCY: Status: ACTIVE | Noted: 2021-06-13

## 2021-06-13 LAB
ANION GAP SERPL CALCULATED.3IONS-SCNC: 10 MMOL/L (ref 4–13)
ATRIAL RATE: 65 BPM
BASOPHILS # BLD AUTO: 0.02 THOUSANDS/ΜL (ref 0–0.1)
BASOPHILS NFR BLD AUTO: 0 % (ref 0–1)
BUN SERPL-MCNC: 16 MG/DL (ref 5–25)
CALCIUM SERPL-MCNC: 8.7 MG/DL (ref 8.3–10.1)
CHLORIDE SERPL-SCNC: 104 MMOL/L (ref 100–108)
CO2 SERPL-SCNC: 24 MMOL/L (ref 21–32)
CREAT SERPL-MCNC: 1.25 MG/DL (ref 0.6–1.3)
EOSINOPHIL # BLD AUTO: 0.57 THOUSAND/ΜL (ref 0–0.61)
EOSINOPHIL NFR BLD AUTO: 11 % (ref 0–6)
ERYTHROCYTE [DISTWIDTH] IN BLOOD BY AUTOMATED COUNT: 13.5 % (ref 11.6–15.1)
GFR SERPL CREATININE-BSD FRML MDRD: 40 ML/MIN/1.73SQ M
GLUCOSE SERPL-MCNC: 91 MG/DL (ref 65–140)
HCT VFR BLD AUTO: 33.7 % (ref 34.8–46.1)
HGB BLD-MCNC: 11.1 G/DL (ref 11.5–15.4)
IMM GRANULOCYTES # BLD AUTO: 0.01 THOUSAND/UL (ref 0–0.2)
IMM GRANULOCYTES NFR BLD AUTO: 0 % (ref 0–2)
LYMPHOCYTES # BLD AUTO: 1.14 THOUSANDS/ΜL (ref 0.6–4.47)
LYMPHOCYTES NFR BLD AUTO: 23 % (ref 14–44)
MAGNESIUM SERPL-MCNC: 1.9 MG/DL (ref 1.6–2.6)
MCH RBC QN AUTO: 32.1 PG (ref 26.8–34.3)
MCHC RBC AUTO-ENTMCNC: 32.9 G/DL (ref 31.4–37.4)
MCV RBC AUTO: 97 FL (ref 82–98)
MONOCYTES # BLD AUTO: 0.5 THOUSAND/ΜL (ref 0.17–1.22)
MONOCYTES NFR BLD AUTO: 10 % (ref 4–12)
NEUTROPHILS # BLD AUTO: 2.77 THOUSANDS/ΜL (ref 1.85–7.62)
NEUTS SEG NFR BLD AUTO: 56 % (ref 43–75)
NRBC BLD AUTO-RTO: 0 /100 WBCS
P AXIS: 55 DEGREES
PLATELET # BLD AUTO: 153 THOUSANDS/UL (ref 149–390)
PMV BLD AUTO: 9.6 FL (ref 8.9–12.7)
POTASSIUM SERPL-SCNC: 3.9 MMOL/L (ref 3.5–5.3)
PR INTERVAL: 156 MS
QRS AXIS: 3 DEGREES
QRSD INTERVAL: 86 MS
QT INTERVAL: 472 MS
QTC INTERVAL: 490 MS
RBC # BLD AUTO: 3.46 MILLION/UL (ref 3.81–5.12)
SODIUM SERPL-SCNC: 138 MMOL/L (ref 136–145)
T WAVE AXIS: 64 DEGREES
TROPONIN I SERPL-MCNC: <0.02 NG/ML
TROPONIN I SERPL-MCNC: <0.02 NG/ML
VENTRICULAR RATE: 65 BPM
WBC # BLD AUTO: 5.01 THOUSAND/UL (ref 4.31–10.16)

## 2021-06-13 PROCEDURE — 85025 COMPLETE CBC W/AUTO DIFF WBC: CPT | Performed by: INTERNAL MEDICINE

## 2021-06-13 PROCEDURE — 93010 ELECTROCARDIOGRAM REPORT: CPT | Performed by: INTERNAL MEDICINE

## 2021-06-13 PROCEDURE — 99225 PR SBSQ OBSERVATION CARE/DAY 25 MINUTES: CPT | Performed by: INTERNAL MEDICINE

## 2021-06-13 PROCEDURE — 86340 INTRINSIC FACTOR ANTIBODY: CPT | Performed by: PHYSICIAN ASSISTANT

## 2021-06-13 PROCEDURE — 99214 OFFICE O/P EST MOD 30 MIN: CPT | Performed by: PSYCHIATRY & NEUROLOGY

## 2021-06-13 PROCEDURE — 84484 ASSAY OF TROPONIN QUANT: CPT | Performed by: PHYSICIAN ASSISTANT

## 2021-06-13 PROCEDURE — 83735 ASSAY OF MAGNESIUM: CPT | Performed by: INTERNAL MEDICINE

## 2021-06-13 PROCEDURE — 97163 PT EVAL HIGH COMPLEX 45 MIN: CPT

## 2021-06-13 PROCEDURE — 80048 BASIC METABOLIC PNL TOTAL CA: CPT | Performed by: INTERNAL MEDICINE

## 2021-06-13 RX ORDER — ATENOLOL 25 MG/1
25 TABLET ORAL 2 TIMES DAILY
Status: DISCONTINUED | OUTPATIENT
Start: 2021-06-13 | End: 2021-06-14

## 2021-06-13 RX ORDER — FERROUS SULFATE 325(65) MG
325 TABLET ORAL DAILY
Status: DISCONTINUED | OUTPATIENT
Start: 2021-06-13 | End: 2021-06-14 | Stop reason: HOSPADM

## 2021-06-13 RX ORDER — LISINOPRIL 10 MG/1
10 TABLET ORAL DAILY
Status: DISCONTINUED | OUTPATIENT
Start: 2021-06-13 | End: 2021-06-14 | Stop reason: HOSPADM

## 2021-06-13 RX ORDER — PRAVASTATIN SODIUM 80 MG/1
80 TABLET ORAL
Status: DISCONTINUED | OUTPATIENT
Start: 2021-06-13 | End: 2021-06-14 | Stop reason: HOSPADM

## 2021-06-13 RX ORDER — IMIPRAMINE HYDROCHLORIDE 10 MG/1
10 TABLET, FILM COATED ORAL
Status: DISCONTINUED | OUTPATIENT
Start: 2021-06-13 | End: 2021-06-14 | Stop reason: HOSPADM

## 2021-06-13 RX ORDER — ALPRAZOLAM 0.5 MG/1
0.5 TABLET ORAL DAILY PRN
Status: DISCONTINUED | OUTPATIENT
Start: 2021-06-13 | End: 2021-06-14 | Stop reason: HOSPADM

## 2021-06-13 RX ORDER — PANTOPRAZOLE SODIUM 40 MG/1
40 TABLET, DELAYED RELEASE ORAL
Status: DISCONTINUED | OUTPATIENT
Start: 2021-06-13 | End: 2021-06-14 | Stop reason: HOSPADM

## 2021-06-13 RX ORDER — LEVOTHYROXINE SODIUM 0.05 MG/1
50 TABLET ORAL
Status: DISCONTINUED | OUTPATIENT
Start: 2021-06-13 | End: 2021-06-14 | Stop reason: HOSPADM

## 2021-06-13 RX ORDER — TOBRAMYCIN AND DEXAMETHASONE 3; 1 MG/ML; MG/ML
1 SUSPENSION/ DROPS OPHTHALMIC EVERY 6 HOURS SCHEDULED
Status: DISCONTINUED | OUTPATIENT
Start: 2021-06-13 | End: 2021-06-14 | Stop reason: HOSPADM

## 2021-06-13 RX ORDER — LANOLIN ALCOHOL/MO/W.PET/CERES
3 CREAM (GRAM) TOPICAL
Status: DISCONTINUED | OUTPATIENT
Start: 2021-06-13 | End: 2021-06-14 | Stop reason: HOSPADM

## 2021-06-13 RX ORDER — SACCHAROMYCES BOULARDII 250 MG
250 CAPSULE ORAL 2 TIMES DAILY
Status: DISCONTINUED | OUTPATIENT
Start: 2021-06-13 | End: 2021-06-14 | Stop reason: HOSPADM

## 2021-06-13 RX ORDER — ASPIRIN 81 MG/1
81 TABLET ORAL DAILY
Status: DISCONTINUED | OUTPATIENT
Start: 2021-06-13 | End: 2021-06-14 | Stop reason: HOSPADM

## 2021-06-13 RX ORDER — LATANOPROST 50 UG/ML
1 SOLUTION/ DROPS OPHTHALMIC
Status: DISCONTINUED | OUTPATIENT
Start: 2021-06-13 | End: 2021-06-14 | Stop reason: HOSPADM

## 2021-06-13 RX ADMIN — TOBRAMYCIN AND DEXAMETHASONE 1 DROP: 3; 1 SUSPENSION/ DROPS OPHTHALMIC at 05:33

## 2021-06-13 RX ADMIN — ACETAMINOPHEN 650 MG: 325 TABLET, FILM COATED ORAL at 21:07

## 2021-06-13 RX ADMIN — HEPARIN SODIUM 5000 UNITS: 5000 INJECTION INTRAVENOUS; SUBCUTANEOUS at 15:28

## 2021-06-13 RX ADMIN — ATENOLOL 25 MG: 25 TABLET ORAL at 03:56

## 2021-06-13 RX ADMIN — ASPIRIN 81 MG: 81 TABLET, COATED ORAL at 09:47

## 2021-06-13 RX ADMIN — ALPRAZOLAM 0.5 MG: 0.5 TABLET ORAL at 04:24

## 2021-06-13 RX ADMIN — FERROUS SULFATE TAB 325 MG (65 MG ELEMENTAL FE) 325 MG: 325 (65 FE) TAB at 09:48

## 2021-06-13 RX ADMIN — LISINOPRIL 10 MG: 10 TABLET ORAL at 15:54

## 2021-06-13 RX ADMIN — TOBRAMYCIN AND DEXAMETHASONE 1 DROP: 3; 1 SUSPENSION/ DROPS OPHTHALMIC at 18:10

## 2021-06-13 RX ADMIN — ATENOLOL 25 MG: 25 TABLET ORAL at 21:07

## 2021-06-13 RX ADMIN — Medication 250 MG: at 09:47

## 2021-06-13 RX ADMIN — LEVOTHYROXINE SODIUM 50 MCG: 50 TABLET ORAL at 05:33

## 2021-06-13 RX ADMIN — TOBRAMYCIN AND DEXAMETHASONE 1 DROP: 3; 1 SUSPENSION/ DROPS OPHTHALMIC at 12:45

## 2021-06-13 RX ADMIN — CYANOCOBALAMIN TAB 500 MCG 1000 MCG: 500 TAB at 15:31

## 2021-06-13 RX ADMIN — ATENOLOL 25 MG: 25 TABLET ORAL at 09:47

## 2021-06-13 RX ADMIN — SODIUM CHLORIDE 50 ML/HR: 0.9 INJECTION, SOLUTION INTRAVENOUS at 00:00

## 2021-06-13 RX ADMIN — IMIPRAMINE HYDROCHLORIDE 10 MG: 10 TABLET ORAL at 09:48

## 2021-06-13 RX ADMIN — HEPARIN SODIUM 5000 UNITS: 5000 INJECTION INTRAVENOUS; SUBCUTANEOUS at 05:33

## 2021-06-13 RX ADMIN — LATANOPROST 1 DROP: 50 SOLUTION OPHTHALMIC at 21:07

## 2021-06-13 RX ADMIN — HEPARIN SODIUM 5000 UNITS: 5000 INJECTION INTRAVENOUS; SUBCUTANEOUS at 21:07

## 2021-06-13 RX ADMIN — Medication 250 MG: at 18:09

## 2021-06-13 RX ADMIN — PANTOPRAZOLE SODIUM 40 MG: 40 TABLET, DELAYED RELEASE ORAL at 09:47

## 2021-06-13 RX ADMIN — PRAVASTATIN SODIUM 80 MG: 80 TABLET ORAL at 15:54

## 2021-06-13 NOTE — PHYSICAL THERAPY NOTE
PT Evaluation (13min)  (8:30-8:43)    Past Medical History:   Diagnosis Date    Benign essential hypertension     Last assessed: 9/11/14    Disease of thyroid gland     Fibromyalgia     GERD (gastroesophageal reflux disease)     History of nail disorders     Hyperlipidemia     Hypertension     Palpitations     Parkinson's disease ([facility] [address] )     Transient cerebral ischemia         06/13/21 0830   PT Last Visit   PT Visit Date 06/13/21   Note Type   Note type Evaluation   Pain Assessment   Pain Assessment Tool Pain Assessment not indicated - pt denies pain   Home Living   Type of Home Apartment  ([facility])   Home Layout One level;Elevator   Bathroom Shower/Tub Tub/shower unit   Bathroom Toilet Standard   Bathroom Equipment Grab bars in shower;Grab bars around toilet   [address] Walker;Cane   Prior Function   Level of [place] Independent with ADLs and functional mobility  (ambulates c SPC)   Lives With Alone   Receives Help From Family   ADL Assistance Independent   IADLs Needs assistance   Falls in the last 6 months 1 to 4  (3)   Vocational Retired   Comments (-) drive; family (A) c transportation   Restrictions/Precautions   [facility] Bearing Precautions Per Order No   Other Precautions Telemetry;Multiple lines; Fall Risk;Bed Alarm; Chair Alarm   General   Additional Pertinent History pt presents to [facility] s/p syncopal episode  PT consulted for mobility + d/c planning  Family/Caregiver Present No   Cognition   Orientation Level Oriented X4   RUE Assessment   RUE Assessment WFL  (3+/5)   LUE Assessment   LUE Assessment WFL  (3+/5)   RLE Assessment   RLE Assessment WFL  (4-/5)   LLE Assessment   LLE Assessment WFL  (4-/5)   Coordination   Sensation WFL   Bed Mobility   Supine to Sit 5  Supervision   Additional items HOB elevated; Increased time required;Verbal cues   Transfers   Sit to Stand 4  Minimal assistance   Additional items Assist x 1;Verbal cues; Increased time required  (CG (A)x1)   Stand to Sit 4  Minimal assistance   Additional items Assist x 1; Armrests; Verbal cues; Increased time required  (CG (A)x1)   Ambulation/Elevation   Gait pattern Narrow NAVI; Decreased foot clearance; Excessively slow; Short stride   Gait Assistance 4  Minimal assist   Additional items Assist x 1;Verbal cues  (CG (A)x1)   Assistive Device Rolling walker   Distance 20'   Stair Management Assistance Not tested   Balance   Static Sitting Good   Dynamic Sitting Fair +   Static Standing Fair   Dynamic Standing Gracia Franco [id number] -   Activity Tolerance   Activity Tolerance Patient limited by fatigue   Nurse Made Aware RN Gail aware pt ambulates (A)x1 c RW  pt seated in chair at end of session c chair alarm activated  Assessment   Prognosis Good   Problem List Decreased strength;Decreased endurance; Impaired balance;Decreased mobility   Assessment pt is an 84y/o f who presents to [facility] s/p syncopal episode  PMH significant for fibromyalgia, GERD, PD, + thyroid disease  at baseline, pt mod (I) c functional mobility c SPC  lives alone in senior high rise apt c elevator access  currently requires min (A)x1/CG (A) to complete OOB mobility tasks 2* deficits in strength, balance, gait quality, + activity tolerance noted in PT exam above  Barthel Index 55/100, Modified [place] 4  increased time required to complete mobility tasks  ambulated 20' c RW  able to sit OOB in chair at end of session c chair alarm activated  would benefit from skilled PT to maximize functional mobility + return home safely  AM-PAC raw score 18 indicating pt safe for d/c home c hhpt, however please refer to PT d/c recommendation for safe d/c planning  PT eval of high complexity 2* unstable med status c pt requiring ongoing medical management s/p syncopal episode c pending neuro c/s  pt c multiple co-morbidities + mobility deficits requiring use of RW for safety c ambulation  pt lives alone; currently c multiple lines      Barriers to Discharge Decreased caregiver support   Goals   Patient Goals "to go home"  STG Expiration Date 06/23/21   Short Term Goal #1 1  increase strength 1/2 grade to improve overall functional mobility, 2  perform bed mobility mod (I) to decrease caregiver burden, 3  perform transfers mod (I) to safely perform ADLs, 4  ambulate >150' mod (I) c least restrictive AD to safely navigate home environment   Plan   Treatment/Interventions Functional transfer training;LE strengthening/ROM; Therapeutic exercise; Endurance training;Patient/family training;Equipment eval/education; Bed mobility;Gait training;Spoke to nursing   PT Frequency Other (Comment)  (3-5x/wk)   Recommendation   PT Discharge Recommendation Home with home health rehabilitation   [address] [place] [address] Mobility Inpatient   Turning in Bed Without Bedrails 3   Lying on Back to Sitting on Edge of Flat Bed 3   Moving Bed to Chair 3   Standing Up From Chair 3   Walk in Room 3   Climb 3-5 Stairs 3   Basic Mobility Inpatient Raw Score 18   Basic Mobility Standardized Score 41 05   Modified [place] Scale   Modified [place] Scale 4   Barthel Index   Feeding 10   Bathing 0   Grooming Score 5   Dressing Score 5   Bladder Score 10   Bowels Score 10   Toilet Use Score 5   Transfers (Bed/Chair) Score 10   Mobility (Level Surface) Score 0   Stairs Score 0   Barthel Index Score 55     Geri Gill, PT, DPT

## 2021-06-13 NOTE — PLAN OF CARE
Problem: PHYSICAL THERAPY ADULT  Goal: Performs mobility at highest level of function for planned discharge setting  See evaluation for individualized goals  Description: Treatment/Interventions: Functional transfer training, LE strengthening/ROM, Therapeutic exercise, Endurance training, Patient/family training, Equipment eval/education, Bed mobility, Gait training, Spoke to nursing          See flowsheet documentation for full assessment, interventions and recommendations  Note: Prognosis: Good  Problem List: Decreased strength, Decreased endurance, Impaired balance, Decreased mobility  Assessment: pt is an 86y/o f who presents to [facility] s/p syncopal episode  PMH significant for fibromyalgia, GERD, PD, + thyroid disease  at baseline, pt mod (I) c functional mobility c SPC  lives alone in [facility] apt c elevator access  currently requires min (A)x1/CG (A) to complete OOB mobility tasks 2* deficits in strength, balance, gait quality, + activity tolerance noted in PT exam above  Barthel Index 55/100, Modified [place] 4  increased time required to complete mobility tasks  ambulated 20' c RW  able to sit OOB in chair at end of session c chair alarm activated  would benefit from skilled PT to maximize functional mobility + return home safely  AM-PAC raw score 18 indicating pt safe for d/c home c hhpt, however please refer to PT d/c recommendation for safe d/c planning  PT eval of high complexity 2* unstable med status c pt requiring ongoing medical management s/p syncopal episode c pending neuro c/s  pt c multiple co-morbidities + mobility deficits requiring use of RW for safety c ambulation  pt lives alone; currently c multiple lines  Barriers to Discharge: Decreased caregiver support        PT Discharge Recommendation: Home with home health rehabilitation          See flowsheet documentation for full assessment

## 2021-06-13 NOTE — ED PROVIDER NOTES
History  Chief Complaint   Patient presents with    Headache     Patient reports headache starting this evening and periods of blackouts  HPI    Prior to Admission Medications   Prescriptions Last Dose Informant Patient Reported? Taking?    ALPRAZolam (XANAX) 0 5 mg tablet 2021 at Unknown time Self Yes Yes   Sig: Take 0 5 mg by mouth daily as needed     Probiotic Product (ALIGN) 4 MG CAPS 2021 at Unknown time Self Yes Yes   Sig: Take 1 tablet by mouth daily   acetaminophen (TYLENOL) 325 mg tablet 2021 at Unknown time Self Yes Yes   Sig: Take 1-2 tablets by mouth every 6 (six) hours as needed   aspirin (ECOTRIN LOW STRENGTH) 81 mg EC tablet 2021 at Unknown time  No Yes   Sig: Take 1 tablet (81 mg total) by mouth daily   atenolol (TENORMIN) 25 mg tablet 2021 at Unknown time  No Yes   Sig: take 2 tablets by mouth twice a day   Patient taking differently: 25 mg 2 (two) times a day    atenolol (TENORMIN) 50 mg tablet 2021 at Unknown time  No Yes   Sig: Take 1 tablet (50 mg total) by mouth 2 (two) times a day   ferrous sulfate 325 (65 Fe) mg tablet 2021 at Unknown time Self Yes Yes   Sig: Take 1 tablet by mouth daily   imipramine (TOFRANIL) 10 mg tablet 2021 at Unknown time Self No Yes   Sig: Take 1 tablet (10 mg total) by mouth 4 (four) times a week   latanoprost (XALATAN) 0 005 % ophthalmic solution 2021 at Unknown time Self Yes Yes   Sig: instill 1 drop into both eyes nightly   levothyroxine 50 mcg tablet 2021 at Unknown time Self No Yes   Si UG 6 DAYS PER WEEK   lisinopril (ZESTRIL) 10 mg tablet 2021 at Unknown time Self No Yes   Sig: Take 1 tablet (10 mg total) by mouth daily   pantoprazole (PROTONIX) 40 mg tablet 2021 at Unknown time  No Yes   Sig: take 1 tablet by mouth daily before breakfast   polyethylene glycol (GOLYTELY) 4000 mL solution   No No   Sig: Take 4,000 mL by mouth once for 1 dose   Patient not taking: Reported on 2020 rosuvastatin (CRESTOR) 10 MG tablet 2021 at Unknown time  No Yes   Sig: Take 1 tablet (10 mg total) by mouth daily   tobramycin-dexamethasone (TOBRADEX) ophthalmic suspension 2021 at Unknown time  Yes Yes   Sig: instill 1 drop into left eye four times a day      Facility-Administered Medications: None       Past Medical History:   Diagnosis Date    Benign essential hypertension     Last assessed: 14    Disease of thyroid gland     Fibromyalgia     GERD (gastroesophageal reflux disease)     History of nail disorders     Hyperlipidemia     Hypertension     Palpitations     Parkinson's disease ([facility] [address] )     Transient cerebral ischemia        Past Surgical History:   Procedure Laterality Date    APPENDECTOMY      EGD AND COLONOSCOPY  2020    HYSTERECTOMY  2010    MN LAP,CHOLECYSTECTOMY/GRAPH N/A 2018    Procedure: LAPAROSCOPIC CHOLECYSTECTOMY WITH IOC;  Surgeon: Keith Sparks MD;  Location: [facility];  Service: General    TUBAL LIGATION         Family History   Problem Relation Age of Onset    Stroke Mother         ischemic stroke    Hypertension Mother     Heart disease Father      I have reviewed and agree with the history as documented  E-Cigarette/Vaping    E-Cigarette Use Never User      E-Cigarette/Vaping Substances    Nicotine No     THC No     CBD No     Flavoring No     Other No     Unknown No      Social History     Tobacco Use    Smoking status: Former Smoker     Packs/day: 0 10     Years: 50 00     Pack years: 5 00     Types: Cigarettes     Start date:      Quit date:      Years since quittin 4    Smokeless tobacco: Never Used    Tobacco comment: 1 pack a week    Substance Use Topics    Alcohol use: Not Currently     Alcohol/week: 0 0 standard drinks     Comment: 0    Drug use: No       Review of Systems    Physical Exam  Physical Exam  Vitals signs and nursing note reviewed     Constitutional:       General: She is not in acute distress  Appearance: She is well-developed  Comments: Mildly hypertensive, improves on repeat   HENT:      Head: Normocephalic and atraumatic  Comments: No temporal artery tenderness  Eyes:      Extraocular Movements: Extraocular movements intact  Conjunctiva/sclera: Conjunctivae normal       Pupils: Pupils are equal, round, and reactive to light  Neck:      Musculoskeletal: Normal range of motion  Trachea: No tracheal deviation  Cardiovascular:      Rate and Rhythm: Normal rate and regular rhythm  Heart sounds: Normal heart sounds  Pulmonary:      Effort: Pulmonary effort is normal  No respiratory distress  Breath sounds: Normal breath sounds  Abdominal:      General: There is no distension  Palpations: Abdomen is soft  Tenderness: There is no abdominal tenderness  Skin:     General: Skin is warm and dry  Neurological:      General: No focal deficit present  Mental Status: She is alert and oriented to person, place, and time  GCS: GCS eye subscore is 4  GCS verbal subscore is 5  GCS motor subscore is 6  Cranial Nerves: No cranial nerve deficit  Sensory: Sensation is intact  No sensory deficit  Motor: Motor function is intact  No weakness  Coordination: Coordination normal  Finger-Nose-Finger Test normal       Deep Tendon Reflexes:      Reflex Scores:       Bicep reflexes are 2+ on the right side and 2+ on the left side  Brachioradialis reflexes are 2+ on the right side and 2+ on the left side  Patellar reflexes are 2+ on the right side and 2+ on the left side    Psychiatric:         Behavior: Behavior normal          Vital Signs  ED Triage Vitals   Temperature Pulse Respirations Blood Pressure SpO2   06/12/21 2055 06/12/21 2037 06/12/21 2037 06/12/21 2037 06/12/21 2037   97 8 °F (36 6 °C) 66 18 (!) 179/69 95 %      Temp Source Heart Rate Source Patient Position - Orthostatic VS BP Location FiO2 (%)   06/12/21 2055 06/12/21 2037 06/12/21 2037 06/12/21 2037 --   Oral Monitor Sitting Left arm       Pain Score       06/12/21 2054       3           Vitals:    06/12/21 2037 06/12/21 2145 06/12/21 2249   BP: (!) 179/69  144/65   Pulse: 66 72 66   Patient Position - Orthostatic VS: Sitting  Lying         Visual Acuity  Visual Acuity      Most Recent Value   L Pupil Size (mm)  3   R Pupil Size (mm)  3          ED Medications  Medications   iohexol (OMNIPAQUE) 350 MG/ML injection (MULTI-DOSE) 85 mL (85 mL Intravenous Given 6/12/21 2135)       Diagnostic Studies  Results Reviewed     Procedure Component Value Units Date/Time    Basic metabolic panel [[id number]]  (Abnormal) Collected: 06/12/21 2105    Lab Status: Final result Specimen: Blood from Arm, Right Updated: 06/12/21 2136     Sodium 134 mmol/L      Potassium 4 1 mmol/L      Chloride 98 mmol/L      CO2 27 mmol/L      ANION GAP 9 mmol/L      BUN 19 mg/dL      Creatinine 1 45 mg/dL      Glucose 67 mg/dL      Calcium 8 7 mg/dL      eGFR 33 ml/min/1 73sq m     Narrative:      [place] guidelines for Chronic Kidney Disease (CKD):     Stage 1 with normal or high GFR (GFR > 90 mL/min/1 73 square meters)    Stage 2 Mild CKD (GFR = 60-89 mL/min/1 73 square meters)    Stage 3A Moderate CKD (GFR = 45-59 mL/min/1 73 square meters)    Stage 3B Moderate CKD (GFR = 30-44 mL/min/1 73 square meters)    Stage 4 Severe CKD (GFR = 15-29 mL/min/1 73 square meters)    Stage 5 End Stage CKD (GFR <15 mL/min/1 73 square meters)  Note: GFR calculation is accurate only with a steady state creatinine    TSH [[id number]]  (Abnormal) Collected: 06/12/21 2105    Lab Status: Final result Specimen: Blood from Arm, Right Updated: 06/12/21 2136     TSH 3RD GENERATON 4 206 uIU/mL     Narrative:      Patients undergoing fluorescein dye angiography may retain small amounts of fluorescein in the body for 48-72 hours post procedure   Samples containing fluorescein can produce falsely depressed TSH values  If the patient had this procedure,a specimen should be resubmitted post fluorescein clearance  Magnesium [[id number]]  (Normal) Collected: 06/12/21 2105    Lab Status: Final result Specimen: Blood from Arm, Right Updated: 06/12/21 2136     Magnesium 1 7 mg/dL     C-reactive protein [[id number]]  (Normal) Collected: 06/12/21 2105    Lab Status: Final result Specimen: Blood from Arm, Right Updated: 06/12/21 2136     CRP <3 0 mg/L     Troponin I [[id number]]  (Normal) Collected: 06/12/21 2105    Lab Status: Final result Specimen: Blood from Arm, Right Updated: 06/12/21 2128     Troponin I <0 02 ng/mL     Sedimentation rate, automated [[id number]]  (Normal) Collected: 06/12/21 2109    Lab Status: Final result Specimen: Blood from Arm, Left Updated: 06/12/21 2115     Sed Rate 25 mm/hour     CBC and differential [[id number]]  (Abnormal) Collected: 06/12/21 2105    Lab Status: Final result Specimen: Blood from Arm, Right Updated: 06/12/21 2109     WBC 6 52 Thousand/uL      RBC 3 55 Million/uL      Hemoglobin 11 3 g/dL      Hematocrit 34 1 %      MCV 96 fL      MCH 31 8 pg      MCHC 33 1 g/dL      RDW 13 3 %      MPV 10 1 fL      Platelets 094 Thousands/uL      nRBC 0 /100 WBCs      Neutrophils Relative 54 %      Immat GRANS % 0 %      Lymphocytes Relative 24 %      Monocytes Relative 12 %      Eosinophils Relative 9 %      Basophils Relative 1 %      Neutrophils Absolute 3 58 Thousands/µL      Immature Grans Absolute 0 02 Thousand/uL      Lymphocytes Absolute 1 57 Thousands/µL      Monocytes Absolute 0 76 Thousand/µL      Eosinophils Absolute 0 56 Thousand/µL      Basophils Absolute 0 03 Thousands/µL                  CTA head with and without contrast   Final Result by Shauna Beckham MD (06/12 2229)      No acute intracranial abnormality  No focal stenosis or saccular aneurysm within the [place] [facility] [place]                    Workstation performed: [id number]                    Procedures  ECG 12 Lead Documentation Only    Date/Time: 6/12/2021 10:58 PM  Performed by: Larissa Galeana MD  Authorized by: Larissa Galeana MD     Indications / Diagnosis:  Syncope  ECG reviewed by me, the ED Provider: yes    Patient location:  ED  Previous ECG:     Previous ECG:  Compared to current    Comparison ECG info:  06/10/21    Similarity:  No change  Interpretation:     Interpretation: normal    Rate:     ECG rate:  65    ECG rate assessment: normal    Rhythm:     Rhythm: sinus rhythm    Ectopy:     Ectopy: none    QRS:     QRS axis:  Normal    QRS intervals:  Normal  Conduction:     Conduction: normal    ST segments:     ST segments:  Normal  T waves:     T waves: normal    Comments:      High normal QTc interval             ED Course                             SBIRT 22yo+      Most Recent Value   SBIRT (22 yo +)   In order to provide better care to our patients, we are screening all of our patients for alcohol and drug use  Would it be okay to ask you these screening questions? Yes Filed at: 06/12/2021 2055   Initial Alcohol Screen: US AUDIT-C    1  How often do you have a drink containing alcohol?  0 Filed at: 06/12/2021 2055   2  How many drinks containing alcohol do you have on a typical day you are drinking? 0 Filed at: 06/12/2021 2055   3a  Male UNDER 65: How often do you have five or more drinks on one occasion? 0 Filed at: 06/12/2021 2055   3b  FEMALE Any Age, or MALE 65+: How often do you have 4 or more drinks on one occassion? 0 Filed at: 06/12/2021 2055   Audit-C Score  0 Filed at: 06/12/2021 2055   GILLES: How many times in the past year have you    Used an illegal drug or used a prescription medication for non-medical reasons? Never Filed at: 06/12/2021 2055                    MDM  Number of Diagnoses or Management Options  Acute headache: new and requires workup  Syncope: new and requires workup  Diagnosis management comments:  This is an 60-year-old female who presents here today after a syncopal event  At around 1800 she developed sudden onset of headache  She states it started in progressed over less than a minute, frontal head radiated into her top of her head  She denies vision changes, nausea, vomiting, focal weakness or numbness  She did take Tylenol which has thus far helped mildly with her headache  She says later she was sitting on the couch when she briefly felt that her vision was going dark around the edges and does not remember what happened after that  The daughters who were with her states that she went unresponsive, for about 2-3 minutes  She had no seizure-like activity or postictal state  They tried to wake her up but she did not respond to them  The patient states she does feels not well and at this point in time but no other acute symptoms  She denies focal weakness or numbness  She denies any palpitations  She was recently admitted for ACS rule out and has not had recurrence of chest pain, shortness of breath, dyspnea on exertion since she was in the hospital   She endorses remote history of syncopal event, and denies presyncopal symptoms  She denies any recent blood loss, over-the-counter medication use, recent infectious symptoms  She was restarted on a baby aspirin while she was in the hospital but denies any other new medications or changes in medications  She states she has been taking her medications as prescribed  Review of systems:  Otherwise negative unless stated as above    She is well-appearing, in no acute distress  She has no temporal artery tenderness  She has no focal neurologic symptoms on exam   She is mildly hypertensive here  Exam is otherwise unremarkable  Concern with sudden onset of headache is for subarachnoid hemorrhage, with age is temporal arteritis  She is not having any infectious symptoms to suggest underlying infectious etiology    Her syncopal episode may be due to her underlying headache including subarachnoid, dysrhythmia, dehydration, electrolyte or thyroid abnormality  We will check lab work, and CT/CTA of her head to evaluate for etiologies of her symptoms  We will check sed rate/CRP given concern for possible temporal arteritis  Anemia is at baseline  Creatinine is 1 45, which is high normal for the patient  TSH is mildly elevated though was normal yesterday, so this is of uncertain etiology  Magnesium is low normal seven  CT scan of her head shows no acute abnormalities  The patient has had complete resolution of her headache at this point in time  I discussed with the patient and her daughters findings thus far, uncertain exact etiology for her symptoms, particularly her syncopal episode, concern given syncopal episode at rest, risks and benefits of discharge home verses readmission to the hospital, and patient in daughters agree more comfortable with admission for further evaluation         Amount and/or Complexity of Data Reviewed  Clinical lab tests: ordered and reviewed  Tests in the radiology section of CPT®: ordered and reviewed  Decide to obtain previous medical records or to obtain history from someone other than the patient: yes  Obtain history from someone other than the patient: yes (daughters)  Review and summarize past medical records: yes  Discuss the patient with other providers: yes  Independent visualization of images, tracings, or specimens: yes        Disposition  Final diagnoses:   Syncope   Acute headache     Time reflects when diagnosis was documented in both MDM as applicable and the Disposition within this note     Time User Action Codes Description Comment    6/12/2021 11:17 PM Mariya Quinn Add [R55] Syncope     6/12/2021 11:17 PM Mariya Quinn Add [R51 9] Acute headache       ED Disposition     ED Disposition Condition Date/Time Comment    Admit Stable Sat Jun 12, 2021 11:15 PM Case was discussed with James Mijares and the patient's admission status was agreed to be Admission Status: observation status to the service of Dr Raji Howell  Follow-up Information    None         Patient's Medications   Discharge Prescriptions    No medications on file     No discharge procedures on file      PDMP Review     None          ED Provider  Electronically Signed by           Porfirio Del Valle MD  06/12/21 8797

## 2021-06-13 NOTE — ASSESSMENT & PLAN NOTE
· Creatinine 1 45 on admission   Baseline 1 2-1 3  · Likely mild dehydration  · NS @ 50mL/hr x 10 hours  · Avoid hypotension and nephrotoxic agents  · Home dose Lisinopril on hold  · BMP in am

## 2021-06-13 NOTE — ASSESSMENT & PLAN NOTE
· States she had acute onset aching HA-frontal and crown of head that improved with Tylenol  States was watching TV when she noticed blackness around the TV and in peripheral vision  Daughters then report she became unresponsive x 2-3 minutes  Eyes were open  States she does not recall her daughters trying to get her to respond  No post-ictal period noted  Denies history of seizures    · CTA head/neck (-)  · Neuro checks q4hrs  · Consult neurology  · Check orthostatic VS  · NS @ 50mL/hr x 10 hours

## 2021-06-13 NOTE — ASSESSMENT & PLAN NOTE
· States she had acute onset aching HA-frontal and crown of head that improved with Tylenol  States was watching TV when she noticed blackness around the TV and in peripheral vision  Daughters then report she became unresponsive x 2-3 minutes  Eyes were open  States she does not recall her daughters trying to get her to respond  No post-ictal period noted  Denies history of seizures  · CTA head/neck (-)  · Orthostatic vitals negative  · Likely from dehydration  Evaluated by PT, recommended home with VNA  · Neurology evaluation appreciated  No further workup  · Echocardiogram showed normal EF, no wall motion abnormality  · Her blood pressure slightly high today    If remains stable possible discharge tomorrow

## 2021-06-13 NOTE — PROGRESS NOTES
[facility]  Progress Note - Jeannine Israel 1935, 82 y o  female MRN: [id number]  Unit/Bed#: -Greg Encounter: [id number]  Primary Care Provider: Chip Torres MD   Date and time admitted to hospital: 6/12/2021  8:32 PM    Neuropathy  Assessment & Plan  Secondary to B12 deficiency  Supplementation given    B12 deficiency  Assessment & Plan  Start on supplement    Elevated serum creatinine  Assessment & Plan  · Creatinine 1 45 on admission  Baseline 1 2-1 3  · Likely mild dehydration  · NS @ 50mL/hr x 10 hours  · Avoid hypotension and nephrotoxic agents  · Improved    Headache, resolved  Assessment & Plan  · See AP above    GERD without esophagitis  Assessment & Plan  · Continue home dose Protonix  · Mylanta PRN    Hyperlipidemia  Assessment & Plan  · Continue home dose Crestor (equivalent)    Hypertension  Assessment & Plan  · Continue home medication  · Blood pressure running high  Monitor  If remains high will increase lisinopril dose    Hypothyroidism  Assessment & Plan  · Continue home dose Levothyroxine    Anxiety  Assessment & Plan  · Continue home dose Xanax PRN    * Syncope  Assessment & Plan  · States she had acute onset aching HA-frontal and crown of head that improved with Tylenol  States was watching TV when she noticed blackness around the TV and in peripheral vision  Daughters then report she became unresponsive x 2-3 minutes  Eyes were open  States she does not recall her daughters trying to get her to respond  No post-ictal period noted  Denies history of seizures  · CTA head/neck (-)  · Orthostatic vitals negative  · Likely from dehydration  Evaluated by PT, recommended home with VNA  · Neurology evaluation appreciated  No further workup  · Echocardiogram showed normal EF, no wall motion abnormality  · Her blood pressure slightly high today    If remains stable possible discharge tomorrow        VTE Pharmacologic Prophylaxis:   Pharmacologic: Heparin  Mechanical VTE Prophylaxis in Place: Yes    Patient Centered Rounds: I have performed bedside rounds with nursing staff today  Discussions with Specialists or Other Care Team Provider:     Education and Discussions with Family / Patient:  Patient    Time Spent for Care: More than 50% of total time spent on counseling and coordination of care as described above  Current Length of Stay: 0 day(s)    Current Patient Status: Observation   Certification Statement: The patient will continue to require additional inpatient hospital stay due to See above    Discharge Plan:  24 hours    Code Status: Level 1 - Full Code      Subjective:   I have seen and examined the patient bedside this morning  Patient feels all right now  No chest pain or shortness of breath    Objective:     Vitals:   Temp (24hrs), Av 7 °F (36 5 °C), Min:97 5 °F (36 4 °C), Max:98 °F (36 7 °C)    Temp:  [97 5 °F (36 4 °C)-98 °F (36 7 °C)] 98 °F (36 7 °C)  HR:  [61-72] 69  Resp:  [15-24] 18  BP: (144-179)/(65-88) 173/79  SpO2:  [95 %-99 %] 97 %  Body mass index is 23 9 kg/m²  Input and Output Summary (last 24 hours):        Intake/Output Summary (Last 24 hours) at 2021 1643  Last data filed at 2021 1501  Gross per 24 hour   Intake 800 ml   Output 1400 ml   Net -600 ml       Physical Exam:     Physical Exam  General- elderly female, Awake, alert and oriented x 3, looks comfortable  HEENT- Normocephalic, atraumatic  CVS- Normal S1/ S2, Regular rate and rhythm  Respiratory system- B/L clear breath sounds  Abdomen- Soft, Non distended, no tenderness, Bowel sound- present  CNS- No acute focal neurologic deficit noted    Additional Data:     Labs:    Results from last 7 days   Lab Units 21  0526   WBC Thousand/uL 5 01   HEMOGLOBIN g/dL 11 1*   HEMATOCRIT % 33 7*   PLATELETS Thousands/uL 153   NEUTROS PCT % 56   LYMPHS PCT % 23   MONOS PCT % 10   EOS PCT % 11*     Results from last 7 days   Lab Units 21  0526 06/11/21  0457   SODIUM mmol/L 138 139   POTASSIUM mmol/L 3 9 4 3   CHLORIDE mmol/L 104 103   CO2 mmol/L 24 24   BUN mg/dL 16 15   CREATININE mg/dL 1 25 1 22   ANION GAP mmol/L 10 12   CALCIUM mg/dL 8 7 8 9   ALBUMIN g/dL  --  3 4*   TOTAL BILIRUBIN mg/dL  --  0 61   ALK PHOS U/L  --  96   ALT U/L  --  30   AST U/L  --  38   GLUCOSE RANDOM mg/dL 91 85                           * I Have Reviewed All Lab Data Listed Above  * Additional Pertinent Lab Tests Reviewed:  All Labs Within Last 24 Hours Reviewed    Imaging:    Imaging Reports Reviewed Today Include:   Imaging Personally Reviewed by Myself Includes:      Recent Cultures (last 7 days):     Results from last 7 days   Lab Units 06/10/21  2007   URINE CULTURE  10,000-19,000 cfu/ml        Last 24 Hours Medication List:   Current Facility-Administered Medications   Medication Dose Route Frequency Provider Last Rate    acetaminophen  650 mg Oral Q4H PRN [facility] Mast, PA-C      ALPRAZolam  0 5 mg Oral Daily PRN Karol Mast, PA-BOOGIE      aluminum-magnesium hydroxide-simethicone  30 mL Oral Q6H PRN Karol Mast, PA-BOOGIE      aspirin  81 mg Oral Daily [place], [place]      atenolol  25 mg Oral BID Mariam Whitehead PA-C      cyanocobalamin  1,000 mcg Oral Daily Jaycob Calixto MD      ferrous sulfate  325 mg Oral Daily Karol Mast, PA-BOOGIE      heparin (porcine)  5,000 Units Subcutaneous [facility] Magdiel Whitehead PA-C      imipramine  10 [facility] Sun Tue [facility] Sat [facility]      latanoprost  1 drop Both Eyes HS [place], [place]      levothyroxine  50 mcg Oral Early Morning Karol Mast, [place]      lisinopril  10 mg Oral Daily Jaycob Calixto MD      melatonin  3 mg Oral HS PRN Karol MastVIDHYA      ondansetron  4 mg Intravenous Q6H PRN Karol Mast, PAIDANIA      pantoprazole  40 mg Oral Daily Before Breakfast [place], [place]      pravastatin  80 mg Oral Daily With [place] Sydni Li PA-C      saccharomyces boulardii  250 mg Oral BID Pako Bertrand PA-C      tobramycin-dexamethasone  1 drop Left Eye Q6H [address] [place], [place]          Today, Patient Was Seen By: Pavithra Shelton MD    ** Please Note: Dictation voice to text software may have been used in the creation of this document   **

## 2021-06-13 NOTE — CONSULTS
Consultation - Neurology   Henrique Cook 82 y o  female MRN: [id number]  Unit/Bed#: -01 Encounter: [id number]      Assessment/Plan     * Syncope  Assessment & Plan  29-year-old female with essential tremor, peripheral neuropathy, sensory ataxia, HTN, DLP, fibromyalgia, presents with headache and syncope  History as presented consistent with syncopal event  No seizure-like activity tongue-bite, incontinence, or post-ictal state  Patient had tunnel vision prior to unresponsiveness  Patient admits she does not keep well-hydrated and had EVAN on presentation  Suspect dehydration as etiology  Echo unremarkable  CTA head/neck demonstrated no flow consequential stenosis or LVO  CTH with no acute changes  Plan:  -continue outpatient followup with Neurology (pt sees Dr Karen Jhaveri)  -no role for EEG  -treatment/management of syncope as per primary service  -no further inpt neurologic recommendations  Neuropathy  Assessment & Plan  Follows with Dr Karen Jhaveri  Supplement B12 as noted  TSH elevated  B12 deficiency  Assessment & Plan  B12 361 in July 2020  Will order intrinsic factor antibody  Recommend supplementing B12 1000mcg PO daily  Prefer level >400  History of Present Illness     Reason for Consult / Principal Problem: syncope  Hx and PE limited by: n/a  HPI: Henrique Cook is a 82 y o  right handed female with essential tremor, peripheral neuropathy, sensory ataxia, HTN, DLP, fibromyalgia, presents with headache and syncope  Patient was watching TV when she suddenly developed frontal headache, tunnel vision, and then was unresponsive for 2-3 mins per her daughter, with her eyes open  No seizure-like activity, tongue-bite, incontinence, or post-ictal state  Patient admits she does not keep well-hydrated and had EVAN on presentation  Patient states she has had tunnel vision before but does not think she has had loss of consciousness    Orthostatics were negative but measured after hydration  B12 in July 2020 was 361  Patient is diagnosed with idiopathic peripheral neuropathy  CTH demonstrated no acute changes  CTA head/neck demonstrated no flow consequential stenosis/LVO  BP on presentation 179/69  Neurologic exam significant for head and jaw tremor and UE essential appearing tremor  No parkinsonian features  Inpatient consult to Neurology  Consult performed by: Reyna Ibanez PA-C  Consult ordered by: Christie Wilson PA-C          Review of Systems   Constitutional: Negative  HENT: Negative  Eyes: Negative  Respiratory: Negative  Cardiovascular: Negative  Gastrointestinal: Negative  Endocrine: Negative  Genitourinary: Negative  Musculoskeletal: Negative  Skin: Negative for rash  Allergic/Immunologic: Negative  Neurological: Positive for tremors, syncope and numbness (chronic LE)  As above  Hematological: Negative  Psychiatric/Behavioral: Negative          Historical Information   Past Medical History:   Diagnosis Date    Benign essential hypertension     Last assessed: 9/11/14    Disease of thyroid gland     Essential tremor     Fibromyalgia     GERD (gastroesophageal reflux disease)     History of nail disorders     Hyperlipidemia     Hypertension     Palpitations     Transient cerebral ischemia      Past Surgical History:   Procedure Laterality Date    APPENDECTOMY      EGD AND COLONOSCOPY  07/2020    HYSTERECTOMY  01/01/2010    SC LAP,CHOLECYSTECTOMY/GRAPH N/A 4/4/2018    Procedure: LAPAROSCOPIC CHOLECYSTECTOMY WITH IOC;  Surgeon: Sam Mckenzie MD;  Location: [facility];  Service: General    TUBAL LIGATION       Social History   Social History     Substance and Sexual Activity   Alcohol Use Not Currently    Alcohol/week: 0 0 standard drinks    Comment: 0     Social History     Substance and Sexual Activity   Drug Use No     E-Cigarette/Vaping    E-Cigarette Use Never User      E-Cigarette/Vaping Substances    Nicotine No     THC No     CBD No     Flavoring No     Other No     Unknown No      Social History     Tobacco Use   Smoking Status Former Smoker    Packs/day: 0 10    Years: 50 00    Pack years: 5 00    Types: Cigarettes    Start date:     Quit date:     Years since quittin 4   Smokeless Tobacco Never Used   Tobacco Comment    1 pack a week      Family History:   Family History   Problem Relation Age of Onset    Stroke Mother         ischemic stroke    Hypertension Mother     Heart disease Father        Review of previous medical records was completed  Meds/Allergies   PTA meds:   Prior to Admission Medications   Prescriptions Last Dose Informant Patient Reported? Taking?    ALPRAZolam (XANAX) 0 5 mg tablet 2021 at Unknown time Self Yes Yes   Sig: Take 0 5 mg by mouth daily as needed     Probiotic Product (ALIGN) 4 MG CAPS 2021 at Unknown time Self Yes Yes   Sig: Take 1 tablet by mouth daily   acetaminophen (TYLENOL) 325 mg tablet 2021 at Unknown time Self Yes Yes   Sig: Take 1-2 tablets by mouth every 6 (six) hours as needed   aspirin (ECOTRIN LOW STRENGTH) 81 mg EC tablet 2021 at Unknown time  No Yes   Sig: Take 1 tablet (81 mg total) by mouth daily   atenolol (TENORMIN) 25 mg tablet 2021 at Unknown time  No Yes   Sig: take 2 tablets by mouth twice a day   Patient taking differently: 25 mg 2 (two) times a day    atenolol (TENORMIN) 50 mg tablet 2021 at Unknown time  No Yes   Sig: Take 1 tablet (50 mg total) by mouth 2 (two) times a day   ferrous sulfate 325 (65 Fe) mg tablet 2021 at Unknown time Self Yes Yes   Sig: Take 1 tablet by mouth daily   imipramine (TOFRANIL) 10 mg tablet 2021 at Unknown time Self No Yes   Sig: Take 1 tablet (10 mg total) by mouth 4 (four) times a week   latanoprost (XALATAN) 0 005 % ophthalmic solution 2021 at Unknown time Self Yes Yes   Sig: instill 1 drop into both eyes nightly   levothyroxine 50 mcg tablet 2021 at Unknown time Self No Yes   Si UG 6 DAYS PER WEEK   lisinopril (ZESTRIL) 10 mg tablet 2021 at Unknown time Self No Yes   Sig: Take 1 tablet (10 mg total) by mouth daily   pantoprazole (PROTONIX) 40 mg tablet 2021 at Unknown time  No Yes   Sig: take 1 tablet by mouth daily before breakfast   polyethylene glycol (GOLYTELY) 4000 mL solution   No No   Sig: Take 4,000 mL by mouth once for 1 dose   Patient not taking: Reported on 2020   rosuvastatin (CRESTOR) 10 MG tablet 2021 at Unknown time  No Yes   Sig: Take 1 tablet (10 mg total) by mouth daily   tobramycin-dexamethasone (TOBRADEX) ophthalmic suspension 2021 at Unknown time  Yes Yes   Sig: instill 1 drop into left eye four times a day      Facility-Administered Medications: None       Allergies   Allergen Reactions    Other Drowsiness     Annotation - [id number]: ANTIDEPRESSANTS       Objective   Vitals:Blood pressure 156/69, pulse 67, temperature 97 5 °F (36 4 °C), resp  rate 18, weight 50 1 kg (110 lb 7 2 oz), SpO2 99 %, not currently breastfeeding  ,Body mass index is 23 9 kg/m²  Intake/Output Summary (Last 24 hours) at 2021 1101  Last data filed at 2021 0300  Gross per 24 hour   Intake 240 ml   Output 600 ml   Net -360 ml       Invasive Devices: Invasive Devices     Peripheral Intravenous Line            Peripheral IV 21 Left Antecubital <1 day                Physical Exam   General:  Patient is well-developed, well-nourished, and in no acute distress  HEENT:  Head normocephalic  Eyes anicteric  Cardiovascular:  With regular rhythm  Lungs:  Normal effort  Nonlabored breathing  Extremities:  With no significant edema  Skin: No rashes  Neurologic Exam  Mental Status:  Patient is alert, pleasantly interactive, and appropriately conversational   No obvious symbolic language difficulty or dysarthria, and the patient is fully oriented  Gait deferred for safety      Cranial Nerves:   II: Visual fields full to confrontation  Pupils equal, round, reactive to light with normal accomodation  Cannot visualize optic fundi  III,IV,VI: extraocular movements intact with no nystagmus  V: Sensation in the V1 through V3 distributions intact to light touch bilaterally  VII: Face is symmetric with no weakness noted  VIII: Audition intact to finger rub bilaterally  IX/X: Uvula midline  Soft palate elevation symmetric  XII: Tongue midline with no atrophy or fasciculations with appropriate movement  Coordination:  Accurate with finger-to-nose and heel-to-shin maneuvers bilaterally  Head and jaw as well as BL UE tremor noted (when in sustention only)  No rest tremor  Motor testing with no upper or lower extremity drift  Sensory testing with diminished position, temp BL distal LE  Vibration intact  Sensory intact in the BL UE  Reflexes 3 BL UE, 2+ BL knees  Mendez negative  Toe responses are downgoing bilaterally  Lab Results:   CBC:   Results from last 7 days   Lab Units 06/13/21 0526 06/12/21 2105 06/11/21  0457   WBC Thousand/uL 5 01 6 52 5 85   RBC Million/uL 3 46* 3 55* 3 55*   HEMOGLOBIN g/dL 11 1* 11 3* 11 1*   HEMATOCRIT % 33 7* 34 1* 33 7*   MCV fL 97 96 95   PLATELETS Thousands/uL 153 168 147*   , BMP/CMP:   Results from last 7 days   Lab Units 06/13/21 0526 06/12/21 2105 06/11/21 0457 06/10/21  1907   SODIUM mmol/L 138 134* 139 136   POTASSIUM mmol/L 3 9 4 1 4 3 4 1   CHLORIDE mmol/L 104 98* 103 100   CO2 mmol/L 24 27 24 26   BUN mg/dL 16 19 15 17   CREATININE mg/dL 1 25 1 45* 1 22 1 29   CALCIUM mg/dL 8 7 8 7 8 9 9 0   AST U/L  --   --  38 36   ALT U/L  --   --  30 32   ALK PHOS U/L  --   --  96 116   EGFR ml/min/1 73sq m 40 33 41 38   , Vitamin B12:     Imaging Studies: I have personally reviewed pertinent reports  and I have personally reviewed pertinent films in PACS  EKG, Pathology, and Other Studies: I have personally reviewed pertinent reports      VTE Prophylaxis: Sequential compression device [facility])     Code Status: Level 1 - Full Code  Advance Directive and Living Will:      Power of :    POLST:

## 2021-06-13 NOTE — CASE MANAGEMENT
Medically cleared  PT rec home PT  Cm spoke w pt who is agreeable to [facility]  Referral sent  A post acute care recommendation was made by your care team for [facility]  Discussed [place] of Choice with patient  List of agencies given to patient via in person  patient aware the list is custom filtered for them by preference  and that [facility]'s post acute providers are designated   No other needs reported

## 2021-06-13 NOTE — ASSESSMENT & PLAN NOTE
82-year-old female with essential tremor, peripheral neuropathy, sensory ataxia, HTN, DLP, fibromyalgia, presents with headache and syncope  History as presented consistent with syncopal event  No seizure-like activity tongue-bite, incontinence, or post-ictal state  Patient had tunnel vision prior to unresponsiveness  Patient admits she does not keep well-hydrated and had EVAN on presentation  Suspect dehydration as etiology  Echo unremarkable  CTA head/neck demonstrated no flow consequential stenosis or LVO  CTH with no acute changes  Plan:  -continue outpatient followup with Neurology (pt sees Dr Kayli Robertson)  -no role for EEG  -treatment/management of syncope as per primary service  -no further inpt neurologic recommendations

## 2021-06-13 NOTE — PLAN OF CARE
Problem: Potential for Falls  Goal: Patient will remain free of falls  Description: INTERVENTIONS:  - Assess patient frequently for physical needs  -  Identify cognitive and physical deficits and behaviors that affect risk of falls  -  [place] fall precautions as indicated by assessment   - Educate patient/family on patient safety including physical limitations  - Instruct patient to call for assistance with activity based on assessment  - Modify environment to reduce risk of injury  - Consider OT/PT consult to assist with strengthening/mobility  6/13/2021 1238 by Miki Cooley RN  Outcome: Progressing  6/13/2021 0804 by Miki Cooley RN  Outcome: Progressing     Problem: PAIN - ADULT  Goal: Verbalizes/displays adequate comfort level or baseline comfort level  Description: Interventions:  - Encourage patient to monitor pain and request assistance  - Assess pain using appropriate pain scale  - Administer analgesics based on type and severity of pain and evaluate response  - Implement non-pharmacological measures as appropriate and evaluate response  - Consider cultural and social influences on pain and pain management  - Notify physician/advanced practitioner if interventions unsuccessful or patient reports new pain  6/13/2021 1238 by Miki Cooley RN  Outcome: Progressing  6/13/2021 0804 by Miki Cooley RN  Outcome: Progressing     Problem: SAFETY ADULT  Goal: Patient will remain free of falls  Description: INTERVENTIONS:  - Assess patient frequently for physical needs  -  Identify cognitive and physical deficits and behaviors that affect risk of falls    -  [place] fall precautions as indicated by assessment   - Educate patient/family on patient safety including physical limitations  - Instruct patient to call for assistance with activity based on assessment  - Modify environment to reduce risk of injury  - Consider OT/PT consult to assist with strengthening/mobility  6/13/2021 1238 by Marizol Del Angel RN  Outcome: Progressing  6/13/2021 0804 by Marizol Del Angel RN  Outcome: Progressing  Goal: Maintain or return to baseline ADL function  Description: INTERVENTIONS:  -  Assess patient's ability to carry out ADLs; assess patient's baseline for ADL function and identify physical deficits which impact ability to perform ADLs (bathing, care of mouth/teeth, toileting, grooming, dressing, etc )  - Assess/evaluate cause of self-care deficits   - Assess range of motion  - Assess patient's mobility; develop plan if impaired  - Assess patient's need for assistive devices and provide as appropriate  - Encourage maximum independence but intervene and supervise when necessary  - Involve family in performance of ADLs  - Assess for home care needs following discharge   - Consider OT consult to assist with ADL evaluation and planning for discharge  - Provide patient education as appropriate  6/13/2021 1238 by Marizol Del Angel RN  Outcome: Progressing  6/13/2021 0804 by Marizol Del Angel RN  Outcome: Progressing  Goal: Maintain or return mobility status to optimal level  Description: INTERVENTIONS:  - Assess patient's baseline mobility status (ambulation, transfers, stairs, etc )    - Identify cognitive and physical deficits and behaviors that affect mobility  - Identify mobility aids required to assist with transfers and/or ambulation (gait belt, sit-to-stand, lift, walker, cane, etc )  - [place] fall precautions as indicated by assessment  - Record patient progress and toleration of activity level on Mobility SBAR; progress patient to next Phase/Stage  - Instruct patient to call for assistance with activity based on assessment  - Consider rehabilitation consult to assist with strengthening/weightbearing, etc   6/13/2021 1238 by Marizol Del Angel RN  Outcome: Progressing  6/13/2021 0804 by Marizol Del Angel RN  Outcome: Progressing     Problem: DISCHARGE PLANNING  Goal: Discharge to home or other facility with appropriate resources  Description: INTERVENTIONS:  - Identify barriers to discharge w/patient and caregiver  - Arrange for needed discharge resources and transportation as appropriate  - Identify discharge learning needs (meds, wound care, etc )  - Arrange for interpretive services to assist at discharge as needed  - Refer to Case Management Department for coordinating discharge planning if the patient needs post-hospital services based on physician/advanced practitioner order or complex needs related to functional status, cognitive ability, or social support system  6/13/2021 1238 by Joss Broussard RN  Outcome: Progressing  6/13/2021 0804 by Joss Broussard RN  Outcome: Progressing     Problem: Knowledge Deficit  Goal: Patient/family/caregiver demonstrates understanding of disease process, treatment plan, medications, and discharge instructions  Description: Complete learning assessment and assess knowledge base    Interventions:  - Provide teaching at level of understanding  - Provide teaching via preferred learning methods  6/13/2021 1238 by Joss Broussard RN  Outcome: Progressing  6/13/2021 0804 by Joss Broussard RN  Outcome: Progressing     Problem: NEUROSENSORY - ADULT  Goal: Achieves stable or improved neurological status  Description: INTERVENTIONS  - Monitor and report changes in neurological status  - Monitor vital signs such as temperature, blood pressure, glucose, and any other labs ordered   - Initiate measures to prevent increased intracranial pressure  - Monitor for seizure activity and implement precautions if appropriate      6/13/2021 1238 by Joss Broussard RN  Outcome: Progressing  6/13/2021 0804 by Joss Broussard RN  Outcome: Progressing  Goal: Remains free of injury related to seizures activity  Description: INTERVENTIONS  - Maintain airway, patient safety  and administer oxygen as ordered  - Monitor patient for seizure activity, document and report duration and description of seizure to physician/advanced practitioner  - If seizure occurs,  ensure patient safety during seizure  - Reorient patient post seizure  - Seizure pads on all 4 side rails  - Instruct patient/family to notify RN of any seizure activity including if an aura is experienced  - Instruct patient/family to call for assistance with activity based on nursing assessment  - Administer anti-seizure medications if ordered    6/13/2021 1238 by Warner Mayberry RN  Outcome: Progressing  6/13/2021 0804 by Warner Mayberry RN  Outcome: Progressing  Goal: Achieves maximal functionality and self care  Description: INTERVENTIONS  - Monitor swallowing and airway patency with patient fatigue and changes in neurological status  - Encourage and assist patient to increase activity and self care     - Encourage visually impaired, hearing impaired and aphasic patients to use assistive/communication devices  6/13/2021 1238 by Warner Mayberry RN  Outcome: Progressing  6/13/2021 0804 by Warner Mayberry RN  Outcome: Progressing     Problem: CARDIOVASCULAR - ADULT  Goal: Maintains optimal cardiac output and hemodynamic stability  Description: INTERVENTIONS:  - Monitor I/O, vital signs and rhythm  - Monitor for S/S and trends of decreased cardiac output  - Administer and titrate ordered vasoactive medications to optimize hemodynamic stability  - Assess quality of pulses, skin color and temperature  - Assess for signs of decreased coronary artery perfusion  - Instruct patient to report change in severity of symptoms  6/13/2021 1238 by Warner Mayberry RN  Outcome: Progressing  6/13/2021 0804 by Warner Mayberry RN  Outcome: Progressing  Goal: Absence of cardiac dysrhythmias or at baseline rhythm  Description: INTERVENTIONS:  - Continuous cardiac monitoring, vital signs, obtain 12 lead EKG if ordered  - Administer antiarrhythmic and heart rate control medications as ordered  - Monitor electrolytes and administer replacement therapy as ordered  6/13/2021 1238 by Kenzie Butterfield RN  Outcome: Progressing  6/13/2021 0804 by Kenzie Butterfield RN  Outcome: Progressing     Problem: METABOLIC, FLUID AND ELECTROLYTES - ADULT  Goal: Electrolytes maintained within normal limits  Description: INTERVENTIONS:  - Monitor labs and assess patient for signs and symptoms of electrolyte imbalances  - Administer electrolyte replacement as ordered  - Monitor response to electrolyte replacements, including repeat lab results as appropriate  - Instruct patient on fluid and nutrition as appropriate  6/13/2021 1238 by Kenzie Butterfield RN  Outcome: Progressing  6/13/2021 0804 by Kenzie Butterfield RN  Outcome: Progressing  Goal: Fluid balance maintained  Description: INTERVENTIONS:  - Monitor labs   - Monitor I/O and WT  - Instruct patient on fluid and nutrition as appropriate  - Assess for signs & symptoms of volume excess or deficit  6/13/2021 1238 by Kenzie Butterfield RN  Outcome: Progressing  6/13/2021 0804 by Kenzie Butterfield RN  Outcome: Progressing  Goal: Glucose maintained within target range  Description: INTERVENTIONS:  - Monitor Blood Glucose as ordered  - Assess for signs and symptoms of hyperglycemia and hypoglycemia  - Administer ordered medications to maintain glucose within target range  - Assess nutritional intake and initiate nutrition service referral as needed  6/13/2021 1238 by Kenzie Butterfield RN  Outcome: Progressing  6/13/2021 0804 by Kenzie Butterfield RN  Outcome: Progressing     Problem: MUSCULOSKELETAL - ADULT  Goal: Maintain or return mobility to safest level of function  Description: INTERVENTIONS:  - Assess patient's ability to carry out ADLs; assess patient's baseline for ADL function and identify physical deficits which impact ability to perform ADLs (bathing, care of mouth/teeth, toileting, grooming, dressing, etc )  - Assess/evaluate cause of self-care deficits   - Assess range of motion  - Assess patient's mobility  - Assess patient's need for assistive devices and provide as appropriate  - Encourage maximum independence but intervene and supervise when necessary  - Involve family in performance of ADLs  - Assess for home care needs following discharge   - Consider OT consult to assist with ADL evaluation and planning for discharge  - Provide patient education as appropriate  6/13/2021 1238 by Dina Sifuentes RN  Outcome: Progressing  6/13/2021 0804 by Dina Sifuentes RN  Outcome: Progressing  Goal: Maintain proper alignment of affected body part  Description: INTERVENTIONS:  - Support, maintain and protect limb and body alignment  - Provide patient/ family with appropriate education  6/13/2021 1238 by Dina Sifuentes RN  Outcome: Progressing  6/13/2021 0804 by Dina Sifuentes RN  Outcome: Progressing

## 2021-06-13 NOTE — ASSESSMENT & PLAN NOTE
· BP adequately controlled on current regimen  · Continue home dose Atenolol  · Home dose Lisinopril on hold 2/2 creatinine elevated from baseline  · Monitor BP per unit protocol

## 2021-06-13 NOTE — ASSESSMENT & PLAN NOTE
· Continue home medication  · Blood pressure running high  Monitor    If remains high will increase lisinopril dose

## 2021-06-13 NOTE — ASSESSMENT & PLAN NOTE
B12 361 in July 2020  Will order intrinsic factor antibody  Recommend supplementing B12 1000mcg PO daily  Prefer level >400

## 2021-06-13 NOTE — ASSESSMENT & PLAN NOTE
· Creatinine 1 45 on admission   Baseline 1 2-1 3  · Likely mild dehydration  · NS @ 50mL/hr x 10 hours  · Avoid hypotension and nephrotoxic agents  · Improved

## 2021-06-13 NOTE — PLAN OF CARE
Problem: Potential for Falls  Goal: Patient will remain free of falls  Description: INTERVENTIONS:  - Assess patient frequently for physical needs  -  Identify cognitive and physical deficits and behaviors that affect risk of falls  -  [place] fall precautions as indicated by assessment   - Educate patient/family on patient safety including physical limitations  - Instruct patient to call for assistance with activity based on assessment  - Modify environment to reduce risk of injury  - Consider OT/PT consult to assist with strengthening/mobility  Outcome: Progressing     Problem: PAIN - ADULT  Goal: Verbalizes/displays adequate comfort level or baseline comfort level  Description: Interventions:  - Encourage patient to monitor pain and request assistance  - Assess pain using appropriate pain scale  - Administer analgesics based on type and severity of pain and evaluate response  - Implement non-pharmacological measures as appropriate and evaluate response  - Consider cultural and social influences on pain and pain management  - Notify physician/advanced practitioner if interventions unsuccessful or patient reports new pain  Outcome: Progressing     Problem: SAFETY ADULT  Goal: Patient will remain free of falls  Description: INTERVENTIONS:  - Assess patient frequently for physical needs  -  Identify cognitive and physical deficits and behaviors that affect risk of falls    -  [place] fall precautions as indicated by assessment   - Educate patient/family on patient safety including physical limitations  - Instruct patient to call for assistance with activity based on assessment  - Modify environment to reduce risk of injury  - Consider OT/PT consult to assist with strengthening/mobility  Outcome: Progressing  Goal: Maintain or return to baseline ADL function  Description: INTERVENTIONS:  -  Assess patient's ability to carry out ADLs; assess patient's baseline for ADL function and identify physical deficits which impact ability to perform ADLs (bathing, care of mouth/teeth, toileting, grooming, dressing, etc )  - Assess/evaluate cause of self-care deficits   - Assess range of motion  - Assess patient's mobility; develop plan if impaired  - Assess patient's need for assistive devices and provide as appropriate  - Encourage maximum independence but intervene and supervise when necessary  - Involve family in performance of ADLs  - Assess for home care needs following discharge   - Consider OT consult to assist with ADL evaluation and planning for discharge  - Provide patient education as appropriate  Outcome: Progressing  Goal: Maintain or return mobility status to optimal level  Description: INTERVENTIONS:  - Assess patient's baseline mobility status (ambulation, transfers, stairs, etc )    - Identify cognitive and physical deficits and behaviors that affect mobility  - Identify mobility aids required to assist with transfers and/or ambulation (gait belt, sit-to-stand, lift, walker, cane, etc )  - [place] fall precautions as indicated by assessment  - Record patient progress and toleration of activity level on Mobility SBAR; progress patient to next Phase/Stage  - Instruct patient to call for assistance with activity based on assessment  - Consider rehabilitation consult to assist with strengthening/weightbearing, etc   Outcome: Progressing     Problem: DISCHARGE PLANNING  Goal: Discharge to home or other facility with appropriate resources  Description: INTERVENTIONS:  - Identify barriers to discharge w/patient and caregiver  - Arrange for needed discharge resources and transportation as appropriate  - Identify discharge learning needs (meds, wound care, etc )  - Arrange for interpretive services to assist at discharge as needed  - Refer to Case Management Department for coordinating discharge planning if the patient needs post-hospital services based on physician/advanced practitioner order or complex needs related to functional status, cognitive ability, or social support system  Outcome: Progressing     Problem: Knowledge Deficit  Goal: Patient/family/caregiver demonstrates understanding of disease process, treatment plan, medications, and discharge instructions  Description: Complete learning assessment and assess knowledge base  Interventions:  - Provide teaching at level of understanding  - Provide teaching via preferred learning methods  Outcome: Progressing     Problem: NEUROSENSORY - ADULT  Goal: Achieves stable or improved neurological status  Description: INTERVENTIONS  - Monitor and report changes in neurological status  - Monitor vital signs such as temperature, blood pressure, glucose, and any other labs ordered   - Initiate measures to prevent increased intracranial pressure  - Monitor for seizure activity and implement precautions if appropriate      Outcome: Progressing  Goal: Remains free of injury related to seizures activity  Description: INTERVENTIONS  - Maintain airway, patient safety  and administer oxygen as ordered  - Monitor patient for seizure activity, document and report duration and description of seizure to physician/advanced practitioner  - If seizure occurs,  ensure patient safety during seizure  - Reorient patient post seizure  - Seizure pads on all 4 side rails  - Instruct patient/family to notify RN of any seizure activity including if an aura is experienced  - Instruct patient/family to call for assistance with activity based on nursing assessment  - Administer anti-seizure medications if ordered    Outcome: Progressing  Goal: Achieves maximal functionality and self care  Description: INTERVENTIONS  - Monitor swallowing and airway patency with patient fatigue and changes in neurological status  - Encourage and assist patient to increase activity and self care     - Encourage visually impaired, hearing impaired and aphasic patients to use assistive/communication devices  Outcome: Progressing Problem: CARDIOVASCULAR - ADULT  Goal: Maintains optimal cardiac output and hemodynamic stability  Description: INTERVENTIONS:  - Monitor I/O, vital signs and rhythm  - Monitor for S/S and trends of decreased cardiac output  - Administer and titrate ordered vasoactive medications to optimize hemodynamic stability  - Assess quality of pulses, skin color and temperature  - Assess for signs of decreased coronary artery perfusion  - Instruct patient to report change in severity of symptoms  Outcome: Progressing  Goal: Absence of cardiac dysrhythmias or at baseline rhythm  Description: INTERVENTIONS:  - Continuous cardiac monitoring, vital signs, obtain 12 lead EKG if ordered  - Administer antiarrhythmic and heart rate control medications as ordered  - Monitor electrolytes and administer replacement therapy as ordered  Outcome: Progressing     Problem: METABOLIC, FLUID AND ELECTROLYTES - ADULT  Goal: Electrolytes maintained within normal limits  Description: INTERVENTIONS:  - Monitor labs and assess patient for signs and symptoms of electrolyte imbalances  - Administer electrolyte replacement as ordered  - Monitor response to electrolyte replacements, including repeat lab results as appropriate  - Instruct patient on fluid and nutrition as appropriate  Outcome: Progressing  Goal: Fluid balance maintained  Description: INTERVENTIONS:  - Monitor labs   - Monitor I/O and WT  - Instruct patient on fluid and nutrition as appropriate  - Assess for signs & symptoms of volume excess or deficit  Outcome: Progressing  Goal: Glucose maintained within target range  Description: INTERVENTIONS:  - Monitor Blood Glucose as ordered  - Assess for signs and symptoms of hyperglycemia and hypoglycemia  - Administer ordered medications to maintain glucose within target range  - Assess nutritional intake and initiate nutrition service referral as needed  Outcome: Progressing     Problem: MUSCULOSKELETAL - ADULT  Goal: Maintain or return mobility to safest level of function  Description: INTERVENTIONS:  - Assess patient's ability to carry out ADLs; assess patient's baseline for ADL function and identify physical deficits which impact ability to perform ADLs (bathing, care of mouth/teeth, toileting, grooming, dressing, etc )  - Assess/evaluate cause of self-care deficits   - Assess range of motion  - Assess patient's mobility  - Assess patient's need for assistive devices and provide as appropriate  - Encourage maximum independence but intervene and supervise when necessary  - Involve family in performance of ADLs  - Assess for home care needs following discharge   - Consider OT consult to assist with ADL evaluation and planning for discharge  - Provide patient education as appropriate  Outcome: Progressing  Goal: Maintain proper alignment of affected body part  Description: INTERVENTIONS:  - Support, maintain and protect limb and body alignment  - Provide patient/ family with appropriate education  Outcome: Progressing

## 2021-06-13 NOTE — H&P
[address] [place] [address] 1935, 82 y o  female MRN: [id number]  Unit/Bed#: -01 Encounter: [id number]  Primary Care Provider: Harish Maher MD   Date and time admitted to hospital: 6/12/2021  8:32 PM    * Syncope  Assessment & Plan  · States she had acute onset aching HA-frontal and crown of head that improved with Tylenol  States was watching TV when she noticed blackness around the TV and in peripheral vision  Daughters then report she became unresponsive x 2-3 minutes  Eyes were open  States she does not recall her daughters trying to get her to respond  No post-ictal period noted  Denies history of seizures  · CTA head/neck (-)  · Neuro checks q4hrs  · Consult neurology  · Check orthostatic VS  · NS @ 50mL/hr x 10 hours    Headache, resolved  Assessment & Plan  · See AP above    Elevated serum creatinine  Assessment & Plan  · Creatinine 1 45 on admission  Baseline 1 2-1 3  · Likely mild dehydration  · NS @ 50mL/hr x 10 hours  · Avoid hypotension and nephrotoxic agents  · Home dose Lisinopril on hold  · BMP in am    Hypertension  Assessment & Plan  · BP adequately controlled on current regimen  · Continue home dose Atenolol  · Home dose Lisinopril on hold 2/2 creatinine elevated from baseline  · Monitor BP per unit protocol    Hypothyroidism  Assessment & Plan  · Continue home dose Levothyroxine    GERD without esophagitis  Assessment & Plan  · Continue home dose Protonix  · Mylanta PRN    Anxiety  Assessment & Plan  · Continue home dose Xanax PRN    Hyperlipidemia  Assessment & Plan  · Continue home dose Crestor (equivalent)      VTE Prophylaxis: Heparin  / sequential compression device   Code Status: Level 1 Full Code  POLST: POLST form is not discussed and not completed at this time  Discussion with family: NA    Anticipated Length of Stay:  Patient will be admitted on an Observation basis with an anticipated length of stay of  Less than 2 midnights     Justification for Hospital Stay: See AP above    Total Time for Visit, including Counseling / Coordination of Care: 45 minutes  Greater than 50% of this total time spent on direct patient counseling and coordination of care  Chief Complaint:   Unresponsive episode    History of Present Illness:    Rickie Dietrich is a 82 y o  female who presents with unresponsive episode  States she had acute onset aching HA-frontal and crown of head that improved with Tylenol  States was watching TV when she noticed blackness around the TV and in peripheral vision  Daughters then report she became unresponsive x 2-3 minutes  Eyes were open  States she does not recall her daughters trying to get her to respond  No post-ictal period noted  Denies history of seizures  Review of Systems:    Review of Systems   Constitutional: Positive for chills  Negative for appetite change and fever  HENT: Negative for congestion, ear pain, sinus pressure and sore throat  Eyes: Positive for visual disturbance  Respiratory: Negative for cough, shortness of breath and wheezing  Cardiovascular: Negative for chest pain, palpitations and leg swelling  Gastrointestinal: Negative for abdominal pain, constipation, diarrhea, nausea and vomiting  Genitourinary: Negative for difficulty urinating, dysuria, frequency and urgency  Musculoskeletal: Negative for neck pain and neck stiffness  Neurological: Positive for syncope and headaches  Negative for dizziness and light-headedness  All other systems reviewed and are negative        Past Medical and Surgical History:     Past Medical History:   Diagnosis Date    Benign essential hypertension     Last assessed: 9/11/14    Disease of thyroid gland     Fibromyalgia     GERD (gastroesophageal reflux disease)     History of nail disorders     Hyperlipidemia     Hypertension     Palpitations     Parkinson's disease ([facility] [address] )     Transient cerebral ischemia        Past Surgical History:   Procedure Laterality Date    APPENDECTOMY      EGD AND COLONOSCOPY  07/2020    HYSTERECTOMY  01/01/2010    [place] LAP,CHOLECYSTECTOMY/GRAPH N/A 4/4/2018    Procedure: LAPAROSCOPIC CHOLECYSTECTOMY WITH IOC;  Surgeon: Jorge Morataya MD;  Location: [facility];  Service: General    TUBAL LIGATION         Meds/Allergies:    Prior to Admission medications    Medication Sig Start Date End Date Taking?  Authorizing Provider   acetaminophen (TYLENOL) 325 mg tablet Take 1-2 tablets by mouth every 6 (six) hours as needed   Yes Historical Provider, MD   ALPRAZolam (XANAX) 0 5 mg tablet Take 0 5 mg by mouth daily as needed     Yes Historical Provider, MD   aspirin (ECOTRIN LOW STRENGTH) 81 mg EC tablet Take 1 tablet (81 mg total) by mouth daily 6/12/21 7/12/21 Yes AMIE Diaz   atenolol (TENORMIN) 25 mg tablet take 2 tablets by mouth twice a day  Patient taking differently: 25 mg 2 (two) times a day  4/26/21  Yes AMIE Lerma   atenolol (TENORMIN) 50 mg tablet Take 1 tablet (50 mg total) by mouth 2 (two) times a day 11/13/20  Yes Pati King MD   ferrous sulfate 325 (65 Fe) mg tablet Take 1 tablet by mouth daily 10/9/17  Yes Historical Provider, MD   imipramine (TOFRANIL) 10 mg tablet Take 1 tablet (10 mg total) by mouth 4 (four) times a week 5/19/20  Yes AMIE Lerma   latanoprost (XALATAN) 0 005 % ophthalmic solution instill 1 drop into both eyes nightly 5/12/20  Yes Historical Provider, MD   levothyroxine 50 mcg tablet 50 UG 6 DAYS PER WEEK 7/10/20  Yes Pati King MD   lisinopril (ZESTRIL) 10 mg tablet Take 1 tablet (10 mg total) by mouth daily 7/10/20  Yes Pati King MD   pantoprazole (PROTONIX) 40 mg tablet take 1 tablet by mouth daily before breakfast 5/26/21  Yes Pati King MD   Probiotic Product (ALIGN) 4 MG CAPS Take 1 tablet by mouth daily   Yes Historical Provider, MD   rosuvastatin (CRESTOR) 10 MG tablet Take 1 tablet (10 mg total) by mouth daily 11/13/20  Yes Pati King MD tobramycin-dexamethasone (TOBRADEX) ophthalmic suspension instill 1 drop into left eye four times a day 20  Yes Historical Provider, MD   polyethylene glycol (GOLYTELY) 4000 mL solution Take 4,000 mL by mouth once for 1 dose  Patient not taking: Reported on 2020  Daniela Hassan PA-C     I have reviewed home medications with patient personally  Allergies: Allergies   Allergen Reactions    Other Drowsiness     Annotation - [id number]: ANTIDEPRESSANTS       Social History:     Marital Status:    Patient Pre-hospital Living Situation:   Patient Pre-hospital Level of Mobility:   Patient Pre-hospital Diet Restrictions:   Substance Use History:   Social History     Substance and Sexual Activity   Alcohol Use Not Currently    Alcohol/week: 0 0 standard drinks    Comment: 0     Social History     Tobacco Use   Smoking Status Former Smoker    Packs/day: 0 10    Years: 50 00    Pack years: 5 00    Types: Cigarettes    Start date:     Quit date:     Years since quittin 4   Smokeless Tobacco Never Used   Tobacco Comment    1 pack a week      Social History     Substance and Sexual Activity   Drug Use No       Family History:    Family History   Problem Relation Age of Onset    Stroke Mother         ischemic stroke    Hypertension Mother     Heart disease Father        Physical Exam:     Vitals:   Blood Pressure: 163/74 (21)  Pulse: 66 (21)  Temperature: 97 7 °F (36 5 °C) (21)  Temp Source: Oral (21)  Respirations: (!) 24 (21)  Weight - Scale: 50 1 kg (110 lb 7 2 oz) (21)  SpO2: 99 % (21)    Physical Exam  Vitals reviewed  Constitutional:       General: She is not in acute distress  Appearance: Normal appearance  HENT:      Head: Normocephalic and atraumatic  Mouth/Throat:      Comments: deferred  Eyes:      Extraocular Movements: Extraocular movements intact     Cardiovascular: Rate and Rhythm: Normal rate and regular rhythm  Pulses: Normal pulses  Heart sounds: Normal heart sounds  Pulmonary:      Effort: Pulmonary effort is normal  No respiratory distress  Breath sounds: Normal breath sounds  No wheezing or rhonchi  Abdominal:      Palpations: Abdomen is soft  Tenderness: There is no abdominal tenderness  There is no guarding or rebound  Musculoskeletal:         General: Normal range of motion  Cervical back: Normal range of motion and neck supple  Skin:     General: Skin is warm and dry  Neurological:      General: No focal deficit present  Mental Status: She is alert and oriented to person, place, and time  Psychiatric:         Mood and Affect: Mood normal          Behavior: Behavior normal          Thought Content: Thought content normal          Additional Data:     Lab Results: I have personally reviewed pertinent reports  Results from last 7 days   Lab Units 06/12/21  2105   WBC Thousand/uL 6 52   HEMOGLOBIN g/dL 11 3*   HEMATOCRIT % 34 1*   PLATELETS Thousands/uL 168   NEUTROS PCT % 54   LYMPHS PCT % 24   MONOS PCT % 12   EOS PCT % 9*     Results from last 7 days   Lab Units 06/12/21  2105 06/11/21  0457   SODIUM mmol/L 134* 139   POTASSIUM mmol/L 4 1 4 3   CHLORIDE mmol/L 98* 103   CO2 mmol/L 27 24   BUN mg/dL 19 15   CREATININE mg/dL 1 45* 1 22   ANION GAP mmol/L 9 12   CALCIUM mg/dL 8 7 8 9   ALBUMIN g/dL  --  3 4*   TOTAL BILIRUBIN mg/dL  --  0 61   ALK PHOS U/L  --  96   ALT U/L  --  30   AST U/L  --  38   GLUCOSE RANDOM mg/dL 67 85                       Imaging: I have personally reviewed pertinent reports  CTA head with and without contrast   Final Result by Steven Hendrix MD (06/12 2229)      No acute intracranial abnormality  No focal stenosis or saccular aneurysm within the [place]                    Workstation performed: [id number]             EKG, Pathology, and Other Studies Reviewed on Admission:   · EKG: NSR  QTc 490ms    Allscripts / Epic Records Reviewed: Yes     ** Please Note: This note has been constructed using a voice recognition system   **

## 2021-06-14 ENCOUNTER — CLINICAL SUPPORT (OUTPATIENT)
Dept: CARDIOLOGY CLINIC | Facility: CLINIC | Age: 86
End: 2021-06-14
Payer: MEDICARE

## 2021-06-14 ENCOUNTER — TRANSITIONAL CARE MANAGEMENT (OUTPATIENT)
Dept: INTERNAL MEDICINE CLINIC | Facility: CLINIC | Age: 86
End: 2021-06-14

## 2021-06-14 VITALS
HEART RATE: 61 BPM | BODY MASS INDEX: 23.9 KG/M2 | OXYGEN SATURATION: 99 % | DIASTOLIC BLOOD PRESSURE: 79 MMHG | SYSTOLIC BLOOD PRESSURE: 174 MMHG | WEIGHT: 110.45 LBS | RESPIRATION RATE: 16 BRPM | TEMPERATURE: 97.6 F

## 2021-06-14 DIAGNOSIS — R55 SYNCOPE, UNSPECIFIED SYNCOPE TYPE: ICD-10-CM

## 2021-06-14 DIAGNOSIS — R55 SYNCOPE, UNSPECIFIED SYNCOPE TYPE: Primary | ICD-10-CM

## 2021-06-14 LAB
ANION GAP SERPL CALCULATED.3IONS-SCNC: 10 MMOL/L (ref 4–13)
BUN SERPL-MCNC: 20 MG/DL (ref 5–25)
CALCIUM SERPL-MCNC: 8.9 MG/DL (ref 8.3–10.1)
CHLORIDE SERPL-SCNC: 104 MMOL/L (ref 100–108)
CO2 SERPL-SCNC: 25 MMOL/L (ref 21–32)
CREAT SERPL-MCNC: 1.27 MG/DL (ref 0.6–1.3)
GFR SERPL CREATININE-BSD FRML MDRD: 39 ML/MIN/1.73SQ M
GLUCOSE SERPL-MCNC: 85 MG/DL (ref 65–140)
POTASSIUM SERPL-SCNC: 4.3 MMOL/L (ref 3.5–5.3)
SODIUM SERPL-SCNC: 139 MMOL/L (ref 136–145)
TROPONIN I SERPL-MCNC: <0.02 NG/ML

## 2021-06-14 PROCEDURE — 80048 BASIC METABOLIC PNL TOTAL CA: CPT | Performed by: INTERNAL MEDICINE

## 2021-06-14 PROCEDURE — 93242 EXT ECG>48HR<7D RECORDING: CPT | Performed by: INTERNAL MEDICINE

## 2021-06-14 PROCEDURE — 99222 1ST HOSP IP/OBS MODERATE 55: CPT | Performed by: INTERNAL MEDICINE

## 2021-06-14 PROCEDURE — 99238 HOSP IP/OBS DSCHRG MGMT 30/<: CPT | Performed by: INTERNAL MEDICINE

## 2021-06-14 PROCEDURE — 84484 ASSAY OF TROPONIN QUANT: CPT | Performed by: INTERNAL MEDICINE

## 2021-06-14 RX ORDER — ATENOLOL 50 MG/1
50 TABLET ORAL 2 TIMES DAILY
Status: DISCONTINUED | OUTPATIENT
Start: 2021-06-14 | End: 2021-06-14 | Stop reason: HOSPADM

## 2021-06-14 RX ADMIN — HEPARIN SODIUM 5000 UNITS: 5000 INJECTION INTRAVENOUS; SUBCUTANEOUS at 05:01

## 2021-06-14 RX ADMIN — TOBRAMYCIN AND DEXAMETHASONE 1 DROP: 3; 1 SUSPENSION/ DROPS OPHTHALMIC at 00:00

## 2021-06-14 RX ADMIN — ASPIRIN 81 MG: 81 TABLET, COATED ORAL at 09:03

## 2021-06-14 RX ADMIN — FERROUS SULFATE TAB 325 MG (65 MG ELEMENTAL FE) 325 MG: 325 (65 FE) TAB at 09:04

## 2021-06-14 RX ADMIN — Medication 250 MG: at 09:03

## 2021-06-14 RX ADMIN — CYANOCOBALAMIN TAB 500 MCG 1000 MCG: 500 TAB at 09:03

## 2021-06-14 RX ADMIN — ATENOLOL 25 MG: 25 TABLET ORAL at 09:03

## 2021-06-14 RX ADMIN — TOBRAMYCIN AND DEXAMETHASONE 1 DROP: 3; 1 SUSPENSION/ DROPS OPHTHALMIC at 05:01

## 2021-06-14 RX ADMIN — LEVOTHYROXINE SODIUM 50 MCG: 50 TABLET ORAL at 05:01

## 2021-06-14 RX ADMIN — LISINOPRIL 10 MG: 10 TABLET ORAL at 09:00

## 2021-06-14 RX ADMIN — PANTOPRAZOLE SODIUM 40 MG: 40 TABLET, DELAYED RELEASE ORAL at 09:03

## 2021-06-14 NOTE — DISCHARGE SUMMARY
Discharging Resident Physician: Karla Phipps MD  Attending: Carroll Marshall MD  PCP: Alisha Abrams MD  Admission Date: 6/12/2021  Discharge Date: 06/14/21    Disposition:     Home with VNA Services (Reminder: Complete face to face encounter)    Reason for Admission: Syncope    Consultations During Hospital Stay:  · Neurology  · Cardiology    Procedures Performed:     · None    Significant Findings / Test Results:     CTA HEAD - IMPRESSION: No acute intracranial abnormality  No focal stenosis or saccular aneurysm within the [place]      XR chest - No acute disease     Test results pending on discharge  · Intrinsic factor blocking Ab  · Cardiac Event monitor   · EEG       Hospital Course: Mary Do is a 82 y o  female patient who originally presented to the hospital on 6/12/2021 due to HA, change in mentation, and visual disturbance  Per HPI - Cong [place] is a 82 y o  female who presents with unresponsive episode  States she had acute onset aching HA-frontal and crown of head that improved with Tylenol  States was watching TV when she noticed blackness around the TV and in peripheral vision  Daughters then report she became unresponsive x 2-3 minutes  Eyes were open  States she does not recall her daughters trying to get her to respond  No post-ictal period noted  Denies history of seizures  ''     She was admitted for further evaluation of possible syncopal episode  Orthostatics were negative  Admission labs showed mild elevation in creatinine from her baseline which improved with IV fluids and holding lisinopril  EKG showed normal sinus rhythm  A CTA of the head was negative for any acute change  Echocardiogram was done which showed normal EF with grade 1 diastolic dysfunction with no significant valvular abnormalities  Patient was evaluated with physical therapy which she tolerated well without recurrence of symptoms and recommendations for home PT    Neurology had evaluated the patient with no further recommendation  She was started on vitamin B12 supplementation for neuropathy  Intrinsic factor levels were sent and pending  Cardiology was consulted for evaluation of possible cardiac arrythmia as source of syncope and she will have event monitor set up as outpatient  Although aspirin was restarted while in the hospital, given low suspicion for TIA based on symptoms, ASA has been discontinued on discharge d/t higher risks than benefits  During hospitalization blood pressure was noted to be elevated, and home medications atenolol and lisinopril were restarted  No changes were made to her previously prescribed meds  To follow up with PCP to uptitrate dose of lisinopril if BP remains elevated  On the day of discharge patient was clinically and hemodynamically stable  All return precautions were discussed with her  Advised to follow-up with her primary care physician  Condition at Discharge: good     Discharge Day Visit / Exam:     Subjective:  PATIENT ASSESSED AT BEDSIDE  SHE REPORTS FEELING BETTER TODAY  Has been ambulating with physical therapy without any records of dizziness, chest pain, shortness of breath or headache  Doing well  Blood pressure stable today  Vitals: Blood Pressure: 111/50 (06/14/21 0732)  Pulse: 68 (06/14/21 0732)  Temperature: 98 °F (36 7 °C) (06/14/21 0732)  Temp Source: Oral (06/13/21 2219)  Respirations: 20 (06/14/21 0235)  Weight - Scale: 50 1 kg (110 lb 7 2 oz) (06/12/21 2037)  SpO2: 97 % (06/14/21 0732)     Exam:   Physical Exam  General:  Appears stated age, saturating on room air  HEENT: atraumatic, PERRLA, no nystagmus  moist mucosa  Neck:  no carotid bruit  Respiratory:  Clear to auscultate bilaterally      Cardiovascular: RRR, no murmur, Normal S1 and S2, no peripheral edema    Abdomen: Soft, non-tender, non-distended  Musculoskeletal: normal ROM in upper and lower extremities  Integumentary: warm, dry, and pink  Neurological: a/o x3, Cranial Nerves II-Xii intact, motor strength 5/5 bilateral upper and lower extremities  Finger-nose test normal bilaterally  Fine resting tremor of lower lip and intention tremor of lower legs noted on exam   Psychiatric: cooperative       Discharge instructions/Information to patient and family:   See after visit summary for information provided to patient and family  Provisions for Follow-Up Care:  See after visit summary for information related to follow-up care and any pertinent home health orders  Planned Readmission:  No     Discharge Medications:  See after visit summary for reconciled discharge medications provided to patient and family  ** Please Note: This note has been constructed using a voice recognition system **    Discharge Statement:  I spent 30 minutes discharging the patient  This time was spent on the day of discharge  I had direct contact with the patient on the day of discharge  Greater than 50% of the total time was spent examining patient, answering all patient questions, arranging and discussing plan of care with patient as well as directly providing post-discharge instructions  Additional time then spent on discharge activities

## 2021-06-14 NOTE — CONSULTS
Consultation - Cardiology   Alondra Voss 82 y o  female MRN: [id number]  Unit/Bed#: -01 Encounter: [id number]  06/14/21  1:38 PM    Assessment/ Plan:  1-syncope, unclear etiology, patient seen by Neurology  They recommend following with primary neurologist as well as supplementing B12  CT head negative  CTA negative  Echocardiogram done EF 20%, grade 1 diastolic dysfunction  Plan for 1 week event monitor to assess [place] [facility] [place]    2-hypertension, quite elevated at times but improved after administration of home medications atenolol and lisinopril  Echocardiogram done EF 60%    3-hyperlipidemia on pravastatin    4-anemia, pernicious  Continue with supplementation  [facility]    Patient is stable from cardiac standpoint for discharge  Will follow up in the office  Plan for outpatient event monitor for 1 week to assess [place] [facility] [place]      History of Present Illness   Physician Requesting Consult: Yue Layne MD    Reason for Consult / Principal Problem: syncope    HPI: Alondra Voss is a 82y o  year old female who presents with unresponsive episode  Patient states she was sitting watching a movie when she got a sudden onset of headache in the frontal and chronic of her head that improved with Tylenol  Patient states she was then watching TV further and noticed a black frame around the TV and in the peripheral vision  Patient's daughter then states she became unresponsive for 2-3 minutes  Patient's daughter stated the eyes were open, she did not respond  She denies any confusion after she woke up  Patient has no history of seizures  Patient has been monitor for the last couple of days seen by Neurology all testing is negative  Orthostatic vital signs negative  Blood pressure was elevated during hospitalization and medications were adjusted      Past medical history:  Hypertension, hypothyroidism, fibromyalgia, GERD, hyperlipidemia, Parkinson's disease, TIA    Consults    EKG:  Normal sinus rhythm, prolonged QT      Review of Systems   Constitutional: Negative  Eyes: Positive for visual disturbance  Respiratory: Negative  Cardiovascular: Negative  Neurological: Positive for syncope and headaches  Hematological: Negative  Psychiatric/Behavioral: Negative  All other systems reviewed and are negative        Historical Information   Past Medical History:   Diagnosis Date    Benign essential hypertension     Last assessed: 14    Disease of thyroid gland     Essential tremor     Fibromyalgia     GERD (gastroesophageal reflux disease)     History of nail disorders     Hyperlipidemia     Hypertension     Palpitations     Transient cerebral ischemia      Past Surgical History:   Procedure Laterality Date    APPENDECTOMY      EGD AND COLONOSCOPY  2020    HYSTERECTOMY  2010    IN LAP,CHOLECYSTECTOMY/GRAPH N/A 2018    Procedure: LAPAROSCOPIC CHOLECYSTECTOMY WITH IOC;  Surgeon: Helder Fajardo MD;  Location: [facility];  Service: General    TUBAL LIGATION       Social History     Substance and Sexual Activity   Alcohol Use Not Currently    Alcohol/week: 0 0 standard drinks    Comment: 0     Social History     Substance and Sexual Activity   Drug Use No     Social History     Tobacco Use   Smoking Status Former Smoker    Packs/day: 0 10    Years: 50 00    Pack years: 5 00    Types: Cigarettes    Start date:     Quit date:     Years since quittin 4   Smokeless Tobacco Never Used   Tobacco Comment    1 pack a week        Family History:   Family History   Problem Relation Age of Onset   Jaqueline Guzmán Stroke Mother         ischemic stroke    Hypertension Mother     Heart disease Father        Meds/Allergies   all current active meds have been reviewed and current meds:   Current Facility-Administered Medications   Medication Dose Route Frequency    acetaminophen (TYLENOL) tablet 650 mg  650 mg Oral Q4H PRN    ALPRAZolam (XANAX) tablet 0 5 mg  0 5 mg Oral Daily PRN    aluminum-magnesium hydroxide-simethicone (MYLANTA) oral suspension 30 mL  30 mL Oral Q6H PRN    aspirin (ECOTRIN LOW STRENGTH) EC tablet 81 mg  81 mg Oral Daily    atenolol (TENORMIN) tablet 50 mg  50 mg Oral BID    cyanocobalamin (VITAMIN B-12) tablet 1,000 mcg  1,000 mcg Oral Daily    ferrous sulfate tablet 325 mg  325 mg Oral Daily    heparin (porcine) subcutaneous injection 5,000 Units  5,000 Units Subcutaneous Q8H [address]    imipramine (TOFRANIL) tablet 10 mg  10 mg Oral Once per day on Sun Tue Thu Sat    latanoprost (XALATAN) 0 005 % ophthalmic solution 1 drop  1 drop Both Eyes HS    levothyroxine tablet 50 mcg  50 mcg Oral Early Morning    lisinopril (ZESTRIL) tablet 10 mg  10 mg Oral Daily    melatonin tablet 3 mg  3 mg Oral HS PRN    ondansetron (ZOFRAN) injection 4 mg  4 mg Intravenous Q6H PRN    pantoprazole (PROTONIX) EC tablet 40 mg  40 mg Oral Daily Before Breakfast    pravastatin (PRAVACHOL) tablet 80 mg  80 mg Oral Daily With Dinner    saccharomyces boulardii (FLORASTOR) capsule 250 mg  250 mg Oral BID    tobramycin-dexamethasone (TOBRADEX) 0 3-0 1 % ophthalmic suspension 1 drop  1 drop Left Eye Q6H [address]     Allergies   Allergen Reactions    Other Drowsiness     [facility] - [id number]: ANTIDEPRESSANTS       Objective   Vitals: Blood pressure 168/70, pulse 61, temperature (!) 97 1 °F (36 2 °C), temperature source Oral, resp   rate 16, weight 50 1 kg (110 lb 7 2 oz), SpO2 99 %, not currently breastfeeding , Body mass index is 23 9 kg/m² ,   Orthostatic Blood Pressures      Most Recent Value   Blood Pressure  168/70 filed at 06/14/2021 1130   Patient Position - Orthostatic VS  Sitting filed at 06/14/2021 3545          Systolic (02YYI), HOQ:068 , Min:111 , IIP:541     Diastolic (39EAT), OEP:58, Min:49, Max:85        Intake/Output Summary (Last 24 hours) at 6/14/2021 1338  Last data filed at 6/14/2021 0100  Gross per 24 hour Intake 660 ml   Output 600 ml   Net 60 ml       Invasive Devices     None                     Physical Exam:  GEN: Alert and oriented x 3, in no acute distress  Well appearing and well nourished  HEENT: Sclera anicteric, conjunctivae pink, mucous membranes moist  Oropharynx clear  NECK: Supple, no carotid bruits, no significant JVD  Trachea midline, no thyromegaly  HEART: Regular rhythm, normal S1 and S2, no murmurs, clicks, gallops or rubs  PMI nondisplaced, no thrills  LUNGS: Clear to auscultation bilaterally; no wheezes, rales, or rhonchi  No increased work of breathing or signs of respiratory distress  ABDOMEN: Soft, nontender, nondistended, normoactive bowel sounds  EXTREMITIES: Skin warm and well perfused, no clubbing, cyanosis, or edema  NEURO: No focal findings  Normal speech  Mood and affect normal    SKIN: Normal without suspicious lesions on exposed skin        Lab Results:     Troponins:   Results from last 7 days   Lab Units 06/14/21 0440 06/13/21 0526 06/13/21  0124   TROPONIN I ng/mL <0 02 <0 02 <0 02       CBC with diff:   Results from last 7 days   Lab Units 06/13/21  0526 06/12/21  2105 06/11/21  0457 06/10/21  2218 06/10/21  1908   WBC Thousand/uL 5 01 6 52 5 85  --  5 64   HEMOGLOBIN g/dL 11 1* 11 3* 11 1*  --  11 0*   HEMATOCRIT % 33 7* 34 1* 33 7*  --  33 4*   MCV fL 97 96 95  --  96   PLATELETS Thousands/uL 153 168 147* 152 151   MCH pg 32 1 31 8 31 3  --  31 5   MCHC g/dL 32 9 33 1 32 9  --  32 9   RDW % 13 5 13 3 13 2  --  13 4   MPV fL 9 6 10 1 9 5 9 5 9 3   NRBC AUTO /100 WBCs 0 0  --   --  0         CMP:   Results from last 7 days   Lab Units 06/14/21 0440 06/13/21  0526 06/12/21 2105 06/11/21 0457 06/10/21  1907   POTASSIUM mmol/L 4 3 3 9 4 1 4 3 4 1   CHLORIDE mmol/L 104 104 98* 103 100   CO2 mmol/L 25 24 27 24 26   BUN mg/dL 20 16 19 15 17   CREATININE mg/dL 1 27 1 25 1 45* 1 22 1 29   CALCIUM mg/dL 8 9 8 7 8 7 8 9 9 0   AST U/L  --   --   --  38 36   ALT U/L  -- --   --  30 32   ALK PHOS U/L  --   --   --  96 116   EGFR ml/min/1 73sq m 39 40 33 41 38

## 2021-06-14 NOTE — DISCHARGE INSTR - AVS FIRST PAGE
- Keep a daily blood pressure log and take it with you for your follow up with Dr Dylan Pavon  This will help with deciding if your medications need to be altered  - Do physical therapy as tolerated and follow fall precautions  Remove loose rugs from the floor, keep a night light on to prevents falls at night      - Have EEG done     - Follow up with Dr Mary Negron ( Dr Dee Dee Davies) cardiology office to have event monitor done       - DO NOT TAKE ASPIRIN 81mg daily      - New medication - Vitamin b12 1000mcg daily

## 2021-06-15 ENCOUNTER — TRANSITIONAL CARE MANAGEMENT (OUTPATIENT)
Dept: INTERNAL MEDICINE CLINIC | Facility: CLINIC | Age: 86
End: 2021-06-15

## 2021-06-16 ENCOUNTER — TELEPHONE (OUTPATIENT)
Dept: INTERNAL MEDICINE CLINIC | Facility: CLINIC | Age: 86
End: 2021-06-16

## 2021-06-17 LAB — IF BLOCK AB SER QL RIA: 1.1 AU/ML (ref 0–1.1)

## 2021-06-18 NOTE — ED PROVIDER NOTES
History  Chief Complaint   Patient presents with    Weakness - Generalized     pt brought by ems; per family, pt has been weak for past 3 days; family states pt has been falling recently      63-year-old female presenting to the emergency department for evaluation of generalized fatigue  Per family, she has been generally weak for the past 3 days, multiple falls, no head injuries  Tonight got particularly weak, short of breath while crossing the room, the family noticed that she became ashen/gray, her symptoms resolved after resting  She reports no chest pain during the event but it sounds like the symptoms were exertional in nature  It has been no fevers or chills, no changes in bowel or bladder activity, somewhat decreased appetite  Prior to Admission Medications   Prescriptions Last Dose Informant Patient Reported? Taking?    ALPRAZolam (XANAX) 0 5 mg tablet  Self Yes Yes   Sig: Take 0 5 mg by mouth daily as needed     Probiotic Product (ALIGN) 4 MG CAPS  Self Yes Yes   Sig: Take 1 tablet by mouth daily   acetaminophen (TYLENOL) 325 mg tablet  Self Yes Yes   Sig: Take 1-2 tablets by mouth every 6 (six) hours as needed   atenolol (TENORMIN) 25 mg tablet   No Yes   Sig: take 2 tablets by mouth twice a day   Patient taking differently: 25 mg 2 (two) times a day    atenolol (TENORMIN) 50 mg tablet   No No   Sig: Take 1 tablet (50 mg total) by mouth 2 (two) times a day   ferrous sulfate 325 (65 Fe) mg tablet  Self Yes Yes   Sig: Take 1 tablet by mouth daily   imipramine (TOFRANIL) 10 mg tablet  Self No Yes   Sig: Take 1 tablet (10 mg total) by mouth 4 (four) times a week   latanoprost (XALATAN) 0 005 % ophthalmic solution  Self Yes Yes   Sig: instill 1 drop into both eyes nightly   levothyroxine 50 mcg tablet  Self No Yes   Si UG 6 DAYS PER WEEK   lisinopril (ZESTRIL) 10 mg tablet  Self No Yes   Sig: Take 1 tablet (10 mg total) by mouth daily   pantoprazole (PROTONIX) 40 mg tablet   No Yes   Sig: take 1 tablet by mouth daily before breakfast   rosuvastatin (CRESTOR) 10 MG tablet   No Yes   Sig: Take 1 tablet (10 mg total) by mouth daily   tobramycin-dexamethasone (TOBRADEX) ophthalmic suspension Not Taking at Unknown time  Yes No   Sig: instill 1 drop into left eye four times a day      Facility-Administered Medications: None       Past Medical History:   Diagnosis Date    Benign essential hypertension     Last assessed: 14    Disease of thyroid gland     Essential tremor     Fibromyalgia     GERD (gastroesophageal reflux disease)     History of nail disorders     Hyperlipidemia     Hypertension     Palpitations     Transient cerebral ischemia        Past Surgical History:   Procedure Laterality Date    APPENDECTOMY      EGD AND COLONOSCOPY  2020    HYSTERECTOMY  2010    NH LAP,CHOLECYSTECTOMY/GRAPH N/A 2018    Procedure: LAPAROSCOPIC CHOLECYSTECTOMY WITH IOC;  Surgeon: Anna Garcia MD;  Location: [facility] OR;  Service: General    TUBAL LIGATION         Family History   Problem Relation Age of Onset    Stroke Mother         ischemic stroke    Hypertension Mother     Heart disease Father      I have reviewed and agree with the history as documented  E-Cigarette/Vaping    E-Cigarette Use Never User      E-Cigarette/Vaping Substances    Nicotine No     THC No     CBD No     Flavoring No     Other No     Unknown No      Social History     Tobacco Use    Smoking status: Former Smoker     Packs/day: 0 10     Years: 50 00     Pack years: 5 00     Types: Cigarettes     Start date:      Quit date:      Years since quittin 4    Smokeless tobacco: Never Used    Tobacco comment: 1 pack a week    Vaping Use    Vaping Use: Never used   Substance Use Topics    Alcohol use: Not Currently     Alcohol/week: 0 0 standard drinks     Comment: 0    Drug use: No       Review of Systems   Constitutional: Positive for fatigue   Negative for appetite change, chills and fever    HENT: Negative for sneezing and sore throat  Eyes: Negative for visual disturbance  Respiratory: Positive for shortness of breath  Negative for cough, choking, chest tightness and wheezing  Cardiovascular: Negative for chest pain and palpitations  Gastrointestinal: Negative for abdominal pain, constipation, diarrhea, nausea and vomiting  Genitourinary: Negative for difficulty urinating and dysuria  Neurological: Negative for dizziness, weakness, light-headedness, numbness and headaches  All other systems reviewed and are negative  Physical Exam  Physical Exam  Vitals and nursing note reviewed  Constitutional:       General: She is not in acute distress  Appearance: She is well-developed  She is not diaphoretic  HENT:      Head: Normocephalic and atraumatic  Eyes:      Pupils: Pupils are equal, round, and reactive to light  Neck:      Vascular: No JVD  Trachea: No tracheal deviation  Cardiovascular:      Rate and Rhythm: Normal rate and regular rhythm  Heart sounds: Normal heart sounds  No murmur heard  No friction rub  No gallop  Pulmonary:      Effort: Pulmonary effort is normal  No respiratory distress  Breath sounds: Normal breath sounds  No wheezing or rales  Abdominal:      General: Bowel sounds are normal  There is no distension  Palpations: Abdomen is soft  Tenderness: There is no abdominal tenderness  There is no guarding or rebound  Skin:     General: Skin is warm and dry  Coloration: Skin is not pale  Neurological:      Mental Status: She is alert and oriented to person, place, and time  Cranial Nerves: No cranial nerve deficit  Motor: No abnormal muscle tone     Psychiatric:         Behavior: Behavior normal          Vital Signs  ED Triage Vitals   Temperature Pulse Respirations Blood Pressure SpO2   06/10/21 1843 06/10/21 1843 06/10/21 1843 06/10/21 1843 06/10/21 1843   97 9 °F (36 6 °C) 67 16 (!) 174/88 100 % Temp Source Heart Rate Source Patient Position - Orthostatic VS BP Location FiO2 (%)   06/10/21 1843 06/10/21 1843 06/10/21 1843 06/10/21 1843 --   Oral Monitor Lying Right arm       Pain Score       06/11/21 0022       6           Vitals:    06/10/21 2247 06/11/21 0030 06/11/21 0823 06/11/21 0827   BP: 157/70 163/67 164/70    Pulse: 67 63  98   Patient Position - Orthostatic VS:  Lying           Visual Acuity  Visual Acuity      Most Recent Value   L Pupil Size (mm)  5   R Pupil Size (mm)  5   L Pupil Shape  Round   R Pupil Shape  Round          ED Medications  Medications - No data to display    Diagnostic Studies  Results Reviewed     Procedure Component Value Units Date/Time    Urine culture [[id number]] Collected: 06/10/21 2007    Lab Status: Final result Specimen: Urine, Clean Catch Updated: 06/11/21 2046     Urine Culture 10,000-19,000 cfu/ml     TSH, 3rd generation [[id number]]  (Normal) Collected: 06/11/21 0457    Lab Status: Final result Specimen: Blood from Arm, Left Updated: 06/11/21 0548     TSH 3RD GENERATON 1 790 uIU/mL     Narrative:      Patients undergoing fluorescein dye angiography may retain small amounts of fluorescein in the body for 48-72 hours post procedure  Samples containing fluorescein can produce falsely depressed TSH values  If the patient had this procedure,a specimen should be resubmitted post fluorescein clearance        Magnesium [[id number]]  (Normal) Collected: 06/11/21 0457    Lab Status: Final result Specimen: Blood from Arm, Left Updated: 06/11/21 0548     Magnesium 1 8 mg/dL     Comprehensive metabolic panel [[id number]]  (Abnormal) Collected: 06/11/21 0457    Lab Status: Final result Specimen: Blood from Arm, Left Updated: 06/11/21 0543     Sodium 139 mmol/L      Potassium 4 3 mmol/L      Chloride 103 mmol/L      CO2 24 mmol/L      ANION GAP 12 mmol/L      BUN 15 mg/dL      Creatinine 1 22 mg/dL      Glucose 85 mg/dL      Glucose, Fasting 85 mg/dL      Calcium 8 9 mg/dL Corrected Calcium 9 4 mg/dL      AST 38 U/L      ALT 30 U/L      Alkaline Phosphatase 96 U/L      Total Protein 6 9 g/dL      Albumin 3 4 g/dL      Total Bilirubin 0 61 mg/dL      eGFR 41 ml/min/1 73sq m     Narrative:      [place] guidelines for Chronic Kidney Disease (CKD):     Stage 1 with normal or high GFR (GFR > 90 mL/min/1 73 square meters)    Stage 2 Mild CKD (GFR = 60-89 mL/min/1 73 square meters)    Stage 3A Moderate CKD (GFR = 45-59 mL/min/1 73 square meters)    Stage 3B Moderate CKD (GFR = 30-44 mL/min/1 73 square meters)    Stage 4 Severe CKD (GFR = 15-29 mL/min/1 73 square meters)    Stage 5 End Stage CKD (GFR <15 mL/min/1 73 square meters)  Note: GFR calculation is accurate only with a steady state creatinine    CBC (With Platelets) [[id number]]  (Abnormal) Collected: 06/11/21 0457    Lab Status: Final result Specimen: Blood from Arm, Left Updated: 06/11/21 0511     WBC 5 85 Thousand/uL      RBC 3 55 Million/uL      Hemoglobin 11 1 g/dL      Hematocrit 33 7 %      MCV 95 fL      MCH 31 3 pg      MCHC 32 9 g/dL      RDW 13 2 %      Platelets 672 Thousands/uL      MPV 9 5 fL     Troponin I [[id number]]  (Normal) Collected: 06/11/21 0215    Lab Status: Final result Specimen: Blood from Arm, Left Updated: 06/11/21 0248     Troponin I <0 02 ng/mL     Troponin I [[id number]]  (Normal) Collected: 06/10/21 2218    Lab Status: Final result Specimen: Blood from Arm, Left Updated: 06/10/21 2240     Troponin I <0 02 ng/mL     Platelet count [[id number]]  (Normal) Collected: 06/10/21 2218    Lab Status: Final result Specimen: Blood from Arm, Left Updated: 06/10/21 2223     Platelets 836 Thousands/uL      MPV 9 5 fL     Urine Microscopic [[id number]]  (Abnormal) Collected: 06/10/21 2007    Lab Status: Final result Specimen: Urine, Clean Catch Updated: 06/10/21 2041     RBC, UA 1-2 /hpf      WBC, UA 10-20 /hpf      Epithelial Cells Moderate /hpf      Bacteria, UA Occasional /hpf     Novel [facility] [[id number]]  (Normal) Collected: 06/10/21 1908    Lab Status: Final result Specimen: Nares from Nose Updated: 06/10/21 2030     SARS-CoV-2 Negative    Narrative: The specimen collection materials, transport medium, and/or testing methodology utilized in the production of these test results have been proven to be reliable in a limited validation with an abbreviated program under the Emergency Utilization Authorization provided by the FDA  Testing reported as "Presumptive positive" will be confirmed with secondary testing to ensure result accuracy  Clinical caution and judgement should be used with the interpretation of these results with consideration of the clinical impression and other laboratory testing  Testing reported as "Positive" or "Negative" has been proven to be accurate according to standard laboratory validation requirements  All testing is performed with control materials showing appropriate reactivity at standard intervals        UA w Reflex to Microscopic w Reflex to Culture [[id number]]  (Abnormal) Collected: 06/10/21 2007    Lab Status: Final result Specimen: Urine, Clean Catch Updated: 06/10/21 2018     Color, UA Light Yellow     Clarity, UA Cloudy     Specific Gravity, UA <=1 005     pH, UA 5 5     Leukocytes, UA Moderate     Nitrite, UA Negative     Protein, UA Negative mg/dl      Glucose, UA Negative mg/dl      Ketones, UA Negative mg/dl      Urobilinogen, UA 0 2 E U /dl      Bilirubin, UA Negative     Blood, UA Trace-Intact    Troponin I [[id number]]  (Normal) Collected: 06/10/21 1908    Lab Status: Final result Specimen: Blood from Arm, Left Updated: 06/10/21 1946     Troponin I <0 02 ng/mL     Comprehensive metabolic panel [[id number]] Collected: 06/10/21 1907    Lab Status: Final result Specimen: Blood from Arm, Left Updated: 06/10/21 1943     Sodium 136 mmol/L      Potassium 4 1 mmol/L      Chloride 100 mmol/L      CO2 26 mmol/L      ANION GAP 10 mmol/L BUN 17 mg/dL      Creatinine 1 29 mg/dL      Glucose 66 mg/dL      Calcium 9 0 mg/dL      AST 36 U/L      ALT 32 U/L      Alkaline Phosphatase 116 U/L      Total Protein 7 5 g/dL      Albumin 3 9 g/dL      Total Bilirubin 0 48 mg/dL      eGFR 38 ml/min/1 73sq m     Narrative:      [place] guidelines for Chronic Kidney Disease (CKD):     Stage 1 with normal or high GFR (GFR > 90 mL/min/1 73 square meters)    Stage 2 Mild CKD (GFR = 60-89 mL/min/1 73 square meters)    Stage 3A Moderate CKD (GFR = 45-59 mL/min/1 73 square meters)    Stage 3B Moderate CKD (GFR = 30-44 mL/min/1 73 square meters)    Stage 4 Severe CKD (GFR = 15-29 mL/min/1 73 square meters)    Stage 5 End Stage CKD (GFR <15 mL/min/1 73 square meters)  Note: GFR calculation is accurate only with a steady state creatinine    CBC and differential [[id number]]  (Abnormal) Collected: 06/10/21 1908    Lab Status: Final result Specimen: Blood from Arm, Left Updated: 06/10/21 1923     WBC 5 64 Thousand/uL      RBC 3 49 Million/uL      Hemoglobin 11 0 g/dL      Hematocrit 33 4 %      MCV 96 fL      MCH 31 5 pg      MCHC 32 9 g/dL      RDW 13 4 %      MPV 9 3 fL      Platelets 229 Thousands/uL      nRBC 0 /100 WBCs      Neutrophils Relative 61 %      Immat GRANS % 0 %      Lymphocytes Relative 19 %      Monocytes Relative 10 %      Eosinophils Relative 10 %      Basophils Relative 0 %      Neutrophils Absolute 3 39 Thousands/µL      Immature Grans Absolute 0 02 Thousand/uL      Lymphocytes Absolute 1 06 Thousands/µL      Monocytes Absolute 0 58 Thousand/µL      Eosinophils Absolute 0 57 Thousand/µL      Basophils Absolute 0 02 Thousands/µL                  XR chest 1 view portable   Final Result by Chris Ko MD (06/11 0840)      No acute cardiopulmonary disease                    Workstation performed: [id number]                    Procedures  Procedures         ED Course               Identification of Seniors at Risk Most Recent Value   (ISAR) Identification of Seniors at Risk   Before the illness or injury that brought you to the Emergency, did you need someone to help you on a regular basis? 0 Filed at: 06/10/2021 1847   In the last 24 hours, have you needed more help than usual?  1 Filed at: 06/10/2021 1847   Have you been hospitalized for one or more nights during the past 6 months? 0 Filed at: 06/10/2021 1847   In general, do you see well?  0 Filed at: 06/10/2021 1847   In general, do you have serious problems with your memory? 1 Filed at: 06/10/2021 1847   Do you take more than three different medications every day? 1 Filed at: 06/10/2021 1847   ISAR Score  3 Filed at: 06/10/2021 1847                    SBIRT 20yo+      Most Recent Value   SBIRT (23 yo +)   In order to provide better care to our patients, we are screening all of our patients for alcohol and drug use  Would it be okay to ask you these screening questions? Yes Filed at: 06/10/2021 1849   Initial Alcohol Screen: US AUDIT-C    1  How often do you have a drink containing alcohol?  0 Filed at: 06/10/2021 1849   2  How many drinks containing alcohol do you have on a typical day you are drinking? 0 Filed at: 06/10/2021 1849   3a  Male UNDER 65: How often do you have five or more drinks on one occasion? 0 Filed at: 06/10/2021 1849   3b  FEMALE Any Age, or MALE 65+: How often do you have 4 or more drinks on one occassion? 0 Filed at: 06/10/2021 1849   Audit-C Score  0 Filed at: 06/10/2021 1849   GILLES: How many times in the past year have you    Used an illegal drug or used a prescription medication for non-medical reasons?   Never Filed at: 06/10/2021 1849        DIEGO Risk Score      Most Recent Value   Age >= 72  1 Filed at: 06/11/2021 0740   Known CAD (stenosis >= 50%)  0 Filed at: 06/11/2021 0740   Recent (<=24 hrs) Service Angina  0 Filed at: 06/11/2021 0740   ST Deviation >= 0 5 mm  0 Filed at: 06/11/2021 0740   3+ CAD Risk Factors (FHx, HTN, HLP, DM, Smoker)  1 Filed at: 06/11/2021 0740   Aspirin Use Past 7 Days  1 Filed at: 06/11/2021 0740   Elevated Cardiac Markers  0 Filed at: 06/11/2021 0740   DIEGO Risk Score (Calculated)  3 Filed at: 06/11/2021 0740                  [facility]  Number of Diagnoses or Management Options  Chest pain  Weakness  Diagnosis management comments: 30-year-old female with fatigue and exertional dyspnea with color change diaphoresis tonight, given the exertional nature of the symptoms described, this is concerning for possible ACS equivalent, given the patient's age risk factors, will check labs and cardiac workup here but recommends admission to rule out ACS  Disposition  Final diagnoses:   Weakness   Chest pain     Time reflects when diagnosis was documented in both MDM as applicable and the Disposition within this note     Time User Action Codes Description Comment    6/10/2021  8:30 PM Lisa Vigil [R07 89] Chest discomfort     6/10/2021  8:30 PM Sunita Annette [R07 89] Chest discomfort     6/10/2021  8:46 PM Arpit Lweis Add [R53 1] Weakness     6/10/2021  8:46 PM Grace Lewis Add [R07 9] Chest pain       ED Disposition     ED Disposition Condition Date/Time Comment    Admit Stable Thu Mendoza 10, 2021  8:46 PM Case was discussed with EDUARDO and the patient's admission status was agreed to be Admission Status: observation status to the service of Dr Gill Keyes           Follow-up Information     Follow up With Specialties Details Why Contact Info    Jaz Trevino MD Internal Medicine, Palliative Care Schedule an appointment as soon as possible for a visit in 1 week(s)  [address] [place] [address],  [address]      Joi Brenner MD Cardiology Follow up  [address]  [phone]            Discharge Medication List as of 6/11/2021 12:14 PM      START taking these medications    Details   aspirin (ECOTRIN LOW STRENGTH) 81 mg EC tablet Take 1 tablet (81 mg total) by mouth daily, Starting Sat 6/12/2021, Until Mon 7/12/2021, Normal         CONTINUE these medications which have NOT CHANGED    Details   acetaminophen (TYLENOL) 325 mg tablet Take 1-2 tablets by mouth every 6 (six) hours as needed, Historical Med      ALPRAZolam (XANAX) 0 5 mg tablet Take 0 5 mg by mouth daily as needed  , Historical Med      !! atenolol (TENORMIN) 25 mg tablet take 2 tablets by mouth twice a day, Normal      ferrous sulfate 325 (65 Fe) mg tablet Take 1 tablet by mouth daily, Starting Mon 10/9/2017, Historical Med      imipramine (TOFRANIL) 10 mg tablet Take 1 tablet (10 mg total) by mouth 4 (four) times a week, Starting Tue 5/19/2020, Normal      latanoprost (XALATAN) 0 005 % ophthalmic solution instill 1 drop into both eyes nightly, Historical Med      levothyroxine 50 mcg tablet 50 UG 6 DAYS PER WEEK, Normal      lisinopril (ZESTRIL) 10 mg tablet Take 1 tablet (10 mg total) by mouth daily, Starting Fri 7/10/2020, Normal      pantoprazole (PROTONIX) 40 mg tablet take 1 tablet by mouth daily before breakfast, Normal      Probiotic Product (ALIGN) 4 MG CAPS Take 1 tablet by mouth daily, Historical Med      rosuvastatin (CRESTOR) 10 MG tablet Take 1 tablet (10 mg total) by mouth daily, Starting Fri 11/13/2020, Normal      !! atenolol (TENORMIN) 50 mg tablet Take 1 tablet (50 mg total) by mouth 2 (two) times a day, Starting Fri 11/13/2020, Normal      tobramycin-dexamethasone (TOBRADEX) ophthalmic suspension instill 1 drop into left eye four times a day, Historical Med      polyethylene glycol (GOLYTELY) 4000 mL solution Take 4,000 mL by mouth once for 1 dose, Starting Mon 7/13/2020, Normal       !! - Potential duplicate medications found  Please discuss with provider          Outpatient Discharge Orders   Discharge Diet     Activity as tolerated     Call provider for:  persistent nausea or vomiting     Call provider for:  difficulty breathing, headache or visual disturbances     Call provider for:  severe uncontrolled pain       PDMP Review     None          ED Provider  Electronically Signed by           Solange Lindsay MD  06/18/21 1342

## 2021-06-20 NOTE — PHYSICIAN ADVISOR
Current patient class: Inpatient  The patient is currently on Hospital Day: 3 at [address]      This patient was originally admitted to the hospital under observation class  After admission the patient was reevaluated and determined to require further hospitalization  The patient was then documented to require at least a 2nd midnight in the hospital  As such the patient was then expected to satisfy the 2 midnight benchmark and was therefore eligible for inpatient admission  After review of the relevant documentation, labs, vital signs and test results, the patient is appropriate for INPATIENT ADMISSION  Admission to the hospital as an inpatient is a complex decision making process which requires the practitioner to consider the patients presenting complaint, history and physical examination and all relevant testing  With this in mind, in this case, the patient was deemed appropriate for INPATIENT ADMISSION  After review of the documentation and testing available at the time of the admission I concur with this clinical determination of medical necessity  Rationale is as follows:    OBS to IP 2MN    The patient is an 70-year-old female who had an observation stay from June 10th until June 11, 2021  She returned to the hospital on June 12, 2021 after an episode of unresponsiveness  She was again placed under observation class  The primary diagnosis was syncope  She was found to have mild dehydration  The patient was not yet medically cleared for discharge after the first midnight  She was converted to inpatient class  She was having variations in blood pressure  Further monitoring  And evaluation were recommended  The patient was converted to inpatient class  This was appropriate given the patient's abnormalities and concerns despite a sufficient amount of time in observation    The patients vitals on arrival were   ED Triage Vitals   Temperature Pulse Respirations Blood Pressure SpO2   06/12/21 2055 06/12/21 2037 06/12/21 2037 06/12/21 2037 06/12/21 2037   97 8 °F (36 6 °C) 66 18 (!) 179/69 95 %      Temp Source Heart Rate Source Patient Position - Orthostatic VS BP Location FiO2 (%)   06/12/21 2055 06/12/21 2037 06/12/21 2037 06/12/21 2037 --   Oral Monitor Sitting Left arm       Pain Score       06/12/21 2054       3           Past Medical History:   Diagnosis Date    Benign essential hypertension     Last assessed: 9/11/14    Disease of thyroid gland     Essential tremor     Fibromyalgia     GERD (gastroesophageal reflux disease)     History of nail disorders     Hyperlipidemia     Hypertension     Palpitations     Transient cerebral ischemia      Past Surgical History:   Procedure Laterality Date    APPENDECTOMY      EGD AND COLONOSCOPY  07/2020    HYSTERECTOMY  01/01/2010    ME LAP,CHOLECYSTECTOMY/GRAPH N/A 4/4/2018    Procedure: LAPAROSCOPIC CHOLECYSTECTOMY WITH IOC;  Surgeon: Anna Garcia MD;  Location: [facility];  Service: General    TUBAL LIGATION         The patient was admitted to the hospital at  5:39 PM on 6/13/21 for the following diagnosis:  Syncope [R55]  Acute headache [R51 9]  Headache [R51 9]    Consults have been placed to:   IP CONSULT TO NEUROLOGY  IP CONSULT TO CARDIOLOGY    Vitals:    06/14/21 0905 06/14/21 1121 06/14/21 1130 06/14/21 1421   BP: 152/73 (!) 174/84 168/70 (!) 174/79   BP Location:  Left arm  Left arm   Pulse: 73 61     Resp:  16  16   Temp:  (!) 97 1 °F (36 2 °C)  97 6 °F (36 4 °C)   TempSrc:  Oral  Oral   SpO2: 96% 99%     Weight:           Most recent labs:    No results for input(s): WBC, HGB, HCT, PLT, K, NA, CALCIUM, BUN, CREATININE, LIPASE, AMYLASE, INR, TROPONINI, CKTOTAL, AST, ALT, ALKPHOS, BILITOT in the last 72 hours  Scheduled Meds:  Continuous Infusions:No current facility-administered medications for this encounter  PRN Meds:      Surgical procedures (if appropriate):

## 2021-06-21 ENCOUNTER — OFFICE VISIT (OUTPATIENT)
Dept: INTERNAL MEDICINE CLINIC | Facility: CLINIC | Age: 86
End: 2021-06-21
Payer: MEDICARE

## 2021-06-21 ENCOUNTER — TELEPHONE (OUTPATIENT)
Dept: INTERNAL MEDICINE CLINIC | Facility: CLINIC | Age: 86
End: 2021-06-21

## 2021-06-21 VITALS
HEIGHT: 57 IN | DIASTOLIC BLOOD PRESSURE: 68 MMHG | RESPIRATION RATE: 16 BRPM | WEIGHT: 107 LBS | HEART RATE: 60 BPM | OXYGEN SATURATION: 95 % | BODY MASS INDEX: 23.08 KG/M2 | TEMPERATURE: 97.2 F | SYSTOLIC BLOOD PRESSURE: 128 MMHG

## 2021-06-21 DIAGNOSIS — R55 SYNCOPE, UNSPECIFIED SYNCOPE TYPE: Primary | ICD-10-CM

## 2021-06-21 PROCEDURE — 99495 TRANSJ CARE MGMT MOD F2F 14D: CPT | Performed by: INTERNAL MEDICINE

## 2021-06-21 NOTE — PATIENT INSTRUCTIONS
Hospitalization records reviewed    Patient appears clinically stable though quite fatigued today  We discussed this could be multifactorial related to medication and advancing age  Based upon long history of difficult to control anxiety will hold off on any medication adjustments today and monitor symptoms closely  Patient encouraged to get plenty of rest and eat a healthy diet with lots of fruits and vegetables  Avoid processed foods  F/u ziopatch when available  EEG scheduled  Check labs ordered for June 1st within the next 2 weeks and follow-up subsequently

## 2021-06-21 NOTE — TELEPHONE ENCOUNTER
Patient was seen today  Her daughter would like to know if she should continue to take BP 3 x per day?

## 2021-06-21 NOTE — PROGRESS NOTES
Assessment/Plan:     No problem-specific Assessment & Plan notes found for this encounter  Diagnoses and all orders for this visit:    Syncope, unspecified syncope type              Patient Instructions   Hospitalization records reviewed    Patient appears clinically stable though quite fatigued today  We discussed this could be multifactorial related to medication and advancing age  Based upon long history of difficult to control anxiety will hold off on any medication adjustments today and monitor symptoms closely  Patient encouraged to get plenty of rest and eat a healthy diet with lots of fruits and vegetables  Avoid processed foods  F/u ziopatch when available  EEG scheduled  Check labs ordered for June 1st within the next 2 weeks and follow-up subsequently  Subjective:     Patient ID: Yulia Cervantes is a 82 y o  female  Presents w/ Dgtr Jon Prudent from [place] for [facility]  No further sycope or ms change  Turned in ziopatch yesterday  Home bp's 107-146/44-72  Most wnl    C/o tired today, and pain all over  Jon Prudent will be here until mid July, then back and forth along w/ her dgtrs  Review of Systems   Constitutional: Positive for fatigue  Negative for appetite change, chills, diaphoresis, fever and unexpected weight change  HENT: Negative for congestion, hearing loss and rhinorrhea  Eyes: Negative for visual disturbance  Respiratory: Negative for cough, chest tightness, shortness of breath and wheezing  Cardiovascular: Negative for chest pain, palpitations and leg swelling  Gastrointestinal: Negative for abdominal pain and blood in stool  Endocrine: Negative for cold intolerance, heat intolerance, polydipsia and polyuria  Genitourinary: Negative for difficulty urinating, dysuria, frequency and urgency  Musculoskeletal: Positive for back pain  Negative for arthralgias and myalgias  Skin: Negative for rash     Neurological: Negative for dizziness, weakness, light-headedness and headaches  Hematological: Does not bruise/bleed easily  Psychiatric/Behavioral: Negative for dysphoric mood and sleep disturbance  Objective:     Physical Exam  Constitutional:       Appearance: She is well-developed  HENT:      Head: Normocephalic and atraumatic  Nose: Nose normal    Eyes:      General: No scleral icterus  Conjunctiva/sclera: Conjunctivae normal       Pupils: Pupils are equal, round, and reactive to light  Neck:      Thyroid: No thyromegaly  Vascular: No JVD  Trachea: No tracheal deviation  Cardiovascular:      Rate and Rhythm: Normal rate and regular rhythm  Heart sounds: No murmur heard  No friction rub  No gallop  Pulmonary:      Effort: Pulmonary effort is normal  No respiratory distress  Breath sounds: Normal breath sounds  No wheezing or rales  Musculoskeletal:         General: No deformity  Cervical back: Normal range of motion and neck supple  Lymphadenopathy:      Cervical: No cervical adenopathy  Skin:     General: Skin is warm and dry  Coloration: Skin is not pale  Findings: No erythema or rash  Neurological:      Mental Status: She is alert and oriented to person, place, and time  Cranial Nerves: No cranial nerve deficit  Psychiatric:         Behavior: Behavior normal          Thought Content: Thought content normal          Judgment: Judgment normal            Vitals:    06/21/21 1356 06/21/21 1421   BP: (!) 108/36 128/68   Pulse: 60    Resp: 16    Temp: (!) 97 2 °F (36 2 °C)    TempSrc: Tympanic    SpO2: 95%    Weight: 48 5 kg (107 lb)    Height: 4' 9" (1 448 m)        Transitional Care Management Review:  Janusz Cisneros is a 82 y o  female here for TCM follow up       During the TCM phone call patient stated:    TCM Call (since 5/21/2021)     Date and time call was made  6/15/2021 11:28 AM    Hospital care reviewed  Records reviewed    Patient was hospitialized at  [facility] Date of Admission  06/12/21    Date of discharge  06/14/21    Diagnosis  Syncope    Disposition  Home; Home health services    Current Symptoms  None      TCM Call (since 5/21/2021)     Post hospital issues  None    Scheduled for follow up?   Yes    Patients specialists  Cardiologist    Cardiologist name  Adriane Kunz MD    Cardiologist contact #  [phone]    Did you obtain your prescribed medications  Yes (Comment)  VITAMIN B-12    Do you need help managing your prescriptions or medications  No    Is transportation to your appointment needed  No    I have advised the patient to call PCP with any new or worsening symptoms  Hawk Laughter, LPN    Living Arrangements  Family members    Are you recieving any outpatient services  Yes    What type of services  EEG    Are you recieving home care services  Yes    Types of home care services  Nurse visit; Home PT    Interperter language line needed  No    Counseling  Patient          Clementine Garner MD

## 2021-06-21 NOTE — TELEPHONE ENCOUNTER
Ruth left the message:   Pt evaluated today for home health physical therapy   Do walking skills, and therapeutic exercise     2 times a wk for 4 wks

## 2021-06-29 ENCOUNTER — TELEPHONE (OUTPATIENT)
Dept: NEUROLOGY | Facility: CLINIC | Age: 86
End: 2021-06-29

## 2021-06-29 NOTE — TELEPHONE ENCOUNTER
Spoke with patient who said she did not wanted to schedule her hospital follow up at this time  She will calls us in the future if needed      SLMO/Syncope/Neuropathy/Medicare/  [employer]    Patient is established with Dr Stephen Brain    Notes from chart:  Continue outpatient followup with Neurology

## 2021-07-06 ENCOUNTER — OFFICE VISIT (OUTPATIENT)
Dept: INTERNAL MEDICINE CLINIC | Facility: CLINIC | Age: 86
End: 2021-07-06
Payer: MEDICARE

## 2021-07-06 VITALS
RESPIRATION RATE: 16 BRPM | DIASTOLIC BLOOD PRESSURE: 60 MMHG | TEMPERATURE: 97.9 F | BODY MASS INDEX: 23.3 KG/M2 | SYSTOLIC BLOOD PRESSURE: 132 MMHG | HEIGHT: 57 IN | OXYGEN SATURATION: 91 % | WEIGHT: 108 LBS | HEART RATE: 72 BPM

## 2021-07-06 DIAGNOSIS — R55 SYNCOPE, UNSPECIFIED SYNCOPE TYPE: ICD-10-CM

## 2021-07-06 DIAGNOSIS — R29.898 WEAKNESS OF BOTH LOWER EXTREMITIES: ICD-10-CM

## 2021-07-06 DIAGNOSIS — I10 ESSENTIAL HYPERTENSION: Primary | ICD-10-CM

## 2021-07-06 DIAGNOSIS — E78.2 MIXED HYPERLIPIDEMIA: ICD-10-CM

## 2021-07-06 DIAGNOSIS — F41.9 ANXIETY: ICD-10-CM

## 2021-07-06 PROCEDURE — 99214 OFFICE O/P EST MOD 30 MIN: CPT | Performed by: INTERNAL MEDICINE

## 2021-07-06 RX ORDER — LISINOPRIL 10 MG/1
TABLET ORAL
Qty: 90 TABLET | Refills: 3
Start: 2021-07-06 | End: 2021-09-07

## 2021-07-06 NOTE — PROGRESS NOTES
Assessment/Plan:    Diagnoses and all orders for this visit:    Essential hypertension    Syncope, unspecified syncope type    Anxiety    Weakness of both lower extremities    Mixed hyperlipidemia              Patient Instructions   Generalized weakness is of unclear etiology  Stop rosuvastatin and monitor symptoms  Continue with physical therapy  Address blood pressure as below    Hypertension with diastolic hypotension  No orthostatic change in the office today  Will cut back on lisinopril to 5 mg daily at night, continue 50 b i d  Atenolol  Monitor home blood pressures and follow-up later this month with Cardiology as scheduled  Syncope-no recurrence  Follow-up ZIO patch when available  EEG later this month  Anxiety-patient has not tolerated SSRIs in the past and is reluctant to try other medication  Has been fairly stable with imipramine  Continue to monitor symptoms  Subjective:      Patient ID: Leonel Villanueva is a 82 y o  female    Presents w/ Dgtr Lavonne Goodson and granddgtr from [address] generally weak w/ low energy  Franne Pall this morning going for paper, no injury  Seen by home PT who notes legs are weak, rec'd 100% use of rollator  No further sycope or ms change  Ziopatch pnd  Home bp's 107-146/44-72  Most wnl     BP's low w/ Home PT as low as 107/47  Lavonne Goodson will be here until mid July, then back and forth along w/ her dgtrs           Current Outpatient Medications:     acetaminophen (TYLENOL) 325 mg tablet, Take 1-2 tablets by mouth every 6 (six) hours as needed, Disp: , Rfl:     ALPRAZolam (XANAX) 0 5 mg tablet, Take 0 5 mg by mouth daily as needed  , Disp: , Rfl:     atenolol (TENORMIN) 25 mg tablet, take 2 tablets by mouth twice a day (Patient taking differently: 25 mg 2 (two) times a day ), Disp: 360 tablet, Rfl: 1    atenolol (TENORMIN) 50 mg tablet, Take 1 tablet (50 mg total) by mouth 2 (two) times a day, Disp: 180 tablet, Rfl: 3    cyanocobalamin (VITAMIN B-12) 1000 MCG tablet, Take 1 tablet (1,000 mcg total) by mouth daily, Disp: 30 tablet, Rfl: 0    ferrous sulfate 325 (65 Fe) mg tablet, Take 1 tablet by mouth daily, Disp: , Rfl:     imipramine (TOFRANIL) 10 mg tablet, Take 1 tablet (10 mg total) by mouth 4 (four) times a week, Disp: 90 tablet, Rfl: 3    latanoprost (XALATAN) 0 005 % ophthalmic solution, instill 1 drop into both eyes nightly, Disp: , Rfl:     levothyroxine 50 mcg tablet, 50 UG 6 DAYS PER WEEK, Disp: 90 tablet, Rfl: 3    lisinopril (ZESTRIL) 10 mg tablet, Take 1 tablet (10 mg total) by mouth daily, Disp: 90 tablet, Rfl: 3    pantoprazole (PROTONIX) 40 mg tablet, take 1 tablet by mouth daily before breakfast, Disp: 30 tablet, Rfl: 5    Probiotic Product (ALIGN) 4 MG CAPS, Take 1 tablet by mouth daily, Disp: , Rfl:     tobramycin-dexamethasone (TOBRADEX) ophthalmic suspension, instill 1 drop into left eye four times a day, Disp: , Rfl:     Recent Results (from the past 1008 hour(s))   ECG 12 lead    Collection Time: 06/10/21  6:42 PM   Result Value Ref Range    Ventricular Rate 66 BPM    Atrial Rate 66 BPM    AR Interval 150 ms    QRSD Interval 84 ms    QT Interval 438 ms    QTC Interval 459 ms    P Axis 52 degrees    QRS Axis 2 degrees    T Wave Axis 58 degrees   Comprehensive metabolic panel    Collection Time: 06/10/21  7:07 PM   Result Value Ref Range    Sodium 136 136 - 145 mmol/L    Potassium 4 1 3 5 - 5 3 mmol/L    Chloride 100 100 - 108 mmol/L    CO2 26 21 - 32 mmol/L    ANION GAP 10 4 - 13 mmol/L    BUN 17 5 - 25 mg/dL    Creatinine 1 29 0 60 - 1 30 mg/dL    Glucose 66 65 - 140 mg/dL    Calcium 9 0 8 3 - 10 1 mg/dL    AST 36 5 - 45 U/L    ALT 32 12 - 78 U/L    Alkaline Phosphatase 116 46 - 116 U/L    Total Protein 7 5 6 4 - 8 2 g/dL    Albumin 3 9 3 5 - 5 0 g/dL    Total Bilirubin 0 48 0 20 - 1 00 mg/dL    eGFR 38 ml/min/1 73sq m   CBC and differential    Collection Time: 06/10/21  7:08 PM   Result Value Ref Range    WBC 5 64 4 31 - 10 16 Thousand/uL    RBC 3 49 (L) 3 81 - 5 12 Million/uL    Hemoglobin 11 0 (L) 11 5 - 15 4 g/dL    Hematocrit 33 4 (L) 34 8 - 46 1 %    MCV 96 82 - 98 fL    MCH 31 5 26 8 - 34 3 pg    MCHC 32 9 31 4 - 37 4 g/dL    RDW 13 4 11 6 - 15 1 %    MPV 9 3 8 9 - 12 7 fL    Platelets 907 378 - 104 Thousands/uL    nRBC 0 /100 WBCs    Neutrophils Relative 61 43 - 75 %    Immat GRANS % 0 0 - 2 %    Lymphocytes Relative 19 14 - 44 %    Monocytes Relative 10 4 - 12 %    Eosinophils Relative 10 (H) 0 - 6 %    Basophils Relative 0 0 - 1 %    Neutrophils Absolute 3 39 1 85 - 7 62 Thousands/µL    Immature Grans Absolute 0 02 0 00 - 0 20 Thousand/uL    Lymphocytes Absolute 1 06 0 60 - 4 47 Thousands/µL    Monocytes Absolute 0 58 0 17 - 1 22 Thousand/µL    Eosinophils Absolute 0 57 0 00 - 0 61 Thousand/µL    Basophils Absolute 0 02 0 00 - 0 10 Thousands/µL   Troponin I    Collection Time: 06/10/21  7:08 PM   Result Value Ref Range    Troponin I <0 02 <=0 04 ng/mL   Novel Coronavirus (Covid-19),PCR [facility]    Collection Time: 06/10/21  7:08 PM    Specimen: Nose; Nares   Result Value Ref Range    SARS-CoV-2 Negative Negative   UA w Reflex to Microscopic w Reflex to Culture    Collection Time: 06/10/21  8:07 PM    Specimen: Urine, Clean Catch   Result Value Ref Range    Color, UA Light Yellow     Clarity, UA Cloudy     Specific Gravity, UA <=1 005 1 003 - 1 030    pH, UA 5 5 4 5, 5 0, 5 5, 6 0, 6 5, 7 0, 7 5, 8 0    Leukocytes, UA Moderate (A) Negative    Nitrite, UA Negative Negative    Protein, UA Negative Negative mg/dl    Glucose, UA Negative Negative mg/dl    Ketones, UA Negative Negative mg/dl    Urobilinogen, UA 0 2 0 2, 1 0 E U /dl E U /dl    Bilirubin, UA Negative Negative    Blood, UA Trace-Intact (A) Negative   Urine Microscopic    Collection Time: 06/10/21  8:07 PM   Result Value Ref Range    RBC, UA 1-2 None Seen, 0-1, 1-2, 2-4, 0-5 /hpf    WBC, UA 10-20 (A) None Seen, 0-1, 1-2, 0-5, 2-4 /hpf    Epithelial Cells Moderate (A) None Seen, Occasional /hpf    Bacteria, UA Occasional None Seen, Occasional /hpf   Urine culture    Collection Time: 06/10/21  8:07 PM    Specimen: Urine, Clean Catch   Result Value Ref Range    Urine Culture 10,000-19,000 cfu/ml     Troponin I    Collection Time: 06/10/21 10:18 PM   Result Value Ref Range    Troponin I <0 02 <=0 04 ng/mL   Platelet count    Collection Time: 06/10/21 10:18 PM   Result Value Ref Range    Platelets 671 359 - 301 Thousands/uL    MPV 9 5 8 9 - 12 7 fL   ECG 12 lead    Collection Time: 06/10/21 10:18 PM   Result Value Ref Range    Ventricular Rate 65 BPM    Atrial Rate 65 BPM    WI Interval 156 ms    QRSD Interval 84 ms    QT Interval 452 ms    QTC Interval 470 ms    P Axis 41 degrees    QRS Axis -6 degrees    T Wave Axis 49 degrees   Troponin I    Collection Time: 06/11/21  2:15 AM   Result Value Ref Range    Troponin I <0 02 <=0 04 ng/mL   TSH, 3rd generation    Collection Time: 06/11/21  4:57 AM   Result Value Ref Range    TSH 3RD GENERATON 1 790 0 358 - 3 740 uIU/mL   Comprehensive metabolic panel    Collection Time: 06/11/21  4:57 AM   Result Value Ref Range    Sodium 139 136 - 145 mmol/L    Potassium 4 3 3 5 - 5 3 mmol/L    Chloride 103 100 - 108 mmol/L    CO2 24 21 - 32 mmol/L    ANION GAP 12 4 - 13 mmol/L    BUN 15 5 - 25 mg/dL    Creatinine 1 22 0 60 - 1 30 mg/dL    Glucose 85 65 - 140 mg/dL    Glucose, Fasting 85 65 - 99 mg/dL    Calcium 8 9 8 3 - 10 1 mg/dL    Corrected Calcium 9 4 8 3 - 10 1 mg/dL    AST 38 5 - 45 U/L    ALT 30 12 - 78 U/L    Alkaline Phosphatase 96 46 - 116 U/L    Total Protein 6 9 6 4 - 8 2 g/dL    Albumin 3 4 (L) 3 5 - 5 0 g/dL    Total Bilirubin 0 61 0 20 - 1 00 mg/dL    eGFR 41 ml/min/1 73sq m   Magnesium    Collection Time: 06/11/21  4:57 AM   Result Value Ref Range    Magnesium 1 8 1 6 - 2 6 mg/dL   CBC (With Platelets)    Collection Time: 06/11/21  4:57 AM   Result Value Ref Range    WBC 5 85 4 31 - 10 16 Thousand/uL    RBC 3 55 (L) 3 81 - 5 12 Million/uL Hemoglobin 11 1 (L) 11 5 - 15 4 g/dL    Hematocrit 33 7 (L) 34 8 - 46 1 %    MCV 95 82 - 98 fL    MCH 31 3 26 8 - 34 3 pg    MCHC 32 9 31 4 - 37 4 g/dL    RDW 13 2 11 6 - 15 1 %    Platelets 579 (L) 061 - 390 Thousands/uL    MPV 9 5 8 9 - 12 7 fL   ECG 12 lead    Collection Time: 06/12/21  8:42 PM   Result Value Ref Range    Ventricular Rate 65 BPM    Atrial Rate 65 BPM    NE Interval 156 ms    QRSD Interval 86 ms    QT Interval 472 ms    QTC Interval 490 ms    P Axis 55 degrees    QRS Axis 3 degrees    T Wave Axis 64 degrees   CBC and differential    Collection Time: 06/12/21  9:05 PM   Result Value Ref Range    WBC 6 52 4 31 - 10 16 Thousand/uL    RBC 3 55 (L) 3 81 - 5 12 Million/uL    Hemoglobin 11 3 (L) 11 5 - 15 4 g/dL    Hematocrit 34 1 (L) 34 8 - 46 1 %    MCV 96 82 - 98 fL    MCH 31 8 26 8 - 34 3 pg    MCHC 33 1 31 4 - 37 4 g/dL    RDW 13 3 11 6 - 15 1 %    MPV 10 1 8 9 - 12 7 fL    Platelets 907 141 - 351 Thousands/uL    nRBC 0 /100 WBCs    Neutrophils Relative 54 43 - 75 %    Immat GRANS % 0 0 - 2 %    Lymphocytes Relative 24 14 - 44 %    Monocytes Relative 12 4 - 12 %    Eosinophils Relative 9 (H) 0 - 6 %    Basophils Relative 1 0 - 1 %    Neutrophils Absolute 3 58 1 85 - 7 62 Thousands/µL    Immature Grans Absolute 0 02 0 00 - 0 20 Thousand/uL    Lymphocytes Absolute 1 57 0 60 - 4 47 Thousands/µL    Monocytes Absolute 0 76 0 17 - 1 22 Thousand/µL    Eosinophils Absolute 0 56 0 00 - 0 61 Thousand/µL    Basophils Absolute 0 03 0 00 - 0 10 Thousands/µL   Basic metabolic panel    Collection Time: 06/12/21  9:05 PM   Result Value Ref Range    Sodium 134 (L) 136 - 145 mmol/L    Potassium 4 1 3 5 - 5 3 mmol/L    Chloride 98 (L) 100 - 108 mmol/L    CO2 27 21 - 32 mmol/L    ANION GAP 9 4 - 13 mmol/L    BUN 19 5 - 25 mg/dL    Creatinine 1 45 (H) 0 60 - 1 30 mg/dL    Glucose 67 65 - 140 mg/dL    Calcium 8 7 8 3 - 10 1 mg/dL    eGFR 33 ml/min/1 73sq m   TSH    Collection Time: 06/12/21  9:05 PM   Result Value Ref Range    TSH 3RD GENERATON 4 206 (H) 0 358 - 3 740 uIU/mL   Magnesium    Collection Time: 06/12/21  9:05 PM   Result Value Ref Range    Magnesium 1 7 1 6 - 2 6 mg/dL   Troponin I    Collection Time: 06/12/21  9:05 PM   Result Value Ref Range    Troponin I <0 02 <=0 04 ng/mL   C-reactive protein    Collection Time: 06/12/21  9:05 PM   Result Value Ref Range    CRP <3 0 <3 0 mg/L   Sedimentation rate, automated    Collection Time: 06/12/21  9:09 PM   Result Value Ref Range    Sed Rate 25 0 - 29 mm/hour   Troponin I    Collection Time: 06/13/21  1:24 AM   Result Value Ref Range    Troponin I <0 02 <=0 04 ng/mL   Troponin I    Collection Time: 06/13/21  5:26 AM   Result Value Ref Range    Troponin I <0 02 <=0 04 ng/mL   CBC and differential    Collection Time: 06/13/21  5:26 AM   Result Value Ref Range    WBC 5 01 4 31 - 10 16 Thousand/uL    RBC 3 46 (L) 3 81 - 5 12 Million/uL    Hemoglobin 11 1 (L) 11 5 - 15 4 g/dL    Hematocrit 33 7 (L) 34 8 - 46 1 %    MCV 97 82 - 98 fL    MCH 32 1 26 8 - 34 3 pg    MCHC 32 9 31 4 - 37 4 g/dL    RDW 13 5 11 6 - 15 1 %    MPV 9 6 8 9 - 12 7 fL    Platelets 406 343 - 103 Thousands/uL    nRBC 0 /100 WBCs    Neutrophils Relative 56 43 - 75 %    Immat GRANS % 0 0 - 2 %    Lymphocytes Relative 23 14 - 44 %    Monocytes Relative 10 4 - 12 %    Eosinophils Relative 11 (H) 0 - 6 %    Basophils Relative 0 0 - 1 %    Neutrophils Absolute 2 77 1 85 - 7 62 Thousands/µL    Immature Grans Absolute 0 01 0 00 - 0 20 Thousand/uL    Lymphocytes Absolute 1 14 0 60 - 4 47 Thousands/µL    Monocytes Absolute 0 50 0 17 - 1 22 Thousand/µL    Eosinophils Absolute 0 57 0 00 - 0 61 Thousand/µL    Basophils Absolute 0 02 0 00 - 0 10 Thousands/µL   Magnesium    Collection Time: 06/13/21  5:26 AM   Result Value Ref Range    Magnesium 1 9 1 6 - 2 6 mg/dL   Basic metabolic panel    Collection Time: 06/13/21  5:26 AM   Result Value Ref Range    Sodium 138 136 - 145 mmol/L    Potassium 3 9 3 5 - 5 3 mmol/L    Chloride 104 100 - 108 mmol/L    CO2 24 21 - 32 mmol/L    ANION GAP 10 4 - 13 mmol/L    BUN 16 5 - 25 mg/dL    Creatinine 1 25 0 60 - 1 30 mg/dL    Glucose 91 65 - 140 mg/dL    Calcium 8 7 8 3 - 10 1 mg/dL    eGFR 40 ml/min/1 73sq m   Intrinsic factor blocking antibody    Collection Time: 06/13/21 11:46 AM   Result Value Ref Range    Intrinsic Factor 1 1 0 0 - 1 1 AU/mL   Basic metabolic panel    Collection Time: 06/14/21  4:40 AM   Result Value Ref Range    Sodium 139 136 - 145 mmol/L    Potassium 4 3 3 5 - 5 3 mmol/L    Chloride 104 100 - 108 mmol/L    CO2 25 21 - 32 mmol/L    ANION GAP 10 4 - 13 mmol/L    BUN 20 5 - 25 mg/dL    Creatinine 1 27 0 60 - 1 30 mg/dL    Glucose 85 65 - 140 mg/dL    Calcium 8 9 8 3 - 10 1 mg/dL    eGFR 39 ml/min/1 73sq m   Troponin I    Collection Time: 06/14/21  4:40 AM   Result Value Ref Range    Troponin I <0 02 <=0 04 ng/mL       The following portions of the patient's history were reviewed and updated as appropriate: allergies, current medications, past family history, past medical history, past social history, past surgical history and problem list      Review of Systems   Constitutional: Negative for appetite change, chills, diaphoresis, fatigue, fever and unexpected weight change  HENT: Negative for congestion, hearing loss and rhinorrhea  Eyes: Negative for visual disturbance  Respiratory: Negative for cough, chest tightness, shortness of breath and wheezing  Cardiovascular: Negative for chest pain, palpitations and leg swelling  Gastrointestinal: Negative for abdominal pain and blood in stool  Endocrine: Negative for cold intolerance, heat intolerance, polydipsia and polyuria  Genitourinary: Negative for difficulty urinating, dysuria, frequency and urgency  Musculoskeletal: Positive for gait problem  Negative for arthralgias and myalgias  Skin: Negative for rash  Neurological: Positive for weakness  Negative for dizziness, light-headedness and headaches  Hematological: Does not bruise/bleed easily  Psychiatric/Behavioral: Negative for dysphoric mood and sleep disturbance  Objective:      Vitals:    07/06/21 1614   BP: 132/60   Pulse:    Resp:    Temp:    SpO2:           Physical Exam  Constitutional:       Appearance: She is well-developed  HENT:      Head: Normocephalic and atraumatic  Nose: Nose normal    Eyes:      General: No scleral icterus  Conjunctiva/sclera: Conjunctivae normal       Pupils: Pupils are equal, round, and reactive to light  Neck:      Thyroid: No thyromegaly  Vascular: No JVD  Trachea: No tracheal deviation  Cardiovascular:      Rate and Rhythm: Normal rate and regular rhythm  Heart sounds: No murmur heard  No friction rub  No gallop  Pulmonary:      Effort: Pulmonary effort is normal  No respiratory distress  Breath sounds: Normal breath sounds  No wheezing or rales  Musculoskeletal:         General: No deformity  Cervical back: Normal range of motion and neck supple  Lymphadenopathy:      Cervical: No cervical adenopathy  Skin:     General: Skin is warm and dry  Coloration: Skin is not pale  Findings: No erythema or rash  Neurological:      Mental Status: She is alert and oriented to person, place, and time  Cranial Nerves: No cranial nerve deficit  Psychiatric:         Behavior: Behavior normal          Thought Content:  Thought content normal          Judgment: Judgment normal

## 2021-07-06 NOTE — PATIENT INSTRUCTIONS
Generalized weakness is of unclear etiology  Stop rosuvastatin and monitor symptoms  Continue with physical therapy  Address blood pressure as below    Hypertension with diastolic hypotension  No orthostatic change in the office today  Will cut back on lisinopril to 5 mg daily at night, continue 50 b i d  Atenolol  Monitor home blood pressures and follow-up later this month with Cardiology as scheduled  Syncope-no recurrence  Follow-up ZIO patch when available  EEG later this month  Anxiety-patient has not tolerated SSRIs in the past and is reluctant to try other medication  Has been fairly stable with imipramine  Continue to monitor symptoms

## 2021-07-08 ENCOUNTER — TELEPHONE (OUTPATIENT)
Dept: INTERNAL MEDICINE CLINIC | Facility: CLINIC | Age: 86
End: 2021-07-08

## 2021-07-08 NOTE — TELEPHONE ENCOUNTER
Dr Miri Mancera saw her yesterday  Forgot to mention; sporatic leg cramps; both  Left leg had a bad one last night  Soft, no redness this AM    Had a total hysterectomy 11 yrs ago  Intermittent mild cheese smell from private area    No discharge    P#: [phone]

## 2021-07-12 ENCOUNTER — CLINICAL SUPPORT (OUTPATIENT)
Dept: CARDIOLOGY CLINIC | Facility: CLINIC | Age: 86
End: 2021-07-12
Payer: MEDICARE

## 2021-07-12 DIAGNOSIS — R55 SYNCOPE, UNSPECIFIED SYNCOPE TYPE: ICD-10-CM

## 2021-07-12 PROCEDURE — 93244 EXT ECG>48HR<7D REV&INTERPJ: CPT | Performed by: INTERNAL MEDICINE

## 2021-07-16 ENCOUNTER — HOSPITAL ENCOUNTER (OUTPATIENT)
Dept: NEUROLOGY | Facility: HOSPITAL | Age: 86
Discharge: HOME | End: 2021-07-16
Payer: MEDICARE

## 2021-07-16 DIAGNOSIS — R55 SYNCOPE: ICD-10-CM

## 2021-07-16 PROCEDURE — 95816 EEG AWAKE AND DROWSY: CPT

## 2021-07-16 PROCEDURE — 95816 EEG AWAKE AND DROWSY: CPT | Performed by: PSYCHIATRY & NEUROLOGY

## 2021-07-26 ENCOUNTER — OFFICE VISIT (OUTPATIENT)
Dept: CARDIOLOGY CLINIC | Facility: CLINIC | Age: 86
End: 2021-07-26
Payer: MEDICARE

## 2021-07-26 VITALS
SYSTOLIC BLOOD PRESSURE: 130 MMHG | HEIGHT: 57 IN | WEIGHT: 110 LBS | DIASTOLIC BLOOD PRESSURE: 64 MMHG | OXYGEN SATURATION: 98 % | BODY MASS INDEX: 23.73 KG/M2 | HEART RATE: 79 BPM | RESPIRATION RATE: 16 BRPM

## 2021-07-26 DIAGNOSIS — R55 SYNCOPE AND COLLAPSE: ICD-10-CM

## 2021-07-26 DIAGNOSIS — I10 ESSENTIAL HYPERTENSION: Primary | ICD-10-CM

## 2021-07-26 DIAGNOSIS — F41.9 ANXIETY: ICD-10-CM

## 2021-07-26 PROCEDURE — 99214 OFFICE O/P EST MOD 30 MIN: CPT | Performed by: INTERNAL MEDICINE

## 2021-07-26 RX ORDER — ATENOLOL 25 MG/1
25 TABLET ORAL 2 TIMES DAILY
Start: 2021-07-26 | End: 2022-01-28 | Stop reason: CLARIF

## 2021-07-27 NOTE — PROGRESS NOTES
[facility] [place]  [address] [facility] [address] [id number]  Cardiology Follow Up    Adrianna Russell  1935  [id number]      1  Essential hypertension  atenolol (TENORMIN) 25 mg tablet   2  Syncope and collapse     3  Anxiety         Chief Complaint   Patient presents with    Follow-up       Interval History:   Patient presents for follow-up visit  Patient denies any history of chest pain shortness of breath  Patient denies any history of leg edema or orthopnea PND  No history of presyncope syncope  Patient states compliance with the present list of medications  Patient does have history of anxiety  No further episodes of palpitations or syncope      Patient Active Problem List   Diagnosis    Chest heaviness    Anxiety    Abnormal EKG    Vitamin D deficiency    Irritable bowel syndrome    Iron deficiency anemia    Hypothyroidism    Hypertension    Hyperlipidemia    GERD without esophagitis    ESR raised    Chronic kidney disease    AVM (arteriovenous malformation)    Venous insufficiency    Impaired fasting glucose    Cough    Mild intermittent asthma without complication    Age-related osteoporosis without current pathological fracture    Nondisplaced fracture of middle phalanx of left middle finger, initial encounter for closed fracture    Closed fracture of tenth thoracic vertebra with routine healing    Fall as cause of accidental injury in home as place of occurrence    Atrophic vaginitis    Benign familial tremor    Eustachian tube dysfunction, bilateral    Tension-type headache, not intractable    Hiatal hernia    Syncope    Headache, resolved    Elevated serum creatinine    B12 deficiency    Neuropathy     Past Medical History:   Diagnosis Date    Benign essential hypertension     Last assessed: 9/11/14    Disease of thyroid gland     Essential tremor     Fibromyalgia     GERD (gastroesophageal reflux disease)     History of nail disorders     Hyperlipidemia     Hypertension     Palpitations     Transient cerebral ischemia      Social History     Socioeconomic History    Marital status:      Spouse name: Not on file    Number of children: Not on file    Years of education: Not on file    Highest education level: Not on file   Occupational History    Occupation: retired    Tobacco Use    Smoking status: Former Smoker     Packs/day: 0 10     Years: 50 00     Pack years: 5 00     Types: Cigarettes     Start date:      Quit date:      Years since quittin 5    Smokeless tobacco: Never Used    Tobacco comment: 1 pack a week    Vaping Use    Vaping Use: Never used   Substance and Sexual Activity    Alcohol use: Not Currently     Alcohol/week: 0 0 standard drinks     Comment: 0    Drug use: No    Sexual activity: Not Currently     Partners: Male   Other Topics Concern    Not on file   Social History Narrative    Living independently with spouse     Social Determinants of Health     Financial Resource Strain:     Difficulty of Paying Living Expenses:    Food Insecurity:     Worried About Running Out of Food in the Last Year:     [address] in the Last Year:    Transportation Needs:     Lack of Transportation (Medical):      Lack of Transportation (Non-Medical):    Physical Activity: Insufficiently Active    Days of Exercise per Week: 2 days    Minutes of Exercise per Session: 50 min   Stress: No Stress Concern Present    Feeling of Stress : Not at all   Social Connections:     Frequency of Communication with Friends and Family:     Frequency of Social Gatherings with Friends and Family:     Attends Yazidi Services:     Active Member of Clubs or Organizations:     Attends Club or Organization Meetings:     Marital Status:    Intimate Partner Violence:     Fear of Current or Ex-Partner:     Emotionally Abused:     Physically Abused:     Sexually Abused:       Family History   Problem Relation Age of Onset    Stroke Mother         ischemic stroke    Hypertension Mother     Heart disease Father      Past Surgical History:   Procedure Laterality Date    APPENDECTOMY      EGD AND COLONOSCOPY  07/2020    HYSTERECTOMY  01/01/2010    ME LAP,CHOLECYSTECTOMY/GRAPH N/A 4/4/2018    Procedure: LAPAROSCOPIC CHOLECYSTECTOMY WITH IOC;  Surgeon: Dominga Tee MD;  Location: [facility];  Service: General    TUBAL LIGATION         Current Outpatient Medications:     acetaminophen (TYLENOL) 325 mg tablet, Take 1-2 tablets by mouth every 6 (six) hours as needed, Disp: , Rfl:     ALPRAZolam (XANAX) 0 5 mg tablet, Take 0 5 mg by mouth daily as needed  , Disp: , Rfl:     atenolol (TENORMIN) 25 mg tablet, Take 1 tablet (25 mg total) by mouth 2 (two) times a day, Disp: , Rfl:     ferrous sulfate 325 (65 Fe) mg tablet, Take 1 tablet by mouth daily, Disp: , Rfl:     imipramine (TOFRANIL) 10 mg tablet, Take 1 tablet (10 mg total) by mouth 4 (four) times a week, Disp: 90 tablet, Rfl: 3    latanoprost (XALATAN) 0 005 % ophthalmic solution, instill 1 drop into both eyes nightly, Disp: , Rfl:     levothyroxine 50 mcg tablet, 50 UG 6 DAYS PER WEEK (Patient taking differently: 50 UG 7 DAYS PER WEEK), Disp: 90 tablet, Rfl: 3    lisinopril (ZESTRIL) 10 mg tablet, 1/2 po hs, Disp: 90 tablet, Rfl: 3    pantoprazole (PROTONIX) 40 mg tablet, take 1 tablet by mouth daily before breakfast, Disp: 30 tablet, Rfl: 5    Probiotic Product (ALIGN) 4 MG CAPS, Take 1 tablet by mouth daily, Disp: , Rfl:   Allergies   Allergen Reactions    Other Drowsiness     Annotation - [id number]: ANTIDEPRESSANTS       Labs:  Admission on 06/12/2021, Discharged on 06/14/2021   Component Date Value    WBC 06/12/2021 6 52     RBC 06/12/2021 3 55*    Hemoglobin 06/12/2021 11 3*    Hematocrit 06/12/2021 34 1*    MCV 06/12/2021 96     [address] 06/12/2021 31 8     MCHC 06/12/2021 33 1     RDW 06/12/2021 13 3     MPV 06/12/2021 10 1     Platelets 06/12/2021 168     nRBC 06/12/2021 0     Neutrophils Relative 06/12/2021 54     Immat GRANS % 06/12/2021 0     Lymphocytes Relative 06/12/2021 24     Monocytes Relative 06/12/2021 12     Eosinophils Relative 06/12/2021 9*    Basophils Relative 06/12/2021 1     Neutrophils Absolute 06/12/2021 3 58     Immature Grans Absolute 06/12/2021 0 02     Lymphocytes Absolute 06/12/2021 1 57     Monocytes Absolute 06/12/2021 0 76     Eosinophils Absolute 06/12/2021 0 56     Basophils Absolute 06/12/2021 0 03     Sodium 06/12/2021 134*    Potassium 06/12/2021 4 1     Chloride 06/12/2021 98*    CO2 06/12/2021 27     ANION GAP 06/12/2021 9     BUN 06/12/2021 19     Creatinine 06/12/2021 1 45*    Glucose 06/12/2021 67     Calcium 06/12/2021 8 7     eGFR 06/12/2021 33     TSH 3RD GENERATON 06/12/2021 4 206*    Magnesium 06/12/2021 1 7     Troponin I 06/12/2021 <0 02     Sed Rate 06/12/2021 25     CRP 06/12/2021 <3 0     Troponin I 06/13/2021 <0 02     Troponin I 06/13/2021 <0 02     WBC 06/13/2021 5 01     RBC 06/13/2021 3 46*    Hemoglobin 06/13/2021 11 1*    Hematocrit 06/13/2021 33 7*    MCV 06/13/2021 97     MCH 06/13/2021 32 1     MCHC 06/13/2021 32 9     RDW 06/13/2021 13 5     MPV 06/13/2021 9 6     Platelets 60/99/9962 153     nRBC 06/13/2021 0     Neutrophils Relative 06/13/2021 56     Immat GRANS % 06/13/2021 0     Lymphocytes Relative 06/13/2021 23     Monocytes Relative 06/13/2021 10     Eosinophils Relative 06/13/2021 11*    Basophils Relative 06/13/2021 0     Neutrophils Absolute 06/13/2021 2 77     Immature Grans Absolute 06/13/2021 0 01     Lymphocytes Absolute 06/13/2021 1 14     Monocytes Absolute 06/13/2021 0 50     Eosinophils Absolute 06/13/2021 0 57     Basophils Absolute 06/13/2021 0 02     Magnesium 06/13/2021 1 9     Sodium 06/13/2021 138     Potassium 06/13/2021 3 9     Chloride 06/13/2021 104     CO2 06/13/2021 24     ANION GAP 06/13/2021 10     BUN 06/13/2021 16     Creatinine 06/13/2021 1 25     Glucose 06/13/2021 91     Calcium 06/13/2021 8 7     eGFR 06/13/2021 40     Ventricular Rate 06/12/2021 65     Atrial Rate 06/12/2021 65     ND Interval 06/12/2021 156     QRSD Interval 06/12/2021 86     QT Interval 06/12/2021 472     QTC Interval 06/12/2021 490     P Axis 06/12/2021 55     QRS Axis 06/12/2021 3     T Wave Axis 06/12/2021 64     Intrinsic Factor 06/13/2021 1 1     Sodium 06/14/2021 139     Potassium 06/14/2021 4 3     Chloride 06/14/2021 104     CO2 06/14/2021 25     ANION GAP 06/14/2021 10     BUN 06/14/2021 20     Creatinine 06/14/2021 1 27     Glucose 06/14/2021 85     Calcium 06/14/2021 8 9     eGFR 06/14/2021 39     Troponin I 06/14/2021 <0 02    Admission on 06/10/2021, Discharged on 06/11/2021   Component Date Value    WBC 06/10/2021 5 64     RBC 06/10/2021 3 49*    Hemoglobin 06/10/2021 11 0*    Hematocrit 06/10/2021 33 4*    MCV 06/10/2021 96     MCH 06/10/2021 31 5     MCHC 06/10/2021 32 9     RDW 06/10/2021 13 4     MPV 06/10/2021 9 3     Platelets 07/61/7133 151     nRBC 06/10/2021 0     Neutrophils Relative 06/10/2021 61     Immat GRANS % 06/10/2021 0     Lymphocytes Relative 06/10/2021 19     Monocytes Relative 06/10/2021 10     Eosinophils Relative 06/10/2021 10*    Basophils Relative 06/10/2021 0     Neutrophils Absolute 06/10/2021 3 39     Immature Grans Absolute 06/10/2021 0 02     Lymphocytes Absolute 06/10/2021 1 06     Monocytes Absolute 06/10/2021 0 58     Eosinophils Absolute 06/10/2021 0 57     Basophils Absolute 06/10/2021 0 02     Sodium 06/10/2021 136     Potassium 06/10/2021 4 1     Chloride 06/10/2021 100     CO2 06/10/2021 26     ANION GAP 06/10/2021 10     BUN 06/10/2021 17     Creatinine 06/10/2021 1 29     Glucose 06/10/2021 66     Calcium 06/10/2021 9 0     AST 06/10/2021 36     ALT 06/10/2021 32     Alkaline Phosphatase 06/10/2021 116     Total Protein 06/10/2021 7 5     Albumin 06/10/2021 3 9     Total Bilirubin 06/10/2021 0 48     eGFR 06/10/2021 38     Color, UA 06/10/2021 Light Yellow     Clarity, UA 06/10/2021 Cloudy     Specific Gravity, UA 06/10/2021 <=1 005     pH, UA 06/10/2021 5 5     Leukocytes, UA 06/10/2021 Moderate*    Nitrite, UA 06/10/2021 Negative     Protein, UA 06/10/2021 Negative     Glucose, UA 06/10/2021 Negative     Ketones, UA 06/10/2021 Negative     Urobilinogen, UA 06/10/2021 0 2     Bilirubin, UA 06/10/2021 Negative     Blood, UA 06/10/2021 Trace-Intact*    Troponin I 06/10/2021 <0 02     SARS-CoV-2 06/10/2021 Negative     RBC, UA 06/10/2021 1-2     WBC, UA 06/10/2021 10-20*    Epithelial Cells 06/10/2021 Moderate*    Bacteria, UA 06/10/2021 Occasional     Urine Culture 06/10/2021 10,000-19,000 cfu/ml      Troponin I 06/10/2021 <0 02     Troponin I 06/11/2021 <0 02     Platelets 79/60/8553 152     MPV 06/10/2021 9 5     TSH 3RD GENERATON 06/11/2021 1 790     Sodium 06/11/2021 139     Potassium 06/11/2021 4 3     Chloride 06/11/2021 103     CO2 06/11/2021 24     ANION GAP 06/11/2021 12     BUN 06/11/2021 15     Creatinine 06/11/2021 1 22     Glucose 06/11/2021 85     Glucose, Fasting 06/11/2021 85     Calcium 06/11/2021 8 9     Corrected Calcium 06/11/2021 9 4     AST 06/11/2021 38     ALT 06/11/2021 30     Alkaline Phosphatase 06/11/2021 96     Total Protein 06/11/2021 6 9     Albumin 06/11/2021 3 4*    Total Bilirubin 06/11/2021 0 61     eGFR 06/11/2021 41     Magnesium 06/11/2021 1 8     WBC 06/11/2021 5 85     RBC 06/11/2021 3 55*    Hemoglobin 06/11/2021 11 1*    Hematocrit 06/11/2021 33 7*    MCV 06/11/2021 95     MCH 06/11/2021 31 3     MCHC 06/11/2021 32 9     RDW 06/11/2021 13 2     Platelets 94/96/5071 147*    MPV 06/11/2021 9 5     Ventricular Rate 06/10/2021 65     Atrial Rate 06/10/2021 65     NC Interval 06/10/2021 156     QRSD Interval 06/10/2021 84     QT Interval 06/10/2021 452     QTC Interval 06/10/2021 470     P Axis 06/10/2021 41     QRS Axis 06/10/2021 -6     T Wave Axis 06/10/2021 49     Ventricular Rate 06/10/2021 66     Atrial Rate 06/10/2021 66     ID Interval 06/10/2021 150     QRSD Interval 06/10/2021 84     QT Interval 06/10/2021 438     QTC Interval 06/10/2021 459     P Axis 06/10/2021 52     QRS Axis 06/10/2021 2     T Wave Axis 06/10/2021 58      Imaging: EEG Routine and awake    Result Date: 7/16/2021  Narrative: EEG This is a routine 28 channel EEG recording performed for 30 minutes and 54 seconds beginning at 1:02 p m  on an 30-year-old woman with history of syncope   Background activities during wakefulness consist of mid amplitude 9-10 cycle per second activities emanating from the posterior head region  These activities are reactive to eye opening  Intermingled with background activities are a moderate amount of lower amplitude beta activities emanating from the frontocentral head regions  Episodes of drowsiness  And sleep were not seen  Muscle artifact was noted intermittently  The main feature of the recording work consists of occasional mid amplitude 4-6 cycle per second activities emanating from the bitemporal region independently Photic stimulation was performed over a wide range of flash frequencies and produced a symmetrical driving response  Hyperventilation was not obtained Concomitant EKG revealed a sinus rhythm  Impression: This is a mildly abnormal EEG due to the dysrhythmic activities in the theta frequencies emanating from the frontotemporal regions bilaterally  This type of abnormality suggests cortical and subcortical dysfunction in this region  No epileptiform abnormalities were noted  If seizure is considered clinically repeat tracing with sleep deprivation may be of additional diagnostic value ANDREA Cruz             Review of Systems:  Review of Systems     REVIEW OF SYSTEMS:  Constitutional: Denies fever or chills   Eyes:  Denies change in visual acuity   HENT:  Denies nasal congestion or sore throat   Respiratory:  Denies cough or shortness of breath   Cardiovascular:  Denies chest pain or edema   GI:  Denies abdominal pain, nausea, vomiting, bloody stools or diarrhea   :  Denies dysuria, frequency, difficulty in micturition and nocturia  Musculoskeletal:  Denies back pain or joint pain   Neurologic:  Denies headache, focal weakness or sensory changes   Endocrine:  Denies polyuria or polydipsia   Lymphatic:  Denies swollen glands   Psychiatric:  anxiety     Physical Exam:    /64   Pulse 79   Resp 16   Ht 4' 9" (1 448 m)   Wt 49 9 kg (110 lb)   SpO2 98%   BMI 23 80 kg/m²     Physical Exam   PHYSICAL EXAM:  General:  Patient is not in acute distress   Head: Normocephalic, Atraumatic  HEENT:  Both pupils normal-size atraumatic, normocephalic, nonicteric  Neck:  JVP not raised  Trachea central  No carotid bruit  Respiratory:  normal breath sounds no crackles  no rhonchi  Cardiovascular:  Regular rate and rhythm no S3 no murmurs  GI:  Abdomen soft nontender  No organomegaly  Lymphatic:  No cervical or inguinal lymphadenopathy  Neurologic:  Patient is awake alert, oriented   Grossly nonfocal    Extremities no  edema    Discussion/Summary:   Patient with multiple medical problems who seems to be doing reasonably well from cardiac standpoint  Previous studies reviewed with patient  Medications reviewed and possible side effects discussed  concepts of cardiovascular disease , signs and symptoms of heart disease  Dietary and risk factor modification reinforced  All questions answered  Safety measures reviewed  Patient advised to report any problems prompting medical attention  results of cardiovascular studies including results of echocardiogram as well as CardioNet monitor reviewed with patient    Symptoms to watch out from cardiac standpoint which would indicate the need for further cardiac evaluation discussed  Patient counseled about anxiety and stress management  Follow-up in 6 months or earlier as needed

## 2021-07-30 ENCOUNTER — RA CDI HCC (OUTPATIENT)
Dept: OTHER | Facility: HOSPITAL | Age: 86
End: 2021-07-30

## 2021-07-30 NOTE — PROGRESS NOTES
[facility]  coding opportunities          Chart reviewed, no opportunity found: CHART REVIEWED, NO OPPORTUNITY FOUND                     Patients insurance company: [employer]

## 2021-08-05 ENCOUNTER — OFFICE VISIT (OUTPATIENT)
Dept: INTERNAL MEDICINE CLINIC | Facility: CLINIC | Age: 86
End: 2021-08-05
Payer: MEDICARE

## 2021-08-05 ENCOUNTER — TELEPHONE (OUTPATIENT)
Dept: INTERNAL MEDICINE CLINIC | Facility: CLINIC | Age: 86
End: 2021-08-05

## 2021-08-05 VITALS
WEIGHT: 110 LBS | RESPIRATION RATE: 16 BRPM | HEART RATE: 60 BPM | BODY MASS INDEX: 23.73 KG/M2 | SYSTOLIC BLOOD PRESSURE: 128 MMHG | HEIGHT: 57 IN | DIASTOLIC BLOOD PRESSURE: 72 MMHG

## 2021-08-05 DIAGNOSIS — E53.8 B12 DEFICIENCY: ICD-10-CM

## 2021-08-05 DIAGNOSIS — R55 SYNCOPE, UNSPECIFIED SYNCOPE TYPE: Primary | ICD-10-CM

## 2021-08-05 DIAGNOSIS — F41.9 ANXIETY: ICD-10-CM

## 2021-08-05 DIAGNOSIS — Z12.31 ENCOUNTER FOR SCREENING MAMMOGRAM FOR MALIGNANT NEOPLASM OF BREAST: ICD-10-CM

## 2021-08-05 DIAGNOSIS — R73.01 IMPAIRED FASTING GLUCOSE: ICD-10-CM

## 2021-08-05 DIAGNOSIS — E78.2 MIXED HYPERLIPIDEMIA: ICD-10-CM

## 2021-08-05 DIAGNOSIS — E03.9 ACQUIRED HYPOTHYROIDISM: ICD-10-CM

## 2021-08-05 DIAGNOSIS — N18.32 STAGE 3B CHRONIC KIDNEY DISEASE (HCC): ICD-10-CM

## 2021-08-05 DIAGNOSIS — I10 ESSENTIAL HYPERTENSION: ICD-10-CM

## 2021-08-05 PROCEDURE — 99214 OFFICE O/P EST MOD 30 MIN: CPT | Performed by: INTERNAL MEDICINE

## 2021-08-05 NOTE — PROGRESS NOTES
Assessment/Plan:    Diagnoses and all orders for this visit:    Syncope, unspecified syncope type    Essential hypertension  -     Magnesium; Future  -     Phosphorus; Future  -     CBC and differential; Future  -     Comprehensive metabolic panel; Future  -     Lipid panel; Future    Impaired fasting glucose  -     Hemoglobin A1C; Future    Acquired hypothyroidism  -     TSH, 3rd generation with Free T4 reflex; Future    Stage 3b chronic kidney disease (HCC)    Anxiety    Mixed hyperlipidemia    B12 deficiency  -     Folate; Future  -     Vitamin B12; Future    Encounter for screening mammogram for malignant neoplasm of breast  -     Mammo screening bilateral w 3d & cad; Future    Other orders  -     cyanocobalamin (VITAMIN B-12) 1000 MCG tablet; Take 1,000 mcg by mouth daily              Patient Instructions   Eeg and ZIO patch reviewed in detail    Syncope/staring spells-no recurrence, unclear etiology, cardiac and neurology workup unremarkable  Continue to monitor symptomatic Graciela  Hypertension with diastolic hypotension improved with lowering dose of lisinopril  Continue current regimen  Fatigue with history of B12 deficiency-resume B12 supplement and check B12 levels prior to follow-up    Lab work prior to follow-up in October, sooner as needed  Subjective:      Patient ID: Valentina Johnson is a 82 y o  female    Presents w/ granddgtr Darcie Samuel   Feeling ok, but still fatigued,  weak w/ low energy  No further spells, lightheaded, cp/sob, or falls  Completed home PT, but using rollator 100%, except when w/ someone, then cane  Home bp's 100-130's/47-72  Most wnl              Current Outpatient Medications:     acetaminophen (TYLENOL) 325 mg tablet, Take 1-2 tablets by mouth every 6 (six) hours as needed, Disp: , Rfl:     ALPRAZolam (XANAX) 0 5 mg tablet, Take 0 5 mg by mouth daily as needed  , Disp: , Rfl:     atenolol (TENORMIN) 25 mg tablet, Take 1 tablet (25 mg total) by mouth 2 (two) times a day, Disp: , Rfl:     cyanocobalamin (VITAMIN B-12) 1000 MCG tablet, Take 1,000 mcg by mouth daily, Disp: , Rfl:     ferrous sulfate 325 (65 Fe) mg tablet, Take 1 tablet by mouth daily, Disp: , Rfl:     imipramine (TOFRANIL) 10 mg tablet, Take 1 tablet (10 mg total) by mouth 4 (four) times a week, Disp: 90 tablet, Rfl: 3    latanoprost (XALATAN) 0 005 % ophthalmic solution, instill 1 drop into both eyes nightly, Disp: , Rfl:     levothyroxine 50 mcg tablet, 50 UG 6 DAYS PER WEEK (Patient taking differently: 50 UG 7 DAYS PER WEEK), Disp: 90 tablet, Rfl: 3    lisinopril (ZESTRIL) 10 mg tablet, 1/2 po hs, Disp: 90 tablet, Rfl: 3    pantoprazole (PROTONIX) 40 mg tablet, take 1 tablet by mouth daily before breakfast, Disp: 30 tablet, Rfl: 5    Probiotic Product (ALIGN) 4 MG CAPS, Take 1 tablet by mouth daily, Disp: , Rfl:     No results found for this or any previous visit (from the past 1008 hour(s))  The following portions of the patient's history were reviewed and updated as appropriate: allergies, current medications, past family history, past medical history, past social history, past surgical history and problem list      Review of Systems      Objective:      Vitals:    08/05/21 1249   BP: 128/72   Pulse: 60   Resp: 16          Physical Exam  Constitutional:       Appearance: She is well-developed  HENT:      Head: Normocephalic and atraumatic  Nose: Nose normal    Eyes:      General: No scleral icterus  Conjunctiva/sclera: Conjunctivae normal       Pupils: Pupils are equal, round, and reactive to light  Neck:      Thyroid: No thyromegaly  Vascular: No JVD  Trachea: No tracheal deviation  Cardiovascular:      Rate and Rhythm: Normal rate and regular rhythm  Heart sounds: No murmur heard  No friction rub  No gallop  Pulmonary:      Effort: Pulmonary effort is normal  No respiratory distress  Breath sounds: Normal breath sounds  No wheezing or rales  Musculoskeletal:         General: No deformity  Cervical back: Normal range of motion and neck supple  Lymphadenopathy:      Cervical: No cervical adenopathy  Skin:     General: Skin is warm and dry  Coloration: Skin is not pale  Findings: No erythema or rash  Neurological:      Mental Status: She is alert and oriented to person, place, and time  Cranial Nerves: No cranial nerve deficit  Psychiatric:         Behavior: Behavior normal          Thought Content:  Thought content normal          Judgment: Judgment normal

## 2021-08-05 NOTE — TELEPHONE ENCOUNTER
Was seen today by Dr Deni Egan and forgot to ask if she still needs to monitor and check her blood pressure everyday      Pls call back

## 2021-08-05 NOTE — PATIENT INSTRUCTIONS
Eeg and ZIO patch reviewed in detail    Syncope/staring spells-no recurrence, unclear etiology, cardiac and neurology workup unremarkable  Continue to monitor symptomatic Graciela  Hypertension with diastolic hypotension improved with lowering dose of lisinopril  Continue current regimen  Fatigue with history of B12 deficiency-resume B12 supplement and check B12 levels prior to follow-up    Lab work prior to follow-up in October, sooner as needed

## 2021-08-23 DIAGNOSIS — E03.9 ACQUIRED HYPOTHYROIDISM: ICD-10-CM

## 2021-08-24 RX ORDER — LEVOTHYROXINE SODIUM 0.05 MG/1
TABLET ORAL
Qty: 90 TABLET | Refills: 3 | Status: SHIPPED | OUTPATIENT
Start: 2021-08-24 | End: 2022-07-21

## 2021-08-25 ENCOUNTER — TELEPHONE (OUTPATIENT)
Dept: INTERNAL MEDICINE CLINIC | Facility: CLINIC | Age: 86
End: 2021-08-25

## 2021-08-25 NOTE — TELEPHONE ENCOUNTER
CALLED PT  AND WAS NOTIFIED AND WILL CALL IN AM FOR APPT   AS NO APPT'S LEFT FOR TODAY AND DOES NOT WANT TO GO TO ER OR URGENT CARE

## 2021-08-25 NOTE — TELEPHONE ENCOUNTER
Pt injured her back Monday, bending over to  a small suitcase   now is very painful w/burning sensation in the lower back down to her feet, upper back hurts too, but not as bad   her feet swelled up after this happened  Britni Connie she went to the ED  @ 2 PM & at 6 someone came out & said she would have to wait another 3 - 4 hr's they were very crowded, out of bed's,  having to put pt's on recliners     Pt can't sit long, or stand long, had a splitting headache & had to go home  There is no availability to see Dr today or tomorrow  Would  order an x ray ? ?

## 2021-08-25 NOTE — TELEPHONE ENCOUNTER
We do not usually order x-rays for acute back pain and less there is trauma  Did she hear   Or feel anything snap or crack? She can see 1 of the a peas today or try to schedule with me tomorrow

## 2021-08-26 ENCOUNTER — OFFICE VISIT (OUTPATIENT)
Dept: INTERNAL MEDICINE CLINIC | Facility: CLINIC | Age: 86
End: 2021-08-26
Payer: MEDICARE

## 2021-08-26 VITALS
HEIGHT: 57 IN | TEMPERATURE: 96.4 F | DIASTOLIC BLOOD PRESSURE: 62 MMHG | WEIGHT: 109.2 LBS | SYSTOLIC BLOOD PRESSURE: 132 MMHG | BODY MASS INDEX: 23.56 KG/M2 | OXYGEN SATURATION: 94 % | HEART RATE: 68 BPM

## 2021-08-26 DIAGNOSIS — M54.9 BACK PAIN, UNSPECIFIED BACK LOCATION, UNSPECIFIED BACK PAIN LATERALITY, UNSPECIFIED CHRONICITY: Primary | ICD-10-CM

## 2021-08-26 PROCEDURE — 99213 OFFICE O/P EST LOW 20 MIN: CPT | Performed by: NURSE PRACTITIONER

## 2021-08-26 RX ORDER — METHOCARBAMOL 500 MG/1
500 TABLET, FILM COATED ORAL 3 TIMES DAILY
Qty: 30 TABLET | Refills: 0 | Status: SHIPPED | OUTPATIENT
Start: 2021-08-26 | End: 2022-01-28 | Stop reason: CLARIF

## 2021-08-26 NOTE — PROGRESS NOTES
Assessment/Plan:    Patient Instructions    Lower back pain likely musculoskeletal   Recommend warm compresses 20 minutes 3 times a day, Tylenol 3 times a day, avoid Motrin ibuprofen etcetera secondary to chronic kidney disease, add muscle relaxant, okay to take 1/2 as needed  If symptoms are not improving come Monday she should contact us         Diagnoses and all orders for this visit:    Back pain, unspecified back location, unspecified back pain laterality, unspecified chronicity  -     methocarbamol (ROBAXIN) 500 mg tablet; Take 1 tablet (500 mg total) by mouth 3 (three) times a day         Subjective:      Patient ID: Holly Hernandez is a 82 y o  female     On Sunday patient was packing up her luggage and she leaned over to  a small suitcase when she felt a twinge of pain in her lower back, she did not think much of it, then they drove home from the [place] the next morning which was Monday she woke up she had soreness, but each day since then it is progressively getting worse now she feels it in the lower back it is radiating down her right leg  She denies numbness or tingling just a deep ache work worse with movement but still there even at rest, she tried Tylenol was ineffective went to the emergency room she was there for too long and she left  She denies any pre-existing back conditions except for an old fracture but that is the upper back  She denies any trauma or falls    She is complaining of mild swelling to her ankles        Current Outpatient Medications:     acetaminophen (TYLENOL) 325 mg tablet, Take 1-2 tablets by mouth every 6 (six) hours as needed, Disp: , Rfl:     ALPRAZolam (XANAX) 0 5 mg tablet, Take 0 5 mg by mouth daily as needed  , Disp: , Rfl:     atenolol (TENORMIN) 25 mg tablet, Take 1 tablet (25 mg total) by mouth 2 (two) times a day, Disp: , Rfl:     cyanocobalamin (VITAMIN B-12) 1000 MCG tablet, Take 1,000 mcg by mouth daily, Disp: , Rfl:     ferrous sulfate 325 (65 Fe) mg tablet, Take 1 tablet by mouth daily, Disp: , Rfl:     imipramine (TOFRANIL) 10 mg tablet, Take 1 tablet (10 mg total) by mouth 4 (four) times a week, Disp: 90 tablet, Rfl: 3    latanoprost (XALATAN) 0 005 % ophthalmic solution, instill 1 drop into both eyes nightly, Disp: , Rfl:     levothyroxine 50 mcg tablet, take 1 tablet by mouth 6 DAYS A WEEK (Patient taking differently: daily take 1 tablet by mouth 6 DAYS A WEEK), Disp: 90 tablet, Rfl: 3    lisinopril (ZESTRIL) 10 mg tablet, 1/2 po hs, Disp: 90 tablet, Rfl: 3    pantoprazole (PROTONIX) 40 mg tablet, take 1 tablet by mouth daily before breakfast, Disp: 30 tablet, Rfl: 5    Probiotic Product (ALIGN) 4 MG CAPS, Take 1 tablet by mouth daily, Disp: , Rfl:     methocarbamol (ROBAXIN) 500 mg tablet, Take 1 tablet (500 mg total) by mouth 3 (three) times a day, Disp: 30 tablet, Rfl: 0    No results found for this or any previous visit (from the past 1008 hour(s))  The following portions of the patient's history were reviewed and updated as appropriate: allergies, current medications, past family history, past medical history, past social history, past surgical history and problem list      Review of Systems   Constitutional: Negative for appetite change, chills, diaphoresis, fatigue, fever and unexpected weight change  HENT: Negative for postnasal drip and sneezing  Eyes: Negative for visual disturbance  Respiratory: Negative for chest tightness and shortness of breath  Cardiovascular: Positive for leg swelling  Negative for chest pain and palpitations  Gastrointestinal: Negative for abdominal pain and blood in stool  Endocrine: Negative for cold intolerance, heat intolerance, polydipsia, polyphagia and polyuria  Genitourinary: Negative for difficulty urinating, dysuria, frequency and urgency  Musculoskeletal: Positive for back pain  Negative for arthralgias and myalgias  Skin: Negative for rash and wound     Neurological: Negative for dizziness, weakness, light-headedness and headaches  Hematological: Negative for adenopathy  Psychiatric/Behavioral: Negative for confusion, dysphoric mood and sleep disturbance  The patient is not nervous/anxious  Objective:      /62   Pulse 68   Temp (!) 96 4 °F (35 8 °C) (Tympanic)   Ht 4' 9" (1 448 m)   Wt 49 5 kg (109 lb 3 2 oz)   SpO2 94%   BMI 23 63 kg/m²        Physical Exam  Constitutional:       General: She is not in acute distress  Appearance: She is well-developed  She is not diaphoretic  HENT:      Head: Normocephalic and atraumatic  Nose: Nose normal    Eyes:      Conjunctiva/sclera: Conjunctivae normal       Pupils: Pupils are equal, round, and reactive to light  Neck:      Thyroid: No thyromegaly  Vascular: No JVD  Trachea: No tracheal deviation  Cardiovascular:      Rate and Rhythm: Normal rate and regular rhythm  Heart sounds: Normal heart sounds  No murmur heard  No friction rub  No gallop  Pulmonary:      Effort: Pulmonary effort is normal  No respiratory distress  Breath sounds: Normal breath sounds  No wheezing or rales  Abdominal:      General: Bowel sounds are normal  There is no distension  Palpations: Abdomen is soft  Tenderness: There is no abdominal tenderness  Musculoskeletal:         General: Normal range of motion  Cervical back: Normal range of motion and neck supple  Comments: Tenderness to bilateral si joints   Lymphadenopathy:      Cervical: No cervical adenopathy  Skin:     General: Skin is warm and dry  Findings: No rash  Neurological:      Mental Status: She is alert and oriented to person, place, and time  Cranial Nerves: No cranial nerve deficit  Psychiatric:         Behavior: Behavior normal          Thought Content:  Thought content normal          Judgment: Judgment normal

## 2021-09-07 ENCOUNTER — TELEPHONE (OUTPATIENT)
Dept: INTERNAL MEDICINE CLINIC | Facility: CLINIC | Age: 86
End: 2021-09-07

## 2021-09-07 NOTE — TELEPHONE ENCOUNTER
Pharmacy is requesting a new script for Lisinopril 5 mg tablet  It is very difficult for the patient to break the 10 mg tablet in half because the pill is so small       Send new script to [facility]

## 2021-09-13 DIAGNOSIS — I10 ESSENTIAL HYPERTENSION: ICD-10-CM

## 2021-09-13 DIAGNOSIS — F41.9 ANXIETY: ICD-10-CM

## 2021-09-13 RX ORDER — LISINOPRIL 5 MG/1
5 TABLET ORAL DAILY
Qty: 90 TABLET | Refills: 0 | Status: SHIPPED | OUTPATIENT
Start: 2021-09-13 | End: 2022-03-14 | Stop reason: SDUPTHER

## 2021-09-13 RX ORDER — IMIPRAMINE HYDROCHLORIDE 10 MG/1
10 TABLET, FILM COATED ORAL
Qty: 16 TABLET | Refills: 0 | Status: SHIPPED | OUTPATIENT
Start: 2021-09-14 | End: 2022-07-19

## 2021-09-13 NOTE — TELEPHONE ENCOUNTER
Patient called in for a refill of her medication Lisinopril (ZESTRIL) 5 mg and Imipramine (TOFRANIL) 10 mg

## 2021-10-06 DIAGNOSIS — K21.9 GERD WITHOUT ESOPHAGITIS: ICD-10-CM

## 2021-10-06 RX ORDER — PANTOPRAZOLE SODIUM 40 MG/1
40 TABLET, DELAYED RELEASE ORAL
Qty: 30 TABLET | Refills: 0 | Status: SHIPPED | OUTPATIENT
Start: 2021-10-06 | End: 2021-10-13 | Stop reason: SDUPTHER

## 2021-10-08 ENCOUNTER — APPOINTMENT (OUTPATIENT)
Dept: LAB | Facility: CLINIC | Age: 86
End: 2021-10-08
Payer: MEDICARE

## 2021-10-08 DIAGNOSIS — R73.01 IMPAIRED FASTING GLUCOSE: ICD-10-CM

## 2021-10-08 DIAGNOSIS — I10 ESSENTIAL HYPERTENSION: ICD-10-CM

## 2021-10-08 DIAGNOSIS — E53.8 B12 DEFICIENCY: ICD-10-CM

## 2021-10-08 DIAGNOSIS — E03.9 ACQUIRED HYPOTHYROIDISM: ICD-10-CM

## 2021-10-08 LAB
ALBUMIN SERPL BCP-MCNC: 3.9 G/DL (ref 3.5–5)
ALP SERPL-CCNC: 127 U/L (ref 46–116)
ALT SERPL W P-5'-P-CCNC: 16 U/L (ref 12–78)
ANION GAP SERPL CALCULATED.3IONS-SCNC: 5 MMOL/L (ref 4–13)
AST SERPL W P-5'-P-CCNC: 19 U/L (ref 5–45)
BASOPHILS # BLD AUTO: 0.03 THOUSANDS/ΜL (ref 0–0.1)
BASOPHILS NFR BLD AUTO: 1 % (ref 0–1)
BILIRUB SERPL-MCNC: 0.47 MG/DL (ref 0.2–1)
BUN SERPL-MCNC: 16 MG/DL (ref 5–25)
CALCIUM SERPL-MCNC: 10 MG/DL (ref 8.3–10.1)
CHLORIDE SERPL-SCNC: 101 MMOL/L (ref 100–108)
CHOLEST SERPL-MCNC: 285 MG/DL (ref 50–200)
CO2 SERPL-SCNC: 27 MMOL/L (ref 21–32)
CREAT SERPL-MCNC: 1.3 MG/DL (ref 0.6–1.3)
EOSINOPHIL # BLD AUTO: 0.7 THOUSAND/ΜL (ref 0–0.61)
EOSINOPHIL NFR BLD AUTO: 13 % (ref 0–6)
ERYTHROCYTE [DISTWIDTH] IN BLOOD BY AUTOMATED COUNT: 13.1 % (ref 11.6–15.1)
EST. AVERAGE GLUCOSE BLD GHB EST-MCNC: 111 MG/DL
FOLATE SERPL-MCNC: 13.6 NG/ML (ref 3.1–17.5)
GFR SERPL CREATININE-BSD FRML MDRD: 38 ML/MIN/1.73SQ M
GLUCOSE P FAST SERPL-MCNC: 102 MG/DL (ref 65–99)
HBA1C MFR BLD: 5.5 %
HCT VFR BLD AUTO: 35.9 % (ref 34.8–46.1)
HDLC SERPL-MCNC: 79 MG/DL
HGB BLD-MCNC: 12 G/DL (ref 11.5–15.4)
IMM GRANULOCYTES # BLD AUTO: 0.01 THOUSAND/UL (ref 0–0.2)
IMM GRANULOCYTES NFR BLD AUTO: 0 % (ref 0–2)
LDLC SERPL CALC-MCNC: 179 MG/DL (ref 0–100)
LYMPHOCYTES # BLD AUTO: 1 THOUSANDS/ΜL (ref 0.6–4.47)
LYMPHOCYTES NFR BLD AUTO: 18 % (ref 14–44)
MAGNESIUM SERPL-MCNC: 2.1 MG/DL (ref 1.6–2.6)
MCH RBC QN AUTO: 31.7 PG (ref 26.8–34.3)
MCHC RBC AUTO-ENTMCNC: 33.4 G/DL (ref 31.4–37.4)
MCV RBC AUTO: 95 FL (ref 82–98)
MONOCYTES # BLD AUTO: 0.53 THOUSAND/ΜL (ref 0.17–1.22)
MONOCYTES NFR BLD AUTO: 10 % (ref 4–12)
NEUTROPHILS # BLD AUTO: 3.17 THOUSANDS/ΜL (ref 1.85–7.62)
NEUTS SEG NFR BLD AUTO: 58 % (ref 43–75)
NONHDLC SERPL-MCNC: 206 MG/DL
NRBC BLD AUTO-RTO: 0 /100 WBCS
PHOSPHATE SERPL-MCNC: 3.9 MG/DL (ref 2.3–4.1)
PLATELET # BLD AUTO: 223 THOUSANDS/UL (ref 149–390)
PMV BLD AUTO: 10.1 FL (ref 8.9–12.7)
POTASSIUM SERPL-SCNC: 4.1 MMOL/L (ref 3.5–5.3)
PROT SERPL-MCNC: 7.4 G/DL (ref 6.4–8.2)
RBC # BLD AUTO: 3.79 MILLION/UL (ref 3.81–5.12)
SODIUM SERPL-SCNC: 133 MMOL/L (ref 136–145)
TRIGL SERPL-MCNC: 135 MG/DL
TSH SERPL DL<=0.05 MIU/L-ACNC: 0.76 UIU/ML (ref 0.36–3.74)
VIT B12 SERPL-MCNC: 469 PG/ML (ref 100–900)
WBC # BLD AUTO: 5.44 THOUSAND/UL (ref 4.31–10.16)

## 2021-10-08 PROCEDURE — 80061 LIPID PANEL: CPT

## 2021-10-08 PROCEDURE — 83036 HEMOGLOBIN GLYCOSYLATED A1C: CPT

## 2021-10-08 PROCEDURE — 85025 COMPLETE CBC W/AUTO DIFF WBC: CPT

## 2021-10-08 PROCEDURE — 82607 VITAMIN B-12: CPT

## 2021-10-08 PROCEDURE — 82746 ASSAY OF FOLIC ACID SERUM: CPT

## 2021-10-08 PROCEDURE — 84443 ASSAY THYROID STIM HORMONE: CPT

## 2021-10-08 PROCEDURE — 36415 COLL VENOUS BLD VENIPUNCTURE: CPT

## 2021-10-08 PROCEDURE — 80053 COMPREHEN METABOLIC PANEL: CPT

## 2021-10-08 PROCEDURE — 84100 ASSAY OF PHOSPHORUS: CPT

## 2021-10-08 PROCEDURE — 83735 ASSAY OF MAGNESIUM: CPT

## 2021-10-13 ENCOUNTER — OFFICE VISIT (OUTPATIENT)
Dept: INTERNAL MEDICINE CLINIC | Facility: CLINIC | Age: 86
End: 2021-10-13
Payer: MEDICARE

## 2021-10-13 VITALS
HEART RATE: 70 BPM | SYSTOLIC BLOOD PRESSURE: 110 MMHG | HEIGHT: 57 IN | RESPIRATION RATE: 12 BRPM | OXYGEN SATURATION: 98 % | DIASTOLIC BLOOD PRESSURE: 60 MMHG | TEMPERATURE: 98.1 F | WEIGHT: 111 LBS | BODY MASS INDEX: 23.95 KG/M2

## 2021-10-13 DIAGNOSIS — E78.2 MIXED HYPERLIPIDEMIA: ICD-10-CM

## 2021-10-13 DIAGNOSIS — E03.9 HYPOTHYROIDISM, UNSPECIFIED TYPE: ICD-10-CM

## 2021-10-13 DIAGNOSIS — R79.9 ABNORMAL BLOOD CHEMISTRY: ICD-10-CM

## 2021-10-13 DIAGNOSIS — E03.9 ACQUIRED HYPOTHYROIDISM: ICD-10-CM

## 2021-10-13 DIAGNOSIS — G62.9 NEUROPATHY: ICD-10-CM

## 2021-10-13 DIAGNOSIS — K21.9 GERD WITHOUT ESOPHAGITIS: Primary | ICD-10-CM

## 2021-10-13 DIAGNOSIS — M54.50 LOW BACK PAIN WITHOUT SCIATICA, UNSPECIFIED BACK PAIN LATERALITY, UNSPECIFIED CHRONICITY: ICD-10-CM

## 2021-10-13 DIAGNOSIS — Z23 ENCOUNTER FOR IMMUNIZATION: ICD-10-CM

## 2021-10-13 DIAGNOSIS — I10 PRIMARY HYPERTENSION: ICD-10-CM

## 2021-10-13 PROCEDURE — 90662 IIV NO PRSV INCREASED AG IM: CPT | Performed by: INTERNAL MEDICINE

## 2021-10-13 PROCEDURE — G0008 ADMIN INFLUENZA VIRUS VAC: HCPCS | Performed by: INTERNAL MEDICINE

## 2021-10-13 PROCEDURE — 99214 OFFICE O/P EST MOD 30 MIN: CPT | Performed by: INTERNAL MEDICINE

## 2021-10-13 RX ORDER — ROSUVASTATIN CALCIUM 10 MG/1
10 TABLET, COATED ORAL DAILY
Qty: 90 TABLET | Refills: 3 | Status: SHIPPED | OUTPATIENT
Start: 2021-10-13 | End: 2022-07-19

## 2021-10-13 RX ORDER — PANTOPRAZOLE SODIUM 40 MG/1
40 TABLET, DELAYED RELEASE ORAL
Qty: 90 TABLET | Refills: 3 | Status: SHIPPED | OUTPATIENT
Start: 2021-10-13

## 2021-10-21 ENCOUNTER — NURSE TRIAGE (OUTPATIENT)
Dept: OTHER | Facility: OTHER | Age: 86
End: 2021-10-21

## 2021-11-01 DIAGNOSIS — I10 ESSENTIAL HYPERTENSION: ICD-10-CM

## 2021-11-01 RX ORDER — ATENOLOL 50 MG/1
TABLET ORAL
Qty: 180 TABLET | Refills: 3 | Status: SHIPPED | OUTPATIENT
Start: 2021-11-01

## 2021-11-08 ENCOUNTER — OFFICE VISIT (OUTPATIENT)
Dept: INTERNAL MEDICINE CLINIC | Facility: CLINIC | Age: 86
End: 2021-11-08
Payer: MEDICARE

## 2021-11-08 VITALS
HEART RATE: 69 BPM | BODY MASS INDEX: 23.95 KG/M2 | WEIGHT: 111 LBS | RESPIRATION RATE: 14 BRPM | TEMPERATURE: 98.1 F | SYSTOLIC BLOOD PRESSURE: 110 MMHG | HEIGHT: 57 IN | DIASTOLIC BLOOD PRESSURE: 60 MMHG | OXYGEN SATURATION: 98 %

## 2021-11-08 DIAGNOSIS — S89.92XA INJURY OF LEFT KNEE, INITIAL ENCOUNTER: Primary | ICD-10-CM

## 2021-11-08 DIAGNOSIS — M25.473 ANKLE SWELLING, UNSPECIFIED LATERALITY: ICD-10-CM

## 2021-11-08 PROCEDURE — 99213 OFFICE O/P EST LOW 20 MIN: CPT | Performed by: INTERNAL MEDICINE

## 2021-12-03 DIAGNOSIS — F41.9 ANXIETY: Primary | ICD-10-CM

## 2021-12-04 RX ORDER — ALPRAZOLAM 0.5 MG/1
TABLET ORAL
Qty: 120 TABLET | Refills: 0 | Status: SHIPPED | OUTPATIENT
Start: 2021-12-04 | End: 2022-01-04 | Stop reason: SDUPTHER

## 2022-01-03 ENCOUNTER — TELEPHONE (OUTPATIENT)
Dept: INTERNAL MEDICINE CLINIC | Facility: CLINIC | Age: 87
End: 2022-01-03

## 2022-01-03 NOTE — TELEPHONE ENCOUNTER
Pt wanted appt today for stomach pain and swelling of feet  None available  Suggested ER but she already went there and it was too busy so she left  Suggested she call early tomorrow for sameday appt      [id number]

## 2022-01-04 ENCOUNTER — OFFICE VISIT (OUTPATIENT)
Dept: INTERNAL MEDICINE CLINIC | Facility: CLINIC | Age: 87
End: 2022-01-04
Payer: MEDICARE

## 2022-01-04 VITALS
HEIGHT: 57 IN | HEART RATE: 60 BPM | RESPIRATION RATE: 16 BRPM | SYSTOLIC BLOOD PRESSURE: 142 MMHG | DIASTOLIC BLOOD PRESSURE: 80 MMHG | WEIGHT: 109.8 LBS | BODY MASS INDEX: 23.69 KG/M2

## 2022-01-04 DIAGNOSIS — I87.2 VENOUS INSUFFICIENCY: Primary | ICD-10-CM

## 2022-01-04 DIAGNOSIS — I50.32 CHRONIC DIASTOLIC CHF (CONGESTIVE HEART FAILURE) (HCC): ICD-10-CM

## 2022-01-04 DIAGNOSIS — K44.9 HIATAL HERNIA: ICD-10-CM

## 2022-01-04 DIAGNOSIS — K58.9 IRRITABLE BOWEL SYNDROME, UNSPECIFIED TYPE: ICD-10-CM

## 2022-01-04 PROCEDURE — 99214 OFFICE O/P EST MOD 30 MIN: CPT | Performed by: INTERNAL MEDICINE

## 2022-01-04 NOTE — PATIENT INSTRUCTIONS
Venous insufficiency-echocardiogram from June 2021 reviewed with grade 1 diastolic dysfunction and no significant valvular disease  Exam today is unremarkable for any evidence of CHF exacerbation  Patient advised to follow low-sodium diet, wear support stockings and elevate feet above the level of the heart for 30 minutes at a time several times per day to improve swelling  Contact me if swelling worsens or fails to improve  Contact me or seek medical attention immediately for shortness of breath  Abdominal discomfort with known hiatal hernia and history of irritable bowel syndrome  My suspicion is constipation is causing this pain and I have advised bottle of magnesium citrate  If symptoms fail to resolve consider CT imaging  Once you have completed the magnesium citrate I would recommend daily fiber supplement and MiraLax as well as keeping well hydrated to optimize bowel regimen      Follow-up after labs in April, sooner as needed

## 2022-01-04 NOTE — PROGRESS NOTES
Assessment/Plan:    Diagnoses and all orders for this visit:    Venous insufficiency    Irritable bowel syndrome, unspecified type    Hiatal hernia    Chronic diastolic CHF (congestive heart failure) [facility])              Patient Instructions   Venous insufficiency-echocardiogram from June 2021 reviewed with grade 1 diastolic dysfunction and no significant valvular disease  Exam today is unremarkable for any evidence of CHF exacerbation  Patient advised to follow low-sodium diet, wear support stockings and elevate feet above the level of the heart for 30 minutes at a time several times per day to improve swelling  Contact me if swelling worsens or fails to improve  Contact me or seek medical attention immediately for shortness of breath  Abdominal discomfort with known hiatal hernia and history of irritable bowel syndrome  My suspicion is constipation is causing this pain and I have advised bottle of magnesium citrate  If symptoms fail to resolve consider CT imaging  Once you have completed the magnesium citrate I would recommend daily fiber supplement and MiraLax as well as keeping well hydrated to optimize bowel regimen  Follow-up after labs in April, sooner as needed      Subjective:      Patient ID: Nils Monroe is a 82 y o  female    Patient presents acutely with daughter, Magali Cisneros with concerns for lower extremity swelling and abdominal pain  She notes bilateral lower extremity swelling over the last several weeks often on without any chest pain, shortness of breath, PND or orthopnea  She does not use added salt but does get sodium in her diet  She is concerned with postprandial bloating and abdominal pain that goes across her upper abdomen  She attributes this to her hiatal hernia  CT scan from 2019 was remarkable only for moderate to large hiatal hernia as well as absence of gallbladder and mild fatty liver disease  There has been no change in bowels  No dysphagia or GERD    No unplanned weight loss  Symptoms seem to occur an hour or 2 after a meal   No early satiety    She admits her bowels are not moving very regularly peer        Current Outpatient Medications:     acetaminophen (TYLENOL) 325 mg tablet, Take 1-2 tablets by mouth every 6 (six) hours as needed, Disp: , Rfl:     ALPRAZolam (XANAX) 0 5 mg tablet, take 1/2 to 1 tablet by mouth four times a day if needed for anxiety, Disp: 120 tablet, Rfl: 0    atenolol (TENORMIN) 50 mg tablet, take 1 tablet by mouth twice a day, Disp: 180 tablet, Rfl: 3    ferrous sulfate 325 (65 Fe) mg tablet, Take 1 tablet by mouth daily, Disp: , Rfl:     imipramine (TOFRANIL) 10 mg tablet, Take 1 tablet (10 mg total) by mouth 4 (four) times a week, Disp: 16 tablet, Rfl: 0    latanoprost (XALATAN) 0 005 % ophthalmic solution, instill 1 drop into both eyes nightly, Disp: , Rfl:     levothyroxine 50 mcg tablet, take 1 tablet by mouth 6 DAYS A WEEK (Patient taking differently: daily take 1 tablet by mouth 6 DAYS A WEEK), Disp: 90 tablet, Rfl: 3    lisinopril (ZESTRIL) 5 mg tablet, Take 1 tablet (5 mg total) by mouth daily, Disp: 90 tablet, Rfl: 0    pantoprazole (PROTONIX) 40 mg tablet, Take 1 tablet (40 mg total) by mouth daily before breakfast, Disp: 90 tablet, Rfl: 3    Probiotic Product (ALIGN) 4 MG CAPS, Take 1 tablet by mouth daily, Disp: , Rfl:     atenolol (TENORMIN) 25 mg tablet, Take 1 tablet (25 mg total) by mouth 2 (two) times a day (Patient not taking: Reported on 1/4/2022 ), Disp: , Rfl:     cyanocobalamin (VITAMIN B-12) 1000 MCG tablet, Take 1,000 mcg by mouth daily (Patient not taking: Reported on 1/4/2022 ), Disp: , Rfl:     methocarbamol (ROBAXIN) 500 mg tablet, Take 1 tablet (500 mg total) by mouth 3 (three) times a day (Patient not taking: Reported on 1/4/2022 ), Disp: 30 tablet, Rfl: 0    rosuvastatin (CRESTOR) 10 MG tablet, Take 1 tablet (10 mg total) by mouth daily (Patient not taking: Reported on 1/4/2022 ), Disp: 90 tablet, Rfl: 3    No results found for this or any previous visit (from the past 1008 hour(s))  The following portions of the patient's history were reviewed and updated as appropriate: allergies, current medications, past family history, past medical history, past social history, past surgical history and problem list      Review of Systems   Constitutional: Negative for appetite change, chills, diaphoresis, fatigue, fever and unexpected weight change  HENT: Negative for congestion, hearing loss and rhinorrhea  Eyes: Negative for visual disturbance  Respiratory: Negative for cough, chest tightness, shortness of breath and wheezing  Cardiovascular: Positive for leg swelling  Negative for chest pain and palpitations  Gastrointestinal: Positive for abdominal pain  Negative for blood in stool  Endocrine: Negative for cold intolerance, heat intolerance, polydipsia and polyuria  Genitourinary: Negative for difficulty urinating, dysuria, frequency and urgency  Musculoskeletal: Negative for arthralgias and myalgias  Skin: Negative for rash  Neurological: Negative for dizziness, weakness, light-headedness and headaches  Hematological: Does not bruise/bleed easily  Psychiatric/Behavioral: Negative for dysphoric mood and sleep disturbance  Objective:      Vitals:    01/04/22 1644   BP: 142/80   Pulse: 60   Resp: 16          Physical Exam  Constitutional:       Appearance: She is well-developed  HENT:      Head: Normocephalic and atraumatic  Nose: Nose normal    Eyes:      General: No scleral icterus  Conjunctiva/sclera: Conjunctivae normal       Pupils: Pupils are equal, round, and reactive to light  Neck:      Thyroid: No thyromegaly  Vascular: No JVD  Trachea: No tracheal deviation  Comments: No JVD  Cardiovascular:      Rate and Rhythm: Normal rate and regular rhythm  Heart sounds: No murmur heard  No friction rub  No gallop      Pulmonary:      Effort: Pulmonary effort is normal  No respiratory distress  Breath sounds: Normal breath sounds  No wheezing or rales  Abdominal:      General: Bowel sounds are normal  There is no distension  Palpations: Abdomen is soft  There is no mass  Tenderness: There is no abdominal tenderness  There is no guarding or rebound  Musculoskeletal:         General: No tenderness  Cervical back: Normal range of motion and neck supple  Right lower leg: Edema present  Left lower leg: Edema present  Comments: Trace to 1+ pitting edema bilaterally   Lymphadenopathy:      Cervical: No cervical adenopathy  Skin:     General: Skin is warm and dry  Findings: No erythema or rash  Neurological:      Mental Status: She is alert and oriented to person, place, and time  Cranial Nerves: No cranial nerve deficit  Psychiatric:         Behavior: Behavior normal          Thought Content:  Thought content normal          Judgment: Judgment normal

## 2022-01-05 ENCOUNTER — TELEPHONE (OUTPATIENT)
Dept: INTERNAL MEDICINE CLINIC | Facility: CLINIC | Age: 87
End: 2022-01-05

## 2022-01-05 DIAGNOSIS — F41.9 ANXIETY: ICD-10-CM

## 2022-01-05 NOTE — TELEPHONE ENCOUNTER
Pt called back and said her Shala Dole was ordered yesterday by Dr César Gore    The pharmacy called her and told her; that she can  the script

## 2022-01-06 RX ORDER — ALPRAZOLAM 0.5 MG/1
0.5 TABLET ORAL 4 TIMES DAILY PRN
Qty: 120 TABLET | Refills: 3 | OUTPATIENT
Start: 2022-01-06

## 2022-01-24 ENCOUNTER — NURSE TRIAGE (OUTPATIENT)
Dept: OTHER | Facility: OTHER | Age: 87
End: 2022-01-24

## 2022-01-24 NOTE — TELEPHONE ENCOUNTER
Pt called in stating she has been having swelling in both of her feet that extends up into her lower leg area for a while now  However, she wanted to report that it is happening more often  Swelling with walking and sitting for a extended amount of time  Pt wanted to see if Dr Jillian Fernandez had any recommendations or if she should come into the office to be seen

## 2022-01-24 NOTE — TELEPHONE ENCOUNTER
Reason for Disposition   [1] MODERATE leg swelling (e g , swelling extends up to knees) AND [2] new-onset or worsening    Answer Assessment - Initial Assessment Questions  1  ONSET: "When did the swelling start?" (e g , minutes, hours, days)    On and off for a while now  But today it is the worse  2  LOCATION: "What part of the leg is swollen?"  "Are both legs swollen or just one leg?"      Both feet and legs   3  SEVERITY: "How bad is the swelling?" (e g , localized; mild, moderate, severe)   - Localized - small area of swelling localized to one leg   - MILD pedal edema - swelling limited to foot and ankle, pitting edema < 1/4 inch (6 mm) deep, rest and elevation eliminate most or all swelling   - MODERATE edema - swelling of lower leg to knee, pitting edema > 1/4 inch (6 mm) deep, rest and elevation only partially reduce swelling   - SEVERE edema - swelling extends above knee, facial or hand swelling present       Moderate with pitting edema   4  REDNESS: "Does the swelling look red or infected?"     Denies   5  PAIN: "Is the swelling painful to touch?" If Yes, ask: "How painful is it?"   (Scale 1-10; mild, moderate or severe)      Uncomfortable  6  FEVER: "Do you have a fever?" If Yes, ask: "What is it, how was it measured, and when did it start?"       Denies  7  CAUSE: "What do you think is causing the leg swelling?"      Denies  8  MEDICAL HISTORY: "Do you have a history of heart failure, kidney disease, liver failure, or cancer?"      Denies  9  RECURRENT SYMPTOM: "Have you had leg swelling before?" If Yes, ask: "When was the last time?" "What happened that time?"      Yes, on and off for a while now     10  OTHER SYMPTOMS: "Do you have any other symptoms?" (e g , chest pain, difficulty breathing)    Some SOB but this is always    Protocols used: LEG SWELLING AND EDEMA-ADULT-

## 2022-01-24 NOTE — TELEPHONE ENCOUNTER
Regarding: Swollen feet / [facility]  ----- Message from Eugene Malagon sent at 1/24/2022  1:17 PM EST -----  "My feet have swollen triple their normal size   I need some help "

## 2022-01-24 NOTE — TELEPHONE ENCOUNTER
Will start Lasix and potassium and have her follow-up later this week    Get basic metabolic panel prior to follow-up

## 2022-01-25 NOTE — PATIENT INSTRUCTIONS
Status post admission for fall at home without LOC with fracture of T10 with no retropulsion or central canal impingement and fracture of left 3rd phalanx healing well  Increase Tylenol to 1000 mg 3 times daily as needed for analgesia  Wear the TLSO brace for activities  Routine follow-up after labs in December, sooner as needed  [Follow-Up Visit] : a follow-up visit for [FreeTextEntry2] : right shoulder pain

## 2022-01-26 ENCOUNTER — TELEPHONE (OUTPATIENT)
Dept: INTERNAL MEDICINE CLINIC | Facility: CLINIC | Age: 87
End: 2022-01-26

## 2022-01-26 NOTE — TELEPHONE ENCOUNTER
SINCE STARTING LASIX & POTASSIUM SHE HAS BEEN GETTING STOMACH CRAMPS    IS ASKING IF THERE IS SOMETHING DR CAN RECOMMEND THAT SHE TAKE FOR THIS ?? SHE DID TAKE A TYLENOL      [phone] (M)

## 2022-01-26 NOTE — TELEPHONE ENCOUNTER
Seeing Alondra on Friday, she can stop the Lasix and potassium and rediscuss with Remonia Genera on Friday

## 2022-01-28 ENCOUNTER — OFFICE VISIT (OUTPATIENT)
Dept: INTERNAL MEDICINE CLINIC | Facility: CLINIC | Age: 87
End: 2022-01-28
Payer: MEDICARE

## 2022-01-28 VITALS
RESPIRATION RATE: 14 BRPM | BODY MASS INDEX: 23.34 KG/M2 | HEIGHT: 57 IN | SYSTOLIC BLOOD PRESSURE: 128 MMHG | HEART RATE: 60 BPM | WEIGHT: 108.2 LBS | DIASTOLIC BLOOD PRESSURE: 80 MMHG

## 2022-01-28 DIAGNOSIS — I87.2 VENOUS INSUFFICIENCY: ICD-10-CM

## 2022-01-28 DIAGNOSIS — M25.473 ANKLE SWELLING, UNSPECIFIED LATERALITY: Primary | ICD-10-CM

## 2022-01-28 PROCEDURE — 99214 OFFICE O/P EST MOD 30 MIN: CPT | Performed by: NURSE PRACTITIONER

## 2022-01-28 NOTE — PATIENT INSTRUCTIONS
Patient with lower extremity swelling history of grade 1 diastolic dysfunction she states that she took Lasix and potassium x2 days and caused cramping and diarrhea  Question if this was related to something else or truly medication, I have given her several options  One is to discontinue the Lasix and potassium and use compression stockings only monitor salt intake  Next option is to take the Lasix and increase potassium in diet and then we check a BMP in a week, 3rd option is discontinuing Lasix and trying hydrochlorothiazide which I described as a weaker diuretic but may help with lower extremity swelling    She has chosen to try the Lasix with increased potassium in diet, I will call her Monday Tuesday see how she is feeling she will need a follow-up BMP

## 2022-01-28 NOTE — PROGRESS NOTES
Assessment/Plan:    Patient Instructions     Patient with lower extremity swelling history of grade 1 diastolic dysfunction she states that she took Lasix and potassium x2 days and caused cramping and diarrhea  Question if this was related to something else or truly medication, I have given her several options  One is to discontinue the Lasix and potassium and use compression stockings only monitor salt intake  Next option is to take the Lasix and increase potassium in diet and then we check a BMP in a week, 3rd option is discontinuing Lasix and trying hydrochlorothiazide which I described as a weaker diuretic but may help with lower extremity swelling  She has chosen to try the Lasix with increased potassium in diet, I will call her Monday Tuesday see how she is feeling she will need a follow-up BMP         Diagnoses and all orders for this visit:    Ankle swelling, unspecified laterality    Venous insufficiency         Subjective:      Patient ID: Rickie Dietrich is a 82 y o  female     Patient is here to follow-up lower extremity swelling  She was seen by Dr Apryl Cooper learner about a week and a half ago, for lower extremity swelling  She was placed on Lasix, she states she took for 2 days and she got abdominal cramping and had softer than normal stools so she discontinued  She states sometimes the feet get so swollen that they feel like sausages  She does not take salt  She has compression stockings but she did not try them  She occasionally has shortness of breath          Current Outpatient Medications:     acetaminophen (TYLENOL) 325 mg tablet, Take 1-2 tablets by mouth every 6 (six) hours as needed, Disp: , Rfl:     ALPRAZolam (XANAX) 0 5 mg tablet, Take 1 tablet (0 5 mg total) by mouth 4 (four) times a day as needed for anxiety, Disp: 120 tablet, Rfl: 3    atenolol (TENORMIN) 50 mg tablet, take 1 tablet by mouth twice a day, Disp: 180 tablet, Rfl: 3    ferrous sulfate 325 (65 Fe) mg tablet, Take 1 tablet by mouth daily, Disp: , Rfl:     imipramine (TOFRANIL) 10 mg tablet, Take 1 tablet (10 mg total) by mouth 4 (four) times a week, Disp: 16 tablet, Rfl: 0    latanoprost (XALATAN) 0 005 % ophthalmic solution, instill 1 drop into both eyes nightly, Disp: , Rfl:     levothyroxine 50 mcg tablet, take 1 tablet by mouth 6 DAYS A WEEK (Patient taking differently: daily take 1 tablet by mouth 6 DAYS A WEEK), Disp: 90 tablet, Rfl: 3    lisinopril (ZESTRIL) 5 mg tablet, Take 1 tablet (5 mg total) by mouth daily, Disp: 90 tablet, Rfl: 0    pantoprazole (PROTONIX) 40 mg tablet, Take 1 tablet (40 mg total) by mouth daily before breakfast, Disp: 90 tablet, Rfl: 3    Probiotic Product (ALIGN) 4 MG CAPS, Take 1 tablet by mouth daily, Disp: , Rfl:     cyanocobalamin (VITAMIN B-12) 1000 MCG tablet, Take 1,000 mcg by mouth daily (Patient not taking: Reported on 1/4/2022 ), Disp: , Rfl:     rosuvastatin (CRESTOR) 10 MG tablet, Take 1 tablet (10 mg total) by mouth daily (Patient not taking: Reported on 1/4/2022 ), Disp: 90 tablet, Rfl: 3    No results found for this or any previous visit (from the past 1008 hour(s))  The following portions of the patient's history were reviewed and updated as appropriate: allergies, current medications, past family history, past medical history, past social history, past surgical history and problem list      Review of Systems   Constitutional: Positive for fatigue  Negative for appetite change, chills, diaphoresis, fever and unexpected weight change  HENT: Negative for postnasal drip and sneezing  Eyes: Negative for visual disturbance  Respiratory: Negative for chest tightness and shortness of breath  Cardiovascular: Positive for leg swelling  Negative for chest pain and palpitations  Gastrointestinal: Positive for abdominal pain and diarrhea  Negative for blood in stool  Endocrine: Negative for cold intolerance, heat intolerance, polydipsia, polyphagia and polyuria  Genitourinary: Negative for difficulty urinating, dysuria, frequency and urgency  Musculoskeletal: Negative for arthralgias and myalgias  Skin: Negative for rash and wound  Neurological: Negative for dizziness, weakness, light-headedness and headaches  Hematological: Negative for adenopathy  Psychiatric/Behavioral: Negative for confusion, dysphoric mood and sleep disturbance  The patient is not nervous/anxious  Objective:      /80 (BP Location: Left arm, Patient Position: Sitting, Cuff Size: Standard)   Pulse 60   Resp 14   Ht 4' 9" (1 448 m)   Wt 49 1 kg (108 lb 3 2 oz)   BMI 23 41 kg/m²        Physical Exam  Constitutional:       General: She is not in acute distress  Appearance: She is well-developed  She is not diaphoretic  HENT:      Head: Normocephalic and atraumatic  Nose: Nose normal    Eyes:      Conjunctiva/sclera: Conjunctivae normal       Pupils: Pupils are equal, round, and reactive to light  Neck:      Thyroid: No thyromegaly  Vascular: No JVD  Trachea: No tracheal deviation  Cardiovascular:      Rate and Rhythm: Normal rate and regular rhythm  Heart sounds: Normal heart sounds  No murmur heard  No friction rub  No gallop  Pulmonary:      Effort: Pulmonary effort is normal  No respiratory distress  Breath sounds: Normal breath sounds  No wheezing or rales  Abdominal:      General: Bowel sounds are normal  There is no distension  Palpations: Abdomen is soft  Tenderness: There is no abdominal tenderness  Musculoskeletal:         General: Normal range of motion  Cervical back: Normal range of motion and neck supple  Lymphadenopathy:      Cervical: No cervical adenopathy  Skin:     General: Skin is warm and dry  Findings: No rash  Neurological:      Mental Status: She is alert and oriented to person, place, and time  Cranial Nerves: No cranial nerve deficit     Psychiatric:         Behavior: Behavior normal          Thought Content: Thought content normal          Judgment: Judgment normal        Falls Plan of Care: balance, strength, and gait training instructions were provided  Home safety education provided

## 2022-01-31 ENCOUNTER — TELEPHONE (OUTPATIENT)
Dept: INTERNAL MEDICINE CLINIC | Facility: CLINIC | Age: 87
End: 2022-01-31

## 2022-01-31 NOTE — TELEPHONE ENCOUNTER
PT reports that the medication prescribed on 1/28/22 is not helping - swelling in her  legs is getting worse  Would like Alondra to call her      PT [phone]

## 2022-01-31 NOTE — TELEPHONE ENCOUNTER
Have her take the lasix but no potassium- make sure to eat a banana a day and I will call her Thursday to see how she is doing

## 2022-02-02 NOTE — TELEPHONE ENCOUNTER
PT would like a return call from Niharika Watsonh  Pt is concerned  - Not feeling herself today  Feeling very weak and has not appetite  Swelling comes and goes in her legs  Taking the Lasix   Stopped taking the Potassium      Please Advise: [phone]

## 2022-02-10 ENCOUNTER — OFFICE VISIT (OUTPATIENT)
Dept: INTERNAL MEDICINE CLINIC | Facility: CLINIC | Age: 87
End: 2022-02-10
Payer: MEDICARE

## 2022-02-10 VITALS
BODY MASS INDEX: 23.64 KG/M2 | WEIGHT: 109.6 LBS | HEIGHT: 57 IN | HEART RATE: 62 BPM | DIASTOLIC BLOOD PRESSURE: 70 MMHG | RESPIRATION RATE: 16 BRPM | SYSTOLIC BLOOD PRESSURE: 124 MMHG

## 2022-02-10 DIAGNOSIS — I50.32 CHRONIC DIASTOLIC CHF (CONGESTIVE HEART FAILURE) (HCC): ICD-10-CM

## 2022-02-10 DIAGNOSIS — M25.473 ANKLE SWELLING, UNSPECIFIED LATERALITY: ICD-10-CM

## 2022-02-10 DIAGNOSIS — N18.32 STAGE 3B CHRONIC KIDNEY DISEASE (HCC): ICD-10-CM

## 2022-02-10 DIAGNOSIS — K44.9 HIATAL HERNIA: ICD-10-CM

## 2022-02-10 DIAGNOSIS — I10 PRIMARY HYPERTENSION: Primary | ICD-10-CM

## 2022-02-10 PROCEDURE — 99214 OFFICE O/P EST MOD 30 MIN: CPT | Performed by: NURSE PRACTITIONER

## 2022-02-10 RX ORDER — HYDROCHLOROTHIAZIDE 12.5 MG/1
12.5 TABLET ORAL DAILY
Qty: 30 TABLET | Refills: 1 | Status: SHIPPED | OUTPATIENT
Start: 2022-02-10

## 2022-02-10 NOTE — PROGRESS NOTES
Assessment/Plan:     Documentation completed by Temo French NP student  I personally interviewed and examined patient and agree with assessment and plan    Hypertension  BP well controlled on medication    Chronic diastolic CHF (congestive heart failure) ([facility])  Wt Readings from Last 3 Encounters:   02/10/22 49 7 kg (109 lb 9 6 oz)   01/28/22 49 1 kg (108 lb 3 2 oz)   01/04/22 49 8 kg (109 lb 12 8 oz)         No weight gain reported  Breath sounds clear and equal bilaterally  Stage 3b chronic kidney disease [facility])  Lab Results   Component Value Date    EGFR 38 10/08/2021    EGFR 39 06/14/2021    EGFR 40 06/13/2021    CREATININE 1 30 10/08/2021    CREATININE 1 27 06/14/2021    CREATININE 1 25 06/13/2021   will repeat CMP after 1 week of HCTZ inititation    Hiatal hernia  Education on eating 6 small meals throughout the day provided  Will also do trial of Pepcid at night in addition to daily protonix  Discussed in depth no need for GI and surgery follow-up due to patients age  Will manage symptoms instead  Ankle swelling  Will trial HCTZ 12 5mg daily  Also educated use of compression stockings during the day and limiting salt intake  There are no Patient Instructions on file for this visit  Subjective:      Patient ID: Nils Monroe is a 82 y o  female    Patient presenting to the office today with complaints of intermittent swelling to her lower extremities and pain to her epigastric region  Patient denies chest pains or shortness of breath  She reports swlling to her legs worse as the day goes on, better in the morning upon waking  Patient denies n/v/d or constipation           Current Outpatient Medications:     acetaminophen (TYLENOL) 325 mg tablet, Take 1-2 tablets by mouth every 6 (six) hours as needed, Disp: , Rfl:     ALPRAZolam (XANAX) 0 5 mg tablet, Take 1 tablet (0 5 mg total) by mouth 4 (four) times a day as needed for anxiety, Disp: 120 tablet, Rfl: 3    atenolol (TENORMIN) 50 mg tablet, take 1 tablet by mouth twice a day, Disp: 180 tablet, Rfl: 3    ferrous sulfate 325 (65 Fe) mg tablet, Take 1 tablet by mouth daily, Disp: , Rfl:     imipramine (TOFRANIL) 10 mg tablet, Take 1 tablet (10 mg total) by mouth 4 (four) times a week, Disp: 16 tablet, Rfl: 0    latanoprost (XALATAN) 0 005 % ophthalmic solution, instill 1 drop into both eyes nightly, Disp: , Rfl:     levothyroxine 50 mcg tablet, take 1 tablet by mouth 6 DAYS A WEEK (Patient taking differently: daily take 1 tablet by mouth 6 DAYS A WEEK), Disp: 90 tablet, Rfl: 3    lisinopril (ZESTRIL) 5 mg tablet, Take 1 tablet (5 mg total) by mouth daily, Disp: 90 tablet, Rfl: 0    pantoprazole (PROTONIX) 40 mg tablet, Take 1 tablet (40 mg total) by mouth daily before breakfast, Disp: 90 tablet, Rfl: 3    Probiotic Product (ALIGN) 4 MG CAPS, Take 1 tablet by mouth daily, Disp: , Rfl:     cyanocobalamin (VITAMIN B-12) 1000 MCG tablet, Take 1,000 mcg by mouth daily (Patient not taking: Reported on 1/4/2022 ), Disp: , Rfl:     hydrochlorothiazide (HYDRODIURIL) 12 5 mg tablet, Take 1 tablet (12 5 mg total) by mouth daily, Disp: 30 tablet, Rfl: 1    rosuvastatin (CRESTOR) 10 MG tablet, Take 1 tablet (10 mg total) by mouth daily (Patient not taking: Reported on 1/4/2022 ), Disp: 90 tablet, Rfl: 3    No results found for this or any previous visit (from the past 1008 hour(s))  The following portions of the patient's history were reviewed and updated as appropriate: allergies, current medications, past family history, past medical history, past social history, past surgical history and problem list      Review of Systems   Constitutional: Negative for appetite change, chills, diaphoresis, fatigue, fever and unexpected weight change  HENT: Negative for postnasal drip and sneezing  Eyes: Negative for visual disturbance  Respiratory: Negative for chest tightness and shortness of breath  Cardiovascular: Positive for leg swelling  Negative for chest pain and palpitations  Gastrointestinal: Positive for abdominal pain  Negative for blood in stool, constipation, diarrhea, nausea and vomiting  Endocrine: Negative for cold intolerance, heat intolerance, polydipsia, polyphagia and polyuria  Genitourinary: Negative for difficulty urinating, dysuria, frequency and urgency  Musculoskeletal: Negative for arthralgias and myalgias  Skin: Negative for rash and wound  Neurological: Negative for dizziness, weakness, light-headedness and headaches  Hematological: Negative for adenopathy  Psychiatric/Behavioral: Negative for confusion, dysphoric mood and sleep disturbance  The patient is not nervous/anxious  Objective:      /70 (BP Location: Left arm, Patient Position: Sitting, Cuff Size: Standard)   Pulse 62   Resp 16   Ht 4' 9" (1 448 m)   Wt 49 7 kg (109 lb 9 6 oz)   BMI 23 72 kg/m²       Physical Exam  Vitals and nursing note reviewed  Constitutional:       General: She is not in acute distress  Appearance: She is well-developed  She is not diaphoretic  HENT:      Head: Normocephalic and atraumatic  Nose: Nose normal    Eyes:      Conjunctiva/sclera: Conjunctivae normal       Pupils: Pupils are equal, round, and reactive to light  Neck:      Thyroid: No thyromegaly  Vascular: No JVD  Trachea: No tracheal deviation  Cardiovascular:      Rate and Rhythm: Normal rate and regular rhythm  Heart sounds: Normal heart sounds  No murmur heard  No friction rub  No gallop  Pulmonary:      Effort: Pulmonary effort is normal  No respiratory distress  Breath sounds: Normal breath sounds  No wheezing or rales  Chest:      Chest wall: No tenderness  Abdominal:      General: Bowel sounds are normal  There is no distension  Palpations: Abdomen is soft  Tenderness: There is no abdominal tenderness  Musculoskeletal:         General: Normal range of motion        Cervical back: Normal range of motion and neck supple  Right lower le+ Pitting Edema present  Left lower le+ Pitting Edema present  Lymphadenopathy:      Cervical: No cervical adenopathy  Skin:     General: Skin is warm and dry  Coloration: Skin is not jaundiced or pale  Findings: No rash  Neurological:      Mental Status: She is alert and oriented to person, place, and time  Cranial Nerves: No cranial nerve deficit  Psychiatric:         Behavior: Behavior normal          Thought Content: Thought content normal          Judgment: Judgment normal          Diagnoses and all orders for this visit:    Primary hypertension  -     hydrochlorothiazide (HYDRODIURIL) 12 5 mg tablet;  Take 1 tablet (12 5 mg total) by mouth daily    Stage 3b chronic kidney disease ([facility])    Chronic diastolic CHF (congestive heart failure) ([facility])    Hiatal hernia    Ankle swelling, unspecified laterality

## 2022-02-10 NOTE — ASSESSMENT & PLAN NOTE
Wt Readings from Last 3 Encounters:   02/10/22 49 7 kg (109 lb 9 6 oz)   01/28/22 49 1 kg (108 lb 3 2 oz)   01/04/22 49 8 kg (109 lb 12 8 oz)         No weight gain reported  Breath sounds clear and equal bilaterally

## 2022-02-10 NOTE — ASSESSMENT & PLAN NOTE
Education on eating 6 small meals throughout the day provided  Will also do trial of Pepcid at night in addition to daily protonix  Discussed in depth no need for GI and surgery follow-up due to patients age  Will manage symptoms instead

## 2022-02-10 NOTE — ASSESSMENT & PLAN NOTE
Will trial HCTZ 12 5mg daily  Also educated use of compression stockings during the day and limiting salt intake

## 2022-02-10 NOTE — ASSESSMENT & PLAN NOTE
Lab Results   Component Value Date    EGFR 38 10/08/2021    EGFR 39 06/14/2021    EGFR 40 06/13/2021    CREATININE 1 30 10/08/2021    CREATININE 1 27 06/14/2021    CREATININE 1 25 06/13/2021   will repeat CMP after 1 week of HCTZ inititation

## 2022-03-14 DIAGNOSIS — I10 ESSENTIAL HYPERTENSION: ICD-10-CM

## 2022-03-14 RX ORDER — LISINOPRIL 5 MG/1
5 TABLET ORAL DAILY
Qty: 90 TABLET | Refills: 0 | Status: SHIPPED | OUTPATIENT
Start: 2022-03-14 | End: 2022-06-13

## 2022-03-21 ENCOUNTER — HOSPITAL ENCOUNTER (EMERGENCY)
Facility: HOSPITAL | Age: 87
Discharge: HOME | End: 2022-03-21
Attending: EMERGENCY MEDICINE
Payer: MEDICARE

## 2022-03-21 ENCOUNTER — APPOINTMENT (EMERGENCY)
Dept: RADIOLOGY | Facility: HOSPITAL | Age: 87
End: 2022-03-21
Payer: MEDICARE

## 2022-03-21 VITALS
DIASTOLIC BLOOD PRESSURE: 70 MMHG | SYSTOLIC BLOOD PRESSURE: 171 MMHG | HEART RATE: 58 BPM | TEMPERATURE: 97.4 F | OXYGEN SATURATION: 98 % | RESPIRATION RATE: 16 BRPM

## 2022-03-21 DIAGNOSIS — R07.9 CHEST PAIN: Primary | ICD-10-CM

## 2022-03-21 LAB
2HR DELTA HS TROPONIN: 0 NG/L
ANION GAP SERPL CALCULATED.3IONS-SCNC: 8 MMOL/L (ref 4–13)
BASOPHILS # BLD AUTO: 0.03 THOUSANDS/ΜL (ref 0–0.1)
BASOPHILS NFR BLD AUTO: 1 % (ref 0–1)
BUN SERPL-MCNC: 16 MG/DL (ref 5–25)
CALCIUM SERPL-MCNC: 8.8 MG/DL (ref 8.3–10.1)
CARDIAC TROPONIN I PNL SERPL HS: 6 NG/L
CARDIAC TROPONIN I PNL SERPL HS: 6 NG/L
CHLORIDE SERPL-SCNC: 106 MMOL/L (ref 100–108)
CO2 SERPL-SCNC: 26 MMOL/L (ref 21–32)
CREAT SERPL-MCNC: 1.22 MG/DL (ref 0.6–1.3)
EOSINOPHIL # BLD AUTO: 0.75 THOUSAND/ΜL (ref 0–0.61)
EOSINOPHIL NFR BLD AUTO: 12 % (ref 0–6)
ERYTHROCYTE [DISTWIDTH] IN BLOOD BY AUTOMATED COUNT: 13.9 % (ref 11.6–15.1)
GFR SERPL CREATININE-BSD FRML MDRD: 40 ML/MIN/1.73SQ M
GLUCOSE SERPL-MCNC: 88 MG/DL (ref 65–140)
HCT VFR BLD AUTO: 32.6 % (ref 34.8–46.1)
HGB BLD-MCNC: 10.7 G/DL (ref 11.5–15.4)
IMM GRANULOCYTES # BLD AUTO: 0.01 THOUSAND/UL (ref 0–0.2)
IMM GRANULOCYTES NFR BLD AUTO: 0 % (ref 0–2)
LYMPHOCYTES # BLD AUTO: 1.48 THOUSANDS/ΜL (ref 0.6–4.47)
LYMPHOCYTES NFR BLD AUTO: 24 % (ref 14–44)
MCH RBC QN AUTO: 31.3 PG (ref 26.8–34.3)
MCHC RBC AUTO-ENTMCNC: 32.8 G/DL (ref 31.4–37.4)
MCV RBC AUTO: 95 FL (ref 82–98)
MONOCYTES # BLD AUTO: 0.69 THOUSAND/ΜL (ref 0.17–1.22)
MONOCYTES NFR BLD AUTO: 11 % (ref 4–12)
NEUTROPHILS # BLD AUTO: 3.31 THOUSANDS/ΜL (ref 1.85–7.62)
NEUTS SEG NFR BLD AUTO: 52 % (ref 43–75)
NRBC BLD AUTO-RTO: 0 /100 WBCS
PLATELET # BLD AUTO: 192 THOUSANDS/UL (ref 149–390)
PMV BLD AUTO: 10 FL (ref 8.9–12.7)
POTASSIUM SERPL-SCNC: 3.3 MMOL/L (ref 3.5–5.3)
RBC # BLD AUTO: 3.42 MILLION/UL (ref 3.81–5.12)
SODIUM SERPL-SCNC: 140 MMOL/L (ref 136–145)
WBC # BLD AUTO: 6.27 THOUSAND/UL (ref 4.31–10.16)

## 2022-03-21 PROCEDURE — 71045 X-RAY EXAM CHEST 1 VIEW: CPT

## 2022-03-21 PROCEDURE — 99285 EMERGENCY DEPT VISIT HI MDM: CPT

## 2022-03-21 PROCEDURE — 36415 COLL VENOUS BLD VENIPUNCTURE: CPT | Performed by: EMERGENCY MEDICINE

## 2022-03-21 PROCEDURE — 85025 COMPLETE CBC W/AUTO DIFF WBC: CPT | Performed by: EMERGENCY MEDICINE

## 2022-03-21 PROCEDURE — 80048 BASIC METABOLIC PNL TOTAL CA: CPT | Performed by: EMERGENCY MEDICINE

## 2022-03-21 PROCEDURE — 84484 ASSAY OF TROPONIN QUANT: CPT | Performed by: EMERGENCY MEDICINE

## 2022-03-21 PROCEDURE — 99284 EMERGENCY DEPT VISIT MOD MDM: CPT | Performed by: EMERGENCY MEDICINE

## 2022-03-21 PROCEDURE — 93005 ELECTROCARDIOGRAM TRACING: CPT

## 2022-03-21 RX ORDER — ACETAMINOPHEN 325 MG/1
650 TABLET ORAL ONCE
Status: COMPLETED | OUTPATIENT
Start: 2022-03-21 | End: 2022-03-21

## 2022-03-21 RX ADMIN — ACETAMINOPHEN 650 MG: 325 TABLET, FILM COATED ORAL at 18:31

## 2022-03-21 NOTE — ED PROVIDER NOTES
History  Chief Complaint   Patient presents with    Chest Pain     chest pain, palpitations and sob x 20 mins      Sudden onset sternal nonradiating nonpleuritic CP approximately 1 hour ago  Associated dyspnea  No clear precipitant  No h/o similar sxs in past  No syncope, cough, hemoptysis, fever or chills  Chronic leg swelling unchanged from baseline  No orthopnea  No aggravating or alleviating factors  Severity moderate  Symptoms constant since onset  Additional history from daughter at bedside  Prior to Admission Medications   Prescriptions Last Dose Informant Patient Reported? Taking?    ALPRAZolam (XANAX) 0 5 mg tablet  Self No No   Sig: Take 1 tablet (0 5 mg total) by mouth 4 (four) times a day as needed for anxiety   Probiotic Product (ALIGN) 4 MG CAPS  Self Yes No   Sig: Take 1 tablet by mouth daily   acetaminophen (TYLENOL) 325 mg tablet  Self Yes No   Sig: Take 1-2 tablets by mouth every 6 (six) hours as needed   atenolol (TENORMIN) 50 mg tablet  Self No No   Sig: take 1 tablet by mouth twice a day   cyanocobalamin (VITAMIN B-12) 1000 MCG tablet  Self Yes No   Sig: Take 1,000 mcg by mouth daily   Patient not taking: Reported on 1/4/2022    ferrous sulfate 325 (65 Fe) mg tablet  Self Yes No   Sig: Take 1 tablet by mouth daily   hydrochlorothiazide (HYDRODIURIL) 12 5 mg tablet   No No   Sig: Take 1 tablet (12 5 mg total) by mouth daily   imipramine (TOFRANIL) 10 mg tablet  Self No No   Sig: Take 1 tablet (10 mg total) by mouth 4 (four) times a week   latanoprost (XALATAN) 0 005 % ophthalmic solution  Self Yes No   Sig: instill 1 drop into both eyes nightly   levothyroxine 50 mcg tablet  Self No No   Sig: take 1 tablet by mouth 6 DAYS A WEEK   Patient taking differently: daily take 1 tablet by mouth 6 DAYS A WEEK   lisinopril (ZESTRIL) 5 mg tablet   No No   Sig: Take 1 tablet (5 mg total) by mouth daily   pantoprazole (PROTONIX) 40 mg tablet  Self No No   Sig: Take 1 tablet (40 mg total) by mouth daily before breakfast   rosuvastatin (CRESTOR) 10 MG tablet  Self No No   Sig: Take 1 tablet (10 mg total) by mouth daily   Patient not taking: Reported on 2022       Facility-Administered Medications: None       Past Medical History:   Diagnosis Date    Benign essential hypertension     Last assessed: 14    Disease of thyroid gland     Essential tremor     Fibromyalgia     GERD (gastroesophageal reflux disease)     History of nail disorders     Hyperlipidemia     Hypertension     Palpitations     Transient cerebral ischemia        Past Surgical History:   Procedure Laterality Date    APPENDECTOMY      EGD AND COLONOSCOPY  2020    HYSTERECTOMY  2010    [place] LAP,CHOLECYSTECTOMY/GRAPH N/A 2018    Procedure: LAPAROSCOPIC CHOLECYSTECTOMY WITH IOC;  Surgeon: Dale Hartman MD;  Location: [facility] OR;  Service: General    TUBAL LIGATION         Family History   Problem Relation Age of Onset    Stroke Mother         ischemic stroke    Hypertension Mother     Heart disease Father      I have reviewed and agree with the history as documented  E-Cigarette/Vaping    E-Cigarette Use Never User      E-Cigarette/Vaping Substances    Nicotine No     THC No     CBD No     Flavoring No     Other No     Unknown No      Social History     Tobacco Use    Smoking status: Former Smoker     Packs/day: 0 10     Years: 50 00     Pack years: 5 00     Types: Cigarettes     Start date:      Quit date:      Years since quittin 2    Smokeless tobacco: Never Used    Tobacco comment: 1 pack a week    Vaping Use    Vaping Use: Never used   Substance Use Topics    Alcohol use: Not Currently     Alcohol/week: 0 0 standard drinks     Comment: 0    Drug use: No       Review of Systems   Respiratory: Positive for chest tightness and shortness of breath  Cardiovascular: Positive for chest pain, palpitations and leg swelling     All other systems reviewed and are negative  Physical Exam  Physical Exam  Vitals and nursing note reviewed  Constitutional:       General: She is not in acute distress  Appearance: She is well-developed  She is not diaphoretic  HENT:      Head: Normocephalic and atraumatic  Eyes:      Conjunctiva/sclera: Conjunctivae normal       Pupils: Pupils are equal, round, and reactive to light  Neck:      Vascular: No JVD  Cardiovascular:      Rate and Rhythm: Normal rate and regular rhythm  Heart sounds: Normal heart sounds  Pulmonary:      Effort: Pulmonary effort is normal       Breath sounds: Normal breath sounds  No stridor  Abdominal:      General: There is no distension  Palpations: Abdomen is soft  Tenderness: There is no abdominal tenderness  There is no guarding or rebound  Musculoskeletal:         General: No tenderness or deformity  Normal range of motion  Cervical back: Normal range of motion and neck supple  Right lower leg: No tenderness  Edema present  Left lower leg: No tenderness  Edema present  Skin:     General: Skin is warm and dry  Capillary Refill: Capillary refill takes less than 2 seconds  Coloration: Skin is not pale  Findings: No erythema or rash  Neurological:      Mental Status: She is alert and oriented to person, place, and time  Cranial Nerves: No cranial nerve deficit  Sensory: No sensory deficit  Motor: No abnormal muscle tone        Coordination: Coordination normal          Vital Signs  ED Triage Vitals   Temperature Pulse Respirations Blood Pressure SpO2   03/21/22 1719 03/21/22 1719 03/21/22 1719 03/21/22 1719 03/21/22 1719   (!) 97 4 °F (36 3 °C) 69 20 127/93 98 %      Temp Source Heart Rate Source Patient Position - Orthostatic VS BP Location FiO2 (%)   03/21/22 1719 -- 03/21/22 1719 03/21/22 1719 --   Temporal  Sitting Left arm       Pain Score       03/21/22 1831       5           Vitals:    03/21/22 1719 03/21/22 1830 03/21/22 1930   BP: 127/93 158/72 (!) 171/70   Pulse: 69 64 58   Patient Position - Orthostatic VS: Sitting Lying Lying         Visual Acuity      ED Medications  Medications   acetaminophen (TYLENOL) tablet 650 mg (650 mg Oral Given 3/21/22 1831)       Diagnostic Studies  Results Reviewed     Procedure Component Value Units Date/Time    HS Troponin I 2hr [[id number]]  (Normal) Collected: 03/21/22 1959    Lab Status: Final result Specimen: Blood from Arm, Left Updated: 03/21/22 2032     hs TnI 2hr 6 ng/L      Delta 2hr hsTnI 0 ng/L     HS Troponin 0hr (reflex protocol) [[id number]]  (Normal) Collected: 03/21/22 1831    Lab Status: Final result Specimen: Blood from Arm, Left Updated: 03/21/22 1903     hs TnI 0hr 6 ng/L     Basic metabolic panel [[id number]]  (Abnormal) Collected: 03/21/22 1831    Lab Status: Final result Specimen: Blood from Arm, Left Updated: 03/21/22 1851     Sodium 140 mmol/L      Potassium 3 3 mmol/L      Chloride 106 mmol/L      CO2 26 mmol/L      ANION GAP 8 mmol/L      BUN 16 mg/dL      Creatinine 1 22 mg/dL      Glucose 88 mg/dL      Calcium 8 8 mg/dL      eGFR 40 ml/min/1 73sq m     Narrative:      Kristie guidelines for Chronic Kidney Disease (CKD):     Stage 1 with normal or high GFR (GFR > 90 mL/min/1 73 square meters)    Stage 2 Mild CKD (GFR = 60-89 mL/min/1 73 square meters)    Stage 3A Moderate CKD (GFR = 45-59 mL/min/1 73 square meters)    Stage 3B Moderate CKD (GFR = 30-44 mL/min/1 73 square meters)    Stage 4 Severe CKD (GFR = 15-29 mL/min/1 73 square meters)    Stage 5 End Stage CKD (GFR <15 mL/min/1 73 square meters)  Note: GFR calculation is accurate only with a steady state creatinine    CBC and differential [[id number]]  (Abnormal) Collected: 03/21/22 1831    Lab Status: Final result Specimen: Blood from Arm, Left Updated: 03/21/22 1840     WBC 6 27 Thousand/uL      RBC 3 42 Million/uL      Hemoglobin 10 7 g/dL      Hematocrit 32 6 %      MCV 95 fL      MCH 31 3 pg      MCHC 32 8 g/dL      RDW 13 9 %      MPV 10 0 fL      Platelets 964 Thousands/uL      nRBC 0 /100 WBCs      Neutrophils Relative 52 %      Immat GRANS % 0 %      Lymphocytes Relative 24 %      Monocytes Relative 11 %      Eosinophils Relative 12 %      Basophils Relative 1 %      Neutrophils Absolute 3 31 Thousands/µL      Immature Grans Absolute 0 01 Thousand/uL      Lymphocytes Absolute 1 48 Thousands/µL      Monocytes Absolute 0 69 Thousand/µL      Eosinophils Absolute 0 75 Thousand/µL      Basophils Absolute 0 03 Thousands/µL                  XR chest 1 view portable   Final Result by Jarocho Rico MD (03/22 8289)      No acute cardiopulmonary disease  Workstation performed: [id number]                    Procedures  Procedures         ED Course  ED Course as of 03/22/22 1451   Mon Mar 21, 2022   1957 Reevaluated  Feels well  Offered admission  Pt provides informed refusal of admission at this time and daughter at bedside supports her decision to d/c home  Will perform 2nd troponin prior to discharge  2033 Repeat troponin with delta troponin of 0  Pt reports resolution of pain and reiterates her desire to d/c home  HEART Risk Score      Most Recent Value   Heart Score Risk Calculator    History 0 Filed at: 03/21/2022 1912   ECG 0 Filed at: 03/21/2022 1912   Age 2 Filed at: 03/21/2022 1912   Risk Factors 1 Filed at: 03/21/2022 1912   Troponin 0 Filed at: 03/21/2022 1912   HEART Score 3 Filed at: 03/21/2022 1912                        SBIRT 20yo+      Most Recent Value   SBIRT (23 yo +)    In order to provide better care to our patients, we are screening all of our patients for alcohol and drug use  Would it be okay to ask you these screening questions? Yes Filed at: 03/21/2022 1837   Initial Alcohol Screen: US AUDIT-C     1  How often do you have a drink containing alcohol? 0 Filed at: 03/21/2022 1837   2   How many drinks containing alcohol do you have on a typical day you are drinking? 0 Filed at: 03/21/2022 1837   3a  Male UNDER 65: How often do you have five or more drinks on one occasion? 0 Filed at: 03/21/2022 1837   3b  FEMALE Any Age, or MALE 65+: How often do you have 4 or more drinks on one occassion? 0 Filed at: 03/21/2022 1837   Audit-C Score 0 Filed at: 03/21/2022 1837   GILLES: How many times in the past year have you    Used an illegal drug or used a prescription medication for non-medical reasons? Never Filed at: 03/21/2022 7446                    MDM    Disposition  Final diagnoses:   Chest pain     Time reflects when diagnosis was documented in both MDM as applicable and the Disposition within this note     Time User Action Codes Description Comment    3/21/2022  8:36 PM Laron Lewis Add [R07 9] Chest pain       ED Disposition     ED Disposition Condition Date/Time Comment    Discharge Stable Mon Mar 21, 2022  8:36 PM Leigh Luke discharge to home/self care              Follow-up Information     Follow up With Specialties Details Why Contact Info    Jena Meraz MD Internal Medicine, Palliative Care In 3 days  [address], [address]            Discharge Medication List as of 3/21/2022  8:41 PM      CONTINUE these medications which have NOT CHANGED    Details   acetaminophen (TYLENOL) 325 mg tablet Take 1-2 tablets by mouth every 6 (six) hours as needed, Historical Med      ALPRAZolam (XANAX) 0 5 mg tablet Take 1 tablet (0 5 mg total) by mouth 4 (four) times a day as needed for anxiety, Starting Tue 1/4/2022, Normal      atenolol (TENORMIN) 50 mg tablet take 1 tablet by mouth twice a day, Normal      cyanocobalamin (VITAMIN B-12) 1000 MCG tablet Take 1,000 mcg by mouth daily, Historical Med      ferrous sulfate 325 (65 Fe) mg tablet Take 1 tablet by mouth daily, Starting Mon 10/9/2017, Historical Med      hydrochlorothiazide (HYDRODIURIL) 12 5 mg tablet Take 1 tablet (12 5 mg total) by mouth daily, Starting Thu 2/10/2022, Normal      imipramine (TOFRANIL) 10 mg tablet Take 1 tablet (10 mg total) by mouth 4 (four) times a week, Starting Tue 9/14/2021, Until Thu 2/10/2022, Normal      latanoprost (XALATAN) 0 005 % ophthalmic solution instill 1 drop into both eyes nightly, Historical Med      levothyroxine 50 mcg tablet take 1 tablet by mouth 6 DAYS A WEEK, Normal      lisinopril (ZESTRIL) 5 mg tablet Take 1 tablet (5 mg total) by mouth daily, Starting Mon 3/14/2022, Normal      pantoprazole (PROTONIX) 40 mg tablet Take 1 tablet (40 mg total) by mouth daily before breakfast, Starting Wed 10/13/2021, Normal      Probiotic Product (ALIGN) 4 MG CAPS Take 1 tablet by mouth daily, Historical Med      rosuvastatin (CRESTOR) 10 MG tablet Take 1 tablet (10 mg total) by mouth daily, Starting Wed 10/13/2021, Normal             No discharge procedures on file      PDMP Review       Value Time User    PDMP Reviewed  Yes 1/4/2022  5:42 PM Bennye Halsted, MD          ED Provider  Electronically Signed by           Bassam Elizabeth MD  03/22/22 [phone]

## 2022-03-24 ENCOUNTER — APPOINTMENT (OUTPATIENT)
Dept: LAB | Facility: CLINIC | Age: 87
End: 2022-03-24
Payer: MEDICARE

## 2022-03-24 ENCOUNTER — OFFICE VISIT (OUTPATIENT)
Dept: INTERNAL MEDICINE CLINIC | Facility: CLINIC | Age: 87
End: 2022-03-24
Payer: MEDICARE

## 2022-03-24 VITALS
WEIGHT: 106.2 LBS | SYSTOLIC BLOOD PRESSURE: 142 MMHG | HEIGHT: 57 IN | BODY MASS INDEX: 22.91 KG/M2 | OXYGEN SATURATION: 99 % | TEMPERATURE: 98.2 F | DIASTOLIC BLOOD PRESSURE: 68 MMHG | HEART RATE: 61 BPM

## 2022-03-24 DIAGNOSIS — E03.9 ACQUIRED HYPOTHYROIDISM: ICD-10-CM

## 2022-03-24 DIAGNOSIS — I10 PRIMARY HYPERTENSION: ICD-10-CM

## 2022-03-24 DIAGNOSIS — E78.49 OTHER HYPERLIPIDEMIA: Primary | ICD-10-CM

## 2022-03-24 DIAGNOSIS — I50.32 CHRONIC DIASTOLIC CHF (CONGESTIVE HEART FAILURE) (HCC): ICD-10-CM

## 2022-03-24 DIAGNOSIS — E03.9 HYPOTHYROIDISM, UNSPECIFIED TYPE: ICD-10-CM

## 2022-03-24 DIAGNOSIS — Z00.00 WELL ADULT EXAM: ICD-10-CM

## 2022-03-24 DIAGNOSIS — G25.0 BENIGN FAMILIAL TREMOR: ICD-10-CM

## 2022-03-24 DIAGNOSIS — E78.2 MIXED HYPERLIPIDEMIA: ICD-10-CM

## 2022-03-24 DIAGNOSIS — R79.9 ABNORMAL BLOOD CHEMISTRY: ICD-10-CM

## 2022-03-24 DIAGNOSIS — M25.473 ANKLE SWELLING, UNSPECIFIED LATERALITY: ICD-10-CM

## 2022-03-24 LAB
ALBUMIN SERPL BCP-MCNC: 4 G/DL (ref 3.5–5)
ALP SERPL-CCNC: 120 U/L (ref 46–116)
ALT SERPL W P-5'-P-CCNC: 23 U/L (ref 12–78)
ANION GAP SERPL CALCULATED.3IONS-SCNC: 6 MMOL/L (ref 4–13)
AST SERPL W P-5'-P-CCNC: 24 U/L (ref 5–45)
BASOPHILS # BLD AUTO: 0.02 THOUSANDS/ΜL (ref 0–0.1)
BASOPHILS NFR BLD AUTO: 0 % (ref 0–1)
BILIRUB SERPL-MCNC: 0.69 MG/DL (ref 0.2–1)
BUN SERPL-MCNC: 14 MG/DL (ref 5–25)
CALCIUM SERPL-MCNC: 9.4 MG/DL (ref 8.3–10.1)
CHLORIDE SERPL-SCNC: 109 MMOL/L (ref 100–108)
CHOLEST SERPL-MCNC: 209 MG/DL
CO2 SERPL-SCNC: 26 MMOL/L (ref 21–32)
CREAT SERPL-MCNC: 1.34 MG/DL (ref 0.6–1.3)
EOSINOPHIL # BLD AUTO: 0.55 THOUSAND/ΜL (ref 0–0.61)
EOSINOPHIL NFR BLD AUTO: 9 % (ref 0–6)
ERYTHROCYTE [DISTWIDTH] IN BLOOD BY AUTOMATED COUNT: 13.9 % (ref 11.6–15.1)
EST. AVERAGE GLUCOSE BLD GHB EST-MCNC: 97 MG/DL
GFR SERPL CREATININE-BSD FRML MDRD: 36 ML/MIN/1.73SQ M
GLUCOSE P FAST SERPL-MCNC: 93 MG/DL (ref 65–99)
HBA1C MFR BLD: 5 %
HCT VFR BLD AUTO: 34.6 % (ref 34.8–46.1)
HDLC SERPL-MCNC: 66 MG/DL
HGB BLD-MCNC: 11.1 G/DL (ref 11.5–15.4)
IMM GRANULOCYTES # BLD AUTO: 0.03 THOUSAND/UL (ref 0–0.2)
IMM GRANULOCYTES NFR BLD AUTO: 1 % (ref 0–2)
LDLC SERPL CALC-MCNC: 123 MG/DL (ref 0–100)
LYMPHOCYTES # BLD AUTO: 1.22 THOUSANDS/ΜL (ref 0.6–4.47)
LYMPHOCYTES NFR BLD AUTO: 21 % (ref 14–44)
MCH RBC QN AUTO: 30.9 PG (ref 26.8–34.3)
MCHC RBC AUTO-ENTMCNC: 32.1 G/DL (ref 31.4–37.4)
MCV RBC AUTO: 96 FL (ref 82–98)
MONOCYTES # BLD AUTO: 0.63 THOUSAND/ΜL (ref 0.17–1.22)
MONOCYTES NFR BLD AUTO: 11 % (ref 4–12)
NEUTROPHILS # BLD AUTO: 3.45 THOUSANDS/ΜL (ref 1.85–7.62)
NEUTS SEG NFR BLD AUTO: 58 % (ref 43–75)
NONHDLC SERPL-MCNC: 143 MG/DL
NRBC BLD AUTO-RTO: 0 /100 WBCS
PLATELET # BLD AUTO: 188 THOUSANDS/UL (ref 149–390)
PMV BLD AUTO: 11.3 FL (ref 8.9–12.7)
POTASSIUM SERPL-SCNC: 3.9 MMOL/L (ref 3.5–5.3)
PROT SERPL-MCNC: 7.6 G/DL (ref 6.4–8.2)
RBC # BLD AUTO: 3.59 MILLION/UL (ref 3.81–5.12)
SODIUM SERPL-SCNC: 141 MMOL/L (ref 136–145)
TRIGL SERPL-MCNC: 99 MG/DL
TSH SERPL DL<=0.05 MIU/L-ACNC: 0.78 UIU/ML (ref 0.36–3.74)
WBC # BLD AUTO: 5.9 THOUSAND/UL (ref 4.31–10.16)

## 2022-03-24 PROCEDURE — 99214 OFFICE O/P EST MOD 30 MIN: CPT | Performed by: INTERNAL MEDICINE

## 2022-03-24 PROCEDURE — 85025 COMPLETE CBC W/AUTO DIFF WBC: CPT

## 2022-03-24 PROCEDURE — 36415 COLL VENOUS BLD VENIPUNCTURE: CPT

## 2022-03-24 PROCEDURE — 80061 LIPID PANEL: CPT

## 2022-03-24 PROCEDURE — 84443 ASSAY THYROID STIM HORMONE: CPT

## 2022-03-24 PROCEDURE — G0439 PPPS, SUBSEQ VISIT: HCPCS | Performed by: INTERNAL MEDICINE

## 2022-03-24 PROCEDURE — 80053 COMPREHEN METABOLIC PANEL: CPT

## 2022-03-24 PROCEDURE — 83036 HEMOGLOBIN GLYCOSYLATED A1C: CPT

## 2022-03-24 NOTE — PROGRESS NOTES
Assessment/Plan:    No problem-specific Assessment & Plan notes found for this encounter  Problem List Items Addressed This Visit        Endocrine    Hypothyroidism       Other    Hyperlipidemia - Primary      Other Visit Diagnoses     Well adult exam                Subjective:      Patient ID: Aspen Ho is a 82 y o  female      HPI    The following portions of the patient's history were reviewed and updated as appropriate: allergies, current medications, past family history, past medical history, past social history, past surgical history and problem list     Review of Systems      Objective:      /68 (BP Location: Left arm, Patient Position: Sitting, Cuff Size: Standard) Comment: bp  Pulse 61   Temp 98 2 °F (36 8 °C) (Temporal) Comment: NO NSAIDS  Ht 4' 9" (1 448 m)   Wt 48 2 kg (106 lb 3 2 oz)   SpO2 99%   BMI 22 98 kg/m²          Physical Exam

## 2022-03-24 NOTE — PROGRESS NOTES
Assessment/Plan:    Hypothyroidism  Continue levothyroxine 50 mcg daily  TSH within normal limits    Hypertension  Blood pressure controlled on atenolol 50 mg daily, lisinopril 5 mg daily    Chronic diastolic CHF (congestive heart failure) (HCC)  Wt Readings from Last 3 Encounters:   03/24/22 48 2 kg (106 lb 3 2 oz)   02/10/22 49 7 kg (109 lb 9 6 oz)   01/28/22 49 1 kg (108 lb 3 2 oz)     No weight gain reported, breath sounds clear  Echo in 2021 July showed EF 60% with normal systolic function, grade 1 diastolic dysfunction        Benign familial tremor  Patient is restarting imipramine after she was noncompliant  Will consider switching atenolol to propranolol for tremor and hypertension at next visit  Follow-up in 4 weeks    Ankle swelling  Patient had side effects from hydrochlorothiazide and Lasix  Will hold off on medications for now         Problem List Items Addressed This Visit        Endocrine    Hypothyroidism     Continue levothyroxine 50 mcg daily  TSH within normal limits            Cardiovascular and Mediastinum    Hypertension     Blood pressure controlled on atenolol 50 mg daily, lisinopril 5 mg daily         Chronic diastolic CHF (congestive heart failure) ([address] )     Wt Readings from Last 3 Encounters:   03/24/22 48 2 kg (106 lb 3 2 oz)   02/10/22 49 7 kg (109 lb 9 6 oz)   01/28/22 49 1 kg (108 lb 3 2 oz)     No weight gain reported, breath sounds clear  Echo in 2021 July showed EF 60% with normal systolic function, grade 1 diastolic dysfunction                Nervous and Auditory    Benign familial tremor     Patient is restarting imipramine after she was noncompliant  Will consider switching atenolol to propranolol for tremor and hypertension at next visit  Follow-up in 4 weeks            Other    Hyperlipidemia - Primary    Ankle swelling     Patient had side effects from hydrochlorothiazide and Lasix  Will hold off on medications for now           Other Visit Diagnoses     Well adult exam Subjective:      Patient ID: Sukhjinder Olvera is a 82 y o  female  62-year-old female past medical history hypertension, hypothyroidism, anxiety presents for follow-up  She recently went to the ED due to chest pain  She has stopped taking her imipramine  She complains of leg swelling  She has been seen on previous visits with leg swelling as well  It is chronic in nature  She feels like it has gotten worse  She was trialed on Lasix and hydrochlorothiazide separately but had poor response  She states she just did not feel well after taking these medications  No other complaints  The following portions of the patient's history were reviewed and updated as appropriate: allergies, current medications, past family history, past medical history, past social history, past surgical history and problem list     Review of Systems   Constitutional: Negative  Eyes: Negative  Respiratory: Negative  Cardiovascular: Negative  Gastrointestinal: Negative  Endocrine: Negative  Genitourinary: Negative  Musculoskeletal: Negative  Allergic/Immunologic: Negative  Neurological: Negative  Psychiatric/Behavioral: Negative  Objective:      /68 (BP Location: Left arm, Patient Position: Sitting, Cuff Size: Standard) Comment: bp  Pulse 61   Temp 98 2 °F (36 8 °C) (Temporal) Comment: NO NSAIDS  Ht 4' 9" (1 448 m)   Wt 48 2 kg (106 lb 3 2 oz)   SpO2 99%   BMI 22 98 kg/m²          Physical Exam  HENT:      Head: Normocephalic and atraumatic  Nose: Nose normal       Mouth/Throat:      Mouth: Mucous membranes are moist    Eyes:      Extraocular Movements: Extraocular movements intact  Pupils: Pupils are equal, round, and reactive to light  Cardiovascular:      Rate and Rhythm: Normal rate and regular rhythm  Pulses: Normal pulses  Abdominal:      General: Abdomen is flat  Bowel sounds are normal       Palpations: Abdomen is soft     Musculoskeletal: General: Normal range of motion  Cervical back: Normal range of motion  Right lower leg: Edema present  Left lower leg: Edema present  Comments: Mild bilateral lower extremity pitting edema   Skin:     General: Skin is warm  Neurological:      General: No focal deficit present  Mental Status: She is alert and oriented to person, place, and time  Mental status is at baseline

## 2022-03-24 NOTE — ASSESSMENT & PLAN NOTE
Wt Readings from Last 3 Encounters:   03/24/22 48 2 kg (106 lb 3 2 oz)   02/10/22 49 7 kg (109 lb 9 6 oz)   01/28/22 49 1 kg (108 lb 3 2 oz)     No weight gain reported, breath sounds clear  Echo in 2021 July showed EF 60% with normal systolic function, grade 1 diastolic dysfunction

## 2022-03-24 NOTE — PROGRESS NOTES
Assessment and Plan:     Problem List Items Addressed This Visit     None           Preventive health issues were discussed with patient, and age appropriate screening tests were ordered as noted in patient's After Visit Summary  Personalized health advice and appropriate referrals for health education or preventive services given if needed, as noted in patient's After Visit Summary       History of Present Illness:     Patient presents for Medicare Annual Wellness visit    Patient Care Team:  Andres Mathew MD as PCP - Fatmata Rosario MD     Problem List:     Patient Active Problem List   Diagnosis    Chest heaviness    Anxiety    Abnormal EKG    Vitamin D deficiency    Irritable bowel syndrome    Iron deficiency anemia    Hypothyroidism    Hypertension    Hyperlipidemia    GERD without esophagitis    ESR raised    Chronic kidney disease    AVM (arteriovenous malformation)    Venous insufficiency    Impaired fasting glucose    Cough    Mild intermittent asthma without complication    Age-related osteoporosis without current pathological fracture    Nondisplaced fracture of middle phalanx of left middle finger, initial encounter for closed fracture    Closed fracture of tenth thoracic vertebra with routine healing    Fall as cause of accidental injury in home as place of occurrence    Atrophic vaginitis    Benign familial tremor    Eustachian tube dysfunction, bilateral    Tension-type headache, not intractable    Hiatal hernia    Syncope    Headache, resolved    Elevated serum creatinine    B12 deficiency    Neuropathy    Left knee injury    Ankle swelling    Chronic diastolic [facility]estive [facility] failure[facility])    Stage 3b chronic kidney disease ([facility] [address] )      Past Medical and Surgical History:     Past Medical History:   Diagnosis Date    Benign essential hypertension     Last assessed: 9/11/14    Disease of thyroid gland     Essential tremor     Fibromyalgia     GERD (gastroesophageal reflux disease)     History of nail disorders     Hyperlipidemia     Hypertension     Palpitations     Transient cerebral ischemia      Past Surgical History:   Procedure Laterality Date    APPENDECTOMY      EGD AND COLONOSCOPY  2020    HYSTERECTOMY  2010    [facility] LAP,CHOLECYSTECTOMY/GRAPH N/A 2018    Procedure: LAPAROSCOPIC CHOLECYSTECTOMY WITH IOC;  Surgeon: Merline Dunnings, MD;  Location: [facility];  Service: General    TUBAL LIGATION        Family History:     Family History   Problem Relation Age of Onset   Josselin Chevak Stroke Mother         ischemic stroke    Hypertension Mother     Heart disease Father       Social History:     Social History     Socioeconomic History    Marital status:       Spouse name: None    Number of children: None    Years of education: None    Highest education level: None   Occupational History    Occupation: retired    Tobacco Use    Smoking status: Former Smoker     Packs/day: 0 10     Years: 50 00     Pack years: 5 00     Types: Cigarettes     Start date:      Quit date:      Years since quittin 2    Smokeless tobacco: Never Used    Tobacco comment: 1 pack a week    Vaping Use    Vaping Use: Never used   Substance and Sexual Activity    Alcohol use: Not Currently     Alcohol/week: 0 0 standard drinks     Comment: 0    Drug use: No    Sexual activity: Not Currently     Partners: Male   Other Topics Concern    None   Social History Narrative    Living independently with spouse     Social Determinants of Health     Financial Resource Strain: Not on file   Food Insecurity: Not on file   Transportation Needs: Not on file   Physical Activity: Not on file   Stress: Not on file   Social Connections: Not on file   Intimate Partner Violence: Not on file   Housing Stability: Not on file      Medications and Allergies:     Current Outpatient Medications   Medication Sig Dispense Refill    acetaminophen (TYLENOL) 325 mg tablet Take 1-2 tablets by mouth every 6 (six) hours as needed      ALPRAZolam (XANAX) 0 5 mg tablet Take 1 tablet (0 5 mg total) by mouth 4 (four) times a day as needed for anxiety 120 tablet 3    atenolol (TENORMIN) 50 mg tablet take 1 tablet by mouth twice a day 180 tablet 3    ferrous sulfate 325 (65 Fe) mg tablet Take 1 tablet by mouth daily      hydrochlorothiazide (HYDRODIURIL) 12 5 mg tablet Take 1 tablet (12 5 mg total) by mouth daily 30 tablet 1    latanoprost (XALATAN) 0 005 % ophthalmic solution instill 1 drop into both eyes nightly      levothyroxine 50 mcg tablet take 1 tablet by mouth 6 DAYS A WEEK (Patient taking differently: daily take 1 tablet by mouth 6 DAYS A WEEK) 90 tablet 3    lisinopril (ZESTRIL) 5 mg tablet Take 1 tablet (5 mg total) by mouth daily 90 tablet 0    pantoprazole (PROTONIX) 40 mg tablet Take 1 tablet (40 mg total) by mouth daily before breakfast 90 tablet 3    Probiotic Product (ALIGN) 4 MG CAPS Take 1 tablet by mouth daily      cyanocobalamin (VITAMIN B-12) 1000 MCG tablet Take 1,000 mcg by mouth daily (Patient not taking: Reported on 1/4/2022 )      imipramine (TOFRANIL) 10 mg tablet Take 1 tablet (10 mg total) by mouth 4 (four) times a week 16 tablet 0    rosuvastatin (CRESTOR) 10 MG tablet Take 1 tablet (10 mg total) by mouth daily (Patient not taking: Reported on 1/4/2022 ) 90 tablet 3     No current facility-administered medications for this visit       Allergies   Allergen Reactions    Other Drowsiness     [facility] - [id number]: ANTIDEPRESSANTS      Immunizations:     Immunization History   Administered Date(s) Administered    COVID-19 PFIZER VACCINE 0 3 ML IM 03/18/2021, 04/12/2021, 10/26/2021    INFLUENZA 11/09/2007, 09/01/2015, 10/09/2017    Influenza Split High Dose Preservative Free IM 10/09/2017    Influenza, high dose seasonal 0 7 mL 12/11/2018, 10/01/2019, 10/06/2020, 10/13/2021    Influenza, seasonal, injectable 10/25/2010, 09/01/2015    Influenza, seasonal, injectable, preservative free 11/09/2007    Pneumococcal Conjugate 13-Valent 01/29/2015, 01/11/2016    Pneumococcal Polysaccharide PPV23 10/02/2003    Tdap 06/02/2015    Zoster 1935      Health Maintenance:         Topic Date Due    Breast Cancer Screening: Mammogram  05/14/2020    DXA SCAN  05/14/2024     There are no preventive care reminders to display for this patient  Medicare Health Risk Assessment:     /68 (BP Location: Left arm, Patient Position: Sitting, Cuff Size: Standard) Comment: bp  Pulse 61   Temp 98 2 °F (36 8 °C) (Temporal) Comment: NO NSAIDS  Ht 4' 9" (1 448 m)   Wt 48 2 kg (106 lb 3 2 oz)   SpO2 99%   BMI 22 98 kg/m²      Lakeisha is here for her Subsequent Wellness visit  Health Risk Assessment:   Patient rates overall health as fair  Patient feels that their physical health rating is slightly worse  Patient is satisfied with their life  Eyesight was rated as slightly worse  Hearing was rated as same  Patient feels that their emotional and mental health rating is same  Patients states they are sometimes angry  Patient states they are often unusually tired/fatigued  Pain experienced in the last 7 days has been a lot  Patient's pain rating has been 8/10  Patient states that she has experienced no weight loss or gain in last 6 months  Fall Risk Screening: In the past year, patient has experienced: no history of falling in past year      Urinary Incontinence Screening:   Patient has not leaked urine accidently in the last six months  Home Safety:  Patient has trouble with stairs inside or outside of their home  Patient has working smoke alarms and has working carbon monoxide detector  Home safety hazards include: none  Nutrition:   Current diet is Regular  Medications:   Patient is currently taking over-the-counter supplements  OTC medications include: see medication list  Patient is able to manage medications       Activities of Daily Living (ADLs)/Instrumental Activities of Daily Living (IADLs):   Walk and transfer into and out of bed and chair?: No  Dress and groom yourself?: No    Bathe or shower yourself?: No    Feed yourself? No  Do your laundry/housekeeping?: No  Manage your money, pay your bills and track your expenses?: No  Make your own meals?: No    Do your own shopping?: No    Previous Hospitalizations:   Any hospitalizations or ED visits within the last 12 months?: Yes    How many hospitalizations have you had in the last year?: 1-2    Advance Care Planning:   Living will: No    Durable POA for healthcare: No    Advanced directive: No      PREVENTIVE SCREENINGS      Cardiovascular Screening:    General: Screening Not Indicated and History Lipid Disorder      Diabetes Screening:     General: Screening Current      Cervical Cancer Screening:    General: Screening Not Indicated      Osteoporosis Screening:    General: Screening Not Indicated and History Osteoporosis      Lung Cancer Screening:     General: Screening Not Indicated    Screening, Brief Intervention, and Referral to Treatment (SBIRT)    Screening  Typical number of drinks in a day: 0  Typical number of drinks in a week: 0  Interpretation: Low risk drinking behavior      Single Item Drug Screening:  How often have you used an illegal drug (including marijuana) or a prescription medication for non-medical reasons in the past year? never    Single Item Drug Screen Score: 0  Interpretation: Negative screen for possible drug use disorder      Muna Jovel MD

## 2022-03-24 NOTE — ASSESSMENT & PLAN NOTE
Patient is restarting imipramine after she was noncompliant  Will consider switching atenolol to propranolol for tremor and hypertension at next visit  Follow-up in 4 weeks

## 2022-03-26 LAB
ATRIAL RATE: 70 BPM
P AXIS: 46 DEGREES
PR INTERVAL: 146 MS
QRS AXIS: 7 DEGREES
QRSD INTERVAL: 84 MS
QT INTERVAL: 430 MS
QTC INTERVAL: 464 MS
T WAVE AXIS: 6 DEGREES
VENTRICULAR RATE: 70 BPM

## 2022-03-26 PROCEDURE — 93010 ELECTROCARDIOGRAM REPORT: CPT | Performed by: INTERNAL MEDICINE

## 2022-04-08 ENCOUNTER — RA CDI HCC (OUTPATIENT)
Dept: OTHER | Facility: HOSPITAL | Age: 87
End: 2022-04-08

## 2022-04-08 NOTE — PROGRESS NOTES
I13 0  [facility]  coding opportunities          Chart Reviewed number of suggestions sent to Provider: 1     Patients Insurance     Medicare Insurance: [employer]

## 2022-04-23 ENCOUNTER — APPOINTMENT (EMERGENCY)
Dept: CT IMAGING | Facility: HOSPITAL | Age: 87
End: 2022-04-23
Payer: MEDICARE

## 2022-04-23 ENCOUNTER — APPOINTMENT (EMERGENCY)
Dept: RADIOLOGY | Facility: HOSPITAL | Age: 87
End: 2022-04-23
Payer: MEDICARE

## 2022-04-23 ENCOUNTER — HOSPITAL ENCOUNTER (OUTPATIENT)
Facility: HOSPITAL | Age: 87
Setting detail: OBSERVATION
Discharge: HOME | End: 2022-04-24
Attending: EMERGENCY MEDICINE | Admitting: FAMILY MEDICINE
Payer: MEDICARE

## 2022-04-23 DIAGNOSIS — R10.9 ABDOMINAL PAIN: ICD-10-CM

## 2022-04-23 DIAGNOSIS — R07.9 CHEST PAIN, UNSPECIFIED: Primary | ICD-10-CM

## 2022-04-23 DIAGNOSIS — I10 HYPERTENSION: ICD-10-CM

## 2022-04-23 DIAGNOSIS — K52.9 ENTEROCOLITIS: ICD-10-CM

## 2022-04-23 LAB
ALBUMIN SERPL BCP-MCNC: 3.7 G/DL (ref 3.5–5)
ALP SERPL-CCNC: 132 U/L (ref 46–116)
ALT SERPL W P-5'-P-CCNC: 19 U/L (ref 12–78)
ANION GAP SERPL CALCULATED.3IONS-SCNC: 9 MMOL/L (ref 4–13)
APTT PPP: 30 SECONDS (ref 23–37)
AST SERPL W P-5'-P-CCNC: 27 U/L (ref 5–45)
BASOPHILS # BLD AUTO: 0.02 THOUSANDS/ΜL (ref 0–0.1)
BASOPHILS NFR BLD AUTO: 0 % (ref 0–1)
BILIRUB SERPL-MCNC: 0.49 MG/DL (ref 0.2–1)
BUN SERPL-MCNC: 15 MG/DL (ref 5–25)
CALCIUM SERPL-MCNC: 9.4 MG/DL (ref 8.3–10.1)
CARDIAC TROPONIN I PNL SERPL HS: 5 NG/L
CHLORIDE SERPL-SCNC: 103 MMOL/L (ref 100–108)
CO2 SERPL-SCNC: 24 MMOL/L (ref 21–32)
CREAT SERPL-MCNC: 1.42 MG/DL (ref 0.6–1.3)
EOSINOPHIL # BLD AUTO: 0.58 THOUSAND/ΜL (ref 0–0.61)
EOSINOPHIL NFR BLD AUTO: 6 % (ref 0–6)
ERYTHROCYTE [DISTWIDTH] IN BLOOD BY AUTOMATED COUNT: 14.2 % (ref 11.6–15.1)
GFR SERPL CREATININE-BSD FRML MDRD: 33 ML/MIN/1.73SQ M
GLUCOSE SERPL-MCNC: 93 MG/DL (ref 65–140)
HCT VFR BLD AUTO: 36.5 % (ref 34.8–46.1)
HGB BLD-MCNC: 11.8 G/DL (ref 11.5–15.4)
IMM GRANULOCYTES # BLD AUTO: 0.04 THOUSAND/UL (ref 0–0.2)
IMM GRANULOCYTES NFR BLD AUTO: 0 % (ref 0–2)
INR PPP: 1.11 (ref 0.84–1.19)
LYMPHOCYTES # BLD AUTO: 1.47 THOUSANDS/ΜL (ref 0.6–4.47)
LYMPHOCYTES NFR BLD AUTO: 16 % (ref 14–44)
MCH RBC QN AUTO: 31.2 PG (ref 26.8–34.3)
MCHC RBC AUTO-ENTMCNC: 32.3 G/DL (ref 31.4–37.4)
MCV RBC AUTO: 97 FL (ref 82–98)
MONOCYTES # BLD AUTO: 0.78 THOUSAND/ΜL (ref 0.17–1.22)
MONOCYTES NFR BLD AUTO: 9 % (ref 4–12)
NEUTROPHILS # BLD AUTO: 6.3 THOUSANDS/ΜL (ref 1.85–7.62)
NEUTS SEG NFR BLD AUTO: 69 % (ref 43–75)
NRBC BLD AUTO-RTO: 0 /100 WBCS
PLATELET # BLD AUTO: 216 THOUSANDS/UL (ref 149–390)
PMV BLD AUTO: 10.6 FL (ref 8.9–12.7)
POTASSIUM SERPL-SCNC: 4 MMOL/L (ref 3.5–5.3)
PROT SERPL-MCNC: 7.6 G/DL (ref 6.4–8.2)
PROTHROMBIN TIME: 13.8 SECONDS (ref 11.6–14.5)
RBC # BLD AUTO: 3.78 MILLION/UL (ref 3.81–5.12)
SODIUM SERPL-SCNC: 136 MMOL/L (ref 136–145)
WBC # BLD AUTO: 9.19 THOUSAND/UL (ref 4.31–10.16)

## 2022-04-23 PROCEDURE — 83880 ASSAY OF NATRIURETIC PEPTIDE: CPT | Performed by: EMERGENCY MEDICINE

## 2022-04-23 PROCEDURE — 99285 EMERGENCY DEPT VISIT HI MDM: CPT

## 2022-04-23 PROCEDURE — 74174 CTA ABD&PLVS W/CONTRAST: CPT

## 2022-04-23 PROCEDURE — 85025 COMPLETE CBC W/AUTO DIFF WBC: CPT | Performed by: EMERGENCY MEDICINE

## 2022-04-23 PROCEDURE — 85730 THROMBOPLASTIN TIME PARTIAL: CPT | Performed by: EMERGENCY MEDICINE

## 2022-04-23 PROCEDURE — 93005 ELECTROCARDIOGRAM TRACING: CPT

## 2022-04-23 PROCEDURE — 96361 HYDRATE IV INFUSION ADD-ON: CPT

## 2022-04-23 PROCEDURE — 99285 EMERGENCY DEPT VISIT HI MDM: CPT | Performed by: EMERGENCY MEDICINE

## 2022-04-23 PROCEDURE — 80053 COMPREHEN METABOLIC PANEL: CPT | Performed by: EMERGENCY MEDICINE

## 2022-04-23 PROCEDURE — 71275 CT ANGIOGRAPHY CHEST: CPT

## 2022-04-23 PROCEDURE — G1004 CDSM NDSC: HCPCS

## 2022-04-23 PROCEDURE — 36415 COLL VENOUS BLD VENIPUNCTURE: CPT | Performed by: EMERGENCY MEDICINE

## 2022-04-23 PROCEDURE — 71045 X-RAY EXAM CHEST 1 VIEW: CPT

## 2022-04-23 PROCEDURE — 85610 PROTHROMBIN TIME: CPT | Performed by: EMERGENCY MEDICINE

## 2022-04-23 PROCEDURE — 84484 ASSAY OF TROPONIN QUANT: CPT | Performed by: EMERGENCY MEDICINE

## 2022-04-23 RX ADMIN — SODIUM CHLORIDE 500 ML: 900 INJECTION, SOLUTION INTRAVENOUS at 22:24

## 2022-04-23 RX ADMIN — IOHEXOL 100 ML: 350 INJECTION, SOLUTION INTRAVENOUS at 22:45

## 2022-04-24 VITALS
RESPIRATION RATE: 17 BRPM | WEIGHT: 114.64 LBS | BODY MASS INDEX: 24.81 KG/M2 | SYSTOLIC BLOOD PRESSURE: 153 MMHG | DIASTOLIC BLOOD PRESSURE: 69 MMHG | OXYGEN SATURATION: 95 % | TEMPERATURE: 97.7 F | HEART RATE: 81 BPM

## 2022-04-24 PROBLEM — K52.9 ENTEROCOLITIS: Status: ACTIVE | Noted: 2022-04-24

## 2022-04-24 LAB
2HR DELTA HS TROPONIN: 0 NG/L
ANION GAP SERPL CALCULATED.3IONS-SCNC: 9 MMOL/L (ref 4–13)
ATRIAL RATE: 51 BPM
ATRIAL RATE: 69 BPM
ATRIAL RATE: 78 BPM
BUN SERPL-MCNC: 11 MG/DL (ref 5–25)
CALCIUM SERPL-MCNC: 8.9 MG/DL (ref 8.3–10.1)
CARDIAC TROPONIN I PNL SERPL HS: 5 NG/L
CHLORIDE SERPL-SCNC: 105 MMOL/L (ref 100–108)
CO2 SERPL-SCNC: 25 MMOL/L (ref 21–32)
CREAT SERPL-MCNC: 1.25 MG/DL (ref 0.6–1.3)
ERYTHROCYTE [DISTWIDTH] IN BLOOD BY AUTOMATED COUNT: 14.1 % (ref 11.6–15.1)
GFR SERPL CREATININE-BSD FRML MDRD: 39 ML/MIN/1.73SQ M
GLUCOSE P FAST SERPL-MCNC: 115 MG/DL (ref 65–99)
GLUCOSE SERPL-MCNC: 115 MG/DL (ref 65–140)
HCT VFR BLD AUTO: 34.2 % (ref 34.8–46.1)
HGB BLD-MCNC: 11 G/DL (ref 11.5–15.4)
MCH RBC QN AUTO: 30.8 PG (ref 26.8–34.3)
MCHC RBC AUTO-ENTMCNC: 32.2 G/DL (ref 31.4–37.4)
MCV RBC AUTO: 96 FL (ref 82–98)
NT-PROBNP SERPL-MCNC: 1108 PG/ML
P AXIS: 49 DEGREES
P AXIS: 57 DEGREES
P AXIS: 58 DEGREES
PLATELET # BLD AUTO: 202 THOUSANDS/UL (ref 149–390)
PMV BLD AUTO: 9.7 FL (ref 8.9–12.7)
POTASSIUM SERPL-SCNC: 4 MMOL/L (ref 3.5–5.3)
PR INTERVAL: 138 MS
PR INTERVAL: 146 MS
PR INTERVAL: 152 MS
QRS AXIS: -11 DEGREES
QRS AXIS: -3 DEGREES
QRS AXIS: -4 DEGREES
QRSD INTERVAL: 80 MS
QRSD INTERVAL: 82 MS
QRSD INTERVAL: 84 MS
QT INTERVAL: 434 MS
QT INTERVAL: 436 MS
QT INTERVAL: 492 MS
QTC INTERVAL: 453 MS
QTC INTERVAL: 465 MS
QTC INTERVAL: 497 MS
RBC # BLD AUTO: 3.57 MILLION/UL (ref 3.81–5.12)
SODIUM SERPL-SCNC: 139 MMOL/L (ref 136–145)
T WAVE AXIS: 43 DEGREES
T WAVE AXIS: 45 DEGREES
T WAVE AXIS: 54 DEGREES
VENTRICULAR RATE: 51 BPM
VENTRICULAR RATE: 69 BPM
VENTRICULAR RATE: 78 BPM
WBC # BLD AUTO: 10.93 THOUSAND/UL (ref 4.31–10.16)

## 2022-04-24 PROCEDURE — 99217 PR OBSERVATION CARE DISCHARGE MANAGEMENT: CPT | Performed by: STUDENT IN AN ORGANIZED HEALTH CARE EDUCATION/TRAINING PROGRAM

## 2022-04-24 PROCEDURE — 36415 COLL VENOUS BLD VENIPUNCTURE: CPT | Performed by: EMERGENCY MEDICINE

## 2022-04-24 PROCEDURE — 96375 TX/PRO/DX INJ NEW DRUG ADDON: CPT

## 2022-04-24 PROCEDURE — 96374 THER/PROPH/DIAG INJ IV PUSH: CPT

## 2022-04-24 PROCEDURE — 84484 ASSAY OF TROPONIN QUANT: CPT | Performed by: EMERGENCY MEDICINE

## 2022-04-24 PROCEDURE — 96376 TX/PRO/DX INJ SAME DRUG ADON: CPT

## 2022-04-24 PROCEDURE — 93005 ELECTROCARDIOGRAM TRACING: CPT

## 2022-04-24 PROCEDURE — 80048 BASIC METABOLIC PNL TOTAL CA: CPT | Performed by: PHYSICIAN ASSISTANT

## 2022-04-24 PROCEDURE — 96361 HYDRATE IV INFUSION ADD-ON: CPT

## 2022-04-24 PROCEDURE — 93010 ELECTROCARDIOGRAM REPORT: CPT | Performed by: INTERNAL MEDICINE

## 2022-04-24 PROCEDURE — 85027 COMPLETE CBC AUTOMATED: CPT | Performed by: PHYSICIAN ASSISTANT

## 2022-04-24 RX ORDER — ACETAMINOPHEN 325 MG/1
650 TABLET ORAL EVERY 6 HOURS PRN
Status: DISCONTINUED | OUTPATIENT
Start: 2022-04-24 | End: 2022-04-24 | Stop reason: HOSPADM

## 2022-04-24 RX ORDER — ATENOLOL 50 MG/1
50 TABLET ORAL 2 TIMES DAILY
Status: DISCONTINUED | OUTPATIENT
Start: 2022-04-24 | End: 2022-04-24 | Stop reason: HOSPADM

## 2022-04-24 RX ORDER — POLYETHYLENE GLYCOL 3350 17 G/17G
17 POWDER, FOR SOLUTION ORAL DAILY
Status: DISCONTINUED | OUTPATIENT
Start: 2022-04-24 | End: 2022-04-24 | Stop reason: HOSPADM

## 2022-04-24 RX ORDER — LATANOPROST 50 UG/ML
1 SOLUTION/ DROPS OPHTHALMIC
Status: DISCONTINUED | OUTPATIENT
Start: 2022-04-24 | End: 2022-04-24 | Stop reason: HOSPADM

## 2022-04-24 RX ORDER — HYDROMORPHONE HCL IN WATER/PF 6 MG/30 ML
0.2 PATIENT CONTROLLED ANALGESIA SYRINGE INTRAVENOUS ONCE
Status: COMPLETED | OUTPATIENT
Start: 2022-04-24 | End: 2022-04-24

## 2022-04-24 RX ORDER — LISINOPRIL 5 MG/1
5 TABLET ORAL DAILY
Status: DISCONTINUED | OUTPATIENT
Start: 2022-04-24 | End: 2022-04-24 | Stop reason: HOSPADM

## 2022-04-24 RX ORDER — ALPRAZOLAM 0.5 MG/1
0.5 TABLET ORAL 4 TIMES DAILY PRN
Status: DISCONTINUED | OUTPATIENT
Start: 2022-04-24 | End: 2022-04-24

## 2022-04-24 RX ORDER — LABETALOL HYDROCHLORIDE 5 MG/ML
10 INJECTION, SOLUTION INTRAVENOUS ONCE
Status: COMPLETED | OUTPATIENT
Start: 2022-04-24 | End: 2022-04-24

## 2022-04-24 RX ORDER — HYDROCHLOROTHIAZIDE 12.5 MG/1
12.5 TABLET ORAL DAILY
Status: DISCONTINUED | OUTPATIENT
Start: 2022-04-24 | End: 2022-04-24 | Stop reason: HOSPADM

## 2022-04-24 RX ORDER — ALPRAZOLAM 0.5 MG/1
0.5 TABLET ORAL 2 TIMES DAILY PRN
Status: DISCONTINUED | OUTPATIENT
Start: 2022-04-24 | End: 2022-04-24 | Stop reason: HOSPADM

## 2022-04-24 RX ORDER — LEVOTHYROXINE SODIUM 0.05 MG/1
50 TABLET ORAL
Status: DISCONTINUED | OUTPATIENT
Start: 2022-04-24 | End: 2022-04-24 | Stop reason: HOSPADM

## 2022-04-24 RX ORDER — FERROUS SULFATE 325(65) MG
325 TABLET ORAL DAILY
Status: DISCONTINUED | OUTPATIENT
Start: 2022-04-24 | End: 2022-04-24 | Stop reason: HOSPADM

## 2022-04-24 RX ORDER — ONDANSETRON 2 MG/ML
4 INJECTION INTRAMUSCULAR; INTRAVENOUS ONCE
Status: COMPLETED | OUTPATIENT
Start: 2022-04-24 | End: 2022-04-24

## 2022-04-24 RX ORDER — ATENOLOL 50 MG/1
50 TABLET ORAL ONCE
Status: DISCONTINUED | OUTPATIENT
Start: 2022-04-24 | End: 2022-04-24 | Stop reason: HOSPADM

## 2022-04-24 RX ORDER — HYDRALAZINE HYDROCHLORIDE 20 MG/ML
5 INJECTION INTRAMUSCULAR; INTRAVENOUS ONCE
Status: COMPLETED | OUTPATIENT
Start: 2022-04-24 | End: 2022-04-24

## 2022-04-24 RX ORDER — SODIUM CHLORIDE, SODIUM LACTATE, POTASSIUM CHLORIDE, CALCIUM CHLORIDE 600; 310; 30; 20 MG/100ML; MG/100ML; MG/100ML; MG/100ML
100 INJECTION, SOLUTION INTRAVENOUS CONTINUOUS
Status: DISCONTINUED | OUTPATIENT
Start: 2022-04-24 | End: 2022-04-24

## 2022-04-24 RX ORDER — PANTOPRAZOLE SODIUM 40 MG/1
40 TABLET, DELAYED RELEASE ORAL
Status: DISCONTINUED | OUTPATIENT
Start: 2022-04-24 | End: 2022-04-24 | Stop reason: HOSPADM

## 2022-04-24 RX ORDER — ONDANSETRON 2 MG/ML
4 INJECTION INTRAMUSCULAR; INTRAVENOUS EVERY 6 HOURS PRN
Status: DISCONTINUED | OUTPATIENT
Start: 2022-04-24 | End: 2022-04-24 | Stop reason: HOSPADM

## 2022-04-24 RX ADMIN — Medication 125 MG: at 05:40

## 2022-04-24 RX ADMIN — HYDROCHLOROTHIAZIDE 12.5 MG: 12.5 TABLET ORAL at 12:29

## 2022-04-24 RX ADMIN — HYDROMORPHONE HYDROCHLORIDE 0.2 MG: 0.2 INJECTION, SOLUTION INTRAMUSCULAR; INTRAVENOUS; SUBCUTANEOUS at 00:34

## 2022-04-24 RX ADMIN — LEVOTHYROXINE SODIUM 50 MCG: 50 TABLET ORAL at 05:55

## 2022-04-24 RX ADMIN — PANTOPRAZOLE SODIUM 40 MG: 40 TABLET, DELAYED RELEASE ORAL at 08:25

## 2022-04-24 RX ADMIN — ALPRAZOLAM 0.5 MG: 0.5 TABLET ORAL at 10:36

## 2022-04-24 RX ADMIN — ENOXAPARIN SODIUM 30 MG: 30 INJECTION SUBCUTANEOUS at 08:24

## 2022-04-24 RX ADMIN — LABETALOL 20 MG/4 ML (5 MG/ML) INTRAVENOUS SYRINGE 10 MG: at 02:27

## 2022-04-24 RX ADMIN — LISINOPRIL 5 MG: 5 TABLET ORAL at 08:25

## 2022-04-24 RX ADMIN — ALPRAZOLAM 0.5 MG: 0.5 TABLET ORAL at 14:23

## 2022-04-24 RX ADMIN — SODIUM CHLORIDE 500 ML: 0.9 INJECTION, SOLUTION INTRAVENOUS at 02:46

## 2022-04-24 RX ADMIN — HYDROMORPHONE HYDROCHLORIDE 0.2 MG: 0.2 INJECTION, SOLUTION INTRAMUSCULAR; INTRAVENOUS; SUBCUTANEOUS at 01:34

## 2022-04-24 RX ADMIN — SODIUM CHLORIDE, SODIUM LACTATE, POTASSIUM CHLORIDE, AND CALCIUM CHLORIDE 100 ML/HR: .6; .31; .03; .02 INJECTION, SOLUTION INTRAVENOUS at 03:51

## 2022-04-24 RX ADMIN — FERROUS SULFATE TAB 325 MG (65 MG ELEMENTAL FE) 325 MG: 325 (65 FE) TAB at 08:25

## 2022-04-24 RX ADMIN — ONDANSETRON 4 MG: 2 INJECTION INTRAMUSCULAR; INTRAVENOUS at 02:45

## 2022-04-24 RX ADMIN — ATENOLOL 50 MG: 50 TABLET ORAL at 08:24

## 2022-04-24 RX ADMIN — Medication 125 MG: at 12:29

## 2022-04-24 RX ADMIN — HYDRALAZINE HYDROCHLORIDE 5 MG: 20 INJECTION INTRAMUSCULAR; INTRAVENOUS at 12:08

## 2022-04-24 NOTE — ASSESSMENT & PLAN NOTE
· /91 at time of admission  Patient reports she forgot to take her morning pills due to not feeling well  · Continue home atenolol 50mg po BID, lisinopril 5mg po daily   · Per pt no longer on HCTZ   · BP is improved with IV hydralazine 5 mg once and patient is very anxious and eager to go home  · Advised to follow up with Dr Renetta Michelle in 3-5 days after hospital discharge

## 2022-04-24 NOTE — H&P
[address] [place] [address] 1935, 82 y o  female MRN: [id number]  Unit/Bed#: ED 25 Encounter: [id number]  Primary Care Provider: Ke Short MD   Date and time admitted to hospital: 4/23/2022 10:02 PM    * Enterocolitis  Assessment & Plan  Pt presents with diarrhea, abdominal pain, and malaise x 1d  Hx of IBS  Denies recent atb use  No known sick contact  · CTA c/a/p: Fluid distention of the colon with diffuse mucosal hyperenhancement  Engorgement of the vasa recta involving numerous loops of small bowel  · Check c diff assay, stool culture   · Empirically started on oral vancomycin     Stage 3b chronic kidney disease (HCC)  Assessment & Plan  Cr 1 4, near baseline of 1 3   · Avoid nephrotoxins, monitor I/Os   · BMP in am     Hyperlipidemia  Assessment & Plan  · Continue statin     Hypertension  Assessment & Plan  · /91 at time of admission, may be exacerbated due to pain  · Continue home atenolol 50mg po BID, lisinopril 5mg po daily   · Per pt no longer on HCTZ   · Received IV labetalol with borderline hypotension, bradycardia, and likley vasovagal episode  would avoid further doses   · Monitor BP     Hypothyroidism  Assessment & Plan  · Continue home levothyroxine     Iron deficiency anemia  Assessment & Plan  · Hemoglobin stable from baseline  · Continue iron 325 mg daily    Anxiety  Assessment & Plan  · Continue Xanax 0 5 mg b i d  P r n  VTE Pharmacologic Prophylaxis: VTE Score: 4 Moderate Risk (Score 3-4) - Pharmacological DVT Prophylaxis Ordered: enoxaparin (Lovenox)  Code Status: Level 1 - Full Code     Anticipated Length of Stay: Patient will be admitted on an observation basis with an anticipated length of stay of less than 2 midnights secondary to Acute illness  Total Time for Visit, including Counseling / Coordination of Care: 60 minutes Greater than 50% of this total time spent on direct patient counseling and coordination of care      Chief Complaint   Chief Complaint   Patient presents with    Chest Pain     had abdominal pain earlier today and then this evening she had chest pain that radiated to the left then to the right  History of Present Illness:  Gustavo Raza is a 82 y o  female with a PMH of hypertension, hyperlipidemia, GERD who presents with acute onset of abdominal pain  Patient reports that abdominal pain started yesterday evening  She reports 10/10 constant, sharp pain that radiates to her chest and back  Symptoms accompanied by nausea and diarrhea  She did take 1 Imodium and Gas-X prior to her arrival   CTA performed in ED which was negative for dissection however did show enterocolitis  Pain is now improved after receiving IV Dilaudid  She denies fever, chills, palpitations, lightheadedness, dizziness, diaphoresis  No known exposure to sick contacts  No hematochezia or melena  Review of Systems:  10-point review of system was obtained  Pertinent positives and negatives are listed in the HPI  Remainder of review of systems are negative  Past Medical and Surgical History:   Past Medical History:   Diagnosis Date    Benign essential hypertension     Last assessed: 9/11/14    Disease of thyroid gland     Essential tremor     Fibromyalgia     GERD (gastroesophageal reflux disease)     History of nail disorders     Hyperlipidemia     Hypertension     Palpitations     Transient cerebral ischemia        Past Surgical History:   Procedure Laterality Date    APPENDECTOMY      EGD AND COLONOSCOPY  07/2020    HYSTERECTOMY  01/01/2010    [place] LAP,CHOLECYSTECTOMY/GRAPH N/A 4/4/2018    Procedure: LAPAROSCOPIC CHOLECYSTECTOMY WITH IOC;  Surgeon: Vicente Galeana MD;  Location: [facility];  Service: General    TUBAL LIGATION         Meds/Allergies:  Prior to Admission medications    Medication Sig Start Date End Date Taking?  Authorizing Provider   acetaminophen (TYLENOL) 325 mg tablet Take 1-2 tablets by mouth every 6 (six) hours as needed    Historical Provider, MD   ALPRAZolam Juliane Echavarria) 0 5 mg tablet Take 1 tablet (0 5 mg total) by mouth 4 (four) times a day as needed for anxiety 1/4/22   An Valencia MD   atenolol (TENORMIN) 50 mg tablet take 1 tablet by mouth twice a day 11/1/21   An Valencia MD   cyanocobalamin (VITAMIN B-12) 1000 MCG tablet Take 1,000 mcg by mouth daily  Patient not taking: Reported on 1/4/2022     Historical Provider, MD   ferrous sulfate 325 (65 Fe) mg tablet Take 1 tablet by mouth daily 10/9/17   Historical Provider, MD   hydrochlorothiazide (HYDRODIURIL) 12 5 mg tablet Take 1 tablet (12 5 mg total) by mouth daily 2/10/22   AMIE Moore   imipramine (TOFRANIL) 10 mg tablet Take 1 tablet (10 mg total) by mouth 4 (four) times a week 9/14/21 2/10/22  AMIE Moore   latanoprost (XALATAN) 0 005 % ophthalmic solution instill 1 drop into both eyes nightly 5/12/20   Historical Provider, MD   levothyroxine 50 mcg tablet take 1 tablet by mouth 6 DAYS A WEEK  Patient taking differently: daily take 1 tablet by mouth 6 DAYS A WEEK 8/24/21   An Valencia MD   lisinopril (ZESTRIL) 5 mg tablet Take 1 tablet (5 mg total) by mouth daily 3/14/22   AMIE Moore   pantoprazole (PROTONIX) 40 mg tablet Take 1 tablet (40 mg total) by mouth daily before breakfast 10/13/21   Ventura Peñaloza MD   Probiotic Product (ALIGN) 4 MG CAPS Take 1 tablet by mouth daily    Historical Provider, MD   rosuvastatin (CRESTOR) 10 MG tablet Take 1 tablet (10 mg total) by mouth daily  Patient not taking: Reported on 1/4/2022  10/13/21   Ventura Peñaloza MD     I have reviewed home medications with patient personally  Allergies: Allergies   Allergen Reactions    Other Drowsiness     Annotation - [id number]: ANTIDEPRESSANTS       Social History:  Marital Status:     Occupation:   Patient Pre-hospital Living Situation: Home  Patient Pre-hospital Level of Mobility: walks  Patient Pre-hospital Diet Restrictions: Substance Use History:   Social History     Substance and Sexual Activity   Alcohol Use Not Currently    Alcohol/week: 0 0 standard drinks    Comment: 0     Social History     Tobacco Use   Smoking Status Former Smoker    Packs/day: 0 10    Years: 50 00    Pack years: 5 00    Types: Cigarettes    Start date:     Quit date:     Years since quittin 3   Smokeless Tobacco Never Used   Tobacco Comment    1 pack a week      Social History     Substance and Sexual Activity   Drug Use No       Family History:  Family History   Problem Relation Age of Onset    Stroke Mother         ischemic stroke    Hypertension Mother     Heart disease Father        Physical Exam:     Vitals:   Blood Pressure: (!) 173/77 (22 0500)  Pulse: 76 (22 0500)  Temperature: 97 7 °F (36 5 °C) (22)  Temp Source: Oral (22)  Respirations: 16 (22 0500)  Weight - Scale: 52 kg (114 lb 10 2 oz) (22)  SpO2: 96 % (22 0500)    Physical Exam  Vitals and nursing note reviewed  Constitutional:       General: She is not in acute distress  Appearance: She is well-developed  She is ill-appearing  HENT:      Head: Normocephalic and atraumatic  Mouth/Throat:      Mouth: Mucous membranes are dry  Eyes:      Extraocular Movements: Extraocular movements intact  Conjunctiva/sclera: Conjunctivae normal    Cardiovascular:      Rate and Rhythm: Normal rate and regular rhythm  Heart sounds: No murmur heard  Pulmonary:      Effort: Pulmonary effort is normal  No respiratory distress  Breath sounds: Normal breath sounds  Abdominal:      General: Bowel sounds are normal       Palpations: Abdomen is soft  Tenderness: There is abdominal tenderness  There is no guarding or rebound  Musculoskeletal:         General: Normal range of motion  Cervical back: Neck supple  Skin:     General: Skin is warm and dry     Neurological:      General: No focal deficit present  Mental Status: She is alert and oriented to person, place, and time  Psychiatric:         Mood and Affect: Mood normal          Behavior: Behavior normal           Additional Data:     Lab Results:  Results from last 7 days   Lab Units 04/24/22  0558 04/23/22 2223 04/23/22  2223   WBC Thousand/uL 10 93*   < > 9 19   HEMOGLOBIN g/dL 11 0*   < > 11 8   HEMATOCRIT % 34 2*   < > 36 5   PLATELETS Thousands/uL 202   < > 216   NEUTROS PCT %  --   --  69   LYMPHS PCT %  --   --  16   MONOS PCT %  --   --  9   EOS PCT %  --   --  6    < > = values in this interval not displayed  Results from last 7 days   Lab Units 04/24/22  0558 04/23/22 2223 04/23/22 2223   SODIUM mmol/L 139   < > 136   POTASSIUM mmol/L 4 0   < > 4 0   CHLORIDE mmol/L 105   < > 103   CO2 mmol/L 25   < > 24   BUN mg/dL 11   < > 15   CREATININE mg/dL 1 25   < > 1 42*   ANION GAP mmol/L 9   < > 9   CALCIUM mg/dL 8 9   < > 9 4   ALBUMIN g/dL  --   --  3 7   TOTAL BILIRUBIN mg/dL  --   --  0 49   ALK PHOS U/L  --   --  132*   ALT U/L  --   --  19   AST U/L  --   --  27   GLUCOSE RANDOM mg/dL 115   < > 93    < > = values in this interval not displayed  Results from last 7 days   Lab Units 04/23/22  2321   INR  1 11                   Imaging: Reviewed radiology reports from this admission including: chest CT scan  CTA dissection protocol chest/abdomen/pelvis   Final Result by Catia Solano MD (04/23 2339)      No evidence of aortic aneurysm or dissection      Findings consistent with enterocolitis  Workstation performed: [id number]         XR chest 1 view portable    (Results Pending)       EKG and Other Studies Reviewed on Admission:   · EKG: NSR  HR 70  T-wave inversion in inferior leads, inferior infarct age indeterminate  ** Please Note: This note has been constructed using a voice recognition system   **

## 2022-04-24 NOTE — DISCHARGE SUMMARY
[facility]  Discharge- Talita Gonzalez 1935, 82 y o  female MRN: [id number]  Unit/Bed#: ED 25 Encounter: [id number]  Primary Care Provider: Azalia Khoury MD   Date and time admitted to hospital: 4/23/2022 10:02 PM    * Enterocolitis  Assessment & Plan  Pt presents with diarrhea, abdominal pain, and malaise x 1d  Hx of IBS  Denies recent atb use  No known sick contact  · CTA c/a/p: Fluid distention of the colon with diffuse mucosal hyperenhancement  Engorgement of the vasa recta involving numerous loops of small bowel  · No bowel movement since admission  Unlikely C diff  · Monitor off antibiotics  Stage 3b chronic kidney disease (HCC)  Assessment & Plan  Cr 1 4, near baseline of 1 3   · Avoid nephrotoxins, monitor I/Os   · BMP in am: 1 25    Hyperlipidemia  Assessment & Plan  · Continue statin     Hypertension  Assessment & Plan  · /91 at time of admission  Patient reports she forgot to take her morning pills due to not feeling well  · Continue home atenolol 50mg po BID, lisinopril 5mg po daily   · Per pt no longer on HCTZ   · BP is improved with IV hydralazine 5 mg once and patient is very anxious and eager to go home  · Advised to follow up with Dr Phill Beltran in 3-5 days after hospital discharge  Hypothyroidism  Assessment & Plan  · Continue home levothyroxine     Iron deficiency anemia  Assessment & Plan  · Hemoglobin stable from baseline  · Continue iron 325 mg daily    Anxiety  Assessment & Plan  · Continue Xanax 0 5 mg b i d  P r n      Discharging Physician / Practitioner: Austine Goodpasture, MD  PCP: Azalia Khoury MD  Admission Date:   Admission Orders (From admission, onward)     Ordered        04/24/22 0311  Place in Observation  Once                      Discharge Date: 04/24/22    Medical Problems             Resolved Problems  Date Reviewed: 4/24/2022    None                Consultations During Hospital Stay:  · None    Procedures Performed: · None    Significant Findings / Test Results:   CTA dissection protocol chest/abdomen/pelvis   Final Result by Sun Munoz MD (04/23 2339)      No evidence of aortic aneurysm or dissection      Findings consistent with enterocolitis  Workstation performed: [id number]         XR chest 1 view portable   Final Result by Amara Christy MD (04/24 0930)      No acute cardiopulmonary disease  Workstation performed: [id number]         ·   ·     Incidental Findings:   · As above     Test Results Pending at Discharge (will require follow up): · None     Outpatient Tests Requested:  · None    Complications:  None    Reason for Admission: Enterocolitis     Hospital Course: Ness Garrett is a 82 y o  female patient with PMH of hypertension, hyperlipidemia, GERD who originally presented to the hospital on 4/23/2022 due to acute onset of abdominal pain  She reports she has been not feeling well with fatigue for 1 week  She reports of sudden onset bilateral flank pain radiated down to her lower abdomen accompanied by 3 episodes of loose BM yesterday  No blood or mucus in it  No fever, chills or urinary symptoms  No vomiting  Patient has been afebrile  CBC showed WBC 10 93  Hemoglobin 11 which is at her baseline  Troponin negative x 2  CT he in ED showed enterocolitis  She was given IV fluid and oral vancomycin x2  Unlikely C diff since the patient is no more recurrent diarrhea  She admits she forgot to take her blood pressure medications yesterday morning  Her blood pressure was found to be elevated on admission in the setting of abdominal pain  Her pain is resolved with IV Dilaudid  Patient denies any further bowel movement since admission  She denies any chest pain, palpitation, abdominal pain, nausea, vomiting  Her BP is improved with IV hydralazine 5 mg this am      Patient is hemodynamically and clinically stable before discharge   She is advised to follow-up with primary care physician within 1 week after hospital discharge  Please see above list of diagnoses and related plan for additional information  Condition at Discharge: good     Discharge Day Visit / Exam:     Subjective:  Patient was seen examined the bedside  Patient reports her abdominal pain is improved  No bowel movements since here in ED  She is very eager to go home  Denies any chest pain, palpitation, nausea, abdominal pain  Vitals: Blood Pressure: 153/69 (04/24/22 1300)  Pulse: 81 (04/24/22 1300)  Temperature: 97 7 °F (36 5 °C) (04/23/22 2213)  Temp Source: Oral (04/23/22 2215)  Respirations: 17 (04/24/22 1300)  Weight - Scale: 52 kg (114 lb 10 2 oz) (04/23/22 2209)  SpO2: 95 % (04/24/22 1300)  Exam:   Physical Exam  Vitals and nursing note reviewed  Constitutional:       General: She is not in acute distress  Appearance: Normal appearance  She is normal weight  She is not ill-appearing, toxic-appearing or diaphoretic  HENT:      Head: Normocephalic and atraumatic  Nose: Nose normal       Mouth/Throat:      Mouth: Mucous membranes are moist       Pharynx: Oropharynx is clear  Eyes:      General: No scleral icterus  Extraocular Movements: Extraocular movements intact  Conjunctiva/sclera: Conjunctivae normal    Cardiovascular:      Rate and Rhythm: Normal rate and regular rhythm  Pulses: Normal pulses  Heart sounds: Normal heart sounds  No murmur heard  No gallop  Pulmonary:      Effort: Pulmonary effort is normal  No respiratory distress  Breath sounds: Normal breath sounds  No stridor  No wheezing or rales  Abdominal:      General: Bowel sounds are normal  There is no distension  Palpations: Abdomen is soft  Tenderness: There is no abdominal tenderness  There is no guarding  Musculoskeletal:         General: Normal range of motion  Cervical back: Normal range of motion and neck supple  Right lower leg: No edema        Left lower leg: No edema    Skin:     General: Skin is warm  Capillary Refill: Capillary refill takes less than 2 seconds  Neurological:      Mental Status: She is alert and oriented to person, place, and time  Psychiatric:         Mood and Affect: Mood normal          Discussion with Family:  Discussed with the patient  Discharge instructions/Information to patient and family:   See after visit summary for information provided to patient and family  Provisions for Follow-Up Care:  See after visit summary for information related to follow-up care and any pertinent home health orders  Disposition:     Home    For Discharges to [facility]:   · Not Applicable to this Patient - Not Applicable to this Patient    Planned Readmission: No     Discharge Statement:  I spent 30 minutes discharging the patient  This time was spent on the day of discharge  I had direct contact with the patient on the day of discharge  Greater than 50% of the total time was spent examining patient, answering all patient questions, arranging and discussing plan of care with patient as well as directly providing post-discharge instructions  Additional time then spent on discharge activities  Discharge Medications:  See after visit summary for reconciled discharge medications provided to patient and family        ** Please Note: This note has been constructed using a voice recognition system **

## 2022-04-24 NOTE — ASSESSMENT & PLAN NOTE
Pt presents with diarrhea, abdominal pain, and malaise x 1d  Hx of IBS  Denies recent atb use  No known sick contact  · CTA c/a/p: Fluid distention of the colon with diffuse mucosal hyperenhancement  Engorgement of the vasa recta involving numerous loops of small bowel    · Check c diff assay, stool culture   · Empirically started on oral vancomycin

## 2022-04-24 NOTE — ED PROVIDER NOTES
Pt Name: Avril Mcgovern  MRN: [id number]  [place] 1935  Age/Sex: 82 y o  female  Date of evaluation: 4/23/2022  PCP: Andrés Rivera MD    [address] [place] [address]    Chief Complaint   Patient presents with    Chest Pain     had abdominal pain earlier today and then this evening she had chest pain that radiated to the left then to the right  HPI    82 y o  female presenting with chest and abdominal pain  Patient states she had intermittent abdominal pain earlier today, shortly prior to arrival she had been sitting watching TV in stood up and started make dinner, had sudden onset pain in the abdomen and chest   The pain is sharp, severe, in the upper abdomen as well as the lower chest, radiating to back, radiating to the left and right arm as well  This pain is worse with exertion and better at rest   She was given 324 mg of aspirin prior to arrival   She also complains of nausea but denies fever, vomiting, trauma, loss of consciousness, numbness, weakness, other symptoms        HPI      Past Medical and Surgical History    Past Medical History:   Diagnosis Date    Benign essential hypertension     Last assessed: 9/11/14    Disease of thyroid gland     Essential tremor     Fibromyalgia     GERD (gastroesophageal reflux disease)     History of nail disorders     Hyperlipidemia     Hypertension     Palpitations     Transient cerebral ischemia        Past Surgical History:   Procedure Laterality Date    APPENDECTOMY      EGD AND COLONOSCOPY  07/2020    HYSTERECTOMY  01/01/2010    NH LAP,CHOLECYSTECTOMY/GRAPH N/A 4/4/2018    Procedure: LAPAROSCOPIC CHOLECYSTECTOMY WITH IOC;  Surgeon: Tasha Liz MD;  Location: [facility] OR;  Service: General    TUBAL LIGATION         Family History   Problem Relation Age of Onset    Stroke Mother         ischemic stroke    Hypertension Mother     Heart disease Father        Social History     Tobacco Use    Smoking status: Former Smoker     Packs/day: 0 10 Years: 50 00     Pack years: 5 00     Types: Cigarettes     Start date:      Quit date:      Years since quittin 3    Smokeless tobacco: Never Used    Tobacco comment: 1 pack a week    Vaping Use    Vaping Use: Never used   Substance Use Topics    Alcohol use: Not Currently     Alcohol/week: 0 0 standard drinks     Comment: 0    Drug use: No           Allergies    Allergies   Allergen Reactions    Other Drowsiness     Annotation - [id number]: ANTIDEPRESSANTS       Home Medications    Prior to Admission medications    Medication Sig Start Date End Date Taking?  Authorizing Provider   acetaminophen (TYLENOL) 325 mg tablet Take 1-2 tablets by mouth every 6 (six) hours as needed    Historical Provider, MD   ALPRAZolam Carmen Maguire) 0 5 mg tablet Take 1 tablet (0 5 mg total) by mouth 4 (four) times a day as needed for anxiety 22   Daniel Benz MD   atenolol (TENORMIN) 50 mg tablet take 1 tablet by mouth twice a day 21   Daniel Benz MD   cyanocobalamin (VITAMIN B-12) 1000 MCG tablet Take 1,000 mcg by mouth daily  Patient not taking: Reported on 2022     Historical Provider, MD   ferrous sulfate 325 (65 Fe) mg tablet Take 1 tablet by mouth daily 10/9/17   Historical Provider, MD   hydrochlorothiazide (HYDRODIURIL) 12 5 mg tablet Take 1 tablet (12 5 mg total) by mouth daily 2/10/22   AMIE Ayala   imipramine (TOFRANIL) 10 mg tablet Take 1 tablet (10 mg total) by mouth 4 (four) times a week 9/14/21 2/10/22  AMIE Ayala   latanoprost (XALATAN) 0 005 % ophthalmic solution instill 1 drop into both eyes nightly 20   Historical Provider, MD   levothyroxine 50 mcg tablet take 1 tablet by mouth 6 DAYS A WEEK  Patient taking differently: daily take 1 tablet by mouth 6 DAYS A WEEK 21   Daniel Benz MD   lisinopril (ZESTRIL) 5 mg tablet Take 1 tablet (5 mg total) by mouth daily 3/14/22   AMIE Ayala   pantoprazole (PROTONIX) 40 mg tablet Take 1 tablet (40 mg total) by mouth daily before breakfast 10/13/21   Josiah Morales MD   Probiotic Product (ALIGN) 4 MG CAPS Take 1 tablet by mouth daily    Historical Provider, MD   rosuvastatin (CRESTOR) 10 MG tablet Take 1 tablet (10 mg total) by mouth daily  Patient not taking: Reported on 1/4/2022  10/13/21   Suzy Frazier MD           Review of Systems    Review of Systems   Constitutional: Negative for activity change, chills and fever  HENT: Negative for drooling and facial swelling  Eyes: Negative for pain, discharge and visual disturbance  Respiratory: Negative for apnea, cough, chest tightness, shortness of breath and wheezing  Cardiovascular: Positive for chest pain  Negative for leg swelling  Gastrointestinal: Positive for abdominal pain and nausea  Negative for constipation, diarrhea and vomiting  Genitourinary: Negative for difficulty urinating, dysuria and urgency  Musculoskeletal: Negative for arthralgias, back pain and gait problem  Skin: Negative for color change and rash  Neurological: Negative for dizziness, speech difficulty, weakness and headaches  Psychiatric/Behavioral: Negative for agitation, behavioral problems and confusion  All other systems reviewed and negative  Physical Exam      ED Triage Vitals   Temperature Pulse Respirations Blood Pressure SpO2   04/23/22 2213 04/23/22 2209 04/23/22 2209 04/23/22 2209 04/23/22 2209   97 7 °F (36 5 °C) 67 18 (!) 218/91 100 %      Temp Source Heart Rate Source Patient Position - Orthostatic VS BP Location FiO2 (%)   04/23/22 2209 04/23/22 2209 04/23/22 2209 04/23/22 2209 --   Oral Monitor Sitting Left arm       Pain Score       04/24/22 0034       8               Physical Exam  Vitals and nursing note reviewed  Constitutional:       General: She is not in acute distress  Appearance: She is well-developed  She is not ill-appearing or toxic-appearing  HENT:      Head: Normocephalic and atraumatic        Right Ear: External ear normal  Left Ear: External ear normal       Mouth/Throat:      Mouth: Mucous membranes are moist       Pharynx: Oropharynx is clear  Eyes:      Conjunctiva/sclera: Conjunctivae normal       Pupils: Pupils are equal, round, and reactive to light  Cardiovascular:      Rate and Rhythm: Normal rate and regular rhythm  Pulses: Normal pulses  Heart sounds: Murmur heard  Comments: 2+ pulses x4, grade 3/6 systolic murmur noted on auscultation  Pulmonary:      Effort: Pulmonary effort is normal  No respiratory distress  Breath sounds: Normal breath sounds  No wheezing or rales  Abdominal:      General: There is no distension  Palpations: Abdomen is soft  Tenderness: There is abdominal tenderness  There is no guarding or rebound  Comments: Tender to palpation the epigastric region, no rebound or guarding   Musculoskeletal:         General: No deformity  Normal range of motion  Cervical back: Normal range of motion and neck supple  Skin:     General: Skin is warm and dry  Findings: No erythema or rash  Neurological:      Mental Status: She is alert and oriented to person, place, and time  Cranial Nerves: No cranial nerve deficit  Motor: No weakness  Coordination: Coordination normal    Psychiatric:         Behavior: Behavior normal          Thought Content:  Thought content normal          Judgment: Judgment normal               Diagnostic Results    EKG Interpretation    Rate:  78  BPM  Rhythm:  Normal Sinus Rhythm   Axis:  Normal   Intervals: Normal, no blocks, QTc  497 ms  Q waves:  No pathologic Q waves   T waves:  Nonspecific changes in inferior leads  ST segments:  No significant elevations or depressions     Impression:  Normal sinus rhythm nonspecific T-wave changes and prolonged QTC but without evidence of acute ischemia or significant arrhythmia      EKG for comparison:  EKG dated 21 March 2022 similar in character although T-wave morphology change since that study  EKG interpreted by me       Labs:    Results Reviewed     Procedure Component Value Units Date/Time    Basic metabolic panel [[id number]]     Lab Status: No result Specimen: Blood     CBC (With Platelets) [[id number]]     Lab Status: No result Specimen: Blood     Stool Enteric Bacterial Panel by PCR [[id number]]     Lab Status: No result Specimen: Stool     Clostridium difficile toxin by PCR with EIA [[id number]]     Lab Status: No result Specimen: Stool from Per Rectum     HS Troponin I 2hr [[id number]]  (Normal) Collected: 04/24/22 0026    Lab Status: Final result Specimen: Blood from Arm, Left Updated: 04/24/22 0058     hs TnI 2hr 5 ng/L      Delta 2hr hsTnI 0 ng/L     NT-BNP PRO [[id number]]  (Abnormal) Collected: 04/23/22 2223    Lab Status: Final result Specimen: Blood from Arm, Left Updated: 04/24/22 0031     NT-proBNP 1,108 pg/mL     HS Troponin I 4hr [[id number]]     Lab Status: No result Specimen: Blood     Protime-INR [[id number]]  (Normal) Collected: 04/23/22 2321    Lab Status: Final result Specimen: Blood from Arm, Left Updated: 04/23/22 2348     Protime 13 8 seconds      INR 1 11    APTT [[id number]]  (Normal) Collected: 04/23/22 2321    Lab Status: Final result Specimen: Blood from Arm, Left Updated: 04/23/22 2348     PTT 30 seconds     Comprehensive metabolic panel [[id number]]  (Abnormal) Collected: 04/23/22 2223    Lab Status: Final result Specimen: Blood from Arm, Left Updated: 04/23/22 2333     Sodium 136 mmol/L      Potassium 4 0 mmol/L      Chloride 103 mmol/L      CO2 24 mmol/L      ANION GAP 9 mmol/L      BUN 15 mg/dL      Creatinine 1 42 mg/dL      Glucose 93 mg/dL      Calcium 9 4 mg/dL      AST 27 U/L      ALT 19 U/L      Alkaline Phosphatase 132 U/L      Total Protein 7 6 g/dL      Albumin 3 7 g/dL      Total Bilirubin 0 49 mg/dL      eGFR 33 ml/min/1 73sq m     Narrative:      [place] guidelines for Chronic Kidney Disease (CKD):     Stage 1 with normal or high GFR (GFR > 90 mL/min/1 73 square meters)    Stage 2 Mild CKD (GFR = 60-89 mL/min/1 73 square meters)    Stage 3A Moderate CKD (GFR = 45-59 mL/min/1 73 square meters)    Stage 3B Moderate CKD (GFR = 30-44 mL/min/1 73 square meters)    Stage 4 Severe CKD (GFR = 15-29 mL/min/1 73 square meters)    Stage 5 End Stage CKD (GFR <15 mL/min/1 73 square meters)  Note: GFR calculation is accurate only with a steady state creatinine    HS Troponin 0hr (reflex protocol) [[id number]]  (Normal) Collected: 04/23/22 2223    Lab Status: Final result Specimen: Blood from Arm, Left Updated: 04/23/22 2329     hs TnI 0hr 5 ng/L     CBC and differential [[id number]]  (Abnormal) Collected: 04/23/22 2223    Lab Status: Final result Specimen: Blood from Arm, Left Updated: 04/23/22 2308     WBC 9 19 Thousand/uL      RBC 3 78 Million/uL      Hemoglobin 11 8 g/dL      Hematocrit 36 5 %      MCV 97 fL      MCH 31 2 pg      MCHC 32 3 g/dL      RDW 14 2 %      MPV 10 6 fL      Platelets 253 Thousands/uL      nRBC 0 /100 WBCs      Neutrophils Relative 69 %      Immat GRANS % 0 %      Lymphocytes Relative 16 %      Monocytes Relative 9 %      Eosinophils Relative 6 %      Basophils Relative 0 %      Neutrophils Absolute 6 30 Thousands/µL      Immature Grans Absolute 0 04 Thousand/uL      Lymphocytes Absolute 1 47 Thousands/µL      Monocytes Absolute 0 78 Thousand/µL      Eosinophils Absolute 0 58 Thousand/µL      Basophils Absolute 0 02 Thousands/µL           All labs reviewed and utilized in the medical decision making process    Radiology:    CTA dissection protocol chest/abdomen/pelvis   Final Result      No evidence of aortic aneurysm or dissection      Findings consistent with enterocolitis                 Workstation performed: [id number]         XR chest 1 view portable    (Results Pending)       All radiology studies independently viewed by me and interpreted by the radiologist     Procedure    Procedures        ED Course of Care and Re-Assessments  On initial arrival, with sudden onset abdominal and chest pain radiating to back, as well as with heart murmur auscultated, some concern for aortic dissection, CTA obtained prior to labs and was found to be consistent with enterocolitis but no dissection noted  Pain hypertension initially improved with hydromorphone, also treated with labetalol for hypertension with improvement of same  Given IV fluids for resuscitation, with initial 500 mL bolus  At 1 point during ER stay shortly before admission, patient became complained of a sharp spike of pain in her abdomen and became very lightheaded and nauseous as well as pale and sweaty, blood pressure dropped to 103/51, and patient became bradycardic with lowest rate of 48, EKG taken and showed sinus bradycardia about high-degree heart block  Started initial 500 mL bolus as well as Zofran for nausea, symptoms improved substantially over period of 5-10 minutes with bradycardia resolving, blood pressure becoming hypertensive again, and symptoms resolving  Patient states this episode feels exactly like what happened in her house the led to her calling an ambulance  High suspicion for abdominal pain and distension from enterocolitis causing vasovagal event      Medications   atenolol (TENORMIN) tablet 50 mg (0 mg Oral Hold 4/24/22 0252)   lactated ringers infusion (100 mL/hr Intravenous New Bag 4/24/22 0351)   acetaminophen (TYLENOL) tablet 650 mg (has no administration in time range)   atenolol (TENORMIN) tablet 50 mg (has no administration in time range)   ferrous sulfate tablet 325 mg (has no administration in time range)   pantoprazole (PROTONIX) EC tablet 40 mg (has no administration in time range)   latanoprost (XALATAN) 0 005 % ophthalmic solution 1 drop (has no administration in time range)   levothyroxine tablet 50 mcg (has no administration in time range)   ALPRAZolam (XANAX) tablet 0 5 mg (has no administration in time range)   polyethylene glycol (MIRALAX) packet 17 g (has no administration in time range)   ondansetron (ZOFRAN) injection 4 mg (has no administration in time range)   enoxaparin (LOVENOX) subcutaneous injection 30 mg (has no administration in time range)   lisinopril (ZESTRIL) tablet 5 mg (has no administration in time range)   vancomycin (VANCOCIN) oral solution 125 mg (has no administration in time range)   sodium chloride 0 9 % bolus 500 mL (0 mL Intravenous Stopped 4/24/22 0135)   iohexol (OMNIPAQUE) 350 MG/ML injection (SINGLE-DOSE) 100 mL (100 mL Intravenous Given 4/23/22 2245)   HYDROmorphone HCl (DILAUDID) injection 0 2 mg (0 2 mg Intravenous Given 4/24/22 0034)   HYDROmorphone HCl (DILAUDID) injection 0 2 mg (0 2 mg Intravenous Given 4/24/22 0134)   labetalol (NORMODYNE) injection 10 mg (10 mg Intravenous Given 4/24/22 0227)   sodium chloride 0 9 % bolus 500 mL (0 mL Intravenous Stopped 4/24/22 0330)   ondansetron (ZOFRAN) injection 4 mg (4 mg Intravenous Given 4/24/22 0245)           FINAL IMPRESSION    Final diagnoses:   Chest pain, unspecified   Abdominal pain   Enterocolitis   Hypertension         DISPOSITION/PLAN    Presentation as above with chest pain and abdominal pain in the setting of hypertension enterocolitis  Vital signs remarkable for hypertension, examination remarkable for tenderness as above as well as heart murmur  No aortic dissection or other surgical emergency uncovered on initial workup, but based on symptoms as well as significant hypertension acute coronary syndrome not completely ruled out and patient admitted with Heart score 7     After initial treatment resuscitation emergency department, as well as suspected vasovagal event as above, admitted Internal Medicine for observation, hemodynamically stable and comfortable at that time    Time reflects when diagnosis was documented in both MDM as applicable and the Disposition within this note     Time User Action Codes Description Comment    4/24/2022 2:29 AM [place] T Add [R07 9] Chest pain, unspecified     4/24/2022  2:29 AM [place] T Add [R10 9] Abdominal pain     4/24/2022  2:29 AM [place] T Add [K52 9] Enterocolitis     4/24/2022  2:29 AM De Иван Add [I10] Hypertension       ED Disposition     ED Disposition Condition Date/Time Comment    Admit Stable Sun Apr 24, 2022  2:29 AM Case was discussed with EDUARDO and the patient's admission status was agreed to be Admission Status: observation status to the service of Dr Eitan Stevens   Follow-up Information    None           PATIENT REFERRED TO:    No follow-up provider specified  DISCHARGE MEDICATIONS:    Patient's Medications   Discharge Prescriptions    No medications on file       No discharge procedures on file  Chelsea Hernandez MD    Portions of the record may have been created with voice recognition software  Occasional wrong word or "sound alike" substitutions may have occurred due to the inherent limitations of voice recognition software    Please read the chart carefully and recognize, using context, where substitutions have occurred     Chelsea Hernandez MD  04/24/22 0516

## 2022-04-24 NOTE — ASSESSMENT & PLAN NOTE
Pt presents with diarrhea, abdominal pain, and malaise x 1d  Hx of IBS  Denies recent atb use  No known sick contact  · CTA c/a/p: Fluid distention of the colon with diffuse mucosal hyperenhancement  Engorgement of the vasa recta involving numerous loops of small bowel  · No bowel movement since admission  Unlikely C diff  · Monitor off antibiotics

## 2022-04-24 NOTE — ASSESSMENT & PLAN NOTE
· /91 at time of admission, may be exacerbated due to pain  · Continue home atenolol 50mg po BID, lisinopril 5mg po daily   · Per pt no longer on HCTZ   · Received IV labetalol with borderline hypotension, bradycardia, and likley vasovagal episode   would avoid further doses   · Monitor BP

## 2022-04-25 ENCOUNTER — TRANSITIONAL CARE MANAGEMENT (OUTPATIENT)
Dept: INTERNAL MEDICINE CLINIC | Facility: CLINIC | Age: 87
End: 2022-04-25

## 2022-04-26 ENCOUNTER — OFFICE VISIT (OUTPATIENT)
Dept: INTERNAL MEDICINE CLINIC | Facility: CLINIC | Age: 87
End: 2022-04-26
Payer: MEDICARE

## 2022-04-26 VITALS
RESPIRATION RATE: 16 BRPM | SYSTOLIC BLOOD PRESSURE: 112 MMHG | WEIGHT: 103 LBS | TEMPERATURE: 97 F | DIASTOLIC BLOOD PRESSURE: 58 MMHG | OXYGEN SATURATION: 98 % | HEART RATE: 58 BPM | BODY MASS INDEX: 22.29 KG/M2

## 2022-04-26 DIAGNOSIS — E78.2 MIXED HYPERLIPIDEMIA: ICD-10-CM

## 2022-04-26 DIAGNOSIS — I10 PRIMARY HYPERTENSION: ICD-10-CM

## 2022-04-26 DIAGNOSIS — Z12.31 ENCOUNTER FOR SCREENING MAMMOGRAM FOR BREAST CANCER: Primary | ICD-10-CM

## 2022-04-26 DIAGNOSIS — I87.2 VENOUS INSUFFICIENCY: ICD-10-CM

## 2022-04-26 DIAGNOSIS — F41.9 ANXIETY: ICD-10-CM

## 2022-04-26 DIAGNOSIS — K52.9 GASTROENTERITIS: ICD-10-CM

## 2022-04-26 PROCEDURE — 99214 OFFICE O/P EST MOD 30 MIN: CPT | Performed by: INTERNAL MEDICINE

## 2022-04-26 NOTE — PROGRESS NOTES
Assessment/Plan:     No problem-specific Assessment & Plan notes found for this encounter  Diagnoses and all orders for this visit:    Encounter for screening mammogram for breast cancer    Gastroenteritis    Primary hypertension    Mixed hyperlipidemia    Anxiety    Venous insufficiency              Patient Instructions   Self-limited viral gastroenteritis stable    Hypertension-stable on present regimen    Venous insufficiency stable with compression stockings and elevation  Continue following low-sodium diet    Caktxwiwevohkd-fqzdka-hq lipids in September, consider starting back on low-dose statin if indicated    Anxiety-monitor symptoms on alprazolam alone, patient tolerated imipramine for years but lately has developed intolerance  Will monitor symptoms, no longer seeing Psychiatry since Dr Leo Ramirez retired    Routine follow-up after labs in September, sooner as needed  Subjective:     Patient ID: Shanu Robledo is a 82 y o  female  ARTURO Here w/ Chasity Teague  She was hospitalized Saturday into Sunday with gastroenteritis, CT and lab workup unremarkable  She was hypertensive requiring IV hydralazine x1  No other dose adjustments were made  Hypertension-she is off hydrochlorothiazide, taking atenolol and lisinopril  No blood pressures since discharge    Venous insufficiency-swelling has been controlled off of diuretics with elevation and use of compression stockings  Hyperlipidemia-she has been off rosuvastatin due to weakness    Anxiety-she went back on imipramine and I gave her stomach ache so she stopped  She has been using alprazolam instead  Review of Systems   Constitutional: Positive for fatigue  Negative for appetite change, chills, diaphoresis, fever and unexpected weight change  HENT: Negative for congestion, hearing loss and rhinorrhea  Eyes: Negative for visual disturbance  Respiratory: Negative for cough, chest tightness, shortness of breath and wheezing  Cardiovascular: Negative for chest pain, palpitations and leg swelling  Gastrointestinal: Negative for abdominal pain and blood in stool  Endocrine: Negative for cold intolerance, heat intolerance, polydipsia and polyuria  Genitourinary: Negative for difficulty urinating, dysuria, frequency and urgency  Musculoskeletal: Negative for arthralgias and myalgias  Skin: Negative for rash  Neurological: Negative for dizziness, weakness, light-headedness and headaches  Hematological: Does not bruise/bleed easily  Psychiatric/Behavioral: Negative for dysphoric mood and sleep disturbance  Objective:     Physical Exam  Constitutional:       Appearance: She is well-developed  HENT:      Head: Normocephalic and atraumatic  Nose: Nose normal    Eyes:      General: No scleral icterus  Conjunctiva/sclera: Conjunctivae normal       Pupils: Pupils are equal, round, and reactive to light  Neck:      Thyroid: No thyromegaly  Vascular: No JVD  Trachea: No tracheal deviation  Cardiovascular:      Rate and Rhythm: Normal rate and regular rhythm  Heart sounds: No murmur heard  No friction rub  No gallop  Pulmonary:      Effort: Pulmonary effort is normal  No respiratory distress  Breath sounds: Normal breath sounds  No wheezing or rales  Musculoskeletal:         General: No deformity  Cervical back: Normal range of motion and neck supple  Lymphadenopathy:      Cervical: No cervical adenopathy  Skin:     General: Skin is warm and dry  Coloration: Skin is not pale  Findings: No erythema or rash  Neurological:      Mental Status: She is alert and oriented to person, place, and time  Cranial Nerves: No cranial nerve deficit  Psychiatric:         Behavior: Behavior normal          Thought Content:  Thought content normal          Judgment: Judgment normal            Vitals:    04/26/22 1332   BP: 112/58   BP Location: Left arm   Patient Position: Sitting   Cuff Size: Standard   Pulse: 58   Resp: 16   Temp: (!) 97 °F (36 1 °C)   TempSrc: Temporal   SpO2: 98%   Weight: 46 7 kg (103 lb)       Transitional Care Management Review:  Nan Cancino is a 82 y o  female here for TCM follow up       During the TCM phone call patient stated:    TCM Call (since 3/26/2022)     None      TCM Call (since 3/26/2022)     None          Kg Trevino MD

## 2022-04-26 NOTE — PATIENT INSTRUCTIONS
Self-limited viral gastroenteritis stable    Hypertension-stable on present regimen    Venous insufficiency stable with compression stockings and elevation  Continue following low-sodium diet    Wsvwtoitcnffhn-khhwel-wr lipids in September, consider starting back on low-dose statin if indicated    Anxiety-monitor symptoms on alprazolam alone, patient tolerated imipramine for years but lately has developed intolerance  Will monitor symptoms, no longer seeing Psychiatry since Dr Ralph Casey retired    Routine follow-up after labs in September, sooner as needed

## 2022-05-15 DIAGNOSIS — F41.9 ANXIETY: ICD-10-CM

## 2022-05-16 RX ORDER — ALPRAZOLAM 0.5 MG/1
TABLET ORAL
Qty: 120 TABLET | Refills: 1 | Status: SHIPPED | OUTPATIENT
Start: 2022-05-16 | End: 2022-07-22

## 2022-06-13 DIAGNOSIS — I10 ESSENTIAL HYPERTENSION: ICD-10-CM

## 2022-06-13 RX ORDER — LISINOPRIL 5 MG/1
TABLET ORAL
Qty: 90 TABLET | Refills: 0 | Status: SHIPPED | OUTPATIENT
Start: 2022-06-13

## 2022-06-19 ENCOUNTER — NURSE TRIAGE (OUTPATIENT)
Dept: OTHER | Facility: OTHER | Age: 87
End: 2022-06-19

## 2022-06-19 NOTE — TELEPHONE ENCOUNTER
Reason for Disposition   SEVERE coughing spells (e g , whooping sound after coughing, vomiting after coughing)    Answer Assessment - Initial Assessment Questions  1  ONSET: "When did the cough begin?"       Three days ago  2  SEVERITY: "How bad is the cough today?"       severe  3  SPUTUM: "Describe the color of your sputum" (none, dry cough; clear, white, yellow, green)      denies  4  HEMOPTYSIS: "Are you coughing up any blood?" If so ask: "How much?" (flecks, streaks, tablespoons, etc )      denies  5  DIFFICULTY BREATHING: "Are you having difficulty breathing?" If Yes, ask: "How bad is it?" (e g , mild, moderate, severe)     - MILD: No SOB at rest, mild SOB with walking, speaks normally in sentences, can lay down, no retractions, pulse < 100      - MODERATE: SOB at rest, SOB with minimal exertion and prefers to sit, cannot lie down flat, speaks in phrases, mild retractions, audible wheezing, pulse 100-120      - SEVERE: Very SOB at rest, speaks in single words, struggling to breathe, sitting hunched forward, retractions, pulse > 120       Denies   6  FEVER: "Do you have a fever?" If Yes, ask: "What is your temperature, how was it measured, and when did it start?"      denies  7  CARDIAC HISTORY: "Do you have any history of heart disease?" (e g , heart attack, congestive heart failure)       Denies  8  LUNG HISTORY: "Do you have any history of lung disease?"  (e g , pulmonary embolus, asthma, emphysema)      denies  9  PE RISK FACTORS: "Do you have a history of blood clots?" (or: recent major surgery, recent prolonged travel, bedridden)      Denies  10   OTHER SYMPTOMS: "Do you have any other symptoms?" (e g , runny nose, wheezing, chest pain)        Tired and weak    Protocols used: COUGH-ADULT-OH

## 2022-06-20 ENCOUNTER — OFFICE VISIT (OUTPATIENT)
Dept: INTERNAL MEDICINE CLINIC | Facility: CLINIC | Age: 87
End: 2022-06-20
Payer: MEDICARE

## 2022-06-20 VITALS
WEIGHT: 103 LBS | SYSTOLIC BLOOD PRESSURE: 130 MMHG | HEART RATE: 68 BPM | OXYGEN SATURATION: 99 % | HEIGHT: 57 IN | TEMPERATURE: 98.2 F | DIASTOLIC BLOOD PRESSURE: 68 MMHG | BODY MASS INDEX: 22.22 KG/M2

## 2022-06-20 DIAGNOSIS — R05.9 COUGH: Primary | ICD-10-CM

## 2022-06-20 PROCEDURE — 99214 OFFICE O/P EST MOD 30 MIN: CPT

## 2022-06-20 RX ORDER — BENZONATATE 200 MG/1
200 CAPSULE ORAL 3 TIMES DAILY PRN
Qty: 20 CAPSULE | Refills: 0 | Status: SHIPPED | OUTPATIENT
Start: 2022-06-20

## 2022-06-20 RX ORDER — BENZONATATE 200 MG/1
200 CAPSULE ORAL 3 TIMES DAILY PRN
Qty: 20 CAPSULE | Refills: 0 | Status: SHIPPED | OUTPATIENT
Start: 2022-06-20 | End: 2022-06-20

## 2022-06-20 RX ORDER — FLUTICASONE PROPIONATE 50 MCG
2 SPRAY, SUSPENSION (ML) NASAL DAILY
Qty: 16 G | Refills: 0 | Status: SHIPPED | OUTPATIENT
Start: 2022-06-20

## 2022-06-20 RX ORDER — FLUTICASONE PROPIONATE 50 MCG
2 SPRAY, SUSPENSION (ML) NASAL DAILY
Qty: 16 G | Refills: 0 | Status: SHIPPED | OUTPATIENT
Start: 2022-06-20 | End: 2022-06-20

## 2022-06-20 NOTE — PROGRESS NOTES
[facility]    NAME: Gustavo Raza  AGE: 82 y o  SEX: female  : 1935     DATE: 2022     Assessment and Plan:     1  Cough  1 week history of cough, sore throat and fatigue  Patient states she had a negative covid home test+ last week  I discussed with her that she may have tested too early however she refused a follow up covid swab today  Since this is day 7 of her symptoms she was instructed to wear a mask for the remainder of these 3 days as a precaution    - benzonatate (TESSALON) 200 MG capsule; Take 1 capsule (200 mg total) by mouth 3 (three) times a day as needed for cough  Dispense: 20 capsule; Refill: 0  - fluticasone (FLONASE) 50 mcg/act nasal spray; 2 sprays into each nostril daily  Dispense: 16 g; Refill: 0  - Claritin 1 tablet daily      Return if symptoms worsen or fail to improve  History of Present Illness:   Patient presents today in the office with a 1 week history she states she did a home a COVID test last week and it was negative  She states this was only 2 days after her symptoms started  She denies any fever, loss of taste or smell, nausea, vomiting, diarrhea, or headache  She does admit that she feels today she is a little better than she has been  Patient is vaccinated and has 1 booster  Review of Systems:     Review of Systems   Constitutional: Positive for fatigue  Negative for appetite change, chills, diaphoresis, fever and unexpected weight change  HENT: Positive for sore throat  Negative for postnasal drip and sneezing  Eyes: Negative for visual disturbance  Respiratory: Positive for cough  Negative for chest tightness and shortness of breath  Cardiovascular: Negative for chest pain, palpitations and leg swelling  Gastrointestinal: Negative for abdominal pain and blood in stool  Endocrine: Negative for cold intolerance, heat intolerance, polydipsia, polyphagia and polyuria     Genitourinary: Negative for difficulty urinating, dysuria, frequency and urgency  Musculoskeletal: Negative for arthralgias and myalgias  Skin: Negative for rash and wound  Neurological: Negative for dizziness, weakness, light-headedness and headaches  Hematological: Negative for adenopathy  Psychiatric/Behavioral: Negative for confusion, dysphoric mood and sleep disturbance  The patient is not nervous/anxious  Objective:     /68 (BP Location: Left arm, Patient Position: Sitting, Cuff Size: Standard)   Pulse 68   Temp 98 2 °F (36 8 °C) (Temporal)   Ht 4' 9" (1 448 m)   Wt 46 7 kg (103 lb)   SpO2 99%   BMI 22 29 kg/m²     Physical Exam  Constitutional:       Appearance: She is well-developed  HENT:      Head: Normocephalic and atraumatic  Eyes:      Pupils: Pupils are equal, round, and reactive to light  Neck:      Thyroid: No thyromegaly  Cardiovascular:      Rate and Rhythm: Normal rate and regular rhythm  Heart sounds: No murmur heard  Pulmonary:      Effort: Pulmonary effort is normal       Breath sounds: Normal breath sounds  Abdominal:      General: Bowel sounds are normal       Palpations: Abdomen is soft  Musculoskeletal:         General: Normal range of motion  Cervical back: Normal range of motion and neck supple  Lymphadenopathy:      Cervical: No cervical adenopathy  Skin:     General: Skin is warm and dry  Neurological:      Mental Status: She is alert and oriented to person, place, and time           AMIE Saunders  [facility]

## 2022-07-19 ENCOUNTER — APPOINTMENT (OUTPATIENT)
Dept: LAB | Facility: CLINIC | Age: 87
End: 2022-07-19
Payer: MEDICARE

## 2022-07-19 ENCOUNTER — TELEPHONE (OUTPATIENT)
Dept: INTERNAL MEDICINE CLINIC | Facility: CLINIC | Age: 87
End: 2022-07-19

## 2022-07-19 ENCOUNTER — OFFICE VISIT (OUTPATIENT)
Dept: INTERNAL MEDICINE CLINIC | Facility: CLINIC | Age: 87
End: 2022-07-19
Payer: MEDICARE

## 2022-07-19 VITALS
RESPIRATION RATE: 20 BRPM | WEIGHT: 103 LBS | HEART RATE: 59 BPM | SYSTOLIC BLOOD PRESSURE: 118 MMHG | TEMPERATURE: 96.9 F | DIASTOLIC BLOOD PRESSURE: 70 MMHG | BODY MASS INDEX: 22.22 KG/M2 | OXYGEN SATURATION: 96 % | HEIGHT: 57 IN

## 2022-07-19 DIAGNOSIS — E03.9 ACQUIRED HYPOTHYROIDISM: Primary | ICD-10-CM

## 2022-07-19 DIAGNOSIS — F41.9 ANXIETY: ICD-10-CM

## 2022-07-19 DIAGNOSIS — E03.9 ACQUIRED HYPOTHYROIDISM: ICD-10-CM

## 2022-07-19 LAB — TSH SERPL DL<=0.05 MIU/L-ACNC: 2.24 UIU/ML (ref 0.45–4.5)

## 2022-07-19 PROCEDURE — 99214 OFFICE O/P EST MOD 30 MIN: CPT | Performed by: INTERNAL MEDICINE

## 2022-07-19 PROCEDURE — 36415 COLL VENOUS BLD VENIPUNCTURE: CPT

## 2022-07-19 PROCEDURE — 84443 ASSAY THYROID STIM HORMONE: CPT

## 2022-07-19 RX ORDER — IMIPRAMINE HYDROCHLORIDE 10 MG/1
10 TABLET, FILM COATED ORAL
Qty: 30 TABLET | Refills: 0 | Status: SHIPPED | OUTPATIENT
Start: 2022-07-19 | End: 2022-07-19 | Stop reason: SDUPTHER

## 2022-07-19 RX ORDER — IMIPRAMINE HYDROCHLORIDE 10 MG/1
10 TABLET, FILM COATED ORAL
Qty: 30 TABLET | Refills: 5 | Status: SHIPPED | OUTPATIENT
Start: 2022-07-19 | End: 2022-10-24 | Stop reason: SDUPTHER

## 2022-07-19 RX ORDER — DOXEPIN HYDROCHLORIDE 10 MG/1
10 CAPSULE ORAL
Qty: 30 CAPSULE | Refills: 0 | Status: CANCELLED | OUTPATIENT
Start: 2022-07-19 | End: 2022-08-18

## 2022-07-19 RX ORDER — IMIPRAMINE HYDROCHLORIDE 10 MG/1
10 TABLET, FILM COATED ORAL
Qty: 16 TABLET | Refills: 0 | Status: CANCELLED | OUTPATIENT
Start: 2022-07-19 | End: 2022-08-18

## 2022-07-19 NOTE — PROGRESS NOTES
Assessment/Plan:    Diagnoses and all orders for this visit:    Acquired hypothyroidism  -     TSH, 3rd generation with Free T4 reflex; Future    Anxiety  -     imipramine (TOFRANIL) 10 mg tablet; Take 1 tablet (10 mg total) by mouth daily at bedtime              Patient Instructions   Please restart imipramine as prescribed for treatment of anxiety and depression  You may continue to take xanax, but are cautioned about potential adverse effects including dizziness, and a propensity to falls  Please obtain labwork for thyroid hormone assessment  Subjective:      Patient ID: Sowmya Stanley is a 82 y o  female    Ms Micki Sandoval with encountered today in the clinic due to concerns over anxiety and depression  Patient has a longstanding history the after mentioned, and reports that has recently been exacerbated by the death of her daughter  She states that she has not been feeling herself  She states that she has low energy as well, and she has intolerance to cold temperature  She has a history of hypothyroidism on levothyroxine 50 mcg daily, for which she endorses compliance  She does not endorse any suicidal or homicidal ideations  She had been previously managed with imipramine for anxiety and depression, however she reported GI symptoms, and this medication was discontinued  Patient additionally takes alprazolam 0 5 mg up to 3 times daily, and reports that this works well for her    She does not report any history of dizziness or falls while taking alprazolam         Current Outpatient Medications:     imipramine (TOFRANIL) 10 mg tablet, Take 1 tablet (10 mg total) by mouth daily at bedtime, Disp: 30 tablet, Rfl: 0    acetaminophen (TYLENOL) 325 mg tablet, Take 1-2 tablets by mouth every 6 (six) hours as needed, Disp: , Rfl:     ALPRAZolam (XANAX) 0 5 mg tablet, take 1 tablet four times a day if needed for anxiety, Disp: 120 tablet, Rfl: 1    atenolol (TENORMIN) 50 mg tablet, take 1 tablet by mouth twice a day, Disp: 180 tablet, Rfl: 3    benzonatate (TESSALON) 200 MG capsule, Take 1 capsule (200 mg total) by mouth 3 (three) times a day as needed for cough, Disp: 20 capsule, Rfl: 0    cyanocobalamin (VITAMIN B-12) 1000 MCG tablet, Take 1,000 mcg by mouth daily  , Disp: , Rfl:     ferrous sulfate 325 (65 Fe) mg tablet, Take 1 tablet by mouth daily, Disp: , Rfl:     fluticasone (FLONASE) 50 mcg/act nasal spray, 2 sprays into each nostril daily, Disp: 16 g, Rfl: 0    hydrochlorothiazide (HYDRODIURIL) 12 5 mg tablet, Take 1 tablet (12 5 mg total) by mouth daily (Patient not taking: No sig reported), Disp: 30 tablet, Rfl: 1    imipramine (TOFRANIL) 10 mg tablet, Take 1 tablet (10 mg total) by mouth 4 (four) times a week, Disp: 16 tablet, Rfl: 0    latanoprost (XALATAN) 0 005 % ophthalmic solution, instill 1 drop into both eyes nightly, Disp: , Rfl:     levothyroxine 50 mcg tablet, take 1 tablet by mouth 6 DAYS A WEEK (Patient taking differently: daily take 1 tablet by mouth 6 DAYS A WEEK), Disp: 90 tablet, Rfl: 3    lisinopril (ZESTRIL) 5 mg tablet, take 1 tablet by mouth daily, Disp: 90 tablet, Rfl: 0    pantoprazole (PROTONIX) 40 mg tablet, Take 1 tablet (40 mg total) by mouth daily before breakfast, Disp: 90 tablet, Rfl: 3    Probiotic Product (ALIGN) 4 MG CAPS, Take 1 tablet by mouth daily (Patient not taking: No sig reported), Disp: , Rfl:     No results found for this or any previous visit (from the past 1008 hour(s))  The following portions of the patient's history were reviewed and updated as appropriate: allergies, current medications, past family history, past medical history, past social history, past surgical history and problem list      Review of Systems   Constitutional: Positive for fatigue  Negative for chills and fever  HENT: Negative for ear pain and sore throat  Eyes: Negative for pain and visual disturbance  Respiratory: Positive for shortness of breath   Negative for cough  Cardiovascular: Negative for chest pain and palpitations  Gastrointestinal: Negative for abdominal pain and vomiting  Endocrine: Positive for cold intolerance  Genitourinary: Negative for dysuria and hematuria  Musculoskeletal: Negative for arthralgias and back pain  Skin: Negative for color change and rash  Neurological: Negative for seizures and syncope  Psychiatric/Behavioral: Positive for dysphoric mood and sleep disturbance  Negative for suicidal ideas  All other systems reviewed and are negative  Objective:      Vitals:    07/19/22 1305   BP: 118/70   Pulse: 59   Resp: 20   Temp: (!) 96 9 °F (36 1 °C)   SpO2: 96%          Physical Exam  Vitals reviewed  Constitutional:       Appearance: Normal appearance  She is obese  HENT:      Head: Normocephalic and atraumatic  Mouth/Throat:      Mouth: Mucous membranes are moist    Eyes:      General:         Right eye: No discharge  Left eye: No discharge  Conjunctiva/sclera: Conjunctivae normal    Neck:      Thyroid: No thyromegaly  Cardiovascular:      Rate and Rhythm: Normal rate and regular rhythm  Heart sounds: S1 normal and S2 normal    Pulmonary:      Effort: Pulmonary effort is normal       Breath sounds: Normal breath sounds  Abdominal:      Palpations: Abdomen is soft  Musculoskeletal:         General: No swelling or tenderness  Cervical back: Neck supple  Skin:     General: Skin is warm and dry  Neurological:      General: No focal deficit present  Mental Status: She is alert     Psychiatric:         Mood and Affect: Mood normal          Behavior: Behavior normal

## 2022-07-19 NOTE — PATIENT INSTRUCTIONS
Please restart imipramine as prescribed for treatment of anxiety and depression  You may continue to take xanax, but are cautioned about potential adverse effects including dizziness, and a propensity to falls  Please obtain labwork for thyroid hormone assessment

## 2022-07-19 NOTE — TELEPHONE ENCOUNTER
PT had appt w/ Dr Heath Servant today  Please send scripts to [facility] and cancel [facility]service

## 2022-07-20 ENCOUNTER — RA CDI HCC (OUTPATIENT)
Dept: OTHER | Facility: HOSPITAL | Age: 87
End: 2022-07-20

## 2022-07-21 DIAGNOSIS — F41.9 ANXIETY: ICD-10-CM

## 2022-07-21 DIAGNOSIS — E03.9 ACQUIRED HYPOTHYROIDISM: ICD-10-CM

## 2022-07-21 RX ORDER — LEVOTHYROXINE SODIUM 0.05 MG/1
TABLET ORAL
Qty: 90 TABLET | Refills: 3 | Status: SHIPPED | OUTPATIENT
Start: 2022-07-21 | End: 2022-07-26 | Stop reason: SDUPTHER

## 2022-07-22 RX ORDER — ALPRAZOLAM 0.5 MG/1
TABLET ORAL
Qty: 120 TABLET | Refills: 0 | Status: SHIPPED | OUTPATIENT
Start: 2022-07-22 | End: 2022-09-06 | Stop reason: SDUPTHER

## 2022-07-25 ENCOUNTER — TELEPHONE (OUTPATIENT)
Dept: INTERNAL MEDICINE CLINIC | Facility: CLINIC | Age: 87
End: 2022-07-25

## 2022-07-25 NOTE — TELEPHONE ENCOUNTER
Patient received refill of levothyroxine 50 mcg   On 7/21/2022, but  thinks the dosage is wrong,cant remember if Dr Lewis Canary change the dosage or not, needs verification

## 2022-07-26 DIAGNOSIS — E03.9 ACQUIRED HYPOTHYROIDISM: ICD-10-CM

## 2022-07-26 RX ORDER — LEVOTHYROXINE SODIUM 0.05 MG/1
TABLET ORAL
Qty: 90 TABLET | Refills: 3 | Status: SHIPPED | OUTPATIENT
Start: 2022-07-26 | End: 2022-09-28 | Stop reason: SDUPTHER

## 2022-08-16 ENCOUNTER — OFFICE VISIT (OUTPATIENT)
Dept: INTERNAL MEDICINE CLINIC | Facility: CLINIC | Age: 87
End: 2022-08-16
Payer: MEDICARE

## 2022-08-16 VITALS
HEIGHT: 57 IN | DIASTOLIC BLOOD PRESSURE: 72 MMHG | TEMPERATURE: 97.7 F | HEART RATE: 65 BPM | BODY MASS INDEX: 22.31 KG/M2 | RESPIRATION RATE: 17 BRPM | OXYGEN SATURATION: 98 % | SYSTOLIC BLOOD PRESSURE: 116 MMHG | WEIGHT: 103.4 LBS

## 2022-08-16 DIAGNOSIS — F41.9 ANXIETY: Primary | ICD-10-CM

## 2022-08-16 PROCEDURE — 99213 OFFICE O/P EST LOW 20 MIN: CPT | Performed by: INTERNAL MEDICINE

## 2022-08-16 NOTE — PROGRESS NOTES
Assessment/Plan:    Diagnoses and all orders for this visit:    Anxiety    Other orders  -     SACCHAROMYCES BOULARDII PO; Take 250 mg by mouth 2 (two) times a day Probiotic            Patient Instructions   Anxiety stable on imipramine  Depression c/b Bereavement-supportive counseling given  Consider grief support group  Subjective:      Patient ID: Deborah Madison is a 82 y o  female    F/u depression/anxiety and bereavment  Taking imipramine qiw, just like prior  No stomach upset (which was why she stopped)  Anxiety is better, but having a hard time accepting Kelli's passing  Sleep is sporadic w/ trouble falling asleep            Current Outpatient Medications:     acetaminophen (TYLENOL) 325 mg tablet, Take 1-2 tablets by mouth every 6 (six) hours as needed, Disp: , Rfl:     ALPRAZolam (XANAX) 0 5 mg tablet, take 1 tablet by mouth four times a day if needed for anxiety, Disp: 120 tablet, Rfl: 0    atenolol (TENORMIN) 50 mg tablet, take 1 tablet by mouth twice a day, Disp: 180 tablet, Rfl: 3    ferrous sulfate 325 (65 Fe) mg tablet, Take 1 tablet by mouth daily, Disp: , Rfl:     imipramine (TOFRANIL) 10 mg tablet, Take 1 tablet (10 mg total) by mouth daily at bedtime 4 nights per week, Disp: 30 tablet, Rfl: 5    latanoprost (XALATAN) 0 005 % ophthalmic solution, instill 1 drop into both eyes nightly, Disp: , Rfl:     levothyroxine 50 mcg tablet, 1 po qd, Disp: 90 tablet, Rfl: 3    lisinopril (ZESTRIL) 5 mg tablet, take 1 tablet by mouth daily, Disp: 90 tablet, Rfl: 0    pantoprazole (PROTONIX) 40 mg tablet, Take 1 tablet (40 mg total) by mouth daily before breakfast, Disp: 90 tablet, Rfl: 3    SACCHAROMYCES BOULARDII PO, Take 250 mg by mouth 2 (two) times a day Probiotic, Disp: , Rfl:     benzonatate (TESSALON) 200 MG capsule, Take 1 capsule (200 mg total) by mouth 3 (three) times a day as needed for cough (Patient not taking: Reported on 8/16/2022), Disp: 20 capsule, Rfl: 0   cyanocobalamin (VITAMIN B-12) 1000 MCG tablet, Take 1,000 mcg by mouth daily   (Patient not taking: Reported on 8/16/2022), Disp: , Rfl:     fluticasone (FLONASE) 50 mcg/act nasal spray, 2 sprays into each nostril daily (Patient not taking: Reported on 8/16/2022), Disp: 16 g, Rfl: 0    hydrochlorothiazide (HYDRODIURIL) 12 5 mg tablet, Take 1 tablet (12 5 mg total) by mouth daily (Patient not taking: No sig reported), Disp: 30 tablet, Rfl: 1    Probiotic Product (ALIGN) 4 MG CAPS, Take 1 tablet by mouth daily (Patient not taking: No sig reported), Disp: , Rfl:     Recent Results (from the past 1008 hour(s))   TSH, 3rd generation with Free T4 reflex    Collection Time: 07/19/22  2:13 PM   Result Value Ref Range    TSH 3RD GENERATON 2 240 0 450 - 4 500 uIU/mL       The following portions of the patient's history were reviewed and updated as appropriate: allergies, current medications, past family history, past medical history, past social history, past surgical history and problem list      Review of Systems   Constitutional: Negative for appetite change, chills, diaphoresis, fatigue, fever and unexpected weight change  HENT: Negative for congestion, hearing loss and rhinorrhea  Eyes: Negative for visual disturbance  Respiratory: Negative for cough, chest tightness, shortness of breath and wheezing  Cardiovascular: Negative for chest pain, palpitations and leg swelling  Gastrointestinal: Negative for abdominal pain and blood in stool  Endocrine: Negative for cold intolerance, heat intolerance, polydipsia and polyuria  Genitourinary: Negative for difficulty urinating, dysuria, frequency and urgency  Musculoskeletal: Negative for arthralgias and myalgias  Skin: Negative for rash  Neurological: Negative for dizziness, weakness, light-headedness and headaches  Hematological: Does not bruise/bleed easily  Psychiatric/Behavioral: Negative for dysphoric mood and sleep disturbance  Objective:      Vitals:    08/16/22 1501   BP: 116/72   Pulse: 65   Resp: 17   Temp: 97 7 °F (36 5 °C)   SpO2: 98%          Physical Exam

## 2022-08-16 NOTE — PATIENT INSTRUCTIONS
Anxiety stable on imipramine  Depression c/b Bereavement-supportive counseling given  Consider grief support group

## 2022-08-31 ENCOUNTER — OFFICE VISIT (OUTPATIENT)
Dept: INTERNAL MEDICINE CLINIC | Facility: CLINIC | Age: 87
End: 2022-08-31
Payer: MEDICARE

## 2022-08-31 ENCOUNTER — APPOINTMENT (OUTPATIENT)
Dept: LAB | Facility: CLINIC | Age: 87
End: 2022-08-31
Payer: MEDICARE

## 2022-08-31 VITALS
WEIGHT: 102.4 LBS | DIASTOLIC BLOOD PRESSURE: 70 MMHG | BODY MASS INDEX: 22.09 KG/M2 | SYSTOLIC BLOOD PRESSURE: 114 MMHG | OXYGEN SATURATION: 98 % | TEMPERATURE: 98.4 F | HEART RATE: 62 BPM | HEIGHT: 57 IN | RESPIRATION RATE: 16 BRPM

## 2022-08-31 DIAGNOSIS — M79.10 MYALGIA: ICD-10-CM

## 2022-08-31 DIAGNOSIS — E78.2 MIXED HYPERLIPIDEMIA: ICD-10-CM

## 2022-08-31 DIAGNOSIS — R51.9 NONINTRACTABLE HEADACHE, UNSPECIFIED CHRONICITY PATTERN, UNSPECIFIED HEADACHE TYPE: ICD-10-CM

## 2022-08-31 DIAGNOSIS — Z12.31 ENCOUNTER FOR SCREENING MAMMOGRAM FOR BREAST CANCER: Primary | ICD-10-CM

## 2022-08-31 DIAGNOSIS — E55.9 VITAMIN D DEFICIENCY: ICD-10-CM

## 2022-08-31 LAB
25(OH)D3 SERPL-MCNC: 12.5 NG/ML (ref 30–100)
ALBUMIN SERPL BCP-MCNC: 3.4 G/DL (ref 3.5–5)
ALP SERPL-CCNC: 114 U/L (ref 46–116)
ALT SERPL W P-5'-P-CCNC: 17 U/L (ref 12–78)
ANION GAP SERPL CALCULATED.3IONS-SCNC: 5 MMOL/L (ref 4–13)
AST SERPL W P-5'-P-CCNC: 19 U/L (ref 5–45)
BASOPHILS # BLD AUTO: 0.03 THOUSANDS/ΜL (ref 0–0.1)
BASOPHILS NFR BLD AUTO: 1 % (ref 0–1)
BILIRUB SERPL-MCNC: 0.53 MG/DL (ref 0.2–1)
BUN SERPL-MCNC: 9 MG/DL (ref 5–25)
CALCIUM ALBUM COR SERPL-MCNC: 9.9 MG/DL (ref 8.3–10.1)
CALCIUM SERPL-MCNC: 9.4 MG/DL (ref 8.3–10.1)
CHLORIDE SERPL-SCNC: 103 MMOL/L (ref 96–108)
CHOLEST SERPL-MCNC: 277 MG/DL
CO2 SERPL-SCNC: 26 MMOL/L (ref 21–32)
CREAT SERPL-MCNC: 1.32 MG/DL (ref 0.6–1.3)
CRP SERPL QL: <3 MG/L
EOSINOPHIL # BLD AUTO: 0.74 THOUSAND/ΜL (ref 0–0.61)
EOSINOPHIL NFR BLD AUTO: 12 % (ref 0–6)
ERYTHROCYTE [DISTWIDTH] IN BLOOD BY AUTOMATED COUNT: 13.3 % (ref 11.6–15.1)
ERYTHROCYTE [SEDIMENTATION RATE] IN BLOOD: 39 MM/HOUR (ref 0–29)
GFR SERPL CREATININE-BSD FRML MDRD: 37 ML/MIN/1.73SQ M
GLUCOSE P FAST SERPL-MCNC: 97 MG/DL (ref 65–99)
HCT VFR BLD AUTO: 35.8 % (ref 34.8–46.1)
HDLC SERPL-MCNC: 64 MG/DL
HGB BLD-MCNC: 11.8 G/DL (ref 11.5–15.4)
IMM GRANULOCYTES # BLD AUTO: 0.02 THOUSAND/UL (ref 0–0.2)
IMM GRANULOCYTES NFR BLD AUTO: 0 % (ref 0–2)
LDLC SERPL CALC-MCNC: 182 MG/DL (ref 0–100)
LYMPHOCYTES # BLD AUTO: 1.3 THOUSANDS/ΜL (ref 0.6–4.47)
LYMPHOCYTES NFR BLD AUTO: 20 % (ref 14–44)
MCH RBC QN AUTO: 30.9 PG (ref 26.8–34.3)
MCHC RBC AUTO-ENTMCNC: 33 G/DL (ref 31.4–37.4)
MCV RBC AUTO: 94 FL (ref 82–98)
MONOCYTES # BLD AUTO: 0.8 THOUSAND/ΜL (ref 0.17–1.22)
MONOCYTES NFR BLD AUTO: 13 % (ref 4–12)
NEUTROPHILS # BLD AUTO: 3.53 THOUSANDS/ΜL (ref 1.85–7.62)
NEUTS SEG NFR BLD AUTO: 54 % (ref 43–75)
NONHDLC SERPL-MCNC: 213 MG/DL
NRBC BLD AUTO-RTO: 0 /100 WBCS
PLATELET # BLD AUTO: 198 THOUSANDS/UL (ref 149–390)
PMV BLD AUTO: 11.4 FL (ref 8.9–12.7)
POTASSIUM SERPL-SCNC: 4.1 MMOL/L (ref 3.5–5.3)
PROT SERPL-MCNC: 7.5 G/DL (ref 6.4–8.4)
RBC # BLD AUTO: 3.82 MILLION/UL (ref 3.81–5.12)
SODIUM SERPL-SCNC: 134 MMOL/L (ref 135–147)
T4 FREE SERPL-MCNC: 1.08 NG/DL (ref 0.76–1.46)
TRIGL SERPL-MCNC: 153 MG/DL
TSH SERPL DL<=0.05 MIU/L-ACNC: 6.39 UIU/ML (ref 0.45–4.5)
WBC # BLD AUTO: 6.42 THOUSAND/UL (ref 4.31–10.16)

## 2022-08-31 PROCEDURE — 80061 LIPID PANEL: CPT

## 2022-08-31 PROCEDURE — 86140 C-REACTIVE PROTEIN: CPT

## 2022-08-31 PROCEDURE — 80053 COMPREHEN METABOLIC PANEL: CPT

## 2022-08-31 PROCEDURE — 85652 RBC SED RATE AUTOMATED: CPT

## 2022-08-31 PROCEDURE — 85025 COMPLETE CBC W/AUTO DIFF WBC: CPT

## 2022-08-31 PROCEDURE — 84443 ASSAY THYROID STIM HORMONE: CPT

## 2022-08-31 PROCEDURE — 82306 VITAMIN D 25 HYDROXY: CPT

## 2022-08-31 PROCEDURE — 84439 ASSAY OF FREE THYROXINE: CPT

## 2022-08-31 PROCEDURE — 99213 OFFICE O/P EST LOW 20 MIN: CPT | Performed by: INTERNAL MEDICINE

## 2022-08-31 PROCEDURE — 36415 COLL VENOUS BLD VENIPUNCTURE: CPT

## 2022-08-31 NOTE — PROGRESS NOTES
Assessment/Plan:    Diagnoses and all orders for this visit:    Encounter for screening mammogram for breast cancer  -     Mammo screening bilateral w 3d & cad; Future    Nonintractable headache, unspecified chronicity pattern, unspecified headache type  -     Sedimentation rate, automated; Future  -     C-reactive protein; Future    Myalgia    Vitamin D deficiency  -     Vitamin D 25 hydroxy; Future            There are no Patient Instructions on file for this visit  Subjective:      Patient ID: Hodan Calero is a 82 y o  female    Presents acutely with daughter, Augusto Marquis with complaint of left-sided headache and she notes she feels a throbbing and a bulging along her temporal artery  She occasionally feels a lump on the scalp that is not present today  She also has some pain from the left trapezius that radiates down through the shoulder and into the arm, but only on the left side  She also notes a sharp jab be left flank pain that seems to come on after eating  She has no fevers, chills, visual disturbance          Current Outpatient Medications:     ALPRAZolam (XANAX) 0 5 mg tablet, take 1 tablet by mouth four times a day if needed for anxiety, Disp: 120 tablet, Rfl: 0    atenolol (TENORMIN) 50 mg tablet, take 1 tablet by mouth twice a day, Disp: 180 tablet, Rfl: 3    ferrous sulfate 325 (65 Fe) mg tablet, Take 1 tablet by mouth daily, Disp: , Rfl:     latanoprost (XALATAN) 0 005 % ophthalmic solution, instill 1 drop into both eyes nightly, Disp: , Rfl:     levothyroxine 50 mcg tablet, 1 po qd, Disp: 90 tablet, Rfl: 3    lisinopril (ZESTRIL) 5 mg tablet, take 1 tablet by mouth daily, Disp: 90 tablet, Rfl: 0    pantoprazole (PROTONIX) 40 mg tablet, Take 1 tablet (40 mg total) by mouth daily before breakfast, Disp: 90 tablet, Rfl: 3    SACCHAROMYCES BOULARDII PO, Take 250 mg by mouth 2 (two) times a day Probiotic, Disp: , Rfl:     acetaminophen (TYLENOL) 325 mg tablet, Take 1-2 tablets by mouth every 6 (six) hours as needed, Disp: , Rfl:     ALPRAZolam (XANAX) 0 5 mg tablet, take 1 tablet by mouth four times a day if needed for anxiety, Disp: 120 tablet, Rfl: 0    atenolol (TENORMIN) 50 mg tablet, take 1 tablet by mouth twice a day, Disp: 180 tablet, Rfl: 3    benzonatate (TESSALON) 200 MG capsule, Take 1 capsule (200 mg total) by mouth 3 (three) times a day as needed for cough (Patient not taking: No sig reported), Disp: 20 capsule, Rfl: 0    cyanocobalamin (VITAMIN B-12) 1000 MCG tablet, Take 1,000 mcg by mouth daily   (Patient not taking: No sig reported), Disp: , Rfl:     ferrous sulfate 325 (65 Fe) mg tablet, Take 1 tablet by mouth daily, Disp: , Rfl:     fluticasone (FLONASE) 50 mcg/act nasal spray, 2 sprays into each nostril daily (Patient not taking: No sig reported), Disp: 16 g, Rfl: 0    hydrochlorothiazide (HYDRODIURIL) 12 5 mg tablet, Take 1 tablet (12 5 mg total) by mouth daily (Patient not taking: No sig reported), Disp: 30 tablet, Rfl: 1    imipramine (TOFRANIL) 10 mg tablet, Take 1 tablet (10 mg total) by mouth daily at bedtime 4 nights per week, Disp: 30 tablet, Rfl: 5    latanoprost (XALATAN) 0 005 % ophthalmic solution, instill 1 drop into both eyes nightly, Disp: , Rfl:     pantoprazole (PROTONIX) 40 mg tablet, Take 1 tablet (40 mg total) by mouth daily before breakfast, Disp: 90 tablet, Rfl: 3    Probiotic Product (ALIGN) 4 MG CAPS, Take 1 tablet by mouth daily (Patient not taking: Reported on 8/31/2022), Disp: , Rfl:     No results found for this or any previous visit (from the past 1008 hour(s))  The following portions of the patient's history were reviewed and updated as appropriate: allergies, current medications, past family history, past medical history, past social history, past surgical history and problem list      Review of Systems   Constitutional: Negative for appetite change, chills, diaphoresis, fatigue, fever and unexpected weight change     HENT: Negative for congestion, hearing loss and rhinorrhea  Eyes: Negative for visual disturbance  Respiratory: Negative for cough, chest tightness, shortness of breath and wheezing  Cardiovascular: Negative for chest pain, palpitations and leg swelling  Gastrointestinal: Negative for abdominal pain and blood in stool  Endocrine: Negative for cold intolerance, heat intolerance, polydipsia and polyuria  Genitourinary: Negative for difficulty urinating, dysuria, frequency and urgency  Musculoskeletal: Positive for myalgias  Negative for arthralgias  Skin: Negative for rash  Neurological: Positive for headaches  Negative for dizziness, weakness and light-headedness  Hematological: Does not bruise/bleed easily  Psychiatric/Behavioral: Negative for dysphoric mood and sleep disturbance  Objective:      Vitals:    08/31/22 1404   BP: 114/70   Pulse: 62   Resp: 16   Temp: 98 4 °F (36 9 °C)   SpO2: 98%          Physical Exam  Constitutional:       Appearance: She is well-developed  HENT:      Head: Normocephalic and atraumatic  Comments: Prominent left temporal artery that is tender to palpate, no scalp tenderness     Nose: Nose normal    Eyes:      General: No scleral icterus  Conjunctiva/sclera: Conjunctivae normal       Pupils: Pupils are equal, round, and reactive to light  Neck:      Thyroid: No thyromegaly  Vascular: No JVD  Trachea: No tracheal deviation  Cardiovascular:      Rate and Rhythm: Normal rate and regular rhythm  Heart sounds: No murmur heard  No friction rub  No gallop  Pulmonary:      Effort: Pulmonary effort is normal  No respiratory distress  Breath sounds: Normal breath sounds  No wheezing or rales  Abdominal:      General: Bowel sounds are normal  There is no distension  Palpations: Abdomen is soft  There is no mass  Tenderness: There is no abdominal tenderness  There is no guarding or rebound     Musculoskeletal:         General: No tenderness  Cervical back: Normal range of motion and neck supple  Comments: Full range of motion at shoulders   Lymphadenopathy:      Cervical: No cervical adenopathy  Skin:     General: Skin is warm and dry  Findings: No erythema or rash  Neurological:      Mental Status: She is alert and oriented to person, place, and time  Cranial Nerves: No cranial nerve deficit  Psychiatric:         Behavior: Behavior normal          Thought Content:  Thought content normal          Judgment: Judgment normal

## 2022-09-01 DIAGNOSIS — E55.9 VITAMIN D DEFICIENCY: Primary | ICD-10-CM

## 2022-09-01 RX ORDER — ERGOCALCIFEROL 1.25 MG/1
50000 CAPSULE ORAL WEEKLY
Qty: 12 CAPSULE | Refills: 0 | Status: SHIPPED | OUTPATIENT
Start: 2022-09-01 | End: 2023-01-04

## 2022-09-06 DIAGNOSIS — F41.9 ANXIETY: ICD-10-CM

## 2022-09-06 RX ORDER — ALPRAZOLAM 0.5 MG/1
0.5 TABLET ORAL 4 TIMES DAILY PRN
Qty: 120 TABLET | Refills: 0 | Status: SHIPPED | OUTPATIENT
Start: 2022-09-06 | End: 2022-10-24

## 2022-09-28 DIAGNOSIS — E03.9 ACQUIRED HYPOTHYROIDISM: ICD-10-CM

## 2022-09-28 RX ORDER — LEVOTHYROXINE SODIUM 0.05 MG/1
TABLET ORAL
Qty: 90 TABLET | Refills: 0 | Status: SHIPPED | OUTPATIENT
Start: 2022-09-28

## 2022-09-28 NOTE — TELEPHONE ENCOUNTER
Medication Refill Request     Name Levothyroxine     Dose/Frequency 50 mcg daily   Quantity 90  Verified pharmacy   [x]  Verified ordering Provider   [x]  Does patient have enough for the next 3 days?  Yes [x] No []

## 2022-10-19 ENCOUNTER — OFFICE VISIT (OUTPATIENT)
Dept: INTERNAL MEDICINE CLINIC | Facility: CLINIC | Age: 87
End: 2022-10-19
Payer: MEDICARE

## 2022-10-19 VITALS
SYSTOLIC BLOOD PRESSURE: 132 MMHG | TEMPERATURE: 98.9 F | HEART RATE: 65 BPM | OXYGEN SATURATION: 96 % | DIASTOLIC BLOOD PRESSURE: 70 MMHG | BODY MASS INDEX: 22.65 KG/M2 | WEIGHT: 105 LBS | HEIGHT: 57 IN | RESPIRATION RATE: 16 BRPM

## 2022-10-19 DIAGNOSIS — N18.32 STAGE 3B CHRONIC KIDNEY DISEASE (HCC): ICD-10-CM

## 2022-10-19 DIAGNOSIS — G44.209 TENSION-TYPE HEADACHE, NOT INTRACTABLE, UNSPECIFIED CHRONICITY PATTERN: ICD-10-CM

## 2022-10-19 DIAGNOSIS — E78.2 MIXED HYPERLIPIDEMIA: ICD-10-CM

## 2022-10-19 DIAGNOSIS — K21.9 GERD WITHOUT ESOPHAGITIS: ICD-10-CM

## 2022-10-19 DIAGNOSIS — I10 PRIMARY HYPERTENSION: ICD-10-CM

## 2022-10-19 DIAGNOSIS — K58.9 IRRITABLE BOWEL SYNDROME, UNSPECIFIED TYPE: Primary | ICD-10-CM

## 2022-10-19 DIAGNOSIS — I50.32 CHRONIC DIASTOLIC CHF (CONGESTIVE HEART FAILURE) (HCC): ICD-10-CM

## 2022-10-19 DIAGNOSIS — R73.01 IMPAIRED FASTING GLUCOSE: ICD-10-CM

## 2022-10-19 DIAGNOSIS — M81.0 AGE-RELATED OSTEOPOROSIS WITHOUT CURRENT PATHOLOGICAL FRACTURE: ICD-10-CM

## 2022-10-19 DIAGNOSIS — E55.9 VITAMIN D DEFICIENCY: ICD-10-CM

## 2022-10-19 DIAGNOSIS — E03.9 ACQUIRED HYPOTHYROIDISM: ICD-10-CM

## 2022-10-19 DIAGNOSIS — Z23 ENCOUNTER FOR IMMUNIZATION: ICD-10-CM

## 2022-10-19 DIAGNOSIS — F41.9 ANXIETY: ICD-10-CM

## 2022-10-19 PROBLEM — R79.89 ELEVATED SERUM CREATININE: Status: RESOLVED | Noted: 2021-06-12 | Resolved: 2022-10-19

## 2022-10-19 PROCEDURE — 99214 OFFICE O/P EST MOD 30 MIN: CPT | Performed by: INTERNAL MEDICINE

## 2022-10-19 PROCEDURE — G0008 ADMIN INFLUENZA VIRUS VAC: HCPCS | Performed by: INTERNAL MEDICINE

## 2022-10-19 PROCEDURE — 90662 IIV NO PRSV INCREASED AG IM: CPT | Performed by: INTERNAL MEDICINE

## 2022-10-19 RX ORDER — ROSUVASTATIN CALCIUM 10 MG/1
10 TABLET, COATED ORAL DAILY
Status: CANCELLED | OUTPATIENT
Start: 2022-10-19

## 2022-10-19 RX ORDER — RISEDRONATE SODIUM 35 MG/1
35 TABLET, FILM COATED ORAL
Qty: 4 TABLET | Refills: 5 | Status: SHIPPED | OUTPATIENT
Start: 2022-10-19

## 2022-10-19 NOTE — PROGRESS NOTES
Assessment/Plan:    1  HTN  -seems to be under control  -continue lisinopril low-dose    2  Hypothyroidism  -elevated TSH but normal T4 level  -under good control, no noticeable symptoms of hypothyroidism  -will repeat TSH in 6 months    3  HLD  -elevated LDL however Pt had H/o generalized muscle weakness for which Crestor was stopped resulting in improvement  -no cardiovascular risk factors present  -will treat with conservative management    4  Irritable bowel syndrome  -seems to be under control    5  Stage III CKD  -renal function stable at baseline 1 3     6  Vitamin-D deficiency  -recently noted low vitamin-D level  -will continue vitamin-D2 38387 units every week    7  Osteoporosis  -will repeat DEXA scan next year  -continue ambulation with exercise as tolerated  -continue vitamin-D supplements  -will start Actonel, all side-effect were explained in detail, Pt agrees with that    8  Bloating  -recommended to walk after meal  -can take over-the-counter Gas-X as needed    9  Headache  -mildly elevated ESR with normal CRP  -no vision problems  -headache still in Lt temporal region going down to Lt shoulder and Lt arm  -no tenderness on palpation of Lt temporal region  -improves with Tylenol, no functional limitations    10  Anxiety  -currently controlled with imipramine    11  GERD  -stable with Protonix    He follow-up with us in February  Does get your labs before next scheduled visit           Problem List Items Addressed This Visit        Digestive    Irritable bowel syndrome - Primary    GERD without esophagitis       Endocrine    Hypothyroidism    Relevant Orders    TSH, 3rd generation with Free T4 reflex    Impaired fasting glucose       Cardiovascular and Mediastinum    Hypertension    Relevant Orders    CBC and differential    Comprehensive metabolic panel    Chronic diastolic CHF (congestive heart failure) (HCC)       Musculoskeletal and Integument    Age-related osteoporosis without current pathological fracture    Relevant Medications    risedronate (ACTONEL) 35 mg tablet    Other Relevant Orders    DXA bone density spine hip and pelvis       Genitourinary    Stage 3b chronic kidney disease (HCC)       Other    Anxiety    Vitamin D deficiency    Hyperlipidemia    Tension-type headache, not intractable      Other Visit Diagnoses     Encounter for immunization        Relevant Orders    influenza vaccine, high-dose, PF 0 5 mL (Completed)            Subjective:      Patient ID: Alondra Voss is a 82 y o  female  Pt is here for regular follow-up visit  States headache is still there in the Lt temporal region  Radiating down to Lt shoulder and Lt arm  Controlled with Tylenol as needed  C/o bloating sensation  Otherwise no abdominal pain, chest pain, shortness a breath  Does have some muscle ache and bone pain in hip and back which is controlled w/o any functional limitation  Also has trouble in vision and about to get follow-up with Ophthalmology  Anxiety is under control  BP remains stable  Recent labs with renal function looks good  Elevated TSH however T4 was WNL  Takes vitamin-D supplements well w/o any side effects  Does not take any medication for osteoporosis  The following portions of the patient's history were reviewed and updated as appropriate:       Review of Systems:    Review of Systems   Constitutional: Positive for activity change and fatigue  Negative for appetite change, chills, diaphoresis, fever and unexpected weight change  HENT: Negative for rhinorrhea and sore throat  Eyes: Negative for visual disturbance  Respiratory: Negative for cough, chest tightness and shortness of breath  Cardiovascular: Negative for chest pain, palpitations and leg swelling  Gastrointestinal: Negative for abdominal distention, abdominal pain, blood in stool, constipation, diarrhea, nausea and vomiting  Endocrine: Negative for polydipsia, polyphagia and polyuria  Genitourinary: Negative for difficulty urinating, dysuria, flank pain, frequency, hematuria and urgency  Musculoskeletal: Positive for arthralgias and back pain  Negative for gait problem and myalgias  Neurological: Positive for headaches  Negative for dizziness, tremors, seizures, syncope, speech difficulty and light-headedness  Psychiatric/Behavioral: Negative for behavioral problems and sleep disturbance  Past Medical and Surgical History:     Past Medical History:   Diagnosis Date   • Benign essential hypertension     Last assessed: 14   • Disease of thyroid gland    • Essential tremor    • Fibromyalgia    • GERD (gastroesophageal reflux disease)    • History of nail disorders    • Hyperlipidemia    • Hypertension    • Palpitations    • Transient cerebral ischemia        Past Surgical History:   Procedure Laterality Date   • APPENDECTOMY     • EGD AND COLONOSCOPY  2020   • HYSTERECTOMY  2010   • [place] LAP,CHOLECYSTECTOMY/GRAPH N/A 2018    Procedure: LAPAROSCOPIC CHOLECYSTECTOMY WITH IOC;  Surgeon: Hilton Montoya MD;  Location: [facility];  Service: General   • TUBAL LIGATION           Family History:    Family History   Problem Relation Age of Onset   • Stroke Mother         ischemic stroke   • Hypertension Mother    • Heart disease Father           Social History:    Substance Use History:   Social History     Substance and Sexual Activity   Alcohol Use Not Currently   • Alcohol/week: 0 0 standard drinks    Comment: 0     Social History     Tobacco Use   Smoking Status Former Smoker   • Packs/day: 0 10   • Years: 50 00   • Pack years: 5 00   • Types: Cigarettes   • Start date:    • Quit date:    • Years since quittin 8   Smokeless Tobacco Never Used   Tobacco Comment    1 pack a week      Social History     Substance and Sexual Activity   Drug Use No         Meds/Allergies:    Prior to Admission medications    Medication Sig Start Date End Date Taking?  Authorizing Provider   acetaminophen (TYLENOL) 325 mg tablet Take 1-2 tablets by mouth every 6 (six) hours as needed   Yes Historical Provider, MD   ALPRAZolam Harlon Needle) 0 5 mg tablet Take 1 tablet (0 5 mg total) by mouth 4 (four) times a day as needed for anxiety 9/6/22  Yes Iwona Gill MD   atenolol (TENORMIN) 50 mg tablet take 1 tablet by mouth twice a day 11/1/21  Yes Iwona Gill MD   ergocalciferol (VITAMIN D2) 50,000 units Take 1 capsule (50,000 Units total) by mouth once a week for 12 doses 9/1/22 11/18/22 Yes Iwona Gill MD   ferrous sulfate 325 (65 Fe) mg tablet Take 1 tablet by mouth daily 10/9/17  Yes Historical Provider, MD   latanoprost (XALATAN) 0 005 % ophthalmic solution instill 1 drop into both eyes nightly 5/12/20  Yes Historical Provider, MD   levothyroxine 50 mcg tablet 1 po qd 9/28/22  Yes Iwona Gill MD   lisinopril (ZESTRIL) 5 mg tablet take 1 tablet by mouth daily 6/13/22  Yes Lestine Scheuermann, CRNP   pantoprazole (PROTONIX) 40 mg tablet Take 1 tablet (40 mg total) by mouth daily before breakfast 10/13/21  Yes Leigh Allan MD   Probiotic Product (ALIGN) 4 MG CAPS Take 1 tablet by mouth daily   Yes Historical Provider, MD   cyanocobalamin (VITAMIN B-12) 1000 MCG tablet Take 1,000 mcg by mouth daily    Patient not taking: Reported on 10/19/2022    Historical Provider, MD   imipramine (TOFRANIL) 10 mg tablet Take 1 tablet (10 mg total) by mouth daily at bedtime 4 nights per week 7/19/22 8/18/22  Iwona Gill MD   benzonatate (TESSALON) 200 MG capsule Take 1 capsule (200 mg total) by mouth 3 (three) times a day as needed for cough 6/20/22 10/19/22  AMIE Woo   fluticasone [facility]) 50 mcg/act nasal spray 2 sprays into each nostril daily 6/20/22 10/19/22  AMIE Woo   hydrochlorothiazide (HYDRODIURIL) 12 5 mg tablet Take 1 tablet (12 5 mg total) by mouth daily 2/10/22 10/19/22  Lestine Scheuermann, CRNP   SACCHAROMYCES BOULARDII PO Take 250 mg by mouth 2 (two) times a day Probiotic  10/19/22  Historical Provider, MD       Allergies: Allergies   Allergen Reactions   • Other Drowsiness     Annotation - [id number]: ANTIDEPRESSANTS       Objective:  Vitals:    10/19/22 1449   BP: 132/70   BP Location: Left arm   Patient Position: Sitting   Cuff Size: Standard   Pulse: 65   Resp: 16   Temp: 98 9 °F (37 2 °C)   TempSrc: Tympanic   SpO2: 96%   Weight: 47 6 kg (105 lb)   Height: 4' 9" (1 448 m)     Body mass index is 22 72 kg/m²  Physical Exam  Vitals reviewed  Constitutional:       General: She is not in acute distress  Appearance: She is well-developed  HENT:      Head: Normocephalic and atraumatic  Eyes:      General: No scleral icterus  Right eye: No discharge  Left eye: No discharge  Cardiovascular:      Rate and Rhythm: Normal rate and regular rhythm  Pulses: Normal pulses  Heart sounds: Normal heart sounds  Pulmonary:      Effort: Pulmonary effort is normal  No respiratory distress  Breath sounds: Normal breath sounds  No wheezing or rales  Chest:      Chest wall: No tenderness  Abdominal:      General: Bowel sounds are normal  There is no distension  Palpations: Abdomen is soft  Tenderness: There is no abdominal tenderness  Musculoskeletal:         General: No swelling or tenderness  Normal range of motion  Cervical back: Normal range of motion  Right lower leg: No edema  Left lower leg: No edema  Lymphadenopathy:      Cervical: No cervical adenopathy  Skin:     General: Skin is warm  Coloration: Skin is not pale  Neurological:      General: No focal deficit present  Mental Status: She is alert and oriented to person, place, and time  Mental status is at baseline  Cranial Nerves: No cranial nerve deficit  Sensory: No sensory deficit  Motor: No weakness     Psychiatric:         Mood and Affect: Mood normal          Behavior: Behavior normal

## 2022-10-23 DIAGNOSIS — F41.9 ANXIETY: ICD-10-CM

## 2022-10-24 DIAGNOSIS — F41.9 ANXIETY: ICD-10-CM

## 2022-10-24 DIAGNOSIS — I10 ESSENTIAL HYPERTENSION: ICD-10-CM

## 2022-10-24 RX ORDER — ALPRAZOLAM 0.5 MG/1
TABLET ORAL
Qty: 120 TABLET | Refills: 1 | Status: SHIPPED | OUTPATIENT
Start: 2022-10-24

## 2022-10-25 RX ORDER — IMIPRAMINE HYDROCHLORIDE 10 MG/1
10 TABLET, FILM COATED ORAL
Qty: 30 TABLET | Refills: 5 | Status: SHIPPED | OUTPATIENT
Start: 2022-10-25 | End: 2022-11-24

## 2022-10-25 RX ORDER — LISINOPRIL 5 MG/1
5 TABLET ORAL DAILY
Qty: 90 TABLET | Refills: 0 | Status: SHIPPED | OUTPATIENT
Start: 2022-10-25

## 2022-11-02 ENCOUNTER — HOSPITAL ENCOUNTER (OUTPATIENT)
Dept: MAMMOGRAPHY | Facility: CLINIC | Age: 87
Discharge: HOME | End: 2022-11-02

## 2022-11-02 DIAGNOSIS — Z12.31 ENCOUNTER FOR SCREENING MAMMOGRAM FOR BREAST CANCER: ICD-10-CM

## 2022-11-09 ENCOUNTER — HOSPITAL ENCOUNTER (OUTPATIENT)
Dept: BONE DENSITY | Facility: CLINIC | Age: 87
Discharge: HOME | End: 2022-11-09

## 2022-11-09 VITALS — WEIGHT: 105 LBS | BODY MASS INDEX: 23.62 KG/M2 | HEIGHT: 56 IN

## 2022-11-09 DIAGNOSIS — M81.0 AGE-RELATED OSTEOPOROSIS WITHOUT CURRENT PATHOLOGICAL FRACTURE: ICD-10-CM

## 2022-12-07 DIAGNOSIS — I10 ESSENTIAL HYPERTENSION: ICD-10-CM

## 2022-12-07 RX ORDER — ATENOLOL 50 MG/1
TABLET ORAL
Qty: 180 TABLET | Refills: 3 | Status: SHIPPED | OUTPATIENT
Start: 2022-12-07

## 2022-12-08 ENCOUNTER — HOSPITAL ENCOUNTER (EMERGENCY)
Facility: HOSPITAL | Age: 87
Discharge: HOME | End: 2022-12-08
Attending: EMERGENCY MEDICINE

## 2022-12-08 ENCOUNTER — APPOINTMENT (EMERGENCY)
Dept: RADIOLOGY | Facility: HOSPITAL | Age: 87
End: 2022-12-08

## 2022-12-08 VITALS
SYSTOLIC BLOOD PRESSURE: 188 MMHG | RESPIRATION RATE: 16 BRPM | TEMPERATURE: 98 F | HEART RATE: 76 BPM | OXYGEN SATURATION: 96 % | DIASTOLIC BLOOD PRESSURE: 75 MMHG

## 2022-12-08 DIAGNOSIS — R05.1 ACUTE COUGH: Primary | ICD-10-CM

## 2022-12-08 LAB
ALBUMIN SERPL BCP-MCNC: 4 G/DL (ref 3.5–5)
ALP SERPL-CCNC: 97 U/L (ref 46–116)
ALT SERPL W P-5'-P-CCNC: 18 U/L (ref 12–78)
ANION GAP SERPL CALCULATED.3IONS-SCNC: 11 MMOL/L (ref 4–13)
AST SERPL W P-5'-P-CCNC: 24 U/L (ref 5–45)
BASOPHILS # BLD AUTO: 0.02 THOUSANDS/ÂΜL (ref 0–0.1)
BASOPHILS NFR BLD AUTO: 0 % (ref 0–1)
BILIRUB SERPL-MCNC: 0.46 MG/DL (ref 0.2–1)
BUN SERPL-MCNC: 17 MG/DL (ref 5–25)
CALCIUM SERPL-MCNC: 9.6 MG/DL (ref 8.3–10.1)
CARDIAC TROPONIN I PNL SERPL HS: 3 NG/L
CHLORIDE SERPL-SCNC: 95 MMOL/L (ref 96–108)
CO2 SERPL-SCNC: 24 MMOL/L (ref 21–32)
CREAT SERPL-MCNC: 1.39 MG/DL (ref 0.6–1.3)
EOSINOPHIL # BLD AUTO: 0.54 THOUSAND/ÂΜL (ref 0–0.61)
EOSINOPHIL NFR BLD AUTO: 6 % (ref 0–6)
ERYTHROCYTE [DISTWIDTH] IN BLOOD BY AUTOMATED COUNT: 13.2 % (ref 11.6–15.1)
FLUAV RNA RESP QL NAA+PROBE: NEGATIVE
FLUBV RNA RESP QL NAA+PROBE: NEGATIVE
GFR SERPL CREATININE-BSD FRML MDRD: 34 ML/MIN/1.73SQ M
GLUCOSE SERPL-MCNC: 122 MG/DL (ref 65–140)
HCT VFR BLD AUTO: 35.9 % (ref 34.8–46.1)
HGB BLD-MCNC: 12.3 G/DL (ref 11.5–15.4)
IMM GRANULOCYTES # BLD AUTO: 0.01 THOUSAND/UL (ref 0–0.2)
IMM GRANULOCYTES NFR BLD AUTO: 0 % (ref 0–2)
LYMPHOCYTES # BLD AUTO: 0.75 THOUSANDS/ÂΜL (ref 0.6–4.47)
LYMPHOCYTES NFR BLD AUTO: 8 % (ref 14–44)
MCH RBC QN AUTO: 32.2 PG (ref 26.8–34.3)
MCHC RBC AUTO-ENTMCNC: 34.3 G/DL (ref 31.4–37.4)
MCV RBC AUTO: 94 FL (ref 82–98)
MONOCYTES # BLD AUTO: 0.82 THOUSAND/ÂΜL (ref 0.17–1.22)
MONOCYTES NFR BLD AUTO: 9 % (ref 4–12)
NEUTROPHILS # BLD AUTO: 6.82 THOUSANDS/ÂΜL (ref 1.85–7.62)
NEUTS SEG NFR BLD AUTO: 77 % (ref 43–75)
NRBC BLD AUTO-RTO: 0 /100 WBCS
PLATELET # BLD AUTO: 183 THOUSANDS/UL (ref 149–390)
PMV BLD AUTO: 9 FL (ref 8.9–12.7)
POTASSIUM SERPL-SCNC: 4.2 MMOL/L (ref 3.5–5.3)
PROT SERPL-MCNC: 7.9 G/DL (ref 6.4–8.4)
RBC # BLD AUTO: 3.82 MILLION/UL (ref 3.81–5.12)
RSV RNA RESP QL NAA+PROBE: NEGATIVE
SARS-COV-2 RNA RESP QL NAA+PROBE: NEGATIVE
SODIUM SERPL-SCNC: 130 MMOL/L (ref 135–147)
WBC # BLD AUTO: 8.96 THOUSAND/UL (ref 4.31–10.16)

## 2022-12-08 RX ORDER — DOXYCYCLINE 100 MG/1
100 CAPSULE ORAL 2 TIMES DAILY
Qty: 20 CAPSULE | Refills: 0 | Status: SHIPPED | OUTPATIENT
Start: 2022-12-08 | End: 2022-12-09 | Stop reason: CLARIF

## 2022-12-08 NOTE — ED PROVIDER NOTES
History  Chief Complaint   Patient presents with   • Cough     Cough x 2-3 days, denies fever     83 yo female with 2-3 days nonproductive cough  No dyspnea, wheezing or orthopnea  No leg pain or swelling  No hemoptysis  No syncope or near syncope  Has h/o chf, was previously on lasix but stopped it due to not liking the way it makes her feel  No fever or chills  No other symptoms or concerns  Severity mild  Additional history from daughter at bedside  Social history - former smoker  Prior to Admission Medications   Prescriptions Last Dose Informant Patient Reported? Taking?    ALPRAZolam (XANAX) 0 5 mg tablet   No No   Sig: take 1 tablet by mouth four times a day if needed for anxiety   Probiotic Product (ALIGN) 4 MG CAPS   Yes No   Sig: Take 1 tablet by mouth daily   acetaminophen (TYLENOL) 325 mg tablet   Yes No   Sig: Take 1-2 tablets by mouth every 6 (six) hours as needed   atenolol (TENORMIN) 50 mg tablet   No No   Sig: take 1 tablet by mouth twice a day   cyanocobalamin (VITAMIN B-12) 1000 MCG tablet   Yes No   Sig: Take 1,000 mcg by mouth daily     Patient not taking: Reported on 10/19/2022   ergocalciferol (VITAMIN D2) 50,000 units   No No   Sig: Take 1 capsule (50,000 Units total) by mouth once a week for 12 doses   ferrous sulfate 325 (65 Fe) mg tablet   Yes No   Sig: Take 1 tablet by mouth daily   imipramine (TOFRANIL) 10 mg tablet   No No   Sig: Take 1 tablet (10 mg total) by mouth daily at bedtime 4 nights per week   latanoprost (XALATAN) 0 005 % ophthalmic solution   Yes No   Sig: instill 1 drop into both eyes nightly   levothyroxine 50 mcg tablet   No No   Si po qd   lisinopril (ZESTRIL) 5 mg tablet   No No   Sig: Take 1 tablet (5 mg total) by mouth daily   pantoprazole (PROTONIX) 40 mg tablet   No No   Sig: Take 1 tablet (40 mg total) by mouth daily before breakfast   risedronate (ACTONEL) 35 mg tablet   No No   Sig: Take 1 tablet (35 mg total) by mouth every 7 days with water on empty stomach, nothing by mouth or lie down for next 30 minutes  Facility-Administered Medications: None       Past Medical History:   Diagnosis Date   • Benign essential hypertension     Last assessed: 14   • Disease of thyroid gland    • Essential tremor    • Fibromyalgia    • GERD (gastroesophageal reflux disease)    • History of nail disorders    • Hyperlipidemia    • Hypertension    • Palpitations    • Transient cerebral ischemia        Past Surgical History:   Procedure Laterality Date   • APPENDECTOMY     • EGD AND COLONOSCOPY  2020   • HYSTERECTOMY  2010   • [place] LAP,CHOLECYSTECTOMY/GRAPH N/A 2018    Procedure: LAPAROSCOPIC CHOLECYSTECTOMY WITH IOC;  Surgeon: Ashu Hernandez MD;  Location[facility];  Service: General   • TUBAL LIGATION         Family History   Problem Relation Age of Onset   • Stroke Mother         ischemic stroke   • Hypertension Mother    • Heart disease Father    • Cancer Sister    • No Known Problems Sister    • No Known Problems Daughter    • No Known Problems Daughter    • No Known Problems Daughter    • No Known Problems Daughter    • No Known Problems Daughter    • No Known Problems Maternal Grandmother    • No Known Problems Paternal Grandmother    • No Known Problems Paternal Aunt    • No Known Problems Paternal Aunt    • No Known Problems Paternal Aunt    • No Known Problems Paternal Aunt    • No Known Problems Paternal Aunt    • Breast cancer Neg Hx      I have reviewed and agree with the history as documented      E-Cigarette/Vaping   • E-Cigarette Use Never User      E-Cigarette/Vaping Substances   • Nicotine No    • THC No    • CBD No    • Flavoring No    • Other No    • Unknown No      Social History     Tobacco Use   • Smoking status: Former     Packs/day: 0 10     Years: 50 00     Pack years: 5 00     Types: Cigarettes     Start date:      Quit date:      Years since quittin 9   • Smokeless tobacco: Never   • Tobacco comments:     1 pack a week Vaping Use   • Vaping Use: Never used   Substance Use Topics   • Alcohol use: Not Currently     Alcohol/week: 0 0 standard drinks     Comment: 0   • Drug use: No       Review of Systems   Respiratory: Positive for cough  All other systems reviewed and are negative  Physical Exam  Physical Exam  Vitals and nursing note reviewed  Constitutional:       General: She is not in acute distress  Appearance: Normal appearance  She is well-developed  She is not ill-appearing, toxic-appearing or diaphoretic  HENT:      Head: Normocephalic and atraumatic  Eyes:      Conjunctiva/sclera: Conjunctivae normal       Pupils: Pupils are equal, round, and reactive to light  Neck:      Vascular: No JVD  Cardiovascular:      Rate and Rhythm: Normal rate and regular rhythm  Heart sounds: Normal heart sounds  Pulmonary:      Effort: Pulmonary effort is normal  No respiratory distress  Breath sounds: No stridor  Rhonchi present  No wheezing  Abdominal:      General: There is no distension  Palpations: Abdomen is soft  Tenderness: There is no abdominal tenderness  There is no guarding or rebound  Musculoskeletal:         General: No tenderness or deformity  Normal range of motion  Cervical back: Normal range of motion and neck supple  Skin:     General: Skin is warm and dry  Capillary Refill: Capillary refill takes less than 2 seconds  Coloration: Skin is not pale  Findings: No erythema or rash  Neurological:      General: No focal deficit present  Mental Status: She is alert and oriented to person, place, and time  Cranial Nerves: No cranial nerve deficit  Sensory: No sensory deficit  Motor: No weakness or abnormal muscle tone        Coordination: Coordination normal       Gait: Gait normal          Vital Signs  ED Triage Vitals [12/08/22 1423]   Temperature Pulse Respirations Blood Pressure SpO2   98 °F (36 7 °C) 76 16 (!) 188/75 96 %      Temp Source Heart Rate Source Patient Position - Orthostatic VS BP Location FiO2 (%)   Oral Monitor Sitting Left arm --      Pain Score       --           Vitals:    12/08/22 1423   BP: (!) 188/75   Pulse: 76   Patient Position - Orthostatic VS: Sitting         Visual Acuity      ED Medications  Medications - No data to display    Diagnostic Studies  Results Reviewed     Procedure Component Value Units Date/Time    HS Troponin 0hr (reflex protocol) [[id number]]  (Normal) Collected: 12/08/22 1426    Lab Status: Final result Specimen: Blood from Arm, Right Updated: 12/08/22 1518     hs TnI 0hr 3 ng/L     FLU/RSV/COVID - if FLU/RSV clinically relevant [[id number]]  (Normal) Collected: 12/08/22 1426    Lab Status: Final result Specimen: Nares from Nose Updated: 12/08/22 1517     SARS-CoV-2 Negative     INFLUENZA A PCR Negative     INFLUENZA B PCR Negative     RSV PCR Negative    Narrative:      FOR PEDIATRIC PATIENTS - copy/paste COVID Guidelines URL to browser: [email or web address]  [email or web address]    SARS-CoV-2 assay is a Nucleic Acid Amplification assay intended for the  qualitative detection of nucleic acid from SARS-CoV-2 in nasopharyngeal  swabs  Results are for the presumptive identification of SARS-CoV-2 RNA  Positive results are indicative of infection with SARS-CoV-2, the virus  causing COVID-19, but do not rule out bacterial infection or co-infection  with other viruses  Laboratories within the [place] and its  territories are required to report all positive results to the appropriate  public health authorities  Negative results do not preclude SARS-CoV-2  infection and should not be used as the sole basis for treatment or other  patient management decisions  Negative results must be combined with  clinical observations, patient history, and epidemiological information  This test has not been FDA cleared or approved      This test has been authorized by FDA under an Emergency Use Authorization  (EUA)  This test is only authorized for the duration of time the  declaration that circumstances exist justifying the authorization of the  emergency use of an in vitro diagnostic tests for detection of SARS-CoV-2  virus and/or diagnosis of COVID-19 infection under section 564(b)(1) of  the Act, 21 U  S C  027EKM-5(Z)(1), unless the authorization is terminated  or revoked sooner  The test has been validated but independent review by FDA  and CLIA is pending  Test performed using [employer] Gene[employer]pert: This RT-PCR assay targets N2,  a region unique to SARS-CoV-2  A conserved region in the E-gene was chosen  for pan-Sarbecovirus detection which includes SARS-CoV-2  According to CMS-2020-01-R, this platform meets the definition of high-throughput technology      Comprehensive metabolic panel [[id number]]  (Abnormal) Collected: 12/08/22 1426    Lab Status: Final result Specimen: Blood from Arm, Right Updated: 12/08/22 1453     Sodium 130 mmol/L      Potassium 4 2 mmol/L      Chloride 95 mmol/L      CO2 24 mmol/L      ANION GAP 11 mmol/L      BUN 17 mg/dL      Creatinine 1 39 mg/dL      Glucose 122 mg/dL      Calcium 9 6 mg/dL      AST 24 U/L      ALT 18 U/L      Alkaline Phosphatase 97 U/L      Total Protein 7 9 g/dL      Albumin 4 0 g/dL      Total Bilirubin 0 46 mg/dL      eGFR 34 ml/min/1 73sq m     Narrative:      Kristie guidelines for Chronic Kidney Disease (CKD):   •  Stage 1 with normal or high GFR (GFR > 90 mL/min/1 73 square meters)  •  Stage 2 Mild CKD (GFR = 60-89 mL/min/1 73 square meters)  •  Stage 3A Moderate CKD (GFR = 45-59 mL/min/1 73 square meters)  •  Stage 3B Moderate CKD (GFR = 30-44 mL/min/1 73 square meters)  •  Stage 4 Severe CKD (GFR = 15-29 mL/min/1 73 square meters)  •  Stage 5 End Stage CKD (GFR <15 mL/min/1 73 square meters)  Note: GFR calculation is accurate only with a steady state creatinine    CBC and differential [[id number]] (Abnormal) Collected: 12/08/22 1426    Lab Status: Final result Specimen: Blood from Arm, Right Updated: 12/08/22 1433     WBC 8 96 Thousand/uL      RBC 3 82 Million/uL      Hemoglobin 12 3 g/dL      Hematocrit 35 9 %      MCV 94 fL      MCH 32 2 pg      MCHC 34 3 g/dL      RDW 13 2 %      MPV 9 0 fL      Platelets 586 Thousands/uL      nRBC 0 /100 WBCs      Neutrophils Relative 77 %      Immat GRANS % 0 %      Lymphocytes Relative 8 %      Monocytes Relative 9 %      Eosinophils Relative 6 %      Basophils Relative 0 %      Neutrophils Absolute 6 82 Thousands/µL      Immature Grans Absolute 0 01 Thousand/uL      Lymphocytes Absolute 0 75 Thousands/µL      Monocytes Absolute 0 82 Thousand/µL      Eosinophils Absolute 0 54 Thousand/µL      Basophils Absolute 0 02 Thousands/µL                  XR chest 1 view   Final Result by Arcadio Boast, MD (12/09 0631)      No acute cardiopulmonary disease  Workstation performed: [id number]                    Procedures  Procedures         ED Course                                             MDM    Disposition  Final diagnoses:   Acute cough     Time reflects when diagnosis was documented in both MDM as applicable and the Disposition within this note     Time User Action Codes Description Comment    12/8/2022  3:47 PM Laron Lewis Add [R05 1] Acute cough       ED Disposition     ED Disposition   Discharge    Condition   Stable    Date/Time   Thu Dec 8, 2022  3:47 PM    Comment   Leigh Luke discharge to home/self care                 Follow-up Information     Follow up With Specialties Details Why Contact Info Additional Information    [facility]  If symptoms worsen [address] Emergency Department, [address] [place] [address], [facility]          Discharge Medication List as of 12/8/2022  3:48 PM START taking these medications    Details   doxycycline monohydrate (MONODOX) 100 mg capsule Take 1 capsule (100 mg total) by mouth 2 (two) times a day for 10 days, Starting Thu 12/8/2022, Until Sun 12/18/2022, Print         CONTINUE these medications which have NOT CHANGED    Details   acetaminophen (TYLENOL) 325 mg tablet Take 1-2 tablets by mouth every 6 (six) hours as needed, Historical Med      ALPRAZolam (XANAX) 0 5 mg tablet take 1 tablet by mouth four times a day if needed for anxiety, Normal      atenolol (TENORMIN) 50 mg tablet take 1 tablet by mouth twice a day, Normal      cyanocobalamin (VITAMIN B-12) 1000 MCG tablet Take 1,000 mcg by mouth daily  , Historical Med      ergocalciferol (VITAMIN D2) 50,000 units Take 1 capsule (50,000 Units total) by mouth once a week for 12 doses, Starting Thu 9/1/2022, Until Fri 11/18/2022, Normal      ferrous sulfate 325 (65 Fe) mg tablet Take 1 tablet by mouth daily, Starting Mon 10/9/2017, Historical Med      imipramine (TOFRANIL) 10 mg tablet Take 1 tablet (10 mg total) by mouth daily at bedtime 4 nights per week, Starting Tue 10/25/2022, Until Thu 11/24/2022, Normal      latanoprost (XALATAN) 0 005 % ophthalmic solution instill 1 drop into both eyes nightly, Historical Med      levothyroxine 50 mcg tablet 1 po qd, Normal      lisinopril (ZESTRIL) 5 mg tablet Take 1 tablet (5 mg total) by mouth daily, Starting Tue 10/25/2022, Normal      pantoprazole (PROTONIX) 40 mg tablet Take 1 tablet (40 mg total) by mouth daily before breakfast, Starting Wed 10/13/2021, Normal      Probiotic Product (ALIGN) 4 MG CAPS Take 1 tablet by mouth daily, Historical Med      risedronate (ACTONEL) 35 mg tablet Take 1 tablet (35 mg total) by mouth every 7 days with water on empty stomach, nothing by mouth or lie down for next 30 minutes  , Starting Wed 10/19/2022, Normal             No discharge procedures on file      PDMP Review       Value Time User    PDMP Reviewed  Yes 10/24/2022 6:45 AM Harish Maher MD          ED Provider  Electronically Signed by           Michaela Escamilla MD  12/09/22 6903

## 2022-12-09 ENCOUNTER — TELEMEDICINE (OUTPATIENT)
Dept: INTERNAL MEDICINE CLINIC | Facility: CLINIC | Age: 87
End: 2022-12-09

## 2022-12-09 DIAGNOSIS — R05.9 COUGH, UNSPECIFIED TYPE: Primary | ICD-10-CM

## 2022-12-09 RX ORDER — PREDNISONE 10 MG/1
TABLET ORAL
Qty: 6 TABLET | Refills: 0 | Status: SHIPPED | OUTPATIENT
Start: 2022-12-09

## 2022-12-09 RX ORDER — AZITHROMYCIN 250 MG/1
TABLET, FILM COATED ORAL
Qty: 6 TABLET | Refills: 0 | Status: SHIPPED | OUTPATIENT
Start: 2022-12-09 | End: 2022-12-14

## 2022-12-09 NOTE — PROGRESS NOTES
Virtual Regular Visit    Verification of patient location:    Patient is located in the following state in which I hold an active license [place]      Assessment/Plan:    Problem List Items Addressed This Visit    None  Visit Diagnoses     Cough, unspecified type    -  Primary    Relevant Medications    azithromycin (ZITHROMAX) 250 mg tablet    predniSONE 10 mg tablet               Reason for visit is No chief complaint on file  Encounter provider AMIE Metzger    Provider located at [address] [place] [address]      Recent Visits  No visits were found meeting these conditions  Showing recent visits within past 7 days and meeting all other requirements  Today's Visits  Date Type Provider Dept   12/09/22 Telemedicine [facility] today's visits and meeting all other requirements  Future Appointments  No visits were found meeting these conditions  Showing future appointments within next 150 days and meeting all other requirements       The patient was identified by name and date of birth  Cade Duran was informed that this is a telemedicine visit and that the visit is being conducted through the [address] [employer] platform  She agrees to proceed     My office door was closed  No one else was in the room  She acknowledged consent and understanding of privacy and security of the video platform  The patient has agreed to participate and understands they can discontinue the visit at any time  Patient is aware this is a billable service  Subjective  Cade Duran is a 82 y o  female    NOT FEELING WELL X 5 DAYS  ST, SINUS AND COUGH  YESTERDAY WENT TO ER NEGATIVE FOR COVID/FLU/RSV- NOMRAL CXR WAS GIVEN DOXY  HAD TERRIBLE DIARRHEA ALL NIGHT     NOW W/ MUCUS AND WHEEZE  NO FEVERS       Past Medical History:   Diagnosis Date   • Benign essential hypertension     Last assessed: 9/11/14   • Disease of thyroid gland    • Essential tremor    • Fibromyalgia    • GERD (gastroesophageal reflux disease)    • History of nail disorders    • Hyperlipidemia    • Hypertension    • Palpitations    • Transient cerebral ischemia        Past Surgical History:   Procedure Laterality Date   • APPENDECTOMY     • EGD AND COLONOSCOPY  07/2020   • HYSTERECTOMY  01/01/2010   • [facility] LAP,CHOLECYSTECTOMY/GRAPH N/A 4/4/2018    Procedure: LAPAROSCOPIC CHOLECYSTECTOMY WITH IOC;  Surgeon: Martha Sánchez MD;  Location: [facility];  Service: General   • TUBAL LIGATION         Current Outpatient Medications   Medication Sig Dispense Refill   • azithromycin (ZITHROMAX) 250 mg tablet Take 2 tabs today then 1 tab each additional day until complete 6 tablet 0   • predniSONE 10 mg tablet Take 2 tablets for 2 days then 1 tablets for2 6 tablet 0   • acetaminophen (TYLENOL) 325 mg tablet Take 1-2 tablets by mouth every 6 (six) hours as needed     • ALPRAZolam (XANAX) 0 5 mg tablet take 1 tablet by mouth four times a day if needed for anxiety 120 tablet 1   • atenolol (TENORMIN) 50 mg tablet take 1 tablet by mouth twice a day 180 tablet 3   • cyanocobalamin (VITAMIN B-12) 1000 MCG tablet Take 1,000 mcg by mouth daily   (Patient not taking: Reported on 10/19/2022)     • ergocalciferol (VITAMIN D2) 50,000 units Take 1 capsule (50,000 Units total) by mouth once a week for 12 doses 12 capsule 0   • ferrous sulfate 325 (65 Fe) mg tablet Take 1 tablet by mouth daily     • imipramine (TOFRANIL) 10 mg tablet Take 1 tablet (10 mg total) by mouth daily at bedtime 4 nights per week 30 tablet 5   • latanoprost (XALATAN) 0 005 % ophthalmic solution instill 1 drop into both eyes nightly     • levothyroxine 50 mcg tablet 1 po qd 90 tablet 0   • lisinopril (ZESTRIL) 5 mg tablet Take 1 tablet (5 mg total) by mouth daily 90 tablet 0   • pantoprazole (PROTONIX) 40 mg tablet Take 1 tablet (40 mg total) by mouth daily before breakfast 90 tablet 3   • Probiotic Product (ALIGN) 4 MG CAPS Take 1 tablet by mouth daily     • risedronate (ACTONEL) 35 mg tablet Take 1 tablet (35 mg total) by mouth every 7 days with water on empty stomach, nothing by mouth or lie down for next 30 minutes  4 tablet 5     No current facility-administered medications for this visit  Allergies   Allergen Reactions   • Other Drowsiness     [facility] - [id number]: ANTIDEPRESSANTS       Review of Systems   Constitutional: Positive for fatigue  Negative for appetite change, chills, diaphoresis, fever and unexpected weight change  HENT: Negative for postnasal drip and sneezing  Eyes: Negative for visual disturbance  Respiratory: Positive for cough and wheezing  Negative for chest tightness and shortness of breath  Cardiovascular: Negative for chest pain, palpitations and leg swelling  Gastrointestinal: Positive for diarrhea  Negative for abdominal pain and blood in stool  Endocrine: Negative for cold intolerance, heat intolerance, polydipsia, polyphagia and polyuria  Genitourinary: Negative for difficulty urinating, dysuria, frequency and urgency  Musculoskeletal: Negative for arthralgias and myalgias  Skin: Negative for rash and wound  Neurological: Negative for dizziness, weakness, light-headedness and headaches  Hematological: Negative for adenopathy  Psychiatric/Behavioral: Negative for confusion, dysphoric mood and sleep disturbance  The patient is not nervous/anxious  It was my intent to perform this visit via video technology but the patient was not able to do a video connection so the visit was completed via audio telephone only  Video Exam    There were no vitals filed for this visit      Physical Exam     I spent 10 minutes directly with the patient during this visit

## 2022-12-29 ENCOUNTER — HOSPITAL ENCOUNTER (EMERGENCY)
Facility: HOSPITAL | Age: 87
Discharge: HOME | End: 2022-12-30
Attending: EMERGENCY MEDICINE

## 2022-12-29 ENCOUNTER — APPOINTMENT (EMERGENCY)
Dept: CT IMAGING | Facility: HOSPITAL | Age: 87
End: 2022-12-29

## 2022-12-29 DIAGNOSIS — D64.9 ANEMIA: ICD-10-CM
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
## 2022-12-29 DIAGNOSIS — S09.90XA INJURY OF HEAD, INITIAL ENCOUNTER: ICD-10-CM

## 2022-12-29 LAB
ALBUMIN SERPL BCP-MCNC: 3.5 G/DL (ref 3.5–5)
ALP SERPL-CCNC: 76 U/L (ref 46–116)
ALT SERPL W P-5'-P-CCNC: 15 U/L (ref 12–78)
ANION GAP SERPL CALCULATED.3IONS-SCNC: 9 MMOL/L (ref 4–13)
APTT PPP: 27 SECONDS (ref 23–37)
AST SERPL W P-5'-P-CCNC: 26 U/L (ref 5–45)
ATRIAL RATE: 69 BPM
BASOPHILS # BLD AUTO: 0.03 THOUSANDS/ÂΜL (ref 0–0.1)
BASOPHILS NFR BLD AUTO: 1 % (ref 0–1)
BILIRUB SERPL-MCNC: 0.47 MG/DL (ref 0.2–1)
BUN SERPL-MCNC: 13 MG/DL (ref 5–25)
CALCIUM SERPL-MCNC: 8.9 MG/DL (ref 8.3–10.1)
CARDIAC TROPONIN I PNL SERPL HS: 5 NG/L
CHLORIDE SERPL-SCNC: 100 MMOL/L (ref 96–108)
CO2 SERPL-SCNC: 25 MMOL/L (ref 21–32)
CREAT SERPL-MCNC: 1.31 MG/DL (ref 0.6–1.3)
EOSINOPHIL # BLD AUTO: 0.55 THOUSAND/ÂΜL (ref 0–0.61)
EOSINOPHIL NFR BLD AUTO: 11 % (ref 0–6)
ERYTHROCYTE [DISTWIDTH] IN BLOOD BY AUTOMATED COUNT: 13.2 % (ref 11.6–15.1)
FLUAV RNA RESP QL NAA+PROBE: NEGATIVE
FLUBV RNA RESP QL NAA+PROBE: NEGATIVE
GFR SERPL CREATININE-BSD FRML MDRD: 37 ML/MIN/1.73SQ M
GLUCOSE SERPL-MCNC: 88 MG/DL (ref 65–140)
HCT VFR BLD AUTO: 31.2 % (ref 34.8–46.1)
HGB BLD-MCNC: 10.4 G/DL (ref 11.5–15.4)
IMM GRANULOCYTES # BLD AUTO: 0 THOUSAND/UL (ref 0–0.2)
IMM GRANULOCYTES NFR BLD AUTO: 0 % (ref 0–2)
INR PPP: 0.98 (ref 0.84–1.19)
LYMPHOCYTES # BLD AUTO: 1.32 THOUSANDS/ÂΜL (ref 0.6–4.47)
LYMPHOCYTES NFR BLD AUTO: 26 % (ref 14–44)
MCH RBC QN AUTO: 31.5 PG (ref 26.8–34.3)
MCHC RBC AUTO-ENTMCNC: 33.3 G/DL (ref 31.4–37.4)
MCV RBC AUTO: 95 FL (ref 82–98)
MONOCYTES # BLD AUTO: 0.66 THOUSAND/ÂΜL (ref 0.17–1.22)
MONOCYTES NFR BLD AUTO: 13 % (ref 4–12)
NEUTROPHILS # BLD AUTO: 2.61 THOUSANDS/ÂΜL (ref 1.85–7.62)
NEUTS SEG NFR BLD AUTO: 49 % (ref 43–75)
NRBC BLD AUTO-RTO: 0 /100 WBCS
P AXIS: 58 DEGREES
PLATELET # BLD AUTO: 185 THOUSANDS/UL (ref 149–390)
PMV BLD AUTO: 10.4 FL (ref 8.9–12.7)
POTASSIUM SERPL-SCNC: 4.1 MMOL/L (ref 3.5–5.3)
PR INTERVAL: 152 MS
PROT SERPL-MCNC: 7 G/DL (ref 6.4–8.4)
PROTHROMBIN TIME: 12.8 SECONDS (ref 11.6–14.5)
QRS AXIS: 4 DEGREES
QRSD INTERVAL: 86 MS
QT INTERVAL: 426 MS
QTC INTERVAL: 456 MS
RBC # BLD AUTO: 3.3 MILLION/UL (ref 3.81–5.12)
RSV RNA RESP QL NAA+PROBE: NEGATIVE
SARS-COV-2 RNA RESP QL NAA+PROBE: NEGATIVE
SODIUM SERPL-SCNC: 134 MMOL/L (ref 135–147)
T WAVE AXIS: 50 DEGREES
VENTRICULAR RATE: 69 BPM
WBC # BLD AUTO: 5.17 THOUSAND/UL (ref 4.31–10.16)

## 2022-12-29 RX ORDER — ALPRAZOLAM 0.5 MG/1
0.5 TABLET ORAL ONCE
Status: COMPLETED | OUTPATIENT
Start: 2022-12-29 | End: 2022-12-29

## 2022-12-29 RX ADMIN — IOHEXOL 100 ML: 350 INJECTION, SOLUTION INTRAVENOUS at 22:54

## 2022-12-29 RX ADMIN — ALPRAZOLAM 0.5 MG: 0.5 TABLET ORAL at 23:43

## 2022-12-30 VITALS
RESPIRATION RATE: 20 BRPM | HEART RATE: 79 BPM | WEIGHT: 103 LBS | SYSTOLIC BLOOD PRESSURE: 149 MMHG | HEIGHT: 55 IN | DIASTOLIC BLOOD PRESSURE: 69 MMHG | BODY MASS INDEX: 23.84 KG/M2 | OXYGEN SATURATION: 98 % | TEMPERATURE: 97.4 F

## 2022-12-30 LAB
2HR DELTA HS TROPONIN: 0 NG/L
BACTERIA UR QL AUTO: ABNORMAL /HPF
BILIRUB UR QL STRIP: NEGATIVE
CARDIAC TROPONIN I PNL SERPL HS: 5 NG/L
CLARITY UR: CLEAR
COLOR UR: COLORLESS
GLUCOSE UR STRIP-MCNC: NEGATIVE MG/DL
HGB UR QL STRIP.AUTO: NEGATIVE
KETONES UR STRIP-MCNC: NEGATIVE MG/DL
LEUKOCYTE ESTERASE UR QL STRIP: ABNORMAL
NITRITE UR QL STRIP: NEGATIVE
NON-SQ EPI CELLS URNS QL MICRO: ABNORMAL /HPF
PH UR STRIP.AUTO: 6 [PH]
PROT UR STRIP-MCNC: NEGATIVE MG/DL
RBC #/AREA URNS AUTO: ABNORMAL /HPF
SP GR UR STRIP.AUTO: 1.01 (ref 1–1.03)
UROBILINOGEN UR STRIP-ACNC: <2 MG/DL
WBC #/AREA URNS AUTO: ABNORMAL /HPF

## 2022-12-30 NOTE — DISCHARGE INSTRUCTIONS
Please return to the ED if you begin to experience any new or worsening symptoms, chest pain, shortness of breath, lightheadedness, dizziness, changes to vision, passing out, numbness, tingling, or weakness in the extremities, difficulty walking/swallowing/breathing, fevers or chills, palpitations

## 2022-12-30 NOTE — ED PROVIDER NOTES
History  Chief Complaint   Patient presents with   • Fall     Pt c/o "funny/foggy"; pt was knocked down by dog, +hs, denies LOC NO BT, pt notes back pain     Steve Meredith is a 82 y o  female with a pertinent past medical history for hyperlipidemia, hypertension, fibromyalgia presenting today status post mechanical fall  Patient reports that she was knocked over by her dog at home about 1 hour prior to arrival   Positive head strike denies any loss of consciousness  She is not on any blood thinners  Complaining of a 6/10 in severity headache with no visual changes, no numbness/tingling/weakness in the extremities  No neurodeficits on physical examination  Reports that since the fall she has been feeling "foggy"  The patient ports that she fell onto her bottom and then subsequently her head there is no lacerations  Denies any chest pain or shortness of breath  There was no lightheadedness or dizziness prior to or after the fall  No further complaints at this time  Prior to Admission Medications   Prescriptions Last Dose Informant Patient Reported? Taking?    ALPRAZolam (XANAX) 0 5 mg tablet   No No   Sig: take 1 tablet by mouth four times a day if needed for anxiety   Probiotic Product (ALIGN) 4 MG CAPS   Yes No   Sig: Take 1 tablet by mouth daily   acetaminophen (TYLENOL) 325 mg tablet   Yes No   Sig: Take 1-2 tablets by mouth every 6 (six) hours as needed   atenolol (TENORMIN) 50 mg tablet   No No   Sig: take 1 tablet by mouth twice a day   cyanocobalamin (VITAMIN B-12) 1000 MCG tablet   Yes No   Sig: Take 1,000 mcg by mouth daily     Patient not taking: Reported on 10/19/2022   ergocalciferol (VITAMIN D2) 50,000 units   No No   Sig: Take 1 capsule (50,000 Units total) by mouth once a week for 12 doses   ferrous sulfate 325 (65 Fe) mg tablet   Yes No   Sig: Take 1 tablet by mouth daily   imipramine (TOFRANIL) 10 mg tablet   No No   Sig: Take 1 tablet (10 mg total) by mouth daily at bedtime 4 nights per week   latanoprost (XALATAN) 0 005 % ophthalmic solution   Yes No   Sig: instill 1 drop into both eyes nightly   levothyroxine 50 mcg tablet   No No   Si po qd   lisinopril (ZESTRIL) 5 mg tablet   No No   Sig: Take 1 tablet (5 mg total) by mouth daily   pantoprazole (PROTONIX) 40 mg tablet   No No   Sig: Take 1 tablet (40 mg total) by mouth daily before breakfast   predniSONE 10 mg tablet   No No   Sig: Take 2 tablets for 2 days then 1 tablets for2   risedronate (ACTONEL) 35 mg tablet   No No   Sig: Take 1 tablet (35 mg total) by mouth every 7 days with water on empty stomach, nothing by mouth or lie down for next 30 minutes        Facility-Administered Medications: None       Past Medical History:   Diagnosis Date   • Benign essential hypertension     Last assessed: 14   • Disease of thyroid gland    • Essential tremor    • Fibromyalgia    • GERD (gastroesophageal reflux disease)    • History of nail disorders    • Hyperlipidemia    • Hypertension    • Palpitations    • Transient cerebral ischemia        Past Surgical History:   Procedure Laterality Date   • APPENDECTOMY     • EGD AND COLONOSCOPY  2020   • HYSTERECTOMY  2010   • CT LAP,CHOLECYSTECTOMY/GRAPH N/A 2018    Procedure: LAPAROSCOPIC CHOLECYSTECTOMY WITH IOC;  Surgeon: Jagruti Gloria MD;  Location: [facility];  Service: General   • TUBAL LIGATION         Family History   Problem Relation Age of Onset   • Stroke Mother         ischemic stroke   • Hypertension Mother    • Heart disease Father    • Cancer Sister    • No Known Problems Sister    • No Known Problems Daughter    • No Known Problems Daughter    • No Known Problems Daughter    • No Known Problems Daughter    • No Known Problems Daughter    • No Known Problems Maternal Grandmother    • No Known Problems Paternal Grandmother    • No Known Problems Paternal Aunt    • No Known Problems Paternal Aunt    • No Known Problems Paternal Aunt    • No Known Problems Paternal Aunt • No Known Problems Paternal Aunt    • Breast cancer Neg Hx      I have reviewed and agree with the history as documented  E-Cigarette/Vaping   • E-Cigarette Use Never User      E-Cigarette/Vaping Substances   • Nicotine No    • THC No    • CBD No    • Flavoring No    • Other No    • Unknown No      Social History     Tobacco Use   • Smoking status: Former     Packs/day: 0 10     Years: 50 00     Pack years: 5 00     Types: Cigarettes     Start date: 1963     Quit date: 2013     Years since quitting: 10 0   • Smokeless tobacco: Never   • Tobacco comments:     1 pack a week    Vaping Use   • Vaping Use: Never used   Substance Use Topics   • Alcohol use: Not Currently     Alcohol/week: 0 0 standard drinks     Comment: 0   • Drug use: No       Review of Systems   Musculoskeletal: Positive for back pain  Neurological: Positive for headaches  All other systems reviewed and are negative  Physical Exam  Physical Exam  Vitals and nursing note reviewed  Constitutional:       General: She is not in acute distress  Appearance: She is well-developed  HENT:      Head: Normocephalic and atraumatic  Right Ear: Tympanic membrane and external ear normal       Left Ear: Tympanic membrane and external ear normal       Nose: Nose normal  No congestion or rhinorrhea  Mouth/Throat:      Mouth: Mucous membranes are moist       Pharynx: Oropharynx is clear  Eyes:      General: No visual field deficit  Extraocular Movements: Extraocular movements intact  Conjunctiva/sclera: Conjunctivae normal       Pupils: Pupils are equal, round, and reactive to light  Cardiovascular:      Rate and Rhythm: Normal rate and regular rhythm  Heart sounds: No murmur heard  Pulmonary:      Effort: Pulmonary effort is normal  No respiratory distress  Breath sounds: Normal breath sounds  Abdominal:      Palpations: Abdomen is soft  Tenderness: There is no abdominal tenderness     Musculoskeletal: General: No swelling  Cervical back: Normal range of motion and neck supple  No tenderness  Skin:     General: Skin is warm and dry  Capillary Refill: Capillary refill takes less than 2 seconds  Neurological:      General: No focal deficit present  Mental Status: She is alert and oriented to person, place, and time  Mental status is at baseline  GCS: GCS eye subscore is 4  GCS verbal subscore is 5  GCS motor subscore is 6  Cranial Nerves: Cranial nerves 2-12 are intact  No cranial nerve deficit, dysarthria or facial asymmetry  Sensory: Sensation is intact  No sensory deficit  Motor: Motor function is intact  No weakness, tremor, atrophy, abnormal muscle tone, seizure activity or pronator drift  Coordination: Coordination is intact  Gait: Gait is intact   Gait normal    Psychiatric:         Mood and Affect: Mood normal          Vital Signs  ED Triage Vitals   Temperature Pulse Respirations Blood Pressure SpO2   12/29/22 1703 12/29/22 1703 12/29/22 1703 12/29/22 1703 12/29/22 1703   98 °F (36 7 °C) 69 18 (!) 173/73 96 %      Temp Source Heart Rate Source Patient Position - Orthostatic VS BP Location FiO2 (%)   12/29/22 1703 12/29/22 1703 12/29/22 1703 12/29/22 1703 --   Tympanic Monitor Sitting Left arm       Pain Score       12/29/22 2130       8           Vitals:    12/29/22 2300 12/29/22 2330 12/30/22 0001 12/30/22 0100   BP: (!) 174/75 167/74 156/72 149/69   Pulse: 81 76 78 79   Patient Position - Orthostatic VS: Lying Lying Lying Lying         Visual Acuity  Visual Acuity    Flowsheet Row Most Recent Value   L Pupil Size (mm) 3   R Pupil Size (mm) 3          ED Medications  Medications   iohexol (OMNIPAQUE) 350 MG/ML injection (SINGLE-DOSE) 100 mL (100 mL Intravenous Given 12/29/22 2254)   ALPRAZolam (XANAX) tablet 0 5 mg (0 5 mg Oral Given 12/29/22 2343)       Diagnostic Studies  Results Reviewed     Procedure Component Value Units Date/Time    HS Troponin I 2hr [[id number]]  (Normal) Collected: 12/30/22 0000    Lab Status: Final result Specimen: Blood from Arm, Left Updated: 12/30/22 0043     hs TnI 2hr 5 ng/L      Delta 2hr hsTnI 0 ng/L     Urine Microscopic [[id number]]  (Abnormal) Collected: 12/30/22 0000    Lab Status: Final result Specimen: Urine, Clean Catch Updated: 12/30/22 0036     RBC, UA 1-2 /hpf      WBC, UA 10-20 /hpf      Epithelial Cells Occasional /hpf      Bacteria, UA Occasional /hpf     Urine culture [[id number]] Collected: 12/30/22 0000    Lab Status: In process Specimen: Urine, Clean Catch Updated: 12/30/22 0036    UA w Reflex to Microscopic w Reflex to Culture [[id number]]  (Abnormal) Collected: 12/30/22 0000    Lab Status: Final result Specimen: Urine, Clean Catch Updated: 12/30/22 0029     Color, UA Colorless     Clarity, UA Clear     Specific Gravity, UA 1 013     pH, UA 6 0     Leukocytes, UA Small     Nitrite, UA Negative     Protein, UA Negative mg/dl      Glucose, UA Negative mg/dl      Ketones, UA Negative mg/dl      Urobilinogen, UA <2 0 mg/dl      Bilirubin, UA Negative     Occult Blood, UA Negative    FLU/RSV/COVID - if FLU/RSV clinically relevant [[id number]]  (Normal) Collected: 12/29/22 2140    Lab Status: Final result Specimen: Nasopharyngeal from Nose Updated: 12/29/22 2255     SARS-CoV-2 Negative     INFLUENZA A PCR Negative     INFLUENZA B PCR Negative     RSV PCR Negative    Narrative:      FOR PEDIATRIC PATIENTS - copy/paste COVID Guidelines URL to browser: [email or web address]    SARS-CoV-2 assay is a Nucleic Acid Amplification assay intended for the  qualitative detection of nucleic acid from SARS-CoV-2 in nasopharyngeal  swabs  Results are for the presumptive identification of SARS-CoV-2 RNA  Positive results are indicative of infection with SARS-CoV-2, the virus  causing COVID-19, but do not rule out bacterial infection or co-infection  with other viruses   Laboratories within the [facility] and its  territories are required to report all positive results to the appropriate  public health authorities  Negative results do not preclude SARS-CoV-2  infection and should not be used as the sole basis for treatment or other  patient management decisions  Negative results must be combined with  clinical observations, patient history, and epidemiological information  This test has not been FDA cleared or approved  This test has been authorized by FDA under an Emergency Use Authorization  (EUA)  This test is only authorized for the duration of time the  declaration that circumstances exist justifying the authorization of the  emergency use of an in vitro diagnostic tests for detection of SARS-CoV-2  virus and/or diagnosis of COVID-19 infection under section 564(b)(1) of  the Act, 21 U  S C  841PDI-5(L)(8), unless the authorization is terminated  or revoked sooner  The test has been validated but independent review by FDA  and CLIA is pending  Test performed using [employer] GeneXpert: This RT-PCR assay targets N2,  a region unique to SARS-CoV-2  A conserved region in the E-gene was chosen  for pan-Sarbecovirus detection which includes SARS-CoV-2  According to CMS-2020-01-R, this platform meets the definition of high-throughput technology      HS Troponin 0hr (reflex protocol) [[id number]]  (Normal) Collected: 12/29/22 2140    Lab Status: Final result Specimen: Blood from Arm, Left Updated: 12/29/22 2241     hs TnI 0hr 5 ng/L     Comprehensive metabolic panel [[id number]]  (Abnormal) Collected: 12/29/22 2140    Lab Status: Final result Specimen: Blood from Arm, Left Updated: 12/29/22 2233     Sodium 134 mmol/L      Potassium 4 1 mmol/L      Chloride 100 mmol/L      CO2 25 mmol/L      ANION GAP 9 mmol/L      BUN 13 mg/dL      Creatinine 1 31 mg/dL      Glucose 88 mg/dL      Calcium 8 9 mg/dL      AST 26 U/L      ALT 15 U/L      Alkaline Phosphatase 76 U/L      Total Protein 7 0 g/dL Albumin 3 5 g/dL      Total Bilirubin 0 47 mg/dL      eGFR 37 ml/min/1 73sq m     Narrative:      [place] guidelines for Chronic Kidney Disease (CKD):   •  Stage 1 with normal or high GFR (GFR > 90 mL/min/1 73 square meters)  •  Stage 2 Mild CKD (GFR = 60-89 mL/min/1 73 square meters)  •  Stage 3A Moderate CKD (GFR = 45-59 mL/min/1 73 square meters)  •  Stage 3B Moderate CKD (GFR = 30-44 mL/min/1 73 square meters)  •  Stage 4 Severe CKD (GFR = 15-29 mL/min/1 73 square meters)  •  Stage 5 End Stage CKD (GFR <15 mL/min/1 73 square meters)  Note: GFR calculation is accurate only with a steady state creatinine    Protime-INR [[id number]]  (Normal) Collected: 12/29/22 2140    Lab Status: Final result Specimen: Blood from Arm, Left Updated: 12/29/22 2228     Protime 12 8 seconds      INR 0 98    APTT [[id number]]  (Normal) Collected: 12/29/22 2140    Lab Status: Final result Specimen: Blood from Arm, Left Updated: 12/29/22 2228     PTT 27 seconds     CBC and differential [[id number]]  (Abnormal) Collected: 12/29/22 2140    Lab Status: Final result Specimen: Blood from Arm, Left Updated: 12/29/22 2203     WBC 5 17 Thousand/uL      RBC 3 30 Million/uL      Hemoglobin 10 4 g/dL      Hematocrit 31 2 %      MCV 95 fL      MCH 31 5 pg      MCHC 33 3 g/dL      RDW 13 2 %      MPV 10 4 fL      Platelets 788 Thousands/uL      nRBC 0 /100 WBCs      Neutrophils Relative 49 %      Immat GRANS % 0 %      Lymphocytes Relative 26 %      Monocytes Relative 13 %      Eosinophils Relative 11 %      Basophils Relative 1 %      Neutrophils Absolute 2 61 Thousands/µL      Immature Grans Absolute 0 00 Thousand/uL      Lymphocytes Absolute 1 32 Thousands/µL      Monocytes Absolute 0 66 Thousand/µL      Eosinophils Absolute 0 55 Thousand/µL      Basophils Absolute 0 03 Thousands/µL                  CT head without contrast   Final Result by Manuel Wells MD (12/29 2344)      No acute intracranial abnormality  Workstation performed: [id number]         CT cervical spine without contrast   Final Result by Ulises Echevarria MD (12/29 2352)      No acute cervical spine fracture or traumatic malalignment  Workstation performed: [id number]         CT recon only thoracolumbar   Final Result by Ulises Echevarria MD (12/29 2355)      Chronic T10 and L4 fractures  No evidence of acute fracture in the thoracic or lumbar spine  Workstation performed: [id number]         CT chest abdomen pelvis w contrast   Final Result by Ulises Echevarria MD (12/29 2350)         1  No evidence of acute trauma in the chest, abdomen or pelvis  2   Constipation  Workstation performed: [id number]                    Procedures  Procedures         ED Course  ED Course as of 12/30/22 0339   Thu Dec 29, 2022   2229 Call [facility] lab for [facility]   2234 Text to CT to take patient for Image  Fri Dec 30, 2022   0038 Discussed findings with the patient at bedside  Discussed CT images and decrease in Hgb  Recommended close follow up with PCP  SBIRT 22yo+    Flowsheet Row Most Recent Value   SBIRT (23 yo +)    In order to provide better care to our patients, we are screening all of our patients for alcohol and drug use  Would it be okay to ask you these screening questions? Yes Filed at: 12/29/2022 2110   Initial Alcohol Screen: US AUDIT-C     1  How often do you have a drink containing alcohol? 0 Filed at: 12/29/2022 2110   2  How many drinks containing alcohol do you have on a typical day you are drinking? 0 Filed at: 12/29/2022 2110   3b  FEMALE Any Age, or MALE 65+: How often do you have 4 or more drinks on one occassion? 0 Filed at: 12/29/2022 2110   Audit-C Score 0 Filed at: 12/29/2022 2110   GILLES: How many times in the past year have you    Used an illegal drug or used a prescription medication for non-medical reasons?  Never Filed at: 12/29/2022 2110 MDM  Number of Diagnoses or Management Options  Anemia: new and requires workup  Fall, initial encounter: new and requires workup  Injury of head, initial encounter: new and requires workup  Diagnosis management comments: Reviewed CT imaging with the patient at bedside  I reviewed the results of the CBC which included the decrease in hemoglobin since her last evaluation  I discussed the need for follow-up with the patient at bedside  She demonstrated understanding  Reports that her symptoms had subsided here in the emergency department she was not having any lightheadedness or dizziness, no palpitations  Vital signs remained stable here in the emergency department  Patient felt well upon discharge  Amount and/or Complexity of Data Reviewed  Clinical lab tests: ordered and reviewed  Tests in the radiology section of CPT®: ordered and reviewed  Tests in the medicine section of CPT®: reviewed and ordered  Review and summarize past medical records: yes  Discuss the patient with other providers: yes  Independent visualization of images, tracings, or specimens: yes    Risk of Complications, Morbidity, and/or Mortality  Presenting problems: high  Diagnostic procedures: moderate  Management options: moderate        Disposition  Final diagnoses:   Fall, initial encounter   Injury of head, initial encounter   Anemia     Time reflects when diagnosis was documented in both MDM as applicable and the Disposition within this note     Time User Action Codes Description Comment    12/30/2022 12:37 AM Kane APODACA] Fall, initial encounter     12/30/2022 12:37 AM Kane Jordan [[id number]] Injury of head, initial encounter     12/30/2022 12:38 AM Kane Jordan [D64 [id number]] Anemia       ED Disposition     ED Disposition   Discharge    Condition   Stable    Date/Time   Fri Dec 30, 2022 0037    Comment   Flavia Barber discharge to home/self care                 Follow-up Information Follow up With Specialties Details Why Contact Info Additional [address] [facility] Emergency Department Emergency Medicine Go to  If symptoms worsen [address] [facility] [address] [facility] Emergency Department, [address] [facility], [place], [place], [address] [place] [address] Road, MD Internal Medicine, Hospice Services, Palliative Care Call today To schedule an appointment for follow-up [address] [facility],  [place] [id number] [facility]  [phone]             Discharge Medication List as of 12/30/2022 12:39 AM      CONTINUE these medications which have NOT CHANGED    Details   acetaminophen (TYLENOL) 325 mg tablet Take 1-2 tablets by mouth every 6 (six) hours as needed, Historical Med      ALPRAZolam (XANAX) 0 5 mg tablet take 1 tablet by mouth four times a day if needed for anxiety, Normal      atenolol (TENORMIN) 50 mg tablet take 1 tablet by mouth twice a day, Normal      cyanocobalamin (VITAMIN B-12) 1000 MCG tablet Take 1,000 mcg by mouth daily  , Historical Med      ergocalciferol (VITAMIN D2) 50,000 units Take 1 capsule (50,000 Units total) by mouth once a week for 12 doses, Starting Thu 9/1/2022, Until Fri 11/18/2022, Normal      ferrous sulfate 325 (65 Fe) mg tablet Take 1 tablet by mouth daily, Starting Mon 10/9/2017, Historical Med      imipramine (TOFRANIL) 10 mg tablet Take 1 tablet (10 mg total) by mouth daily at bedtime 4 nights per week, Starting Tue 10/25/2022, Until Thu 11/24/2022, Normal      latanoprost (XALATAN) 0 005 % ophthalmic solution instill 1 drop into both eyes nightly, Historical Med      levothyroxine 50 mcg tablet 1 po qd, Normal      lisinopril (ZESTRIL) 5 mg tablet Take 1 tablet (5 mg total) by mouth daily, Starting Tue 10/25/2022, Normal      pantoprazole (PROTONIX) 40 mg tablet Take 1 tablet (40 mg total) by mouth daily before breakfast, Starting Wed 10/13/2021, Normal predniSONE 10 mg tablet Take 2 tablets for 2 days then 1 tablets for2, Normal      Probiotic Product (ALIGN) 4 MG CAPS Take 1 tablet by mouth daily, Historical Med      risedronate (ACTONEL) 35 mg tablet Take 1 tablet (35 mg total) by mouth every 7 days with water on empty stomach, nothing by mouth or lie down for next 30 minutes  , Starting Wed 10/19/2022, Normal             No discharge procedures on file      PDMP Review       Value Time User    PDMP Reviewed  Yes 10/24/2022  6:45 AM Brandy Smith MD          ED Provider  Electronically Signed by           Lowell Leung PA-C  12/30/22 9779

## 2022-12-31 LAB — BACTERIA UR CULT: NORMAL

## 2023-01-02 LAB
ATRIAL RATE: 69 BPM
P AXIS: 58 DEGREES
PR INTERVAL: 152 MS
QRS AXIS: 4 DEGREES
QRSD INTERVAL: 86 MS
QT INTERVAL: 426 MS
QTC INTERVAL: 456 MS
T WAVE AXIS: 50 DEGREES
VENTRICULAR RATE: 69 BPM

## 2023-01-03 ENCOUNTER — TELEPHONE (OUTPATIENT)
Dept: INTERNAL MEDICINE CLINIC | Facility: CLINIC | Age: 88
End: 2023-01-03

## 2023-01-03 DIAGNOSIS — F41.9 ANXIETY: ICD-10-CM

## 2023-01-03 DIAGNOSIS — E03.9 ACQUIRED HYPOTHYROIDISM: ICD-10-CM

## 2023-01-03 RX ORDER — LEVOTHYROXINE SODIUM 0.05 MG/1
TABLET ORAL
Qty: 90 TABLET | Refills: 0 | Status: SHIPPED | OUTPATIENT
Start: 2023-01-03

## 2023-01-03 NOTE — TELEPHONE ENCOUNTER
Pharmacy is asking if patient's medication can be sent in today   Patient has been without medication for 5 days/

## 2023-01-04 ENCOUNTER — TELEMEDICINE (OUTPATIENT)
Dept: INTERNAL MEDICINE CLINIC | Facility: CLINIC | Age: 88
End: 2023-01-04

## 2023-01-04 ENCOUNTER — TELEPHONE (OUTPATIENT)
Dept: INTERNAL MEDICINE CLINIC | Facility: CLINIC | Age: 88
End: 2023-01-04

## 2023-01-04 DIAGNOSIS — M81.0 AGE-RELATED OSTEOPOROSIS WITHOUT CURRENT PATHOLOGICAL FRACTURE: ICD-10-CM

## 2023-01-04 RX ORDER — ALPRAZOLAM 0.5 MG/1
TABLET ORAL
Qty: 120 TABLET | Refills: 3 | Status: SHIPPED | OUTPATIENT
Start: 2023-01-04

## 2023-01-04 RX ORDER — ACETAMINOPHEN 160 MG
2000 TABLET,DISINTEGRATING ORAL DAILY
Start: 2023-01-04

## 2023-01-04 NOTE — TELEPHONE ENCOUNTER
Patient is asking if she would be able to do a video visit today at 2:30pm  She was scheduled for an OV for assessment from an ER visit for a fall  She is very weak and somewhat confused from that fall  She does not have Gl[employer] set up but can do Facetime on her Iphone  Please advise her daughter Leif Gibbs at [phone] if she can do the video visit today

## 2023-01-05 ENCOUNTER — RA CDI HCC (OUTPATIENT)
Dept: OTHER | Facility: HOSPITAL | Age: 88
End: 2023-01-05

## 2023-01-05 NOTE — PROGRESS NOTES
I13 0  [facility] 75  coding opportunities          Chart Reviewed number of suggestions sent to Provider: 1     Patients Insurance     Medicare Insurance: [employer]

## 2023-01-12 ENCOUNTER — OFFICE VISIT (OUTPATIENT)
Dept: INTERNAL MEDICINE CLINIC | Facility: CLINIC | Age: 88
End: 2023-01-12

## 2023-01-12 ENCOUNTER — APPOINTMENT (OUTPATIENT)
Dept: LAB | Facility: CLINIC | Age: 88
End: 2023-01-12

## 2023-01-12 VITALS
OXYGEN SATURATION: 98 % | WEIGHT: 105 LBS | HEIGHT: 55 IN | RESPIRATION RATE: 16 BRPM | BODY MASS INDEX: 24.3 KG/M2 | TEMPERATURE: 98 F | SYSTOLIC BLOOD PRESSURE: 138 MMHG | DIASTOLIC BLOOD PRESSURE: 70 MMHG | HEART RATE: 68 BPM

## 2023-01-12 DIAGNOSIS — K21.9 GERD WITHOUT ESOPHAGITIS: ICD-10-CM
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
## 2023-01-12 DIAGNOSIS — F41.9 ANXIETY: ICD-10-CM

## 2023-01-12 DIAGNOSIS — I67.2 CEREBRAL ATHEROSCLEROSIS: ICD-10-CM

## 2023-01-12 DIAGNOSIS — R51.9 SCALP TENDERNESS: Primary | ICD-10-CM
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
## 2023-01-12 DIAGNOSIS — R51.9 SCALP TENDERNESS: ICD-10-CM

## 2023-01-12 RX ORDER — PANTOPRAZOLE SODIUM 40 MG/1
40 TABLET, DELAYED RELEASE ORAL
Qty: 90 TABLET | Refills: 3 | Status: SHIPPED | OUTPATIENT
Start: 2023-01-12

## 2023-01-12 RX ORDER — IMIPRAMINE HYDROCHLORIDE 10 MG/1
10 TABLET, FILM COATED ORAL
Qty: 30 TABLET | Refills: 5 | Status: SHIPPED | OUTPATIENT
Start: 2023-01-12 | End: 2023-02-11

## 2023-01-12 NOTE — PROGRESS NOTES
Assessment/Plan:    Diagnoses and all orders for this visit:    Scalp tenderness  -     Sedimentation rate, automated; Future  -     C-reactive protein; Future  -     VAS temporal artery duplex; Future    Anxiety  -     imipramine (TOFRANIL) 10 mg tablet; Take 1 tablet (10 mg total) by mouth daily at bedtime 4 nights per week    GERD without esophagitis  -     pantoprazole (PROTONIX) 40 mg tablet; Take 1 tablet (40 mg total) by mouth daily before breakfast    Fall as cause of accidental injury in home as place of occurrence, subsequent encounter    Cerebral atherosclerosis  -     VAS temporal artery duplex; Future            Patient Instructions   Scalp tenderness-repeat inflammatory markers, check vascular ultrasound to rule out temporal arteritis  Continue Tylenol as needed      Subjective:      Patient ID: Ivis Posey is a 82 y o  female    Patient presents acutely with Ashley Persons with persistent left-sided temporal tenderness and occasionally seeing bulging blood vessel  She was seen for this in August at which time C-reactive protein was normal and sedimentation rate was 39  She had a fall and fell backwards striking the back of her head December 29  She had opened the door and the dog pushed her from the front and knocked her over  She was seen in the emergency department with no acute traumatic fracture  CT brain was unremarkable          Current Outpatient Medications:   •  acetaminophen (TYLENOL) 325 mg tablet, Take 1-2 tablets by mouth every 6 (six) hours as needed, Disp: , Rfl:   •  ALPRAZolam (XANAX) 0 5 mg tablet, take 1 tablet by mouth four times a day if needed for anxiety, Disp: 120 tablet, Rfl: 3  •  atenolol (TENORMIN) 50 mg tablet, take 1 tablet by mouth twice a day, Disp: 180 tablet, Rfl: 3  •  ferrous sulfate 325 (65 Fe) mg tablet, Take 1 tablet by mouth daily, Disp: , Rfl:   •  imipramine (TOFRANIL) 10 mg tablet, Take 1 tablet (10 mg total) by mouth daily at bedtime 4 nights per week, Disp: 30 tablet, Rfl: 5  •  latanoprost (XALATAN) 0 005 % ophthalmic solution, instill 1 drop into both eyes nightly, Disp: , Rfl:   •  levothyroxine 50 mcg tablet, take 1 tablet by mouth once daily, Disp: 90 tablet, Rfl: 0  •  lisinopril (ZESTRIL) 5 mg tablet, Take 1 tablet (5 mg total) by mouth daily, Disp: 90 tablet, Rfl: 0  •  pantoprazole (PROTONIX) 40 mg tablet, Take 1 tablet (40 mg total) by mouth daily before breakfast, Disp: 90 tablet, Rfl: 3  •  Probiotic Product (ALIGN) 4 MG CAPS, Take 1 tablet by mouth daily, Disp: , Rfl:   •  risedronate (ACTONEL) 35 mg tablet, Take 1 tablet (35 mg total) by mouth every 7 days with water on empty stomach, nothing by mouth or lie down for next 30 minutes  , Disp: 4 tablet, Rfl: 5  •  Cholecalciferol (Vitamin D3) 50 MCG (2000 UT) capsule, Take 1 capsule (2,000 Units total) by mouth daily (Patient not taking: Reported on 1/12/2023), Disp: , Rfl:   •  cyanocobalamin (VITAMIN B-12) 1000 MCG tablet, Take 1,000 mcg by mouth daily   (Patient not taking: Reported on 1/12/2023), Disp: , Rfl:   •  predniSONE 10 mg tablet, Take 2 tablets for 2 days then 1 tablets for2, Disp: 6 tablet, Rfl: 0    Recent Results (from the past 1008 hour(s))   CBC and differential    Collection Time: 12/08/22  2:26 PM   Result Value Ref Range    WBC 8 96 4 31 - 10 16 Thousand/uL    RBC 3 82 3 81 - 5 12 Million/uL    Hemoglobin 12 3 11 5 - 15 4 g/dL    Hematocrit 35 9 34 8 - 46 1 %    MCV 94 82 - 98 fL    MCH 32 2 26 8 - 34 3 pg    MCHC 34 3 31 4 - 37 4 g/dL    RDW 13 2 11 6 - 15 1 %    MPV 9 0 8 9 - 12 7 fL    Platelets 401 597 - 967 Thousands/uL    nRBC 0 /100 WBCs    Neutrophils Relative 77 (H) 43 - 75 %    Immat GRANS % 0 0 - 2 %    Lymphocytes Relative 8 (L) 14 - 44 %    Monocytes Relative 9 4 - 12 %    Eosinophils Relative 6 0 - 6 %    Basophils Relative 0 0 - 1 %    Neutrophils Absolute 6 82 1 85 - 7 62 Thousands/µL    Immature Grans Absolute 0 01 0 00 - 0 20 Thousand/uL    Lymphocytes Absolute 0  75 0 60 - 4 47 Thousands/µL    Monocytes Absolute 0 82 0 17 - 1 22 Thousand/µL    Eosinophils Absolute 0 54 0 00 - 0 61 Thousand/µL    Basophils Absolute 0 02 0 00 - 0 10 Thousands/µL   Comprehensive metabolic panel    Collection Time: 12/08/22  2:26 PM   Result Value Ref Range    Sodium 130 (L) 135 - 147 mmol/L    Potassium 4 2 3 5 - 5 3 mmol/L    Chloride 95 (L) 96 - 108 mmol/L    CO2 24 21 - 32 mmol/L    ANION GAP 11 4 - 13 mmol/L    BUN 17 5 - 25 mg/dL    Creatinine 1 39 (H) 0 60 - 1 30 mg/dL    Glucose 122 65 - 140 mg/dL    Calcium 9 6 8 3 - 10 1 mg/dL    AST 24 5 - 45 U/L    ALT 18 12 - 78 U/L    Alkaline Phosphatase 97 46 - 116 U/L    Total Protein 7 9 6 4 - 8 4 g/dL    Albumin 4 0 3 5 - 5 0 g/dL    Total Bilirubin 0 46 0 20 - 1 00 mg/dL    eGFR 34 ml/min/1 73sq m   HS Troponin 0hr (reflex protocol)    Collection Time: 12/08/22  2:26 PM   Result Value Ref Range    hs TnI 0hr 3 "Refer to ACS Flowchart"- see link ng/L   FLU/RSV/COVID - if FLU/RSV clinically relevant    Collection Time: 12/08/22  2:26 PM    Specimen: Nose; Nares   Result Value Ref Range    SARS-CoV-2 Negative Negative    INFLUENZA A PCR Negative Negative    INFLUENZA B PCR Negative Negative    RSV PCR Negative Negative   ECG 12 lead    Collection Time: 12/29/22  9:04 PM   Result Value Ref Range    Ventricular Rate 69 BPM    Atrial Rate 69 BPM    UT Interval 152 ms    QRSD Interval 86 ms    QT Interval 426 ms    QTC Interval 456 ms    P Axis 58 degrees    QRS Axis 4 degrees    T Wave Axis 50 degrees   CBC and differential    Collection Time: 12/29/22  9:40 PM   Result Value Ref Range    WBC 5 17 4 31 - 10 16 Thousand/uL    RBC 3 30 (L) 3 81 - 5 12 Million/uL    Hemoglobin 10 4 (L) 11 5 - 15 4 g/dL    Hematocrit 31 2 (L) 34 8 - 46 1 %    MCV 95 82 - 98 fL    MCH 31 5 26 8 - 34 3 pg    MCHC 33 3 31 4 - 37 4 g/dL    RDW 13 2 11 6 - 15 1 %    MPV 10 4 8 9 - 12 7 fL    Platelets 168 173 - 920 Thousands/uL    nRBC 0 /100 WBCs    Neutrophils Relative 49 43 - 75 %    Immat GRANS % 0 0 - 2 %    Lymphocytes Relative 26 14 - 44 %    Monocytes Relative 13 (H) 4 - 12 %    Eosinophils Relative 11 (H) 0 - 6 %    Basophils Relative 1 0 - 1 %    Neutrophils Absolute 2 61 1 85 - 7 62 Thousands/µL    Immature Grans Absolute 0 00 0 00 - 0 20 Thousand/uL    Lymphocytes Absolute 1 32 0 60 - 4 47 Thousands/µL    Monocytes Absolute 0 66 0 17 - 1 22 Thousand/µL    Eosinophils Absolute 0 55 0 00 - 0 61 Thousand/µL    Basophils Absolute 0 03 0 00 - 0 10 Thousands/µL   Comprehensive metabolic panel    Collection Time: 12/29/22  9:40 PM   Result Value Ref Range    Sodium 134 (L) 135 - 147 mmol/L    Potassium 4 1 3 5 - 5 3 mmol/L    Chloride 100 96 - 108 mmol/L    CO2 25 21 - 32 mmol/L    ANION GAP 9 4 - 13 mmol/L    BUN 13 5 - 25 mg/dL    Creatinine 1 31 (H) 0 60 - 1 30 mg/dL    Glucose 88 65 - 140 mg/dL    Calcium 8 9 8 3 - 10 1 mg/dL    AST 26 5 - 45 U/L    ALT 15 12 - 78 U/L    Alkaline Phosphatase 76 46 - 116 U/L    Total Protein 7 0 6 4 - 8 4 g/dL    Albumin 3 5 3 5 - 5 0 g/dL    Total Bilirubin 0 47 0 20 - 1 00 mg/dL    eGFR 37 ml/min/1 73sq m   Protime-INR    Collection Time: 12/29/22  9:40 PM   Result Value Ref Range    Protime 12 8 11 6 - 14 5 seconds    INR 0 98 0 84 - 1 19   APTT    Collection Time: 12/29/22  9:40 PM   Result Value Ref Range    PTT 27 23 - 37 seconds   FLU/RSV/COVID - if FLU/RSV clinically relevant    Collection Time: 12/29/22  9:40 PM    Specimen: Nose; Nasopharyngeal   Result Value Ref Range    SARS-CoV-2 Negative Negative    INFLUENZA A PCR Negative Negative    INFLUENZA B PCR Negative Negative    RSV PCR Negative Negative   HS Troponin 0hr (reflex protocol)    Collection Time: 12/29/22  9:40 PM   Result Value Ref Range    hs TnI 0hr 5 "Refer to ACS Flowchart"- see link ng/L   UA w Reflex to Microscopic w Reflex to Culture    Collection Time: 12/30/22 12:00 AM    Specimen: Urine, Clean Catch   Result Value Ref Range    Color, UA Colorless     Clarity, UA Clear     Specific [place], UA 1 013 1 003 - 1 030    pH, UA 6 0 4 5, 5 0, 5 5, 6 0, 6 5, 7 0, 7 5, 8 0    Leukocytes, UA Small (A) Negative    Nitrite, UA Negative Negative    Protein, UA Negative Negative mg/dl    Glucose, UA Negative Negative mg/dl    Ketones, UA Negative Negative mg/dl    Urobilinogen, UA <2 0 <2 0 mg/dl mg/dl    Bilirubin, UA Negative Negative    Occult Blood, UA Negative Negative   HS Troponin I 2hr    Collection Time: 12/30/22 12:00 AM   Result Value Ref Range    hs TnI 2hr 5 "Refer to ACS Flowchart"- see link ng/L    Delta 2hr hsTnI 0 <20 ng/L   Urine Microscopic    Collection Time: 12/30/22 12:00 AM   Result Value Ref Range    RBC, UA 1-2 None Seen, 1-2 /hpf    WBC, UA 10-20 (A) None Seen, 1-2 /hpf    Epithelial Cells Occasional None Seen, Occasional /hpf    Bacteria, UA Occasional None Seen, Occasional /hpf   Urine culture    Collection Time: 12/30/22 12:00 AM    Specimen: Urine, Clean Catch   Result Value Ref Range    Urine Culture 10,000-19,000 cfu/ml        The following portions of the patient's history were reviewed and updated as appropriate: allergies, current medications, past family history, past medical history, past social history, past surgical history and problem list      Review of Systems   Constitutional: Negative for appetite change, chills, diaphoresis, fatigue, fever and unexpected weight change  HENT: Negative for congestion, hearing loss and rhinorrhea  Eyes: Negative for visual disturbance  Respiratory: Negative for cough, chest tightness, shortness of breath and wheezing  Cardiovascular: Negative for chest pain, palpitations and leg swelling  Gastrointestinal: Negative for abdominal pain and blood in stool  Endocrine: Negative for cold intolerance, heat intolerance, polydipsia and polyuria  Genitourinary: Negative for difficulty urinating, dysuria, frequency and urgency  Musculoskeletal: Positive for myalgias  Negative for arthralgias     Skin: Negative for rash  Neurological: Positive for headaches  Negative for dizziness, weakness and light-headedness  Hematological: Does not bruise/bleed easily  Psychiatric/Behavioral: Negative for dysphoric mood and sleep disturbance  Objective:      Vitals:    01/12/23 1421   BP: 138/70   Pulse: 68   Resp: 16   Temp: 98 °F (36 7 °C)   SpO2: 98%          Physical Exam  Constitutional:       Appearance: She is well-developed  HENT:      Head: Normocephalic and atraumatic  Nose: Nose normal    Eyes:      General: No scleral icterus  Conjunctiva/sclera: Conjunctivae normal       Pupils: Pupils are equal, round, and reactive to light  Neck:      Thyroid: No thyromegaly  Vascular: No JVD  Trachea: No tracheal deviation  Comments: No scalp or temporal artery tenderness  Cardiovascular:      Rate and Rhythm: Normal rate and regular rhythm  Heart sounds: No murmur heard  No friction rub  No gallop  Pulmonary:      Effort: Pulmonary effort is normal  No respiratory distress  Breath sounds: Normal breath sounds  No wheezing or rales  Musculoskeletal:         General: No deformity  Cervical back: Normal range of motion and neck supple  No tenderness  Lymphadenopathy:      Cervical: No cervical adenopathy  Skin:     General: Skin is warm and dry  Coloration: Skin is not pale  Findings: No erythema or rash  Neurological:      Mental Status: She is alert and oriented to person, place, and time  Cranial Nerves: No cranial nerve deficit  Psychiatric:         Behavior: Behavior normal          Thought Content:  Thought content normal          Judgment: Judgment normal

## 2023-01-12 NOTE — PATIENT INSTRUCTIONS
Scalp tenderness-repeat inflammatory markers, check vascular ultrasound to rule out temporal arteritis    Continue Tylenol as needed

## 2023-01-13 LAB
CRP SERPL QL: <3 MG/L
ERYTHROCYTE [SEDIMENTATION RATE] IN BLOOD: 17 MM/HOUR (ref 0–29)

## 2023-01-19 ENCOUNTER — HOSPITAL ENCOUNTER (OUTPATIENT)
Dept: NON INVASIVE DIAGNOSTICS | Facility: HOSPITAL | Age: 88
Discharge: HOME | End: 2023-01-19

## 2023-01-19 DIAGNOSIS — I67.2 CEREBRAL ATHEROSCLEROSIS: ICD-10-CM

## 2023-01-19 DIAGNOSIS — R51.9 SCALP TENDERNESS: ICD-10-CM

## 2023-01-20 ENCOUNTER — TELEPHONE (OUTPATIENT)
Dept: INTERNAL MEDICINE CLINIC | Facility: CLINIC | Age: 88
End: 2023-01-20

## 2023-01-20 NOTE — TELEPHONE ENCOUNTER
----- Message from Madelyn Dang MD sent at 1/20/2023  6:52 AM EST -----  Notify-labs and u/s are normal   No evidence of temporal arteritis  Continue tylenol as needed for headaches

## 2023-02-09 DIAGNOSIS — I10 ESSENTIAL HYPERTENSION: ICD-10-CM

## 2023-02-09 RX ORDER — LISINOPRIL 5 MG/1
TABLET ORAL
Qty: 90 TABLET | Refills: 0 | Status: SHIPPED | OUTPATIENT
Start: 2023-02-09

## 2023-02-22 ENCOUNTER — RA CDI HCC (OUTPATIENT)
Dept: OTHER | Facility: HOSPITAL | Age: 88
End: 2023-02-22

## 2023-02-22 NOTE — PROGRESS NOTES
I13 0  [facility] 75  coding opportunities          Chart Reviewed number of suggestions sent to Provider: 1     Patients Insurance     Medicare Insurance: [employer]

## 2023-03-01 ENCOUNTER — OFFICE VISIT (OUTPATIENT)
Dept: INTERNAL MEDICINE CLINIC | Facility: CLINIC | Age: 88
End: 2023-03-01

## 2023-03-01 ENCOUNTER — APPOINTMENT (OUTPATIENT)
Dept: LAB | Facility: CLINIC | Age: 88
End: 2023-03-01

## 2023-03-01 VITALS
HEIGHT: 55 IN | SYSTOLIC BLOOD PRESSURE: 134 MMHG | DIASTOLIC BLOOD PRESSURE: 82 MMHG | WEIGHT: 105.2 LBS | OXYGEN SATURATION: 97 % | BODY MASS INDEX: 24.35 KG/M2 | RESPIRATION RATE: 16 BRPM | HEART RATE: 70 BPM

## 2023-03-01 DIAGNOSIS — E53.8 B12 DEFICIENCY: ICD-10-CM

## 2023-03-01 DIAGNOSIS — I87.2 VENOUS INSUFFICIENCY: ICD-10-CM

## 2023-03-01 DIAGNOSIS — I10 PRIMARY HYPERTENSION: ICD-10-CM

## 2023-03-01 DIAGNOSIS — R73.01 IMPAIRED FASTING GLUCOSE: ICD-10-CM

## 2023-03-01 DIAGNOSIS — E03.9 ACQUIRED HYPOTHYROIDISM: ICD-10-CM

## 2023-03-01 DIAGNOSIS — N18.32 STAGE 3B CHRONIC KIDNEY DISEASE (HCC): ICD-10-CM

## 2023-03-01 DIAGNOSIS — Q27.30 AVM (ARTERIOVENOUS MALFORMATION): ICD-10-CM

## 2023-03-01 DIAGNOSIS — E78.2 MIXED HYPERLIPIDEMIA: ICD-10-CM

## 2023-03-01 DIAGNOSIS — F33.9 DEPRESSION, RECURRENT (HCC): ICD-10-CM

## 2023-03-01 DIAGNOSIS — K58.9 IRRITABLE BOWEL SYNDROME, UNSPECIFIED TYPE: Primary | ICD-10-CM

## 2023-03-01 DIAGNOSIS — F41.9 ANXIETY: ICD-10-CM

## 2023-03-01 DIAGNOSIS — I50.32 CHRONIC DIASTOLIC CHF (CONGESTIVE HEART FAILURE) (HCC): ICD-10-CM

## 2023-03-01 DIAGNOSIS — M81.0 AGE-RELATED OSTEOPOROSIS WITHOUT CURRENT PATHOLOGICAL FRACTURE: ICD-10-CM

## 2023-03-01 DIAGNOSIS — J45.20 MILD INTERMITTENT ASTHMA WITHOUT COMPLICATION: ICD-10-CM

## 2023-03-01 DIAGNOSIS — D50.9 IRON DEFICIENCY ANEMIA, UNSPECIFIED IRON DEFICIENCY ANEMIA TYPE: ICD-10-CM

## 2023-03-01 DIAGNOSIS — K21.9 GERD WITHOUT ESOPHAGITIS: ICD-10-CM

## 2023-03-01 PROBLEM — K52.9 ENTEROCOLITIS: Status: RESOLVED | Noted: 2022-04-24 | Resolved: 2023-03-01

## 2023-03-01 PROBLEM — S62.653A NONDISPLACED FRACTURE OF MIDDLE PHALANX OF LEFT MIDDLE FINGER, INITIAL ENCOUNTER FOR CLOSED FRACTURE: Status: RESOLVED | Noted: 2019-10-16 | Resolved: 2023-03-01

## 2023-03-01 NOTE — PROGRESS NOTES
Assessment/Plan:    Diagnoses and all orders for this visit:    Irritable bowel syndrome, unspecified type    GERD without esophagitis    Impaired fasting glucose  -     Hemoglobin A1C; Future  -     Microalbumin / creatinine urine ratio    Acquired hypothyroidism  -     TSH, 3rd generation with Free T4 reflex; Future    Mild intermittent asthma without complication    Primary hypertension  -     CBC and differential; Future  -     Comprehensive metabolic panel; Future  -     Lipid panel; Future    Venous insufficiency    Chronic diastolic CHF (congestive heart failure) (HCC)    AVM (arteriovenous malformation)    Age-related osteoporosis without current pathological fracture    Stage 3b chronic kidney disease (HCC)    Iron deficiency anemia, unspecified iron deficiency anemia type  -     Iron Panel (Includes Ferritin, Iron Sat%, Iron, and TIBC); Future    Mixed hyperlipidemia    Anxiety    B12 deficiency  -     Vitamin B12; Future    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[facility], recurrent ([facility] )            Patient Instructions   Multiple medical comorbidities appears well controlled on current regimen, will check labs and follow accordingly      Subjective:      Patient ID: Ely Amaya is a 82 y o  female    F/u mmp but didn't do labs  Here w/ Clance Sham  Feeling generally well  Still w/ rib pain s/p fall in Dec   No fx  No subsequent falls  Still w/ L sided scalp ttp and headache off and on  Neg w/u TA x 2  Hypertension- taking atenolol and lisinopril  No home bp's  GERD w/ H/H-stable on PPI unless she eats too spicy  Venous insufficiency-swelling has been stable  Hyperlipidemia-she has been off rosuvastatin due to weakness and falls  Anxiety-stable w/ imiprimine and xanax    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        Current Outpatient Medications:   •  acetaminophen (TYLENOL) 325 mg tablet, Take 1-2 tablets by mouth every 6 (six) hours as needed, Disp: , Rfl:   •  ALPRAZolam (XANAX) 0 5 mg tablet, take 1 tablet by mouth four times a day if needed for anxiety, Disp: 120 tablet, Rfl: 3  •  atenolol (TENORMIN) 50 mg tablet, take 1 tablet by mouth twice a day, Disp: 180 tablet, Rfl: 3  •  imipramine (TOFRANIL) 10 mg tablet, Take 1 tablet (10 mg total) by mouth daily at bedtime 4 nights per week, Disp: 30 tablet, Rfl: 5  •  latanoprost (XALATAN) 0 005 % ophthalmic solution, instill 1 drop into both eyes nightly, Disp: , Rfl:   •  levothyroxine 50 mcg tablet, take 1 tablet by mouth once daily, Disp: 90 tablet, Rfl: 0  •  lisinopril (ZESTRIL) 5 mg tablet, take 1 tablet by mouth once daily, Disp: 90 tablet, Rfl: 0  •  pantoprazole (PROTONIX) 40 mg tablet, Take 1 tablet (40 mg total) by mouth daily before breakfast, Disp: 90 tablet, Rfl: 3  •  Probiotic Product (ALIGN) 4 MG CAPS, Take 1 tablet by mouth daily, Disp: , Rfl:   •  risedronate (ACTONEL) 35 mg tablet, Take 1 tablet (35 mg total) by mouth every 7 days with water on empty stomach, nothing by mouth or lie down for next 30 minutes  , Disp: 4 tablet, Rfl: 5  •  Cholecalciferol (Vitamin D3) 50 MCG (2000 UT) capsule, Take 1 capsule (2,000 Units total) by mouth daily (Patient not taking: Reported on 1/12/2023), Disp: , Rfl:     No results found for this or any previous visit (from the past 1008 hour(s))  The following portions of the patient's history were reviewed and updated as appropriate: allergies, current medications, past family history, past medical history, past social history, past surgical history and problem list      Review of Systems   Constitutional: Negative for appetite change, chills, diaphoresis, fatigue, fever and unexpected weight change  HENT: Negative for congestion, hearing loss and rhinorrhea  Eyes: Negative for visual disturbance  Respiratory: Negative for cough, chest tightness, shortness of breath and wheezing  Cardiovascular: Negative for chest pain, palpitations and leg swelling  Gastrointestinal: Negative for abdominal pain and blood in stool  Endocrine: Negative for cold intolerance, heat intolerance, polydipsia and polyuria  Genitourinary: Negative for difficulty urinating, dysuria, frequency and urgency  Musculoskeletal: Positive for arthralgias  Negative for myalgias  Skin: Negative for rash  Neurological: Positive for headaches  Negative for dizziness, weakness and light-headedness  Hematological: Does not bruise/bleed easily  Psychiatric/Behavioral: Negative for dysphoric mood and sleep disturbance  Objective:      Vitals:    03/01/23 1313   BP: 134/82   Pulse: 70   Resp: 16   SpO2: 97%          Physical Exam  Constitutional:       Appearance: She is well-developed  HENT:      Head: Normocephalic and atraumatic  Nose: Nose normal    Eyes:      General: No scleral icterus  Conjunctiva/sclera: Conjunctivae normal       Pupils: Pupils are equal, round, and reactive to light  Neck:      Thyroid: No thyromegaly  Vascular: No JVD  Trachea: No tracheal deviation  Cardiovascular:      Rate and Rhythm: Normal rate and regular rhythm  Heart sounds: No murmur heard  No friction rub  No gallop  Pulmonary:      Effort: Pulmonary effort is normal  No respiratory distress  Breath sounds: Normal breath sounds  No wheezing or rales  Abdominal:      General: Bowel sounds are normal  There is no distension  Palpations: Abdomen is soft  There is no mass  Tenderness: There is no abdominal tenderness  There is no guarding or rebound  Musculoskeletal:         General: No tenderness  Cervical back: Normal range of motion and neck supple  Lymphadenopathy:      Cervical: No cervical adenopathy  Skin:     General: Skin is warm and dry  Findings: No erythema or rash  Neurological:      Mental Status: She is alert and oriented to person, place, and time  Cranial Nerves: No cranial nerve deficit  Psychiatric:         Behavior: Behavior normal          Thought Content:  Thought content normal          Judgment: Judgment normal

## 2023-03-01 NOTE — PATIENT INSTRUCTIONS
Multiple medical comorbidities appears well controlled on current regimen, will check labs and follow accordingly

## 2023-03-02 ENCOUNTER — TELEPHONE (OUTPATIENT)
Dept: INTERNAL MEDICINE CLINIC | Facility: CLINIC | Age: 88
End: 2023-03-02

## 2023-03-02 DIAGNOSIS — E03.9 ACQUIRED HYPOTHYROIDISM: Primary | ICD-10-CM

## 2023-03-02 LAB
ALBUMIN SERPL BCP-MCNC: 3.7 G/DL (ref 3.5–5)
ALP SERPL-CCNC: 91 U/L (ref 46–116)
ALT SERPL W P-5'-P-CCNC: 13 U/L (ref 12–78)
ANION GAP SERPL CALCULATED.3IONS-SCNC: 3 MMOL/L (ref 4–13)
AST SERPL W P-5'-P-CCNC: 19 U/L (ref 5–45)
BASOPHILS # BLD AUTO: 0.03 THOUSANDS/ÂΜL (ref 0–0.1)
BASOPHILS NFR BLD AUTO: 1 % (ref 0–1)
BILIRUB SERPL-MCNC: 0.37 MG/DL (ref 0.2–1)
BUN SERPL-MCNC: 11 MG/DL (ref 5–25)
CALCIUM SERPL-MCNC: 9.1 MG/DL (ref 8.3–10.1)
CHLORIDE SERPL-SCNC: 106 MMOL/L (ref 96–108)
CHOLEST SERPL-MCNC: 247 MG/DL
CO2 SERPL-SCNC: 27 MMOL/L (ref 21–32)
CREAT SERPL-MCNC: 1.2 MG/DL (ref 0.6–1.3)
CREAT UR-MCNC: 35.5 MG/DL
EOSINOPHIL # BLD AUTO: 0.47 THOUSAND/ÂΜL (ref 0–0.61)
EOSINOPHIL NFR BLD AUTO: 10 % (ref 0–6)
ERYTHROCYTE [DISTWIDTH] IN BLOOD BY AUTOMATED COUNT: 13.1 % (ref 11.6–15.1)
EST. AVERAGE GLUCOSE BLD GHB EST-MCNC: 97 MG/DL
FERRITIN SERPL-MCNC: 121 NG/ML (ref 8–388)
GFR SERPL CREATININE-BSD FRML MDRD: 41 ML/MIN/1.73SQ M
GLUCOSE P FAST SERPL-MCNC: 110 MG/DL (ref 65–99)
HBA1C MFR BLD: 5 %
HCT VFR BLD AUTO: 36.2 % (ref 34.8–46.1)
HDLC SERPL-MCNC: 72 MG/DL
HGB BLD-MCNC: 11.8 G/DL (ref 11.5–15.4)
IMM GRANULOCYTES # BLD AUTO: 0.01 THOUSAND/UL (ref 0–0.2)
IMM GRANULOCYTES NFR BLD AUTO: 0 % (ref 0–2)
IRON SATN MFR SERPL: 22 % (ref 15–50)
IRON SERPL-MCNC: 67 UG/DL (ref 50–170)
LDLC SERPL CALC-MCNC: 143 MG/DL (ref 0–100)
LYMPHOCYTES # BLD AUTO: 1.24 THOUSANDS/ÂΜL (ref 0.6–4.47)
LYMPHOCYTES NFR BLD AUTO: 26 % (ref 14–44)
MCH RBC QN AUTO: 32.1 PG (ref 26.8–34.3)
MCHC RBC AUTO-ENTMCNC: 32.6 G/DL (ref 31.4–37.4)
MCV RBC AUTO: 98 FL (ref 82–98)
MICROALBUMIN UR-MCNC: 24.8 MG/L (ref 0–20)
MICROALBUMIN/CREAT 24H UR: 70 MG/G CREATININE (ref 0–30)
MONOCYTES # BLD AUTO: 0.55 THOUSAND/ÂΜL (ref 0.17–1.22)
MONOCYTES NFR BLD AUTO: 12 % (ref 4–12)
NEUTROPHILS # BLD AUTO: 2.46 THOUSANDS/ÂΜL (ref 1.85–7.62)
NEUTS SEG NFR BLD AUTO: 51 % (ref 43–75)
NONHDLC SERPL-MCNC: 175 MG/DL
NRBC BLD AUTO-RTO: 0 /100 WBCS
PLATELET # BLD AUTO: 168 THOUSANDS/UL (ref 149–390)
PMV BLD AUTO: 11.5 FL (ref 8.9–12.7)
POTASSIUM SERPL-SCNC: 3.7 MMOL/L (ref 3.5–5.3)
PROT SERPL-MCNC: 7.1 G/DL (ref 6.4–8.4)
RBC # BLD AUTO: 3.68 MILLION/UL (ref 3.81–5.12)
SODIUM SERPL-SCNC: 136 MMOL/L (ref 135–147)
T4 FREE SERPL-MCNC: 0.99 NG/DL (ref 0.76–1.46)
TIBC SERPL-MCNC: 299 UG/DL (ref 250–450)
TRIGL SERPL-MCNC: 160 MG/DL
TSH SERPL DL<=0.05 MIU/L-ACNC: 8.19 UIU/ML (ref 0.45–4.5)
VIT B12 SERPL-MCNC: 374 PG/ML (ref 100–900)
WBC # BLD AUTO: 4.76 THOUSAND/UL (ref 4.31–10.16)

## 2023-03-02 RX ORDER — LEVOTHYROXINE SODIUM 0.05 MG/1
TABLET ORAL
Qty: 102 TABLET | Refills: 3 | Status: SHIPPED | OUTPATIENT
Start: 2023-03-02

## 2023-03-02 NOTE — TELEPHONE ENCOUNTER
----- Message from Saba Valladares MD sent at 3/2/2023  1:43 PM EST -----  Notify-labs all okay except thyroid is off  Please ask if she has missed any doses in the last month, if so, no change and will recheck in 3 months, but if she has not missed any doses I would like her to increase levothyroxine to 100 mcg on Sundays and 50 mcg the rest of the week    Follow-up after labs in 3 months

## 2023-03-02 NOTE — TELEPHONE ENCOUNTER
I spoke to Jaqui she is aware of providers message and she ask if are you going to send her a script for the extra dose she only has 50 mg and she has enough for a week an a half

## 2023-04-06 ENCOUNTER — OFFICE VISIT (OUTPATIENT)
Age: 88
End: 2023-04-06

## 2023-04-06 VITALS
HEART RATE: 66 BPM | WEIGHT: 107.6 LBS | BODY MASS INDEX: 24.9 KG/M2 | HEIGHT: 55 IN | DIASTOLIC BLOOD PRESSURE: 78 MMHG | SYSTOLIC BLOOD PRESSURE: 128 MMHG | OXYGEN SATURATION: 97 %

## 2023-04-06 DIAGNOSIS — M62.830 MUSCLE SPASM OF BACK: Primary | ICD-10-CM

## 2023-04-06 NOTE — PROGRESS NOTES
INTERNAL MEDICINE FOLLOW-UP VISIT  [facility] [place]    NAME: Wilbur Valentin  AGE: 82 y o  SEX: female  : 1935     DATE: 2023     Assessment and Plan:   1  Muscle spasm of back  Left sided low to middle back spasm  Moist heat to area of concern  Gentle stretching and massage  PT- eval and treat for strengthening, ROM, massage  - Ambulatory Referral to Physical Therapy; Future      No follow-ups on file  Chief Complaint:     Chief Complaint   Patient presents with   • Palpitations      History of Present Illness:     Patient is here today for 1-2 weeks of left sided back pain  She did not report any injury or trauma to this area  She also has left sided radiculopathy  She denies any numbness or tingling, any loss of bowel or bladder, or any saddle paraesthesia  The following portions of the patient's history were reviewed and updated as appropriate: allergies, current medications, past family history, past medical history, past social history, past surgical history and problem list      Review of Systems:     Review of Systems   Constitutional: Negative for appetite change, chills, diaphoresis, fatigue, fever and unexpected weight change  HENT: Negative for postnasal drip and sneezing  Eyes: Negative for visual disturbance  Respiratory: Negative for chest tightness and shortness of breath  Cardiovascular: Negative for chest pain, palpitations and leg swelling  Gastrointestinal: Negative for abdominal pain and blood in stool  Endocrine: Negative for cold intolerance, heat intolerance, polydipsia, polyphagia and polyuria  Genitourinary: Negative for difficulty urinating, dysuria, frequency and urgency  Musculoskeletal: Positive for back pain  Negative for arthralgias and myalgias  Skin: Negative for rash and wound  Neurological: Negative for dizziness, weakness, light-headedness and headaches     Hematological: Negative for adenopathy  Psychiatric/Behavioral: Negative for confusion, dysphoric mood and sleep disturbance  The patient is not nervous/anxious  Past Medical History:     Past Medical History:   Diagnosis Date   • Benign essential hypertension     Last assessed: 9/11/14   • Disease of thyroid gland    • Essential tremor    • Fibromyalgia    • GERD (gastroesophageal reflux disease)    • History of nail disorders    • Hyperlipidemia    • Hypertension    • Palpitations    • Transient cerebral ischemia         Current Medications:     Current Outpatient Medications:   •  acetaminophen (TYLENOL) 325 mg tablet, Take 1-2 tablets by mouth every 6 (six) hours as needed, Disp: , Rfl:   •  ALPRAZolam (XANAX) 0 5 mg tablet, take 1 tablet by mouth four times a day if needed for anxiety, Disp: 120 tablet, Rfl: 3  •  atenolol (TENORMIN) 50 mg tablet, take 1 tablet by mouth twice a day, Disp: 180 tablet, Rfl: 3  •  imipramine (TOFRANIL) 10 mg tablet, Take 1 tablet (10 mg total) by mouth daily at bedtime 4 nights per week, Disp: 30 tablet, Rfl: 5  •  latanoprost (XALATAN) 0 005 % ophthalmic solution, instill 1 drop into both eyes nightly, Disp: , Rfl:   •  levothyroxine 50 mcg tablet, take 1 tablet by mouth once daily except Sunday take 2 tabs, Disp: 102 tablet, Rfl: 3  •  lisinopril (ZESTRIL) 5 mg tablet, take 1 tablet by mouth once daily, Disp: 90 tablet, Rfl: 0  •  pantoprazole (PROTONIX) 40 mg tablet, Take 1 tablet (40 mg total) by mouth daily before breakfast, Disp: 90 tablet, Rfl: 3  •  Probiotic Product (ALIGN) 4 MG CAPS, Take 1 tablet by mouth daily, Disp: , Rfl:   •  risedronate (ACTONEL) 35 mg tablet, Take 1 tablet (35 mg total) by mouth every 7 days with water on empty stomach, nothing by mouth or lie down for next 30 minutes  , Disp: 4 tablet, Rfl: 5  •  Cholecalciferol (Vitamin D3) 50 MCG (2000 UT) capsule, Take 1 capsule (2,000 Units total) by mouth daily (Patient not taking: Reported on 1/12/2023), Disp: , Rfl: "Allergies: Allergies   Allergen Reactions   • Other Drowsiness     Annotation - [id number]: ANTIDEPRESSANTS        Physical Exam:     /78 (BP Location: Left arm, Patient Position: Sitting, Cuff Size: Standard)   Pulse 66   Ht 4' 7\" (1 397 m)   Wt 48 8 kg (107 lb 9 6 oz)   SpO2 97%   BMI 25 01 kg/m²     Physical Exam  Constitutional:       Appearance: She is well-developed  HENT:      Head: Normocephalic and atraumatic  Eyes:      Pupils: Pupils are equal, round, and reactive to light  Neck:      Thyroid: No thyromegaly  Cardiovascular:      Rate and Rhythm: Normal rate and regular rhythm  Heart sounds: No murmur heard  Pulmonary:      Effort: Pulmonary effort is normal       Breath sounds: Normal breath sounds  Abdominal:      General: Bowel sounds are normal       Palpations: Abdomen is soft  Musculoskeletal:         General: Normal range of motion  Cervical back: Normal range of motion and neck supple  Thoracic back: Spasms present  Lumbar back: Spasms present  Negative right straight leg raise test and negative left straight leg raise test    Lymphadenopathy:      Cervical: No cervical adenopathy  Skin:     General: Skin is warm and dry  Neurological:      Mental Status: She is alert and oriented to person, place, and time  Data:     Laboratory Results: I have personally reviewed the pertinent laboratory results/reports   Radiology/Other Diagnostic Testing Results: I have personally reviewed pertinent reports        AMIE Humphreys  [facility]  "

## 2023-05-01 ENCOUNTER — TELEPHONE (OUTPATIENT)
Age: 88
End: 2023-05-01

## 2023-05-01 NOTE — TELEPHONE ENCOUNTER
Patient  Is having sob and wanted to have an EKG please let her know when an order is in  She wanted to see Dr Maribel Campbell but no appt till July   Call patient at [phone] when order is in and we will make appt

## 2023-05-02 ENCOUNTER — APPOINTMENT (OUTPATIENT)
Age: 88
End: 2023-05-02

## 2023-05-02 ENCOUNTER — OFFICE VISIT (OUTPATIENT)
Age: 88
End: 2023-05-02

## 2023-05-02 VITALS
HEART RATE: 72 BPM | SYSTOLIC BLOOD PRESSURE: 128 MMHG | WEIGHT: 109 LBS | RESPIRATION RATE: 20 BRPM | TEMPERATURE: 96.9 F | HEIGHT: 55 IN | BODY MASS INDEX: 25.22 KG/M2 | DIASTOLIC BLOOD PRESSURE: 84 MMHG | OXYGEN SATURATION: 99 %

## 2023-05-02 DIAGNOSIS — F33.9 DEPRESSION, RECURRENT (HCC): ICD-10-CM

## 2023-05-02 DIAGNOSIS — R06.09 EXERTIONAL DYSPNEA: Primary | ICD-10-CM

## 2023-05-02 DIAGNOSIS — R06.09 EXERTIONAL DYSPNEA: ICD-10-CM

## 2023-05-02 NOTE — PROGRESS NOTES
Assessment/Plan:    Diagnoses and all orders for this visit:    Exertional dyspnea  -     POCT ECG  -     Echo complete w/ contrast if indicated; Future  -     NM myocardial perfusion spect (rx stress and/or rest); Future  -     XR chest pa & lateral; Future    Depression, recurrent ([address] )            Patient Instructions   Patient was able to ambulate in the hallway maintaining saturations greater than 90% with heart rates in the 70s  She experienced exertional dyspnea but no chest pain  Exertion related dyspnea with unremarkable cardiovascular examination, normal oxygen saturations and normal EKG  Will check chest x-ray, nuclear stress test and echocardiogram and follow-up subsequently  Subjective:      Patient ID: Joni Blancas is a 82 y o  female    Acute patient visit with complaint of exertional dyspnea  She noted some palpitations about 2 months ago and has had persistent exertional dyspnea since that time  She notes that simple tasks like walking from one room to the other or making her bed will wipe her out and make her very short of breath  She is not having any chest pain, PND or orthopnea  No fevers or chills  She has a nonproductive cough that she attributes to allergy          Current Outpatient Medications:     acetaminophen (TYLENOL) 325 mg tablet, Take 1-2 tablets by mouth every 6 (six) hours as needed, Disp: , Rfl:     ALPRAZolam (XANAX) 0 5 mg tablet, take 1 tablet by mouth four times a day if needed for anxiety, Disp: 120 tablet, Rfl: 3    atenolol (TENORMIN) 50 mg tablet, take 1 tablet by mouth twice a day, Disp: 180 tablet, Rfl: 3    latanoprost (XALATAN) 0 005 % ophthalmic solution, instill 1 drop into both eyes nightly, Disp: , Rfl:     levothyroxine 50 mcg tablet, take 1 tablet by mouth once daily except Sunday take 2 tabs, Disp: 102 tablet, Rfl: 3    lisinopril (ZESTRIL) 5 mg tablet, take 1 tablet by mouth once daily, Disp: 90 tablet, Rfl: 0    pantoprazole (PROTONIX) 40 mg tablet, Take 1 tablet (40 mg total) by mouth daily before breakfast, Disp: 90 tablet, Rfl: 3    Probiotic Product (ALIGN) 4 MG CAPS, Take 1 tablet by mouth daily, Disp: , Rfl:     risedronate (ACTONEL) 35 mg tablet, Take 1 tablet (35 mg total) by mouth every 7 days with water on empty stomach, nothing by mouth or lie down for next 30 minutes  , Disp: 4 tablet, Rfl: 5    Cholecalciferol (Vitamin D3) 50 MCG (2000 UT) capsule, Take 1 capsule (2,000 Units total) by mouth daily (Patient not taking: Reported on 1/12/2023), Disp: , Rfl:     imipramine (TOFRANIL) 10 mg tablet, Take 1 tablet (10 mg total) by mouth daily at bedtime 4 nights per week, Disp: 30 tablet, Rfl: 5    No results found for this or any previous visit (from the past 1008 hour(s))      The following portions of the patient's history were reviewed and updated as appropriate: allergies, current medications, past family history, past medical history, past social history, past surgical history and problem list      Review of Systems      Objective:      Vitals:    05/02/23 1631   BP: 128/84   Pulse: 72   Resp: 20   Temp: (!) 96 9 °F (36 1 °C)   SpO2: 99%          Physical Exam

## 2023-05-02 NOTE — PATIENT INSTRUCTIONS
Patient was able to ambulate in the hallway maintaining saturations greater than 90% with heart rates in the 70s  She experienced exertional dyspnea but no chest pain  Exertion related dyspnea with unremarkable cardiovascular examination, normal oxygen saturations and normal EKG  Will check chest x-ray, nuclear stress test and echocardiogram and follow-up subsequently

## 2023-05-04 NOTE — PROGRESS NOTES
Assessment/Plan:    Patient Instructions     Patient with 2 episodes of severe headache followed by ataxic gait on physical exam she is noted to have facial droop, left pronator drift  Ataxic gait differential diagnosis is stroke additionally noted was a facial tremor cannot rule out Parkinson's but we will start with stat CT brain if negative refer to neurology for evaluation         Diagnoses and all orders for this visit:    Nonintractable headache, unspecified chronicity pattern, unspecified headache type  -     CT head wo contrast; Future    Ataxia  -     CT head wo contrast; Future    Tremor         Subjective:      Patient ID: Ovi Saucedo is a 82 y o  female     Last week patient had 2 episodes of incapacitating headache that she rated an 8/10 they lasted about 45 minutes and then went away  They happened on 2 separate occasions but a day apart from each other  There were no visual changes no difficulty speaking no weakness to extremities but she does state that she feels dizzy and off balance no falls she has tremor to jaw but no tremor any place else  She does complain of feeling fatigued  She notes she feels off balance when she is walking  She feels like she is going to fall over          Current Outpatient Medications:     acetaminophen (TYLENOL) 325 mg tablet, Take 1-2 tablets by mouth every 6 (six) hours as needed, Disp: , Rfl:     ALPRAZolam (XANAX) 0 5 mg tablet, Take 0 5 mg by mouth daily as needed  , Disp: , Rfl:     atenolol (TENORMIN) 50 mg tablet, Take 1 tablet (50 mg total) by mouth 2 (two) times a day, Disp: 180 tablet, Rfl: 3    ferrous sulfate 325 (65 Fe) mg tablet, Take 1 tablet by mouth daily, Disp: , Rfl:     imipramine (TOFRANIL) 10 mg tablet, Take 1 tablet (10 mg total) by mouth 4 (four) times a week, Disp: 90 tablet, Rfl: 3    levothyroxine 50 mcg tablet, Take 1 tablet (50 mcg total) by mouth daily, Disp: 90 tablet, Rfl: 3    lisinopril (ZESTRIL) 10 mg tablet, Take 1 tablet (10 mg total) by mouth daily, Disp: 90 tablet, Rfl: 3    Probiotic Product (ALIGN) 4 MG CAPS, Take 1 tablet by mouth daily, Disp: , Rfl:     rosuvastatin (CRESTOR) 10 MG tablet, Take 1 tablet (10 mg total) by mouth daily, Disp: 90 tablet, Rfl: 1    TRAVATAN Z 0 004 % ophthalmic solution, , Disp: , Rfl: 0    No results found for this or any previous visit (from the past 1008 hour(s))  The following portions of the patient's history were reviewed and updated as appropriate: allergies, current medications, past family history, past medical history, past social history, past surgical history and problem list      Review of Systems   Constitutional: Negative for appetite change, chills, diaphoresis, fatigue, fever and unexpected weight change  HENT: Negative for postnasal drip and sneezing  Eyes: Negative for visual disturbance  Respiratory: Negative for chest tightness and shortness of breath  Cardiovascular: Negative for chest pain, palpitations and leg swelling  Gastrointestinal: Negative for abdominal pain and blood in stool  Endocrine: Negative for cold intolerance, heat intolerance, polydipsia, polyphagia and polyuria  Genitourinary: Negative for difficulty urinating, dysuria, frequency and urgency  Musculoskeletal: Negative for arthralgias and myalgias  Skin: Negative for rash and wound  Neurological: Positive for dizziness, tremors, weakness and headaches  Negative for light-headedness  Hematological: Negative for adenopathy  Psychiatric/Behavioral: Negative for confusion, dysphoric mood and sleep disturbance  The patient is not nervous/anxious  Objective:      /62 (BP Location: Left arm, Patient Position: Sitting, Cuff Size: Standard)   Pulse 60   Resp 14   Ht 4' 10" (1 473 m)   Wt 47 4 kg (104 lb 9 6 oz)   BMI 21 86 kg/m²        Physical Exam   Constitutional: She is oriented to person, place, and time  She appears well-developed  No distress     HENT: Head: Normocephalic and atraumatic  Nose: Nose normal    Mouth/Throat: Oropharynx is clear and moist    Eyes: Pupils are equal, round, and reactive to light  Conjunctivae and EOM are normal    Neck: Normal range of motion  Neck supple  No JVD present  No tracheal deviation present  No thyromegaly present  Cardiovascular: Normal rate, regular rhythm, normal heart sounds and intact distal pulses  Exam reveals no gallop and no friction rub  No murmur heard  Pulmonary/Chest: Effort normal and breath sounds normal  No respiratory distress  She has no wheezes  She has no rales  Abdominal: Soft  Bowel sounds are normal  She exhibits no distension  There is no tenderness  Musculoskeletal: Normal range of motion  She exhibits no edema  Lymphadenopathy:     She has no cervical adenopathy  Neurological: She is alert and oriented to person, place, and time  No cranial nerve deficit  Left facial droop, normal DTR's, left pronator drift, ataxic gait with abnormal arm swing  ? cogwheeling   Skin: Skin is warm and dry  No rash noted  She is not diaphoretic  Psychiatric: She has a normal mood and affect   Her behavior is normal  Judgment and thought content normal  Suicidal ideation

## 2023-05-05 ENCOUNTER — TELEPHONE (OUTPATIENT)
Age: 88
End: 2023-05-05

## 2023-05-09 DIAGNOSIS — I10 ESSENTIAL HYPERTENSION: ICD-10-CM

## 2023-05-09 RX ORDER — LISINOPRIL 5 MG/1
TABLET ORAL
Qty: 90 TABLET | Refills: 0 | Status: SHIPPED | OUTPATIENT
Start: 2023-05-09

## 2023-05-17 ENCOUNTER — APPOINTMENT (EMERGENCY)
Dept: CT IMAGING | Facility: HOSPITAL | Age: 88
End: 2023-05-17

## 2023-05-17 ENCOUNTER — APPOINTMENT (EMERGENCY)
Dept: RADIOLOGY | Facility: HOSPITAL | Age: 88
End: 2023-05-17

## 2023-05-17 ENCOUNTER — APPOINTMENT (OUTPATIENT)
Dept: NON INVASIVE DIAGNOSTICS | Facility: HOSPITAL | Age: 88
End: 2023-05-17

## 2023-05-17 ENCOUNTER — HOSPITAL ENCOUNTER (OUTPATIENT)
Facility: HOSPITAL | Age: 88
Setting detail: OBSERVATION
Discharge: HOME HEALTH | End: 2023-05-18
Attending: EMERGENCY MEDICINE | Admitting: FAMILY MEDICINE

## 2023-05-17 DIAGNOSIS — S01.01XA LACERATION OF SCALP, INITIAL ENCOUNTER: ICD-10-CM

## 2023-05-17 DIAGNOSIS — R55 SYNCOPE, UNSPECIFIED SYNCOPE TYPE: Primary | ICD-10-CM

## 2023-05-17 DIAGNOSIS — S09.90XA CLOSED HEAD INJURY, INITIAL ENCOUNTER: ICD-10-CM

## 2023-05-17 DIAGNOSIS — R55 SYNCOPE: Primary | ICD-10-CM

## 2023-05-17 LAB
2HR DELTA HS TROPONIN: 1 NG/L
4HR DELTA HS TROPONIN: 2 NG/L
ALBUMIN SERPL BCP-MCNC: 4.4 G/DL (ref 3.5–5)
ALP SERPL-CCNC: 80 U/L (ref 34–104)
ALT SERPL W P-5'-P-CCNC: 10 U/L (ref 7–52)
ANION GAP SERPL CALCULATED.3IONS-SCNC: 6 MMOL/L (ref 4–13)
AORTIC ROOT: 2.2 CM
APICAL FOUR CHAMBER EJECTION FRACTION: 71 %
APTT PPP: 29 SECONDS (ref 23–37)
ASCENDING AORTA: 2.4 CM
AST SERPL W P-5'-P-CCNC: 22 U/L (ref 13–39)
ATRIAL RATE: 73 BPM
AV LVOT MEAN GRADIENT: 2 MMHG
AV LVOT PEAK GRADIENT: 3 MMHG
BASOPHILS # BLD AUTO: 0.02 THOUSANDS/ÂΜL (ref 0–0.1)
BASOPHILS NFR BLD AUTO: 0 % (ref 0–1)
BILIRUB SERPL-MCNC: 0.55 MG/DL (ref 0.2–1)
BUN SERPL-MCNC: 20 MG/DL (ref 5–25)
CALCIUM SERPL-MCNC: 9.7 MG/DL (ref 8.4–10.2)
CARDIAC TROPONIN I PNL SERPL HS: 4 NG/L
CARDIAC TROPONIN I PNL SERPL HS: 5 NG/L
CARDIAC TROPONIN I PNL SERPL HS: 6 NG/L
CHLORIDE SERPL-SCNC: 101 MMOL/L (ref 96–108)
CO2 SERPL-SCNC: 26 MMOL/L (ref 21–32)
CREAT SERPL-MCNC: 1.36 MG/DL (ref 0.6–1.3)
DOP CALC LVOT PEAK VEL VTI: 22.19 CM
DOP CALC LVOT PEAK VEL: 0.91 M/S
E WAVE DECELERATION TIME: 252 MS
EOSINOPHIL # BLD AUTO: 0.51 THOUSAND/ÂΜL (ref 0–0.61)
EOSINOPHIL NFR BLD AUTO: 10 % (ref 0–6)
ERYTHROCYTE [DISTWIDTH] IN BLOOD BY AUTOMATED COUNT: 12.3 % (ref 11.6–15.1)
FRACTIONAL SHORTENING: 27 % (ref 28–44)
GFR SERPL CREATININE-BSD FRML MDRD: 35 ML/MIN/1.73SQ M
GLUCOSE SERPL-MCNC: 110 MG/DL (ref 65–140)
HCT VFR BLD AUTO: 36.2 % (ref 34.8–46.1)
HGB BLD-MCNC: 12.1 G/DL (ref 11.5–15.4)
IMM GRANULOCYTES # BLD AUTO: 0.02 THOUSAND/UL (ref 0–0.2)
IMM GRANULOCYTES NFR BLD AUTO: 0 % (ref 0–2)
INR PPP: 1.03 (ref 0.84–1.19)
INTERVENTRICULAR SEPTUM IN DIASTOLE (PARASTERNAL SHORT AXIS VIEW): 1 CM
INTERVENTRICULAR SEPTUM: 1 CM (ref 0.6–1.1)
LAAS-AP2: 13.8 CM2
LAAS-AP4: 15.2 CM2
LEFT ATRIUM AREA SYSTOLE SINGLE PLANE A4C: 15 CM2
LEFT ATRIUM SIZE: 3.1 CM
LEFT INTERNAL DIMENSION IN SYSTOLE: 2.2 CM (ref 2.1–4)
LEFT VENTRICULAR INTERNAL DIMENSION IN DIASTOLE: 3 CM (ref 3.5–6)
LEFT VENTRICULAR POSTERIOR WALL IN END DIASTOLE: 0.9 CM
LEFT VENTRICULAR STROKE VOLUME: 21 ML
LVSV (TEICH): 21 ML
LYMPHOCYTES # BLD AUTO: 0.87 THOUSANDS/ÂΜL (ref 0.6–4.47)
LYMPHOCYTES NFR BLD AUTO: 17 % (ref 14–44)
MCH RBC QN AUTO: 31.8 PG (ref 26.8–34.3)
MCHC RBC AUTO-ENTMCNC: 33.4 G/DL (ref 31.4–37.4)
MCV RBC AUTO: 95 FL (ref 82–98)
MONOCYTES # BLD AUTO: 0.46 THOUSAND/ÂΜL (ref 0.17–1.22)
MONOCYTES NFR BLD AUTO: 9 % (ref 4–12)
MV E'TISSUE VEL-SEP: 5 CM/S
MV PEAK A VEL: 1.14 M/S
MV PEAK E VEL: 81 CM/S
MV STENOSIS PRESSURE HALF TIME: 73 MS
MV VALVE AREA P 1/2 METHOD: 3.01 CM2
NEUTROPHILS # BLD AUTO: 3.25 THOUSANDS/ÂΜL (ref 1.85–7.62)
NEUTS SEG NFR BLD AUTO: 64 % (ref 43–75)
NRBC BLD AUTO-RTO: 0 /100 WBCS
P AXIS: 57 DEGREES
PLATELET # BLD AUTO: 174 THOUSANDS/UL (ref 149–390)
PLATELET # BLD AUTO: 181 THOUSANDS/UL (ref 149–390)
PMV BLD AUTO: 9.1 FL (ref 8.9–12.7)
PMV BLD AUTO: 9.8 FL (ref 8.9–12.7)
POTASSIUM SERPL-SCNC: 3.8 MMOL/L (ref 3.5–5.3)
PR INTERVAL: 174 MS
PROT SERPL-MCNC: 7.6 G/DL (ref 6.4–8.4)
PROTHROMBIN TIME: 13.3 SECONDS (ref 11.6–14.5)
QRS AXIS: 6 DEGREES
QRSD INTERVAL: 86 MS
QT INTERVAL: 428 MS
QTC INTERVAL: 471 MS
RBC # BLD AUTO: 3.8 MILLION/UL (ref 3.81–5.12)
RIGHT ATRIUM AREA SYSTOLE A4C: 10.3 CM2
RIGHT VENTRICLE ID DIMENSION: 2.6 CM
SL CV LEFT ATRIUM LENGTH A2C: 4 CM
SL CV PED ECHO LEFT VENTRICLE DIASTOLIC VOLUME (MOD BIPLANE) 2D: 36 ML
SL CV PED ECHO LEFT VENTRICLE SYSTOLIC VOLUME (MOD BIPLANE) 2D: 16 ML
SODIUM SERPL-SCNC: 133 MMOL/L (ref 135–147)
T WAVE AXIS: 42 DEGREES
TR MAX PG: 23 MMHG
TR PEAK VELOCITY: 2.4 M/S
TRICUSPID ANNULAR PLANE SYSTOLIC EXCURSION: 1.8 CM
TRICUSPID VALVE PEAK REGURGITATION VELOCITY: 2.4 M/S
VENTRICULAR RATE: 73 BPM
WBC # BLD AUTO: 5.13 THOUSAND/UL (ref 4.31–10.16)

## 2023-05-17 RX ORDER — ATENOLOL 50 MG/1
50 TABLET ORAL 2 TIMES DAILY
Status: DISCONTINUED | OUTPATIENT
Start: 2023-05-17 | End: 2023-05-18 | Stop reason: HOSPADM

## 2023-05-17 RX ORDER — LEVOTHYROXINE SODIUM 0.05 MG/1
50 TABLET ORAL
Status: DISCONTINUED | OUTPATIENT
Start: 2023-05-17 | End: 2023-05-18 | Stop reason: HOSPADM

## 2023-05-17 RX ORDER — ACETAMINOPHEN 325 MG/1
650 TABLET ORAL EVERY 6 HOURS PRN
Status: DISCONTINUED | OUTPATIENT
Start: 2023-05-17 | End: 2023-05-18 | Stop reason: HOSPADM

## 2023-05-17 RX ORDER — ALPRAZOLAM 0.5 MG/1
0.5 TABLET ORAL 2 TIMES DAILY PRN
Status: DISCONTINUED | OUTPATIENT
Start: 2023-05-17 | End: 2023-05-18 | Stop reason: HOSPADM

## 2023-05-17 RX ORDER — LISINOPRIL 5 MG/1
5 TABLET ORAL DAILY
Status: DISCONTINUED | OUTPATIENT
Start: 2023-05-17 | End: 2023-05-18 | Stop reason: HOSPADM

## 2023-05-17 RX ORDER — SODIUM CHLORIDE, SODIUM GLUCONATE, SODIUM ACETATE, POTASSIUM CHLORIDE, MAGNESIUM CHLORIDE, SODIUM PHOSPHATE, DIBASIC, AND POTASSIUM PHOSPHATE .53; .5; .37; .037; .03; .012; .00082 G/100ML; G/100ML; G/100ML; G/100ML; G/100ML; G/100ML; G/100ML
100 INJECTION, SOLUTION INTRAVENOUS CONTINUOUS
Status: DISPENSED | OUTPATIENT
Start: 2023-05-17 | End: 2023-05-17

## 2023-05-17 RX ORDER — HEPARIN SODIUM 5000 [USP'U]/ML
5000 INJECTION, SOLUTION INTRAVENOUS; SUBCUTANEOUS EVERY 8 HOURS SCHEDULED
Status: DISCONTINUED | OUTPATIENT
Start: 2023-05-17 | End: 2023-05-18 | Stop reason: HOSPADM

## 2023-05-17 RX ORDER — PANTOPRAZOLE SODIUM 40 MG/1
40 TABLET, DELAYED RELEASE ORAL
Status: DISCONTINUED | OUTPATIENT
Start: 2023-05-17 | End: 2023-05-18 | Stop reason: HOSPADM

## 2023-05-17 RX ADMIN — ALPRAZOLAM 0.5 MG: 0.5 TABLET ORAL at 11:02

## 2023-05-17 RX ADMIN — TETANUS TOXOID, REDUCED DIPHTHERIA TOXOID AND ACELLULAR PERTUSSIS VACCINE, ADSORBED 0.5 ML: 5; 2.5; 8; 8; 2.5 SUSPENSION INTRAMUSCULAR at 09:48

## 2023-05-17 RX ADMIN — LISINOPRIL 5 MG: 5 TABLET ORAL at 11:02

## 2023-05-17 RX ADMIN — LEVOTHYROXINE SODIUM 50 MCG: 0.05 TABLET ORAL at 11:02

## 2023-05-17 RX ADMIN — PANTOPRAZOLE SODIUM 40 MG: 40 TABLET, DELAYED RELEASE ORAL at 11:02

## 2023-05-17 RX ADMIN — HEPARIN SODIUM 5000 UNITS: 5000 INJECTION INTRAVENOUS; SUBCUTANEOUS at 22:47

## 2023-05-17 RX ADMIN — ALPRAZOLAM 0.5 MG: 0.5 TABLET ORAL at 23:14

## 2023-05-17 RX ADMIN — IOHEXOL 100 ML: 350 INJECTION, SOLUTION INTRAVENOUS at 08:42

## 2023-05-17 RX ADMIN — SODIUM CHLORIDE, SODIUM GLUCONATE, SODIUM ACETATE, POTASSIUM CHLORIDE, MAGNESIUM CHLORIDE, SODIUM PHOSPHATE, DIBASIC, AND POTASSIUM PHOSPHATE 100 ML/HR: .53; .5; .37; .037; .03; .012; .00082 INJECTION, SOLUTION INTRAVENOUS at 11:06

## 2023-05-17 RX ADMIN — ACETAMINOPHEN 650 MG: 325 TABLET ORAL at 11:02

## 2023-05-17 RX ADMIN — ATENOLOL 50 MG: 50 TABLET ORAL at 18:39

## 2023-05-17 RX ADMIN — SODIUM CHLORIDE, SODIUM GLUCONATE, SODIUM ACETATE, POTASSIUM CHLORIDE, MAGNESIUM CHLORIDE, SODIUM PHOSPHATE, DIBASIC, AND POTASSIUM PHOSPHATE 100 ML/HR: .53; .5; .37; .037; .03; .012; .00082 INJECTION, SOLUTION INTRAVENOUS at 18:34

## 2023-05-17 RX ADMIN — ACETAMINOPHEN 650 MG: 325 TABLET ORAL at 23:14

## 2023-05-17 RX ADMIN — ATENOLOL 50 MG: 50 TABLET ORAL at 11:02

## 2023-05-17 NOTE — PLAN OF CARE
Problem: MOBILITY - ADULT  Goal: Maintain or return to baseline ADL function  Description: INTERVENTIONS:  -  Assess patient's ability to carry out ADLs; assess patient's baseline for ADL function and identify physical deficits which impact ability to perform ADLs (bathing, care of mouth/teeth, toileting, grooming, dressing, etc )  - Assess/evaluate cause of self-care deficits   - Assess range of motion  - Assess patient's mobility; develop plan if impaired  - Assess patient's need for assistive devices and provide as appropriate  - Encourage maximum independence but intervene and supervise when necessary  - Involve family in performance of ADLs  - Assess for home care needs following discharge   - Consider OT consult to assist with ADL evaluation and planning for discharge  - Provide patient education as appropriate  Outcome: Progressing  Goal: Maintains/Returns to pre admission functional level  Description: INTERVENTIONS:  - Perform BMAT or MOVE assessment daily    - Set and communicate daily mobility goal to care team and patient/family/caregiver  - Collaborate with rehabilitation services on mobility goals if consulted  - Perform Range of Motion    times a day  - Reposition patient every    hours    - Dangle patient    times a day  - Stand patient      times a day  - Ambulate patient    times a day  - Out of bed to chair    times a day   - Out of bed for meals    times a day  - Out of bed for toileting  - Record patient progress and toleration of activity level   Outcome: Progressing     Problem: Potential for Falls  Goal: Patient will remain free of falls  Description: INTERVENTIONS:  - Educate patient/family on patient safety including physical limitations  - Instruct patient to call for assistance with activity   - Consult OT/PT to assist with strengthening/mobility   - Keep Call bell within reach  - Keep bed low and locked with side rails adjusted as appropriate  - Keep care items and personal belongings within reach  - Initiate and maintain comfort rounds  - Make Fall Risk Sign visible to staff  - Offer Toileting every    Hours, in advance of need  - Initiate/Maintain   alarm  - Obtain necessary fall risk management equipment:     - Apply yellow socks and bracelet for high fall risk patients  - Consider moving patient to room near nurses station  Outcome: Progressing     Problem: Prexisting or High Potential for Compromised Skin Integrity  Goal: Skin integrity is maintained or improved  Description: INTERVENTIONS:  - Identify patients at risk for skin breakdown  - Assess and monitor skin integrity  - Assess and monitor nutrition and hydration status  - Monitor labs   - Assess for incontinence   - Turn and reposition patient  - Assist with mobility/ambulation  - Relieve pressure over bony prominences  - Avoid friction and shearing  - Provide appropriate hygiene as needed including keeping skin clean and dry  - Evaluate need for skin moisturizer/barrier cream  - Collaborate with interdisciplinary team   - Patient/family teaching  - Consider wound care consult   Outcome: Progressing     Problem: PAIN - ADULT  Goal: Verbalizes/displays adequate comfort level or baseline comfort level  Description: Interventions:  - Encourage patient to monitor pain and request assistance  - Assess pain using appropriate pain scale  - Administer analgesics based on type and severity of pain and evaluate response  - Implement non-pharmacological measures as appropriate and evaluate response  - Consider cultural and social influences on pain and pain management  - Notify physician/advanced practitioner if interventions unsuccessful or patient reports new pain  Outcome: Progressing     Problem: INFECTION - ADULT  Goal: Absence or prevention of progression during hospitalization  Description: INTERVENTIONS:  - Assess and monitor for signs and symptoms of infection  - Monitor lab/diagnostic results  - Monitor all insertion sites, i e  indwelling lines, tubes, and drains  - Monitor endotracheal if appropriate and nasal secretions for changes in amount and color  - Jackson appropriate cooling/warming therapies per order  - Administer medications as ordered  - Instruct and encourage patient and family to use good hand hygiene technique  - Identify and instruct in appropriate isolation precautions for identified infection/condition  Outcome: Progressing  Goal: Absence of fever/infection during neutropenic period  Description: INTERVENTIONS:  - Monitor WBC    Outcome: Progressing     Problem: SAFETY ADULT  Goal: Maintain or return to baseline ADL function  Description: INTERVENTIONS:  -  Assess patient's ability to carry out ADLs; assess patient's baseline for ADL function and identify physical deficits which impact ability to perform ADLs (bathing, care of mouth/teeth, toileting, grooming, dressing, etc )  - Assess/evaluate cause of self-care deficits   - Assess range of motion  - Assess patient's mobility; develop plan if impaired  - Assess patient's need for assistive devices and provide as appropriate  - Encourage maximum independence but intervene and supervise when necessary  - Involve family in performance of ADLs  - Assess for home care needs following discharge   - Consider OT consult to assist with ADL evaluation and planning for discharge  - Provide patient education as appropriate  Outcome: Progressing  Goal: Maintains/Returns to pre admission functional level  Description: INTERVENTIONS:  - Perform BMAT or MOVE assessment daily    - Set and communicate daily mobility goal to care team and patient/family/caregiver  - Collaborate with rehabilitation services on mobility goals if consulted  - Perform Range of Motion    times a day  - Reposition patient every      hours    - Dangle patient    times a day  - Stand patient    times a day  - Ambulate patient    times a day  - Out of bed to chair    times a day   - Out of bed for meals times a day  - Out of bed for toileting  - Record patient progress and toleration of activity level   Outcome: Progressing  Goal: Patient will remain free of falls  Description: INTERVENTIONS:  - Educate patient/family on patient safety including physical limitations  - Instruct patient to call for assistance with activity   - Consult OT/PT to assist with strengthening/mobility   - Keep Call bell within reach  - Keep bed low and locked with side rails adjusted as appropriate  - Keep care items and personal belongings within reach  - Initiate and maintain comfort rounds  - Make Fall Risk Sign visible to staff  - Offer Toileting every    Hours, in advance of need  - Initiate/Maintain   alarm  - Obtain necessary fall risk management equipment:   - Apply yellow socks and bracelet for high fall risk patients  - Consider moving patient to room near nurses station  Outcome: Progressing     Problem: DISCHARGE PLANNING  Goal: Discharge to home or other facility with appropriate resources  Description: INTERVENTIONS:  - Identify barriers to discharge w/patient and caregiver  - Arrange for needed discharge resources and transportation as appropriate  - Identify discharge learning needs (meds, wound care, etc )  - Arrange for interpretive services to assist at discharge as needed  - Refer to Case Management Department for coordinating discharge planning if the patient needs post-hospital services based on physician/advanced practitioner order or complex needs related to functional status, cognitive ability, or social support system  Outcome: Progressing     Problem: Knowledge Deficit  Goal: Patient/family/caregiver demonstrates understanding of disease process, treatment plan, medications, and discharge instructions  Description: Complete learning assessment and assess knowledge base    Interventions:  - Provide teaching at level of understanding  - Provide teaching via preferred learning methods  Outcome: Progressing

## 2023-05-17 NOTE — ASSESSMENT & PLAN NOTE
· Blood pressure reading currently elevated  · Resume home atenolol/lisinopril  · Monitor pressure closely

## 2023-05-17 NOTE — ED PROVIDER NOTES
"History  Chief Complaint   Patient presents with   • Head Injury     Pt presents with granddaughter following unwitnessed fall in the middle of the night  Pt reports waking up to use the bathroom \"and the next thing I know I was on the floor with blood around me and I peed on myself\" Pt is unsure how long she remained in the bathroom on the floor but believes she woke up around 0500, and crawled to her granddaughters door around 7  Vaguely remembers sitting on the toilet  Denies being on thinners, +LOC, +head strike  Sheela Aiken is a pleasant and well-appearing 44-year-old female arriving to the emergency department today accompanied by granddaughter for evaluation after an unwitnessed syncopal episode and fall overnight  Patient states that she had walked to use the bathroom and while having a bowel movement she presumes she must have passed out, but she has no recollection leading up to the event  She had ultimately fallen off of the toilet seat and hit her head against the radiator in the bathroom resulting in a left-sided scalp wound  She denies use of anticoagulant medications  She is also reporting left-sided chest wall discomfort, increased since the fall  She states that she has been following with her primary care provider for further work-up of chest pain and shortness of breath, stating that she did feel little bit more short of breath than her usual today  She denies any vision change or disturbance, dizziness or lightheadedness, diaphoresis, nausea or episodes of vomiting  She presently feels back to her baseline and granddaughter that the patient behaving appropriately and at her baseline mental status  Patient and granddaughter for no other complaints or concerns at this time  Prior to Admission Medications   Prescriptions Last Dose Informant Patient Reported? Taking?    ALPRAZolam (XANAX) 0 5 mg tablet   No No   Sig: take 1 tablet by mouth four times a day if needed for anxiety " Cholecalciferol (Vitamin D3) 50 MCG (2000 UT) capsule   No No   Sig: Take 1 capsule (2,000 Units total) by mouth daily   Patient not taking: Reported on 1/12/2023   Probiotic Product (ALIGN) 4 MG CAPS   Yes No   Sig: Take 1 tablet by mouth daily   acetaminophen (TYLENOL) 325 mg tablet   Yes No   Sig: Take 1-2 tablets by mouth every 6 (six) hours as needed   atenolol (TENORMIN) 50 mg tablet   No No   Sig: take 1 tablet by mouth twice a day   imipramine (TOFRANIL) 10 mg tablet   No No   Sig: Take 1 tablet (10 mg total) by mouth daily at bedtime 4 nights per week   latanoprost (XALATAN) 0 005 % ophthalmic solution   Yes No   Sig: instill 1 drop into both eyes nightly   levothyroxine 50 mcg tablet   No No   Sig: take 1 tablet by mouth once daily except Sunday take 2 tabs   lisinopril (ZESTRIL) 5 mg tablet   No No   Sig: take 1 tablet by mouth once daily   pantoprazole (PROTONIX) 40 mg tablet   No No   Sig: Take 1 tablet (40 mg total) by mouth daily before breakfast   risedronate (ACTONEL) 35 mg tablet   No No   Sig: Take 1 tablet (35 mg total) by mouth every 7 days with water on empty stomach, nothing by mouth or lie down for next 30 minutes        Facility-Administered Medications: None       Past Medical History:   Diagnosis Date   • Benign essential hypertension     Last assessed: 9/11/14   • Disease of thyroid gland    • Essential tremor    • Fibromyalgia    • GERD (gastroesophageal reflux disease)    • History of nail disorders    • Hyperlipidemia    • Hypertension    • Palpitations    • Transient cerebral ischemia        Past Surgical History:   Procedure Laterality Date   • APPENDECTOMY     • EGD AND COLONOSCOPY  07/2020   • HYSTERECTOMY  01/01/2010   • VT LAPS SURG CHOLECYSTECTOMY W/CHOLANGIOGRAPHY N/A 4/4/2018    Procedure: LAPAROSCOPIC CHOLECYSTECTOMY WITH IOC;  Surgeon: Carola Lion MD;  Location: [facility];  Service: General   • TUBAL LIGATION         Family History   Problem Relation Age of Onset   • Stroke Mother         ischemic stroke   • Hypertension Mother    • Heart disease Father    • Cancer Sister    • No Known Problems Sister    • No Known Problems Daughter    • No Known Problems Daughter    • No Known Problems Daughter    • No Known Problems Daughter    • No Known Problems Daughter    • No Known Problems Maternal Grandmother    • No Known Problems Paternal Grandmother    • No Known Problems Paternal Aunt    • No Known Problems Paternal Aunt    • No Known Problems Paternal Aunt    • No Known Problems Paternal Aunt    • No Known Problems Paternal Aunt    • Breast cancer Neg Hx      I have reviewed and agree with the history as documented  E-Cigarette/Vaping   • E-Cigarette Use Never User      E-Cigarette/Vaping Substances   • Nicotine No    • THC No    • CBD No    • Flavoring No    • Other No    • Unknown No      Social History     Tobacco Use   • Smoking status: Former     Packs/day: 0 10     Years: 50 00     Pack years: 5 00     Types: Cigarettes     Start date: 1963     Quit date: 2013     Years since quitting: 10 3   • Smokeless tobacco: Never   • Tobacco comments:     1 pack a week    Vaping Use   • Vaping Use: Never used   Substance Use Topics   • Alcohol use: Not Currently     Alcohol/week: 0 0 standard drinks     Comment: 0   • Drug use: No       Review of Systems   Constitutional: Negative for chills and fever  Respiratory: Positive for shortness of breath  Cardiovascular: Positive for chest pain  Gastrointestinal: Negative for abdominal pain, nausea and vomiting  Musculoskeletal: Negative for back pain and neck stiffness  Skin: Negative for rash  Neurological: Positive for syncope and headaches  All other systems reviewed and are negative  Physical Exam  Physical Exam  Vitals and nursing note reviewed  Constitutional:       General: She is not in acute distress  Appearance: Normal appearance  She is well-developed   She is not ill-appearing, toxic-appearing or diaphoretic  HENT:      Head: Normocephalic  Right Ear: Tympanic membrane, ear canal and external ear normal  No hemotympanum  Left Ear: Tympanic membrane, ear canal and external ear normal  No hemotympanum  Eyes:      Conjunctiva/sclera: Conjunctivae normal    Cardiovascular:      Rate and Rhythm: Normal rate and regular rhythm  Pulses: Normal pulses  Pulmonary:      Effort: Pulmonary effort is normal  No respiratory distress  Breath sounds: Normal breath sounds  No wheezing, rhonchi or rales  Chest:      Chest wall: No tenderness  Abdominal:      General: There is no distension  Palpations: Abdomen is soft  Tenderness: There is no abdominal tenderness  Musculoskeletal:         General: Normal range of motion  Cervical back: Normal range of motion and neck supple  Skin:     General: Skin is warm and dry  Capillary Refill: Capillary refill takes less than 2 seconds  Neurological:      General: No focal deficit present  Mental Status: She is alert  Mental status is at baseline  GCS: GCS eye subscore is 4  GCS verbal subscore is 5  GCS motor subscore is 6  Sensory: Sensation is intact  No sensory deficit  Motor: Motor function is intact  No weakness     Psychiatric:         Mood and Affect: Mood normal          Vital Signs  ED Triage Vitals [05/17/23 0809]   Temperature Pulse Respirations Blood Pressure SpO2   98 °F (36 7 °C) 73 16 160/79 100 %      Temp Source Heart Rate Source Patient Position - Orthostatic VS BP Location FiO2 (%)   Oral Monitor Sitting Left arm --      Pain Score       --           Vitals:    05/17/23 0809   BP: 160/79   Pulse: 73   Patient Position - Orthostatic VS: Sitting         Visual Acuity      ED Medications  Medications - No data to display    Diagnostic Studies  Results Reviewed     Procedure Component Value Units Date/Time    HS Troponin I 2hr [[id number]]  (Normal) Collected: 05/17/23 1110    Lab Status: Final result Specimen: Blood from Arm, Right Updated: 05/17/23 1145     hs TnI 2hr 5 ng/L      Delta 2hr hsTnI 1 ng/L     Platelet count [[id number]]  (Normal) Collected: 05/17/23 1110    Lab Status: Final result Specimen: Blood from Arm, Right Updated: 05/17/23 1117     Platelets 967 Thousands/uL      MPV 9 1 fL     HS Troponin I 4hr [[id number]]     Lab Status: No result Specimen: Blood     HS Troponin 0hr (reflex protocol) [[id number]]  (Normal) Collected: 05/17/23 0825    Lab Status: Final result Specimen: Blood from Arm, Right Updated: 05/17/23 0859     hs TnI 0hr 4 ng/L     Protime-INR [[id number]]  (Normal) Collected: 05/17/23 0825    Lab Status: Final result Specimen: Blood from Arm, Right Updated: 05/17/23 0851     Protime 13 3 seconds      INR 1 03    APTT [[id number]]  (Normal) Collected: 05/17/23 0825    Lab Status: Final result Specimen: Blood from Arm, Right Updated: 05/17/23 0851     PTT 29 seconds     Comprehensive metabolic panel [[id number]]  (Abnormal) Collected: 05/17/23 0825    Lab Status: Final result Specimen: Blood from Arm, Right Updated: 05/17/23 0851     Sodium 133 mmol/L      Potassium 3 8 mmol/L      Chloride 101 mmol/L      CO2 26 mmol/L      ANION GAP 6 mmol/L      BUN 20 mg/dL      Creatinine 1 36 mg/dL      Glucose 110 mg/dL      Calcium 9 7 mg/dL      AST 22 U/L      ALT 10 U/L      Alkaline Phosphatase 80 U/L      Total Protein 7 6 g/dL      Albumin 4 4 g/dL      Total Bilirubin 0 55 mg/dL      eGFR 35 ml/min/1 73sq m     Narrative:      [place] guidelines for Chronic Kidney Disease (CKD):   •  Stage 1 with normal or high GFR (GFR > 90 mL/min/1 73 square meters)  •  Stage 2 Mild CKD (GFR = 60-89 mL/min/1 73 square meters)  •  Stage 3A Moderate CKD (GFR = 45-59 mL/min/1 73 square meters)  •  Stage 3B Moderate CKD (GFR = 30-44 mL/min/1 73 square meters)  •  Stage 4 Severe CKD (GFR = 15-29 mL/min/1 73 square meters)  •  Stage 5 End Stage CKD (GFR <15 mL/min/1 73 square meters)  Note: GFR calculation is accurate only with a steady state creatinine    CBC and differential [[id number]]  (Abnormal) Collected: 05/17/23 0825    Lab Status: Final result Specimen: Blood from Arm, Right Updated: 05/17/23 0833     WBC 5 13 Thousand/uL      RBC 3 80 Million/uL      Hemoglobin 12 1 g/dL      Hematocrit 36 2 %      MCV 95 fL      MCH 31 8 pg      MCHC 33 4 g/dL      RDW 12 3 %      MPV 9 8 fL      Platelets 065 Thousands/uL      nRBC 0 /100 WBCs      Neutrophils Relative 64 %      Immat GRANS % 0 %      Lymphocytes Relative 17 %      Monocytes Relative 9 %      Eosinophils Relative 10 %      Basophils Relative 0 %      Neutrophils Absolute 3 25 Thousands/µL      Immature Grans Absolute 0 02 Thousand/uL      Lymphocytes Absolute 0 87 Thousands/µL      Monocytes Absolute 0 46 Thousand/µL      Eosinophils Absolute 0 51 Thousand/µL      Basophils Absolute 0 02 Thousands/µL                  CT head without contrast   Final Result by Montserrat Moura MD (05/17 9039)      No acute intracranial abnormality  Stable chronic microangiopathic changes within the brain  The study was marked in [facility] for immediate notification  Workstation performed: [id number]         CT spine cervical without contrast   Final Result by Montserrat Moura MD (05/17 0900)      No cervical spine fracture or traumatic malalignment  The study was marked in [facility] for immediate notification  Workstation performed: [id number]         CT chest abdomen pelvis w contrast   Final Result by Montserrat Moura MD (05/17 0915)      1  No acute traumatic injury in the chest, abdomen, and pelvis  2  Stable additional findings, as described above  The study was marked in [facility] for immediate notification  Workstation performed: [id number]         XR chest 1 view portable   Final Result by Jose Daniel Cortez MD (14/24 4056)      No acute cardiopulmonary disease  No acute displaced fracture  "  Moderate hiatal hernia  Workstation performed: [id number]                    Procedures  Laceration repair    Date/Time: 5/17/2023 10:30 AM  Performed by: Kayode Sharma PA-C  Authorized by: Kayode Sharma PA-C   Consent: Verbal consent obtained  Risks and benefits: risks, benefits and alternatives were discussed  Consent given by: patient  Patient understanding: patient states understanding of the procedure being performed  Patient consent: the patient's understanding of the procedure matches consent given  Procedure consent: procedure consent matches procedure scheduled  Relevant documents: relevant documents present and verified  Site marked: the operative site was marked  Radiology Images displayed and confirmed  If images not available, report reviewed: imaging studies available  Required items: required blood products, implants, devices, and special equipment available  Patient identity confirmed: verbally with patient  Time out: Immediately prior to procedure a \"time out\" was called to verify the correct patient, procedure, equipment, support staff and site/side marked as required  Body area: head/neck  Location details: scalp  Laceration length: 1 cm  Foreign bodies: no foreign bodies  Tendon involvement: none  Nerve involvement: none    Wound Dehiscence:  Superficial Wound Dehiscence: simple closure      Procedure Details:  Preparation: Patient was prepped and draped in the usual sterile fashion    Irrigation solution: saline  Debridement: none  Degree of undermining: none  Skin closure: staples (2)  Technique: simple  Approximation: close  Approximation difficulty: simple  Patient tolerance: patient tolerated the procedure well with no immediate complications    ECG 12 Lead Documentation Only    Date/Time: 5/17/2023 8:18 AM  Performed by: Kayode Sharma PA-C  Authorized by: Kayode Sharma PA-C     ECG reviewed by me, the ED Provider: yes    Patient location:  ED  Rate:     ECG rate:  " 73    ECG rate assessment: normal    Rhythm:     Rhythm: sinus rhythm    Ectopy:     Ectopy: none    QRS:     QRS axis:  Normal    QRS intervals:  Normal  Conduction:     Conduction: normal    Comments:      No ischemic ST/T wave changes             ED Course                                             Medical Decision Making  This is an 82-year-old female presenting for evaluation after an unwitnessed syncopal episode and fall last night  Patient states that she went to use the bathroom to have a bowel movement overnight, and does not recall any straining while doing so but ultimately lost consciousness  She admits that she has little to no recollection of the event  She had fallen from the toilet seat and struck her head against the radiator in the bathroom  She has a scalp laceration, also reporting left-sided chest wall pain  She is at baseline mental status and appears in no acute respiratory distress  Differential diagnosis includes but is not limited to: syncope, vasovagal vs cardiac, arrhythmia, electrolyte derangements, anemia, EVAN; will obtain imaging to evaluate for any traumatic findings including acute intracranial hemorrhage or calvarial fracture, rib fracture/dislocation, pulmonary contusion, pneumothorax    Initial ED plan: ECG, labs, imaging, admission    Final ED Assessment: Vital signs reviewed on ED presentation, examination as above  All labs and imaging independently reviewed with imaging interpreted by the Radiologist   ECG without ischemic changes, troponin within normal limits  There are no traumatic findings reported on imaging  Laceration repair performed by me and documented in separate procedure note which was well-tolerated by the patient, hemostasis achieved    All test results reviewed with patient and granddaughter at bedside as well as recommendation for hospital admission for continued care and further management per dania, to include telemetry monitoring and PT/OT evaluations, which they are understanding of and agreeable with  Case discussed with Dr Jose Charles slim, who accepts patient for admission  The patient has remained hemodynamically stable without new or worsening symptoms during ED course and is stable for admission, bridging orders placed  Closed head injury, initial encounter: acute illness or injury  Laceration of scalp, initial encounter: acute illness or injury  Syncope: acute illness or injury  Amount and/or Complexity of Data Reviewed  Labs: ordered  Radiology: ordered  Risk  Prescription drug management  Decision regarding hospitalization  Disposition  Final diagnoses:   Syncope   Closed head injury, initial encounter   Laceration of scalp, initial encounter     Time reflects when diagnosis was documented in both MDM as applicable and the Disposition within this note     Time User Action Codes Description Comment    5/17/2023  9:56 AM Anay Jordan [[id number]] Syncope     5/17/2023  9:56 AM Anay Jordan [[id number]] Closed head injury, initial encounter       ED Disposition     ED Disposition   Admit    Condition   Stable    Date/Time   Wed May 17, 2023  9:56 AM    Comment   Case was discussed with   and the patient's admission status was agreed to be Admission Status: observation status to the service of Dr Jose Charles  Follow-up Information    None         Patient's Medications   Discharge Prescriptions    No medications on file       No discharge procedures on file      PDMP Review       Value Time User    PDMP Reviewed  Yes 1/4/2023  5:41 AM Jacuqie Aguilar MD          ED Provider  Electronically Signed by           Stephany Woo PA-C  05/17/23 8665

## 2023-05-17 NOTE — PLAN OF CARE
Problem: PHYSICAL THERAPY ADULT  Goal: Performs mobility at highest level of function for planned discharge setting  See evaluation for individualized goals  Description: Treatment/Interventions: Functional transfer training, LE strengthening/ROM, Elevations, Therapeutic exercise, Endurance training, Patient/family training, Bed mobility, Gait training, OT, Family          See flowsheet documentation for full assessment, interventions and recommendations  Note: Prognosis: Good  Problem List: Decreased strength, Decreased endurance, Impaired balance, Decreased mobility, Impaired sensation, Pain  Assessment: Pt is 82year old female seen for PT evaluation s/p [facility] to [facility] on 5/17/2023 with Syncope  PT consulted to assess pt's functional mobility and discharge needs  Order placed for PT evaluation and treatment  Comorbidities affecting pt's physical performance at time of assessment include hypothyroidism, hypertension, GERD, recurrent depression, and scalp laceration  Prior to hospitalization, pt was independent with all functional mobility without an AD for household distances and with a walker for community distances  Pt resides with her family, in a two level house, with four steps to enter  Personal factors affecting pt at time of initial evaluation include lives in a two story house, stairs to enter home, ambulating with an assistive device, inability to ambulate community distances, inability to navigate level surfaces without external assistance, positive fall history, difficulty performing ADLs, and inability to perform IADLs  Please find objective findings from PT assessment regarding body systems outlined above with impairments and limitations including weakness, impaired balance, decreased endurance, gait deviations, pain, decreased activity tolerance, decreased functional mobility tolerance, altered sensation, and fall risk   The following objective measures were performed on initial evaluation Barthel Index: 50/100, Modified Grainger: 4 (moderate/severe disability) and AM-PAC 6-Clicks: 79/67  Pt's clinical presentation is currently evolving seen in pt's presentation of need for ongoing medical management/monitoring, pt is a fall risk, and pt requires cues and assist for safety with functional mobility  Pt to benefit from continued PT treatment to address deficits as defined above and maximize pt's level of function and independence with mobility  From a PT standpoint, recommendation at time of discharge would be Home PT with family support pending pt's progress in order to facilitate return to prior level of function  Barriers to Discharge: Inaccessible home environment     PT Discharge Recommendation: Home with home health rehabilitation    See flowsheet documentation for full assessment

## 2023-05-17 NOTE — H&P
[address] [place] [address]  H&P  Name: Omero Gregorio 82 y o  female I MRN: [id number]  Unit/Bed#: ED 08 I Date of Admission: 5/17/2023   Date of Service: 5/17/2023 I Hospital Day: 0      Assessment/Plan   * Syncope  Assessment & Plan  81 yo with history of intermittent substernal chest ache/exertional dyspnea with plan for outpatient echo/stress test by PCP presents to ED following syncopal episode today  Went to bathroom and found herself on the floor lying in a pool of blood  She was eventually found by her granddaughter 2 hours later  She was oriented x3  She does not recall passing out or how long she lost consciousness  · CT head negative for acute findings  · CT C-spine negative for acute findings  · CT chest abdomen pelvis negative for any acute trauma    -Orthostats ordered  - Mild elevation in creatinine at 1 36 with baseline 1 1  Gentle hydration ordered for 10 hours  - Initial troponin negative  Continue to cycle  - 2D echo ordered  -Cardiology consulted  - Telemetry monitoring  - PT OT eval     Scalp laceration  Assessment & Plan  · Sustained scalp laceration due to head strike  · CT head negative for acute findings  · CT C-spine negative for acute findings  · CT chest abdomen pelvis negative for acute trauma  · Fall precautions  · Neurochecks  Hypertension  Assessment & Plan  · Blood pressure reading currently elevated  · Resume home atenolol/lisinopril  · Monitor pressure closely    Hypothyroidism  Assessment & Plan  · Continue Synthroid  GERD without esophagitis  Assessment & Plan  · Continue PPI  Depression, recurrent ([facility] [address] )  Assessment & Plan  · Continue home Xanax  VTE Prophylaxis: Heparin  / sequential compression device   Code Status: level 1  POLST: POLST form is not discussed and not completed at this time    Discussion with family: maribel granddaughter    Anticipated Length of Stay:  Patient will be admitted on an Observation basis with an anticipated length of stay of  Less than 2 midnights  Justification for Hospital Stay: syncopal work up    Total Time for Visit, including Counseling / Coordination of Care: 45 minutes  Greater than 50% of this total time spent on direct patient counseling and coordination of care  Chief Complaint:   syncope    History of Present Illness:    Nelda Jasso is a 82 y o  female with underlying history of hypertension hyperlipidemia hypothyroidism/GERD/prior TIA who presents to the ED after a syncopal episode today  Patient went to the bathroom and next found herself on the floor lying in a pool of blood  She was eventually found by her granddaughter 2 hours later when she was oriented x3  She does not recall events leading to her passing out or how long she lost consciousness for  She does report having intermittent episodes of substernal chest ache and exertional dyspnea ongoing for the last few weeks for which there was plan to do an outpatient echo/stress test   Currently she is chest pain-free  Review of Systems:    Review of Systems   Constitutional: Positive for activity change  Negative for chills and fever  HENT: Negative for ear pain and sore throat  Eyes: Negative for pain and visual disturbance  Respiratory: Positive for shortness of breath  Negative for cough  Cardiovascular: Positive for chest pain  Negative for palpitations  Gastrointestinal: Negative for abdominal pain, nausea and vomiting  Genitourinary: Negative for dysuria and hematuria  Musculoskeletal: Negative for arthralgias and back pain  Skin: Negative for color change and rash  Neurological: Positive for syncope  Negative for dizziness, seizures, facial asymmetry, speech difficulty, light-headedness, numbness and headaches  All other systems reviewed and are negative        Past Medical and Surgical History:     Past Medical History:   Diagnosis Date   • Benign essential hypertension     Last assessed: 9/11/14   • Disease of thyroid gland    • Essential tremor    • Fibromyalgia    • GERD (gastroesophageal reflux disease)    • History of nail disorders    • Hyperlipidemia    • Hypertension    • Palpitations    • Transient cerebral ischemia        Past Surgical History:   Procedure Laterality Date   • APPENDECTOMY     • EGD AND COLONOSCOPY  07/2020   • HYSTERECTOMY  01/01/2010   • CT LAPS SURG CHOLECYSTECTOMY W/CHOLANGIOGRAPHY N/A 4/4/2018    Procedure: LAPAROSCOPIC CHOLECYSTECTOMY WITH IOC;  Surgeon: Lili Edouard MD;  Location: [facility];  Service: General   • TUBAL LIGATION         Meds/Allergies:    Prior to Admission medications    Medication Sig Start Date End Date Taking?  Authorizing Provider   acetaminophen (TYLENOL) 325 mg tablet Take 1-2 tablets by mouth every 6 (six) hours as needed    Historical Provider, MD   ALPRAZolam Ruth Hence) 0 5 mg tablet take 1 tablet by mouth four times a day if needed for anxiety 1/4/23   Darrian Brand MD   atenolol (TENORMIN) 50 mg tablet take 1 tablet by mouth twice a day 12/7/22   Darrian Brand MD   Cholecalciferol (Vitamin D3) 50 MCG (2000 UT) capsule Take 1 capsule (2,000 Units total) by mouth daily  Patient not taking: Reported on 1/12/2023 1/4/23   Darrian Brand MD   imipramine (TOFRANIL) 10 mg tablet Take 1 tablet (10 mg total) by mouth daily at bedtime 4 nights per week 1/12/23 4/6/23  Darrian Brand MD   latanoprost (XALATAN) 0 005 % ophthalmic solution instill 1 drop into both eyes nightly 5/12/20   Historical Provider, MD   levothyroxine 50 mcg tablet take 1 tablet by mouth once daily except Sunday take 2 tabs 3/2/23   Darrian Brand MD   lisinopril (ZESTRIL) 5 mg tablet take 1 tablet by mouth once daily 5/9/23   Darrian Brand MD   pantoprazole (PROTONIX) 40 mg tablet Take 1 tablet (40 mg total) by mouth daily before breakfast 1/12/23   Darrian Brand MD   Probiotic Product (ALIGN) 4 MG CAPS Take 1 tablet by mouth daily    Historical Provider, MD   risedronate (ACTONEL) 35 mg tablet Take 1 tablet (35 mg total) by mouth every 7 days with water on empty stomach, nothing by mouth or lie down for next 30 minutes  10/19/22   Ciarra Dixon MD     I have reviewed home medications using allscripts  Allergies: Allergies   Allergen Reactions   • Other Drowsiness     Annotation - [id number]: ANTIDEPRESSANTS       Social History:     Marital Status:      Substance Use History:   Social History     Substance and Sexual Activity   Alcohol Use Not Currently   • Alcohol/week: 0 0 standard drinks    Comment: 0     Social History     Tobacco Use   Smoking Status Former   • Packs/day: 0 10   • Years: 50 00   • Pack years: 5 00   • Types: Cigarettes   • Start date: 82   • Quit date: 2013   • Years since quitting: 10 3   Smokeless Tobacco Never   Tobacco Comments    1 pack a week      Social History     Substance and Sexual Activity   Drug Use No       Family History:    non-contributory    Physical Exam:     Vitals:   Blood Pressure: 166/72 (05/17/23 1102)  Pulse: 72 (05/17/23 1102)  Temperature: 98 °F (36 7 °C) (05/17/23 0809)  Temp Source: Oral (05/17/23 0809)  Respirations: 18 (05/17/23 1100)  SpO2: 95 % (05/17/23 1100)    Physical Exam  Vitals reviewed  Constitutional:       General: She is not in acute distress  Appearance: She is not ill-appearing  HENT:      Head: Normocephalic and atraumatic  Nose: Nose normal  No congestion  Mouth/Throat:      Mouth: Mucous membranes are dry  Pharynx: Oropharynx is clear  Eyes:      Conjunctiva/sclera: Conjunctivae normal       Pupils: Pupils are equal, round, and reactive to light  Cardiovascular:      Rate and Rhythm: Normal rate and regular rhythm  Pulses: Normal pulses  Pulmonary:      Effort: Pulmonary effort is normal  No respiratory distress  Breath sounds: Normal breath sounds  No wheezing or rales  Abdominal:      General: Abdomen is flat  Bowel sounds are normal  There is no distension  Palpations: Abdomen is soft  Tenderness: There is no abdominal tenderness  There is no guarding  Musculoskeletal:         General: No swelling  Normal range of motion  Cervical back: Normal range of motion and neck supple  Skin:     General: Skin is warm and dry  Findings: No rash  Neurological:      General: No focal deficit present  Mental Status: She is alert and oriented to person, place, and time  Psychiatric:         Mood and Affect: Mood normal          Behavior: Behavior normal          Additional Data:     Lab Results: I have personally reviewed pertinent reports  Results from last 7 days   Lab Units 05/17/23  1110 05/17/23  0825   WBC Thousand/uL  --  5 13   HEMOGLOBIN g/dL  --  12 1   HEMATOCRIT %  --  36 2   PLATELETS Thousands/uL 174 181   NEUTROS PCT %  --  64   LYMPHS PCT %  --  17   MONOS PCT %  --  9   EOS PCT %  --  10*     Results from last 7 days   Lab Units 05/17/23  0825   SODIUM mmol/L 133*   POTASSIUM mmol/L 3 8   CHLORIDE mmol/L 101   CO2 mmol/L 26   BUN mg/dL 20   CREATININE mg/dL 1 36*   ANION GAP mmol/L 6   CALCIUM mg/dL 9 7   ALBUMIN g/dL 4 4   TOTAL BILIRUBIN mg/dL 0 55   ALK PHOS U/L 80   ALT U/L 10   AST U/L 22   GLUCOSE RANDOM mg/dL 110     Results from last 7 days   Lab Units 05/17/23  0825   INR  1 03                   Imaging: I have personally reviewed pertinent reports  CT head without contrast   Final Result by Pati Mendez MD (05/17 0761)      No acute intracranial abnormality  Stable chronic microangiopathic changes within the brain  The study was marked in [facility] for immediate notification  Workstation performed: [id number]         CT spine cervical without contrast   Final Result by Pati Mendez MD (05/17 0900)      No cervical spine fracture or traumatic malalignment  The study was marked in [facility] for immediate notification              Workstation performed: [id number]         CT chest abdomen pelvis w contrast Final Result by Klarissa Mayer MD (05/17 0915)      1  No acute traumatic injury in the chest, abdomen, and pelvis  2  Stable additional findings, as described above  The study was marked in [facility] for immediate notification  Workstation performed: [id number]         XR chest 1 view portable   Final Result by Marielos Montoya MD (39/78 7925)      No acute cardiopulmonary disease  No acute displaced fracture  Moderate hiatal hernia  Workstation performed: [id number]           Allscripts / Epic Records Reviewed: Yes     ** Please Note: This note has been constructed using a voice recognition system   **

## 2023-05-17 NOTE — ASSESSMENT & PLAN NOTE
· Sustained scalp laceration due to head strike  · CT head negative for acute findings  · CT C-spine negative for acute findings  · CT chest abdomen pelvis negative for acute trauma  · Fall precautions  · Neurochecks

## 2023-05-17 NOTE — PLAN OF CARE
Problem: OCCUPATIONAL THERAPY ADULT  Goal: Performs self-care activities at highest level of function for planned discharge setting  See evaluation for individualized goals  Description: Treatment Interventions: ADL retraining, Functional transfer training, Endurance training, Patient/family training, Equipment evaluation/education, Compensatory technique education, Continued evaluation, Energy conservation          See flowsheet documentation for full assessment, interventions and recommendations  Note: Limitation: Decreased ADL status, Decreased UE strength, Decreased endurance, Decreased self-care trans, Decreased high-level ADLs  Prognosis: Good  Assessment: Patient is a 82 y o  female seen for OT evaluation s/p admit to [id number] [place] [address] on 5/17/2023 w/Syncope  Commorbidities affecting patient's functional performance at time of assessment include: Scalp laceration, HTN, Hypothyroidism, GERD, Depression, essential tremor, fibromyalgia  Orders placed for OT evaluation and treatment  Performed at least two patient identifiers during session including name and wristband  Prior to admission,  Patient reports independnet with basic self care and mobility, needs assistance for LB ADLs and mobility intermittently  Patient ambulares without AD, does have a walker ause same PRN and outdoors  Personal factors affecting patient at time of initial evaluation include: steps to enter, difficulty performing ADLs and difficulty performing IADLs  Upon evaluation, patient requires minimal  assist for UB ADLs, minimal  assist for LB ADLs, transfers and functional ambulation in room and bathroom with minimal  assist, with the use of Rolling Walker  Occupational performance is affected by the following deficits: degenerative arthritic joint changes, dynamic sit/ stand balance deficit with poor standing tolerance time for self care and functional mobility and decreased activity tolerance    Patient to benefit from continued Occupational Therapy treatment while in the hospital to address deficits as defined above and maximize level of functional independence with ADLs and functional mobility  Occupational Performance areas to address include: bathing/ shower, dressing, toilet hygiene, transfer to all surfaces, functional mobility and emergency response  From OT standpoint, recommendation at time of d/c would be Home with daily checks, Home with family support, [address] and Home OT       OT Discharge Recommendation: Home with home health rehabilitation

## 2023-05-17 NOTE — CONSULTS
Consultation - Cardiology   Tan Ryan 82 y o  female MRN: [id number]  Unit/Bed#: ED 08 Encounter: [id number]  05/17/23  3:39 PM    Assessment/ Plan:  1  Syncope, unclear etiology  Troponins negative x3  Orthostatic vital signs negative  Monitor telemetry  Echocardiogram ordered and pending; if echocardiogram within normal limits plan for discharge and stress test as scheduled as an outpatient for Friday  Plan for outpatient 2-week event monitor  If echocardiogram found to be abnormal further ischemic evaluation inpatient will be warranted  2   Hypertension, elevated on admission  Orthostatic vital signs negative  Continue with atenolol, lisinopril  3   Hyponatremia, sodium 133  Continue to monitor    4  Acute kidney injury, creatinine on admission 1 3  Continue to follow  History of Present Illness   Physician Requesting Consult: Oral Watkins MD    Reason for Consult / Principal Problem: syncope    HPI: Tan Ryan is a 82y o  year old female who presents with syncope  Patient states she got up in the middle the night to use the bathroom and found herself laying on the floor  Patient states she thinks she fell off the toilet and lost consciousness  She hit her head on the radiator  Patient is unsure how long she was passed out for  She was kicking the wall until her granddaughter heard her approximately 2 hours later  Patient notes substernal chest pain and exertional dyspnea going on for the last couple of weeks and was scheduled to have outpatient stress test and echocardiogram on Friday  Currently without complaints of chest pain or shortness of breath  PMH: Hypertension, hyperlipidemia, hypothyroidism, GERD, prior TIA    Consults    EKG: Normal sinus rhythm      Review of Systems   Constitutional: Negative  Respiratory: Positive for shortness of breath  Cardiovascular: Positive for chest pain  Neurological: Positive for syncope  Hematological: Negative  Psychiatric/Behavioral: Negative  All other systems reviewed and are negative        Historical Information   Past Medical History:   Diagnosis Date   • Benign essential hypertension     Last assessed: 9/11/14   • Disease of thyroid gland    • Essential tremor    • Fibromyalgia    • GERD (gastroesophageal reflux disease)    • History of nail disorders    • Hyperlipidemia    • Hypertension    • Palpitations    • Transient cerebral ischemia      Past Surgical History:   Procedure Laterality Date   • APPENDECTOMY     • EGD AND COLONOSCOPY  07/2020   • HYSTERECTOMY  01/01/2010   • GA LAPS SURG CHOLECYSTECTOMY W/CHOLANGIOGRAPHY N/A 4/4/2018    Procedure: LAPAROSCOPIC CHOLECYSTECTOMY WITH IOC;  Surgeon: Vika Dodge MD;  Location: [facility];  Service: General   • TUBAL LIGATION       Social History     Substance and Sexual Activity   Alcohol Use Not Currently   • Alcohol/week: 0 0 standard drinks    Comment: 0     Social History     Substance and Sexual Activity   Drug Use No     Social History     Tobacco Use   Smoking Status Former   • Packs/day: 0 10   • Years: 50 00   • Pack years: 5 00   • Types: Cigarettes   • Start date: 1963   • Quit date: 2013   • Years since quitting: 10 3   Smokeless Tobacco Never   Tobacco Comments    1 pack a week        Family History:   Family History   Problem Relation Age of Onset   • Stroke Mother         ischemic stroke   • Hypertension Mother    • Heart disease Father    • Cancer Sister    • No Known Problems Sister    • No Known Problems Daughter    • No Known Problems Daughter    • No Known Problems Daughter    • No Known Problems Daughter    • No Known Problems Daughter    • No Known Problems Maternal Grandmother    • No Known Problems Paternal Grandmother    • No Known Problems Paternal Aunt    • No Known Problems Paternal Aunt    • No Known Problems Paternal Aunt    • No Known Problems Paternal Aunt    • No Known Problems Paternal Aunt    • Breast cancer Neg Hx "      Meds/Allergies   all current active meds have been reviewed and current meds:   Current Facility-Administered Medications   Medication Dose Route Frequency   • acetaminophen (TYLENOL) tablet 650 mg  650 mg Oral Q6H PRN   • ALPRAZolam (XANAX) tablet 0 5 mg  0 5 mg Oral BID PRN   • atenolol (TENORMIN) tablet 50 mg  50 mg Oral BID   • heparin (porcine) subcutaneous injection 5,000 Units  5,000 Units Subcutaneous Q8H [facility] & assisted   • levothyroxine tablet 50 mcg  50 mcg Oral Early Morning   • lisinopril (ZESTRIL) tablet 5 mg  5 mg Oral Daily   • multi-electrolyte (PLASMALYTE-A/ISOLYTE-S PH 7 4) IV solution  100 mL/hr Intravenous Continuous   • pantoprazole (PROTONIX) EC tablet 40 mg  40 mg Oral Daily Before Breakfast     Allergies   Allergen Reactions   • Other Drowsiness     Annotation - [id number]: ANTIDEPRESSANTS       Objective   Vitals: Blood pressure 166/72, pulse 68, temperature 98 °F (36 7 °C), temperature source Oral, resp  rate 20, height 4' 7\" (1 397 m), weight 49 4 kg (109 lb), SpO2 95 %, not currently breastfeeding , Body mass index is 25 33 kg/m² ,   Orthostatic Blood Pressures    Flowsheet Row Most Recent Value   Blood Pressure 166/72 filed at 05/17/2023 1415   Patient Position - Orthostatic VS Lying - Orthostatic VS filed at 05/17/2023 1741          Systolic (07EWQ), TRO:086 , Min:160 , BMO:102     Diastolic (99ZJM), NDO:35, Min:72, Max:80      No intake or output data in the 24 hours ending 05/17/23 1539    Invasive Devices     Peripheral Intravenous Line  Duration           Peripheral IV 05/17/23 Right Antecubital <1 day                    Physical Exam:  GEN: Alert and oriented, in no acute distress  Ill appearing and well nourished  HEENT: Sclera anicteric, conjunctivae pink, mucous membranes moist  Oropharynx clear  NECK: Supple, no carotid bruits, no significant JVD  Trachea midline, no thyromegaly  HEART: Regular rhythm, normal S1 and S2, no murmurs, clicks, gallops or rubs   PMI nondisplaced, no " thrills  LUNGS: decreased breath sounds bilaterally; no wheezes, rales, or rhonchi  No increased work of breathing or signs of respiratory distress  ABDOMEN: Soft, nontender, nondistended, normoactive bowel sounds  EXTREMITIES: Skin warm and well perfused, no clubbing, cyanosis, or edema  NEURO: No focal findings  Normal speech  Mood and affect normal    SKIN: Normal without suspicious lesions on exposed skin        Lab Results:     Troponins:   Results from last 7 days   Lab Units 05/17/23  1247 05/17/23  1110 05/17/23  0825   HS TNI 0HR ng/L  --   --  4   HS TNI 2HR ng/L  --  5  --    HS TNI 4HR ng/L 6  --   --    HSTNI D4 ng/L 2  --   --        CBC with diff:   Results from last 7 days   Lab Units 05/17/23  1110 05/17/23  0825   WBC Thousand/uL  --  5 13   HEMOGLOBIN g/dL  --  12 1   HEMATOCRIT %  --  36 2   MCV fL  --  95   PLATELETS Thousands/uL 174 181   MCH pg  --  31 8   MCHC g/dL  --  33 4   RDW %  --  12 3   MPV fL 9 1 9 8   NRBC AUTO /100 WBCs  --  0         CMP:   Results from last 7 days   Lab Units 05/17/23  0825   POTASSIUM mmol/L 3 8   CHLORIDE mmol/L 101   CO2 mmol/L 26   BUN mg/dL 20   CREATININE mg/dL 1 36*   CALCIUM mg/dL 9 7   AST U/L 22   ALT U/L 10   ALK PHOS U/L 80   EGFR ml/min/1 73sq m 35

## 2023-05-17 NOTE — PHYSICAL THERAPY NOTE
Physical Therapy Evaluation     Patient's Name: Araseli Wakefield    Admitting Diagnosis  Laceration of head [S01 [id number]]    Problem List  Patient Active Problem List   Diagnosis   • Chest heaviness   • Anxiety   • Abnormal EKG   • Vitamin D deficiency   • Irritable bowel syndrome   • Iron deficiency anemia   • Hypothyroidism   • Hypertension   • Hyperlipidemia   • GERD without esophagitis   • AVM (arteriovenous malformation)   • Venous insufficiency   • Impaired fasting glucose   • Mild intermittent asthma without complication   • Age-related osteoporosis without current pathological fracture   • Closed fracture of tenth thoracic vertebra with routine healing   • Fall as cause of accidental injury in home as place of occurrence   • Atrophic vaginitis   • Benign familial tremor   • Eustachian tube dysfunction, bilateral   • Tension-type headache, not intractable   • Hiatal hernia   • Syncope   • B12 deficiency   • Neuropathy   • Left knee injury   • Ankle swelling   • Chronic diastolic CHF (congestive heart failure) ([facility])   • Stage 3b chronic kidney disease ([facility] )   • Depression, recurrent ([facility] )   • Scalp laceration     Past Medical History  Past Medical History:   Diagnosis Date   • Benign essential hypertension     Last assessed: 9/11/14   • Disease of thyroid gland    • Essential tremor    • Fibromyalgia    • GERD (gastroesophageal reflux disease)    • History of nail disorders    • Hyperlipidemia    • Hypertension    • Palpitations    • Transient cerebral ischemia      Past Surgical History  Past Surgical History:   Procedure Laterality Date   • APPENDECTOMY     • EGD AND COLONOSCOPY  07/2020   • HYSTERECTOMY  01/01/2010   • [place] LAPS SURG CHOLECYSTECTOMY W/CHOLANGIOGRAPHY N/A 4/4/2018    Procedure: LAPAROSCOPIC CHOLECYSTECTOMY WITH IOC;  Surgeon: Wes Bamberger, MD;  Location: [facility] OR;  Service: General   • TUBAL LIGATION        05/17/23 1310   PT Last Visit   PT Visit Date 05/17/23   Note Type   Note type "Evaluation and Treatment   Pain Assessment   Pain Assessment Tool 0-10   Pain Score 2   Pain Location/Orientation Location: Head   Pain Onset/Description Onset: Ongoing   Hospital Pain Intervention(s) Repositioned; Ambulation/increased activity   Multiple Pain Sites Yes   Pain 2   Pain Score 2 8   Pain Location/Orientation 2 Orientation: Left; Location: Rib Cage   Pain Onset/Description 2 Onset: Ongoing   Hospital Pain Intervention(s) 2 Repositioned; Ambulation/increased activity   Restrictions/Precautions   Weight Bearing Precautions Per Order No   Braces or Orthoses Other (Comment)  (history of back brace-no longer wears)   Other Precautions Chair Alarm; Bed Alarm;Multiple lines; Fall Risk;Telemetry;Pain   Home Living   Type of [address] [place] [address] Two level; Able to live on main level with bedroom/bathroom; Performs ADLs on one level;Stairs to enter with rails  (4 SWETA)   Bathroom Shower/Tub Tub/shower unit   Bathroom Toilet Standard   Bathroom Equipment Grab bars in shower; Shower chair   [address] Walker   Additional Comments Pt ambulates without an AD for household distances; uses a walker for community distances  Prior Function   Level of [place] Independent with functional mobility; Needs assistance with ADLs; Needs assistance with IADLS   Lives With [facility] Help From Family   IADLs Independent with medication management; Family/Friend/Other provides transportation; Family/Friend/Other provides meals   Falls in the last 6 months 1 to 4  (3 falls)   Vocational Retired   General   Family/Caregiver Present Yes  (pt's granddaughter)   Cognition   Overall Cognitive Status WFL   Arousal/Participation Alert   Attention Within functional limits   Orientation Level Oriented X4   Memory Within functional limits   Following Commands Follows all commands and directions without difficulty   Comments Pt agreeable to PT  Subjective   Subjective \"I walked to the bathroom  \" " RLE Assessment   RLE Assessment X   Strength RLE   RLE Overall Strength 4-/5   LLE Assessment   LLE Assessment X   Strength LLE   LLE Overall Strength 4-/5   Light Touch   RLE Light Touch Impaired   RLE Light Touch Comments neuropathy   LLE Light Touch Impaired   LLE Light Touch Comments neuropathy   Bed Mobility   Supine to Sit 4  Minimal assistance   Additional items Assist x 1;HOB elevated; Increased time required;Verbal cues   Sit to Supine 4  Minimal assistance   Additional items Assist x 1; Increased time required;Verbal cues;LE management   Additional Comments Pt exhibits right lateral trunk lean when sitting unsupported at EOB; verbal cues for upright posture   Transfers   Sit to Stand 4  Minimal assistance   Additional items Assist x 1; Increased time required;Verbal cues   Stand to Sit 4  Minimal assistance   Additional items Assist x 1; Increased time required;Verbal cues   Ambulation/Elevation   Gait pattern Decreased toe off;Decreased heel strike;Decreased hip extension; Excessively slow; Step to;Short stride; Shuffling   Gait Assistance 4  Minimal assist   Additional items Assist x 1;Verbal cues   Assistive Device Rolling walker   Distance 60 feet with 1 standing rest break   Balance   Static Sitting Fair +   Dynamic Sitting Fair   Static Standing Fair -   Dynamic Standing Poor +   Ambulatory Poor +   Endurance Deficit   Endurance Deficit Yes   Endurance Deficit Description decreased activity tolerance   Activity Tolerance   Activity Tolerance Patient tolerated treatment well   Medical Staff Made Aware OT Johnnie Host  (Co-evaluation performed with OT secondary to complex medical condition of patient and regression of functional status from baseline  PT/OT goals were addressed separately )   Assessment   Prognosis Good   Problem List Decreased strength;Decreased endurance; Impaired balance;Decreased mobility; Impaired sensation;Pain   Assessment Pt is 82year old female seen for PT evaluation s/p [facility]/2023 with Syncope  PT consulted to assess pt's functional mobility and discharge needs  Order placed for PT evaluation and treatment  Comorbidities affecting pt's physical performance at time of assessment include hypothyroidism, hypertension, GERD, recurrent depression, and scalp laceration  Prior to hospitalization, pt was independent with all functional mobility without an AD for household distances and with a walker for community distances  Pt resides with her family, in a two level house, with four steps to enter  Personal factors affecting pt at time of initial evaluation include lives in a two story house, stairs to enter home, ambulating with an assistive device, inability to ambulate community distances, inability to navigate level surfaces without external assistance, positive fall history, difficulty performing ADLs, and inability to perform IADLs  Please find objective findings from PT assessment regarding body systems outlined above with impairments and limitations including weakness, impaired balance, decreased endurance, gait deviations, pain, decreased activity tolerance, decreased functional mobility tolerance, altered sensation, and fall risk  The following objective measures were performed on initial evaluation Barthel Index: 50/100, Modified Cory: 4 (moderate/severe disability) and AM-PAC 6-Clicks: 95/98  Pt's clinical presentation is currently evolving seen in pt's presentation of need for ongoing medical management/monitoring, pt is a fall risk, and pt requires cues and assist for safety with functional mobility  Pt to benefit from continued PT treatment to address deficits as defined above and maximize pt's level of function and independence with mobility  From a PT standpoint, recommendation at time of discharge would be Home PT with family support pending pt's progress in order to facilitate return to prior level of function     Barriers to Discharge Inaccessible home environment   Goals   STG Expiration Date 05/27/23   Short Term Goal #1 In 10 days: Increase bilateral LE strength 1/2 grade to facilitate independent mobility, Perform all bed mobility tasks modified independent to decrease caregiver burden, Perform all transfers modified independent to improve independence, Ambulate > 150 ft  with least restrictive assistive device modified independent w/o LOB and w/ normalized gait pattern 100% of the time, Navigate 4 stairs with close supervision with bilateral handrail to facilitate return to previous living environment and Increase all balance 1/2 grade to decrease risk for falls   PT Treatment Day 1   Plan   Treatment/Interventions Functional transfer training;LE strengthening/ROM; Elevations; Therapeutic exercise; Endurance training;Patient/family training;Bed mobility;Gait training;OT;Family   PT Frequency 2-3x/wk   Recommendation   PT Discharge Recommendation Home with home health rehabilitation   AM-PAC Basic Mobility Inpatient   Turning in Flat Bed Without Bedrails 3   Lying on Back to Sitting on Edge of Flat Bed Without Bedrails 3   Moving Bed to Chair 3   Standing Up From Chair Using Arms 3   Walk in Room 3   Climb 3-5 Stairs With Railing 2   Basic Mobility Inpatient Raw Score 17   Basic Mobility Standardized Score 39 67   Highest Level Of Mobility   -HL Goal 5: Stand one or more mins   -HLM Achieved 7: Walk 25 feet or more   Modified Clarion Scale   Modified Clarion Scale 4   Barthel Index   Feeding 10   Bathing 0   Grooming Score 0   Dressing Score 5   Bladder Score 10   Bowels Score 10   Toilet Use Score 5   Transfers (Bed/Chair) Score 10   Mobility (Level Surface) Score 0   Stairs Score 0   Barthel Index Score 50   Additional Treatment Session   Start Time 1300   End Time 1310   Treatment Assessment Pt agreeable to PT treatment session following PT evaluation  Pt performed seated therapeutic exercise as indicated below   Pt required verbal cues for correct technique and form  Pt tolerated therapeutic exercise well without complaints of pain  Pt continues to exhibit decreased lower extremity strength, impaired balance, decreased endurance, gait deviations, and decreased functional mobility  PT to continue to recommend home PT with family support  PT to continue to follow and treat as appropriate  Exercises   Hip Flexion Sitting;10 reps;AROM; Bilateral   Hip Adduction Sitting;10 reps;AROM; Bilateral   Knee AROM Long Arc Quad Sitting;10 reps;AROM; Bilateral   Ankle Pumps Sitting;15 reps;AROM; Bilateral   End of Consult   Patient Position at End of Consult Supine; All needs within reach     PT Evaluation Time: 7103-2979    PT Treatment Time: 6458-1356  10 minutes    Kevin Loaiza PT, DPT

## 2023-05-17 NOTE — ASSESSMENT & PLAN NOTE
83 yo with history of intermittent substernal chest ache/exertional dyspnea with plan for outpatient echo/stress test by PCP presents to ED following syncopal episode today  Went to bathroom and found herself on the floor lying in a pool of blood  She was eventually found by her granddaughter 2 hours later  She was oriented x3  She does not recall passing out or how long she lost consciousness  · CT head negative for acute findings  · CT C-spine negative for acute findings  · CT chest abdomen pelvis negative for any acute trauma    -Orthostats ordered  - Mild elevation in creatinine at 1 36 with baseline 1 1  Gentle hydration ordered for 10 hours  - Initial troponin negative  Continue to cycle  - 2D echo ordered  -Cardiology consulted    - Telemetry monitoring  - PT OT donyal

## 2023-05-18 VITALS
BODY MASS INDEX: 24.9 KG/M2 | DIASTOLIC BLOOD PRESSURE: 62 MMHG | WEIGHT: 107.58 LBS | HEIGHT: 55 IN | HEART RATE: 65 BPM | OXYGEN SATURATION: 96 % | RESPIRATION RATE: 18 BRPM | SYSTOLIC BLOOD PRESSURE: 154 MMHG | TEMPERATURE: 97.3 F

## 2023-05-18 LAB
ANION GAP SERPL CALCULATED.3IONS-SCNC: 9 MMOL/L (ref 4–13)
BUN SERPL-MCNC: 16 MG/DL (ref 5–25)
CALCIUM SERPL-MCNC: 9.8 MG/DL (ref 8.4–10.2)
CHLORIDE SERPL-SCNC: 105 MMOL/L (ref 96–108)
CO2 SERPL-SCNC: 24 MMOL/L (ref 21–32)
CREAT SERPL-MCNC: 1.19 MG/DL (ref 0.6–1.3)
ERYTHROCYTE [DISTWIDTH] IN BLOOD BY AUTOMATED COUNT: 12.4 % (ref 11.6–15.1)
GFR SERPL CREATININE-BSD FRML MDRD: 41 ML/MIN/1.73SQ M
GLUCOSE P FAST SERPL-MCNC: 101 MG/DL (ref 65–99)
GLUCOSE SERPL-MCNC: 101 MG/DL (ref 65–140)
HCT VFR BLD AUTO: 37 % (ref 34.8–46.1)
HGB BLD-MCNC: 12.3 G/DL (ref 11.5–15.4)
MCH RBC QN AUTO: 31.6 PG (ref 26.8–34.3)
MCHC RBC AUTO-ENTMCNC: 33.2 G/DL (ref 31.4–37.4)
MCV RBC AUTO: 95 FL (ref 82–98)
PLATELET # BLD AUTO: 186 THOUSANDS/UL (ref 149–390)
PMV BLD AUTO: 10 FL (ref 8.9–12.7)
POTASSIUM SERPL-SCNC: 3.7 MMOL/L (ref 3.5–5.3)
RBC # BLD AUTO: 3.89 MILLION/UL (ref 3.81–5.12)
SODIUM SERPL-SCNC: 138 MMOL/L (ref 135–147)
WBC # BLD AUTO: 6.39 THOUSAND/UL (ref 4.31–10.16)

## 2023-05-18 RX ADMIN — LISINOPRIL 5 MG: 5 TABLET ORAL at 08:29

## 2023-05-18 RX ADMIN — LEVOTHYROXINE SODIUM 50 MCG: 0.05 TABLET ORAL at 06:16

## 2023-05-18 RX ADMIN — PANTOPRAZOLE SODIUM 40 MG: 40 TABLET, DELAYED RELEASE ORAL at 06:15

## 2023-05-18 RX ADMIN — ATENOLOL 50 MG: 50 TABLET ORAL at 08:29

## 2023-05-18 RX ADMIN — ACETAMINOPHEN 650 MG: 325 TABLET ORAL at 08:29

## 2023-05-18 RX ADMIN — HEPARIN SODIUM 5000 UNITS: 5000 INJECTION INTRAVENOUS; SUBCUTANEOUS at 06:15

## 2023-05-18 RX ADMIN — ALPRAZOLAM 0.5 MG: 0.5 TABLET ORAL at 07:43

## 2023-05-18 NOTE — PLAN OF CARE
Problem: MOBILITY - ADULT  Goal: Maintain or return to baseline ADL function  Description: INTERVENTIONS:  -  Assess patient's ability to carry out ADLs; assess patient's baseline for ADL function and identify physical deficits which impact ability to perform ADLs (bathing, care of mouth/teeth, toileting, grooming, dressing, etc )  - Assess/evaluate cause of self-care deficits   - Assess range of motion  - Assess patient's mobility; develop plan if impaired  - Assess patient's need for assistive devices and provide as appropriate  - Encourage maximum independence but intervene and supervise when necessary  - Involve family in performance of ADLs  - Assess for home care needs following discharge   - Consider OT consult to assist with ADL evaluation and planning for discharge  - Provide patient education as appropriate  Outcome: Progressing  Goal: Maintains/Returns to pre admission functional level  Description: INTERVENTIONS:  - Perform BMAT or MOVE assessment daily    - Set and communicate daily mobility goal to care team and patient/family/caregiver  - Collaborate with rehabilitation services on mobility goals if consulted  - Perform Range of Motion  times a day  - Reposition patient every  hours    - Dangle patient  times a day  - Stand patient  times a day  - Ambulate patient  times a day  - Out of bed to chair  times a day   - Out of bed for meals  times a day  - Out of bed for toileting  - Record patient progress and toleration of activity level   Outcome: Progressing     Problem: Potential for Falls  Goal: Patient will remain free of falls  Description: INTERVENTIONS:  - Educate patient/family on patient safety including physical limitations  - Instruct patient to call for assistance with activity   - Consult OT/PT to assist with strengthening/mobility   - Keep Call bell within reach  - Keep bed low and locked with side rails adjusted as appropriate  - Keep care items and personal belongings within Shy Oden is a 17year old 1 month old male who was brought in for his  Sports Physical (and well child) visit.   Subjective   History was provided by patient and father  HPI:   Patient presents for:  Patient presents with:  Sports Physical: an Tobacco/Alcohol/drugs/sexual activity: No    Review of Systems:  As documented in HPI  Constitutional:   no change in appetite, no weight concerns, no sleep changes  HEENT:   no eye/vision concerns, no ear/hearing concerns and no cold symptoms  Respira reach  - Initiate and maintain comfort rounds  - Make Fall Risk Sign visible to staff  - Offer Toileting every  Hours, in advance of need  - Initiate/Maintain alarm  - Obtain necessary fall risk management equipment:   - Apply yellow socks and bracelet for high fall risk patients  - Consider moving patient to room near nurses station  Outcome: Progressing     Problem: Prexisting or High Potential for Compromised Skin Integrity  Goal: Skin integrity is maintained or improved  Description: INTERVENTIONS:  - Identify patients at risk for skin breakdown  - Assess and monitor skin integrity  - Assess and monitor nutrition and hydration status  - Monitor labs   - Assess for incontinence   - Turn and reposition patient  - Assist with mobility/ambulation  - Relieve pressure over bony prominences  - Avoid friction and shearing  - Provide appropriate hygiene as needed including keeping skin clean and dry  - Evaluate need for skin moisturizer/barrier cream  - Collaborate with interdisciplinary team   - Patient/family teaching  - Consider wound care consult   Outcome: Progressing     Problem: PAIN - ADULT  Goal: Verbalizes/displays adequate comfort level or baseline comfort level  Description: Interventions:  - Encourage patient to monitor pain and request assistance  - Assess pain using appropriate pain scale  - Administer analgesics based on type and severity of pain and evaluate response  - Implement non-pharmacological measures as appropriate and evaluate response  - Consider cultural and social influences on pain and pain management  - Notify physician/advanced practitioner if interventions unsuccessful or patient reports new pain  Outcome: Progressing     Problem: INFECTION - ADULT  Goal: Absence or prevention of progression during hospitalization  Description: INTERVENTIONS:  - Assess and monitor for signs and symptoms of infection  - Monitor lab/diagnostic results  - Monitor all insertion sites, i e  indwelling lines, deformities, full ROM of all extremities  Extremities: no deformities, pulses equal upper and lower extremities   Neurologic: exam appropriate for age, reflexes grossly normal for age and motor skills grossly normal for age    Psychiatric: behavior appropr tubes, and drains  - Monitor endotracheal if appropriate and nasal secretions for changes in amount and color  - [place] appropriate cooling/warming therapies per order  - Administer medications as ordered  - Instruct and encourage patient and family to use good hand hygiene technique  - Identify and instruct in appropriate isolation precautions for identified infection/condition  Outcome: Progressing  Goal: Absence of fever/infection during neutropenic period  Description: INTERVENTIONS:  - Monitor WBC    Outcome: Progressing     Problem: SAFETY ADULT  Goal: Maintain or return to baseline ADL function  Description: INTERVENTIONS:  -  Assess patient's ability to carry out ADLs; assess patient's baseline for ADL function and identify physical deficits which impact ability to perform ADLs (bathing, care of mouth/teeth, toileting, grooming, dressing, etc )  - Assess/evaluate cause of self-care deficits   - Assess range of motion  - Assess patient's mobility; develop plan if impaired  - Assess patient's need for assistive devices and provide as appropriate  - Encourage maximum independence but intervene and supervise when necessary  - Involve family in performance of ADLs  - Assess for home care needs following discharge   - Consider OT consult to assist with ADL evaluation and planning for discharge  - Provide patient education as appropriate  Outcome: Progressing  Goal: Maintains/Returns to pre admission functional level  Description: INTERVENTIONS:  - Perform BMAT or MOVE assessment daily    - Set and communicate daily mobility goal to care team and patient/family/caregiver  - Collaborate with rehabilitation services on mobility goals if consulted  - Perform Range of Motion  times a day  - Reposition patient every  hours    - Dangle patient  times a day  - Stand patient  times a day  - Ambulate patient  times a day  - Out of bed to chair  times a day   - Out of bed for meals  times a day  - Out of bed for toileting  - Record patient progress and toleration of activity level   Outcome: Progressing  Goal: Patient will remain free of falls  Description: INTERVENTIONS:  - Educate patient/family on patient safety including physical limitations  - Instruct patient to call for assistance with activity   - Consult OT/PT to assist with strengthening/mobility   - Keep Call bell within reach  - Keep bed low and locked with side rails adjusted as appropriate  - Keep care items and personal belongings within reach  - Initiate and maintain comfort rounds  - Make Fall Risk Sign visible to staff  - Offer Toileting every  Hours, in advance of need  - Initiate/Maintain alarm  - Obtain necessary fall risk management equipment: Apply yellow socks and bracelet for high fall risk patients  - Consider moving patient to room near nurses station  Outcome: Progressing     Problem: DISCHARGE PLANNING  Goal: Discharge to home or other facility with appropriate resources  Description: INTERVENTIONS:  - Identify barriers to discharge w/patient and caregiver  - Arrange for needed discharge resources and transportation as appropriate  - Identify discharge learning needs (meds, wound care, etc )  - Arrange for interpretive services to assist at discharge as needed  - Refer to Case Management Department for coordinating discharge planning if the patient needs post-hospital services based on physician/advanced practitioner order or complex needs related to functional status, cognitive ability, or social support system  Outcome: Progressing     Problem: Knowledge Deficit  Goal: Patient/family/caregiver demonstrates understanding of disease process, treatment plan, medications, and discharge instructions  Description: Complete learning assessment and assess knowledge base    Interventions:  - Provide teaching at level of understanding  - Provide teaching via preferred learning methods  Outcome: Progressing

## 2023-05-18 NOTE — DISCHARGE SUMMARY
Discharge Summary - Trevin Whitfield 82 y o  female MRN: [id number]  Unit/Bed#: -01 Encounter: [id number]    Admission Date:    5/17/2023   Discharge Date:   05/18/23   Admitting Diagnosis:   Syncope [R55]  Laceration of head [S01 91XA]  Closed head injury, initial encounter [S09 90XA]  Laceration of scalp, initial encounter [S01 01XA]  Admitting Provider:   Nikki Craig MD  Discharge Provider:   Varun Fong MD     Primary Care Physician at Discharge:   Varun Fong MD,[phone]    HPI: From H&P by Dr Daniel Workmantio is a 82 y o  female with underlying history of hypertension hyperlipidemia hypothyroidism/GERD/prior TIA who presents to the ED after a syncopal episode today  Patient went to the bathroom and next found herself on the floor lying in a pool of blood  She was eventually found by her granddaughter 2 hours later when she was oriented x3  She does not recall events leading to her passing out or how long she lost consciousness for  She does report having intermittent episodes of substernal chest ache and exertional dyspnea ongoing for the last few weeks for which there was plan to do an outpatient echo/stress test   Currently she is chest pain-free  Procedures Performed:   Orders Placed This Encounter   Procedures   • ED ECG Documentation Only   • Laceration repair       Hospital Course:   Syncope-likely vasovagal   Syncope was unwitnessed but occurred when patient was using restroom  Her pajama bottoms were down when she came to  Orthostatic vital signs x2, CTs, labs including troponins, telemetry and echo all unremarkable  Patient is scheduled for outpatient stress test tomorrow  She was evaluated by cardiology and felt to be stable for discharge with close outpatient follow-up  left frontal scalp laceration-well opposed with 3 staples    Will remove in the office next week    Hypertension-blood pressures were slightly elevated during the hospitalization and will be followed closely in the outpatient setting  No medication adjustments were made  She was evaluated by physical therapy and deemed stable for discharge with home therapy  Discharge plan follow-up discussed with daughter, Rosio Kang by phone  Current Facility-Administered Medications:   •  acetaminophen (TYLENOL) tablet 650 mg, 650 mg, Oral, Q6H PRN, Tommy Acevedo MD, 650 mg at 05/18/23 3163  •  ALPRAZolam (XANAX) tablet 0 5 mg, 0 5 mg, Oral, BID PRN, Tommy Acevedo MD, 0 5 mg at 05/18/23 0743  •  atenolol (TENORMIN) tablet 50 mg, 50 mg, Oral, BID, Tommy Acevedo MD, 50 mg at 05/18/23 8754  •  heparin (porcine) subcutaneous injection 5,000 Units, 5,000 Units, Subcutaneous, Q8H [facility], 5,000 Units at 05/18/23 0615 **AND** [COMPLETED] Platelet count, , , Once, Tommy Acevedo MD  •  levothyroxine tablet 50 mcg, 50 mcg, Oral, Early Morning, Tommy Acevedo MD, 50 mcg at 05/18/23 3716  •  lisinopril (ZESTRIL) tablet 5 mg, 5 mg, Oral, Daily, Tommy Acevedo MD, 5 mg at 05/18/23 7038  •  pantoprazole (PROTONIX) EC tablet 40 mg, 40 mg, Oral, Daily Before Breakfast, Tommy Acevedo MD, 40 mg at 05/18/23 0453      Consulting Providers   Cardiology    Significant Findings, Care, Treatment and Services Provided:   CT brain:  IMPRESSION:     No acute intracranial abnormality  Stable chronic microangiopathic changes within the brain      The study was marked in EPIC for immediate notification  CT chest, Abdomen and pelvis:  IMPRESSION:     1  No acute traumatic injury in the chest, abdomen, and pelvis      2  Stable additional findings, as described above  CT cervical spine:  IMPRESSION:     No cervical spine fracture or traumatic malalignment      The study was marked in EPIC for immediate notification  Echocardiogram:  •  Left Ventricle: Left ventricular cavity size is normal  Wall thickness is normal  Systolic function is normal (60%)   Wall motion is normal  Diastolic function is mildly abnormal, consistent with grade I (abnormal) relaxation  •  Right Ventricle: Right ventricular cavity size is normal  Systolic function is normal   •  Aortic Valve: There is trace regurgitation  •  Mitral Valve: There is moderate annular calcification  There is mild regurgitation  •  Tricuspid Valve: There is mild regurgitation  The right ventricular systolic pressure is normal     Complications:  None  Physical Exam:  General Appearance:    Alert, cooperative, no distress   Head:    Normocephalic, without obvious abnormality, atraumatic   Eyes:    PERRL, conjunctiva/corneas clear, EOM's intact       Nose:   Moist mucous membranes, no drainage or sinus tenderness   Throat:   No tenderness, no exudates   Neck:   Supple, symmetrical, trachea midline, no JVD   Lungs:     Clear to auscultation bilaterally, respirations unlabored   Heart:    Regular rate and rhythm, S1 and S2 normal, no murmur, rub   or gallop   Abdomen: Soft, non-tender, positive bowel sounds, no masses, no organomegaly   Extremities:  No pedal edema, calf tenderness  Distal pulses palpable  Neurologic:     CNII-XII intact      Labs:   Lab Results   Component Value Date    WBC 6 39 05/18/2023    WBC 6 51 09/10/2015    RBC 3 89 05/18/2023    RBC 3 67 (L) 09/10/2015    HGB 12 3 05/18/2023    HGB 10 9 (L) 09/10/2015    HCT 37 0 05/18/2023    HCT 32 8 (L) 09/10/2015    MCV 95 05/18/2023    MCV 89 09/10/2015    MCH 31 6 05/18/2023    MCH 29 7 09/10/2015    RDW 12 4 05/18/2023    RDW 14 2 09/10/2015     05/18/2023     09/10/2015     Lab Results   Component Value Date    CREATININE 1 19 05/18/2023    CREATININE 1 14 09/10/2015    BUN 16 05/18/2023    BUN 15 09/10/2015     09/10/2015    K 3 7 05/18/2023    K 3 8 09/10/2015     05/18/2023     09/10/2015    CO2 24 05/18/2023    CO2 28 09/10/2015    GLUCOSE 85 09/10/2015    PROT 7 6 10/01/2015    PROT 7 6 09/10/2015    ALKPHOS 80 05/17/2023    ALKPHOS 96 09/10/2015    ALT 10 05/17/2023    ALT 17 09/10/2015    AST 22 05/17/2023    AST 13 09/10/2015    BILIDIR 0 11 07/26/2018    BILIDIR 0 16 09/10/2015         Discharge Diagnosis:   Principal Problem:    Syncope  Active Problems:    Hypothyroidism    Hypertension    GERD without esophagitis    Depression, recurrent ([facility] [address] )    Scalp laceration  Resolved Problems:    * No resolved hospital problems  *      Condition at Discharge:   Good     Code Status: Level 1 - Full Code  Advance Directive and Living Will: <no information>  Power of :    POLST:      Discharge instructions/Information to patient and family:   See after visit summary for information provided to patient and family  Provisions for Follow-Up Care:  See after visit summary for information related to follow-up care and any pertinent home health orders  Disposition:   Home    Planned Readmission:   No    Discharge Statement   I spent 32 minutes discharging the patient  This time was spent on the day of discharge  I had direct contact with the patient on the day of discharge  Additional documentation is required if more than 30 minutes were spent on discharge  Greater than 50% of the total time was spent examining patient, answering all patient questions, arranging and discussing plan of care with patient as well as directly providing post-discharge instructions  Additional time then spent on discharge activities  Discharge Medications:  See after visit summary for reconciled discharge medications provided to patient and family        Kim Morris MD  5/18/2023,9:05 AM

## 2023-05-18 NOTE — PLAN OF CARE
Problem: MOBILITY - ADULT  Goal: Maintain or return to baseline ADL function  Description: INTERVENTIONS:  -  Assess patient's ability to carry out ADLs; assess patient's baseline for ADL function and identify physical deficits which impact ability to perform ADLs (bathing, care of mouth/teeth, toileting, grooming, dressing, etc )  - Assess/evaluate cause of self-care deficits   - Assess range of motion  - Assess patient's mobility; develop plan if impaired  - Assess patient's need for assistive devices and provide as appropriate  - Encourage maximum independence but intervene and supervise when necessary  - Involve family in performance of ADLs  - Assess for home care needs following discharge   - Consider OT consult to assist with ADL evaluation and planning for discharge  - Provide patient education as appropriate  Outcome: Progressing  Goal: Maintains/Returns to pre admission functional level  Description: INTERVENTIONS:  - Perform BMAT or MOVE assessment daily    - Set and communicate daily mobility goal to care team and patient/family/caregiver  - Collaborate with rehabilitation services on mobility goals if consulted  - Perform Range of Motion  times a day  - Reposition patient every  hours    - Dangle patient  times a day  - Stand patient  times a day  - Ambulate patient  times a day  - Out of bed to chair  times a day   - Out of bed for meals  times a day  - Out of bed for toileting  - Record patient progress and toleration of activity level   Outcome: Progressing     Problem: Potential for Falls  Goal: Patient will remain free of falls  Description: INTERVENTIONS:  - Educate patient/family on patient safety including physical limitations  - Instruct patient to call for assistance with activity   - Consult OT/PT to assist with strengthening/mobility   - Keep Call bell within reach  - Keep bed low and locked with side rails adjusted as appropriate  - Keep care items and personal belongings within reach  - Initiate and maintain comfort rounds  - Make Fall Risk Sign visible to staff  - Offer Toileting every  Hours, in advance of need  - Initiate/Maintain alarm  - Obtain necessary fall risk management equipment:  - Apply yellow socks and bracelet for high fall risk patients  - Consider moving patient to room near nurses station  Outcome: Progressing

## 2023-05-19 ENCOUNTER — HOSPITAL ENCOUNTER (OUTPATIENT)
Dept: NON INVASIVE DIAGNOSTICS | Facility: CLINIC | Age: 88
Discharge: HOME | End: 2023-05-19

## 2023-05-19 ENCOUNTER — TRANSITIONAL CARE MANAGEMENT (OUTPATIENT)
Age: 88
End: 2023-05-19

## 2023-05-19 VITALS
WEIGHT: 107 LBS | SYSTOLIC BLOOD PRESSURE: 190 MMHG | HEART RATE: 64 BPM | DIASTOLIC BLOOD PRESSURE: 78 MMHG | BODY MASS INDEX: 24.76 KG/M2 | HEIGHT: 55 IN | OXYGEN SATURATION: 100 %

## 2023-05-19 DIAGNOSIS — R06.09 EXERTIONAL DYSPNEA: ICD-10-CM

## 2023-05-19 LAB
CHEST PAIN STATEMENT: NORMAL
MAX DIASTOLIC BP: 78 MMHG
MAX HEART RATE: 91 BPM
MAX PREDICTED HEART RATE: 135 BPM
MAX. SYSTOLIC BP: 190 MMHG
NUC STRESS DIASTOLIC VOLUME INDEX: 28 ML/M2
NUC STRESS EJECTION FRACTION: 88 %
NUC STRESS SYSTOLIC VOLUME INDEX: 3 ML/M2
PROTOCOL NAME: NORMAL
RATE PRESSURE PRODUCT: NORMAL
REASON FOR TERMINATION: NORMAL
SL CV REST NUCLEAR ISOTOPE DOSE: 10.97 MCI
SL CV STRESS NUCLEAR ISOTOPE DOSE: 32.3 MCI
SL CV STRESS RECOVERY BP: NORMAL MMHG
SL CV STRESS RECOVERY HR: 89 BPM
SL CV STRESS RECOVERY O2 SAT: 100 %
STRESS ANGINA INDEX: 0
STRESS BASELINE BP: NORMAL MMHG
STRESS BASELINE HR: 64 BPM
STRESS O2 SAT REST: 100 %
STRESS PEAK HR: 91 BPM
STRESS POST O2 SAT PEAK: 100 %
STRESS POST PEAK BP: 136 MMHG
STRESS/REST PERFUSION RATIO: 1.06
TARGET HR FORMULA: NORMAL
TEST INDICATION: NORMAL
TIME IN EXERCISE PHASE: NORMAL

## 2023-05-19 RX ORDER — REGADENOSON 0.08 MG/ML
0.4 INJECTION, SOLUTION INTRAVENOUS ONCE
Status: COMPLETED | OUTPATIENT
Start: 2023-05-19 | End: 2023-05-19

## 2023-05-19 RX ORDER — AMINOPHYLLINE DIHYDRATE 25 MG/ML
50 INJECTION, SOLUTION INTRAVENOUS ONCE
Status: COMPLETED | OUTPATIENT
Start: 2023-05-19 | End: 2023-05-19

## 2023-05-19 RX ADMIN — REGADENOSON 0.4 MG: 0.08 INJECTION, SOLUTION INTRAVENOUS at 13:07

## 2023-05-19 RX ADMIN — AMINOPHYLLINE 50 MG: 25 INJECTION, SOLUTION INTRAVENOUS at 13:40

## 2023-05-22 ENCOUNTER — OFFICE VISIT (OUTPATIENT)
Age: 88
End: 2023-05-22

## 2023-05-22 VITALS
BODY MASS INDEX: 25.22 KG/M2 | SYSTOLIC BLOOD PRESSURE: 124 MMHG | HEART RATE: 68 BPM | WEIGHT: 109 LBS | HEIGHT: 55 IN | OXYGEN SATURATION: 100 % | TEMPERATURE: 97.1 F | DIASTOLIC BLOOD PRESSURE: 72 MMHG | RESPIRATION RATE: 18 BRPM

## 2023-05-22 DIAGNOSIS — E03.8 OTHER SPECIFIED HYPOTHYROIDISM: ICD-10-CM

## 2023-05-22 DIAGNOSIS — R55 SYNCOPE, UNSPECIFIED SYNCOPE TYPE: ICD-10-CM

## 2023-05-22 DIAGNOSIS — S01.01XD LACERATION OF SCALP, SUBSEQUENT ENCOUNTER: ICD-10-CM

## 2023-05-22 DIAGNOSIS — M54.50 CHRONIC LOW BACK PAIN WITHOUT SCIATICA, UNSPECIFIED BACK PAIN LATERALITY: ICD-10-CM

## 2023-05-22 DIAGNOSIS — F33.9 DEPRESSION, RECURRENT (HCC): ICD-10-CM

## 2023-05-22 DIAGNOSIS — Z76.89 ENCOUNTER FOR SUPPORT AND COORDINATION OF TRANSITION OF CARE: Primary | ICD-10-CM

## 2023-05-22 DIAGNOSIS — I10 HYPERTENSION, UNSPECIFIED TYPE: ICD-10-CM

## 2023-05-22 DIAGNOSIS — G89.29 CHRONIC LOW BACK PAIN WITHOUT SCIATICA, UNSPECIFIED BACK PAIN LATERALITY: ICD-10-CM

## 2023-05-22 DIAGNOSIS — K21.9 GERD WITHOUT ESOPHAGITIS: ICD-10-CM

## 2023-05-22 NOTE — PROGRESS NOTES
Assessment & Plan     1  Encounter for support and coordination of transition of care  Presents with granddaughter for follow-up after recent hospital admission due to syncope likely vasovagal   Cardiology consulted in the hospital, echo and stress test negative; 2-week event monitor ordered for patient-to follow-up with cardiology  2  Other specified hypothyroidism  On levothyroxine, stable at this time  3  GERD without esophagitis  Takes Pantoprazole and probiotic, stable at this time    4  Hypertension, unspecified type  Slight elevation in the hospital , stable at this visit ;on Lisinopril and Atenolol, stable at this time  5  Laceration of scalp, subsequent encounter  Rayshawn Adin during syncopal episode, had staples placed, has 2 staples intact, will comeback for removal  Minor headache, goes away with Tylenol, better since fall  6  Depression, recurrent ([address] )   States has a big family  Anxiety and depression, takes Xanax and that helps  7  Syncope, unspecified syncope type  Syncopal episode in the bathroom,found by granddaughter, does not recall what exactly happened before she passed out  CT head, spine, chest/abdomen/pelvis completed in hospital negative  Echo and NM stress test done  To follow up with 2 week event monitor, follows with cardiology  Denies any other episodes of syncope, lightheadedness, dizziness since discharge    8  Chronic low back pain without sciatica, unspecified back pain laterality  Complaints of chronic back pain, states has worsened since the fall during syncopal episode  Takes Tylenol, and has some OTC pain patches she uses  Imaging done in hospital, negative for fractures; Voltaren gel ordered  PT recommended  Pt has referral for PT placed in April, states will be following up with them  -     Diclofenac Sodium (VOLTAREN) 1 %; Apply 2 g topically 4 (four) times a day      BMI Counseling: Body mass index is 25 33 kg/m²   The BMI is above normal  Nutrition recommendations include encouraging healthy choices of fruits and vegetables, decreasing fast food intake, limiting drinks that contain sugar, moderation in carbohydrate intake, reducing intake of saturated and trans fat and reducing intake of cholesterol  Exercise recommendations include exercising 3-5 times per week  No pharmacotherapy was ordered  Rationale for BMI follow-up plan is due to patient being overweight or obese  Subjective     Transitional Care Management Review:   Barbara Parr is a 82 y o  female here for TCM follow up  During the TCM phone call patient stated:  TCM Call     Date and time call was made  5/19/2023 11:09 AM    Hospital care reviewed  Records reviewed    Patient was hospitialized at  [facility]    Date of Admission  05/17/23    Date of discharge  05/18/23    Diagnosis  Syncope, Laceration of head    Disposition  Home    Were the patients medications reviewed and updated  Yes    Current Symptoms  None      TCM Call     Post hospital issues  Reduced activity    Scheduled for follow up?   Yes    Patients specialists  Cardiologist    Cardiologist name  Gage Schulz MD    Cardiologist contact #  [phone]    Other specialists names  Amy Trinh MD    Other specialists contcat #  [phone]    Did you obtain your prescribed medications  No    Why were you unable to obtain your medications  none given    Do you need help managing your prescriptions or medications  No    Is transportation to your appointment needed  No    I have advised the patient to call PCP with any new or worsening symptoms  [place] Miller, [phone] [address] members    Support System  Family    Are you recieving any outpatient services  Yes    What type of services  EEG    Are you recieving home care services  Yes    Types of home care services  Nurse visit; Home PT    Have you fallen in the last 12 months  Yes    Interperter language line needed  No    Counseling "Family    Counseling topics  patient and family education; Importance of RX compliance    Comments  Scheduled TCM for patient on Monday, 05/22/23 at 1 00 pm         Patient presents with his granddaughter for follow-up and transition of care visit  Patient states that going to the bathroom, passed out later found himself on the floor in a pool of blood from a hitting her head, crawled on the floor, started to tapping until one of her granddaughters heard the noise and came to her rescue  Has stitches on the scalp, that were placed when she go to the hospital   Patient states has been feeling a little bit better, denies any dizziness, lightheadedness, palpitations  Reports occasional headache, which she takes Tylenol for and no issues  Also complaining of some worsening back pain, since the fall, states it is tender sometimes  Lives at home with family  States follows with cardiologist, Dr Townsend Due  Review of Systems   Constitutional: Negative for chills and fever  HENT: Negative for ear pain and sore throat  Scalp tenderness   Eyes: Negative for pain and visual disturbance  Respiratory: Negative for cough and shortness of breath  Cardiovascular: Negative for chest pain and palpitations  Gastrointestinal: Negative for abdominal pain and vomiting  Genitourinary: Negative for dysuria and hematuria  Musculoskeletal: Positive for arthralgias and back pain  Left side from the fall   Skin: Negative for color change and rash  Neurological: Negative for seizures and syncope  All other systems reviewed and are negative  Objective     /72 (BP Location: Left arm, Patient Position: Sitting, Cuff Size: Standard)   Pulse 68   Temp (!) 97 1 °F (36 2 °C) (Tympanic)   Resp 18   Ht 4' 7\" (1 397 m)   Wt 49 4 kg (109 lb)   SpO2 100%   BMI 25 33 kg/m²      Physical Exam  Vitals and nursing note reviewed  Constitutional:       General: She is not in acute distress       " Appearance: She is well-developed  HENT:      Head: Normocephalic and atraumatic  Right Ear: Tympanic membrane normal       Left Ear: Tympanic membrane normal       Nose: Nose normal       Mouth/Throat:      Mouth: Mucous membranes are moist    Eyes:      Conjunctiva/sclera: Conjunctivae normal    Cardiovascular:      Rate and Rhythm: Normal rate and regular rhythm  Heart sounds: No murmur heard  Pulmonary:      Effort: Pulmonary effort is normal  No respiratory distress  Breath sounds: Normal breath sounds  Abdominal:      Palpations: Abdomen is soft  Tenderness: There is no abdominal tenderness  Musculoskeletal:         General: No swelling  Cervical back: Neck supple  Skin:     General: Skin is warm and dry  Capillary Refill: Capillary refill takes less than 2 seconds  Findings: Bruising (scalp, forehead) present  Comments: 2 staples    Neurological:      Mental Status: She is alert  Psychiatric:         Mood and Affect: Mood normal          Behavior: Behavior normal          Thought Content:  Thought content normal          Judgment: Judgment normal        Medications have been reviewed by provider in current encounter    AMIE Galvan

## 2023-05-24 LAB
CHEST PAIN STATEMENT: NORMAL
MAX DIASTOLIC BP: 78 MMHG
MAX HEART RATE: 91 BPM
MAX PREDICTED HEART RATE: 135 BPM
MAX. SYSTOLIC BP: 190 MMHG
PROTOCOL NAME: NORMAL
REASON FOR TERMINATION: NORMAL
TARGET HR FORMULA: NORMAL
TEST INDICATION: NORMAL
TIME IN EXERCISE PHASE: NORMAL

## 2023-05-25 ENCOUNTER — TELEPHONE (OUTPATIENT)
Age: 88
End: 2023-05-25

## 2023-05-25 ENCOUNTER — OFFICE VISIT (OUTPATIENT)
Age: 88
End: 2023-05-25

## 2023-05-25 VITALS
DIASTOLIC BLOOD PRESSURE: 60 MMHG | OXYGEN SATURATION: 99 % | HEART RATE: 70 BPM | SYSTOLIC BLOOD PRESSURE: 124 MMHG | RESPIRATION RATE: 18 BRPM | BODY MASS INDEX: 25.22 KG/M2 | TEMPERATURE: 98.2 F | HEIGHT: 55 IN | WEIGHT: 109 LBS

## 2023-05-25 DIAGNOSIS — I50.32 CHRONIC DIASTOLIC CHF (CONGESTIVE HEART FAILURE) (HCC): ICD-10-CM

## 2023-05-25 DIAGNOSIS — G25.0 BENIGN FAMILIAL TREMOR: ICD-10-CM

## 2023-05-25 DIAGNOSIS — S01.01XD LACERATION OF SCALP, SUBSEQUENT ENCOUNTER: ICD-10-CM

## 2023-05-25 DIAGNOSIS — I10 HYPERTENSION, UNSPECIFIED TYPE: ICD-10-CM

## 2023-05-25 DIAGNOSIS — I87.2 VENOUS INSUFFICIENCY: ICD-10-CM

## 2023-05-25 DIAGNOSIS — M81.0 AGE-RELATED OSTEOPOROSIS WITHOUT CURRENT PATHOLOGICAL FRACTURE: ICD-10-CM

## 2023-05-25 DIAGNOSIS — E78.2 MIXED HYPERLIPIDEMIA: ICD-10-CM

## 2023-05-25 DIAGNOSIS — F41.9 ANXIETY: ICD-10-CM

## 2023-05-25 DIAGNOSIS — K58.9 IRRITABLE BOWEL SYNDROME, UNSPECIFIED TYPE: ICD-10-CM

## 2023-05-25 DIAGNOSIS — Z00.00 MEDICARE ANNUAL WELLNESS VISIT, SUBSEQUENT: Primary | ICD-10-CM

## 2023-05-25 RX ORDER — RISEDRONATE SODIUM 35 MG/1
35 TABLET, FILM COATED ORAL
Qty: 4 TABLET | Refills: 5 | Status: SHIPPED | OUTPATIENT
Start: 2023-05-25

## 2023-05-25 NOTE — PATIENT INSTRUCTIONS
Medicare Preventive Visit Patient Instructions  Thank you for completing your Welcome to Medicare Visit or Medicare Annual Wellness Visit today  Your next wellness visit will be due in one year (5/25/2024)  The screening/preventive services that you may require over the next 5-10 years are detailed below  Some tests may not apply to you based off risk factors and/or age  Screening tests ordered at today's visit but not completed yet may show as past due  Also, please note that scanned in results may not display below  Preventive Screenings:  Service Recommendations Previous Testing/Comments   Colorectal Cancer Screening  * Colonoscopy    * Fecal Occult Blood Test (FOBT)/Fecal Immunochemical Test (FIT)  * Fecal DNA/Cologuard Test  * Flexible Sigmoidoscopy Age: 39-70 years old   Colonoscopy: every 10 years (may be performed more frequently if at higher risk)  OR  FOBT/FIT: every 1 year  OR  Cologuard: every 3 years  OR  Sigmoidoscopy: every 5 years  Screening may be recommended earlier than age 39 if at higher risk for colorectal cancer  Also, an individualized decision between you and your healthcare provider will decide whether screening between the ages of 74-80 would be appropriate  Colonoscopy: 07/27/2020  FOBT/FIT: 07/08/2020  Cologuard: Not on file  Sigmoidoscopy: Not on file    Screening Not Indicated     Breast Cancer Screening Age: 36 years old  Frequency: every 1-2 years  Not required if history of left and right mastectomy Mammogram: 11/02/2022    Screening Current   Cervical Cancer Screening Between the ages of 21-29, pap smear recommended once every 3 years  Between the ages of 33-67, can perform pap smear with HPV co-testing every 5 years     Recommendations may differ for women with a history of total hysterectomy, cervical cancer, or abnormal pap smears in past  Pap Smear: Not on file    Screening Not Indicated   Hepatitis C Screening Once for adults born between 1945 and 1965  More frequently in patients at high risk for Hepatitis C Hep C Antibody: Not on file        Diabetes Screening 1-2 times per year if you're at risk for diabetes or have pre-diabetes Fasting glucose: 101 mg/dL (5/18/2023)  A1C: 5 0 % (3/1/2023)  Screening Current   Cholesterol Screening Once every 5 years if you don't have a lipid disorder  May order more often based on risk factors  Lipid panel: 03/01/2023    Screening Not Indicated  History Lipid Disorder     Other Preventive Screenings Covered by Medicare:  1  Abdominal Aortic Aneurysm (AAA) Screening: covered once if your at risk  You're considered to be at risk if you have a family history of AAA  2  Lung Cancer Screening: covers low dose CT scan once per year if you meet all of the following conditions: (1) Age 50-69; (2) No signs or symptoms of lung cancer; (3) Current smoker or have quit smoking within the last 15 years; (4) You have a tobacco smoking history of at least 20 pack years (packs per day multiplied by number of years you smoked); (5) You get a written order from a healthcare provider  3  Glaucoma Screening: covered annually if you're considered high risk: (1) You have diabetes OR (2) Family history of glaucoma OR (3)  aged 48 and older OR (3)  American aged 72 and older  3  Osteoporosis Screening: covered every 2 years if you meet one of the following conditions: (1) You're estrogen deficient and at risk for osteoporosis based off medical history and other findings; (2) Have a vertebral abnormality; (3) On glucocorticoid therapy for more than 3 months; (4) Have primary hyperparathyroidism; (5) On osteoporosis medications and need to assess response to drug therapy  · Last bone density test (DXA Scan): 11/09/2022   5  HIV Screening: covered annually if you're between the age of 15-65  Also covered annually if you are younger than 13 and older than 72 with risk factors for HIV infection   For pregnant patients, it is covered up to 3 times per pregnancy  Immunizations:  Immunization Recommendations   Influenza Vaccine Annual influenza vaccination during flu season is recommended for all persons aged >= 6 months who do not have contraindications   Pneumococcal Vaccine   * Pneumococcal conjugate vaccine = PCV13 (Prevnar 13), PCV15 (Vaxneuvance), PCV20 (Prevnar 20)  * Pneumococcal polysaccharide vaccine = PPSV23 (Pneumovax) Adults 25-60 years old: 1-3 doses may be recommended based on certain risk factors  Adults 72 years old: 1-2 doses may be recommended based off what pneumonia vaccine you previously received   Hepatitis B Vaccine 3 dose series if at intermediate or high risk (ex: diabetes, end stage renal disease, liver disease)   Tetanus (Td) Vaccine - COST NOT COVERED BY MEDICARE PART B Following completion of primary series, a booster dose should be given every 10 years to maintain immunity against tetanus  Td may also be given as tetanus wound prophylaxis  Tdap Vaccine - COST NOT COVERED BY MEDICARE PART B Recommended at least once for all adults  For pregnant patients, recommended with each pregnancy  Shingles Vaccine (Shingrix) - COST NOT COVERED BY MEDICARE PART B  2 shot series recommended in those aged 48 and above     Health Maintenance Due:      Topic Date Due   • Breast Cancer Screening: Mammogram  11/02/2023   • DXA SCAN  11/09/2027     Immunizations Due:      Topic Date Due   • COVID-19 Vaccine (4 - Pfizer series) 12/21/2021     Advance Directives   What are advance directives? Advance directives are legal documents that state your wishes and plans for medical care  These plans are made ahead of time in case you lose your ability to make decisions for yourself  Advance directives can apply to any medical decision, such as the treatments you want, and if you want to donate organs  What are the types of advance directives? There are many types of advance directives, and each state has rules about how to use them   You may choose a combination of any of the following:  · Living will: This is a written record of the treatment you want  You can also choose which treatments you do not want, which to limit, and which to stop at a certain time  This includes surgery, medicine, IV fluid, and tube feedings  · Durable power of  for healthcare [facility]): This is a written record that states who you want to make healthcare choices for you when you are unable to make them for yourself  This person, called a proxy, is usually a family member or a friend  You may choose more than 1 proxy  · Do not resuscitate (DNR) order:  A DNR order is used in case your heart stops beating or you stop breathing  It is a request not to have certain forms of treatment, such as CPR  A DNR order may be included in other types of advance directives  · Medical directive: This covers the care that you want if you are in a coma, near death, or unable to make decisions for yourself  You can list the treatments you want for each condition  Treatment may include pain medicine, surgery, blood transfusions, dialysis, IV or tube feedings, and a ventilator (breathing machine)  · Values history: This document has questions about your views, beliefs, and how you feel and think about life  This information can help others choose the care that you would choose  Why are advance directives important? An advance directive helps you control your care  Although spoken wishes may be used, it is better to have your wishes written down  Spoken wishes can be misunderstood, or not followed  Treatments may be given even if you do not want them  An advance directive may make it easier for your family to make difficult choices about your care  Fall Prevention    Fall prevention  includes ways to make your home and other areas safer  It also includes ways you can move more carefully to prevent a fall   Health conditions that cause changes in your blood pressure, vision, or muscle strength and coordination may increase your risk for falls  Medicines may also increase your risk for falls if they make you dizzy, weak, or sleepy  Fall prevention tips:   · Stand or sit up slowly  · Use assistive devices as directed  · Wear shoes that fit well and have soles that   · Wear a personal alarm  · Stay active  · Manage your medical conditions  Home Safety Tips:  · Add items to prevent falls in the bathroom  · Keep paths clear  · Install bright lights in your home  · Keep items you use often on shelves within reach  · Paint or place reflective tape on the edges of your stairs  Urinary Incontinence   Urinary incontinence (UI)  is when you lose control of your bladder  UI develops because your bladder cannot store or empty urine properly  The 3 most common types of UI are stress incontinence, urge incontinence, or both  Medicines:   · May be given to help strengthen your bladder control  Report any side effects of medication to your healthcare provider  Do pelvic muscle exercises often:  Your pelvic muscles help you stop urinating  Squeeze these muscles tight for 5 seconds, then relax for 5 seconds  Gradually work up to squeezing for 10 seconds  Do 3 sets of 15 repetitions a day, or as directed  This will help strengthen your pelvic muscles and improve bladder control  Train your bladder:  Go to the bathroom at set times, such as every 2 hours, even if you do not feel the urge to go  You can also try to hold your urine when you feel the urge to go  For example, hold your urine for 5 minutes when you feel the urge to go  As that becomes easier, hold your urine for 10 minutes  Self-care:   · Keep a UI record  Write down how often you leak urine and how much you leak  Make a note of what you were doing when you leaked urine  · Drink liquids as directed  You may need to limit the amount of liquid you drink to help control your urine leakage   Do not drink any liquid right before you go to bed  Limit or do not have drinks that contain caffeine or alcohol  · Prevent constipation  Eat a variety of high-fiber foods  Good examples are high-fiber cereals, beans, vegetables, and whole-grain breads  Walking is the best way to trigger your intestines to have a bowel movement  · Exercise regularly and maintain a healthy weight  Weight loss and exercise will decrease pressure on your bladder and help you control your leakage  · Use a catheter as directed  to help empty your bladder  A catheter is a tiny, plastic tube that is put into your bladder to drain your urine  · Go to behavior therapy as directed  Behavior therapy may be used to help you learn to control your urge to urinate  Weight Management   Why it is important to manage your weight:  Being overweight increases your risk of health conditions such as heart disease, high blood pressure, type 2 diabetes, and certain types of cancer  It can also increase your risk for osteoarthritis, sleep apnea, and other respiratory problems  Aim for a slow, steady weight loss  Even a small amount of weight loss can lower your risk of health problems  How to lose weight safely:  A safe and healthy way to lose weight is to eat fewer calories and get regular exercise  You can lose up about 1 pound a week by decreasing the number of calories you eat by 500 calories each day  Healthy meal plan for weight management:  A healthy meal plan includes a variety of foods, contains fewer calories, and helps you stay healthy  A healthy meal plan includes the following:  · Eat whole-grain foods more often  A healthy meal plan should contain fiber  Fiber is the part of grains, fruits, and vegetables that is not broken down by your body  Whole-grain foods are healthy and provide extra fiber in your diet  Some examples of whole-grain foods are whole-wheat breads and pastas, oatmeal, brown rice, and bulgur    · Eat a variety of vegetables every day   Include dark, leafy greens such as spinach, kale, andrew greens, and mustard greens  Eat yellow and orange vegetables such as carrots, sweet potatoes, and winter squash  · Eat a variety of fruits every day  Choose fresh or canned fruit (canned in its own juice or light syrup) instead of juice  Fruit juice has very little or no fiber  · Eat low-fat dairy foods  Drink fat-free (skim) milk or 1% milk  Eat fat-free yogurt and low-fat cottage cheese  Try low-fat cheeses such as mozzarella and other reduced-fat cheeses  · Choose meat and other protein foods that are low in fat  Choose beans or other legumes such as split peas or lentils  Choose fish, skinless poultry (chicken or turkey), or lean cuts of red meat (beef or pork)  Before you cook meat or poultry, cut off any visible fat  · Use less fat and oil  Try baking foods instead of frying them  Add less fat, such as margarine, sour cream, regular salad dressing and mayonnaise to foods  Eat fewer high-fat foods  Some examples of high-fat foods include french fries, doughnuts, ice cream, and cakes  · Eat fewer sweets  Limit foods and drinks that are high in sugar  This includes candy, cookies, regular soda, and sweetened drinks  Exercise:  Exercise at least 30 minutes per day on most days of the week  Some examples of exercise include walking, biking, dancing, and swimming  You can also fit in more physical activity by taking the stairs instead of the elevator or parking farther away from stores  Ask your healthcare provider about the best exercise plan for you  © Copyright [employer] 2018 Information is for End User's use only and may not be sold, redistributed or otherwise used for commercial purposes   All illustrations and images included in CareNotes® are the copyrighted property of [employer]  or [address] [place] [address]

## 2023-05-25 NOTE — PROGRESS NOTES
Assessment and Plan:     Problem List Items Addressed This Visit        Digestive    Irritable bowel syndrome  Patient states she has been doing very well, strict on her diet, and avoid spicy foods  Stable at this time  Cardiovascular and Mediastinum    Hypertension  On lisinopril and atenolol  Stable at this time  Venous insufficiency  Denies recent problems at this time, patient states she wears compression stockings and elevates her legs  Stable at this time  Chronic diastolic CHF (congestive heart failure) (HCC)  Denies shortness of breath, swelling, or recent weight gain  Stable at this time  Nervous and Auditory    Benign familial tremor  States has occasional jaw shaking/tremors  Stable at this time  Musculoskeletal and Integument    Age-related osteoporosis without current pathological fracture  Has osteoporosis, taking Risedronate weekly  States last dose about 2 wks ago, needs refill, order placed  Relevant Medications    risedronate (ACTONEL) 35 mg tablet       Other    Anxiety  States anxiety is intermittent, generally okay  Takes Xanax as needed  Hyperlipidemia  Currently off Statin due to falls and weakness  Can take omega 3 supplement  Scalp laceration   Head laceration on right scalp, staples placed while in the hospital, 2 staples noted, staples removed this visit patient tolerated well, skin intact  Other Visit Diagnoses     Medicare annual wellness visit, subsequent    -  Primary  Presents for Medicare wellness visit today, also due to have staples removed  Up-to-date on labs, and screenings  To follow-up in 6 months with PCP  Preventive health issues were discussed with patient, and age appropriate screening tests were ordered as noted in patient's After Visit Summary  Personalized health advice and appropriate referrals for health education or preventive services given if needed, as noted in patient's After Visit Summary  History of Present Illness:     Patient presents for a Medicare Wellness Visit    Presents with granddaughter for Medicare wellness and removal of staples  Patient states she has been feeling better since the hospital   Denies having any pain staples, denies any other syncopal episodes  Pt complaining of a slight headache states she was sitting in traffic on [place], and was directly facing the sun  Patient Care Team:  Rufus Redmond MD as PCP - Mariela Miles MD     Review of Systems:     Review of Systems   Constitutional: Negative for chills and fever  HENT: Negative for ear pain and sore throat  Eyes: Negative for pain and visual disturbance  Respiratory: Negative for cough and shortness of breath  Cardiovascular: Negative for chest pain and palpitations  Gastrointestinal: Negative for abdominal pain and vomiting  Genitourinary: Negative for dysuria and hematuria  Musculoskeletal: Negative for arthralgias and back pain  Skin: Negative for color change and rash  Neurological: Positive for headaches  Negative for seizures and syncope  All other systems reviewed and are negative         Problem List:     Patient Active Problem List   Diagnosis   • Chest heaviness   • Anxiety   • Abnormal EKG   • Vitamin D deficiency   • Irritable bowel syndrome   • Iron deficiency anemia   • Hypothyroidism   • Hypertension   • Hyperlipidemia   • GERD without esophagitis   • AVM (arteriovenous malformation)   • Venous insufficiency   • Impaired fasting glucose   • Mild intermittent asthma without complication   • Age-related osteoporosis without current pathological fracture   • Closed fracture of tenth thoracic vertebra with routine healing   • Fall as cause of accidental injury in home as place of occurrence   • Atrophic vaginitis   • Benign familial tremor   • Eustachian tube dysfunction, bilateral   • Tension-type headache, not intractable   • Hiatal hernia   • Syncope   • B12 deficiency   • Neuropathy   • Left knee injury   • Ankle swelling   • Chronic diastolic CHF (congestive heart failure) (HCC)   • Stage 3b chronic kidney disease (HCC)   • Depression, recurrent (HCC)   • Scalp laceration      Past Medical and Surgical History:     Past Medical History:   Diagnosis Date   • Benign essential hypertension     Last assessed: 9/11/14   • Disease of thyroid gland    • Essential tremor    • Fibromyalgia    • GERD (gastroesophageal reflux disease)    • History of nail disorders    • Hyperlipidemia    • Hypertension    • Palpitations    • Transient cerebral ischemia      Past Surgical History:   Procedure Laterality Date   • APPENDECTOMY     • EGD AND COLONOSCOPY  07/2020   • HYSTERECTOMY  01/01/2010   • NH LAPS SURG CHOLECYSTECTOMY W/CHOLANGIOGRAPHY N/A 4/4/2018    Procedure: LAPAROSCOPIC CHOLECYSTECTOMY WITH IOC;  Surgeon: Rick Weinstein MD;  Location: [facility] OR;  Service: General   • TUBAL LIGATION        Family History:     Family History   Problem Relation Age of Onset   • Stroke Mother         ischemic stroke   • Hypertension Mother    • Heart disease Father    • Cancer Sister    • No Known Problems Sister    • No Known Problems Daughter    • No Known Problems Daughter    • No Known Problems Daughter    • No Known Problems Daughter    • No Known Problems Daughter    • No Known Problems Maternal Grandmother    • No Known Problems Paternal Grandmother    • No Known Problems Paternal Aunt    • No Known Problems Paternal Aunt    • No Known Problems Paternal Aunt    • No Known Problems Paternal Aunt    • No Known Problems Paternal Aunt    • Breast cancer Neg Hx       Social History:     Social History     Socioeconomic History   • Marital status:       Spouse name: None   • Number of children: None   • Years of education: None   • Highest education level: None   Occupational History   • Occupation: retired    Tobacco Use   • Smoking status: Former     Packs/day: 0 10     Years: 50 00     Total pack years: 5 00     Types: Cigarettes     Start date: 1963     Quit date: 2013     Years since quitting: 10 4   • Smokeless tobacco: Never   • Tobacco comments:     1 pack a week    Vaping Use   • Vaping Use: Never used   Substance and Sexual Activity   • Alcohol use: Not Currently     Alcohol/week: 0 0 standard drinks of alcohol     Comment: 0   • Drug use: No   • Sexual activity: Not Currently     Partners: Male   Other Topics Concern   • None   Social History Narrative    Living independently with spouse     Social Determinants of Health     Financial Resource Strain: Low Risk  (5/25/2023)    Overall Financial Resource Strain (CARDIA)    • Difficulty of Paying Living Expenses: Not hard at all   Food Insecurity: Not on file   Transportation Needs: No Transportation Needs (5/25/2023)    PRAPARE - Transportation    • Lack of Transportation (Medical): No    • Lack of Transportation (Non-Medical): No   Physical Activity: Unknown (8/31/2022)    Exercise Vital Sign    • Days of Exercise per Week: 0 days    • Minutes of Exercise per Session: Not on file   Stress: No Stress Concern Present (8/16/2022)    [id number]    • Feeling of Stress :  Only a little   Social Connections: Not on file   Intimate Partner Violence: Not on file   Housing Stability: Not on file      Medications and Allergies:     Current Outpatient Medications   Medication Sig Dispense Refill   • acetaminophen (TYLENOL) 325 mg tablet Take 1-2 tablets by mouth every 6 (six) hours as needed     • ALPRAZolam (XANAX) 0 5 mg tablet take 1 tablet by mouth four times a day if needed for anxiety 120 tablet 3   • atenolol (TENORMIN) 50 mg tablet take 1 tablet by mouth twice a day 180 tablet 3   • Cholecalciferol (Vitamin D3) 50 MCG (2000 UT) capsule Take 1 capsule (2,000 Units total) by mouth daily     • Diclofenac Sodium (VOLTAREN) 1 % Apply 2 g topically 4 (four) times a day 100 g 0   • latanoprost (XALATAN) 0 005 % ophthalmic solution instill 1 drop into both eyes nightly     • levothyroxine 50 mcg tablet take 1 tablet by mouth once daily except Sunday take 2 tabs 102 tablet 3   • lisinopril (ZESTRIL) 5 mg tablet take 1 tablet by mouth once daily 90 tablet 0   • pantoprazole (PROTONIX) 40 mg tablet Take 1 tablet (40 mg total) by mouth daily before breakfast 90 tablet 3   • Probiotic Product (ALIGN) 4 MG CAPS Take 1 tablet by mouth daily     • risedronate (ACTONEL) 35 mg tablet Take 1 tablet (35 mg total) by mouth every 7 days with water on empty stomach, nothing by mouth or lie down for next 30 minutes  4 tablet 5   • imipramine (TOFRANIL) 10 mg tablet Take 1 tablet (10 mg total) by mouth daily at bedtime 4 nights per week 30 tablet 5     No current facility-administered medications for this visit  Allergies   Allergen Reactions   • Other Drowsiness     Annotation - [id number]: ANTIDEPRESSANTS      Immunizations:     Immunization History   Administered Date(s) Administered   • COVID-19 PFIZER VACCINE 0 3 ML IM 03/18/2021, 04/12/2021, 10/26/2021   • INFLUENZA 11/09/2007, 09/01/2015, 10/09/2017, 10/19/2022   • Influenza Split High Dose Preservative Free IM 10/09/2017   • Influenza, high dose seasonal 0 7 mL 12/11/2018, 10/01/2019, 10/06/2020, 10/13/2021, 10/19/2022   • Influenza, seasonal, injectable 10/25/2010, 09/01/2015   • Influenza, seasonal, injectable, preservative free 11/09/2007   • Pneumococcal Conjugate 13-Valent 01/29/2015, 01/11/2016   • Pneumococcal Polysaccharide PPV23 10/02/2003   • Tdap 06/02/2015, 05/17/2023, 05/17/2023   • Zoster 1935      Health Maintenance:         Topic Date Due   • Breast Cancer Screening: Mammogram  11/02/2023   • DXA SCAN  11/09/2027         Topic Date Due   • COVID-19 Vaccine (4 - Pfizer series) 12/21/2021      Medicare Screening Tests and Risk Assessments:     Kia Velázquez is here for her Subsequent Wellness visit       Health Risk Assessment:   Patient rates overall health as fair  Patient feels that their physical health rating is slightly worse  Patient is satisfied with their life  Eyesight was rated as same  Hearing was rated as same  Patient feels that their emotional and mental health rating is slightly worse  Patients states they are never, rarely angry  Patient states they are sometimes unusually tired/fatigued  Pain experienced in the last 7 days has been some  Patient's pain rating has been 4/10  Patient states that she has experienced no weight loss or gain in last 6 months  Depression Screening:   PHQ-9 Score: 0      Fall Risk Screening: In the past year, patient has experienced: history of falling in past year    Number of falls: 2 or more  Injured during fall?: Yes    Feels unsteady when standing or walking?: Yes    Worried about falling?: Yes      Urinary Incontinence Screening:   Patient has leaked urine accidently in the last six months  Home Safety:  Patient has trouble with stairs inside or outside of their home  Patient has working smoke alarms and has working carbon monoxide detector  Home safety hazards include: none  Nutrition:   Current diet is Regular  Medications:   Patient is not currently taking any over-the-counter supplements  Patient is able to manage medications  Activities of Daily Living (ADLs)/Instrumental Activities of Daily Living (IADLs):   Walk and transfer into and out of bed and chair?: Yes  Dress and groom yourself?: Yes    Bathe or shower yourself?: Yes    Feed yourself?  Yes  Do your laundry/housekeeping?: Yes  Manage your money, pay your bills and track your expenses?: Yes  Make your own meals?: Yes    Do your own shopping?: Yes    Previous Hospitalizations:   Any hospitalizations or ED visits within the last 12 months?: Yes    How many hospitalizations have you had in the last year?: 1-2    Advance Care Planning:   Living will: Yes    Advanced directive: Yes      Comments: Pt states her children knows "what she wants and she has the document at home and will bring it     Cognitive Screening:   Provider or family/friend/caregiver concerned regarding cognition?: No    PREVENTIVE SCREENINGS      Cardiovascular Screening:    General: Screening Not Indicated and History Lipid Disorder      Diabetes Screening:     General: Screening Current      Colorectal Cancer Screening:     General: Screening Not Indicated      Breast Cancer Screening:     General: Screening Current      Cervical Cancer Screening:    General: Screening Not Indicated      Osteoporosis Screening:    General: Screening Not Indicated and History Osteoporosis      Abdominal Aortic Aneurysm (AAA) Screening:        General: Screening Not Indicated      Lung Cancer Screening:     General: Screening Not Indicated      Hepatitis C Screening:    General: Screening Not Indicated    Hep C Screening Accepted: No     Screening, Brief Intervention, and Referral to Treatment (SBIRT)    Screening  Typical number of drinks in a day: 0  Typical number of drinks in a week: 0  Interpretation: Low risk drinking behavior  Single Item Drug Screening:  How often have you used an illegal drug (including marijuana) or a prescription medication for non-medical reasons in the past year? never    Single Item Drug Screen Score: 0  Interpretation: Negative screen for possible drug use disorder    No results found  Physical Exam:     /60 (BP Location: Left arm, Patient Position: Sitting, Cuff Size: Standard)   Pulse 70   Temp 98 2 °F (36 8 °C) (Tympanic)   Resp 18   Ht 4' 7\" (1 397 m)   Wt 49 4 kg (109 lb)   SpO2 99%   BMI 25 33 kg/m²     Physical Exam  Vitals and nursing note reviewed  Constitutional:       General: She is not in acute distress  Appearance: Normal appearance  She is well-developed  She is not ill-appearing  HENT:      Head: Normocephalic and atraumatic  Right Ear: Tympanic membrane normal  There is no impacted cerumen        Left " Ear: Tympanic membrane normal  There is no impacted cerumen  Nose: Nose normal  No congestion or rhinorrhea  Mouth/Throat:      Mouth: Mucous membranes are moist       Pharynx: No oropharyngeal exudate or posterior oropharyngeal erythema  Eyes:      Extraocular Movements: Extraocular movements intact  Conjunctiva/sclera: Conjunctivae normal       Pupils: Pupils are equal, round, and reactive to light  Cardiovascular:      Rate and Rhythm: Normal rate and regular rhythm  Pulses: Normal pulses  Heart sounds: Normal heart sounds  No murmur heard  Pulmonary:      Effort: Pulmonary effort is normal  No respiratory distress  Breath sounds: Normal breath sounds  Abdominal:      General: Bowel sounds are normal       Palpations: Abdomen is soft  Tenderness: There is no abdominal tenderness  Musculoskeletal:         General: No swelling  Normal range of motion  Cervical back: Normal range of motion and neck supple  Skin:     General: Skin is warm and dry  Capillary Refill: Capillary refill takes less than 2 seconds  Findings: Lesion (on scalp) present  Comments: 2 staples removed  Neurological:      Mental Status: She is alert and oriented to person, place, and time  Gait: Gait abnormal (using walker)  Psychiatric:         Mood and Affect: Mood normal          Behavior: Behavior normal          Thought Content: Thought content normal          Judgment: Judgment normal      Procedures   2 staples removed, using staple remover kit  No bleeding, skin intact  Right side of head  Tolerated well       AMIE Boyer

## 2023-05-25 NOTE — TELEPHONE ENCOUNTER
Patient left message said Keyla Griffith spoke to her about Crestor and she would like to talk with her about it more

## 2023-05-30 ENCOUNTER — TELEPHONE (OUTPATIENT)
Age: 88
End: 2023-05-30

## 2023-05-30 NOTE — TELEPHONE ENCOUNTER
Manfred Vann / [id number] [facility]    Starting PT with patient   4 week duration      [phone]

## 2023-06-22 ENCOUNTER — OFFICE VISIT (OUTPATIENT)
Dept: CARDIOLOGY CLINIC | Facility: CLINIC | Age: 88
End: 2023-06-22
Payer: MEDICARE

## 2023-06-22 VITALS
WEIGHT: 110 LBS | BODY MASS INDEX: 25.46 KG/M2 | HEART RATE: 78 BPM | SYSTOLIC BLOOD PRESSURE: 120 MMHG | RESPIRATION RATE: 16 BRPM | HEIGHT: 55 IN | DIASTOLIC BLOOD PRESSURE: 72 MMHG | OXYGEN SATURATION: 98 %

## 2023-06-22 DIAGNOSIS — I10 ESSENTIAL HYPERTENSION: Primary | ICD-10-CM

## 2023-06-22 DIAGNOSIS — R55 SYNCOPE AND COLLAPSE: ICD-10-CM

## 2023-06-22 DIAGNOSIS — F41.9 ANXIETY: ICD-10-CM

## 2023-06-22 PROCEDURE — 99214 OFFICE O/P EST MOD 30 MIN: CPT | Performed by: INTERNAL MEDICINE

## 2023-06-22 NOTE — PROGRESS NOTES
[facility] Dallastad Simmons [address] [facility] [place] [address]  Cardiology Follow Up    Maria D Pfeiffer  1935  [id number]      1  Essential hypertension        2  Anxiety        3  Syncope and collapse            Chief Complaint   Patient presents with   • Follow-up       Interval History: Patient presents for follow-up visit  Patient denies any history of chest pain shortness of breath  Patient denies any history of leg edema or orthopnea PND  No history of presyncope syncope  Patient states compliance with the present list of medications  Patient was recently admitted with an episode of syncope  No obvious etiology could be established  Patient was evaluated by neurology as well as cardiology  Patient was supposed to have a cardiac event monitor which has not been scheduled yet  Patient now presently willing to proceed with the same      Patient Active Problem List   Diagnosis   • Chest heaviness   • Anxiety   • Abnormal EKG   • Vitamin D deficiency   • Irritable bowel syndrome   • Iron deficiency anemia   • Hypothyroidism   • Hypertension   • Hyperlipidemia   • GERD without esophagitis   • AVM (arteriovenous malformation)   • Venous insufficiency   • Impaired fasting glucose   • Mild intermittent asthma without complication   • Age-related osteoporosis without current pathological fracture   • Closed fracture of tenth thoracic vertebra with routine healing   • Fall as cause of accidental injury in home as place of occurrence   • Atrophic vaginitis   • Benign familial tremor   • Eustachian tube dysfunction, bilateral   • Tension-type headache, not intractable   • Hiatal hernia   • Syncope   • B12 deficiency   • Neuropathy   • Left knee injury   • Ankle swelling   • Chronic diastolic CHF (congestive heart failure) (HCC)   • Stage 3b chronic kidney disease (HCC)   • Depression, recurrent (HCC)   • Scalp laceration     Past Medical History:   Diagnosis Date   • Benign essential hypertension Last assessed: 9/11/14   • Disease of thyroid gland    • Essential tremor    • Fibromyalgia    • GERD (gastroesophageal reflux disease)    • History of nail disorders    • Hyperlipidemia    • Hypertension    • Palpitations    • Transient cerebral ischemia      Social History     Socioeconomic History   • Marital status:      Spouse name: Not on file   • Number of children: Not on file   • Years of education: Not on file   • Highest education level: Not on file   Occupational History   • Occupation: retired    Tobacco Use   • Smoking status: Former     Packs/day: 0 10     Years: 50 00     Total pack years: 5 00     Types: Cigarettes     Start date: 1963     Quit date: 2013     Years since quitting: 10 4   • Smokeless tobacco: Never   • Tobacco comments:     1 pack a week    Vaping Use   • Vaping Use: Never used   Substance and Sexual Activity   • Alcohol use: Not Currently     Alcohol/week: 0 0 standard drinks of alcohol     Comment: 0   • Drug use: No   • Sexual activity: Not Currently     Partners: Male   Other Topics Concern   • Not on file   Social History Narrative    Living independently with spouse     Social Determinants of Health     Financial Resource Strain: Low Risk  (5/25/2023)    Overall Financial Resource Strain (CARDIA)    • Difficulty of Paying Living Expenses: Not hard at all   Food Insecurity: Not on file   Transportation Needs: No Transportation Needs (5/25/2023)    PRAPARE - Transportation    • Lack of Transportation (Medical): No    • Lack of Transportation (Non-Medical): No   Physical Activity: Unknown (8/31/2022)    Exercise Vital Sign    • Days of Exercise per Week: 0 days    • Minutes of Exercise per Session: Not on file   Stress: No Stress Concern Present (8/16/2022)    [address]    • Feeling of Stress :  Only a little   Social Connections: Not on file   Intimate Partner Violence: Not on file   Housing Stability: Not on file      Family History   Problem Relation Age of Onset   • Stroke Mother         ischemic stroke   • Hypertension Mother    • Heart disease Father    • Cancer Sister    • No Known Problems Sister    • No Known Problems Daughter    • No Known Problems Daughter    • No Known Problems Daughter    • No Known Problems Daughter    • No Known Problems Daughter    • No Known Problems Maternal Grandmother    • No Known Problems Paternal Grandmother    • No Known Problems Paternal Aunt    • No Known Problems Paternal Aunt    • No Known Problems Paternal Aunt    • No Known Problems Paternal Aunt    • No Known Problems Paternal Aunt    • Breast cancer Neg Hx      Past Surgical History:   Procedure Laterality Date   • APPENDECTOMY     • EGD AND COLONOSCOPY  07/2020   • HYSTERECTOMY  01/01/2010   • DE LAPS SURG CHOLECYSTECTOMY W/CHOLANGIOGRAPHY N/A 4/4/2018    Procedure: LAPAROSCOPIC CHOLECYSTECTOMY WITH IOC;  Surgeon: Isaac Neves MD;  Location: [facility];  Service: General   • TUBAL LIGATION         Current Outpatient Medications:   •  acetaminophen (TYLENOL) 325 mg tablet, Take 1-2 tablets by mouth every 6 (six) hours as needed, Disp: , Rfl:   •  ALPRAZolam (XANAX) 0 5 mg tablet, take 1 tablet by mouth four times a day if needed for anxiety, Disp: 120 tablet, Rfl: 3  •  atenolol (TENORMIN) 50 mg tablet, take 1 tablet by mouth twice a day, Disp: 180 tablet, Rfl: 3  •  Diclofenac Sodium (VOLTAREN) 1 %, Apply 2 g topically 4 (four) times a day, Disp: 100 g, Rfl: 0  •  imipramine (TOFRANIL) 10 mg tablet, Take 1 tablet (10 mg total) by mouth daily at bedtime 4 nights per week, Disp: 30 tablet, Rfl: 5  •  latanoprost (XALATAN) 0 005 % ophthalmic solution, instill 1 drop into both eyes nightly, Disp: , Rfl:   •  levothyroxine 50 mcg tablet, take 1 tablet by mouth once daily except Sunday take 2 tabs, Disp: 102 tablet, Rfl: 3  •  lisinopril (ZESTRIL) 5 mg tablet, take 1 tablet by mouth once daily, Disp: 90 tablet, Rfl: 0  •  pantoprazole (PROTONIX) 40 mg tablet, Take 1 tablet (40 mg total) by mouth daily before breakfast, Disp: 90 tablet, Rfl: 3  •  Probiotic Product (ALIGN) 4 MG CAPS, Take 1 tablet by mouth daily, Disp: , Rfl:   •  risedronate (ACTONEL) 35 mg tablet, Take 1 tablet (35 mg total) by mouth every 7 days with water on empty stomach, nothing by mouth or lie down for next 30 minutes  , Disp: 4 tablet, Rfl: 5  Allergies   Allergen Reactions   • Other Drowsiness     [facility] [id number]: ANTIDEPRESSANTS       Labs:  Hospital Outpatient Visit on 05/19/2023   Component Date Value   • Rest Nuclear Isotope Dose 05/19/2023 10 97    • Stress Nuclear Isotope D* 05/19/2023 32 30    • Baseline HR 05/19/2023 64    • Baseline BP 05/19/2023 190/78    • O2 sat rest 05/19/2023 100    • Stress peak HR 05/19/2023 91    • Post peak BP 05/19/2023 136    • Rate Pressure Product 05/19/2023 12,376 0    • O2 sat peak 05/19/2023 100    • Recovery HR 05/19/2023 89    • Recovery BP 05/19/2023 156/76    • O2 sat recovery 05/19/2023 100    • Angina Index 05/19/2023 0    • Stress/rest perfusion ra* 05/19/2023 8 71    • End diastolic index (mL/* 95/36/8458 28 0    • EF (%) 05/19/2023 88    • End systolic index (mL/m* 61/26/4833 3 0    • Protocol Name 05/19/2023 LEXISCAN-SIT    • Time In Exercise Phase 05/19/2023 00:03:00    • MAX   SYSTOLIC BP 20/03/9036 728    • Max Diastolic Bp 67/43/0665 78    • Max Heart Rate 05/19/2023 91    • Max Predicted Heart Rate 05/19/2023 135    • Reason for Termination 05/19/2023 Protocol Complete    • Test Indication 05/19/2023 Dyspnea on effort    • Target Hr Formular 05/19/2023 (220 - Age)*85%    • Chest Pain Statement 05/19/2023 none    Admission on 05/17/2023, Discharged on 05/18/2023   Component Date Value   • Ventricular Rate 05/17/2023 73    • Atrial Rate 05/17/2023 73    • NM Interval 05/17/2023 174    • QRSD Interval 05/17/2023 86    • QT Interval 05/17/2023 428    • QTC Interval 05/17/2023 471    • P Axis 05/17/2023 57    • QRS Axis 05/17/2023 6    • T Wave Axis 05/17/2023 42    • WBC 05/17/2023 5 13    • RBC 05/17/2023 3 80 (L)    • Hemoglobin 05/17/2023 12 1    • Hematocrit 05/17/2023 36 2    • MCV 05/17/2023 95    • MCH 05/17/2023 31 8    • MCHC 05/17/2023 33 4    • RDW 05/17/2023 12 3    • MPV 05/17/2023 9 8    • Platelets 34/05/3137 181    • nRBC 05/17/2023 0    • Neutrophils Relative 05/17/2023 64    • Immat GRANS % 05/17/2023 0    • Lymphocytes Relative 05/17/2023 17    • Monocytes Relative 05/17/2023 9    • Eosinophils Relative 05/17/2023 10 (H)    • Basophils Relative 05/17/2023 0    • Neutrophils Absolute 05/17/2023 3 25    • Immature Grans Absolute 05/17/2023 0 02    • Lymphocytes Absolute 05/17/2023 0 87    • Monocytes Absolute 05/17/2023 0 46    • Eosinophils Absolute 05/17/2023 0 51    • Basophils Absolute 05/17/2023 0 02    • Protime 05/17/2023 13 3    • INR 05/17/2023 1 03    • PTT 05/17/2023 29    • Sodium 05/17/2023 133 (L)    • Potassium 05/17/2023 3 8    • Chloride 05/17/2023 101    • CO2 05/17/2023 26    • ANION GAP 05/17/2023 6    • BUN 05/17/2023 20    • Creatinine 05/17/2023 1 36 (H)    • Glucose 05/17/2023 110    • Calcium 05/17/2023 9 7    • AST 05/17/2023 22    • ALT 05/17/2023 10    • Alkaline Phosphatase 05/17/2023 80    • Total Protein 05/17/2023 7 6    • Albumin 05/17/2023 4 4    • Total Bilirubin 05/17/2023 0 55    • eGFR 05/17/2023 35    • hs TnI 0hr 05/17/2023 4    • hs TnI 2hr 05/17/2023 5    • Delta 2hr hsTnI 05/17/2023 1    • hs TnI 4hr 05/17/2023 6    • Delta 4hr hsTnI 05/17/2023 2    • Platelets 93/39/2772 174    • MPV 05/17/2023 9 1    • LA size 05/17/2023 3 1    • Triscuspid Valve Regurgi* 05/17/2023 23 0    • Tricuspid valve peak reg* 05/17/2023 2 40    • LVPWd 05/17/2023 0 90    • Left Atrium Area-systoli* 05/17/2023 13 8    • Left Atrium Area-systoli* 05/17/2023 15 2    • MV E' Tissue Velocity Se* 05/17/2023 5    • Tricuspid annular plane * 05/17/2023 1 80    • TR Peak Jose Angel 05/17/2023 2 4    • IVSd 05/17/2023 8 47    • LV DIASTOLIC VOLUME (MOD* 21/50/1359 36    • LEFT VENTRICLE SYSTOLIC * 87/04/0236 16    • Left ventricular stroke * 05/17/2023 21 00    • A4C EF 05/17/2023 71    • LA length (A2C) 05/17/2023 4 00    • LVIDd 05/17/2023 3 00    • IVS 05/17/2023 1    • LVIDS 05/17/2023 2 20    • FS 05/17/2023 27    • Asc Ao 05/17/2023 2 4    • Ao root 05/17/2023 2 20    • RVID d 05/17/2023 2 6    • LVOT mn grad 05/17/2023 2 0    • AV LVOT peak gradient 05/17/2023 3    • MV valve area p 1/2 meth* 05/17/2023 3 01    • E wave deceleration time 05/17/2023 252    • LVOT peak jose angel 05/17/2023 0 91    • LVOT peak VTI 05/17/2023 22 19    • MV Peak E Jose Angel 05/17/2023 81    • MV Peak A Jose Angel 05/17/2023 1 14    • ASHOK A4C 05/17/2023 15    • RAA A4C 05/17/2023 10 3    • MV stenosis pressure 1/2* 05/17/2023 73    • LVSV, 2D 05/17/2023 21    • WBC 05/18/2023 6 39    • RBC 05/18/2023 3 89    • Hemoglobin 05/18/2023 12 3    • Hematocrit 05/18/2023 37 0    • MCV 05/18/2023 95    • MCH 05/18/2023 31 6    • MCHC 05/18/2023 33 2    • RDW 05/18/2023 12 4    • Platelets 48/64/4412 186    • MPV 05/18/2023 10 0    • Sodium 05/18/2023 138    • Potassium 05/18/2023 3 7    • Chloride 05/18/2023 105    • CO2 05/18/2023 24    • ANION GAP 05/18/2023 9    • BUN 05/18/2023 16    • Creatinine 05/18/2023 1 19    • Glucose 05/18/2023 101    • Glucose, Fasting 05/18/2023 101 (H)    • Calcium 05/18/2023 9 8    • eGFR 05/18/2023 41      Imaging: No results found      Review of Systems:  Review of Systems   REVIEW OF SYSTEMS:  Constitutional:  Denies fever or chills   Eyes:  Denies change in visual acuity   HENT:  Denies nasal congestion or sore throat   Respiratory:  Denies cough or shortness of breath   Cardiovascular:  Denies chest pain or edema   GI:  Denies abdominal pain, nausea, vomiting, bloody stools or diarrhea   :  Denies dysuria, frequency, difficulty in micturition and nocturia  Musculoskeletal:  Denies back pain or joint "pain   Neurologic:  Denies headache, focal weakness or sensory changes   Endocrine:  Denies polyuria or polydipsia   Lymphatic:  Denies swollen glands   Psychiatric:  Denies depression or anxiety    Physical Exam:    /72 (BP Location: Left arm, Patient Position: Sitting, Cuff Size: Standard)   Pulse 78   Resp 16   Ht 4' 7\" (1 397 m)   Wt 49 9 kg (110 lb)   SpO2 98%   BMI 25 57 kg/m²     Physical Exam   PHYSICAL EXAM:  General:  Patient is not in acute distress   Head: Normocephalic, Atraumatic  HEENT:  Both pupils normal-size atraumatic, normocephalic, nonicteric  Neck:  JVP not raised  Trachea central  No carotid bruit  Respiratory:  normal breath sounds no crackles  no rhonchi  Cardiovascular:  Regular rate and rhythm no S3 no murmurs  GI:  Abdomen soft nontender  No organomegaly  Lymphatic:  No cervical or inguinal lymphadenopathy  Neurologic:  Patient is awake alert, oriented   Grossly nonfocal  Extremities no edema    Echocardiogram showed ejection fraction of 60% with mild mitral regurgitation    Patient also had a pharmacological nuclear stress test which showed no evidence of ischemia with normal ejection fraction    Discussion/Summary:  Patient with multiple medical problems who seems to be doing reasonably well from cardiac standpoint  Previous studies reviewed with patient  Medications reviewed and possible side effects discussed  concepts of cardiovascular disease , signs and symptoms of heart disease  Dietary and risk factor modification reinforced  All questions answered  Safety measures reviewed  Patient advised to report any problems prompting medical attention  Given vannessa syncope of unclear etiology, discussed with patient and family regarding further evaluation including cardiac event monitor  Patient presently willing to proceed with cardiac event monitor  Importance of adequate hydration reinforced    Symptoms to watch her from cardiac standpoint which would indicate the " need for further cardiac evaluation also discussed  Follow-up in 6 months or earlier as needed  Patient is agreeable with the plan of care

## 2023-06-27 DIAGNOSIS — F41.9 ANXIETY: ICD-10-CM

## 2023-06-27 RX ORDER — ALPRAZOLAM 0.5 MG/1
TABLET ORAL
Qty: 120 TABLET | Refills: 0 | Status: SHIPPED | OUTPATIENT
Start: 2023-06-27

## 2023-07-06 ENCOUNTER — TELEPHONE (OUTPATIENT)
Age: 88
End: 2023-07-06

## 2023-07-06 NOTE — TELEPHONE ENCOUNTER
Marc Siddiqi /  Nurse / [address]    Patient is being discharged from their services today. Wanted to let the doctor know that Marc Devang obtained 2  high blood pressure readings 164 / 83 on the left, 174 / 74 on the right. She doesn't have to push discharge through.   Can someone call her back  [phone] Discontinue Regimen: Mupirocin Initiate Treatment: Compression socks (RX was written and given to patient)\\nIodosorb gel Detail Level: Zone Continue Regimen: Triamcinolone qd

## 2023-07-14 ENCOUNTER — CLINICAL SUPPORT (OUTPATIENT)
Dept: CARDIOLOGY CLINIC | Facility: CLINIC | Age: 88
End: 2023-07-14
Payer: MEDICARE

## 2023-07-14 DIAGNOSIS — R55 SYNCOPE, UNSPECIFIED SYNCOPE TYPE: ICD-10-CM

## 2023-07-14 PROCEDURE — 93248 EXT ECG>7D<15D REV&INTERPJ: CPT | Performed by: INTERNAL MEDICINE

## 2023-07-17 PROBLEM — S01.01XA SCALP LACERATION: Status: RESOLVED | Noted: 2023-05-17 | Resolved: 2023-07-17

## 2023-07-24 DIAGNOSIS — F41.9 ANXIETY: ICD-10-CM

## 2023-07-24 RX ORDER — IMIPRAMINE HYDROCHLORIDE 10 MG/1
TABLET, FILM COATED ORAL
Qty: 30 TABLET | Refills: 5 | Status: SHIPPED | OUTPATIENT
Start: 2023-07-24

## 2023-08-10 ENCOUNTER — TELEPHONE (OUTPATIENT)
Dept: CARDIOLOGY CLINIC | Facility: CLINIC | Age: 88
End: 2023-08-10

## 2023-08-10 DIAGNOSIS — I10 ESSENTIAL HYPERTENSION: ICD-10-CM

## 2023-08-10 RX ORDER — LISINOPRIL 5 MG/1
TABLET ORAL
Qty: 90 TABLET | Refills: 0 | Status: SHIPPED | OUTPATIENT
Start: 2023-08-10

## 2023-08-15 DIAGNOSIS — F41.9 ANXIETY: ICD-10-CM

## 2023-08-15 RX ORDER — ALPRAZOLAM 0.5 MG/1
0.5 TABLET ORAL 4 TIMES DAILY PRN
Qty: 120 TABLET | Refills: 0 | Status: SHIPPED | OUTPATIENT
Start: 2023-08-15

## 2023-08-15 RX ORDER — ALPRAZOLAM 0.5 MG/1
TABLET ORAL
Qty: 120 TABLET | Refills: 0 | Status: SHIPPED | OUTPATIENT
Start: 2023-08-15

## 2023-09-22 DIAGNOSIS — F41.9 ANXIETY: ICD-10-CM

## 2023-09-22 RX ORDER — ALPRAZOLAM 0.5 MG/1
0.5 TABLET ORAL 4 TIMES DAILY PRN
Qty: 120 TABLET | Refills: 0 | Status: SHIPPED | OUTPATIENT
Start: 2023-09-22

## 2023-09-25 DIAGNOSIS — H40.9 GLAUCOMA OF BOTH EYES, UNSPECIFIED GLAUCOMA TYPE: Primary | ICD-10-CM

## 2023-09-27 RX ORDER — LATANOPROST 50 UG/ML
1 SOLUTION/ DROPS OPHTHALMIC
Qty: 7.5 ML | Refills: 3 | Status: SHIPPED | OUTPATIENT
Start: 2023-09-27

## 2023-10-05 ENCOUNTER — TELEPHONE (OUTPATIENT)
Age: 88
End: 2023-10-05

## 2023-10-06 DIAGNOSIS — I10 PRIMARY HYPERTENSION: ICD-10-CM

## 2023-10-06 DIAGNOSIS — G44.209 TENSION-TYPE HEADACHE, NOT INTRACTABLE, UNSPECIFIED CHRONICITY PATTERN: ICD-10-CM

## 2023-10-06 DIAGNOSIS — E03.9 ACQUIRED HYPOTHYROIDISM: Primary | ICD-10-CM

## 2023-10-06 DIAGNOSIS — R73.01 IMPAIRED FASTING GLUCOSE: ICD-10-CM

## 2023-10-10 ENCOUNTER — RA CDI HCC (OUTPATIENT)
Dept: OTHER | Facility: HOSPITAL | Age: 88
End: 2023-10-10

## 2023-10-16 RX ORDER — PREDNISONE 20 MG/1
TABLET ORAL
COMMUNITY
Start: 2023-09-21 | End: 2023-10-19

## 2023-10-16 RX ORDER — ALBUTEROL SULFATE 90 UG/1
AEROSOL, METERED RESPIRATORY (INHALATION)
COMMUNITY
Start: 2023-09-21

## 2023-10-16 RX ORDER — ONDANSETRON 4 MG/1
TABLET, ORALLY DISINTEGRATING ORAL
COMMUNITY
Start: 2023-09-21

## 2023-10-18 ENCOUNTER — APPOINTMENT (OUTPATIENT)
Dept: LAB | Facility: CLINIC | Age: 88
End: 2023-10-18
Payer: MEDICARE

## 2023-10-18 DIAGNOSIS — I10 PRIMARY HYPERTENSION: ICD-10-CM

## 2023-10-18 DIAGNOSIS — R73.01 IMPAIRED FASTING GLUCOSE: ICD-10-CM

## 2023-10-18 DIAGNOSIS — E03.9 ACQUIRED HYPOTHYROIDISM: ICD-10-CM

## 2023-10-18 DIAGNOSIS — G44.209 TENSION-TYPE HEADACHE, NOT INTRACTABLE, UNSPECIFIED CHRONICITY PATTERN: ICD-10-CM

## 2023-10-18 LAB
ALBUMIN SERPL BCP-MCNC: 4.1 G/DL (ref 3.5–5)
ALP SERPL-CCNC: 86 U/L (ref 34–104)
ALT SERPL W P-5'-P-CCNC: 10 U/L (ref 7–52)
ANION GAP SERPL CALCULATED.3IONS-SCNC: 8 MMOL/L
AST SERPL W P-5'-P-CCNC: 22 U/L (ref 13–39)
BASOPHILS # BLD AUTO: 0.02 THOUSANDS/ÂΜL (ref 0–0.1)
BASOPHILS NFR BLD AUTO: 1 % (ref 0–1)
BILIRUB SERPL-MCNC: 0.52 MG/DL (ref 0.2–1)
BUN SERPL-MCNC: 12 MG/DL (ref 5–25)
CALCIUM SERPL-MCNC: 10 MG/DL (ref 8.4–10.2)
CHLORIDE SERPL-SCNC: 103 MMOL/L (ref 96–108)
CHOLEST SERPL-MCNC: 290 MG/DL
CO2 SERPL-SCNC: 28 MMOL/L (ref 21–32)
CREAT SERPL-MCNC: 1.19 MG/DL (ref 0.6–1.3)
EOSINOPHIL # BLD AUTO: 0.55 THOUSAND/ÂΜL (ref 0–0.61)
EOSINOPHIL NFR BLD AUTO: 13 % (ref 0–6)
ERYTHROCYTE [DISTWIDTH] IN BLOOD BY AUTOMATED COUNT: 14 % (ref 11.6–15.1)
ERYTHROCYTE [SEDIMENTATION RATE] IN BLOOD: 29 MM/HOUR (ref 0–29)
EST. AVERAGE GLUCOSE BLD GHB EST-MCNC: 123 MG/DL
GFR SERPL CREATININE-BSD FRML MDRD: 41 ML/MIN/1.73SQ M
GLUCOSE P FAST SERPL-MCNC: 97 MG/DL (ref 65–99)
HBA1C MFR BLD: 5.9 %
HCT VFR BLD AUTO: 36.6 % (ref 34.8–46.1)
HDLC SERPL-MCNC: 70 MG/DL
HGB BLD-MCNC: 12.2 G/DL (ref 11.5–15.4)
IMM GRANULOCYTES # BLD AUTO: 0.01 THOUSAND/UL (ref 0–0.2)
IMM GRANULOCYTES NFR BLD AUTO: 0 % (ref 0–2)
LDLC SERPL CALC-MCNC: 185 MG/DL (ref 0–100)
LYMPHOCYTES # BLD AUTO: 1.27 THOUSANDS/ÂΜL (ref 0.6–4.47)
LYMPHOCYTES NFR BLD AUTO: 31 % (ref 14–44)
MCH RBC QN AUTO: 31.9 PG (ref 26.8–34.3)
MCHC RBC AUTO-ENTMCNC: 33.3 G/DL (ref 31.4–37.4)
MCV RBC AUTO: 96 FL (ref 82–98)
MONOCYTES # BLD AUTO: 0.58 THOUSAND/ÂΜL (ref 0.17–1.22)
MONOCYTES NFR BLD AUTO: 14 % (ref 4–12)
NEUTROPHILS # BLD AUTO: 1.68 THOUSANDS/ÂΜL (ref 1.85–7.62)
NEUTS SEG NFR BLD AUTO: 41 % (ref 43–75)
NONHDLC SERPL-MCNC: 220 MG/DL
NRBC BLD AUTO-RTO: 0 /100 WBCS
PLATELET # BLD AUTO: 217 THOUSANDS/UL (ref 149–390)
PMV BLD AUTO: 11.5 FL (ref 8.9–12.7)
POTASSIUM SERPL-SCNC: 3.9 MMOL/L (ref 3.5–5.3)
PROT SERPL-MCNC: 6.7 G/DL (ref 6.4–8.4)
RBC # BLD AUTO: 3.82 MILLION/UL (ref 3.81–5.12)
SODIUM SERPL-SCNC: 139 MMOL/L (ref 135–147)
TRIGL SERPL-MCNC: 176 MG/DL
TSH SERPL DL<=0.05 MIU/L-ACNC: 1.78 UIU/ML (ref 0.45–4.5)
WBC # BLD AUTO: 4.11 THOUSAND/UL (ref 4.31–10.16)

## 2023-10-18 PROCEDURE — 36415 COLL VENOUS BLD VENIPUNCTURE: CPT

## 2023-10-18 PROCEDURE — 80053 COMPREHEN METABOLIC PANEL: CPT

## 2023-10-18 PROCEDURE — 84443 ASSAY THYROID STIM HORMONE: CPT

## 2023-10-18 PROCEDURE — 85652 RBC SED RATE AUTOMATED: CPT

## 2023-10-18 PROCEDURE — 85025 COMPLETE CBC W/AUTO DIFF WBC: CPT

## 2023-10-18 PROCEDURE — 83036 HEMOGLOBIN GLYCOSYLATED A1C: CPT

## 2023-10-18 PROCEDURE — 80061 LIPID PANEL: CPT

## 2023-10-19 ENCOUNTER — OFFICE VISIT (OUTPATIENT)
Age: 88
End: 2023-10-19

## 2023-10-19 VITALS
BODY MASS INDEX: 26.61 KG/M2 | WEIGHT: 115 LBS | HEART RATE: 74 BPM | DIASTOLIC BLOOD PRESSURE: 64 MMHG | SYSTOLIC BLOOD PRESSURE: 130 MMHG | OXYGEN SATURATION: 98 % | HEIGHT: 55 IN

## 2023-10-19 DIAGNOSIS — E78.2 MIXED HYPERLIPIDEMIA: Primary | ICD-10-CM

## 2023-10-19 DIAGNOSIS — R73.01 IMPAIRED FASTING GLUCOSE: ICD-10-CM

## 2023-10-19 DIAGNOSIS — E03.8 OTHER SPECIFIED HYPOTHYROIDISM: ICD-10-CM

## 2023-10-19 DIAGNOSIS — K21.9 GERD WITHOUT ESOPHAGITIS: ICD-10-CM

## 2023-10-19 DIAGNOSIS — I10 HYPERTENSION, UNSPECIFIED TYPE: ICD-10-CM

## 2023-10-19 DIAGNOSIS — Z23 ENCOUNTER FOR IMMUNIZATION: ICD-10-CM

## 2023-10-19 RX ORDER — ROSUVASTATIN CALCIUM 5 MG/1
5 TABLET, COATED ORAL DAILY
Qty: 30 TABLET | Refills: 3 | Status: SHIPPED | OUTPATIENT
Start: 2023-10-19

## 2023-10-19 NOTE — PATIENT INSTRUCTIONS
Influenza vaccine was administered today    Labs were reviewed in detail and compared to prior. Hyperlipidemia - . Given elevation and increased risk for adverse events, start rosuvastatin 5 mg once daily. Monitor for any muscle aches, weakness and call the office if there are any issues. Recommend lifestyle modifications. Chronic headache - Prior work-up unremarkable. Recommend rest and Tylenol. Hypertension - Stable. Continue lisinopril and atenolol. Hypothyroidism - TSH within normal limits. Continue levothyroxine. GERD - Continue pantoprazole. Follow-up in 6 months with labs before.

## 2023-11-07 DIAGNOSIS — E03.9 ACQUIRED HYPOTHYROIDISM: ICD-10-CM

## 2023-11-07 DIAGNOSIS — K21.9 GERD WITHOUT ESOPHAGITIS: ICD-10-CM

## 2023-11-07 RX ORDER — PANTOPRAZOLE SODIUM 40 MG/1
40 TABLET, DELAYED RELEASE ORAL
Qty: 90 TABLET | Refills: 0 | Status: SHIPPED | OUTPATIENT
Start: 2023-11-07

## 2023-11-07 RX ORDER — LEVOTHYROXINE SODIUM 0.05 MG/1
TABLET ORAL
Qty: 102 TABLET | Refills: 0 | Status: SHIPPED | OUTPATIENT
Start: 2023-11-07

## 2023-11-12 DIAGNOSIS — F41.9 ANXIETY: ICD-10-CM

## 2023-11-13 RX ORDER — ALPRAZOLAM 0.5 MG/1
TABLET ORAL
Qty: 120 TABLET | Refills: 0 | Status: SHIPPED | OUTPATIENT
Start: 2023-11-13

## 2023-11-20 ENCOUNTER — RA CDI HCC (OUTPATIENT)
Dept: OTHER | Facility: HOSPITAL | Age: 88
End: 2023-11-20

## 2023-11-20 DIAGNOSIS — I10 ESSENTIAL HYPERTENSION: ICD-10-CM

## 2023-11-20 DIAGNOSIS — E78.2 MIXED HYPERLIPIDEMIA: ICD-10-CM

## 2023-11-20 RX ORDER — LISINOPRIL 5 MG/1
5 TABLET ORAL DAILY
Qty: 90 TABLET | Refills: 0 | Status: SHIPPED | OUTPATIENT
Start: 2023-11-20

## 2023-11-20 RX ORDER — ROSUVASTATIN CALCIUM 5 MG/1
5 TABLET, COATED ORAL DAILY
Qty: 30 TABLET | Refills: 0 | Status: SHIPPED | OUTPATIENT
Start: 2023-11-20

## 2023-12-12 DIAGNOSIS — E78.2 MIXED HYPERLIPIDEMIA: ICD-10-CM

## 2023-12-12 DIAGNOSIS — I10 ESSENTIAL HYPERTENSION: ICD-10-CM

## 2023-12-12 RX ORDER — ROSUVASTATIN CALCIUM 5 MG/1
5 TABLET, COATED ORAL DAILY
Qty: 30 TABLET | Refills: 0 | Status: SHIPPED | OUTPATIENT
Start: 2023-12-12

## 2023-12-12 RX ORDER — LISINOPRIL 5 MG/1
5 TABLET ORAL DAILY
Qty: 90 TABLET | Refills: 0 | Status: SHIPPED | OUTPATIENT
Start: 2023-12-12

## 2023-12-19 ENCOUNTER — TELEPHONE (OUTPATIENT)
Age: 88
End: 2023-12-19

## 2023-12-19 DIAGNOSIS — I10 ESSENTIAL HYPERTENSION: ICD-10-CM

## 2023-12-19 RX ORDER — ATENOLOL 50 MG/1
50 TABLET ORAL 2 TIMES DAILY
Qty: 180 TABLET | Refills: 3 | Status: SHIPPED | OUTPATIENT
Start: 2023-12-19

## 2023-12-29 DIAGNOSIS — F41.9 ANXIETY: ICD-10-CM

## 2023-12-29 RX ORDER — ALPRAZOLAM 0.5 MG/1
0.5 TABLET ORAL 4 TIMES DAILY PRN
Qty: 120 TABLET | Refills: 0 | Status: SHIPPED | OUTPATIENT
Start: 2023-12-29

## 2023-12-29 RX ORDER — ALPRAZOLAM 0.5 MG/1
TABLET ORAL
Qty: 120 TABLET | Refills: 0 | Status: SHIPPED | OUTPATIENT
Start: 2023-12-29 | End: 2023-12-29

## 2024-01-03 ENCOUNTER — OFFICE VISIT (OUTPATIENT)
Age: 89
End: 2024-01-03
Payer: MEDICARE

## 2024-01-03 VITALS
HEIGHT: 55 IN | BODY MASS INDEX: 25.92 KG/M2 | OXYGEN SATURATION: 99 % | DIASTOLIC BLOOD PRESSURE: 70 MMHG | SYSTOLIC BLOOD PRESSURE: 120 MMHG | HEART RATE: 68 BPM | WEIGHT: 112 LBS | RESPIRATION RATE: 16 BRPM

## 2024-01-03 DIAGNOSIS — M25.572 CHRONIC PAIN OF LEFT ANKLE: Primary | ICD-10-CM

## 2024-01-03 DIAGNOSIS — G89.29 CHRONIC PAIN OF LEFT ANKLE: Primary | ICD-10-CM

## 2024-01-03 PROCEDURE — 99213 OFFICE O/P EST LOW 20 MIN: CPT

## 2024-01-03 NOTE — PROGRESS NOTES
INTERNAL MEDICINE FOLLOW-UP VISIT  [facility] - [facility] INTERNAL MEDICINE [address]    NAME: Lakeisha Langston  AGE: 88 y.o. SEX: female  : 1935     DATE: 1/3/2024     Assessment and Plan:   1. Chronic pain of left ankle  Ankle x-ray ordered to rule out any bony abnormality.  This is most likely related to a sprain.  Discussed rest, ice, compression, and elevation.  Use Tylenol as needed for pain.  If the swelling gets worse, or you are having worsening trouble walking, notify the office.  - XR ankle 3+ vw left; Future      Depression Screening and Follow-up Plan: Patient was screened for depression during today's encounter. They screened negative with a PHQ-9 score of 0.    Falls Plan of Care: balance, strength, and gait training instructions were provided. Recommended assistive device to help with gait and balance.        Return if symptoms worsen or fail to improve.       Chief Complaint:     Chief Complaint   Patient presents with    Follow-up     Left ankle pain      History of Present Illness:   Patient is a an 88-year-old female that presents today for left ankle pain.  She had fallen about 3 years ago and hurt this left ankle.  It has been bothering her more so the past few weeks.  She notes that the ankle is swollen, but she denies any redness or bruising.  It hurts when she is walking for long periods of time or sometimes in the morning when she wakes up.  The pain comes and goes. She uses Tylenol regularly which helps.      The following portions of the patient's history were reviewed and updated as appropriate: allergies, current medications, past family history, past medical history, past social history, past surgical history and problem list.     Review of Systems:     Review of Systems   Constitutional:  Negative for chills and fever.   Musculoskeletal:  Positive for arthralgias and joint swelling. Negative for gait problem and myalgias.        Past Medical History:      Past Medical History:   Diagnosis Date    Anxiety 1992    Arthritis 1991    Benign essential hypertension     Last assessed: 9/11/14    Disease of thyroid gland     Essential tremor     Fibromyalgia     GERD (gastroesophageal reflux disease)     History of nail disorders     Hyperlipidemia     Hypertension     Palpitations     Transient cerebral ischemia         Current Medications:     Current Outpatient Medications:     acetaminophen (TYLENOL) 325 mg tablet, Take 1-2 tablets by mouth every 6 (six) hours as needed, Disp: , Rfl:     albuterol (PROVENTIL HFA,VENTOLIN HFA) 90 mcg/act inhaler, , Disp: , Rfl:     ALPRAZolam (XANAX) 0.5 mg tablet, Take 1 tablet (0.5 mg total) by mouth 4 (four) times a day as needed for anxiety, Disp: 120 tablet, Rfl: 0    atenolol (TENORMIN) 50 mg tablet, Take 1 tablet (50 mg total) by mouth 2 (two) times a day, Disp: 180 tablet, Rfl: 3    Diclofenac Sodium (VOLTAREN) 1 %, Apply 2 g topically 4 (four) times a day, Disp: 100 g, Rfl: 0    imipramine (TOFRANIL) 10 mg tablet, take 1 tablet by mouth at bedtime - 4 NIGHTS PER WEEK AS DIRECTED, Disp: 30 tablet, Rfl: 5    latanoprost (XALATAN) 0.005 % ophthalmic solution, Administer 1 drop to both eyes daily at bedtime, Disp: 7.5 mL, Rfl: 3    levothyroxine 50 mcg tablet, take 1 tablet by mouth once daily except Sunday take 2 tabs, Disp: 102 tablet, Rfl: 0    lisinopril (ZESTRIL) 5 mg tablet, TAKE 1 TABLET(5 MG) BY MOUTH DAILY, Disp: 90 tablet, Rfl: 0    ondansetron (ZOFRAN-ODT) 4 mg disintegrating tablet, , Disp: , Rfl:     pantoprazole (PROTONIX) 40 mg tablet, Take 1 tablet (40 mg total) by mouth daily before breakfast, Disp: 90 tablet, Rfl: 0    Probiotic Product (ALIGN) 4 MG CAPS, Take 1 tablet by mouth daily, Disp: , Rfl:     risedronate (ACTONEL) 35 mg tablet, Take 1 tablet (35 mg total) by mouth every 7 days with water on empty stomach, nothing by mouth or lie down for next 30 minutes., Disp: 4 tablet, Rfl: 5    rosuvastatin (CRESTOR)  "5 mg tablet, TAKE 1 TABLET(5 MG) BY MOUTH DAILY, Disp: 30 tablet, Rfl: 0     Allergies:     Allergies   Allergen Reactions    Other Drowsiness     Annotation - [id number]: ANTIDEPRESSANTS        Physical Exam:     /70 (BP Location: Left arm, Patient Position: Sitting, Cuff Size: Adult)   Pulse 68   Resp 16   Ht 4' 7\" (1.397 m)   Wt 50.8 kg (112 lb)   SpO2 99%   BMI 26.03 kg/m²     Physical Exam  Vitals and nursing note reviewed.   Constitutional:       General: She is awake. She is not in acute distress.     Appearance: Normal appearance. She is well-developed, well-groomed and normal weight.   HENT:      Head: Normocephalic and atraumatic.      Right Ear: Hearing and external ear normal.      Left Ear: Hearing and external ear normal.      Nose: Nose normal.      Mouth/Throat:      Lips: Pink.      Mouth: Mucous membranes are moist.   Eyes:      General: Lids are normal. Vision grossly intact. Gaze aligned appropriately.      Conjunctiva/sclera: Conjunctivae normal.   Neck:      Vascular: No carotid bruit.      Trachea: Trachea and phonation normal.   Cardiovascular:      Rate and Rhythm: Normal rate and regular rhythm.      Heart sounds: Normal heart sounds, S1 normal and S2 normal. No murmur heard.     No friction rub. No gallop.   Pulmonary:      Effort: Pulmonary effort is normal. No respiratory distress.      Breath sounds: Normal breath sounds and air entry. No decreased breath sounds, wheezing, rhonchi or rales.   Abdominal:      General: Abdomen is flat.   Musculoskeletal:         General: No swelling.      Cervical back: Neck supple.      Right lower leg: No edema.      Left lower leg: No edema.      Right ankle: Normal.      Left ankle: Swelling present. No deformity or ecchymosis. No tenderness. Normal range of motion. Normal pulse.      Comments: Normal range of motion.   Skin:     General: Skin is warm.      Capillary Refill: Capillary refill takes less than 2 seconds.   Neurological:      " Mental Status: She is alert.   Psychiatric:         Attention and Perception: Attention and perception normal.         Mood and Affect: Mood and affect normal.         Speech: Speech normal.         Behavior: Behavior normal. Behavior is cooperative.         Thought Content: Thought content normal.         Cognition and Memory: Cognition and memory normal.         Judgment: Judgment normal.           Data:     Laboratory Results: I have personally reviewed the pertinent laboratory results/reports   Radiology/Other Diagnostic Testing Results: I have personally reviewed pertinent reports.      Trista Goyal PA-C  [facility] INTERNAL MEDICINE [address]

## 2024-01-14 ENCOUNTER — APPOINTMENT (EMERGENCY)
Dept: CT IMAGING | Facility: HOSPITAL | Age: 89
End: 2024-01-14
Payer: MEDICARE

## 2024-01-14 ENCOUNTER — HOSPITAL ENCOUNTER (EMERGENCY)
Facility: HOSPITAL | Age: 89
Discharge: HOME | End: 2024-01-15
Attending: EMERGENCY MEDICINE
Payer: MEDICARE

## 2024-01-14 DIAGNOSIS — R42 DIZZINESS: Primary | ICD-10-CM

## 2024-01-14 LAB
2HR DELTA HS TROPONIN: 0 NG/L
ALBUMIN SERPL BCP-MCNC: 4 G/DL (ref 3.5–5)
ALP SERPL-CCNC: 70 U/L (ref 34–104)
ALT SERPL W P-5'-P-CCNC: 8 U/L (ref 7–52)
ANION GAP SERPL CALCULATED.3IONS-SCNC: 5 MMOL/L
AST SERPL W P-5'-P-CCNC: 17 U/L (ref 13–39)
BACTERIA UR QL AUTO: NORMAL /HPF
BASOPHILS # BLD AUTO: 0.02 THOUSANDS/ÂΜL (ref 0–0.1)
BASOPHILS NFR BLD AUTO: 0 % (ref 0–1)
BILIRUB SERPL-MCNC: 0.44 MG/DL (ref 0.2–1)
BILIRUB UR QL STRIP: NEGATIVE
BUN SERPL-MCNC: 11 MG/DL (ref 5–25)
CALCIUM SERPL-MCNC: 9.2 MG/DL (ref 8.4–10.2)
CARDIAC TROPONIN I PNL SERPL HS: 6 NG/L
CARDIAC TROPONIN I PNL SERPL HS: 6 NG/L
CHLORIDE SERPL-SCNC: 104 MMOL/L (ref 96–108)
CLARITY UR: CLEAR
CO2 SERPL-SCNC: 28 MMOL/L (ref 21–32)
COLOR UR: COLORLESS
CREAT SERPL-MCNC: 1.31 MG/DL (ref 0.6–1.3)
EOSINOPHIL # BLD AUTO: 0.56 THOUSAND/ÂΜL (ref 0–0.61)
EOSINOPHIL NFR BLD AUTO: 9 % (ref 0–6)
ERYTHROCYTE [DISTWIDTH] IN BLOOD BY AUTOMATED COUNT: 13.2 % (ref 11.6–15.1)
GFR SERPL CREATININE-BSD FRML MDRD: 36 ML/MIN/1.73SQ M
GLUCOSE SERPL-MCNC: 93 MG/DL (ref 65–140)
GLUCOSE UR STRIP-MCNC: NEGATIVE MG/DL
HCT VFR BLD AUTO: 33.5 % (ref 34.8–46.1)
HGB BLD-MCNC: 11.1 G/DL (ref 11.5–15.4)
HGB UR QL STRIP.AUTO: NEGATIVE
IMM GRANULOCYTES # BLD AUTO: 0.01 THOUSAND/UL (ref 0–0.2)
IMM GRANULOCYTES NFR BLD AUTO: 0 % (ref 0–2)
INR PPP: 0.96 (ref 0.84–1.19)
KETONES UR STRIP-MCNC: NEGATIVE MG/DL
LEUKOCYTE ESTERASE UR QL STRIP: NEGATIVE
LYMPHOCYTES # BLD AUTO: 1.19 THOUSANDS/ÂΜL (ref 0.6–4.47)
LYMPHOCYTES NFR BLD AUTO: 20 % (ref 14–44)
MCH RBC QN AUTO: 31.7 PG (ref 26.8–34.3)
MCHC RBC AUTO-ENTMCNC: 33.1 G/DL (ref 31.4–37.4)
MCV RBC AUTO: 96 FL (ref 82–98)
MONOCYTES # BLD AUTO: 0.65 THOUSAND/ÂΜL (ref 0.17–1.22)
MONOCYTES NFR BLD AUTO: 11 % (ref 4–12)
NEUTROPHILS # BLD AUTO: 3.63 THOUSANDS/ÂΜL (ref 1.85–7.62)
NEUTS SEG NFR BLD AUTO: 60 % (ref 43–75)
NITRITE UR QL STRIP: NEGATIVE
NON-SQ EPI CELLS URNS QL MICRO: NORMAL /HPF
NRBC BLD AUTO-RTO: 0 /100 WBCS
PH UR STRIP.AUTO: 6.5 [PH]
PLATELET # BLD AUTO: 166 THOUSANDS/UL (ref 149–390)
PMV BLD AUTO: 9.6 FL (ref 8.9–12.7)
POTASSIUM SERPL-SCNC: 3.9 MMOL/L (ref 3.5–5.3)
PROT SERPL-MCNC: 6.7 G/DL (ref 6.4–8.4)
PROT UR STRIP-MCNC: NEGATIVE MG/DL
PROTHROMBIN TIME: 13.4 SECONDS (ref 11.6–14.5)
RBC # BLD AUTO: 3.5 MILLION/UL (ref 3.81–5.12)
RBC #/AREA URNS AUTO: NORMAL /HPF
SODIUM SERPL-SCNC: 137 MMOL/L (ref 135–147)
SP GR UR STRIP.AUTO: 1.02 (ref 1–1.03)
TSH SERPL DL<=0.05 MIU/L-ACNC: 0.68 UIU/ML (ref 0.45–4.5)
UROBILINOGEN UR STRIP-ACNC: <2 MG/DL
WBC # BLD AUTO: 6.06 THOUSAND/UL (ref 4.31–10.16)
WBC #/AREA URNS AUTO: NORMAL /HPF

## 2024-01-14 PROCEDURE — 99284 EMERGENCY DEPT VISIT MOD MDM: CPT

## 2024-01-14 PROCEDURE — 81001 URINALYSIS AUTO W/SCOPE: CPT | Performed by: EMERGENCY MEDICINE

## 2024-01-14 PROCEDURE — 84484 ASSAY OF TROPONIN QUANT: CPT | Performed by: EMERGENCY MEDICINE

## 2024-01-14 PROCEDURE — 85610 PROTHROMBIN TIME: CPT | Performed by: EMERGENCY MEDICINE

## 2024-01-14 PROCEDURE — 70498 CT ANGIOGRAPHY NECK: CPT

## 2024-01-14 PROCEDURE — 93005 ELECTROCARDIOGRAM TRACING: CPT

## 2024-01-14 PROCEDURE — 84443 ASSAY THYROID STIM HORMONE: CPT | Performed by: EMERGENCY MEDICINE

## 2024-01-14 PROCEDURE — 99285 EMERGENCY DEPT VISIT HI MDM: CPT | Performed by: EMERGENCY MEDICINE

## 2024-01-14 PROCEDURE — 70496 CT ANGIOGRAPHY HEAD: CPT

## 2024-01-14 PROCEDURE — 85025 COMPLETE CBC W/AUTO DIFF WBC: CPT | Performed by: EMERGENCY MEDICINE

## 2024-01-14 PROCEDURE — 36415 COLL VENOUS BLD VENIPUNCTURE: CPT | Performed by: EMERGENCY MEDICINE

## 2024-01-14 PROCEDURE — 80053 COMPREHEN METABOLIC PANEL: CPT | Performed by: EMERGENCY MEDICINE

## 2024-01-14 RX ORDER — ATENOLOL 25 MG/1
50 TABLET ORAL ONCE
Status: COMPLETED | OUTPATIENT
Start: 2024-01-14 | End: 2024-01-14

## 2024-01-14 RX ADMIN — IOHEXOL 85 ML: 350 INJECTION, SOLUTION INTRAVENOUS at 19:29

## 2024-01-14 RX ADMIN — ATENOLOL 50 MG: 25 TABLET ORAL at 23:38

## 2024-01-15 VITALS
RESPIRATION RATE: 22 BRPM | HEART RATE: 64 BPM | SYSTOLIC BLOOD PRESSURE: 190 MMHG | TEMPERATURE: 97.6 F | DIASTOLIC BLOOD PRESSURE: 81 MMHG | OXYGEN SATURATION: 99 %

## 2024-01-15 LAB
ATRIAL RATE: 66 BPM
P AXIS: 43 DEGREES
PR INTERVAL: 150 MS
QRS AXIS: 4 DEGREES
QRSD INTERVAL: 86 MS
QT INTERVAL: 432 MS
QTC INTERVAL: 452 MS
T WAVE AXIS: 22 DEGREES
VENTRICULAR RATE: 66 BPM

## 2024-01-15 NOTE — ED NOTES
Pt ambulated to bathroom steady gait. Pt assisted by RN and daughter pt stated no dizziness     Nicolette Wu RN  01/14/24 2219       Nicolette Wu RN  01/14/24 [id number]

## 2024-01-15 NOTE — ED PROVIDER NOTES
"History  Chief Complaint   Patient presents with    Dizziness     Pt states \" I feel like ,y head is not attached to me\", c/o dizziness.      88-year-old female patient presents to the emergency department for evaluation of dizziness.  Patient states that over the last 4 days intermittently she has felt off balance.  Today the patient was asleep on the couch, woke up, stated that she felt off balance while walking more significantly than she had previously.  Because this patient symptoms have been ongoing for greater than 24 hours she would not be a candidate for stroke alert or any type of thrombolytic therapy    Currently the patient is awake alert and oriented.  The patient's NIH stroke scale is 0.  She has no neurodeficits.  She is ambulatory with some assistance.    Plans will be for CT, CTA, labs.    Differential currently includes old stroke, vertigo, dehydration.      History provided by:  Patient   used: No    Dizziness  Quality:  Head spinning and lightheadedness  Severity:  Mild  Onset quality:  Gradual  Timing:  Constant  Progression:  Worsening  Context: not with bowel movement, not with eye movement, not with loss of consciousness and not when urinating    Relieved by:  Nothing  Worsened by:  Nothing  Ineffective treatments:  None tried  Associated symptoms: no diarrhea, no hearing loss, no shortness of breath and no tinnitus        Prior to Admission Medications   Prescriptions Last Dose Informant Patient Reported? Taking?   ALPRAZolam (XANAX) 0.5 mg tablet   No No   Sig: Take 1 tablet (0.5 mg total) by mouth 4 (four) times a day as needed for anxiety   Diclofenac Sodium (VOLTAREN) 1 %  Self No No   Sig: Apply 2 g topically 4 (four) times a day   Probiotic Product (ALIGN) 4 MG CAPS  Self Yes No   Sig: Take 1 tablet by mouth daily   acetaminophen (TYLENOL) 325 mg tablet  Self Yes No   Sig: Take 1-2 tablets by mouth every 6 (six) hours as needed   albuterol (PROVENTIL HFA,VENTOLIN " HFA) 90 mcg/act inhaler   Yes No   atenolol (TENORMIN) 50 mg tablet   No No   Sig: Take 1 tablet (50 mg total) by mouth 2 (two) times a day   imipramine (TOFRANIL) 10 mg tablet   No No   Sig: take 1 tablet by mouth at bedtime - 4 NIGHTS PER WEEK AS DIRECTED   latanoprost (XALATAN) 0.005 % ophthalmic solution   No No   Sig: Administer 1 drop to both eyes daily at bedtime   levothyroxine 50 mcg tablet   No No   Sig: take 1 tablet by mouth once daily except Sunday take 2 tabs   lisinopril (ZESTRIL) 5 mg tablet   No No   Sig: TAKE 1 TABLET(5 MG) BY MOUTH DAILY   ondansetron (ZOFRAN-ODT) 4 mg disintegrating tablet   Yes No   pantoprazole (PROTONIX) 40 mg tablet   No No   Sig: Take 1 tablet (40 mg total) by mouth daily before breakfast   risedronate (ACTONEL) 35 mg tablet  Self No No   Sig: Take 1 tablet (35 mg total) by mouth every 7 days with water on empty stomach, nothing by mouth or lie down for next 30 minutes.   rosuvastatin (CRESTOR) 5 mg tablet   No No   Sig: TAKE 1 TABLET(5 MG) BY MOUTH DAILY      Facility-Administered Medications: None       Past Medical History:   Diagnosis Date    Anxiety 1992    Arthritis 1991    Benign essential hypertension     Last assessed: 9/11/14    Disease of thyroid gland     Essential tremor     Fibromyalgia     GERD (gastroesophageal reflux disease)     History of nail disorders     Hyperlipidemia     Hypertension     Palpitations     Transient cerebral ischemia        Past Surgical History:   Procedure Laterality Date    APPENDECTOMY      EGD AND COLONOSCOPY  07/2020    HYSTERECTOMY  01/01/2010    [place] LAPS SURG CHOLECYSTECTOMY W/CHOLANGIOGRAPHY N/A 4/4/2018    Procedure: LAPAROSCOPIC CHOLECYSTECTOMY WITH IOC;  Surgeon: Yakov Mueller MD;  Location: [facility];  Service: General    TUBAL LIGATION         Family History   Problem Relation Age of Onset    Stroke Mother         ischemic stroke    Hypertension Mother     Heart disease Father     Cancer Sister     No Known Problems  Sister     No Known Problems Daughter     No Known Problems Daughter     No Known Problems Daughter     No Known Problems Daughter     No Known Problems Daughter     No Known Problems Maternal Grandmother     No Known Problems Paternal Grandmother     No Known Problems Paternal Aunt     No Known Problems Paternal Aunt     No Known Problems Paternal Aunt     No Known Problems Paternal Aunt     No Known Problems Paternal Aunt     Breast cancer Neg Hx      I have reviewed and agree with the history as documented.    E-Cigarette/Vaping    E-Cigarette Use Never User      E-Cigarette/Vaping Substances    Nicotine No     THC No     CBD No     Flavoring No     Other No     Unknown No      Social History     Tobacco Use    Smoking status: Former     Current packs/day: 0.00     Average packs/day: 0.1 packs/day for 50.0 years (5.0 ttl pk-yrs)     Types: Cigarettes     Start date: 1963     Quit date:      Years since quittin.0    Smokeless tobacco: Never    Tobacco comments:     1 pack a week    Vaping Use    Vaping status: Never Used   Substance Use Topics    Alcohol use: Not Currently     Comment: 0    Drug use: No       Review of Systems   HENT:  Negative for hearing loss and tinnitus.    Respiratory:  Negative for shortness of breath.    Gastrointestinal:  Negative for diarrhea.   Neurological:  Positive for dizziness.   All other systems reviewed and are negative.      Physical Exam  Physical Exam  Vitals and nursing note reviewed.   Constitutional:       Appearance: She is well-developed.   HENT:      Head: Normocephalic and atraumatic.      Right Ear: External ear normal.      Left Ear: External ear normal.   Eyes:      Conjunctiva/sclera: Conjunctivae normal.   Neck:      Thyroid: No thyromegaly.      Vascular: No JVD.      Trachea: No tracheal deviation.   Cardiovascular:      Rate and Rhythm: Normal rate.   Pulmonary:      Effort: Pulmonary effort is normal.      Breath sounds: Normal breath sounds. No  stridor.   Abdominal:      General: There is no distension.      Palpations: Abdomen is soft. There is no mass.      Tenderness: There is no abdominal tenderness. There is no guarding.      Hernia: No hernia is present.   Musculoskeletal:         General: No tenderness or deformity. Normal range of motion.   Lymphadenopathy:      Cervical: No cervical adenopathy.   Skin:     General: Skin is warm.      Coloration: Skin is not pale.      Findings: No erythema or rash.   Neurological:      Mental Status: She is alert and oriented to person, place, and time.   Psychiatric:         Behavior: Behavior normal.         Vital Signs  ED Triage Vitals [01/14/24 1735]   Temperature Pulse Respirations Blood Pressure SpO2   97.6 °F (36.4 °C) 64 18 153/76 99 %      Temp Source Heart Rate Source Patient Position - Orthostatic VS BP Location FiO2 (%)   Oral -- Sitting Right arm --      Pain Score       --           Vitals:    01/14/24 1735   BP: 153/76   Pulse: 64   Patient Position - Orthostatic VS: Sitting         Visual Acuity      ED Medications  Medications   iohexol (OMNIPAQUE) 350 MG/ML injection (MULTI-DOSE) 85 mL (85 mL Intravenous Given 1/14/24 1929)       Diagnostic Studies  Results Reviewed       Procedure Component Value Units Date/Time    TSH, 3rd generation with Free T4 reflex [[id number]]  (Normal) Collected: 01/14/24 1839    Lab Status: Final result Specimen: Blood from Arm, Right Updated: 01/14/24 1920     TSH 3RD GENERATON 0.676 uIU/mL     HS Troponin I 2hr [[id number]]     Lab Status: No result Specimen: Blood     HS Troponin 0hr (reflex protocol) [[id number]]  (Normal) Collected: 01/14/24 1839    Lab Status: Final result Specimen: Blood from Arm, Right Updated: 01/14/24 1911     hs TnI 0hr 6 ng/L     Comprehensive metabolic panel [[id number]]  (Abnormal) Collected: 01/14/24 1839    Lab Status: Final result Specimen: Blood from Arm, Right Updated: 01/14/24 1902     Sodium 137 mmol/L      Potassium 3.9 mmol/L       Chloride 104 mmol/L      CO2 28 mmol/L      ANION GAP 5 mmol/L      BUN 11 mg/dL      Creatinine 1.31 mg/dL      Glucose 93 mg/dL      Calcium 9.2 mg/dL      AST 17 U/L      ALT 8 U/L      Alkaline Phosphatase 70 U/L      Total Protein 6.7 g/dL      Albumin 4.0 g/dL      Total Bilirubin 0.44 mg/dL      eGFR 36 ml/min/1.73sq m     Narrative:      National Kidney Disease Foundation guidelines for Chronic Kidney Disease (CKD):     Stage 1 with normal or high GFR (GFR > 90 mL/min/1.73 square meters)    Stage 2 Mild CKD (GFR = 60-89 mL/min/1.73 square meters)    Stage 3A Moderate CKD (GFR = 45-59 mL/min/1.73 square meters)    Stage 3B Moderate CKD (GFR = 30-44 mL/min/1.73 square meters)    Stage 4 Severe CKD (GFR = 15-29 mL/min/1.73 square meters)    Stage 5 End Stage CKD (GFR <15 mL/min/1.73 square meters)  Note: GFR calculation is accurate only with a steady state creatinine    Protime-INR [[id number]]  (Normal) Collected: 01/14/24 1839    Lab Status: Final result Specimen: Blood from Arm, Right Updated: 01/14/24 1857     Protime 13.4 seconds      INR 0.96    CBC and differential [[id number]]  (Abnormal) Collected: 01/14/24 1839    Lab Status: Final result Specimen: Blood from Arm, Right Updated: 01/14/24 1845     WBC 6.06 Thousand/uL      RBC 3.50 Million/uL      Hemoglobin 11.1 g/dL      Hematocrit 33.5 %      MCV 96 fL      MCH 31.7 pg      MCHC 33.1 g/dL      RDW 13.2 %      MPV 9.6 fL      Platelets 166 Thousands/uL      nRBC 0 /100 WBCs      Neutrophils Relative 60 %      Immat GRANS % 0 %      Lymphocytes Relative 20 %      Monocytes Relative 11 %      Eosinophils Relative 9 %      Basophils Relative 0 %      Neutrophils Absolute 3.63 Thousands/µL      Immature Grans Absolute 0.01 Thousand/uL      Lymphocytes Absolute 1.19 Thousands/µL      Monocytes Absolute 0.65 Thousand/µL      Eosinophils Absolute 0.56 Thousand/µL      Basophils Absolute 0.02 Thousands/µL     Urinalysis with microscopic [[id number]]     Lab  Status: No result Specimen: Urine                    CTA head and neck with and without contrast    (Results Pending)              Procedures  Procedures         ED Course               Identification of Seniors at Risk      Flowsheet Row Most Recent Value   (ISAR) Identification of Seniors at Risk    Before the illness or injury that brought you to the Emergency, did you need someone to help you on a regular basis? 0 Filed at: 01/14/2024 1736   In the last 24 hours, have you needed more help than usual? 0 Filed at: 01/14/2024 1736   Have you been hospitalized for one or more nights during the past 6 months? 0 Filed at: 01/14/2024 1736   In general, do you see well? 0 Filed at: 01/14/2024 1736   In general, do you have serious problems with your memory? 0 Filed at: 01/14/2024 1736   Do you take more than three different medications every day? 0 Filed at: 01/14/2024 1736   ISAR Score 0 Filed at: 01/14/2024 1736                        SBIRT 22yo+      Flowsheet Row Most Recent Value   Initial Alcohol Screen: US AUDIT-C     1. How often do you have a drink containing alcohol? 0 Filed at: 01/14/2024 1736   2. How many drinks containing alcohol do you have on a typical day you are drinking?  0 Filed at: 01/14/2024 1736   3a. Male UNDER 65: How often do you have five or more drinks on one occasion? 0 Filed at: 01/14/2024 1736   3b. FEMALE Any Age, or MALE 65+: How often do you have 4 or more drinks on one occassion? 0 Filed at: 01/14/2024 1736   Audit-C Score 0 Filed at: 01/14/2024 1736   GILLES: How many times in the past year have you...    Used an illegal drug or used a prescription medication for non-medical reasons? Never Filed at: 01/14/2024 1736                      Medical Decision Making  Amount and/or Complexity of Data Reviewed  Labs: ordered.  Radiology: ordered.             Disposition  Final diagnoses:   None     ED Disposition       None          Follow-up Information    None         Patient's Medications    Discharge Prescriptions    No medications on file       No discharge procedures on file.    PDMP Review         Value Time User    PDMP Reviewed  Yes 12/29/2023  4:47 PM Ezekiel Vargas MD            ED Provider  Electronically Signed by             Christiano Andersen DO  01/14/24 5178

## 2024-01-16 ENCOUNTER — VBI (OUTPATIENT)
Age: 89
End: 2024-01-16

## 2024-01-16 DIAGNOSIS — E78.2 MIXED HYPERLIPIDEMIA: ICD-10-CM

## 2024-01-16 RX ORDER — ROSUVASTATIN CALCIUM 5 MG/1
5 TABLET, COATED ORAL DAILY
Qty: 30 TABLET | Refills: 0 | Status: SHIPPED | OUTPATIENT
Start: 2024-01-16 | End: 2024-01-22 | Stop reason: SDUPTHER

## 2024-01-16 NOTE — TELEPHONE ENCOUNTER
01/16/24 9:53 AM    Patient contacted post ED visit, first outreach attempt made. Message was left for patient to return a call to the VBI Department at [facility]: Phone [phone].    Thank you.  Chrissie Strauss MA  [facility]

## 2024-01-16 NOTE — LETTER
[facility] INTERNAL MEDICINE [address]  [address]  [address]  [place] [address]  [phone]    Date: 01/18/24  Lakeisha Langston  [address]   [place] [address]    Dear Lakeisha:                                                                                                                              Thank you for choosing [facility] emergency department for care.  Your primary care provider wants to make sure that your ongoing medical care is being addressed. If you require follow up care as a result of your emergency department visit, there are a few things the practice would like you to know.                As part of the network's continuing commitment to caring for our patients, we have added more same day appointments and have extended office hours to meet your medical needs. After hours, on-call physicians are available via your primary care provider's main office line.               We encourage you to contact our office prior to seeking treatment to discuss your symptoms with the medical staff.  Together, we can determine the correct course of action.  A majority of non-emergent conditions such as: common cold, flu-like symptoms, fevers, strains/sprains, dislocations, minor burns, cuts and animal bites can be treated at [facility] facilities. Diagnostic testing is available at some sites.               Of course, if you are experiencing a life threatening medical emergency call 911 or proceed directly to the nearest emergency room.    Your nearest [facility] facility is conveniently located at:    [facility]  [address] [place] [address] [address] [place] [id number]  [phone]  SKIP THE WAIT  Conveniently offered at most [facility] locations  Anselmo your spot online at [email or web address] or on the [facility]    Sincerely,    [facility] INTERNAL [facility] [address]  Dept: [phone]

## 2024-01-16 NOTE — TELEPHONE ENCOUNTER
01/16/24 9:51 AM     [facility] [employer] SmartForm used to document caregap status.    Chrissie Strauss MA

## 2024-01-17 NOTE — TELEPHONE ENCOUNTER
01/17/24 11:19 AM    Patient contacted post ED visit, second outreach attempt made. Message was left for patient to return a call to the VBI Department at [facility]: Phone [phone].    Thank you.  Chrissie Strauss MA  [facility]

## 2024-01-18 NOTE — TELEPHONE ENCOUNTER
01/18/24 10:43 AM    Patient contacted post ED visit, phone outreaches were unsuccessful and a MyChart letter has been sent to the patient as follow-up.    Thank you.  Chrissie Strauss MA  [facility]

## 2024-01-21 DIAGNOSIS — F41.9 ANXIETY: ICD-10-CM

## 2024-01-22 DIAGNOSIS — E78.2 MIXED HYPERLIPIDEMIA: ICD-10-CM

## 2024-01-22 RX ORDER — IMIPRAMINE HYDROCHLORIDE 10 MG/1
TABLET, FILM COATED ORAL
Qty: 30 TABLET | Refills: 0 | Status: SHIPPED | OUTPATIENT
Start: 2024-01-22

## 2024-01-22 RX ORDER — ROSUVASTATIN CALCIUM 5 MG/1
5 TABLET, COATED ORAL DAILY
Qty: 30 TABLET | Refills: 0 | Status: SHIPPED | OUTPATIENT
Start: 2024-01-22

## 2024-01-30 ENCOUNTER — HOSPITAL ENCOUNTER (OUTPATIENT)
Dept: RADIOLOGY | Facility: HOSPITAL | Age: 89
Discharge: HOME | End: 2024-01-30
Payer: MEDICARE

## 2024-01-30 DIAGNOSIS — M25.572 CHRONIC PAIN OF LEFT ANKLE: ICD-10-CM

## 2024-01-30 DIAGNOSIS — G89.29 CHRONIC PAIN OF LEFT ANKLE: ICD-10-CM

## 2024-01-30 PROCEDURE — 73610 X-RAY EXAM OF ANKLE: CPT

## 2024-01-31 ENCOUNTER — TELEPHONE (OUTPATIENT)
Age: 89
End: 2024-01-31

## 2024-01-31 NOTE — TELEPHONE ENCOUNTER
----- Message from Trista Goyal PA-C sent at 1/31/2024  7:47 AM EST -----  Ankle x-ray looks normal, there is no acute fracture or significant degenerative changes.

## 2024-02-11 DIAGNOSIS — F41.9 ANXIETY: ICD-10-CM

## 2024-02-12 RX ORDER — ALPRAZOLAM 0.5 MG/1
TABLET ORAL
Qty: 120 TABLET | Refills: 0 | Status: SHIPPED | OUTPATIENT
Start: 2024-02-12

## 2024-02-19 DIAGNOSIS — E78.2 MIXED HYPERLIPIDEMIA: ICD-10-CM

## 2024-02-19 RX ORDER — ROSUVASTATIN CALCIUM 5 MG/1
5 TABLET, COATED ORAL DAILY
Qty: 90 TABLET | Refills: 3 | Status: SHIPPED | OUTPATIENT
Start: 2024-02-19

## 2024-02-19 RX ORDER — ROSUVASTATIN CALCIUM 5 MG/1
5 TABLET, COATED ORAL DAILY
Qty: 90 TABLET | Refills: 3 | Status: SHIPPED | OUTPATIENT
Start: 2024-02-19 | End: 2024-02-19 | Stop reason: SDUPTHER

## 2024-02-21 DIAGNOSIS — E03.9 ACQUIRED HYPOTHYROIDISM: ICD-10-CM

## 2024-02-21 RX ORDER — LEVOTHYROXINE SODIUM 0.05 MG/1
TABLET ORAL
Qty: 102 TABLET | Refills: 0 | Status: SHIPPED | OUTPATIENT
Start: 2024-02-21

## 2024-02-22 DIAGNOSIS — F41.9 ANXIETY: ICD-10-CM

## 2024-02-22 RX ORDER — IMIPRAMINE HYDROCHLORIDE 10 MG/1
TABLET, FILM COATED ORAL
Qty: 90 TABLET | Refills: 2 | Status: SHIPPED | OUTPATIENT
Start: 2024-02-22

## 2024-02-26 DIAGNOSIS — K21.9 GERD WITHOUT ESOPHAGITIS: ICD-10-CM

## 2024-02-26 RX ORDER — PANTOPRAZOLE SODIUM 40 MG/1
TABLET, DELAYED RELEASE ORAL
Qty: 90 TABLET | Refills: 3 | Status: SHIPPED | OUTPATIENT
Start: 2024-02-26

## 2024-03-12 ENCOUNTER — OFFICE VISIT (OUTPATIENT)
Age: 89
End: 2024-03-12
Payer: MEDICARE

## 2024-03-12 VITALS
DIASTOLIC BLOOD PRESSURE: 78 MMHG | HEIGHT: 55 IN | BODY MASS INDEX: 24.99 KG/M2 | HEART RATE: 70 BPM | OXYGEN SATURATION: 98 % | WEIGHT: 108 LBS | SYSTOLIC BLOOD PRESSURE: 114 MMHG | RESPIRATION RATE: 16 BRPM

## 2024-03-12 DIAGNOSIS — K44.9 HIATAL HERNIA: Primary | ICD-10-CM

## 2024-03-12 DIAGNOSIS — R06.02 SOB (SHORTNESS OF BREATH): ICD-10-CM

## 2024-03-12 DIAGNOSIS — F41.9 ANXIETY: ICD-10-CM

## 2024-03-12 PROCEDURE — G2211 COMPLEX E/M VISIT ADD ON: HCPCS

## 2024-03-12 PROCEDURE — 99214 OFFICE O/P EST MOD 30 MIN: CPT

## 2024-03-12 RX ORDER — IMIPRAMINE HYDROCHLORIDE 10 MG/1
TABLET, FILM COATED ORAL
Qty: 90 TABLET | Refills: 2 | Status: SHIPPED | OUTPATIENT
Start: 2024-03-12

## 2024-03-12 NOTE — PROGRESS NOTES
INTERNAL MEDICINE FOLLOW-UP VISIT  [facility] INTERNAL MEDICINE [address]    NAME: Lakeisha Langston  AGE: 88 y.o. SEX: female  : 1935     DATE: 3/12/2024     Assessment and Plan:   1. Hiatal hernia  No pain to palpation of epigastric region. Discussed continuing to monitor for worsening pain.  Continue pantoprazole daily and Pepto-Bismol as needed.  If you begin to experience any increase in pain, nausea, vomiting, or worsening reflux symptoms notify the office.    2. Anxiety  - imipramine (TOFRANIL) 10 mg tablet; Take 1 tablet by mouth at bedtime  Dispense: 90 tablet; Refill: 2    3. SOB (shortness of breath)  She notes for the past few weeks she has been more short of breath on exertion.  She denies any PND, orthopnea, worsening lower extremity swelling, wheezing, or cough.  May be related to underlying heart failure.  She has a follow-up coming with her cardiologist.        No follow-ups on file.       Chief Complaint:     Chief Complaint   Patient presents with    Hernia     In abdomen very painful.      History of Present Illness:   Patient is an 88-year-old female that presents today for a hernia.  She has had a hiatal hernia for years.  It started bothering her a few days ago, yesterday being the worst pain.  She notes a lack of appetite the past few days and admits to some nausea.Today she feels much better, and denies any pain.  She denies any reflux symptoms, dysphagia, epigastric pain, postprandial fullness, nausea, vomiting, or lack of appetite today.    The following portions of the patient's history were reviewed and updated as appropriate: allergies, current medications, past family history, past medical history, past social history, past surgical history and problem list.     Review of Systems:     Review of Systems   Constitutional:  Negative for chills, fatigue and fever.   HENT:  Negative for ear discharge, ear pain, postnasal drip, rhinorrhea, sinus  pressure, sinus pain, sore throat, tinnitus and trouble swallowing.    Eyes:  Negative for pain, discharge and itching.   Respiratory:  Positive for shortness of breath (STAHL). Negative for cough and wheezing.    Cardiovascular:  Negative for chest pain, palpitations and leg swelling.   Gastrointestinal:  Negative for abdominal pain, constipation, diarrhea, nausea and vomiting.   Endocrine: Negative for polydipsia, polyphagia and polyuria.   Genitourinary:  Negative for difficulty urinating, frequency, hematuria and urgency.   Musculoskeletal:  Negative for arthralgias, joint swelling and myalgias.   Skin:  Negative for color change.   Allergic/Immunologic: Negative for environmental allergies.   Neurological:  Negative for dizziness, weakness, light-headedness, numbness and headaches.   Hematological:  Negative for adenopathy.   Psychiatric/Behavioral:  Negative for decreased concentration and sleep disturbance. The patient is not nervous/anxious.         Past Medical History:     Past Medical History:   Diagnosis Date    Anxiety 1992    Arthritis 1991    Benign essential hypertension     Last assessed: 9/11/14    Disease of thyroid gland     Essential tremor     Fibromyalgia     GERD (gastroesophageal reflux disease)     History of nail disorders     Hyperlipidemia     Hypertension     Palpitations     Transient cerebral ischemia         Current Medications:     Current Outpatient Medications:     acetaminophen (TYLENOL) 325 mg tablet, Take 1-2 tablets by mouth every 6 (six) hours as needed, Disp: , Rfl:     albuterol (PROVENTIL HFA,VENTOLIN HFA) 90 mcg/act inhaler, , Disp: , Rfl:     ALPRAZolam (XANAX) 0.5 mg tablet, TAKE 1 TABLET(0.5 MG) BY MOUTH FOUR TIMES DAILY AS NEEDED FOR ANXIETY, Disp: 120 tablet, Rfl: 0    atenolol (TENORMIN) 50 mg tablet, Take 1 tablet (50 mg total) by mouth 2 (two) times a day, Disp: 180 tablet, Rfl: 3    Diclofenac Sodium (VOLTAREN) 1 %, Apply 2 g topically 4 (four) times a day, Disp:  "100 g, Rfl: 0    imipramine (TOFRANIL) 10 mg tablet, TAKE 1 TABLET BY MOUTH AT BEDTIME, Disp: 90 tablet, Rfl: 2    latanoprost (XALATAN) 0.005 % ophthalmic solution, Administer 1 drop to both eyes daily at bedtime, Disp: 7.5 mL, Rfl: 3    levothyroxine 50 mcg tablet, take 1 tablet by mouth once daily except Sunday take 2 tabs, Disp: 102 tablet, Rfl: 0    lisinopril (ZESTRIL) 5 mg tablet, TAKE 1 TABLET(5 MG) BY MOUTH DAILY, Disp: 90 tablet, Rfl: 0    ondansetron (ZOFRAN-ODT) 4 mg disintegrating tablet, , Disp: , Rfl:     pantoprazole (PROTONIX) 40 mg tablet, TAKE 1 TABLET(40 MG) BY MOUTH DAILY BEFORE BREAKFAST, Disp: 90 tablet, Rfl: 3    Probiotic Product (ALIGN) 4 MG CAPS, Take 1 tablet by mouth daily, Disp: , Rfl:     risedronate (ACTONEL) 35 mg tablet, Take 1 tablet (35 mg total) by mouth every 7 days with water on empty stomach, nothing by mouth or lie down for next 30 minutes., Disp: 4 tablet, Rfl: 5    rosuvastatin (CRESTOR) 5 mg tablet, Take 1 tablet (5 mg total) by mouth daily, Disp: 90 tablet, Rfl: 3     Allergies:     Allergies   Allergen Reactions    Other Drowsiness     Annotation - [id number]: ANTIDEPRESSANTS        Physical Exam:     /78 (BP Location: Left arm, Patient Position: Sitting, Cuff Size: Standard)   Pulse 70   Resp 16   Ht 4' 7\" (1.397 m)   Wt 49 kg (108 lb)   SpO2 98%   BMI 25.10 kg/m²     Physical Exam  Vitals and nursing note reviewed.   Constitutional:       General: She is awake. She is not in acute distress.     Appearance: Normal appearance. She is well-developed, well-groomed and normal weight.   HENT:      Head: Normocephalic and atraumatic.      Right Ear: Hearing and external ear normal.      Left Ear: Hearing and external ear normal.      Nose: Nose normal.      Mouth/Throat:      Lips: Pink.      Mouth: Mucous membranes are moist.   Eyes:      General: Lids are normal. Vision grossly intact. Gaze aligned appropriately.      Conjunctiva/sclera: Conjunctivae normal. "   Neck:      Vascular: No carotid bruit.      Trachea: Trachea and phonation normal.   Cardiovascular:      Rate and Rhythm: Normal rate and regular rhythm.      Heart sounds: Normal heart sounds, S1 normal and S2 normal. No murmur heard.     No friction rub. No gallop.   Pulmonary:      Effort: Pulmonary effort is normal. No respiratory distress.      Breath sounds: Normal breath sounds and air entry. No decreased breath sounds, wheezing, rhonchi or rales.   Abdominal:      General: Abdomen is flat. Bowel sounds are normal.      Palpations: Abdomen is soft.      Tenderness: There is no abdominal tenderness.   Musculoskeletal:         General: No swelling.      Cervical back: Neck supple.      Right lower le+ Edema present.      Left lower le+ Edema present.   Skin:     General: Skin is warm.      Capillary Refill: Capillary refill takes less than 2 seconds.   Neurological:      Mental Status: She is alert.   Psychiatric:         Attention and Perception: Attention and perception normal.         Mood and Affect: Mood and affect normal.         Speech: Speech normal.         Behavior: Behavior normal. Behavior is cooperative.         Thought Content: Thought content normal.         Cognition and Memory: Cognition and memory normal.         Judgment: Judgment normal.           Data:     Laboratory Results: I have personally reviewed the pertinent laboratory results/reports   Radiology/Other Diagnostic Testing Results: I have personally reviewed pertinent reports.      Trista Goyal PA-C  [facility] INTERNAL MEDICINE [address]

## 2024-03-25 DIAGNOSIS — G89.29 CHRONIC LOW BACK PAIN WITHOUT SCIATICA, UNSPECIFIED BACK PAIN LATERALITY: ICD-10-CM

## 2024-03-25 DIAGNOSIS — M54.50 CHRONIC LOW BACK PAIN WITHOUT SCIATICA, UNSPECIFIED BACK PAIN LATERALITY: ICD-10-CM

## 2024-03-25 DIAGNOSIS — F41.9 ANXIETY: ICD-10-CM

## 2024-03-26 RX ORDER — ALPRAZOLAM 0.5 MG/1
0.5 TABLET ORAL 4 TIMES DAILY PRN
Qty: 120 TABLET | Refills: 0 | Status: SHIPPED | OUTPATIENT
Start: 2024-03-26

## 2024-04-09 ENCOUNTER — OFFICE VISIT (OUTPATIENT)
Dept: CARDIOLOGY CLINIC | Facility: CLINIC | Age: 89
End: 2024-04-09
Payer: MEDICARE

## 2024-04-09 VITALS
SYSTOLIC BLOOD PRESSURE: 124 MMHG | WEIGHT: 111 LBS | RESPIRATION RATE: 16 BRPM | HEIGHT: 55 IN | DIASTOLIC BLOOD PRESSURE: 70 MMHG | HEART RATE: 78 BPM | BODY MASS INDEX: 25.69 KG/M2 | OXYGEN SATURATION: 98 %

## 2024-04-09 DIAGNOSIS — I47.10 PSVT (PAROXYSMAL SUPRAVENTRICULAR TACHYCARDIA): ICD-10-CM

## 2024-04-09 DIAGNOSIS — N18.32 STAGE 3B CHRONIC KIDNEY DISEASE (HCC): ICD-10-CM

## 2024-04-09 DIAGNOSIS — R06.02 SOB (SHORTNESS OF BREATH): Primary | ICD-10-CM

## 2024-04-09 DIAGNOSIS — I10 PRIMARY HYPERTENSION: ICD-10-CM

## 2024-04-09 DIAGNOSIS — E78.2 MIXED HYPERLIPIDEMIA: ICD-10-CM

## 2024-04-09 DIAGNOSIS — I50.32 CHRONIC DIASTOLIC CHF (CONGESTIVE HEART FAILURE) (HCC): ICD-10-CM

## 2024-04-09 DIAGNOSIS — R09.89 BRUIT OF RIGHT CAROTID ARTERY: ICD-10-CM

## 2024-04-09 PROCEDURE — 99214 OFFICE O/P EST MOD 30 MIN: CPT

## 2024-04-09 NOTE — PROGRESS NOTES
[facility] Cardiology   Office Visit    Lakeisha Langston 88 y.o. female MRN: [id number]    04/09/24        Assessment/Plan:  1.  Shortness of breath  Endorses SOB, denies associated chest pain.  Plan for echocardiogram to assess LVEF, wall motion, and PASP.  Discussed consideration for further ischemic evaluation such as stress test stress test, however patient wishes to hold off at this time and continue monitoring symptoms.  Advised to notify our office for any new/worsening symptoms.    2.  Chronic HFpEF  Euvolemic on exam without diuretics.  Encourage low-sodium diet, daily weights, LE compression stockings, and LE elevation.    3.  Hypertension  BP is well-controlled.  Continue atenolol and lisinopril.  Encourage ambulatory monitoring and low-sodium diet.    4.  Hx of syncope  Denies reoccurrence.  No significant ventricular arrhythmias or pauses/heart blocks noted on event monitor.  Most recent pharmacological MPI stress test negative for evidence of stress-induced myocardial ischemia.  Most recent TTE revealed EF 60%, no RWMA, G1 DD, trace AI, mild MR/TR.  Encourage adequate hydration.    5.  PSVT  Noted on event monitor.  Continue atenolol.    6.  Hyperlipidemia  Continue rosuvastatin and dietary control.    7.  Right carotid bruit  VAS carotid duplex study ordered.    8.  CKD 3  9.  Anxiety        HPI: Lakeisha Langston is a 88 y.o. year old female with history of chronic HFpEF, hypertension, syncope, PSVT, hyperlipidemia, CKD 3, and anxiety who presents for office visit.  During previous visit with cardiology patient was ordered an ambulatory event monitor with results as per above.  Patient reports she has been fairly well overall from a cardiac standpoint since that time.  However, recently she has been experiencing increased shortness of breath with activity.  She also occasionally notes mild LE edema.  Patient tries to keep her legs elevated while resting for such.  She endorses strict compliance to all  "medications and a low-sodium diet.  Denies adverse effects to current medications.  Denies chest pain, palpitations, orthopnea, lightheadedness/dizziness or recurrence of syncope.  Follows with Dr. Sorensen as primary cardiologist.      Review of Systems:  Review of Systems   Constitutional:  Negative for chills and fever.   HENT:  Negative for ear pain and sore throat.    Eyes:  Negative for pain and visual disturbance.   Respiratory:  Positive for shortness of breath. Negative for cough.    Cardiovascular:  Negative for chest pain and palpitations. Leg swelling: intermittent.  Gastrointestinal:  Negative for abdominal pain and vomiting.   Genitourinary:  Negative for dysuria and hematuria.   Musculoskeletal:  Negative for arthralgias and back pain.   Skin:  Negative for color change and rash.   Neurological:  Negative for seizures and syncope.   All other systems reviewed and are negative.      PHYSICAL EXAM:  Vitals:   Vitals:    04/09/24 1209   BP: 124/70   BP Location: Left arm   Patient Position: Sitting   Cuff Size: Standard   Pulse: 78   Resp: 16   SpO2: 98%   Weight: 50.3 kg (111 lb)   Height: 4' 7\" (1.397 m)        Physical Exam:  GEN: Alert and oriented x 3, in no acute distress.  Well appearing and well nourished.   HEENT: Sclera anicteric, conjunctivae pink, mucous membranes moist. Oropharynx clear.   NECK: Supple, + R carotid bruit, no significant JVD. Trachea midline, no thyromegaly.   HEART: Regular rhythm, normal S1 and S2, no murmurs, clicks, gallops or rubs. PMI nondisplaced, no thrills.   LUNGS: Clear to auscultation bilaterally; no wheezes, rales, or rhonchi. No increased work of breathing or signs of respiratory distress.   ABDOMEN: Soft, nontender, nondistended, normoactive bowel sounds.   EXTREMITIES: Skin warm and well perfused, no clubbing, cyanosis, or edema.  NEURO: No focal findings. Normal speech. Mood and affect normal.   SKIN: Normal without suspicious lesions on exposed " skin.    Follow up: 3 months or sooner as needed    Allergies   Allergen Reactions    Other Drowsiness     [facility] - [id number]: ANTIDEPRESSANTS         Current Outpatient Medications:     acetaminophen (TYLENOL) 325 mg tablet, Take 1-2 tablets by mouth every 6 (six) hours as needed, Disp: , Rfl:     albuterol (PROVENTIL HFA,VENTOLIN HFA) 90 mcg/act inhaler, , Disp: , Rfl:     ALPRAZolam (XANAX) 0.5 mg tablet, Take 1 tablet (0.5 mg total) by mouth 4 (four) times a day as needed for anxiety, Disp: 120 tablet, Rfl: 0    atenolol (TENORMIN) 50 mg tablet, Take 1 tablet (50 mg total) by mouth 2 (two) times a day, Disp: 180 tablet, Rfl: 3    Diclofenac Sodium (VOLTAREN) 1 %, Apply 2 g topically 4 (four) times a day, Disp: 100 g, Rfl: 0    imipramine (TOFRANIL) 10 mg tablet, Take 1 tablet by mouth at bedtime, Disp: 90 tablet, Rfl: 2    latanoprost (XALATAN) 0.005 % ophthalmic solution, Administer 1 drop to both eyes daily at bedtime, Disp: 7.5 mL, Rfl: 3    levothyroxine 50 mcg tablet, take 1 tablet by mouth once daily except Sunday take 2 tabs (Patient taking differently: Take 50 mcg by mouth daily), Disp: 102 tablet, Rfl: 0    lisinopril (ZESTRIL) 5 mg tablet, TAKE 1 TABLET(5 MG) BY MOUTH DAILY, Disp: 90 tablet, Rfl: 0    ondansetron (ZOFRAN-ODT) 4 mg disintegrating tablet, , Disp: , Rfl:     pantoprazole (PROTONIX) 40 mg tablet, TAKE 1 TABLET(40 MG) BY MOUTH DAILY BEFORE BREAKFAST, Disp: 90 tablet, Rfl: 3    Probiotic Product (ALIGN) 4 MG CAPS, Take 1 tablet by mouth daily, Disp: , Rfl:     rosuvastatin (CRESTOR) 5 mg tablet, Take 1 tablet (5 mg total) by mouth daily, Disp: 90 tablet, Rfl: 3    risedronate (ACTONEL) 35 mg tablet, Take 1 tablet (35 mg total) by mouth every 7 days with water on empty stomach, nothing by mouth or lie down for next 30 minutes. (Patient not taking: Reported on 4/9/2024), Disp: 4 tablet, Rfl: 5    Past Medical History:   Diagnosis Date    Anxiety 1992    Arthritis 1991    Benign essential  hypertension     Last assessed: 9/11/14    Disease of thyroid gland     Essential tremor     Fibromyalgia     GERD (gastroesophageal reflux disease)     History of nail disorders     Hyperlipidemia     Hypertension     Palpitations     Transient cerebral ischemia        Family History   Problem Relation Age of Onset    Stroke Mother         ischemic stroke    Hypertension Mother     Heart disease Father     Cancer Sister     No Known Problems Sister     No Known Problems Daughter     No Known Problems Daughter     No Known Problems Daughter     No Known Problems Daughter     No Known Problems Daughter     No Known Problems Maternal Grandmother     No Known Problems Paternal Grandmother     No Known Problems Paternal Aunt     No Known Problems Paternal Aunt     No Known Problems Paternal Aunt     No Known Problems Paternal Aunt     No Known Problems Paternal Aunt     Breast cancer Neg Hx        Past Medical History:   Diagnosis Date    Anxiety 1992    Arthritis 1991    Benign essential hypertension     Last assessed: 9/11/14    Disease of thyroid gland     Essential tremor     Fibromyalgia     GERD (gastroesophageal reflux disease)     History of nail disorders     Hyperlipidemia     Hypertension     Palpitations     Transient cerebral ischemia        Past Surgical History:   Procedure Laterality Date    APPENDECTOMY      EGD AND COLONOSCOPY  07/2020    HYSTERECTOMY  01/01/2010    FL LAPS SURG CHOLECYSTECTOMY W/CHOLANGIOGRAPHY N/A 4/4/2018    Procedure: LAPAROSCOPIC CHOLECYSTECTOMY WITH IOC;  Surgeon: Yakov Mueller MD;  Location: [facility] [place];  Service: General    TUBAL LIGATION         Social History     Socioeconomic History    Marital status:      Spouse name: Not on file    Number of children: Not on file    Years of education: Not on file    Highest education level: Not on file   Occupational History    Occupation: retired    Tobacco Use    Smoking status: Former     Current packs/day: 0.00     Average  packs/day: 0.1 packs/day for 50.0 years (5.0 ttl pk-yrs)     Types: Cigarettes     Start date: 1963     Quit date: 2013     Years since quittin.2    Smokeless tobacco: Never    Tobacco comments:     1 pack a week    Vaping Use    Vaping status: Never Used   Substance and Sexual Activity    Alcohol use: Not Currently     Comment: 0    Drug use: No    Sexual activity: Not Currently     Partners: Male   Other Topics Concern    Not on file   Social History Narrative    Living independently with spouse     Social Determinants of Health     Financial Resource Strain: Low Risk  (2023)    Overall Financial Resource Strain (CARDIA)     Difficulty of Paying Living Expenses: Not hard at all   Food Insecurity: Not on file   Transportation Needs: No Transportation Needs (2023)    PRAPARE - Transportation     Lack of Transportation (Medical): No     Lack of Transportation (Non-Medical): No   Physical Activity: Unknown (2022)    Exercise Vital Sign     Days of Exercise per Week: 0 days     Minutes of Exercise per Session: Not on file   Stress: No Stress Concern Present (2022)    [employer] - Occupational Stress Questionnaire     Feeling of Stress : Only a little   Social Connections: Not on file   Intimate Partner Violence: Not on file   Housing Stability: Not on file           LABORATORY RESULTS:    Lab Results   Component Value Date    WBC 6.06 2024    HGB 11.1 (L) 2024    HCT 33.5 (L) 2024    MCV 96 2024     2024     Lab Results   Component Value Date    GLUCOSE 85 09/10/2015    CALCIUM 9.2 2024     09/10/2015    K 3.9 2024    CO2 28 2024     2024    BUN 11 2024    CREATININE 1.31 (H) 2024     Lab Results   Component Value Date    HGBA1C 5.9 (H) 10/18/2023       Lipid Profile:   Lab Results   Component Value Date    CHOL 153 09/10/2015    CHOL 141 2015    CHOL 164 2014      Lab Results   Component Value Date    HDL 70 10/18/2023    HDL 72 03/01/2023    HDL 64 08/31/2022     Lab Results   Component Value Date    LDLCALC 185 (H) 10/18/2023    LDLCALC 143 (H) 03/01/2023    LDLCALC 182 (H) 08/31/2022     Lab Results   Component Value Date    TRIG 176 (H) 10/18/2023    TRIG 160 (H) 03/01/2023    TRIG 153 (H) 08/31/2022       The ASCVD Risk score (Robinson DK, et al., 2019) failed to calculate for the following reasons:    The 2019 ASCVD risk score is only valid for ages 40 to 79    1. SOB (shortness of breath)  Echo complete w/ contrast if indicated      2. Chronic diastolic CHF (congestive heart failure) (HCC)        3. Primary hypertension        4. Mixed hyperlipidemia        5. PSVT (paroxysmal supraventricular tachycardia)        6. Bruit of right carotid artery  VAS carotid complete study      7. Stage 3b chronic kidney disease (HCC)            Imaging: I have personally reviewed pertinent reports.        Recommend aggressive risk factor modification and therapeutic lifestyle changes.  Low-salt, low-calorie, low-fat, low-cholesterol diet with regular exercise and to optimize weight.    Discussed concepts of atherosclerosis, including signs and symptoms of cardiac disease.    Medications reviewed and possible side effects discussed.  Previous studies were reviewed.    Safety measures were reviewed.  All questions and concerns addressed.  Patient was advised to report any problems requiring medical attention.    Follow-up with PCP and appropriate specialist and lab work as discussed.    Return for follow up visit as scheduled or earlier, if needed.  Thank you for allowing me to participate in the care and evaluation of your patient.  Should you have any questions, please feel free to contact me.    Donald Tirado PA-C  4/9/2024,12:58 PM

## 2024-04-16 ENCOUNTER — HOSPITAL ENCOUNTER (OUTPATIENT)
Dept: NON INVASIVE DIAGNOSTICS | Facility: CLINIC | Age: 89
Discharge: HOME | End: 2024-04-16
Payer: MEDICARE

## 2024-04-16 VITALS
SYSTOLIC BLOOD PRESSURE: 124 MMHG | HEART RATE: 78 BPM | DIASTOLIC BLOOD PRESSURE: 70 MMHG | WEIGHT: 111 LBS | BODY MASS INDEX: 25.69 KG/M2 | HEIGHT: 55 IN

## 2024-04-16 DIAGNOSIS — R06.02 SOB (SHORTNESS OF BREATH): ICD-10-CM

## 2024-04-16 DIAGNOSIS — R09.89 BRUIT OF RIGHT CAROTID ARTERY: ICD-10-CM

## 2024-04-16 LAB
AORTIC ROOT: 2.5 CM
APICAL FOUR CHAMBER EJECTION FRACTION: 60 %
ASCENDING AORTA: 2.1 CM
BSA FOR ECHO PROCEDURE: 1.36 M2
E WAVE DECELERATION TIME: 269 MS
E/A RATIO: 0.91
FRACTIONAL SHORTENING: 29 (ref 28–44)
INTERVENTRICULAR SEPTUM IN DIASTOLE (PARASTERNAL SHORT AXIS VIEW): 0.9 CM
INTERVENTRICULAR SEPTUM: 0.9 CM (ref 0.6–1.1)
LAAS-AP2: 18.6 CM2
LAAS-AP4: 19.5 CM2
LEFT ATRIUM SIZE: 3.6 CM
LEFT ATRIUM VOLUME (MOD BIPLANE): 49 ML
LEFT ATRIUM VOLUME INDEX (MOD BIPLANE): 36 ML/M2
LEFT INTERNAL DIMENSION IN SYSTOLE: 2.5 CM (ref 2.1–4)
LEFT VENTRICULAR INTERNAL DIMENSION IN DIASTOLE: 3.5 CM (ref 3.5–6)
LEFT VENTRICULAR POSTERIOR WALL IN END DIASTOLE: 0.9 CM
LEFT VENTRICULAR STROKE VOLUME: 28 ML
LVSV (TEICH): 28 ML
MV E'TISSUE VEL-SEP: 4 CM/S
MV PEAK A VEL: 1.23 M/S
MV PEAK E VEL: 112 CM/S
MV STENOSIS PRESSURE HALF TIME: 78 MS
MV VALVE AREA P 1/2 METHOD: 2.82
PA SYSTOLIC PRESSURE: 40 MMHG
RIGHT ATRIUM AREA SYSTOLE A4C: 14.2 CM2
RIGHT VENTRICLE ID DIMENSION: 3.8 CM
SL CV LEFT ATRIUM LENGTH A2C: 5.9 CM
SL CV LV EF: 60
SL CV PED ECHO LEFT VENTRICLE DIASTOLIC VOLUME (MOD BIPLANE) 2D: 51 ML
SL CV PED ECHO LEFT VENTRICLE SYSTOLIC VOLUME (MOD BIPLANE) 2D: 23 ML
TR MAX PG: 36 MMHG
TR PEAK VELOCITY: 3 M/S
TRICUSPID ANNULAR PLANE SYSTOLIC EXCURSION: 2.2 CM
TRICUSPID VALVE PEAK REGURGITATION VELOCITY: 3 M/S

## 2024-04-16 PROCEDURE — 93880 EXTRACRANIAL BILAT STUDY: CPT

## 2024-04-16 PROCEDURE — 93306 TTE W/DOPPLER COMPLETE: CPT | Performed by: INTERNAL MEDICINE

## 2024-04-16 PROCEDURE — 93880 EXTRACRANIAL BILAT STUDY: CPT | Performed by: INTERNAL MEDICINE

## 2024-04-16 PROCEDURE — 93306 TTE W/DOPPLER COMPLETE: CPT

## 2024-04-17 ENCOUNTER — TELEPHONE (OUTPATIENT)
Dept: CARDIOLOGY CLINIC | Facility: CLINIC | Age: 89
End: 2024-04-17

## 2024-04-17 NOTE — TELEPHONE ENCOUNTER
----- Message from Donald Tirado PA-C sent at 4/17/2024  2:36 PM EDT -----  Please call patient to advise echo overall looks stable compared to previous echocardiogram.  These results can be discussed in more detail at next office visit, thank you!

## 2024-04-17 NOTE — TELEPHONE ENCOUNTER
Called and lvm for pt relaying Donald KEE response and to give the office a call back with any concerns or questions.

## 2024-04-17 NOTE — TELEPHONE ENCOUNTER
----- Message from Donald Tirado PA-C sent at 4/16/2024  4:51 PM EDT -----  Please call patient to advise carotid duplex study appears stable from 2018.  We will continue periodic surveillance.  Results can be discussed in more detail at next office visit, thank you!

## 2024-05-12 DIAGNOSIS — F41.9 ANXIETY: ICD-10-CM

## 2024-05-13 RX ORDER — ALPRAZOLAM 0.5 MG/1
TABLET ORAL
Qty: 120 TABLET | Refills: 0 | Status: SHIPPED | OUTPATIENT
Start: 2024-05-13

## 2024-05-13 NOTE — TELEPHONE ENCOUNTER
Telephone call from patient to check on her refill of her Xanax. She states she thought she put in a my chart request last week, but I did not see the request. I did let her know the pharmacy sent over a request yesterday. She is down to just one pill.

## 2024-05-18 DIAGNOSIS — I10 ESSENTIAL HYPERTENSION: ICD-10-CM

## 2024-05-18 RX ORDER — LISINOPRIL 5 MG/1
5 TABLET ORAL DAILY
Qty: 90 TABLET | Refills: 1 | Status: SHIPPED | OUTPATIENT
Start: 2024-05-18

## 2024-05-24 ENCOUNTER — TELEPHONE (OUTPATIENT)
Age: 89
End: 2024-05-24

## 2024-05-28 ENCOUNTER — OFFICE VISIT (OUTPATIENT)
Age: 89
End: 2024-05-28
Payer: MEDICARE

## 2024-05-28 VITALS
BODY MASS INDEX: 25.46 KG/M2 | DIASTOLIC BLOOD PRESSURE: 72 MMHG | WEIGHT: 110 LBS | SYSTOLIC BLOOD PRESSURE: 130 MMHG | OXYGEN SATURATION: 98 % | HEART RATE: 67 BPM | HEIGHT: 55 IN

## 2024-05-28 DIAGNOSIS — K21.9 GERD WITHOUT ESOPHAGITIS: ICD-10-CM

## 2024-05-28 DIAGNOSIS — N18.32 STAGE 3B CHRONIC KIDNEY DISEASE (HCC): ICD-10-CM

## 2024-05-28 DIAGNOSIS — F41.9 ANXIETY: ICD-10-CM

## 2024-05-28 DIAGNOSIS — D50.9 IRON DEFICIENCY ANEMIA, UNSPECIFIED IRON DEFICIENCY ANEMIA TYPE: ICD-10-CM

## 2024-05-28 DIAGNOSIS — I10 PRIMARY HYPERTENSION: Primary | ICD-10-CM

## 2024-05-28 DIAGNOSIS — E03.8 OTHER SPECIFIED HYPOTHYROIDISM: ICD-10-CM

## 2024-05-28 PROCEDURE — 99214 OFFICE O/P EST MOD 30 MIN: CPT | Performed by: INTERNAL MEDICINE

## 2024-05-28 PROCEDURE — G2211 COMPLEX E/M VISIT ADD ON: HCPCS | Performed by: INTERNAL MEDICINE

## 2024-05-28 NOTE — PATIENT INSTRUCTIONS
Headache is probably stress related as previous work up was all negative.   Discussed again constipation options such as meta fiber and if no response use Miralax.   Recommended practicing balance exercise to prevent any future fall.    Please follow up with your labs and will follow results over the phone.

## 2024-05-28 NOTE — PROGRESS NOTES
Assessment/Plan:       Diagnoses and all orders for this visit:    Primary hypertension    GERD without esophagitis    Other specified hypothyroidism    Anxiety    Iron deficiency anemia, unspecified iron deficiency anemia type    Stage 3b chronic kidney disease (HCC)          Patient Instructions   Headache is probably stress related as previous work up was all negative.   Discussed again constipation options such as meta fiber and if no response use Miralax.   Recommended practicing balance exercise to prevent any future fall.    Please follow up with your labs and will follow results over the phone.     Subjective:      Patient ID: Lakeisha Langston is a 88 y.o. female.    Patient is here for follow up with granddaughter.     HTN- No home Bps. Compliant with atenolol and lisinopril.   HLD- On rosuvastatin. No muscle aches.   LARRY- Takes iron pills.   GERD- Controlled with Pantoprazole.   Hypothyroidism- No cold intolerance or constipation. Compliant with levothyroxine.   Anxiety- Takes Xanax almost 5 times a week.     Complains of worsening intermittent throbbing left sided headache for the past few months. Relieved by Tylenol and Xanax. No associated with photophobia, phonophobia, nausea, vomiting, or vision changes.   Denied any SOB this visit.     Lives with her daughter. Eating a balanced diet. Sleeps well. No exercise.        The following portions of the patient's history were reviewed and updated as appropriate:   She has a past medical history of Anxiety (1992), Arthritis (1991), Benign essential hypertension, Disease of thyroid gland, Essential tremor, Fibromyalgia, GERD (gastroesophageal reflux disease), History of nail disorders, Hyperlipidemia, Hypertension, Palpitations, and Transient cerebral ischemia.,  does not have any pertinent problems on file.,   has a past surgical history that includes Appendectomy; pr laps surg cholecystectomy w/cholangiography (N/A, 4/4/2018); Tubal ligation; Hysterectomy  (01/01/2010); and EGD AND COLONOSCOPY (07/2020).,  family history includes Cancer in her sister; Heart disease in her father; Hypertension in her mother; No Known Problems in her daughter, daughter, daughter, daughter, daughter, maternal grandmother, paternal aunt, paternal aunt, paternal aunt, paternal aunt, paternal aunt, paternal grandmother, and sister; Stroke in her mother.,   reports that she quit smoking about 11 years ago. Her smoking use included cigarettes. She started smoking about 61 years ago. She has a 5 pack-year smoking history. She has never used smokeless tobacco. She reports that she does not currently use alcohol. She reports that she does not use drugs.,  is allergic to other..  Current Outpatient Medications   Medication Sig Dispense Refill    acetaminophen (TYLENOL) 325 mg tablet Take 1-2 tablets by mouth every 6 (six) hours as needed      albuterol (PROVENTIL HFA,VENTOLIN HFA) 90 mcg/act inhaler       ALPRAZolam (XANAX) 0.5 mg tablet TAKE 1 TABLET(0.5 MG) BY MOUTH FOUR TIMES DAILY AS NEEDED FOR ANXIETY 120 tablet 0    atenolol (TENORMIN) 50 mg tablet Take 1 tablet (50 mg total) by mouth 2 (two) times a day 180 tablet 3    Diclofenac Sodium (VOLTAREN) 1 % Apply 2 g topically 4 (four) times a day 100 g 0    imipramine (TOFRANIL) 10 mg tablet Take 1 tablet by mouth at bedtime 90 tablet 2    latanoprost (XALATAN) 0.005 % ophthalmic solution Administer 1 drop to both eyes daily at bedtime 7.5 mL 3    levothyroxine 50 mcg tablet take 1 tablet by mouth once daily except Sunday take 2 tabs (Patient taking differently: Take 50 mcg by mouth daily) 102 tablet 0    lisinopril (ZESTRIL) 5 mg tablet TAKE 1 TABLET(5 MG) BY MOUTH DAILY 90 tablet 1    ondansetron (ZOFRAN-ODT) 4 mg disintegrating tablet       pantoprazole (PROTONIX) 40 mg tablet TAKE 1 TABLET(40 MG) BY MOUTH DAILY BEFORE BREAKFAST 90 tablet 3    Probiotic Product (ALIGN) 4 MG CAPS Take 1 tablet by mouth daily      rosuvastatin (CRESTOR) 5 mg  "tablet Take 1 tablet (5 mg total) by mouth daily 90 tablet 3    risedronate (ACTONEL) 35 mg tablet Take 1 tablet (35 mg total) by mouth every 7 days with water on empty stomach, nothing by mouth or lie down for next 30 minutes. (Patient not taking: Reported on 4/9/2024) 4 tablet 5     No current facility-administered medications for this visit.       Review of Systems   Constitutional:  Negative for chills and fever.   HENT:  Negative for ear pain and sore throat.    Eyes:  Negative for pain and visual disturbance.   Respiratory:  Negative for cough and shortness of breath.    Cardiovascular:  Negative for chest pain and palpitations.   Gastrointestinal:  Positive for constipation. Negative for abdominal pain and vomiting.   Genitourinary:  Negative for dysuria and hematuria.   Musculoskeletal:  Negative for arthralgias and back pain.   Skin:  Negative for color change and rash.   Neurological:  Positive for headaches. Negative for seizures and syncope.   All other systems reviewed and are negative.        Objective:  Vitals:    05/28/24 1442   BP: 130/72   Pulse: 67   SpO2: 98%   Weight: 49.9 kg (110 lb)   Height: 4' 7\" (1.397 m)     Body mass index is 25.57 kg/m².     Physical Exam  Constitutional:       Comments: Euvolemic    HENT:      Head: Normocephalic and atraumatic.      Nose: Nose normal.      Mouth/Throat:      Mouth: Mucous membranes are moist.   Eyes:      Extraocular Movements: Extraocular movements intact.      Pupils: Pupils are equal, round, and reactive to light.   Cardiovascular:      Rate and Rhythm: Normal rate and regular rhythm.      Pulses: Normal pulses.   Pulmonary:      Effort: Pulmonary effort is normal.      Breath sounds: Normal breath sounds.   Abdominal:      Tenderness: There is no abdominal tenderness.   Musculoskeletal:      Right lower leg: No edema.      Left lower leg: No edema.   Skin:     General: Skin is warm.   Neurological:      Mental Status: She is alert and oriented to " person, place, and time.   Psychiatric:         Mood and Affect: Mood normal.             Nutrition Assessment and Intervention:     Online resources such as NutritionFacts.org, MostLikely.com, RevTrax.com, pcrHotGrinds.org, or similar provided to patient      Physical Activity Assessment and Intervention:    Activity journal reviewed      Emotional and Mental Well-being, Sleep, Connectedness Assessment and Intervention:    Sleep/stress assessment performed      Therapeutic Lifestyle Change Visit:     One-on-one comprehensive counseling, coaching, and health behavior change visit completed

## 2024-05-31 ENCOUNTER — TELEPHONE (OUTPATIENT)
Age: 89
End: 2024-05-31

## 2024-05-31 ENCOUNTER — APPOINTMENT (OUTPATIENT)
Dept: LAB | Facility: HOSPITAL | Age: 89
End: 2024-05-31
Payer: MEDICARE

## 2024-05-31 DIAGNOSIS — E78.2 MIXED HYPERLIPIDEMIA: ICD-10-CM

## 2024-05-31 DIAGNOSIS — R73.01 IMPAIRED FASTING GLUCOSE: ICD-10-CM

## 2024-05-31 DIAGNOSIS — E03.8 OTHER SPECIFIED HYPOTHYROIDISM: ICD-10-CM

## 2024-05-31 LAB
ALBUMIN SERPL BCP-MCNC: 4.2 G/DL (ref 3.5–5)
ALP SERPL-CCNC: 81 U/L (ref 34–104)
ALT SERPL W P-5'-P-CCNC: 8 U/L (ref 7–52)
ANION GAP SERPL CALCULATED.3IONS-SCNC: 6 MMOL/L (ref 4–13)
AST SERPL W P-5'-P-CCNC: 19 U/L (ref 13–39)
BASOPHILS # BLD AUTO: 0.03 THOUSANDS/ÂΜL (ref 0–0.1)
BASOPHILS NFR BLD AUTO: 1 % (ref 0–1)
BILIRUB SERPL-MCNC: 0.52 MG/DL (ref 0.2–1)
BUN SERPL-MCNC: 16 MG/DL (ref 5–25)
CALCIUM SERPL-MCNC: 9.6 MG/DL (ref 8.4–10.2)
CHLORIDE SERPL-SCNC: 106 MMOL/L (ref 96–108)
CHOLEST SERPL-MCNC: 170 MG/DL
CO2 SERPL-SCNC: 27 MMOL/L (ref 21–32)
CREAT SERPL-MCNC: 1.33 MG/DL (ref 0.6–1.3)
EOSINOPHIL # BLD AUTO: 0.7 THOUSAND/ÂΜL (ref 0–0.61)
EOSINOPHIL NFR BLD AUTO: 11 % (ref 0–6)
ERYTHROCYTE [DISTWIDTH] IN BLOOD BY AUTOMATED COUNT: 13.6 % (ref 11.6–15.1)
EST. AVERAGE GLUCOSE BLD GHB EST-MCNC: 123 MG/DL
GFR SERPL CREATININE-BSD FRML MDRD: 36 ML/MIN/1.73SQ M
GLUCOSE P FAST SERPL-MCNC: 90 MG/DL (ref 65–99)
HBA1C MFR BLD: 5.9 %
HCT VFR BLD AUTO: 34.3 % (ref 34.8–46.1)
HDLC SERPL-MCNC: 73 MG/DL
HGB BLD-MCNC: 11.2 G/DL (ref 11.5–15.4)
IMM GRANULOCYTES # BLD AUTO: 0.01 THOUSAND/UL (ref 0–0.2)
IMM GRANULOCYTES NFR BLD AUTO: 0 % (ref 0–2)
LDLC SERPL CALC-MCNC: 75 MG/DL (ref 0–100)
LYMPHOCYTES # BLD AUTO: 1.45 THOUSANDS/ÂΜL (ref 0.6–4.47)
LYMPHOCYTES NFR BLD AUTO: 23 % (ref 14–44)
MCH RBC QN AUTO: 30.6 PG (ref 26.8–34.3)
MCHC RBC AUTO-ENTMCNC: 32.7 G/DL (ref 31.4–37.4)
MCV RBC AUTO: 94 FL (ref 82–98)
MONOCYTES # BLD AUTO: 0.61 THOUSAND/ÂΜL (ref 0.17–1.22)
MONOCYTES NFR BLD AUTO: 10 % (ref 4–12)
NEUTROPHILS # BLD AUTO: 3.51 THOUSANDS/ÂΜL (ref 1.85–7.62)
NEUTS SEG NFR BLD AUTO: 55 % (ref 43–75)
NRBC BLD AUTO-RTO: 0 /100 WBCS
PLATELET # BLD AUTO: 186 THOUSANDS/UL (ref 149–390)
PMV BLD AUTO: 9.7 FL (ref 8.9–12.7)
POTASSIUM SERPL-SCNC: 4.6 MMOL/L (ref 3.5–5.3)
PROT SERPL-MCNC: 7.1 G/DL (ref 6.4–8.4)
RBC # BLD AUTO: 3.66 MILLION/UL (ref 3.81–5.12)
SODIUM SERPL-SCNC: 139 MMOL/L (ref 135–147)
TRIGL SERPL-MCNC: 108 MG/DL
TSH SERPL DL<=0.05 MIU/L-ACNC: 0.73 UIU/ML (ref 0.45–4.5)
WBC # BLD AUTO: 6.31 THOUSAND/UL (ref 4.31–10.16)

## 2024-05-31 PROCEDURE — 85025 COMPLETE CBC W/AUTO DIFF WBC: CPT

## 2024-05-31 PROCEDURE — 36415 COLL VENOUS BLD VENIPUNCTURE: CPT

## 2024-05-31 PROCEDURE — 83036 HEMOGLOBIN GLYCOSYLATED A1C: CPT

## 2024-05-31 PROCEDURE — 80061 LIPID PANEL: CPT

## 2024-05-31 PROCEDURE — 80053 COMPREHEN METABOLIC PANEL: CPT

## 2024-05-31 PROCEDURE — 84443 ASSAY THYROID STIM HORMONE: CPT

## 2024-05-31 NOTE — TELEPHONE ENCOUNTER
, from [facility] called in requesting if Dr. Vargas can please put in an order for pt labs. Stated that pt missed deadline for labs.     Please advise & call  for any further information or questions. Thank you!     ([facility]) (Lab)   [phone]

## 2024-06-18 DIAGNOSIS — E03.9 ACQUIRED HYPOTHYROIDISM: ICD-10-CM

## 2024-06-18 RX ORDER — LEVOTHYROXINE SODIUM 0.05 MG/1
TABLET ORAL
Qty: 102 TABLET | Refills: 1 | Status: SHIPPED | OUTPATIENT
Start: 2024-06-18

## 2024-06-18 NOTE — TELEPHONE ENCOUNTER
Reason for call:   [x] Refill   [] Prior Auth  [] Other:     Office:   [x] PCP/Provider - [facility] Internal Medicine  [] Specialty/Provider -     Medication:    Does the patient have enough for 3 days?   [] Yes   [x] No - Send as HP to POD

## 2024-06-26 DIAGNOSIS — F41.9 ANXIETY: ICD-10-CM

## 2024-06-27 RX ORDER — ALPRAZOLAM 0.5 MG/1
TABLET ORAL
Qty: 120 TABLET | Refills: 0 | Status: SHIPPED | OUTPATIENT
Start: 2024-06-27

## 2024-08-07 DIAGNOSIS — F41.9 ANXIETY: ICD-10-CM

## 2024-08-07 RX ORDER — ALPRAZOLAM 0.5 MG/1
0.5 TABLET ORAL 4 TIMES DAILY PRN
Qty: 120 TABLET | Refills: 0 | Status: SHIPPED | OUTPATIENT
Start: 2024-08-07

## 2024-08-07 NOTE — TELEPHONE ENCOUNTER
Reason for call:   [x] Refill   [] Prior Auth  [] Other:     Office:   [x] PCP/Provider -   [] Specialty/Provider -     Medication: ALPRAZolam (XANAX) 0.5 mg TAKE 1 TABLET(0.5 MG) BY MOUTH FOUR TIMES DAILY AS NEEDED FOR ANXIETY       Pharmacy: [facility] [place]     Does the patient have enough for 3 days?   [] Yes   [x] No - Send as HP to POD

## 2024-08-15 ENCOUNTER — OFFICE VISIT (OUTPATIENT)
Age: 89
End: 2024-08-15
Payer: MEDICARE

## 2024-08-15 VITALS
DIASTOLIC BLOOD PRESSURE: 78 MMHG | HEART RATE: 61 BPM | WEIGHT: 109 LBS | TEMPERATURE: 97.9 F | BODY MASS INDEX: 25.22 KG/M2 | SYSTOLIC BLOOD PRESSURE: 118 MMHG | OXYGEN SATURATION: 99 % | HEIGHT: 55 IN

## 2024-08-15 DIAGNOSIS — M54.9 LEFT-SIDED BACK PAIN, UNSPECIFIED BACK LOCATION, UNSPECIFIED CHRONICITY: Primary | ICD-10-CM

## 2024-08-15 DIAGNOSIS — I10 PRIMARY HYPERTENSION: ICD-10-CM

## 2024-08-15 DIAGNOSIS — E03.8 OTHER SPECIFIED HYPOTHYROIDISM: ICD-10-CM

## 2024-08-15 DIAGNOSIS — F41.9 ANXIETY: ICD-10-CM

## 2024-08-15 PROCEDURE — 99214 OFFICE O/P EST MOD 30 MIN: CPT | Performed by: INTERNAL MEDICINE

## 2024-08-15 RX ORDER — TRIAMCINOLONE ACETONIDE 5 MG/G
CREAM TOPICAL 3 TIMES DAILY
Qty: 15 G | Refills: 1 | Status: SHIPPED | OUTPATIENT
Start: 2024-08-15

## 2024-08-15 NOTE — PATIENT INSTRUCTIONS
Dear Lakeisha Langston,     Cindi Medication Adjustments -   Use triamcinolone cream for itching site.  Testing Required  -   Chest X-ray  Other Instructions -   Please take all your medications regularly as directed.      Sincerely,   Sukhdev Contreras MD

## 2024-08-15 NOTE — PROGRESS NOTES
Ambulatory Visit  Name: Lakeisha Langston      : 1935      MRN: [id number]  Encounter Provider: Ezekiel Vargas MD  Encounter Date: 8/15/2024   Encounter department: [facility] INTERNAL MEDICINE [facility] [address]    Assessment & Plan   1. Left-sided back pain, unspecified back location, unspecified chronicity  Assessment & Plan:  Symptoms of left sided back, leg, and arm pain that has been going on for the past 3 weeks.  Most significant is her back pain. She does have scoliosis which may be causing her symptoms.  Also appreciated diminished breath sounds at the bases.  She was able to run a triathlon last month.  Recommend chest XR   Orders:  -     XR chest pa & lateral; Future; Expected date: 08/15/2024  -     triamcinolone (KENALOG) 0.5 % cream; Apply topically 3 (three) times a day  2. Primary hypertension  Assessment & Plan:  Well controlled on current medical regimen.  3. Other specified hypothyroidism  Assessment & Plan:  Well controlled on levothyroxine.  4. Anxiety  Assessment & Plan:  Continues on imipramine daily and xanax as needed.      Depression Screening and Follow-up Plan: Patient was screened for depression during today's encounter. They screened negative with a PHQ-9 score of 0.      Preventive health issues were discussed with patient, and age appropriate screening tests were ordered as noted in patient's After Visit Summary. Personalized health advice and appropriate referrals for health education or preventive services given if needed, as noted in patient's After Visit Summary.    History of Present Illness     88-year-old female presenting for annual medical wellness visit and complaints of left-sided leg, arm, back, and head pain.  Symptoms seem to have started 3 weeks ago.  Her daughter seems to think that it might have been related to resuming her statin.  Her symptoms are not reproducible with range of motion exercises.  She mentions that her left-sided back pain is the most significant  and limiting symptom. Otherwise no new symptoms.       Patient Care Team:  Ezekiel Vargas MD as PCP - General  Ezekiel Vargas MD    Review of Systems   Constitutional: Negative.    HENT: Negative.     Eyes: Negative.    Respiratory: Negative.     Cardiovascular: Negative.    Gastrointestinal: Negative.    Endocrine: Negative.    Genitourinary: Negative.    Musculoskeletal:  Positive for arthralgias and back pain.   Skin: Negative.    Allergic/Immunologic: Negative.    Neurological: Negative.    Hematological: Negative.    Psychiatric/Behavioral: Negative.       Medical History Reviewed by provider this encounter:  Tobacco  Allergies  Meds  Problems  Med Hx  Surg Hx  Fam Hx       Annual Wellness Visit Questionnaire   Lakeisha is here for her Subsequent Wellness visit. Last Medicare Wellness visit information reviewed, patient interviewed and updates made to the record today.      Health Risk Assessment:   Patient rates overall health as poor. Patient feels that their physical health rating is slightly worse. Patient is dissatisfied with their life. Eyesight was rated as same. Hearing was rated as same. Patient feels that their emotional and mental health rating is same. Patients states they are never, rarely angry. Patient states they are always unusually tired/fatigued. Pain experienced in the last 7 days has been a lot. Patient's pain rating has been 9/10. Patient states that she has experienced no weight loss or gain in last 6 months.     Depression Screening:   PHQ-9 Score: 0      Fall Risk Screening:   In the past year, patient has experienced: no history of falling in past year      Urinary Incontinence Screening:   Patient has not leaked urine accidently in the last six months.     Home Safety:  Patient has trouble with stairs inside or outside of their home. Patient has working smoke alarms and has working carbon monoxide detector. Home safety hazards include: none.     Nutrition:   Current diet is  Regular.     Medications:   Patient is not currently taking any over-the-counter supplements. Patient is able to manage medications.     Activities of Daily Living (ADLs)/Instrumental Activities of Daily Living (IADLs):   Walk and transfer into and out of bed and chair?: Yes  Dress and groom yourself?: Yes    Bathe or shower yourself?: Yes    Feed yourself? Yes  Do your laundry/housekeeping?: Yes  Manage your money, pay your bills and track your expenses?: Yes  Make your own meals?: Yes    Do your own shopping?: Yes    Previous Hospitalizations:   Any hospitalizations or ED visits within the last 12 months?: No      Advance Care Planning:   Living will: Yes    Durable POA for healthcare: Yes    Advanced directive: Yes      PREVENTIVE SCREENINGS      Cardiovascular Screening:    General: Screening Not Indicated and History Lipid Disorder      Diabetes Screening:     General: Screening Current      Colorectal Cancer Screening:     General: Screening Not Indicated      Breast Cancer Screening:     General: Screening Current      Cervical Cancer Screening:    General: Screening Not Indicated      Osteoporosis Screening:    General: Screening Not Indicated and History Osteoporosis      Lung Cancer Screening:     General: Screening Not Indicated    Screening, Brief Intervention, and Referral to Treatment (SBIRT)    Screening  Typical number of drinks in a day: 0  Typical number of drinks in a week: 0  Interpretation: Low risk drinking behavior.    Other Counseling Topics:   Car/seat belt/driving safety.     Social Determinants of Health     Financial Resource Strain: Low Risk  (5/25/2023)    Overall Financial Resource Strain (CARDIA)     Difficulty of Paying Living Expenses: Not hard at all   Food Insecurity: No Food Insecurity (8/15/2024)    Hunger Vital Sign     Worried About Running Out of Food in the Last Year: Never true     Ran Out of Food in the Last Year: Never true   Transportation Needs: No Transportation Needs  "(8/15/2024)    PRAPARE - Transportation     Lack of Transportation (Medical): No     Lack of Transportation (Non-Medical): No   Housing Stability: Low Risk  (8/15/2024)    Housing Stability Vital Sign     Unable to Pay for Housing in the Last Year: No     Number of Times Moved in the Last Year: 1     Homeless in the Last Year: No   Utilities: Not At Risk (8/15/2024)    [facility] Utilities     Threatened with loss of utilities: No     No results found.    Objective     /78 (BP Location: Right arm, Patient Position: Sitting, Cuff Size: Standard)   Pulse 61   Temp 97.9 °F (36.6 °C) (Tympanic)   Ht 4' 7\" (1.397 m)   Wt 49.4 kg (109 lb)   SpO2 99%   BMI 25.33 kg/m²     Physical Exam  Vitals and nursing note reviewed.   Constitutional:       General: She is not in acute distress.     Appearance: She is well-developed.   HENT:      Head: Normocephalic and atraumatic.   Eyes:      Conjunctiva/sclera: Conjunctivae normal.   Cardiovascular:      Rate and Rhythm: Normal rate and regular rhythm.      Heart sounds: No murmur heard.  Pulmonary:      Effort: Pulmonary effort is normal. No respiratory distress.      Comments: Diminished breath sounds at bases.  Abdominal:      Palpations: Abdomen is soft.      Tenderness: There is no abdominal tenderness.   Musculoskeletal:         General: No swelling.      Cervical back: Neck supple.   Skin:     General: Skin is warm and dry.      Capillary Refill: Capillary refill takes less than 2 seconds.   Neurological:      Mental Status: She is alert.   Psychiatric:         Mood and Affect: Mood normal.       Administrative Statements   I have spent a total time of 20 minutes in caring for this patient on the day of the visit/encounter including Instructions for management, Documenting in the medical record, Reviewing / ordering tests, medicine, procedures  , and Obtaining or reviewing history  .    "

## 2024-08-15 NOTE — ASSESSMENT & PLAN NOTE
Symptoms of left sided back, leg, and arm pain that has been going on for the past 3 weeks.  Most significant is her back pain. She does have scoliosis which may be causing her symptoms.  Also appreciated diminished breath sounds at the bases.  She was able to run a triathlon last month.  Recommend chest XR

## 2024-08-16 ENCOUNTER — HOSPITAL ENCOUNTER (OUTPATIENT)
Dept: RADIOLOGY | Facility: HOSPITAL | Age: 89
End: 2024-08-16
Payer: MEDICARE

## 2024-08-16 DIAGNOSIS — M54.9 LEFT-SIDED BACK PAIN, UNSPECIFIED BACK LOCATION, UNSPECIFIED CHRONICITY: ICD-10-CM

## 2024-08-16 PROCEDURE — 71046 X-RAY EXAM CHEST 2 VIEWS: CPT

## 2024-08-26 ENCOUNTER — HOSPITAL ENCOUNTER (EMERGENCY)
Facility: HOSPITAL | Age: 89
Discharge: HOME | End: 2024-08-26
Attending: EMERGENCY MEDICINE
Payer: MEDICARE

## 2024-08-26 ENCOUNTER — APPOINTMENT (EMERGENCY)
Dept: RADIOLOGY | Facility: HOSPITAL | Age: 89
End: 2024-08-26
Payer: MEDICARE

## 2024-08-26 VITALS
DIASTOLIC BLOOD PRESSURE: 65 MMHG | RESPIRATION RATE: 22 BRPM | BODY MASS INDEX: 23.06 KG/M2 | SYSTOLIC BLOOD PRESSURE: 146 MMHG | TEMPERATURE: 97.8 F | HEART RATE: 77 BPM | OXYGEN SATURATION: 99 % | WEIGHT: 99.21 LBS

## 2024-08-26 DIAGNOSIS — R10.13 EPIGASTRIC PAIN: Primary | ICD-10-CM

## 2024-08-26 DIAGNOSIS — M25.572 ACUTE LEFT ANKLE PAIN: ICD-10-CM

## 2024-08-26 LAB
ALBUMIN SERPL BCG-MCNC: 4.2 G/DL (ref 3.5–5)
ALP SERPL-CCNC: 104 U/L (ref 34–104)
ALT SERPL W P-5'-P-CCNC: 9 U/L (ref 7–52)
ANION GAP SERPL CALCULATED.3IONS-SCNC: 7 MMOL/L (ref 4–13)
AST SERPL W P-5'-P-CCNC: 20 U/L (ref 13–39)
ATRIAL RATE: 80 BPM
BASOPHILS # BLD AUTO: 0.03 THOUSANDS/ÂΜL (ref 0–0.1)
BASOPHILS NFR BLD AUTO: 0 % (ref 0–1)
BILIRUB SERPL-MCNC: 0.33 MG/DL (ref 0.2–1)
BUN SERPL-MCNC: 18 MG/DL (ref 5–25)
CALCIUM SERPL-MCNC: 9.5 MG/DL (ref 8.4–10.2)
CARDIAC TROPONIN I PNL SERPL HS: 6 NG/L
CHLORIDE SERPL-SCNC: 104 MMOL/L (ref 96–108)
CO2 SERPL-SCNC: 25 MMOL/L (ref 21–32)
CREAT SERPL-MCNC: 1.33 MG/DL (ref 0.6–1.3)
EOSINOPHIL # BLD AUTO: 0.72 THOUSAND/ÂΜL (ref 0–0.61)
EOSINOPHIL NFR BLD AUTO: 9 % (ref 0–6)
ERYTHROCYTE [DISTWIDTH] IN BLOOD BY AUTOMATED COUNT: 12.8 % (ref 11.6–15.1)
GFR SERPL CREATININE-BSD FRML MDRD: 36 ML/MIN/1.73SQ M
GLUCOSE SERPL-MCNC: 105 MG/DL (ref 65–140)
HCT VFR BLD AUTO: 30.3 % (ref 34.8–46.1)
HGB BLD-MCNC: 10 G/DL (ref 11.5–15.4)
IMM GRANULOCYTES # BLD AUTO: 0.03 THOUSAND/UL (ref 0–0.2)
IMM GRANULOCYTES NFR BLD AUTO: 0 % (ref 0–2)
LIPASE SERPL-CCNC: 45 U/L (ref 11–82)
LYMPHOCYTES # BLD AUTO: 1.38 THOUSANDS/ÂΜL (ref 0.6–4.47)
LYMPHOCYTES NFR BLD AUTO: 16 % (ref 14–44)
MCH RBC QN AUTO: 30.9 PG (ref 26.8–34.3)
MCHC RBC AUTO-ENTMCNC: 33 G/DL (ref 31.4–37.4)
MCV RBC AUTO: 94 FL (ref 82–98)
MONOCYTES # BLD AUTO: 0.78 THOUSAND/ÂΜL (ref 0.17–1.22)
MONOCYTES NFR BLD AUTO: 9 % (ref 4–12)
NEUTROPHILS # BLD AUTO: 5.45 THOUSANDS/ÂΜL (ref 1.85–7.62)
NEUTS SEG NFR BLD AUTO: 66 % (ref 43–75)
NRBC BLD AUTO-RTO: 0 /100 WBCS
P AXIS: 45 DEGREES
PLATELET # BLD AUTO: 166 THOUSANDS/UL (ref 149–390)
PMV BLD AUTO: 9.6 FL (ref 8.9–12.7)
POTASSIUM SERPL-SCNC: 3.7 MMOL/L (ref 3.5–5.3)
PR INTERVAL: 164 MS
PROT SERPL-MCNC: 6.8 G/DL (ref 6.4–8.4)
QRS AXIS: -16 DEGREES
QRSD INTERVAL: 86 MS
QT INTERVAL: 436 MS
QTC INTERVAL: 502 MS
RBC # BLD AUTO: 3.24 MILLION/UL (ref 3.81–5.12)
SODIUM SERPL-SCNC: 136 MMOL/L (ref 135–147)
T WAVE AXIS: 43 DEGREES
VENTRICULAR RATE: 80 BPM
WBC # BLD AUTO: 8.39 THOUSAND/UL (ref 4.31–10.16)

## 2024-08-26 PROCEDURE — 93010 ELECTROCARDIOGRAM REPORT: CPT | Performed by: STUDENT IN AN ORGANIZED HEALTH CARE EDUCATION/TRAINING PROGRAM

## 2024-08-26 PROCEDURE — 36415 COLL VENOUS BLD VENIPUNCTURE: CPT | Performed by: EMERGENCY MEDICINE

## 2024-08-26 PROCEDURE — 99284 EMERGENCY DEPT VISIT MOD MDM: CPT

## 2024-08-26 PROCEDURE — 83690 ASSAY OF LIPASE: CPT | Performed by: EMERGENCY MEDICINE

## 2024-08-26 PROCEDURE — 93005 ELECTROCARDIOGRAM TRACING: CPT

## 2024-08-26 PROCEDURE — 73610 X-RAY EXAM OF ANKLE: CPT

## 2024-08-26 PROCEDURE — 99285 EMERGENCY DEPT VISIT HI MDM: CPT | Performed by: EMERGENCY MEDICINE

## 2024-08-26 PROCEDURE — 85025 COMPLETE CBC W/AUTO DIFF WBC: CPT | Performed by: EMERGENCY MEDICINE

## 2024-08-26 PROCEDURE — 84484 ASSAY OF TROPONIN QUANT: CPT | Performed by: EMERGENCY MEDICINE

## 2024-08-26 PROCEDURE — 80053 COMPREHEN METABOLIC PANEL: CPT | Performed by: EMERGENCY MEDICINE

## 2024-08-26 RX ORDER — ACETAMINOPHEN 325 MG/1
975 TABLET ORAL ONCE
Status: COMPLETED | OUTPATIENT
Start: 2024-08-26 | End: 2024-08-26

## 2024-08-26 RX ADMIN — ACETAMINOPHEN 975 MG: 325 TABLET, FILM COATED ORAL at 04:17

## 2024-08-26 NOTE — ED PROVIDER NOTES
History  Chief Complaint   Patient presents with    Abdominal Pain     Via EMS/ALS w/report of sudden onset of abdominal pain; states pain woke pt from sleep, however all symptoms have subsided; denies N/V, fever and/or diarrhea; able to pass gas; last BM yesterday     88-year-old female history of hypertension presenting with epigastric pain.  Patient reports waking up from sleep with sharp epigastric pain.  States that symptoms have subsided at this point.  Denies any nausea vomiting diarrhea.  Denies any radiation.  Denies any chest pain shortness of breath.  Patient also complaining of left ankle pain.  States that she was at a wedding tonight and was dancing.  Denies any known injury or trauma.  Denies any other complaints.  Chart reviewed.    Past Medical History:  1992: Anxiety  1991: Arthritis  No date: Benign essential hypertension      Comment:  Last assessed: 9/11/14  No date: Disease of thyroid gland  No date: Essential tremor  No date: Fibromyalgia  No date: GERD (gastroesophageal reflux disease)  No date: History of nail disorders  No date: Hyperlipidemia  No date: Hypertension  No date: Palpitations  No date: Transient cerebral ischemia  Family History: non-contributory  Social History          Prior to Admission Medications   Prescriptions Last Dose Informant Patient Reported? Taking?   ALPRAZolam (XANAX) 0.5 mg tablet   No No   Sig: Take 1 tablet (0.5 mg total) by mouth 4 (four) times a day as needed for anxiety   Diclofenac Sodium (VOLTAREN) 1 %  Self No No   Sig: Apply 2 g topically 4 (four) times a day   Probiotic Product (ALIGN) 4 MG CAPS  Self Yes No   Sig: Take 1 tablet by mouth daily   acetaminophen (TYLENOL) 325 mg tablet  Self Yes No   Sig: Take 1-2 tablets by mouth every 6 (six) hours as needed   albuterol (PROVENTIL HFA,VENTOLIN HFA) 90 mcg/act inhaler  Self Yes No   atenolol (TENORMIN) 50 mg tablet  Self No No   Sig: Take 1 tablet (50 mg total) by mouth 2 (two) times a day   imipramine  (TOFRANIL) 10 mg tablet  Self No No   Sig: Take 1 tablet by mouth at bedtime   latanoprost (XALATAN) 0.005 % ophthalmic solution  Self No No   Sig: Administer 1 drop to both eyes daily at bedtime   levothyroxine 50 mcg tablet   No No   Sig: take 1 tablet by mouth once daily except Sunday take 2 tabs   lisinopril (ZESTRIL) 5 mg tablet   No No   Sig: TAKE 1 TABLET(5 MG) BY MOUTH DAILY   ondansetron (ZOFRAN-ODT) 4 mg disintegrating tablet  Self Yes No   pantoprazole (PROTONIX) 40 mg tablet  Self No No   Sig: TAKE 1 TABLET(40 MG) BY MOUTH DAILY BEFORE BREAKFAST   risedronate (ACTONEL) 35 mg tablet  Self No No   Sig: Take 1 tablet (35 mg total) by mouth every 7 days with water on empty stomach, nothing by mouth or lie down for next 30 minutes.   Patient not taking: Reported on 4/9/2024   rosuvastatin (CRESTOR) 5 mg tablet  Self No No   Sig: Take 1 tablet (5 mg total) by mouth daily   triamcinolone (KENALOG) 0.5 % cream   No No   Sig: Apply topically 3 (three) times a day      Facility-Administered Medications: None       Past Medical History:   Diagnosis Date    Anxiety 1992    Arthritis 1991    Benign essential hypertension     Last assessed: 9/11/14    Disease of thyroid gland     Essential tremor     Fibromyalgia     GERD (gastroesophageal reflux disease)     History of nail disorders     Hyperlipidemia     Hypertension     Palpitations     Transient cerebral ischemia        Past Surgical History:   Procedure Laterality Date    APPENDECTOMY      EGD AND COLONOSCOPY  07/2020    HYSTERECTOMY  01/01/2010    CO LAPS SURG CHOLECYSTECTOMY W/CHOLANGIOGRAPHY N/A 4/4/2018    Procedure: LAPAROSCOPIC CHOLECYSTECTOMY WITH IOC;  Surgeon: Yakov Mueller MD;  [facility];  Service: General    TUBAL LIGATION         Family History   Problem Relation Age of Onset    Stroke Mother         ischemic stroke    Hypertension Mother     Heart disease Father     Cancer Sister     No Known Problems Sister     No Known Problems  Daughter     No Known Problems Daughter     No Known Problems Daughter     No Known Problems Daughter     No Known Problems Daughter     No Known Problems Maternal Grandmother     No Known Problems Paternal Grandmother     No Known Problems Paternal Aunt     No Known Problems Paternal Aunt     No Known Problems Paternal Aunt     No Known Problems Paternal Aunt     No Known Problems Paternal Aunt     Breast cancer Neg Hx      I have reviewed and agree with the history as documented.    E-Cigarette/Vaping    E-Cigarette Use Never User      E-Cigarette/Vaping Substances    Nicotine No     THC No     CBD No     Flavoring No     Other No     Unknown No      Social History     Tobacco Use    Smoking status: Former     Current packs/day: 0.00     Average packs/day: 0.1 packs/day for 50.0 years (5.0 ttl pk-yrs)     Types: Cigarettes     Start date: 1963     Quit date:      Years since quittin.6    Smokeless tobacco: Never    Tobacco comments:     1 pack a week    Vaping Use    Vaping status: Never Used   Substance Use Topics    Alcohol use: Not Currently     Comment: 0    Drug use: No       Review of Systems   Constitutional:  Negative for appetite change, chills, diaphoresis, fever and unexpected weight change.   HENT:  Negative for congestion and rhinorrhea.    Eyes:  Negative for photophobia and visual disturbance.   Respiratory:  Negative for cough, chest tightness and shortness of breath.    Cardiovascular:  Negative for chest pain, palpitations and leg swelling.   Gastrointestinal:  Positive for abdominal pain. Negative for abdominal distention, blood in stool, constipation, diarrhea, nausea and vomiting.   Genitourinary:  Negative for dysuria and hematuria.   Musculoskeletal:  Positive for arthralgias. Negative for back pain, joint swelling, neck pain and neck stiffness.   Skin:  Negative for color change, pallor, rash and wound.   Neurological:  Negative for dizziness, syncope, weakness,  light-headedness and headaches.   Psychiatric/Behavioral:  Negative for agitation.    All other systems reviewed and are negative.      Physical Exam  Physical Exam  Vitals and nursing note reviewed.   Constitutional:       General: She is not in acute distress.     Appearance: Normal appearance. She is well-developed. She is not ill-appearing, toxic-appearing or diaphoretic.   HENT:      Head: Normocephalic and atraumatic.      Nose: Nose normal. No congestion or rhinorrhea.      Mouth/Throat:      Mouth: Mucous membranes are moist.      Pharynx: Oropharynx is clear. No oropharyngeal exudate or posterior oropharyngeal erythema.   Eyes:      General: No scleral icterus.        Right eye: No discharge.         Left eye: No discharge.      Extraocular Movements: Extraocular movements intact.      Conjunctiva/sclera: Conjunctivae normal.      Pupils: Pupils are equal, round, and reactive to light.   Neck:      Vascular: No JVD.      Trachea: No tracheal deviation.      Comments: Supple. Normal range of motion.   Cardiovascular:      Rate and Rhythm: Normal rate and regular rhythm.      Heart sounds: Normal heart sounds. No murmur heard.     No friction rub. No gallop.      Comments: Normal rate and regular rhythm  Pulmonary:      Effort: Pulmonary effort is normal. No respiratory distress.      Breath sounds: Normal breath sounds. No stridor. No wheezing or rales.      Comments: Clear to auscultation bilaterally  Chest:      Chest wall: No tenderness.   Abdominal:      General: Bowel sounds are normal. There is no distension.      Palpations: Abdomen is soft.      Tenderness: There is no abdominal tenderness. There is no right CVA tenderness, left CVA tenderness, guarding or rebound.      Comments: Soft, nontender, nondistended.  Normal bowel sounds throughout   Musculoskeletal:         General: No swelling, tenderness, deformity or signs of injury. Normal range of motion.      Cervical back: Normal range of motion and  neck supple. No rigidity. No muscular tenderness.      Right lower leg: No edema.      Left lower leg: No edema.   Lymphadenopathy:      Cervical: No cervical adenopathy.   Skin:     General: Skin is warm and dry.      Coloration: Skin is not pale.      Findings: No erythema or rash.      Comments: No leg or ankle tenderness.  2+ DP PT pulses   Neurological:      General: No focal deficit present.      Mental Status: She is alert. Mental status is at baseline.      Sensory: No sensory deficit.      Motor: No weakness or abnormal muscle tone.      Coordination: Coordination normal.      Gait: Gait normal.      Comments: Alert.  Strength and sensation grossly intact.  Ambulatory without difficulty at baseline.    Psychiatric:         Behavior: Behavior normal.         Thought Content: Thought content normal.         Vital Signs  ED Triage Vitals   Temperature Pulse Respirations Blood Pressure SpO2   08/26/24 0415 08/26/24 0403 08/26/24 0403 08/26/24 0415 08/26/24 0403   97.8 °F (36.6 °C) 84 15 146/65 98 %      Temp Source Heart Rate Source Patient Position - Orthostatic VS BP Location FiO2 (%)   08/26/24 0415 08/26/24 0403 08/26/24 0403 08/26/24 0403 --   Oral Monitor Lying Right arm       Pain Score       08/26/24 0403       No Pain           Vitals:    08/26/24 0403 08/26/24 0415   BP:  146/65   Pulse: 84 77   Patient Position - Orthostatic VS: Lying Lying         Visual Acuity      ED Medications  Medications   acetaminophen (TYLENOL) tablet 975 mg (975 mg Oral Given 8/26/24 0417)       Diagnostic Studies  Results Reviewed       Procedure Component Value Units Date/Time    HS Troponin 0hr (reflex protocol) [[id number]]  (Normal) Collected: 08/26/24 0414    Lab Status: Final result Specimen: Blood from Arm, Left Updated: 08/26/24 0441     hs TnI 0hr 6 ng/L     HS Troponin I 2hr [[id number]]     Lab Status: No result Specimen: Blood     Comprehensive metabolic panel [[id number]]  (Abnormal) Collected: 08/26/24 0414     Lab Status: Final result Specimen: Blood from Arm, Left Updated: 08/26/24 0437     Sodium 136 mmol/L      Potassium 3.7 mmol/L      Chloride 104 mmol/L      CO2 25 mmol/L      ANION GAP 7 mmol/L      BUN 18 mg/dL      Creatinine 1.33 mg/dL      Glucose 105 mg/dL      Calcium 9.5 mg/dL      AST 20 U/L      ALT 9 U/L      Alkaline Phosphatase 104 U/L      Total Protein 6.8 g/dL      Albumin 4.2 g/dL      Total Bilirubin 0.33 mg/dL      eGFR 36 ml/min/1.73sq m     Narrative:      National Kidney Disease Foundation guidelines for Chronic Kidney Disease (CKD):     Stage 1 with normal or high GFR (GFR > 90 mL/min/1.73 square meters)    Stage 2 Mild CKD (GFR = 60-89 mL/min/1.73 square meters)    Stage 3A Moderate CKD (GFR = 45-59 mL/min/1.73 square meters)    Stage 3B Moderate CKD (GFR = 30-44 mL/min/1.73 square meters)    Stage 4 Severe CKD (GFR = 15-29 mL/min/1.73 square meters)    Stage 5 End Stage CKD (GFR <15 mL/min/1.73 square meters)  Note: GFR calculation is accurate only with a steady state creatinine    Lipase [[id number]]  (Normal) Collected: 08/26/24 0414    Lab Status: Final result Specimen: Blood from Arm, Left Updated: 08/26/24 0437     Lipase 45 u/L     CBC and differential [[id number]]  (Abnormal) Collected: 08/26/24 0414    Lab Status: Final result Specimen: Blood from Arm, Left Updated: 08/26/24 0419     WBC 8.39 Thousand/uL      RBC 3.24 Million/uL      Hemoglobin 10.0 g/dL      Hematocrit 30.3 %      MCV 94 fL      MCH 30.9 pg      MCHC 33.0 g/dL      RDW 12.8 %      MPV 9.6 fL      Platelets 166 Thousands/uL      nRBC 0 /100 WBCs      Segmented % 66 %      Immature Grans % 0 %      Lymphocytes % 16 %      Monocytes % 9 %      Eosinophils Relative 9 %      Basophils Relative 0 %      Absolute Neutrophils 5.45 Thousands/µL      Absolute Immature Grans 0.03 Thousand/uL      Absolute Lymphocytes 1.38 Thousands/µL      Absolute Monocytes 0.78 Thousand/µL      Eosinophils Absolute 0.72 Thousand/µL       Basophils Absolute 0.03 Thousands/µL                    XR ankle 3+ views LEFT    (Results Pending)              Procedures  Procedures         ED Course               HEART Risk Score      Flowsheet Row Most Recent Value   Heart Score Risk Calculator    History 0 Filed at: 08/26/2024 0446   ECG 1 Filed at: 08/26/2024 0446   Age 2 Filed at: 08/26/2024 0446   Risk Factors 1 Filed at: 08/26/2024 0446   Troponin 0 Filed at: 08/26/2024 0446   HEART Score 4 Filed at: 08/26/2024 0446          Identification of Seniors at Risk      Flowsheet Row Most Recent Value   (ISAR) Identification of Seniors at Risk    Before the illness or injury that brought you to the Emergency, did you need someone to help you on a regular basis? 0 Filed at: 08/26/2024 0406   In the last 24 hours, have you needed more help than usual? 0 Filed at: 08/26/2024 0406   Have you been hospitalized for one or more nights during the past 6 months? 0 Filed at: 08/26/2024 0406   In general, do you see well? 0 Filed at: 08/26/2024 0406   In general, do you have serious problems with your memory? 0 Filed at: 08/26/2024 0406   Do you take more than three different medications every day? 1 Filed at: 08/26/2024 0406   ISAR Score 1 Filed at: 08/26/2024 0406                        SBIRT 20yo+      Flowsheet Row Most Recent Value   Initial Alcohol Screen: US AUDIT-C     1. How often do you have a drink containing alcohol? 0 Filed at: 08/26/2024 0407   2. How many drinks containing alcohol do you have on a typical day you are drinking?  0 Filed at: 08/26/2024 0407   3b. FEMALE Any Age, or MALE 65+: How often do you have 4 or more drinks on one occassion? 0 Filed at: 08/26/2024 0407   Audit-C Score 0 Filed at: 08/26/2024 0407   GILLES: How many times in the past year have you...    Used an illegal drug or used a prescription medication for non-medical reasons? Never Filed at: 08/26/2024 0407                      Medical Decision Making  88-year-old female history of  hypertension presenting with epigastric pain.  Brief sharp epigastric pain present upon awaking from sleep now resolved with benign abdominal exam.  Plan for cardiac evaluation including EKG troponin.  Labs including abdominal labs.  Symptom management with p.o. medication.  X-ray.  Reassess.    EKG interpreted by me with normal sinus rhythm and nonspecific ST abnormality.  Labs interpreted by me without significant acute process.  Abdominal pain remains resolved.  X-rays interpreted by me without significant cute osseous process. Discussed results and recommendations. Advised follow up PCP. Medication recommendations. Given instructions and return precautions. Patient/family at bedside acknowledged understanding of all written and verbal instructions and return precautions. Discharged.     Amount and/or Complexity of Data Reviewed  Labs: ordered.  Radiology: ordered.    Risk  OTC drugs.                 Disposition  Final diagnoses:   Epigastric pain   Acute left ankle pain     Time reflects when diagnosis was documented in both MDM as applicable and the Disposition within this note       Time User Action Codes Description Comment    8/26/2024  4:14 AM Samuel Kruse Add [R10.13] Epigastric pain     8/26/2024  4:14 AM Samuel Kruse Add [M25.572] Acute left ankle pain           ED Disposition       ED Disposition   Discharge    Condition   Stable    Date/Time   Mon Aug 26, 2024 3197    Comment   Lakeisha Langston discharge to home/self care.                   Follow-up Information       Follow up With Specialties Details Why Contact Info    Ezekiel Vargas MD Internal Medicine, Hospice Services, Palliative Care Schedule an appointment as soon as possible for a visit in 1 week  [address]  [address]  [place] [address]  [phone]              Patient's Medications   Discharge Prescriptions    No medications on file       No discharge procedures on file.    PDMP Review         Value Time User    PDMP Reviewed  Yes  8/7/2024  1:53 PM Trista Goyal PA-C            ED Provider  Electronically Signed by             Samuel Kruse MD  08/26/24 0513

## 2024-08-26 NOTE — ED TRIAGE NOTES
Via EMS/ALS w/report of sudden onset of abdominal pain; states pain woke pt from sleep, however all symptoms have subsided; denies N/V, fever and/or diarrhea; able to pass gas; last BM yesterday

## 2024-08-27 ENCOUNTER — VBI (OUTPATIENT)
Age: 89
End: 2024-08-27

## 2024-08-27 NOTE — TELEPHONE ENCOUNTER
08/27/24 11:40 AM    Patient contacted post ED visit, [facility] department spoke with patient/caregiver and outreach was successful.    Thank you.  Tiffani Lazaro MA  [facility]

## 2024-08-29 ENCOUNTER — TELEPHONE (OUTPATIENT)
Age: 89
End: 2024-08-29

## 2024-08-29 NOTE — TELEPHONE ENCOUNTER
Patient called, patient was in the ER on 8/26 for 2 issues:  Epigastric pain  Left ankle pain    Pt would like to know what she can do for pain.  Pt would like an appt on a Friday so daughter can take her.    This would be an ER follow up appointment nothing available until January 2025.    Please call patient to schedule appt.    Please advise. Thank you

## 2024-09-18 DIAGNOSIS — F41.9 ANXIETY: ICD-10-CM

## 2024-09-18 RX ORDER — ALPRAZOLAM 0.5 MG
0.5 TABLET ORAL 4 TIMES DAILY PRN
Qty: 120 TABLET | Refills: 0 | Status: SHIPPED | OUTPATIENT
Start: 2024-09-18

## 2024-10-07 ENCOUNTER — TELEPHONE (OUTPATIENT)
Age: 89
End: 2024-10-07

## 2024-10-07 ENCOUNTER — APPOINTMENT (EMERGENCY)
Dept: RADIOLOGY | Facility: HOSPITAL | Age: 89
End: 2024-10-07
Payer: MEDICARE

## 2024-10-07 ENCOUNTER — HOSPITAL ENCOUNTER (EMERGENCY)
Facility: HOSPITAL | Age: 89
Discharge: HOME | End: 2024-10-07
Attending: EMERGENCY MEDICINE
Payer: MEDICARE

## 2024-10-07 ENCOUNTER — APPOINTMENT (EMERGENCY)
Dept: CT IMAGING | Facility: HOSPITAL | Age: 89
End: 2024-10-07
Payer: MEDICARE

## 2024-10-07 VITALS
DIASTOLIC BLOOD PRESSURE: 74 MMHG | HEART RATE: 61 BPM | SYSTOLIC BLOOD PRESSURE: 176 MMHG | BODY MASS INDEX: 25.33 KG/M2 | TEMPERATURE: 98 F | WEIGHT: 109 LBS | RESPIRATION RATE: 23 BRPM | OXYGEN SATURATION: 98 %

## 2024-10-07 DIAGNOSIS — R07.9 CHEST PAIN: ICD-10-CM

## 2024-10-07 DIAGNOSIS — R21 RASH AND NONSPECIFIC SKIN ERUPTION: Primary | ICD-10-CM

## 2024-10-07 DIAGNOSIS — E83.42 HYPOMAGNESEMIA: ICD-10-CM

## 2024-10-07 LAB
2HR DELTA HS TROPONIN: 0 NG/L
ALBUMIN SERPL BCG-MCNC: 4 G/DL (ref 3.5–5)
ALP SERPL-CCNC: 83 U/L (ref 34–104)
ALT SERPL W P-5'-P-CCNC: 6 U/L (ref 7–52)
ANION GAP SERPL CALCULATED.3IONS-SCNC: 7 MMOL/L (ref 4–13)
AST SERPL W P-5'-P-CCNC: 15 U/L (ref 13–39)
BASOPHILS # BLD AUTO: 0.03 THOUSANDS/ΜL (ref 0–0.1)
BASOPHILS NFR BLD AUTO: 0 % (ref 0–1)
BILIRUB DIRECT SERPL-MCNC: 0.12 MG/DL (ref 0–0.2)
BILIRUB SERPL-MCNC: 0.5 MG/DL (ref 0.2–1)
BUN SERPL-MCNC: 13 MG/DL (ref 5–25)
CALCIUM SERPL-MCNC: 9 MG/DL (ref 8.4–10.2)
CARDIAC TROPONIN I PNL SERPL HS: 5 NG/L
CARDIAC TROPONIN I PNL SERPL HS: 5 NG/L
CHLORIDE SERPL-SCNC: 103 MMOL/L (ref 96–108)
CO2 SERPL-SCNC: 27 MMOL/L (ref 21–32)
CREAT SERPL-MCNC: 1.49 MG/DL (ref 0.6–1.3)
EOSINOPHIL # BLD AUTO: 0.6 THOUSAND/ΜL (ref 0–0.61)
EOSINOPHIL NFR BLD AUTO: 9 % (ref 0–6)
ERYTHROCYTE [DISTWIDTH] IN BLOOD BY AUTOMATED COUNT: 13 % (ref 11.6–15.1)
FLUAV RNA RESP QL NAA+PROBE: NEGATIVE
FLUBV RNA RESP QL NAA+PROBE: NEGATIVE
GFR SERPL CREATININE-BSD FRML MDRD: 31 ML/MIN/1.73SQ M
GLUCOSE SERPL-MCNC: 119 MG/DL (ref 65–140)
HCT VFR BLD AUTO: 32.1 % (ref 34.8–46.1)
HGB BLD-MCNC: 10.5 G/DL (ref 11.5–15.4)
IMM GRANULOCYTES # BLD AUTO: 0.02 THOUSAND/UL (ref 0–0.2)
IMM GRANULOCYTES NFR BLD AUTO: 0 % (ref 0–2)
LIPASE SERPL-CCNC: 28 U/L (ref 11–82)
LYMPHOCYTES # BLD AUTO: 1.23 THOUSANDS/ΜL (ref 0.6–4.47)
LYMPHOCYTES NFR BLD AUTO: 18 % (ref 14–44)
MAGNESIUM SERPL-MCNC: 1.6 MG/DL (ref 1.9–2.7)
MCH RBC QN AUTO: 30.3 PG (ref 26.8–34.3)
MCHC RBC AUTO-ENTMCNC: 32.7 G/DL (ref 31.4–37.4)
MCV RBC AUTO: 93 FL (ref 82–98)
MONOCYTES # BLD AUTO: 0.73 THOUSAND/ΜL (ref 0.17–1.22)
MONOCYTES NFR BLD AUTO: 11 % (ref 4–12)
NEUTROPHILS # BLD AUTO: 4.31 THOUSANDS/ΜL (ref 1.85–7.62)
NEUTS SEG NFR BLD AUTO: 62 % (ref 43–75)
NRBC BLD AUTO-RTO: 0 /100 WBCS
PLATELET # BLD AUTO: 184 THOUSANDS/UL (ref 149–390)
PMV BLD AUTO: 9.4 FL (ref 8.9–12.7)
POTASSIUM SERPL-SCNC: 3.5 MMOL/L (ref 3.5–5.3)
PROT SERPL-MCNC: 6.8 G/DL (ref 6.4–8.4)
RBC # BLD AUTO: 3.46 MILLION/UL (ref 3.81–5.12)
RSV RNA RESP QL NAA+PROBE: NEGATIVE
SARS-COV-2 RNA RESP QL NAA+PROBE: NEGATIVE
SODIUM SERPL-SCNC: 137 MMOL/L (ref 135–147)
WBC # BLD AUTO: 6.92 THOUSAND/UL (ref 4.31–10.16)

## 2024-10-07 PROCEDURE — 96365 THER/PROPH/DIAG IV INF INIT: CPT

## 2024-10-07 PROCEDURE — 0241U HB NFCT DS VIR RESP RNA 4 TRGT: CPT | Performed by: EMERGENCY MEDICINE

## 2024-10-07 PROCEDURE — 84484 ASSAY OF TROPONIN QUANT: CPT | Performed by: EMERGENCY MEDICINE

## 2024-10-07 PROCEDURE — 71046 X-RAY EXAM CHEST 2 VIEWS: CPT

## 2024-10-07 PROCEDURE — 85025 COMPLETE CBC W/AUTO DIFF WBC: CPT | Performed by: EMERGENCY MEDICINE

## 2024-10-07 PROCEDURE — 83690 ASSAY OF LIPASE: CPT | Performed by: EMERGENCY MEDICINE

## 2024-10-07 PROCEDURE — 83735 ASSAY OF MAGNESIUM: CPT | Performed by: EMERGENCY MEDICINE

## 2024-10-07 PROCEDURE — 99284 EMERGENCY DEPT VISIT MOD MDM: CPT

## 2024-10-07 PROCEDURE — 70450 CT HEAD/BRAIN W/O DYE: CPT

## 2024-10-07 PROCEDURE — 99285 EMERGENCY DEPT VISIT HI MDM: CPT | Performed by: EMERGENCY MEDICINE

## 2024-10-07 PROCEDURE — 96366 THER/PROPH/DIAG IV INF ADDON: CPT

## 2024-10-07 PROCEDURE — 96361 HYDRATE IV INFUSION ADD-ON: CPT

## 2024-10-07 PROCEDURE — 80048 BASIC METABOLIC PNL TOTAL CA: CPT | Performed by: EMERGENCY MEDICINE

## 2024-10-07 PROCEDURE — 80076 HEPATIC FUNCTION PANEL: CPT | Performed by: EMERGENCY MEDICINE

## 2024-10-07 PROCEDURE — 93005 ELECTROCARDIOGRAM TRACING: CPT

## 2024-10-07 PROCEDURE — 36415 COLL VENOUS BLD VENIPUNCTURE: CPT | Performed by: EMERGENCY MEDICINE

## 2024-10-07 RX ORDER — HYDROCORTISONE 25 MG/G
OINTMENT TOPICAL ONCE
Status: COMPLETED | OUTPATIENT
Start: 2024-10-07 | End: 2024-10-07

## 2024-10-07 RX ORDER — MAGNESIUM SULFATE HEPTAHYDRATE 40 MG/ML
2 INJECTION, SOLUTION INTRAVENOUS ONCE
Status: COMPLETED | OUTPATIENT
Start: 2024-10-07 | End: 2024-10-07

## 2024-10-07 RX ORDER — ACETAMINOPHEN 325 MG/1
650 TABLET ORAL ONCE
Status: COMPLETED | OUTPATIENT
Start: 2024-10-07 | End: 2024-10-07

## 2024-10-07 RX ORDER — LANOLIN ALCOHOL/MO/W.PET/CERES
800 CREAM (GRAM) TOPICAL ONCE
Status: COMPLETED | OUTPATIENT
Start: 2024-10-07 | End: 2024-10-07

## 2024-10-07 RX ORDER — HYDROXYZINE HYDROCHLORIDE 25 MG/1
25 TABLET, FILM COATED ORAL EVERY 6 HOURS PRN
Qty: 30 TABLET | Refills: 0 | Status: SHIPPED | OUTPATIENT
Start: 2024-10-07

## 2024-10-07 RX ADMIN — ACETAMINOPHEN 650 MG: 325 TABLET ORAL at 15:42

## 2024-10-07 RX ADMIN — Medication 800 MG: at 17:50

## 2024-10-07 RX ADMIN — SODIUM CHLORIDE 500 ML: 0.9 INJECTION, SOLUTION INTRAVENOUS at 15:42

## 2024-10-07 RX ADMIN — MAGNESIUM SULFATE HEPTAHYDRATE 2 G: 40 INJECTION, SOLUTION INTRAVENOUS at 16:28

## 2024-10-07 RX ADMIN — SODIUM CHLORIDE 500 ML: 0.9 INJECTION, SOLUTION INTRAVENOUS at 17:19

## 2024-10-07 RX ADMIN — HYDROCORTISONE 1 APPLICATION: 25 OINTMENT TOPICAL at 15:48

## 2024-10-07 NOTE — ED PROVIDER NOTES
Final diagnoses:   Rash and nonspecific skin eruption - Suspect flea bites   Hypomagnesemia   Chest pain     ED Disposition       ED Disposition   Discharge    Condition   Stable    Date/Time   Mon Oct 7, 2024  6:10 PM    Comment   Lakeisha Langston discharge to home/self care.                   Assessment & Plan       Medical Decision Making  88-year-old female presents to the ED for 1 week of pruritic rash starting on her bilateral upper thighs but it has been spreading to other areas of her body.  Rash is itchy, nonpainful.  She has been using triamcinolone cream.  Patient lives with her daughter and her daughter's dog and the dog was recently treated for fleas and based on the appearance of the rash, suspect flea bites or other insect bites.  Will provide hydrocortisone ointment 2.5% for itch relief.  Will likely send home with prescription for Atarax to be used as needed.  Will place referral to dermatology.  Patient also complains of head pressure and will evaluate with noncontrast head CT.  She has normal neurologic exam and does report frequent headaches so this is likely benign however given her age, brain mass or tumor considered.  Low suspicion for meningitis or subarachnoid hemorrhage.  Patient also complained of chest pain associate with diaphoresis and nausea yesterday, now resolved.  Will evaluate with EKG, chest x-ray and cardiac labs.  Will provide Tylenol for headache.    Amount and/or Complexity of Data Reviewed  Labs: ordered. Decision-making details documented in ED Course.  Radiology: ordered and independent interpretation performed. Decision-making details documented in ED Course.  ECG/medicine tests: ordered and independent interpretation performed. Decision-making details documented in ED Course.    Risk  OTC drugs.  Prescription drug management.        ED Course as of 10/07/24 1819   Mon Oct 07, 2024   1550 Hemoglobin(!): 10.5  Hgb stable. 1 month ago, Hgb was 10.0. Prior to that it was 11.2.    1624 Creatinine(!): 1.49   1624 GFR, Calculated: 31  Very mild EVAN.  Baseline creatinine around 1.33 and GFR in the mid 30s.  Will give additional 500 cc of normal saline for total bolus of 1 L.  Will recommend she follow-up with her PCP to have her renal function as well as magnesium level rechecked.  Patient to get 2 g of IV magnesium sulfate.    1631 Updated patient and daughter about results thus far and plan to continue with IV fluids, IV magnesium, repeat troponin and if normal, will discharge home.  She has an appointment with cardiology, Dr. Sorensen, in November.  I encouraged her to keep this appointment.  Also advised close outpatient PCP follow-up for recheck of renal function, magnesium level.  She denies any nasal congestion or sinusitis symptoms despite CT finding so we will hold off on antibiotics at this time.   1809 hs TnI 2hr: 5   1818 Updated patient and daughter about repeat troponin being normal.  I advised her to keep her appointment with cardiology in November and to follow-up with her PCP as soon as possible.  Will also place ambulatory referral to dermatology.  Patient to be sent home with a hydrocortisone ointment and advised her to use it up to 3 times daily but avoid use on the face.  Will also prescribed Atarax to be used as needed for itching.  Discussed ED return parameters with patient and daughter.       Medications   magnesium sulfate 2 g/50 mL IVPB (premix) 2 g (2 g Intravenous New Bag 10/7/24 1628)   sodium chloride 0.9 % bolus 500 mL (500 mL Intravenous New Bag 10/7/24 1542)   acetaminophen (TYLENOL) tablet 650 mg (650 mg Oral Given 10/7/24 1542)   hydrocortisone 2.5 % ointment (1 Application Topical Given 10/7/24 1548)   sodium chloride 0.9 % bolus 500 mL (500 mL Intravenous New Bag 10/7/24 1719)   magnesium Oxide (MAG-OX) tablet 800 mg (800 mg Oral Given 10/7/24 1750)       ED Risk Strat Scores   HEART Risk Score      Flowsheet Row Most Recent Value   Heart Score Risk  Calculator    History 1 Filed at: 10/07/2024 1635   ECG 0 Filed at: 10/07/2024 1635   Age 2 Filed at: 10/07/2024 1635   Risk Factors 1 Filed at: 10/07/2024 1635   Troponin 0 Filed at: 10/07/2024 1635   HEART Score 4 Filed at: 10/07/2024 1635                               SBIRT 22yo+      Flowsheet Row Most Recent Value   Initial Alcohol Screen: US AUDIT-C     1. How often do you have a drink containing alcohol? 0 Filed at: 10/07/2024 1437   2. How many drinks containing alcohol do you have on a typical day you are drinking?  0 Filed at: 10/07/2024 1437   3a. Male UNDER 65: How often do you have five or more drinks on one occasion? 0 Filed at: 10/07/2024 1437   3b. FEMALE Any Age, or MALE 65+: How often do you have 4 or more drinks on one occassion? 0 Filed at: 10/07/2024 1437   Audit-C Score 0 Filed at: 10/07/2024 1437   GILLES: How many times in the past year have you...    Used an illegal drug or used a prescription medication for non-medical reasons? Never Filed at: 10/07/2024 1437                            History of Present Illness       Chief Complaint   Patient presents with    Rash     Rash to b/l groin x 2 wks, itchy        Past Medical History:   Diagnosis Date    Anxiety 1992    Arthritis 1991    Benign essential hypertension     Last assessed: 9/11/14    Disease of thyroid gland     Essential tremor     Fibromyalgia     GERD (gastroesophageal reflux disease)     History of nail disorders     Hyperlipidemia     Hypertension     Palpitations     Transient cerebral ischemia       Past Surgical History:   Procedure Laterality Date    APPENDECTOMY      EGD AND COLONOSCOPY  07/2020    HYSTERECTOMY  01/01/2010    OR LAPS SURG CHOLECYSTECTOMY W/CHOLANGIOGRAPHY N/A 4/4/2018    Procedure: LAPAROSCOPIC CHOLECYSTECTOMY WITH IOC;  Surgeon: Yakov Mueller MD;  Location: [facility];  Service: General    TUBAL LIGATION        Family History   Problem Relation Age of Onset    Stroke Mother         ischemic stroke     Hypertension Mother     Heart disease Father     Cancer Sister     No Known Problems Sister     No Known Problems Daughter     No Known Problems Daughter     No Known Problems Daughter     No Known Problems Daughter     No Known Problems Daughter     No Known Problems Maternal Grandmother     No Known Problems Paternal Grandmother     No Known Problems Paternal Aunt     No Known Problems Paternal Aunt     No Known Problems Paternal Aunt     No Known Problems Paternal Aunt     No Known Problems Paternal Aunt     Breast cancer Neg Hx       Social History     Tobacco Use    Smoking status: Former     Current packs/day: 0.00     Average packs/day: 0.1 packs/day for 50.0 years (5.0 ttl pk-yrs)     Types: Cigarettes     Start date: 1963     Quit date:      Years since quittin.7    Smokeless tobacco: Never    Tobacco comments:     1 pack a week    Vaping Use    Vaping status: Never Used   Substance Use Topics    Alcohol use: Not Currently     Comment: 0    Drug use: No      E-Cigarette/Vaping    E-Cigarette Use Never User       E-Cigarette/Vaping Substances    Nicotine No     THC No     CBD No     Flavoring No     Other No     Unknown No       I have reviewed and agree with the history as documented.     Patient is an 88-year-old female with past medical history of hypertension, hyperlipidemia, hypothyroidism, GERD, presents to the emergency department with her daughter for a pruritic rash mostly on her upper thighs and groin that she noticed about 1 week ago however patient and daughter states the rash is spreading.  She also has itchy lesions on the left upper abdomen and on the neck.  She has been using triamcinolone cream which had been prescribed to her earlier by PCP.  Daughter does state that she recently fumigated the house for fleas as her dog had fleas in late September.  Daughter and patient live together and daughter denies that she has any rash or skin lesions.  She denies any change in household  "products or linens, detergents, etc.  In addition to the rash, daughter states that last night patient developed chest pressure.  Patient states she was sitting down and around 9 PM she developed pressure all across her chest which was associated with nausea and diaphoresis.  She did call 911 and an ambulance came but she refused transport to the ED because the pain had eventually gone away.  She denies any recurrence of chest pain today or any dyspnea, palpitations.  Denies any abdominal pain or back pain.  She does report frequent bowel movements today but states they are formed and not diarrhea.  Denies any nausea or vomiting today.  She does report feeling generally rundown.  She also complains of generalized headache however she states she does get chronic headaches and this feels similar.  She describes the head pain as \"a bowling ball in her head.\"  Denies any dizziness or near syncope, change in vision or hearing, neck pain or stiffness, recent fevers, chills, cough or URI symptoms, UTI symptoms, extremity swelling, extremity weakness or paresthesia or other focal neurologic deficits.  Denies any cardiac history.      History provided by:  Patient and relative   used: No    Rash  Associated symptoms: fatigue and headaches    Associated symptoms: no abdominal pain, no diarrhea, no fever, no nausea, no shortness of breath, no sore throat, not vomiting and not wheezing        Review of Systems   Constitutional:  Positive for fatigue. Negative for chills and fever.   HENT:  Negative for congestion, ear pain, rhinorrhea and sore throat.    Respiratory:  Negative for cough, chest tightness, shortness of breath and wheezing.    Cardiovascular:  Negative for chest pain, palpitations and leg swelling.        (Chest pain last night, resolved today)   Gastrointestinal:  Negative for abdominal pain, constipation, diarrhea, nausea and vomiting.   Genitourinary:  Negative for dysuria, flank pain, " frequency and hematuria.   Musculoskeletal:  Negative for back pain, neck pain and neck stiffness.   Skin:  Positive for rash. Negative for color change, pallor and wound.   Allergic/Immunologic: Negative for immunocompromised state.   Neurological:  Positive for headaches. Negative for dizziness, syncope, facial asymmetry, speech difficulty, weakness, light-headedness and numbness.   Hematological:  Negative for adenopathy. Does not bruise/bleed easily.   Psychiatric/Behavioral:  Negative for confusion and decreased concentration.    All other systems reviewed and are negative.          Objective       ED Triage Vitals   Temperature Pulse Blood Pressure Respirations SpO2 Patient Position - Orthostatic VS   10/07/24 1437 10/07/24 1437 10/07/24 1437 10/07/24 1437 10/07/24 1437 10/07/24 1630   98 °F (36.7 °C) 64 (!) 193/77 18 99 % Lying      Temp src Heart Rate Source BP Location FiO2 (%) Pain Score    -- 10/07/24 1630 10/07/24 1630 -- 10/07/24 1542     Monitor Right arm  3      Vitals      Date and Time Temp Pulse SpO2 Resp BP Pain Score FACES Pain Rating User   10/07/24 1630 -- 61 98 % 23 176/74 -- -- LM   10/07/24 1542 -- -- -- -- -- 3 -- BH   10/07/24 1437 98 °F (36.7 °C) 64 99 % 18 193/77 -- -- AR          Vitals:    10/07/24 1436 10/07/24 1437 10/07/24 1630   BP:  (!) 193/77 (!) 176/74   BP Location:   Right arm   Pulse:  64 61   Resp:  18 (!) 23   Temp:  98 °F (36.7 °C)    SpO2:  99% 98%   Weight: 49.4 kg (109 lb)          Physical Exam  Vitals and nursing note reviewed.   Constitutional:       General: She is not in acute distress.     Appearance: Normal appearance. She is well-developed. She is not ill-appearing, toxic-appearing or diaphoretic.   HENT:      Head: Normocephalic and atraumatic.      Right Ear: External ear normal.      Left Ear: External ear normal.      Mouth/Throat:      Mouth: Mucous membranes are moist.      Pharynx: Oropharynx is clear.   Eyes:      Extraocular Movements: Extraocular  movements intact.      Conjunctiva/sclera: Conjunctivae normal.      Pupils: Pupils are equal, round, and reactive to light.   Neck:      Vascular: No JVD.   Cardiovascular:      Rate and Rhythm: Normal rate and regular rhythm.      Pulses: Normal pulses.      Heart sounds: Normal heart sounds. No murmur heard.     No friction rub. No gallop.   Pulmonary:      Effort: Pulmonary effort is normal. No respiratory distress.      Breath sounds: Normal breath sounds. No wheezing, rhonchi or rales.   Chest:      Chest wall: No tenderness.   Abdominal:      General: There is no distension.      Palpations: Abdomen is soft.      Tenderness: There is no abdominal tenderness. There is no guarding or rebound.   Musculoskeletal:         General: No swelling or tenderness. Normal range of motion.      Cervical back: Normal range of motion and neck supple. No rigidity.      Right lower leg: No edema.      Left lower leg: No edema.   Skin:     General: Skin is warm and dry.      Coloration: Skin is not pale.      Findings: Rash present. No erythema.      Comments: Scattered small erythematous slightly raised macules on the bilateral thighs, left flank/abdomen, anterior neck, right shoulder.  No vesicular lesions. See picture below.    Neurological:      General: No focal deficit present.      Mental Status: She is alert and oriented to person, place, and time.      Sensory: No sensory deficit.      Motor: No weakness.   Psychiatric:         Mood and Affect: Mood normal.         Behavior: Behavior normal.         Results Reviewed       Procedure Component Value Units Date/Time    HS Troponin I 2hr [[id number]]  (Normal) Collected: 10/07/24 1731    Lab Status: Final result Specimen: Blood from Arm, Left Updated: 10/07/24 1757     hs TnI 2hr 5 ng/L      Delta 2hr hsTnI 0 ng/L     FLU/RSV/COVID - if FLU/RSV clinically relevant (2hr TAT) [[id number]]  (Normal) Collected: 10/07/24 1524    Lab Status: Final result Specimen: Nares from Nose  Updated: 10/07/24 1611     SARS-CoV-2 Negative     INFLUENZA A PCR Negative     INFLUENZA B PCR Negative     RSV PCR Negative    Narrative:      This test has been performed using the CoV-2/Flu/RSV plus assay on the [employer] platform. This test has been validated by the  and verified by the performing laboratory.     This test is designed to amplify and detect the following: nucleocapsid (N), envelope (E), and RNA-dependent RNA polymerase (RdRP) genes of the SARS-CoV-2 genome; matrix (M), basic polymerase (PB2), and acidic protein (PA) segments of the influenza A genome; matrix (M) and non-structural protein (NS) segments of the influenza B genome, and the nucleocapsid genes of RSV A and RSV B.     Positive results are indicative of the presence of Flu A, Flu B, RSV, and/or SARS-CoV-2 RNA. Positive results for SARS-CoV-2 or suspected novel influenza should be reported to state, local, or federal health departments according to local reporting requirements.      All results should be assessed in conjunction with clinical presentation and other laboratory markers for clinical management.     FOR PEDIATRIC PATIENTS - copy/paste COVID Guidelines URL to browser: [email or web address]       Basic metabolic panel [[id number]]  (Abnormal) Collected: 10/07/24 1524    Lab Status: Final result Specimen: Blood from Arm, Left Updated: 10/07/24 1555     Sodium 137 mmol/L      Potassium 3.5 mmol/L      Chloride 103 mmol/L      CO2 27 mmol/L      ANION GAP 7 mmol/L      BUN 13 mg/dL      Creatinine 1.49 mg/dL      Glucose 119 mg/dL      Calcium 9.0 mg/dL      eGFR 31 ml/min/1.73sq m     Narrative:      National Kid[employer] Disease Foundation guidelines for Chronic Kidney Disease (CKD):     Stage 1 with normal or high GFR (GFR > 90 mL/min/1.73 square meters)    Stage 2 Mild CKD (GFR = 60-89 mL/min/1.73 square meters)    Stage 3A Moderate CKD (GFR = 45-59 mL/min/1.73  square meters)    Stage 3B Moderate CKD (GFR = 30-44 mL/min/1.73 square meters)    Stage 4 Severe CKD (GFR = 15-29 mL/min/1.73 square meters)    Stage 5 End Stage CKD (GFR <15 mL/min/1.73 square meters)  Note: GFR calculation is accurate only with a steady state creatinine    Hepatic function panel [[id number]]  (Abnormal) Collected: 10/07/24 1524    Lab Status: Final result Specimen: Blood from Arm, Left Updated: 10/07/24 1555     Total Bilirubin 0.50 mg/dL      Bilirubin, Direct 0.12 mg/dL      Alkaline Phosphatase 83 U/L      AST 15 U/L      ALT 6 U/L      Total Protein 6.8 g/dL      Albumin 4.0 g/dL     Magnesium [[id number]]  (Abnormal) Collected: 10/07/24 1524    Lab Status: Final result Specimen: Blood from Arm, Left Updated: 10/07/24 1555     Magnesium 1.6 mg/dL     Lipase [[id number]]  (Normal) Collected: 10/07/24 1524    Lab Status: Final result Specimen: Blood from Arm, Left Updated: 10/07/24 1555     Lipase 28 u/L     HS Troponin 0hr (reflex protocol) [[id number]]  (Normal) Collected: 10/07/24 1524    Lab Status: Final result Specimen: Blood from Arm, Left Updated: 10/07/24 1553     hs TnI 0hr 5 ng/L     CBC and differential [[id number]]  (Abnormal) Collected: 10/07/24 1524    Lab Status: Final result Specimen: Blood from Arm, Left Updated: 10/07/24 1533     WBC 6.92 Thousand/uL      RBC 3.46 Million/uL      Hemoglobin 10.5 g/dL      Hematocrit 32.1 %      MCV 93 fL      MCH 30.3 pg      MCHC 32.7 g/dL      RDW 13.0 %      MPV 9.4 fL      Platelets 184 Thousands/uL      nRBC 0 /100 WBCs      Segmented % 62 %      Immature Grans % 0 %      Lymphocytes % 18 %      Monocytes % 11 %      Eosinophils Relative 9 %      Basophils Relative 0 %      Absolute Neutrophils 4.31 Thousands/µL      Absolute Immature Grans 0.02 Thousand/uL      Absolute Lymphocytes 1.23 Thousands/µL      Absolute Monocytes 0.73 Thousand/µL      Eosinophils Absolute 0.60 Thousand/µL      Basophils Absolute 0.03 Thousands/µL             XR  chest 2 views   ED Interpretation by Cely Avila DO (10/07 1551)   No acute abnormality in the chest.  Stable scoliotic curve to the spine and stable large hiatal hernia.      Final Interpretation by Iftikhar Jaimes MD (10/07 8583)      No acute cardiopulmonary disease.            Workstation performed: [id number]         CT head without contrast   Final Interpretation by Christopher Estrada MD (10/07 1552)         1. No acute intracranial hemorrhage.   2. Mild acute left greater than right maxillary sinusitis.   3. Mild, chronic microangiopathy redemonstrated.                  Workstation performed: [id number]             ECG 12 Lead Documentation Only    Date/Time: 10/7/2024 3:24 PM    Performed by: Cely Avila DO  Authorized by: Cely Avila DO    ECG reviewed by me, the ED Provider: yes    Patient location:  ED  Previous ECG:     Previous ECG:  Compared to current    Comparison ECG info:  8-; QT has shortened  Rate:     ECG rate:  60    ECG rate assessment: normal    Rhythm:     Rhythm: sinus rhythm    Ectopy:     Ectopy: none    QRS:     QRS axis:  Normal    QRS intervals:  Normal  Conduction:     Conduction: normal    ST segments:     ST segments:  Normal  T waves:     T waves: normal        ED Medication and Procedure Management   Prior to Admission Medications   Prescriptions Last Dose Informant Patient Reported? Taking?   ALPRAZolam (XANAX) 0.5 mg tablet   No No   Sig: Take 1 tablet (0.5 mg total) by mouth 4 (four) times a day as needed for anxiety   Diclofenac Sodium (VOLTAREN) 1 %  Self No No   Sig: Apply 2 g topically 4 (four) times a day   Probiotic Product (ALIGN) 4 MG CAPS  Self Yes No   Sig: Take 1 tablet by mouth daily   acetaminophen (TYLENOL) 325 mg tablet  Self Yes No   Sig: Take 1-2 tablets by mouth every 6 (six) hours as needed   albuterol (PROVENTIL HFA,VENTOLIN HFA) 90 mcg/act inhaler  Self Yes No   atenolol (TENORMIN) 50 mg tablet  Self  No No   Sig: Take 1 tablet (50 mg total) by mouth 2 (two) times a day   imipramine (TOFRANIL) 10 mg tablet  Self No No   Sig: Take 1 tablet by mouth at bedtime   latanoprost (XALATAN) 0.005 % ophthalmic solution  Self No No   Sig: Administer 1 drop to both eyes daily at bedtime   levothyroxine 50 mcg tablet   No No   Sig: take 1 tablet by mouth once daily except Sunday take 2 tabs   lisinopril (ZESTRIL) 5 mg tablet   No No   Sig: TAKE 1 TABLET(5 MG) BY MOUTH DAILY   ondansetron (ZOFRAN-ODT) 4 mg disintegrating tablet  Self Yes No   pantoprazole (PROTONIX) 40 mg tablet  Self No No   Sig: TAKE 1 TABLET(40 MG) BY MOUTH DAILY BEFORE BREAKFAST   risedronate (ACTONEL) 35 mg tablet  Self No No   Sig: Take 1 tablet (35 mg total) by mouth every 7 days with water on empty stomach, nothing by mouth or lie down for next 30 minutes.   Patient not taking: Reported on 4/9/2024   rosuvastatin (CRESTOR) 5 mg tablet  Self No No   Sig: Take 1 tablet (5 mg total) by mouth daily   triamcinolone (KENALOG) 0.5 % cream   No No   Sig: Apply topically 3 (three) times a day      Facility-Administered Medications: None     Patient's Medications   Discharge Prescriptions    HYDROXYZINE HCL (ATARAX) 25 MG TABLET    Take 1 tablet (25 mg total) by mouth every 6 (six) hours as needed for itching       Start Date: 10/7/2024 End Date: --       Order Dose: 25 mg       Quantity: 30 tablet    Refills: 0       ED SEPSIS DOCUMENTATION   Time reflects when diagnosis was documented in both MDM as applicable and the Disposition within this note       Time User Action Codes Description Comment    10/7/2024  6:10 PM Cely Avila Add [R21] Rash and nonspecific skin eruption     10/7/2024  6:10 PM Cely Avila Modify [R21] Rash and nonspecific skin eruption Suspect flea bites    10/7/2024  6:10 PM Cely Avila Add [E83.42] Hypomagnesemia     10/7/2024  6:10 PM Cely Avila [R07.9] Chest pain                  Cely Avila  DO  10/07/24 1819

## 2024-10-07 NOTE — TELEPHONE ENCOUNTER
Patient's daughter, Katerine called.  Patient was having a pain across her chest on Saturday and developed a rash on her legs. Katerine called 911 and the ambulance came.  The pain across her chest went away but they looked at the rash on her legs and thought it might be shingles.  She refused to go with them so they recommended following up with PCP.  There is no availability on schedules.  Daughter says that she is using triamcinolone cream on her legs.  Is this OK to use or is there something else she should be applying?  Please contact Katerine and advise.  Thank you.

## 2024-10-08 ENCOUNTER — VBI (OUTPATIENT)
Age: 89
End: 2024-10-08

## 2024-10-08 LAB
ATRIAL RATE: 60 BPM
P AXIS: 46 DEGREES
PR INTERVAL: 152 MS
QRS AXIS: 6 DEGREES
QRSD INTERVAL: 84 MS
QT INTERVAL: 444 MS
QTC INTERVAL: 444 MS
T WAVE AXIS: 48 DEGREES
VENTRICULAR RATE: 60 BPM

## 2024-10-08 PROCEDURE — 93010 ELECTROCARDIOGRAM REPORT: CPT | Performed by: INTERNAL MEDICINE

## 2024-10-08 NOTE — TELEPHONE ENCOUNTER
10/08/24 10:46 AM    Patient contacted post ED visit, first outreach attempt made. Message was left for patient to return a call to the [facility] Department at [facility]: Phone [phone].    Thank you.  [facility] Remy MA  [facility]

## 2024-10-08 NOTE — LETTER
[facility] INTERNAL MEDICINE [address]  [address]  [address]  [facility] [address]  [phone]    Date: 10/10/24    Lakeisha Langston  [address] [facility] [address]    Dear Lakeisha:                                                                                                                                Thank you for choosing [facility] emergency department for care.  Your primary care provider wants to make sure that your ongoing medical care is being addressed. If you require follow up care as a result of your emergency department visit, there are a few things the practice would like you to know.                As part of the network's continuing commitment to caring for our patients, we have added more same day appointments and have extended office hours to meet your medical needs. After hours, on-call physicians are available via your primary care provider's main office line.               We encourage you to contact our office prior to seeking treatment to discuss your symptoms with the medical staff.  Together, we can determine the correct course of action.  A majority of non-emergent conditions such as: common cold, flu-like symptoms, fevers, strains/sprains, dislocations, minor burns, cuts and animal bites can be treated at [facility] facilities. Diagnostic testing is available at some sites.               Of course, if you are experiencing a life threatening medical emergency call 911 or proceed directly to the nearest emergency room.    Your nearest [facility] facility is conveniently located at:    [address]  [place] [address]  [phone]  SKIP THE WAIT  Conveniently offered at most [facility] locations  Skandia your spot online at [email or web address] or on the [facility] Maame    Sincerely,    [facility] INTERNAL [facility] [address]  Dept: [phone]

## 2024-10-09 NOTE — TELEPHONE ENCOUNTER
10/09/24 10:56 AM    Patient contacted post ED visit, second outreach attempt made. Message was left for patient to return a call to the [facility] Department at [facility]: Phone [phone].    Thank you.  Maci Remy MA  [facility]

## 2024-10-10 NOTE — TELEPHONE ENCOUNTER
10/10/24 10:34 AM    Patient contacted post ED visit, phone outreaches were unsuccessful and a MyChart letter has been sent to the patient as follow-up.    Thank you.  Maci Remy MA  [facility]

## 2024-11-01 DIAGNOSIS — F41.9 ANXIETY: ICD-10-CM

## 2024-11-01 RX ORDER — ALPRAZOLAM 0.5 MG
TABLET ORAL
Qty: 120 TABLET | Refills: 0 | Status: SHIPPED | OUTPATIENT
Start: 2024-11-01

## 2024-11-11 DIAGNOSIS — H40.9 GLAUCOMA OF BOTH EYES, UNSPECIFIED GLAUCOMA TYPE: ICD-10-CM

## 2024-11-12 RX ORDER — LATANOPROST 50 UG/ML
1 SOLUTION/ DROPS OPHTHALMIC
Qty: 7.5 ML | Refills: 3 | OUTPATIENT
Start: 2024-11-12

## 2024-11-21 ENCOUNTER — TELEPHONE (OUTPATIENT)
Age: 89
End: 2024-11-21

## 2024-11-21 NOTE — TELEPHONE ENCOUNTER
Patients daughter Lew called stating she is trying put Home Care services in place for her mom. She states a fax was sent last week Wed and another fax was sent today.     She would like to check if fax was received and she would also like a call back to inform her if form has been filled out and faxed back.       Daughter can be reached at [phone].

## 2024-11-21 NOTE — TELEPHONE ENCOUNTER
Called the patients daughter Lew and left a message to call us back and verify what fax number is being used for the form, because we have not received a form for home heal services for the patient as of yet. We also checked [employer] and nothing was shown

## 2024-11-21 NOTE — TELEPHONE ENCOUNTER
Patients daughter relayed a fax number to us for home care services for her mother, but I was under the assumption they were trying to fax paperwork to us to be filled out for home care services. And, I checked the patients chart I do not see an order or referral for home care services. Can you please assist me with this?

## 2024-12-01 ENCOUNTER — APPOINTMENT (EMERGENCY)
Dept: RADIOLOGY | Facility: HOSPITAL | Age: 89
DRG: 178 | End: 2024-12-01
Payer: MEDICARE

## 2024-12-01 ENCOUNTER — HOSPITAL ENCOUNTER (INPATIENT)
Facility: HOSPITAL | Age: 89
LOS: 1 days | Discharge: HOME HEALTH | DRG: 178 | End: 2024-12-03
Attending: EMERGENCY MEDICINE | Admitting: INTERNAL MEDICINE
Payer: MEDICARE

## 2024-12-01 DIAGNOSIS — R53.1 WEAKNESS: ICD-10-CM

## 2024-12-01 DIAGNOSIS — E87.1 HYPONATREMIA: ICD-10-CM

## 2024-12-01 DIAGNOSIS — U07.1 ACUTE COVID-19: Primary | ICD-10-CM

## 2024-12-01 DIAGNOSIS — R26.2 AMBULATORY DYSFUNCTION: ICD-10-CM

## 2024-12-01 DIAGNOSIS — U07.1 COVID-19: ICD-10-CM

## 2024-12-01 LAB
2HR DELTA HS TROPONIN: 1 NG/L
4HR DELTA HS TROPONIN: 2 NG/L
ALBUMIN SERPL BCG-MCNC: 4.4 G/DL (ref 3.5–5)
ALP SERPL-CCNC: 80 U/L (ref 34–104)
ALT SERPL W P-5'-P-CCNC: 11 U/L (ref 7–52)
ANION GAP SERPL CALCULATED.3IONS-SCNC: 8 MMOL/L (ref 4–13)
AST SERPL W P-5'-P-CCNC: 24 U/L (ref 13–39)
BACTERIA UR QL AUTO: ABNORMAL /HPF
BASOPHILS # BLD AUTO: 0.01 THOUSANDS/ÂΜL (ref 0–0.1)
BASOPHILS NFR BLD AUTO: 0 % (ref 0–1)
BILIRUB SERPL-MCNC: 0.45 MG/DL (ref 0.2–1)
BILIRUB UR QL STRIP: NEGATIVE
BUN SERPL-MCNC: 11 MG/DL (ref 5–25)
CALCIUM SERPL-MCNC: 9.6 MG/DL (ref 8.4–10.2)
CARDIAC TROPONIN I PNL SERPL HS: 11 NG/L (ref ?–50)
CARDIAC TROPONIN I PNL SERPL HS: 12 NG/L (ref ?–50)
CARDIAC TROPONIN I PNL SERPL HS: 13 NG/L (ref ?–50)
CHLORIDE SERPL-SCNC: 97 MMOL/L (ref 96–108)
CLARITY UR: CLEAR
CO2 SERPL-SCNC: 27 MMOL/L (ref 21–32)
COLOR UR: COLORLESS
CREAT SERPL-MCNC: 1.33 MG/DL (ref 0.6–1.3)
EOSINOPHIL # BLD AUTO: 0.21 THOUSAND/ÂΜL (ref 0–0.61)
EOSINOPHIL NFR BLD AUTO: 4 % (ref 0–6)
ERYTHROCYTE [DISTWIDTH] IN BLOOD BY AUTOMATED COUNT: 13.9 % (ref 11.6–15.1)
FLUAV AG UPPER RESP QL IA.RAPID: NEGATIVE
FLUBV AG UPPER RESP QL IA.RAPID: NEGATIVE
GFR SERPL CREATININE-BSD FRML MDRD: 35 ML/MIN/1.73SQ M
GLUCOSE SERPL-MCNC: 104 MG/DL (ref 65–140)
GLUCOSE UR STRIP-MCNC: NEGATIVE MG/DL
HCT VFR BLD AUTO: 33.7 % (ref 34.8–46.1)
HGB BLD-MCNC: 11.1 G/DL (ref 11.5–15.4)
HGB UR QL STRIP.AUTO: ABNORMAL
IMM GRANULOCYTES # BLD AUTO: 0.01 THOUSAND/UL (ref 0–0.2)
IMM GRANULOCYTES NFR BLD AUTO: 0 % (ref 0–2)
KETONES UR STRIP-MCNC: NEGATIVE MG/DL
LACTATE SERPL-SCNC: 1.2 MMOL/L (ref 0.5–2)
LEUKOCYTE ESTERASE UR QL STRIP: NEGATIVE
LYMPHOCYTES # BLD AUTO: 0.4 THOUSANDS/ÂΜL (ref 0.6–4.47)
LYMPHOCYTES NFR BLD AUTO: 7 % (ref 14–44)
MAGNESIUM SERPL-MCNC: 1.6 MG/DL (ref 1.9–2.7)
MCH RBC QN AUTO: 30.1 PG (ref 26.8–34.3)
MCHC RBC AUTO-ENTMCNC: 32.9 G/DL (ref 31.4–37.4)
MCV RBC AUTO: 91 FL (ref 82–98)
MONOCYTES # BLD AUTO: 1.03 THOUSAND/ÂΜL (ref 0.17–1.22)
MONOCYTES NFR BLD AUTO: 19 % (ref 4–12)
NEUTROPHILS # BLD AUTO: 3.74 THOUSANDS/ÂΜL (ref 1.85–7.62)
NEUTS SEG NFR BLD AUTO: 70 % (ref 43–75)
NITRITE UR QL STRIP: NEGATIVE
NON-SQ EPI CELLS URNS QL MICRO: ABNORMAL /HPF
NRBC BLD AUTO-RTO: 0 /100 WBCS
PH UR STRIP.AUTO: 6 [PH]
PLATELET # BLD AUTO: 143 THOUSANDS/UL (ref 149–390)
PMV BLD AUTO: 10.1 FL (ref 8.9–12.7)
POTASSIUM SERPL-SCNC: 3.6 MMOL/L (ref 3.5–5.3)
PROCALCITONIN SERPL-MCNC: 0.08 NG/ML
PROT SERPL-MCNC: 7.5 G/DL (ref 6.4–8.4)
PROT UR STRIP-MCNC: NEGATIVE MG/DL
RBC # BLD AUTO: 3.69 MILLION/UL (ref 3.81–5.12)
RBC #/AREA URNS AUTO: ABNORMAL /HPF
SARS-COV+SARS-COV-2 AG RESP QL IA.RAPID: POSITIVE
SODIUM SERPL-SCNC: 132 MMOL/L (ref 135–147)
SP GR UR STRIP.AUTO: 1.01 (ref 1–1.03)
T4 FREE SERPL-MCNC: 1.05 NG/DL (ref 0.61–1.12)
TSH SERPL DL<=0.05 MIU/L-ACNC: 0.23 UIU/ML (ref 0.45–4.5)
UROBILINOGEN UR STRIP-ACNC: <2 MG/DL
WBC # BLD AUTO: 5.4 THOUSAND/UL (ref 4.31–10.16)
WBC #/AREA URNS AUTO: ABNORMAL /HPF

## 2024-12-01 PROCEDURE — 83735 ASSAY OF MAGNESIUM: CPT | Performed by: EMERGENCY MEDICINE

## 2024-12-01 PROCEDURE — 87040 BLOOD CULTURE FOR BACTERIA: CPT | Performed by: EMERGENCY MEDICINE

## 2024-12-01 PROCEDURE — 84145 PROCALCITONIN (PCT): CPT | Performed by: EMERGENCY MEDICINE

## 2024-12-01 PROCEDURE — 36415 COLL VENOUS BLD VENIPUNCTURE: CPT | Performed by: EMERGENCY MEDICINE

## 2024-12-01 PROCEDURE — 83605 ASSAY OF LACTIC ACID: CPT | Performed by: EMERGENCY MEDICINE

## 2024-12-01 PROCEDURE — 84439 ASSAY OF FREE THYROXINE: CPT | Performed by: EMERGENCY MEDICINE

## 2024-12-01 PROCEDURE — 85025 COMPLETE CBC W/AUTO DIFF WBC: CPT | Performed by: EMERGENCY MEDICINE

## 2024-12-01 PROCEDURE — 81001 URINALYSIS AUTO W/SCOPE: CPT

## 2024-12-01 PROCEDURE — 96365 THER/PROPH/DIAG IV INF INIT: CPT

## 2024-12-01 PROCEDURE — 71046 X-RAY EXAM CHEST 2 VIEWS: CPT

## 2024-12-01 PROCEDURE — 94640 AIRWAY INHALATION TREATMENT: CPT

## 2024-12-01 PROCEDURE — 93005 ELECTROCARDIOGRAM TRACING: CPT

## 2024-12-01 PROCEDURE — 84484 ASSAY OF TROPONIN QUANT: CPT | Performed by: EMERGENCY MEDICINE

## 2024-12-01 PROCEDURE — 99285 EMERGENCY DEPT VISIT HI MDM: CPT | Performed by: EMERGENCY MEDICINE

## 2024-12-01 PROCEDURE — 99285 EMERGENCY DEPT VISIT HI MDM: CPT

## 2024-12-01 PROCEDURE — 96361 HYDRATE IV INFUSION ADD-ON: CPT

## 2024-12-01 PROCEDURE — 84443 ASSAY THYROID STIM HORMONE: CPT | Performed by: EMERGENCY MEDICINE

## 2024-12-01 PROCEDURE — 87811 SARS-COV-2 COVID19 W/OPTIC: CPT | Performed by: EMERGENCY MEDICINE

## 2024-12-01 PROCEDURE — 87804 INFLUENZA ASSAY W/OPTIC: CPT | Performed by: EMERGENCY MEDICINE

## 2024-12-01 PROCEDURE — 80053 COMPREHEN METABOLIC PANEL: CPT | Performed by: EMERGENCY MEDICINE

## 2024-12-01 PROCEDURE — 99223 1ST HOSP IP/OBS HIGH 75: CPT

## 2024-12-01 PROCEDURE — 84484 ASSAY OF TROPONIN QUANT: CPT

## 2024-12-01 RX ORDER — LISINOPRIL 5 MG/1
5 TABLET ORAL DAILY
Status: DISCONTINUED | OUTPATIENT
Start: 2024-12-02 | End: 2024-12-03 | Stop reason: HOSPADM

## 2024-12-01 RX ORDER — ATENOLOL 50 MG/1
50 TABLET ORAL 2 TIMES DAILY
Status: DISCONTINUED | OUTPATIENT
Start: 2024-12-01 | End: 2024-12-03 | Stop reason: HOSPADM

## 2024-12-01 RX ORDER — ATORVASTATIN CALCIUM 10 MG/1
10 TABLET, FILM COATED ORAL
Status: DISCONTINUED | OUTPATIENT
Start: 2024-12-02 | End: 2024-12-03 | Stop reason: HOSPADM

## 2024-12-01 RX ORDER — GUAIFENESIN/DEXTROMETHORPHAN 100-10MG/5
10 SYRUP ORAL EVERY 4 HOURS PRN
Status: DISCONTINUED | OUTPATIENT
Start: 2024-12-01 | End: 2024-12-03 | Stop reason: HOSPADM

## 2024-12-01 RX ORDER — PANTOPRAZOLE SODIUM 40 MG/1
40 TABLET, DELAYED RELEASE ORAL
Status: DISCONTINUED | OUTPATIENT
Start: 2024-12-02 | End: 2024-12-03 | Stop reason: HOSPADM

## 2024-12-01 RX ORDER — HEPARIN SODIUM 5000 [USP'U]/ML
5000 INJECTION, SOLUTION INTRAVENOUS; SUBCUTANEOUS EVERY 8 HOURS SCHEDULED
Status: DISCONTINUED | OUTPATIENT
Start: 2024-12-01 | End: 2024-12-03 | Stop reason: HOSPADM

## 2024-12-01 RX ORDER — MAGNESIUM SULFATE HEPTAHYDRATE 40 MG/ML
2 INJECTION, SOLUTION INTRAVENOUS ONCE
Status: COMPLETED | OUTPATIENT
Start: 2024-12-01 | End: 2024-12-01

## 2024-12-01 RX ORDER — LATANOPROST 50 UG/ML
1 SOLUTION/ DROPS OPHTHALMIC
Status: DISCONTINUED | OUTPATIENT
Start: 2024-12-01 | End: 2024-12-03 | Stop reason: HOSPADM

## 2024-12-01 RX ORDER — LEVOTHYROXINE SODIUM 50 UG/1
50 TABLET ORAL
Status: DISCONTINUED | OUTPATIENT
Start: 2024-12-02 | End: 2024-12-03 | Stop reason: HOSPADM

## 2024-12-01 RX ORDER — IPRATROPIUM BROMIDE AND ALBUTEROL SULFATE 2.5; .5 MG/3ML; MG/3ML
3 SOLUTION RESPIRATORY (INHALATION) ONCE
Status: COMPLETED | OUTPATIENT
Start: 2024-12-01 | End: 2024-12-01

## 2024-12-01 RX ORDER — SODIUM CHLORIDE 9 MG/ML
75 INJECTION, SOLUTION INTRAVENOUS ONCE
Status: COMPLETED | OUTPATIENT
Start: 2024-12-01 | End: 2024-12-01

## 2024-12-01 RX ORDER — IMIPRAMINE HYDROCHLORIDE 10 MG/1
10 TABLET, FILM COATED ORAL
Status: DISCONTINUED | OUTPATIENT
Start: 2024-12-01 | End: 2024-12-03 | Stop reason: HOSPADM

## 2024-12-01 RX ORDER — ACETAMINOPHEN 325 MG/1
650 TABLET ORAL EVERY 6 HOURS PRN
Status: DISCONTINUED | OUTPATIENT
Start: 2024-12-01 | End: 2024-12-03 | Stop reason: HOSPADM

## 2024-12-01 RX ADMIN — IPRATROPIUM BROMIDE AND ALBUTEROL SULFATE 3 ML: 2.5; .5 SOLUTION RESPIRATORY (INHALATION) at 17:34

## 2024-12-01 RX ADMIN — ATENOLOL 50 MG: 50 TABLET ORAL at 20:51

## 2024-12-01 RX ADMIN — GUAIFENESIN AND DEXTROMETHORPHAN 10 ML: 100; 10 SYRUP ORAL at 22:55

## 2024-12-01 RX ADMIN — MAGNESIUM SULFATE HEPTAHYDRATE 2 G: 40 INJECTION, SOLUTION INTRAVENOUS at 18:19

## 2024-12-01 RX ADMIN — IMIPRAMINE HYDROCHLORIDE 10 MG: 10 TABLET ORAL at 22:55

## 2024-12-01 RX ADMIN — HEPARIN SODIUM 5000 UNITS: 5000 INJECTION, SOLUTION INTRAVENOUS; SUBCUTANEOUS at 22:55

## 2024-12-01 RX ADMIN — SODIUM CHLORIDE 75 ML/HR: 0.9 INJECTION, SOLUTION INTRAVENOUS at 20:51

## 2024-12-01 RX ADMIN — LATANOPROST 1 DROP: 50 SOLUTION OPHTHALMIC at 22:55

## 2024-12-01 RX ADMIN — ACETAMINOPHEN 650 MG: 325 TABLET, FILM COATED ORAL at 20:51

## 2024-12-01 RX ADMIN — SODIUM CHLORIDE 1000 ML: 0.9 INJECTION, SOLUTION INTRAVENOUS at 17:34

## 2024-12-01 NOTE — ED PROVIDER NOTES
Time reflects when diagnosis was documented in both MDM as applicable and the Disposition within this note       Time User Action Codes Description Comment    12/1/2024  6:40 PM Steffany Davies Add [U07.1] Acute COVID-19     12/1/2024  6:40 PM Steffany Davies Add [E87.1] Hyponatremia     12/1/2024  6:40 PM Steffany Davies Add [R53.1] Weakness     12/1/2024  6:40 PM Steffany Davies Add [R26.2] Ambulatory dysfunction           ED Disposition       ED Disposition   Admit    Condition   Stable    Date/Time   Sun Dec 1, 2024  6:40 PM    Comment   Case was discussed with Dr. Haro and the patient's admission status was agreed to be Admission Status: observation status to the service of Dr. Haro .               Assessment & Plan       Medical Decision Making  89-year female is coming in with complaint of fever with bodyaches and fatigue along with cough for the past 2 days.  Has not been eating and drinking.  Patient lives at home with her daughter.  Normally ambulatory and does basic ADLs.  Patient tried getting out of bed twice today and was generally too weak to stand and get out of bed which is not her baseline.  Tried to get out of bed and her legs gave out and was weak and crumpled to the floor.  Did not hit her head or lose consciousness.  Reports no significant injuries from the fall.  She complains of just overall myalgias and arthralgias denies any specific chest pain or abdominal pain.  Complains of cough.  Took Tylenol few hours prior to arrival.    Will evaluate for fever and will rule out sepsis.  Will check viral swab for potential infectious etiology and get chest x-ray for pneumonia and UA for UTI.  Will get other basic labs for generalized weakness.  Also get blood cultures and lactate and procalcitonin.  Will give IV fluids and given patient not eating and general weakness versus baseline may need to be admitted for dehydration and inability to perform ADLs safely at home.    Amount and/or  "Complexity of Data Reviewed  Labs: ordered.  Radiology: ordered and independent interpretation performed.  ECG/medicine tests: ordered and independent interpretation performed.    Risk  Prescription drug management.  Decision regarding hospitalization.             Medications   magnesium sulfate 2 g/50 mL IVPB (premix) 2 g (2 g Intravenous New Bag 12/1/24 1819)   sodium chloride 0.9 % bolus 1,000 mL (1,000 mL Intravenous New Bag 12/1/24 1734)   ipratropium-albuterol (DUO-NEB) 0.5-2.5 mg/3 mL inhalation solution 3 mL (3 mL Nebulization Given 12/1/24 1734)       ED Risk Strat Scores                           SBIRT 20yo+      Flowsheet Row Most Recent Value   Initial Alcohol Screen: US AUDIT-C     1. How often do you have a drink containing alcohol? 0 Filed at: 12/01/2024 1646   2. How many drinks containing alcohol do you have on a typical day you are drinking?  0 Filed at: 12/01/2024 1646   3b. FEMALE Any Age, or MALE 65+: How often do you have 4 or more drinks on one occassion? 0 Filed at: 12/01/2024 1646   Audit-C Score 0 Filed at: 12/01/2024 1646   GILLES: How many times in the past year have you...    Used an illegal drug or used a prescription medication for non-medical reasons? Never Filed at: 12/01/2024 1646                            History of Present Illness       Chief Complaint   Patient presents with    Fever     C/o \"fever of 103F at home -took tylenol at 1430 PTA. +chills, coughs, headache. Sx started yesterday.     Fall     Pt reports \"being so weak that she slid out of bed twice today, landing on buttocks. -BT, -HS.\"       Past Medical History:   Diagnosis Date    Anxiety 1992    Arthritis 1991    Benign essential hypertension     Last assessed: 9/11/14    Disease of thyroid gland     Essential tremor     Fibromyalgia     GERD (gastroesophageal reflux disease)     History of nail disorders     Hyperlipidemia     Hypertension     Palpitations     Transient cerebral ischemia       Past Surgical History: "   Procedure Laterality Date    APPENDECTOMY      EGD AND COLONOSCOPY  2020    HYSTERECTOMY  2010    KY LAPS SURG CHOLECYSTECTOMY W/CHOLANGIOGRAPHY N/A 2018    Procedure: LAPAROSCOPIC CHOLECYSTECTOMY WITH IOC;  Surgeon: Yakov Mueller MD;  Location: [facility];  Service: General    TUBAL LIGATION        Family History   Problem Relation Age of Onset    Stroke Mother         ischemic stroke    Hypertension Mother     Heart disease Father     Cancer Sister     No Known Problems Sister     No Known Problems Daughter     No Known Problems Daughter     No Known Problems Daughter     No Known Problems Daughter     No Known Problems Daughter     No Known Problems Maternal Grandmother     No Known Problems Paternal Grandmother     No Known Problems Paternal Aunt     No Known Problems Paternal Aunt     No Known Problems Paternal Aunt     No Known Problems Paternal Aunt     No Known Problems Paternal Aunt     Breast cancer Neg Hx       Social History     Tobacco Use    Smoking status: Former     Current packs/day: 0.00     Average packs/day: 0.1 packs/day for 50.0 years (5.0 ttl pk-yrs)     Types: Cigarettes     Start date: 1963     Quit date:      Years since quittin.9    Smokeless tobacco: Never    Tobacco comments:     1 pack a week    Vaping Use    Vaping status: Never Used   Substance Use Topics    Alcohol use: Not Currently     Comment: 0    Drug use: No      E-Cigarette/Vaping    E-Cigarette Use Never User       E-Cigarette/Vaping Substances    Nicotine No     THC No     CBD No     Flavoring No     Other No     Unknown No       I have reviewed and agree with the history as documented.       History provided by:  Patient  Fever  Location:  [address]  Quality:  Aching/weak all over with cough  Severity:  Moderate  Onset quality:  Gradual  Duration:  2 days  Timing:  Constant  Progression:  Worsening  Chronicity:  New  Context:  Pt lives at home with daughter, has been weak and fatigued and has  been having cough for the past 2 days. Twice she went to try to get out of bed and went to night stand  and rolled out of bed and legs were weak and crumped to the ground. No injury from falls. Just too weak to get out bed.  Relieved by:  Nothing  Worsened by:  Nothing  Ineffective treatments:  Took tylenol at 1p  Associated symptoms: cough, fatigue and fever    Associated symptoms: no abdominal pain and no chest pain    Fall  Associated symptoms: no abdominal pain and no chest pain        Review of Systems   Constitutional:  Positive for fatigue and fever.   Respiratory:  Positive for cough.    Cardiovascular:  Negative for chest pain.   Gastrointestinal:  Negative for abdominal pain.   All other systems reviewed and are negative.          Objective       ED Triage Vitals [12/01/24 1642]   Temperature Pulse Blood Pressure Respirations SpO2 Patient Position - Orthostatic VS   100 °F (37.8 °C) 83 (!) 176/74 16 96 % Sitting      Temp Source Heart Rate Source BP Location FiO2 (%) Pain Score    Oral Monitor Left arm -- --      Vitals      Date and Time Temp Pulse SpO2 Resp BP Pain Score FACES Pain Rating User   12/01/24 1800 -- 93 95 % 16 143/63 -- -- ASM   12/01/24 1642 100 °F (37.8 °C) 83 96 % 16 176/74 -- -- MS            Physical Exam  Vitals and nursing note reviewed.   Constitutional:       General: She is not in acute distress.     Appearance: She is well-developed. She is ill-appearing. She is not toxic-appearing or diaphoretic.   HENT:      Head: Normocephalic and atraumatic.      Nose: Nose normal.      Mouth/Throat:      Mouth: Mucous membranes are dry.   Eyes:      Extraocular Movements: Extraocular movements intact.      Conjunctiva/sclera: Conjunctivae normal.   Cardiovascular:      Rate and Rhythm: Normal rate and regular rhythm.      Heart sounds: Normal heart sounds.   Pulmonary:      Effort: Pulmonary effort is normal. No respiratory distress.      Breath sounds: Normal breath sounds. No wheezing.    Abdominal:      General: There is no distension.      Palpations: Abdomen is soft.      Tenderness: There is no abdominal tenderness.   Musculoskeletal:         General: No deformity. Normal range of motion.      Cervical back: Neck supple.   Skin:     General: Skin is warm and dry.      Findings: No rash.   Neurological:      General: No focal deficit present.      Mental Status: She is alert and oriented to person, place, and time.      Cranial Nerves: No cranial nerve deficit.   Psychiatric:         Mood and Affect: Mood normal.         Results Reviewed       Procedure Component Value Units Date/Time    Lactic acid, plasma (w/reflex if result > 2.0) [[id number]]  (Normal) Collected: 12/01/24 1738    Lab Status: Final result Specimen: Blood from Arm, Right Updated: 12/01/24 1831     LACTIC ACID 1.2 mmol/L     Narrative:      Result may be elevated if tourniquet was used during collection.    TSH, 3rd generation with Free T4 reflex [[id number]]  (Abnormal) Collected: 12/01/24 1738    Lab Status: Final result Specimen: Blood from Arm, Right Updated: 12/01/24 1825     TSH 3RD GENERATON 0.229 uIU/mL     T4, free [[id number]] Collected: 12/01/24 1738    Lab Status: In process Specimen: Blood from Arm, Right Updated: 12/01/24 1825    Procalcitonin [[id number]]  (Normal) Collected: 12/01/24 1738    Lab Status: Final result Specimen: Blood from Arm, Right Updated: 12/01/24 1818     Procalcitonin 0.08 ng/ml     HS Troponin I 2hr [[id number]]     Lab Status: No result Specimen: Blood     HS Troponin 0hr (reflex protocol) [[id number]]  (Normal) Collected: 12/01/24 1738    Lab Status: Final result Specimen: Blood from Arm, Right Updated: 12/01/24 1813     hs TnI 0hr 11 ng/L     Comprehensive metabolic panel [[id number]]  (Abnormal) Collected: 12/01/24 1738    Lab Status: Final result Specimen: Blood from Arm, Right Updated: 12/01/24 1807     Sodium 132 mmol/L      Potassium 3.6 mmol/L      Chloride 97 mmol/L      CO2 27 mmol/L       ANION GAP 8 mmol/L      BUN 11 mg/dL      Creatinine 1.33 mg/dL      Glucose 104 mg/dL      Calcium 9.6 mg/dL      AST 24 U/L      ALT 11 U/L      Alkaline Phosphatase 80 U/L      Total Protein 7.5 g/dL      Albumin 4.4 g/dL      Total Bilirubin 0.45 mg/dL      eGFR 35 ml/min/1.73sq m     Narrative:      National Kidney Disease Foundation guidelines for Chronic Kidney Disease (CKD):     Stage 1 with normal or high GFR (GFR > 90 mL/min/1.73 square meters)    Stage 2 Mild CKD (GFR = 60-89 mL/min/1.73 square meters)    Stage 3A Moderate CKD (GFR = 45-59 mL/min/1.73 square meters)    Stage 3B Moderate CKD (GFR = 30-44 mL/min/1.73 square meters)    Stage 4 Severe CKD (GFR = 15-29 mL/min/1.73 square meters)    Stage 5 End Stage CKD (GFR <15 mL/min/1.73 square meters)  Note: GFR calculation is accurate only with a steady state creatinine    Magnesium [[id number]]  (Abnormal) Collected: 12/01/24 1738    Lab Status: Final result Specimen: Blood from Arm, Right Updated: 12/01/24 1807     Magnesium 1.6 mg/dL     CBC and differential [[id number]]  (Abnormal) Collected: 12/01/24 1738    Lab Status: Final result Specimen: Blood from Arm, Right Updated: 12/01/24 1749     WBC 5.40 Thousand/uL      RBC 3.69 Million/uL      Hemoglobin 11.1 g/dL      Hematocrit 33.7 %      MCV 91 fL      MCH 30.1 pg      MCHC 32.9 g/dL      RDW 13.9 %      MPV 10.1 fL      Platelets 143 Thousands/uL      nRBC 0 /100 WBCs      Segmented % 70 %      Immature Grans % 0 %      Lymphocytes % 7 %      Monocytes % 19 %      Eosinophils Relative 4 %      Basophils Relative 0 %      Absolute Neutrophils 3.74 Thousands/µL      Absolute Immature Grans 0.01 Thousand/uL      Absolute Lymphocytes 0.40 Thousands/µL      Absolute Monocytes 1.03 Thousand/µL      Eosinophils Absolute 0.21 Thousand/µL      Basophils Absolute 0.01 Thousands/µL     Blood culture #1 [[id number]] Collected: 12/01/24 1728    Lab Status: In process Specimen: Blood from Arm, Left Updated:  12/01/24 1747    Blood culture #2 [[id number]] Collected: 12/01/24 1738    Lab Status: In process Specimen: Blood from Arm, Right Updated: 12/01/24 1747    FLU/COVID Rapid Antigen (30 min. TAT) - Preferred screening test in ED [[id number]]  (Abnormal) Collected: 12/01/24 1713    Lab Status: Final result Specimen: Nares from Nose Updated: 12/01/24 1736     SARS COV Rapid Antigen Positive     Influenza A Rapid Antigen Negative     Influenza B Rapid Antigen Negative    Narrative:      This test has been performed using the imoji Yarely 2 FLU+SARS Antigen test under the Emergency Use Authorization (EUA). This test has been validated by the  and verified by the performing laboratory. The Yarely uses lateral flow immunofluorescent sandwich assay to detect SARS-COV, Influenza A and Influenza B Antigen.     The Quidel Yarely 2 SARS Antigen test does not differentiate between SARS-CoV and SARS-CoV-2.     Negative results are presumptive and may be confirmed with a molecular assay, if necessary, for patient management. Negative results do not rule out SARS-CoV-2 or influenza infection and should not be used as the sole basis for treatment or patient management decisions. A negative test result may occur if the level of antigen in a sample is below the limit of detection of this test.     Positive results are indicative of the presence of viral antigens, but do not rule out bacterial infection or co-infection with other viruses.     All test results should be used as an adjunct to clinical observations and other information available to the provider.    FOR PEDIATRIC PATIENTS - copy/paste COVID Guidelines URL to browser: [email or web address]    UA w Reflex to Microscopic w Reflex to Culture [[id number]]     Lab Status: No result Specimen: Urine, Clean Catch             XR chest 2 views   ED Interpretation by Steffany Davies MD (12/01 1736)   NAD          ECG 12  Lead Documentation Only    Date/Time: 12/1/2024 5:08 PM    Performed by: Steffany Davies MD  Authorized by: Steffany Davies MD    Indications / Diagnosis:  Weakness  ECG reviewed by me, the ED Provider: yes    Patient location:  ED  Rate:     ECG rate:  84    ECG rate assessment: normal    Rhythm:     Rhythm: sinus rhythm    T waves:     T waves: non-specific        ED Medication and Procedure Management   Prior to Admission Medications   Prescriptions Last Dose Informant Patient Reported? Taking?   ALPRAZolam (XANAX) 0.5 mg tablet   No No   Sig: TAKE 1 TABLET(0.5 MG) BY MOUTH FOUR TIMES DAILY AS NEEDED FOR ANXIETY   Diclofenac Sodium (VOLTAREN) 1 %  Self No No   Sig: Apply 2 g topically 4 (four) times a day   Probiotic Product (ALIGN) 4 MG CAPS  Self Yes No   Sig: Take 1 tablet by mouth daily   acetaminophen (TYLENOL) 325 mg tablet  Self Yes No   Sig: Take 1-2 tablets by mouth every 6 (six) hours as needed   albuterol (PROVENTIL HFA,VENTOLIN HFA) 90 mcg/act inhaler  Self Yes No   atenolol (TENORMIN) 50 mg tablet  Self No No   Sig: Take 1 tablet (50 mg total) by mouth 2 (two) times a day   hydrOXYzine HCL (ATARAX) 25 mg tablet   No No   Sig: Take 1 tablet (25 mg total) by mouth every 6 (six) hours as needed for itching   imipramine (TOFRANIL) 10 mg tablet  Self No No   Sig: Take 1 tablet by mouth at bedtime   latanoprost (XALATAN) 0.005 % ophthalmic solution  Self No No   Sig: Administer 1 drop to both eyes daily at bedtime   levothyroxine 50 mcg tablet   No No   Sig: take 1 tablet by mouth once daily except Sunday take 2 tabs   lisinopril (ZESTRIL) 5 mg tablet   No No   Sig: TAKE 1 TABLET(5 MG) BY MOUTH DAILY   ondansetron (ZOFRAN-ODT) 4 mg disintegrating tablet  Self Yes No   pantoprazole (PROTONIX) 40 mg tablet  Self No No   Sig: TAKE 1 TABLET(40 MG) BY MOUTH DAILY BEFORE BREAKFAST   risedronate (ACTONEL) 35 mg tablet  Self No No   Sig: Take 1 tablet (35 mg total) by mouth every 7 days with water on  empty stomach, nothing by mouth or lie down for next 30 minutes.   Patient not taking: Reported on 4/9/2024   rosuvastatin (CRESTOR) 5 mg tablet  Self No No   Sig: Take 1 tablet (5 mg total) by mouth daily   triamcinolone (KENALOG) 0.5 % cream   No No   Sig: Apply topically 3 (three) times a day      Facility-Administered Medications: None     Patient's Medications   Discharge Prescriptions    No medications on file     No discharge procedures on file.  ED SEPSIS DOCUMENTATION   Time reflects when diagnosis was documented in both MDM as applicable and the Disposition within this note       Time User Action Codes Description Comment    12/1/2024  6:40 PM Steffany Davies [U07.1] Acute COVID-19     12/1/2024  6:40 PM Steffany Davies [E87.1] Hyponatremia     12/1/2024  6:40 PM Steffany Davies [R53.1] Weakness     12/1/2024  6:40 PM Steffany Davies [R26.2] Ambulatory dysfunction                  Steffany Davies MD  12/01/24 1908

## 2024-12-01 NOTE — LETTER
To whom it May concern,        12/3/2024      Lakeisha Langston is under my professional care.  She has had significant decline in functional status and requires total assist with ADLs.  She is currently hospitalized with COVID-19 after sustaining a fall.  Her daughter, Lew Curry, has taken leave of absence from work in order to provide care for Lakeisha within her home.  Please contact me if I can be of any further assistance in this matter.             Sincerely,          Ezekiel Vargas MD

## 2024-12-02 ENCOUNTER — APPOINTMENT (OUTPATIENT)
Dept: CT IMAGING | Facility: HOSPITAL | Age: 89
DRG: 178 | End: 2024-12-02
Payer: MEDICARE

## 2024-12-02 LAB
ALBUMIN SERPL BCG-MCNC: 3.8 G/DL (ref 3.5–5)
ALP SERPL-CCNC: 67 U/L (ref 34–104)
ALT SERPL W P-5'-P-CCNC: 11 U/L (ref 7–52)
ANION GAP SERPL CALCULATED.3IONS-SCNC: 7 MMOL/L (ref 4–13)
AST SERPL W P-5'-P-CCNC: 25 U/L (ref 13–39)
ATRIAL RATE: 84 BPM
BILIRUB SERPL-MCNC: 0.38 MG/DL (ref 0.2–1)
BUN SERPL-MCNC: 11 MG/DL (ref 5–25)
CALCIUM SERPL-MCNC: 8.6 MG/DL (ref 8.4–10.2)
CHLORIDE SERPL-SCNC: 102 MMOL/L (ref 96–108)
CO2 SERPL-SCNC: 25 MMOL/L (ref 21–32)
CREAT SERPL-MCNC: 1.28 MG/DL (ref 0.6–1.3)
ERYTHROCYTE [DISTWIDTH] IN BLOOD BY AUTOMATED COUNT: 13.9 % (ref 11.6–15.1)
GFR SERPL CREATININE-BSD FRML MDRD: 37 ML/MIN/1.73SQ M
GLUCOSE SERPL-MCNC: 99 MG/DL (ref 65–140)
HCT VFR BLD AUTO: 31.6 % (ref 34.8–46.1)
HGB BLD-MCNC: 10.3 G/DL (ref 11.5–15.4)
MAGNESIUM SERPL-MCNC: 2 MG/DL (ref 1.9–2.7)
MCH RBC QN AUTO: 29.9 PG (ref 26.8–34.3)
MCHC RBC AUTO-ENTMCNC: 32.6 G/DL (ref 31.4–37.4)
MCV RBC AUTO: 92 FL (ref 82–98)
P AXIS: 30 DEGREES
PHOSPHATE SERPL-MCNC: 3.8 MG/DL (ref 2.3–4.1)
PLATELET # BLD AUTO: 122 THOUSANDS/UL (ref 149–390)
PMV BLD AUTO: 9.9 FL (ref 8.9–12.7)
POTASSIUM SERPL-SCNC: 3.7 MMOL/L (ref 3.5–5.3)
PR INTERVAL: 144 MS
PROCALCITONIN SERPL-MCNC: 0.07 NG/ML
PROT SERPL-MCNC: 6.4 G/DL (ref 6.4–8.4)
QRS AXIS: -17 DEGREES
QRSD INTERVAL: 80 MS
QT INTERVAL: 390 MS
QTC INTERVAL: 460 MS
RBC # BLD AUTO: 3.44 MILLION/UL (ref 3.81–5.12)
SODIUM SERPL-SCNC: 134 MMOL/L (ref 135–147)
T WAVE AXIS: 11 DEGREES
VENTRICULAR RATE: 84 BPM
WBC # BLD AUTO: 4.2 THOUSAND/UL (ref 4.31–10.16)

## 2024-12-02 PROCEDURE — 80053 COMPREHEN METABOLIC PANEL: CPT

## 2024-12-02 PROCEDURE — 84145 PROCALCITONIN (PCT): CPT

## 2024-12-02 PROCEDURE — 99232 SBSQ HOSP IP/OBS MODERATE 35: CPT | Performed by: INTERNAL MEDICINE

## 2024-12-02 PROCEDURE — 85027 COMPLETE CBC AUTOMATED: CPT

## 2024-12-02 PROCEDURE — 93010 ELECTROCARDIOGRAM REPORT: CPT | Performed by: INTERNAL MEDICINE

## 2024-12-02 PROCEDURE — 70450 CT HEAD/BRAIN W/O DYE: CPT

## 2024-12-02 PROCEDURE — NC001 PR NO CHARGE: Performed by: PHYSICIAN ASSISTANT

## 2024-12-02 PROCEDURE — 97163 PT EVAL HIGH COMPLEX 45 MIN: CPT

## 2024-12-02 PROCEDURE — 72125 CT NECK SPINE W/O DYE: CPT

## 2024-12-02 PROCEDURE — 97167 OT EVAL HIGH COMPLEX 60 MIN: CPT

## 2024-12-02 PROCEDURE — 83735 ASSAY OF MAGNESIUM: CPT

## 2024-12-02 PROCEDURE — 84100 ASSAY OF PHOSPHORUS: CPT

## 2024-12-02 RX ORDER — SACCHAROMYCES BOULARDII 250 MG
250 CAPSULE ORAL 2 TIMES DAILY
Status: DISCONTINUED | OUTPATIENT
Start: 2024-12-02 | End: 2024-12-03 | Stop reason: HOSPADM

## 2024-12-02 RX ORDER — SODIUM CHLORIDE 9 MG/ML
125 INJECTION, SOLUTION INTRAVENOUS CONTINUOUS
Status: DISCONTINUED | OUTPATIENT
Start: 2024-12-02 | End: 2024-12-03 | Stop reason: HOSPADM

## 2024-12-02 RX ORDER — ALPRAZOLAM 0.5 MG
0.5 TABLET ORAL 4 TIMES DAILY PRN
Status: DISCONTINUED | OUTPATIENT
Start: 2024-12-02 | End: 2024-12-03 | Stop reason: HOSPADM

## 2024-12-02 RX ADMIN — IMIPRAMINE HYDROCHLORIDE 10 MG: 10 TABLET ORAL at 23:22

## 2024-12-02 RX ADMIN — GUAIFENESIN AND DEXTROMETHORPHAN 10 ML: 100; 10 SYRUP ORAL at 08:40

## 2024-12-02 RX ADMIN — Medication 250 MG: at 17:38

## 2024-12-02 RX ADMIN — HEPARIN SODIUM 5000 UNITS: 5000 INJECTION, SOLUTION INTRAVENOUS; SUBCUTANEOUS at 08:37

## 2024-12-02 RX ADMIN — HEPARIN SODIUM 5000 UNITS: 5000 INJECTION, SOLUTION INTRAVENOUS; SUBCUTANEOUS at 23:22

## 2024-12-02 RX ADMIN — ATENOLOL 50 MG: 50 TABLET ORAL at 17:38

## 2024-12-02 RX ADMIN — LATANOPROST 1 DROP: 50 SOLUTION OPHTHALMIC at 23:22

## 2024-12-02 RX ADMIN — LISINOPRIL 5 MG: 5 TABLET ORAL at 08:37

## 2024-12-02 RX ADMIN — LEVOTHYROXINE SODIUM 50 MCG: 0.05 TABLET ORAL at 08:37

## 2024-12-02 RX ADMIN — ACETAMINOPHEN 650 MG: 325 TABLET, FILM COATED ORAL at 13:17

## 2024-12-02 RX ADMIN — ATORVASTATIN CALCIUM 10 MG: 10 TABLET, FILM COATED ORAL at 17:38

## 2024-12-02 RX ADMIN — Medication 250 MG: at 08:37

## 2024-12-02 RX ADMIN — PANTOPRAZOLE SODIUM 40 MG: 40 TABLET, DELAYED RELEASE ORAL at 08:37

## 2024-12-02 RX ADMIN — HEPARIN SODIUM 5000 UNITS: 5000 INJECTION, SOLUTION INTRAVENOUS; SUBCUTANEOUS at 13:17

## 2024-12-02 RX ADMIN — SODIUM CHLORIDE 125 ML/HR: 0.9 INJECTION, SOLUTION INTRAVENOUS at 08:38

## 2024-12-02 RX ADMIN — ATENOLOL 50 MG: 50 TABLET ORAL at 08:37

## 2024-12-02 RX ADMIN — ALPRAZOLAM 0.5 MG: 0.5 TABLET ORAL at 09:51

## 2024-12-02 NOTE — CASE MANAGEMENT
Case Management Assessment & Discharge Planning Note    Patient name Lakeisha Langston  Location /-01 MRN [id number]  : 1935 Date 2024       Current Admission Date: 2024  Current Admission Diagnosis:Ambulatory dysfunction   Patient Active Problem List    Diagnosis Date Noted Date Diagnosed    COVID-19 2024     Weakness 2024     Ambulatory dysfunction 2024     Left-sided back pain 08/15/2024     Depression, recurrent (HCC) 2023     Stage 3b chronic kidney disease (HCC) 02/10/2022     Chronic diastolic CHF (congestive heart failure) ([facility]) 2022     Left knee injury 2021     Ankle swelling 2021     B12 deficiency 2021     Neuropathy 2021     Syncope 2021     Hiatal hernia 2020     Eustachian tube dysfunction, bilateral 2020     Tension-type headache, not intractable 2020     Closed fracture of tenth thoracic vertebra with routine healing 10/16/2019     Fall as cause of accidental injury in home as place of occurrence 10/16/2019     Age-related osteoporosis without current pathological fracture 2019     Mild intermittent asthma without complication 2019     Impaired fasting glucose 2018     Venous insufficiency 2018     AVM (arteriovenous malformation) 2018     Chest heaviness 2018     Anxiety 2018     Abnormal EKG 2018     Iron deficiency anemia 2016     Atrophic vaginitis 2016     GERD without esophagitis 2014     Vitamin D deficiency 2014     Irritable bowel syndrome 2014     Hypothyroidism 2014     Hypertension 2014     Hyperlipidemia 2014     Benign familial tremor 2014     Dyslipidemia, goal to be determined 2007     Generalized anxiety disorder 2007     Osteoporosis 2007       LOS (days): 0  Geometric Mean LOS (GMLOS) (days):   Days to GMLOS:     OBJECTIVE:              Current admission  status: Observation       Preferred Pharmacy:   [id number] [place] [facility] [address]  [facility] [address]  Phone: [phone] Fax: [phone]    Primary Care Provider: Ezekiel Vargas MD    Primary Insurance: MEDICARE  Secondary Insurance: AARP    ASSESSMENT:  Active Health Care Proxies    There are no active Health Care Proxies on file.       Advance Directives  Does patient have a Health Care POA?: Yes  Does patient have Advance Directives?: Yes  Advance Directives: Power of  for health care  Primary Contact: Katerine Curry (Daughter)  [phone]         Readmission Root Cause  30 Day Readmission: No    Patient Information  Admitted from:: Home  Mental Status: Alert  During Assessment patient was accompanied by: Daughter  Assessment information provided by:: Daughter  Primary Caregiver: Family  Caregiver's Name:: Katerine  Caregiver's Relationship to Patient:: Family Member  Caregiver's Telephone Number:: [phone]  Support Systems: Self, Daughter  County of Residence: [place]  What city do you live in?: [place]  Home entry access options. Select all that apply.: Stairs  Number of steps to enter home.: 3  Do the steps have railings?: Yes  Type of Current Residence: [facility]  Living Arrangements: Lives w/ Daughter  Is patient a ?: No    Activities of Daily Living Prior to Admission  Functional Status: Assistance  Completes ADLs independently?: No  Level of ADL dependence: Assistance  Ambulates independently?: No  Level of ambulatory dependence: Assistance  Does patient use assisted devices?: Yes  Assisted Devices (DME) used: Walker, Straight Cane, Shower Chair  Does patient currently own DME?: Yes  What DME does the patient currently own?: Shower Chair, Walker, Straight Cane  Does patient have a history of Outpatient Therapy (PT/OT)?: No  Does the patient have a history of Short-Term Rehab?: No  Does patient have a history of HHC?: No  Does  patient currently have HHC?: No         Patient Information Continued  Income Source: SSI/SSD  Does patient have prescription coverage?: Yes  Does patient receive dialysis treatments?: No  Does patient have a history of substance abuse?: No  Does patient have a history of Mental Health Diagnosis?: No         Means of Transportation  Means of Transport to Appts:: Family transport          DISCHARGE DETAILS:    Discharge planning discussed with:: Pt Reta Garcias  Freedom of Choice: Yes  Comments - Freedom of Choice: CM spoke with pt stanleyr Katerine and introduced self/role. FOC discussed at length. As per pt dtr, she wants pt to return home with homecare. Pt has an AMPAC of 8, PT/OT consulted. Brady referral made for STR and HHC. CM continue to follow.     Were Treatment Team discharge recommendations reviewed with patient/caregiver?: Yes  Did patient/caregiver verbalize understanding of patient care needs?: Yes  Were patient/caregiver advised of the risks associated with not following Treatment Team discharge recommendations?: Yes    Contacts  Patient Contacts: Katerine (Dtr)  Relationship to Patient:: Family  Contact Method: Phone  Phone Number: [phone]  Reason/Outcome: Continuity of Care, Emergency Contact, Discharge Planning    Requested Home Health Care         Is the patient interested in HHC at discharge?: No    DME Referral Provided  Referral made for DME?: No    Other Referral/Resources/Interventions Provided:  Interventions: HHC, Prescription Price Check    Would you like to participate in our [facility] service program?  : No - Declined    Treatment Team Recommendation: Short Term Rehab  Discharge Destination Plan:: Home with Home Health Care, Short Term Rehab  [facility]

## 2024-12-02 NOTE — H&P
H&P - Hospitalist   Name: Lakeisha Langston 89 y.o. female I MRN: [id number]  Unit/Bed#: -01 I Date of Admission: 12/1/2024   Date of Service: 12/1/2024 I Hospital Day: 0     Assessment & Plan  Ambulatory dysfunction  Presents to ED c/o weakness, fatigue and fall out of bed x 2.  Denies head strike or loss of consciousness  Also reports poor p.o. intake x 1 day and productive cough x 5 days. No CP or SOB  COVID-19 positive in ED  Labs revealing mild hyponatremia with Na 132 and hypomagnesemia with magnesium 1.6.  Repleted in ED, will re-check with a.m. labs  Currently SpO2: 98 % on  room air  Remainder VSS, afebrile with no leukocytosis, procalcitonin negative  Received 1 L NS bolus in ED.  Will initiate 75 cc/h IVF  Tylenol for fever/headache, Robitussin for cough  PT/OT consulted, fall precautions in place  COVID-19  See plan as above  Anxiety  Continue PTA imipramine  Iron deficiency anemia  Hgb 11.1 POA, within baseline  Continue to monitor with daily CBC  Transfuse for Hgb less than 7  Hypothyroidism  TSH 0.229 POA  Recommend patient follow-up with PCP for dose adjustment of Synthroid due to low TSH  Continue PTA Synthroid  Hypertension  BP stable since admission, continue to monitor  Continue PTA atenolol and lisinopril  Hyperlipidemia  Continue PTA statin  Chronic diastolic CHF (congestive heart failure) (HCC)  Euvolemic on exam  Most recent echo April 2024 revealing LVEF 60% with mild diastolic dysfunction  Follows with cardiology as outpatient  Monitor I's and O's and daily weights  Will proceed with gentle IVF hydration to avoid hypervolemia  Wt Readings from Last 3 Encounters:   10/07/24 49.4 kg (109 lb)   08/26/24 45 kg (99 lb 3.3 oz)   08/15/24 49.4 kg (109 lb)     Stage 3b chronic kidney disease (HCC)  CR 1.33 POA, within patient baseline  Avoid nephrotoxic agents, monitor I's and O's  Lab Results   Component Value Date    EGFR 35 12/01/2024    EGFR 31 10/07/2024    EGFR 36 08/26/2024    CREATININE  1.33 (H) 12/01/2024    CREATININE 1.49 (H) 10/07/2024    CREATININE 1.33 (H) 08/26/2024         VTE Pharmacologic Prophylaxis: VTE Score: 6 High Risk (Score >/= 5) - Pharmacological DVT Prophylaxis Ordered: heparin. Sequential Compression Devices Ordered.  Code Status: Level 1 - Full Code   Discussion with family: Updated  (daughter) at bedside.    Anticipated Length of Stay: Patient will be admitted on an observation basis with an anticipated length of stay of less than 2 midnights secondary to ambulatory dysfunction due to COVID-19.    History of Present Illness   Chief Complaint: Weakness    Lakeisha Langston is a 89 y.o. female with a PMH of HTN, anxiety, LARRY, hypothyroidism, HLD, CHF who presents with weakness.  History obtained from patient and daughter at bedside.  Daughter reports patient called her today because she slipped out of bed and could not get herself back up twice today.  States patient normally ambulates on her own throughout the house without use of walker or cane.  Patient denies head strike or loss of consciousness.  She also reports a productive cough since Wednesday.  Admits to fever today with Tmax 103F, states she took Tylenol around 2:30 PM.  Denies any known sick contacts.  Reports headache and weakness.  States she has had poor appetite with poor p.o. intake for past day.  Denies chest pain, shortness of breath, abdominal pain, nausea, vomiting, dysuria, increased frequency or urgency.    Review of Systems   Constitutional:  Positive for fatigue and fever. Negative for chills.   HENT:  Negative for congestion, sinus pressure, sneezing and sore throat.    Respiratory:  Positive for cough. Negative for chest tightness and shortness of breath.    Cardiovascular:  Negative for chest pain.   Gastrointestinal:  Negative for abdominal pain, constipation, diarrhea, nausea and vomiting.   Genitourinary:  Negative for dysuria, frequency and urgency.   Neurological:  Positive for  headaches. Negative for dizziness, syncope and light-headedness.      Historical Information   Past Medical History:   Diagnosis Date    Anxiety     Arthritis     Benign essential hypertension     Last assessed: 14    Disease of thyroid gland     Essential tremor     Fibromyalgia     GERD (gastroesophageal reflux disease)     History of nail disorders     Hyperlipidemia     Hypertension     Palpitations     Transient cerebral ischemia      Past Surgical History:   Procedure Laterality Date    APPENDECTOMY      EGD AND COLONOSCOPY  2020    HYSTERECTOMY  2010    VT LAPS SURG CHOLECYSTECTOMY W/CHOLANGIOGRAPHY N/A 2018    Procedure: LAPAROSCOPIC CHOLECYSTECTOMY WITH IOC;  Surgeon: Yakov Mueller MD;  Location: [facility];  Service: General    TUBAL LIGATION       Social History     Tobacco Use    Smoking status: Former     Current packs/day: 0.00     Average packs/day: 0.1 packs/day for 50.0 years (5.0 ttl pk-yrs)     Types: Cigarettes     Start date: 1963     Quit date:      Years since quittin.9    Smokeless tobacco: Never    Tobacco comments:     1 pack a week    Vaping Use    Vaping status: Never Used   Substance and Sexual Activity    Alcohol use: Not Currently     Comment: 0    Drug use: No    Sexual activity: Not Currently     Partners: Male     E-Cigarette/Vaping    E-Cigarette Use Never User      E-Cigarette/Vaping Substances    Nicotine No     THC No     CBD No     Flavoring No     Other No     Unknown No      Family History   Problem Relation Age of Onset    Stroke Mother         ischemic stroke    Hypertension Mother     Heart disease Father     Cancer Sister     No Known Problems Sister     No Known Problems Daughter     No Known Problems Daughter     No Known Problems Daughter     No Known Problems Daughter     No Known Problems Daughter     No Known Problems Maternal Grandmother     No Known Problems Paternal Grandmother     No Known Problems Paternal Aunt     No  Known Problems Paternal Aunt     No Known Problems Paternal Aunt     No Known Problems Paternal Aunt     No Known Problems Paternal Aunt     Breast cancer Neg Hx      Social History:  Marital Status:    Occupation:   Patient Pre-hospital Living Situation: Home  Patient Pre-hospital Level of Mobility: walks  Patient Pre-hospital Diet Restrictions:     Meds/Allergies   I have reviewed home medications with patient personally.  Prior to Admission medications    Medication Sig Start Date End Date Taking? Authorizing Provider   acetaminophen (TYLENOL) 325 mg tablet Take 1-2 tablets by mouth every 6 (six) hours as needed    Historical Provider, MD   albuterol (PROVENTIL HFA,VENTOLIN HFA) 90 mcg/act inhaler  9/21/23   Historical Provider, MD   ALPRAZolam (XANAX) 0.5 mg tablet TAKE 1 TABLET(0.5 MG) BY MOUTH FOUR TIMES DAILY AS NEEDED FOR ANXIETY 11/1/24   Ezekiel Vargas MD   atenolol (TENORMIN) 50 mg tablet Take 1 tablet (50 mg total) by mouth 2 (two) times a day 12/19/23   Ezekiel Vargsa MD   Diclofenac Sodium (VOLTAREN) 1 % Apply 2 g topically 4 (four) times a day 3/26/24   Trista Goyal PA-C   hydrOXYzine HCL (ATARAX) 25 mg tablet Take 1 tablet (25 mg total) by mouth every 6 (six) hours as needed for itching 10/7/24   Cely Avila DO   imipramine (TOFRANIL) 10 mg tablet Take 1 tablet by mouth at bedtime 3/12/24   Trista Goyal PA-C   latanoprost (XALATAN) 0.005 % ophthalmic solution Administer 1 drop to both eyes daily at bedtime 9/27/23   AMIE Malin   levothyroxine 50 mcg tablet take 1 tablet by mouth once daily except Sunday take 2 tabs 6/18/24   Ezekiel Vargas MD   lisinopril (ZESTRIL) 5 mg tablet TAKE 1 TABLET(5 MG) BY MOUTH DAILY 5/18/24   Ezekiel Vargas MD   ondansetron (ZOFRAN-ODT) 4 mg disintegrating tablet  9/21/23   Historical Provider, MD   pantoprazole (PROTONIX) 40 mg tablet TAKE 1 TABLET(40 MG) BY MOUTH DAILY BEFORE BREAKFAST 2/26/24   Trista Goyal PA-C    Probiotic Product (ALIGN) 4 MG CAPS Take 1 tablet by mouth daily    Historical Provider, MD   risedronate (ACTONEL) 35 mg tablet Take 1 tablet (35 mg total) by mouth every 7 days with water on empty stomach, nothing by mouth or lie down for next 30 minutes.  Patient not taking: Reported on 4/9/2024 5/25/23   Shay BalderasAMIE ernst   rosuvastatin (CRESTOR) 5 mg tablet Take 1 tablet (5 mg total) by mouth daily 2/19/24   Trista Goyal PA-C   triamcinolone (KENALOG) 0.5 % cream Apply topically 3 (three) times a day 8/15/24   Sukhdev Contreras MD     Allergies   Allergen Reactions    Other Drowsiness     Annotation - [id number]: ANTIDEPRESSANTS       Objective :  Temp:  [98.8 °F (37.1 °C)-100 °F (37.8 °C)] 98.8 °F (37.1 °C)  HR:  [83-93] 85  BP: (143-176)/() 153/108  Resp:  [16] 16  SpO2:  [95 %-98 %] 98 %  O2 Device: None (Room air)    Physical Exam  Vitals reviewed.   Constitutional:       General: She is not in acute distress.     Appearance: She is not toxic-appearing.   HENT:      Mouth/Throat:      Mouth: Mucous membranes are dry.   Cardiovascular:      Rate and Rhythm: Normal rate and regular rhythm.   Pulmonary:      Effort: Pulmonary effort is normal. No respiratory distress.      Breath sounds: Normal breath sounds. No wheezing, rhonchi or rales.   Abdominal:      General: Bowel sounds are normal. There is no distension.      Palpations: Abdomen is soft.      Tenderness: There is no abdominal tenderness.   Musculoskeletal:         General: No swelling.      Right lower leg: No edema.      Left lower leg: No edema.   Skin:     General: Skin is warm and dry.   Neurological:      Mental Status: She is alert.         Lab Results: I have reviewed the following results:  Results from last 7 days   Lab Units 12/01/24  1738   WBC Thousand/uL 5.40   HEMOGLOBIN g/dL 11.1*   HEMATOCRIT % 33.7*   PLATELETS Thousands/uL 143*   SEGS PCT % 70   LYMPHO PCT % 7*   MONO PCT % 19*   EOS PCT % 4     Results from last 7  days   Lab Units 12/01/24  1738   SODIUM mmol/L 132*   POTASSIUM mmol/L 3.6   CHLORIDE mmol/L 97   CO2 mmol/L 27   BUN mg/dL 11   CREATININE mg/dL 1.33*   ANION GAP mmol/L 8   CALCIUM mg/dL 9.6   ALBUMIN g/dL 4.4   TOTAL BILIRUBIN mg/dL 0.45   ALK PHOS U/L 80   ALT U/L 11   AST U/L 24   GLUCOSE RANDOM mg/dL 104             Lab Results   Component Value Date    HGBA1C 5.9 (H) 05/31/2024    HGBA1C 5.9 (H) 10/18/2023    HGBA1C 5.0 03/01/2023     Results from last 7 days   Lab Units 12/01/24  1738   LACTIC ACID mmol/L 1.2   PROCALCITONIN ng/ml 0.08       Imaging Results Review: I reviewed radiology reports from this admission including: chest xray.  Other Study Results Review: EKG was personally reviewed and my interpretation is: NSR. HR 84..    Administrative Statements     ** Please Note: This note has been constructed using a voice recognition system. **

## 2024-12-02 NOTE — ASSESSMENT & PLAN NOTE
Presents to ED c/o weakness, fatigue and fall out of bed x 2.  Denies head strike or loss of consciousness  Also reports poor p.o. intake x 1 day and productive cough x 5 days. No CP or SOB  COVID-19 positive in ED  Labs revealing mild hyponatremia with Na 132 and hypomagnesemia with magnesium 1.6.  Repleted in ED, will re-check with a.m. labs  Currently SpO2: 98 % on  room air  Remainder VSS, afebrile with no leukocytosis, procalcitonin negative  Received 1 L NS bolus in ED.  Will initiate 75 cc/h IVF  Tylenol for fever/headache, Robitussin for cough  PT/OT consulted, fall precautions in place

## 2024-12-02 NOTE — NURSING NOTE
PCA went to answer patient's call light. Patient found lying on the floor with a trail of liquid stool. Immediately called for help while with patient. Rapid response team called.

## 2024-12-02 NOTE — RAPID RESPONSE
Rapid Response Note  Lakeisha Langston 89 y.o. female MRN: [id number]  Unit/Bed#: -01 Encounter: [id number]    Rapid Response Notification(s):   Response called date/time:  12/2/2024 5:40 AM  Response team arrival date/time:  12/2/2024 5:41 AM  Response end date/time:  12/2/2024 5:58 AM  [facility]  Rapid response location:  [facility] unit  Primary reason for rapid response call:  Fall    Rapid Response Intervention(s):   Comments:  CT Head; CT C-spine       Assessment:   Fall, unwitnessed    Plan:   CT Head  CT C-spine; Maintain C-Spine precautions until CT read back  No other complaints or outward trauma  Fall precautions     Rapid Response Outcome:   Transfer:  Remain on floor  Primary service notified of transfer: No    Code Status: Level 1 (Full Code)      Family notified: Primary team to notify Family Member       Background/Situation:   Lakeisha Langston is a 89 y.o. female who was an unwitnessed fall from bed. She was found supine with liquid stool trail. She was awake, alert, oriented x3 no complaints. States she slipped trying to get out of bed to have a bowel movement    Review of Systems   Gastrointestinal:  Positive for diarrhea.   All other systems reviewed and are negative.      Objective:   Vitals:    12/02/24 0543 12/02/24 0544 12/02/24 0545 12/02/24 0546   BP: 127/53 140/60 143/63 141/64   Pulse: 68 74 72 74   Resp:       Temp:       TempSrc:       SpO2: 96% 95% 94% 93%     Physical Exam  Vitals and nursing note reviewed.   HENT:      Head: Normocephalic and atraumatic.      Mouth/Throat:      Pharynx: Oropharynx is clear.   Eyes:      Conjunctiva/sclera: Conjunctivae normal.   Cardiovascular:      Rate and Rhythm: Normal rate and regular rhythm.      Pulses: Normal pulses.      Heart sounds: Normal heart sounds.   Pulmonary:      Effort: Pulmonary effort is normal.      Breath sounds: Normal breath sounds. No wheezing, rhonchi or rales.   Abdominal:      General: Abdomen is flat. Bowel  sounds are normal.      Tenderness: There is no abdominal tenderness. There is no guarding.   Genitourinary:     Comments: Defer  Musculoskeletal:      Cervical back: Neck supple.      Right lower leg: No edema.      Left lower leg: No edema.   Skin:     General: Skin is warm and dry.      Capillary Refill: Capillary refill takes less than 2 seconds.   Neurological:      General: No focal deficit present.      Mental Status: She is alert and oriented to person, place, and time.      Cranial Nerves: No cranial nerve deficit.

## 2024-12-02 NOTE — PLAN OF CARE
Problem: PAIN - ADULT  Goal: Verbalizes/displays adequate comfort level or baseline comfort level  Description: Interventions:  - Encourage patient to monitor pain and request assistance  - Assess pain using appropriate pain scale  - Administer analgesics based on type and severity of pain and evaluate response  - Implement non-pharmacological measures as appropriate and evaluate response  - Consider cultural and social influences on pain and pain management  - Notify physician/advanced practitioner if interventions unsuccessful or patient reports new pain  Outcome: Progressing     Problem: INFECTION - ADULT  Goal: Absence or prevention of progression during hospitalization  Description: INTERVENTIONS:  - Assess and monitor for signs and symptoms of infection  - Monitor lab/diagnostic results  - Monitor all insertion sites, i.e. indwelling lines, tubes, and drains  - Monitor endotracheal if appropriate and nasal secretions for changes in amount and color  - [place] appropriate cooling/warming therapies per order  - Administer medications as ordered  - Instruct and encourage patient and family to use good hand hygiene technique  - Identify and instruct in appropriate isolation precautions for identified infection/condition  Outcome: Progressing  Goal: Absence of fever/infection during neutropenic period  Description: INTERVENTIONS:  - Monitor WBC    Outcome: Progressing     Problem: SAFETY ADULT  Goal: Patient will remain free of falls  Description: INTERVENTIONS:  - Educate patient/family on patient safety including physical limitations  - Instruct patient to call for assistance with activity   - Consult OT/PT to assist with strengthening/mobility   - Keep Call bell within reach  - Keep bed low and locked with side rails adjusted as appropriate  - Keep care items and personal belongings within reach  - Initiate and maintain comfort rounds  - Make Fall Risk Sign visible to staff  - Offer Toileting every 2 Hours,  in advance of need  - Initiate/Maintain bed alarm  - Obtain necessary fall risk management equipment:   - Apply yellow socks and bracelet for high fall risk patients  - Consider moving patient to room near nurses station  Outcome: Progressing  Goal: Maintain or return to baseline ADL function  Description: INTERVENTIONS:  -  Assess patient's ability to carry out ADLs; assess patient's baseline for ADL function and identify physical deficits which impact ability to perform ADLs (bathing, care of mouth/teeth, toileting, grooming, dressing, etc.)  - Assess/evaluate cause of self-care deficits   - Assess range of motion  - Assess patient's mobility; develop plan if impaired  - Assess patient's need for assistive devices and provide as appropriate  - Encourage maximum independence but intervene and supervise when necessary  - Involve family in performance of ADLs  - Assess for home care needs following discharge   - Consider OT consult to assist with ADL evaluation and planning for discharge  - Provide patient education as appropriate  Outcome: Progressing  Goal: Maintains/Returns to pre admission functional level  Description: INTERVENTIONS:  - Perform AM-PAC 6 Click Basic Mobility/ Daily Activity assessment daily.  - Set and communicate daily mobility goal to care team and patient/family/caregiver.   - Collaborate with rehabilitation services on mobility goals if consulted  - Perform Range of Motion 2 times a day.  - Reposition patient every 2 hours.  - Dangle patient 2 times a day  - Stand patient 2 times a day  - Ambulate patient 2 times a day  - Out of bed to chair 2 times a day   - Out of bed for meals 2 times a day  - Out of bed for toileting  - Record patient progress and toleration of activity level   Outcome: Progressing     Problem: DISCHARGE PLANNING  Goal: Discharge to home or other facility with appropriate resources  Description: INTERVENTIONS:  - Identify barriers to discharge w/patient and caregiver  -  Arrange for needed discharge resources and transportation as appropriate  - Identify discharge learning needs (meds, wound care, etc.)  - Arrange for interpretive services to assist at discharge as needed  - Refer to Case Management Department for coordinating discharge planning if the patient needs post-hospital services based on physician/advanced practitioner order or complex needs related to functional status, cognitive ability, or social support system  Outcome: Progressing     Problem: Knowledge Deficit  Goal: Patient/family/caregiver demonstrates understanding of disease process, treatment plan, medications, and discharge instructions  Description: Complete learning assessment and assess knowledge base.  Interventions:  - Provide teaching at level of understanding  - Provide teaching via preferred learning methods  Outcome: Progressing

## 2024-12-02 NOTE — ASSESSMENT & PLAN NOTE
CR 1.33 POA, within patient baseline  Avoid nephrotoxic agents, monitor I's and O's  Lab Results   Component Value Date    EGFR 35 12/01/2024    EGFR 31 10/07/2024    EGFR 36 08/26/2024    CREATININE 1.33 (H) 12/01/2024    CREATININE 1.49 (H) 10/07/2024    CREATININE 1.33 (H) 08/26/2024

## 2024-12-02 NOTE — PLAN OF CARE
Problem: OCCUPATIONAL THERAPY ADULT  Goal: Performs self-care activities at highest level of function for planned discharge setting.  See evaluation for individualized goals.  Description: Treatment Interventions: ADL retraining, Functional transfer training, Endurance training, UE strengthening/ROM, Cognitive reorientation, Patient/family training, Equipment evaluation/education, Compensatory technique education, Continued evaluation, Energy conservation, Activityengagement          See flowsheet documentation for full assessment, interventions and recommendations.   Note: Limitation: Decreased ADL status, Decreased Safe judgement during ADL, Decreased UE strength, Decreased endurance, Decreased fine motor control, Decreased self-care trans, Decreased high-level ADLs  Prognosis: Good  Assessment: Patient is a 89 y.o. female seen for OT evaluation s/p admit to [facility] on 12/1/2024 w/Ambulatory dysfunction. Commorbidities affecting patient's functional performance at time of assessment include: ambulatory dysfunction, COVID19, iron deficiency anemia, HTN, chronic diastolic heart failure, CKD. Orders placed for OT evaluation and treatment.  Performed at least two patient identifiers during session including name and wristband.  Prior to admission,  Patient reported she was independnet with basic ADLs, daughter assists with bathing, requires assistance with IADLs, ambulatory with a cane indoors, walker for community distances.  Personal factors affecting patient at time of initial evaluation include: limited caregiver support, steps to enter, limited insight into deficits, decreased initiation and engagement, difficulty performing ADLs, and difficulty performing IADLs. Upon evaluation, patient requires minimal  assist for UB ADLs, moderate and maximal assist for LB ADLs, transfers and functional ambulation in room and bathroom with minimal  assist, with the use of Rolling Walker.   Occupational  performance is affected by the following deficits: orientation, attention span, decreased functional use of BUEs, decreased muscle strength, degenerative arthritic joint changes, dynamic sit/ stand balance deficit with poor standing tolerance time for self care and functional mobility, decreased activity tolerance, decreased safety awareness, and postural control and postural alignment deficit, requiring external assistance to complete transitional movements.  Patient to benefit from continued Occupational Therapy treatment while in the hospital to address deficits as defined above and maximize level of functional independence with ADLs and functional mobility. Occupational Performance areas to address include: bathing/ shower, dressing, toilet hygiene, transfer to all surfaces, functional mobility, emergency response, health maintenance, IADLs: safety procedures, IADLs: meal prep/ clean up, and Leisure Participation. From OT standpoint, recommendation at time of d/c would be Level 2.     Rehab Resource Intensity Level, OT: II (Moderate Resource Intensity)

## 2024-12-02 NOTE — OCCUPATIONAL THERAPY NOTE
"          Occupational Therapy Evaluation        Patient Name: Lakeisha Langston  Today's Date: 12/2/2024 12/02/24 1014   OT Last Visit   OT Visit Date 12/02/24   Note Type   Note type Evaluation   Pain Assessment   Pain Assessment Tool 0-10   Pain Score 4   Pain Location/Orientation Location: Head   Pain Onset/Description Onset: Ongoing;Descriptor: Aching   Patient's Stated Pain Goal No pain   Hospital Pain Intervention(s) Repositioned   Multiple Pain Sites No   Restrictions/Precautions   Weight Bearing Precautions Per Order No   Braces or Orthoses Other (Comment)  (none at baseline)   Other Precautions Chair Alarm;Bed Alarm;Fall Risk   Home Living   Type of Home House   Home Layout Two level;Performs ADLs on one level;Able to live on main level with bedroom/bathroom  (4 SWETA)   Bathroom Shower/Tub Tub/shower unit   Bathroom Toilet Standard   Bathroom Equipment Grab bars in shower;Shower chair   Bathroom Accessibility Accessible   Home Equipment Cane;Walker   Additional Comments ambulatory with a cane indoors, walker for community distances.   Prior Function   Level of [place] Independent with ADLs;Needs assistance with IADLS   Lives With Family;Daughter   Receives Help From Family   IADLs Family/Friend/Other provides transportation;Family/Friend/Other provides meals;Family/Friend/Other provides medication management   Falls in the last 6 months 1 to 4  (about 3 + 1 in the hospital this AM (\"I got up to get to the sink on my own, and fell\"))   Vocational Retired   Lifestyle   Autonomy Patient reported she was independnet with basic ADLs, daughter assists with bathing, requires assistance with IADLs, ambulatory with a cane indoors, walker for community distances.   Reciprocal Relationships Supportive Family   Service to Others Retired recepcionist   Intrinsic Gratification \"I do puzzles\"   General   Family/Caregiver Present No   ADL   Where Assessed Edge of bed   Eating Assistance 5  Supervision/Setup "   Grooming Assistance 4  Minimal Assistance   UB Bathing Assistance 4  Minimal Assistance   LB Bathing Assistance 2  Maximal Assistance   UB Dressing Assistance 4  Minimal Assistance   LB Dressing Assistance 2  Maximal Assistance   Toileting Assistance  3  Moderate Assistance   Functional Assistance 3  Moderate Assistance   Bed Mobility   Supine to Sit 4  Minimal assistance   Additional items Assist x 2;HOB elevated;Bedrails;Increased time required;Verbal cues   Transfers   Sit to Stand 4  Minimal assistance   Additional items Assist x 2;Increased time required;Verbal cues   Stand to Sit 4  Minimal assistance   Additional items Assist x 2;Increased time required;Verbal cues   Stand pivot 4  Minimal assistance   Additional items Assist x 2;Increased time required;Verbal cues   Toilet transfer 4  Minimal assistance   Additional items Assist x 2;Increased time required;Verbal cues   Functional Mobility   Functional Mobility 4  Minimal assistance   Additional Comments assist of 1 with RW/ verbal cues for safety and correct technique   Additional items Rolling walker   Balance   Static Sitting Fair +   Dynamic Sitting Fair   Static Standing Poor +   Dynamic Standing Poor +   Ambulatory Poor +   Activity Tolerance   Activity Tolerance Patient limited by fatigue   Medical Staff Made Aware Pt seen as a co-eval with PT due to the patient's co-morbidities, clinical presentation, and present impairments which are a regression from the patient's baseline.   RUE Assessment   RUE Assessment X   RUE Strength   RUE Overall Strength Deficits  (3/5)   LUE Assessment   LUE Assessment X   LUE Strength   LUE Overall Strength Deficits  (3/5)   Hand Function   Gross Motor Coordination Functional   Fine Motor Coordination Functional   Sensation   Light Touch No apparent deficits  (BUEs)   Vision-Basic Assessment   Current Vision Does not wear glasses   Cognition   Overall Cognitive Status   (questionable)   Arousal/Participation  Alert;Responsive;Cooperative   Attention Within functional limits   Orientation Level Oriented X4   Memory Within functional limits   Following Commands Follows all commands and directions without difficulty   Assessment   Limitation Decreased ADL status;Decreased Safe judgement during ADL;Decreased UE strength;Decreased endurance;Decreased fine motor control;Decreased self-care trans;Decreased high-level ADLs   Prognosis Good   Assessment Patient is a 89 y.o. female seen for OT evaluation s/p admit to [facility] on 12/1/2024 w/Ambulatory dysfunction. Commorbidities affecting patient's functional performance at time of assessment include: ambulatory dysfunction, COVID19, iron deficiency anemia, HTN, chronic diastolic heart failure, CKD. Orders placed for OT evaluation and treatment.  Performed at least two patient identifiers during session including name and wristband.  Prior to admission,  Patient reported she was independnet with basic ADLs, daughter assists with bathing, requires assistance with IADLs, ambulatory with a cane indoors, walker for community distances.  Personal factors affecting patient at time of initial evaluation include: limited caregiver support, steps to enter, limited insight into deficits, decreased initiation and engagement, difficulty performing ADLs, and difficulty performing IADLs. Upon evaluation, patient requires minimal  assist for UB ADLs, moderate and maximal assist for LB ADLs, transfers and functional ambulation in room and bathroom with minimal  assist, with the use of Rolling Walker.   Occupational performance is affected by the following deficits: orientation, attention span, decreased functional use of BUEs, decreased muscle strength, degenerative arthritic joint changes, dynamic sit/ stand balance deficit with poor standing tolerance time for self care and functional mobility, decreased activity tolerance, decreased safety awareness, and postural control and  postural alignment deficit, requiring external assistance to complete transitional movements.  Patient to benefit from continued Occupational Therapy treatment while in the hospital to address deficits as defined above and maximize level of functional independence with ADLs and functional mobility. Occupational Performance areas to address include: bathing/ shower, dressing, toilet hygiene, transfer to all surfaces, functional mobility, emergency response, health maintenance, IADLs: safety procedures, IADLs: meal prep/ clean up, and Leisure Participation. From OT standpoint, recommendation at time of d/c would be Level 2.   Plan   Treatment Interventions ADL retraining;Functional transfer training;Endurance training;UE strengthening/ROM;Cognitive reorientation;Patient/family training;Equipment evaluation/education;Compensatory technique education;Continued evaluation;Energy conservation;Activityengagement   Goal Expiration Date 12/16/24   Discharge Recommendation   Rehab Resource Intensity Level, OT II (Moderate Resource Intensity)   AM-PAC Daily Activity Inpatient   Lower Body Dressing 2   Bathing 2   Toileting 2   Upper Body Dressing 3   Grooming 3   Eating 3   Daily Activity Raw Score 15   Daily Activity Standardized Score (Calc for Raw Score >=11) 34.69   AM-PAC Applied Cognition Inpatient   Following a Speech/Presentation 3   Understanding Ordinary Conversation 3   Taking Medications 2   Remembering Where Things Are Placed or Put Away 3   Remembering List of 4-5 Errands 3   Taking Care of Complicated Tasks 2   Applied Cognition Raw Score 16   Applied Cognition Standardized Score 35.03   Barthel Index   Feeding 5   Bathing 0   Grooming Score 0   Dressing Score 5   Bladder Score 5   Bowels Score 5   Toilet Use Score 5   Transfers (Bed/Chair) Score 10   Mobility (Level Surface) Score 0   Stairs Score 0   Barthel Index Score 35         1 - Patient will verbalize and demonstrate use of energy conservation/ deep  breathing technique and work simplification skills during functional activity with no verbal cues.    2 - Patient will verbalize and demonstrate good body mechanics and joint protection techniques during  ADLs/ IADLs with no verbal cues.    3 - Patient will increase OOB/ sitting tolerance to 2-4 hours per day for increased participation in self care and leisure tasks with no s/s of exertion.    4 - Patient will increase standing tolerance time to 5  minutes with unilateral UE support to complete sink level ADLs@ mod I level.    5 - Patient will increase sitting tolerance at edge of bed to 20 minutes to complete UB ADLs @ set up assist level.    6 - Patient will transfer bed to Chair / toilet at Set up assist level with AD as indicated.     7 - Patient will complete UB ADLs with set up assist.     8 - Patient will complete LB ADLs with min assist with the use of adaptive equipment.     9 - Patient will complete toileting hygiene with set up assist/ supervision for thoroughness    10 - Patient/ Family  will demonstrate competency with UE Home Exercise Program.

## 2024-12-02 NOTE — QUICK NOTE
RR called 0540 due to unwitnessed fall as patient was ambulating to commode to have a BM. Patient was found with significant amount of liquid stool. CTH and c-spine negative for acute fracture or abnormality. Stool studies ordered. IVF hydration initiated. Probiotic added. Left  for daughter Katerine at 0719am.

## 2024-12-02 NOTE — PLAN OF CARE
Problem: PHYSICAL THERAPY ADULT  Goal: Performs mobility at highest level of function for planned discharge setting.  See evaluation for individualized goals.  Description: Treatment/Interventions: Functional transfer training, Therapeutic exercise, Endurance training, Patient/family training, Bed mobility, Gait training, Continued evaluation, Spoke to nursing, OT  Equipment Recommended: Walker       See flowsheet documentation for full assessment, interventions and recommendations.  Note: Prognosis: Good  Problem List: Decreased strength, Decreased endurance, Impaired balance, Decreased mobility, Decreased safety awareness  Assessment: Pt is 89 y.o. female seen for high-complexity PT evaluation on 12/2/2024 s/p admit to [facility] on 12/1/2024 w/ Ambulatory dysfunction. PT was consulted to assess pt's functional mobility and d/c needs. Order placed for PT eval and tx, w/ up and OOB as tolerated order. PTA, pt was living with family in a two story home with first floor set up and 4 SWETA and reports use of SPC vs rollator for mobility at baseline. At time of eval, patient required minAx2 for sup > sit, and minAx2 for STS and SPT with use of rolling walker. Upon evaluation, pt presenting with impaired functional mobility d/t decreased strength, decreased endurance, impaired balance, decreased mobility, decreased safety awareness, and activity intolerance. Pertinent PMHx and current co-morbidities affecting pt's physical performance at time of assessment include: ambulatory dysfunction, COVID19, iron deficiency anemia, HTN, chronic diastolic heart failure, CKD. Personal factors affecting pt at time of eval include: ambulating w/ assistive device, stairs to enter home, inability to ambulate household distances, inability to navigate level surfaces w/o external assistance, positive fall history, and unable to perform physical activity. The following objective measures performed on IE also reveal  limitations: -PAC 6-Clicks: 10/24. Pt's clinical presentation is currently unstable/unpredictable seen in pt's presentation of advanced age, abnormal lab value(s), need for two person minimal assist w/ all phases of mobility when usually mobilizing independently, tolerance to only 3 feet of ambulation, and ongoing medical assessment. Overall, pt's rehab potential and prognosis to return to PLOF is good as impacted by objective findings, warranting pt to receive further skilled PT interventions to address impairments, activity limitations, and participation restrictions. Pt to benefit from continued PT services to address deficits as defined above and maximize level of functional independent mobility and consistency. From PT/mobility standpoint, recommendation at time of d/c would be level 2, moderate resource intensity in order to facilitate return to PLOF.        Rehab Resource Intensity Level, PT: II (Moderate Resource Intensity)    See flowsheet documentation for full assessment.      Federica Rachel; PT, DPT

## 2024-12-02 NOTE — PROGRESS NOTES
Progress Note - Lakeisha Langston 89 y.o. female MRN: [id number]  Unit/Bed#: -01 Encounter: [id number]    Subjective:   Feeling a little bit better.  Cough is improving.  No chest pain or shortness of breath.  She denies headache or pain in the neck after sustaining a fall today.  She denies any head strike.    All other ROS are negative.    Objective:   Vitals: Blood pressure 137/77, pulse 73, temperature 98.6 °F (37 °C), temperature source Oral, resp. rate 16, SpO2 94%, not currently breastfeeding.,There is no height or weight on file to calculate BMI.  SPO2 RA Rest      Flowsheet Row ED to Hosp-Admission (Current) from 12/1/2024 in [facility] 4th Floor Med Surg Unit   SpO2 94 %   SpO2 Activity At Rest   O2 Device None (Room air)   O2 Flow Rate --          I&O: No intake or output data in the 24 hours ending 12/02/24 1449      Physical Exam:       General Appearance:    Alert, cooperative, no distress   Head:    Normocephalic, without obvious abnormality, atraumatic   Eyes:    PERRL, conjunctiva/corneas clear, EOM's intact       Nose:   Moist mucous membranes, no drainage or sinus tenderness   Throat:   No tenderness, no exudates   Neck:   Supple, symmetrical, trachea midline, no JVD   Lungs:     Clear to auscultation bilaterally, respirations unlabored   Heart:    Regular rate and rhythm, S1 and S2 normal, no murmur, rub   or gallop   Abdomen: Soft, non-tender, positive bowel sounds, no masses, no organomegaly   Extremities:  No pedal edema, calf tenderness. Distal pulses palpable.   Neurologic:     CNII-XII intact.      Invasive Devices       Peripheral Intravenous Line  Duration             Peripheral IV 12/01/24 Distal;Right;Upper;Ventral (anterior) Arm <1 day                          Social History  reviewed  Family History   Problem Relation Age of Onset    Stroke Mother         ischemic stroke    Hypertension Mother     Heart disease Father     Cancer Sister     No Known Problems Sister      No Known Problems Daughter     No Known Problems Daughter     No Known Problems Daughter     No Known Problems Daughter     No Known Problems Daughter     No Known Problems Maternal Grandmother     No Known Problems Paternal Grandmother     No Known Problems Paternal Aunt     No Known Problems Paternal Aunt     No Known Problems Paternal Aunt     No Known Problems Paternal Aunt     No Known Problems Paternal Aunt     Breast cancer Neg Hx     reviewed    Meds:  Current Facility-Administered Medications   Medication Dose Route Frequency Provider Last Rate Last Admin    acetaminophen (TYLENOL) tablet 650 mg  650 mg Oral Q6H PRN Ana Shepherd PA-C   650 mg at 12/02/24 1317    ALPRAZolam (XANAX) tablet 0.5 mg  0.5 mg Oral 4x Daily PRN Ezekiel Vargas MD   0.5 mg at 12/02/24 0951    atenolol (TENORMIN) tablet 50 mg  50 mg Oral BID Ana Shepherd PA-C   50 mg at 12/02/24 0837    atorvastatin (LIPITOR) tablet 10 mg  10 mg Oral Daily With Dinner Aan Shepherd PA-C        dextromethorphan-guaiFENesin (ROBITUSSIN DM) oral syrup 10 mL  10 mL Oral Q4H PRN Ana Shepherd PA-C   10 mL at 12/02/24 0840    heparin (porcine) subcutaneous injection 5,000 Units  5,000 Units Subcutaneous Q8H RACHEL Ana Shepherd PA-C   5,000 Units at 12/02/24 1317    imipramine (TOFRANIL) tablet 10 mg  10 mg Oral HS Ana Shepherd PA-C   10 mg at 12/01/24 2255    influenza vaccine, high-dose (Fluzone High-Dose) IM injection 0.5 mL  0.5 mL Intramuscular Once Elias ANG MD        latanoprost (XALATAN) 0.005 % ophthalmic solution 1 drop  1 drop Both Eyes HS Ana Shepherd PA-C   1 drop at 12/01/24 2255    levothyroxine tablet 50 mcg  50 mcg Oral Early Morning Ana Shepherd PA-C   50 mcg at 12/02/24 0837    lisinopril (ZESTRIL) tablet 5 mg  5 mg Oral Daily Ana Shepherd PA-C   5 mg at 12/02/24 0837    pantoprazole (PROTONIX) EC tablet 40 mg  40 mg Oral Early Morning Ana Shepherd PA-C   40 mg at 12/02/24 0837     saccharomyces boulardii (FLORASTOR) capsule 250 mg  250 mg Oral BID Ana Shepherd PA-C   250 mg at 12/02/24 0837    sodium chloride 0.9 % infusion  125 mL/hr Intravenous Continuous Ana Shepherd PA-C 125 mL/hr at 12/02/24 0838 125 mL/hr at 12/02/24 0838        Medications Prior to Admission:     acetaminophen (TYLENOL) 325 mg tablet    ALPRAZolam (XANAX) 0.5 mg tablet    atenolol (TENORMIN) 50 mg tablet    latanoprost (XALATAN) 0.005 % ophthalmic solution    levothyroxine 50 mcg tablet    lisinopril (ZESTRIL) 5 mg tablet    rosuvastatin (CRESTOR) 5 mg tablet    albuterol (PROVENTIL HFA,VENTOLIN HFA) 90 mcg/act inhaler    Diclofenac Sodium (VOLTAREN) 1 %    hydrOXYzine HCL (ATARAX) 25 mg tablet    imipramine (TOFRANIL) 10 mg tablet    ondansetron (ZOFRAN-ODT) 4 mg disintegrating tablet    pantoprazole (PROTONIX) 40 mg tablet    Probiotic Product (ALIGN) 4 MG CAPS    risedronate (ACTONEL) 35 mg tablet    triamcinolone (KENALOG) 0.5 % cream    Labs:  Results from last 7 days   Lab Units 12/02/24  0441 12/01/24  1738   WBC Thousand/uL 4.20* 5.40   HEMOGLOBIN g/dL 10.3* 11.1*   HEMATOCRIT % 31.6* 33.7*   PLATELETS Thousands/uL 122* 143*   SEGS PCT %  --  70   LYMPHO PCT %  --  7*   MONO PCT %  --  19*   EOS PCT %  --  4     Results from last 7 days   Lab Units 12/02/24  0441 12/01/24  1738   POTASSIUM mmol/L 3.7 3.6   CHLORIDE mmol/L 102 97   CO2 mmol/L 25 27   BUN mg/dL 11 11   CREATININE mg/dL 1.28 1.33*   CALCIUM mg/dL 8.6 9.6   ALK PHOS U/L 67 80   ALT U/L 11 11   AST U/L 25 24     Lab Results   Component Value Date    TROPONINI <0.02 06/14/2021    TROPONINI <0.02 06/13/2021    TROPONINI <0.02 06/13/2021    CKTOTAL 121 05/24/2021    CKTOTAL 113 01/09/2018    CKTOTAL 60 04/06/2016         Lab Results   Component Value Date    BLOODCX Received in Microbiology Lab. Culture in Progress. 12/01/2024    BLOODCX Received in Microbiology Lab. Culture in Progress. 12/01/2024    URINECX 10,000-19,000 cfu/ml  12/30/2022    URINECX 10,000-19,000 cfu/ml 06/10/2021    URINECX No Growth <1000 cfu/mL 04/06/2016       Imaging:  Results for orders placed during the hospital encounter of 05/17/23    XR chest 1 view portable    Narrative  CHEST    INDICATION:   trauma. Unwitnessed fall.    COMPARISON: CXR 5/2/2023 and 12/8/2022, chest CT 12/29/2022.    EXAM PERFORMED/VIEWS:  XR CHEST PORTABLE.    FINDINGS:    Cardiomediastinal silhouette normal. Moderate hiatal hernia.    Lungs clear. No effusion or pneumothorax.    Upper abdomen normal. Cholecystectomy. No acute displaced fracture. Moderate curvature of the spine.    Impression  No acute cardiopulmonary disease. No acute displaced fracture.    Moderate hiatal hernia.      Workstation performed: [id number]    Results for orders placed during the hospital encounter of 12/01/24    XR chest 2 views    Narrative  XR CHEST AP AND LATERAL    INDICATION: fever, cough. COVID-positive today.    COMPARISON: CXR 10/07/2024, chest and abdomen CT 5/17/2023.    FINDINGS:    Clear lungs. No pneumothorax or pleural effusion.    Normal cardiomediastinal silhouette. Moderate hiatal hernia.    Moderate scoliosis. Old compression deformity mid thoracic spine.    Upper abdomen normal. Cholecystectomy. Mild benign eventration of the right hemidiaphragm.    Impression  No acute cardiopulmonary disease.    Moderate hiatal hernia.    Workstation performed: [id number]      VTE Pharmacologic Prophylaxis: Heparin      Code Status:   Level 1 - Full Code    Assessment:  Principal Problem:    Ambulatory dysfunction  Active Problems:    Anxiety    Iron deficiency anemia    Hypothyroidism    Hypertension    Hyperlipidemia    Chronic diastolic CHF (congestive heart failure) (HCC)    Stage 3b chronic kidney disease (HCC)    COVID-19  Resolved Problems:    * No resolved hospital problems. *      Plan:  Ambulatory dysfunction-POA, likely related to acute COVID infection  - PT/OT evaluations pending  - Patient  declines inpatient postacute rehab    COVID-19-she is outside of the treatment window.  She does not have an oxygen requirement.  - Continue antitussives and supportive care    Anxiety-resume Xanax, continue imipramine    Hypothyroidism-TSH is suppressed at 0.229 however free T4 is in the normal range, will recheck in the outpatient setting    Hyperlipidemia-continue on statin    HFpEF-EF 60% by echo April 2024.  Patient appears euvolemic.  - Discontinue IV fluids    Disposition-hopeful for discharge in the next 24 to 48 hours if patient remains stable.  Message left for daughter on voicemail.  Ezekiel Vargas MD  12/2/2024,2:49 PM

## 2024-12-02 NOTE — PHYSICAL THERAPY NOTE
PHYSICAL THERAPY EVALUATION  NAME:  Lakeisha Langston  DATE: 12/02/24    AGE:   89 y.o.  Mrn:   [id number]  ADMIT DX:  Hyponatremia [E87.1]  Weakness generalized [R53.1]  Weakness [R53.1]  Fever [R50.9]  Flu-like symptoms [R68.89]  Headache [R51.9]  Ambulatory dysfunction [R26.2]  Acute COVID-19 [U07.1]  Problem List:   Patient Active Problem List   Diagnosis    Chest heaviness    Anxiety    Abnormal EKG    Vitamin D deficiency    Irritable bowel syndrome    Iron deficiency anemia    Hypothyroidism    Hypertension    Hyperlipidemia    GERD without esophagitis    AVM (arteriovenous malformation)    Venous insufficiency    Impaired fasting glucose    Mild intermittent asthma without complication    Age-related osteoporosis without current pathological fracture    Closed fracture of tenth thoracic vertebra with routine healing    Fall as cause of accidental injury in home as place of occurrence    Atrophic vaginitis    Benign familial tremor    Eustachian tube dysfunction, bilateral    Tension-type headache, not intractable    Hiatal hernia    Syncope    B12 deficiency    Neuropathy    Left knee injury    Ankle swelling    Chronic diastolic CHF (congestive heart failure) (HCC)    Stage 3b chronic kidney disease (HCC)    Depression, recurrent (HCC)    Dyslipidemia, goal to be determined    Generalized anxiety disorder    Osteoporosis    Left-sided back pain    COVID-19    Weakness    Ambulatory dysfunction       Past Medical History  Past Medical History:   Diagnosis Date    Anxiety 1992    Arthritis 1991    Benign essential hypertension     Last assessed: 9/11/14    Disease of thyroid gland     Essential tremor     Fibromyalgia     GERD (gastroesophageal reflux disease)     History of nail disorders     Hyperlipidemia     Hypertension     Palpitations     Transient cerebral ischemia        Past Surgical History  Past Surgical History:   Procedure Laterality Date    APPENDECTOMY      EGD AND COLONOSCOPY  07/2020     "HYSTERECTOMY  01/01/2010    LA LAPS SURG CHOLECYSTECTOMY W/CHOLANGIOGRAPHY N/A 4/4/2018    Procedure: LAPAROSCOPIC CHOLECYSTECTOMY WITH IOC;  Surgeon: Yakov Mueller MD;  Location: [facility];  Service: General    TUBAL LIGATION         Length Of Stay: 0  Performed at least 2 patient identifiers during session: Name and Birthday       12/02/24 1040   PT Last Visit   PT Visit Date 12/02/24   Note Type   Note type Evaluation   Pain Assessment   Pain Assessment Tool 0-10   Pain Score 4   Pain Location/Orientation Location: Head   Pain Onset/Description Onset: Ongoing;Descriptor: Aching   Patient's Stated Pain Goal No pain   Hospital Pain Intervention(s) Repositioned   Multiple Pain Sites No   Restrictions/Precautions   Weight Bearing Precautions Per Order No   Braces or Orthoses   (none reported)   Other Precautions Chair Alarm;Bed Alarm;Fall Risk;Airborne/isolation;Contact/isolation   Home Living   Type of Home House   Home Layout Two level;Performs ADLs on one level;Able to live on main level with bedroom/bathroom  (4 SWETA)   Bathroom Shower/Tub Tub/shower unit   Bathroom Toilet Standard   Bathroom Equipment Grab bars in shower;Shower chair   Bathroom Accessibility Accessible   Home Equipment Walker;Cane   Additional Comments pt reports use of SPC for mobility in the home and use of rollator in the community   Prior Function   Level of [place] Needs assistance with IADLS;Independent with functional mobility   Lives With Family;Daughter   Receives Help From Family   IADLs Family/Friend/Other provides transportation;Family/Friend/Other provides meals;Family/Friend/Other provides medication management   Falls in the last 6 months   (about 3 + 1 in the hospital this AM (\"I got up to get to the sink on my own, and fell\"))   Vocational Retired   General   Family/Caregiver Present No   Cognition   Arousal/Participation Responsive   Orientation Level Oriented X4   Memory Within functional limits   Following Commands " Follows all commands and directions without difficulty   Comments Pt agreeable to PT evaluation   RLE Assessment   RLE Assessment X   Strength RLE   R Knee Extension 3+/5   R Ankle Dorsiflexion 3+/5   LLE Assessment   LLE Assessment X   Strength LLE   L Knee Extension 3+/5   L Ankle Dorsiflexion 3/5   Coordination   Sensation X  (pt reports neuropathy to B feet)   Light Touch   RLE Light Touch Grossly intact   LLE Light Touch Grossly intact   Bed Mobility   Supine to Sit 4  Minimal assistance   Additional items Assist x 2;HOB elevated;Bedrails;Increased time required;Verbal cues   Sit to Supine   (not tested as pt seated in bedside chair at end of session)   Additional Comments Pt without complaints of lightheadedness, SOB, chest pain, dizziness throughout session   Transfers   Sit to Stand 4  Minimal assistance   Additional items Assist x 2;Increased time required;Verbal cues   Stand to Sit 4  Minimal assistance   Additional items Assist x 2;Increased time required;Verbal cues   Stand pivot 4  Minimal assistance   Additional items Assist x 2;Increased time required;Verbal cues   Toilet transfer 4  Minimal assistance   Additional items Assist x 2;Increased time required;Verbal cues  (to/from bedside commode)   Additional Comments RW used during transfers   Ambulation/Elevation   Gait pattern Improper Weight shift;Decreased foot clearance;Shuffling;Short stride;Narrow NAVI;Excessively slow;Step to;Decreased heel strike   Gait Assistance 4  Minimal assist   Additional items Assist x 2;Verbal cues   Assistive Device Rolling walker   Distance 3' to bedside chair   Stair Management Assistance Not tested   Balance   Static Sitting Fair +   Dynamic Sitting Fair   Static Standing Poor +   Dynamic Standing Poor +   Ambulatory Poor +   Endurance Deficit   Endurance Deficit Yes   Endurance Deficit Description decreased activity tolerance, fatigue   Activity Tolerance   Activity Tolerance Patient limited by fatigue   Medical  Staff Made Aware Pt seen as a co-eval with TRACY Copeland due to the patient's co-morbidities, clinical presentation, and present impairments which are a regression from the patient's baseline.   Nurse Made Aware RN aware of session outcomes   Assessment   Prognosis Good   Problem List Decreased strength;Decreased endurance;Impaired balance;Decreased mobility;Decreased safety awareness   Assessment Pt is 89 y.o. female seen for high-complexity PT evaluation on 12/2/2024 s/p admit to [facility] on 12/1/2024 w/ Ambulatory dysfunction. PT was consulted to assess pt's functional mobility and d/c needs. Order placed for PT eval and tx, w/ up and OOB as tolerated order. PTA, pt was living with family in a two story home with first floor set up and 4 SWETA and reports use of SPC vs rollator for mobility at baseline. At time of eval, patient required minAx2 for sup > sit, and minAx2 for STS and SPT with use of rolling walker. Upon evaluation, pt presenting with impaired functional mobility d/t decreased strength, decreased endurance, impaired balance, decreased mobility, decreased safety awareness, and activity intolerance. Pertinent PMHx and current co-morbidities affecting pt's physical performance at time of assessment include: ambulatory dysfunction, COVID19, iron deficiency anemia, HTN, chronic diastolic heart failure, CKD. Personal factors affecting pt at time of eval include: ambulating w/ assistive device, stairs to enter home, inability to ambulate household distances, inability to navigate level surfaces w/o external assistance, positive fall history, and unable to perform physical activity. The following objective measures performed on IE also reveal limitations: AM-PAC 6-Clicks: 10/24. Pt's clinical presentation is currently unstable/unpredictable seen in pt's presentation of advanced age, abnormal lab value(s), need for two person minimal assist w/ all phases of mobility when usually mobilizing  independently, tolerance to only 3 feet of ambulation, and ongoing medical assessment. Overall, pt's rehab potential and prognosis to return to PLOF is good as impacted by objective findings, warranting pt to receive further skilled PT interventions to address impairments, activity limitations, and participation restrictions. Pt to benefit from continued PT services to address deficits as defined above and maximize level of functional independent mobility and consistency. From PT/mobility standpoint, recommendation at time of d/c would be level 2, moderate resource intensity in order to facilitate return to PLOF.   Goals   [facility] Expiration Date 12/12/24   Short Term Goal #1 In 10 days: Increase bilateral LE strength 1/2 grade to facilitate independent mobility, Perform all bed mobility tasks with distant S to decrease caregiver burden, Perform all transfers with distant S to improve independence, Ambulate > 80 ft. with RW with distant S w/o LOB and w/ normalized gait pattern 100% of the time, Increase all balance 1/2 grade to decrease risk for falls, and PT to see and establish goals for stairs when appropriate   PT Treatment Day 0   Plan   Treatment/Interventions Functional transfer training;Therapeutic exercise;Endurance training;Patient/family training;Bed mobility;Gait training;Continued evaluation;Spoke to nursing;OT   PT Frequency 3-5x/wk   Discharge Recommendation   Rehab Resource Intensity Level, PT II (Moderate Resource Intensity)   Equipment Recommended Walker   AM-PAC Basic Mobility Inpatient   Turning in Flat Bed Without Bedrails 2   Lying on Back to Sitting on Edge of Flat Bed Without Bedrails 2   Moving Bed to Chair 2   Standing Up From Chair Using Arms 2   Walk in Room 1   Climb 3-5 Stairs With Railing 1   Basic Mobility Inpatient Raw Score 10   [facility] Highest Level Of Mobility   -[facility] Goal 4: Move to chair/commode   -[facility] Achieved 4: Move to chair/commode   End of Consult   Patient Position at  End of Consult Bed/Chair alarm activated;All needs within reach;Bedside chair       Time In: 1014  Time Out: 1040  Total Evaluation Minutes: 26    Federica Rachel, PT

## 2024-12-02 NOTE — ASSESSMENT & PLAN NOTE
TSH 0.229 POA  Recommend patient follow-up with PCP for dose adjustment of Synthroid due to low TSH  Continue PTA Synthroid

## 2024-12-02 NOTE — PLAN OF CARE
Problem: PAIN - ADULT  Goal: Verbalizes/displays adequate comfort level or baseline comfort level  Description: Interventions:  - Encourage patient to monitor pain and request assistance  - Assess pain using appropriate pain scale  - Administer analgesics based on type and severity of pain and evaluate response  - Implement non-pharmacological measures as appropriate and evaluate response  - Consider cultural and social influences on pain and pain management  - Notify physician/advanced practitioner if interventions unsuccessful or patient reports new pain  Outcome: Progressing     Problem: INFECTION - ADULT  Goal: Absence or prevention of progression during hospitalization  Description: INTERVENTIONS:  - Assess and monitor for signs and symptoms of infection  - Monitor lab/diagnostic results  - Monitor all insertion sites, i.e. indwelling lines, tubes, and drains  - Monitor endotracheal if appropriate and nasal secretions for changes in amount and color  - [place] appropriate cooling/warming therapies per order  - Administer medications as ordered  - Instruct and encourage patient and family to use good hand hygiene technique  - Identify and instruct in appropriate isolation precautions for identified infection/condition  Outcome: Progressing  Goal: Absence of fever/infection during neutropenic period  Description: INTERVENTIONS:  - Monitor WBC    Outcome: Progressing     Problem: SAFETY ADULT  Goal: Patient will remain free of falls  Description: INTERVENTIONS:  - Educate patient/family on patient safety including physical limitations  - Instruct patient to call for assistance with activity   - Consult OT/PT to assist with strengthening/mobility   - Keep Call bell within reach  - Keep bed low and locked with side rails adjusted as appropriate  - Keep care items and personal belongings within reach  - Initiate and maintain comfort rounds  - Make Fall Risk Sign visible to staff  - Offer Toileting every 2 Hours,  in advance of need  - Initiate/Maintain bed alarm  - Obtain necessary fall risk management equipment  - Apply yellow socks and bracelet for high fall risk patients  - Consider moving patient to room near nurses station  Outcome: Progressing  Goal: Maintain or return to baseline ADL function  Description: INTERVENTIONS:  -  Assess patient's ability to carry out ADLs; assess patient's baseline for ADL function and identify physical deficits which impact ability to perform ADLs (bathing, care of mouth/teeth, toileting, grooming, dressing, etc.)  - Assess/evaluate cause of self-care deficits   - Assess range of motion  - Assess patient's mobility; develop plan if impaired  - Assess patient's need for assistive devices and provide as appropriate  - Encourage maximum independence but intervene and supervise when necessary  - Involve family in performance of ADLs  - Assess for home care needs following discharge   - Consider OT consult to assist with ADL evaluation and planning for discharge  - Provide patient education as appropriate  Outcome: Progressing  Goal: Maintains/Returns to pre admission functional level  Description: INTERVENTIONS:  - Perform AM-PAC 6 Click Basic Mobility/ Daily Activity assessment daily.  - Set and communicate daily mobility goal to care team and patient/family/caregiver.   - Collaborate with rehabilitation services on mobility goals if consulted  - Perform Range of Motion 3 times a day.  - Reposition patient every 2 hours.  - Dangle patient 3 times a day  - Stand patient 3 times a day  - Ambulate patient 3 times a day  - Out of bed to chair 3 times a day   - Out of bed for meals 3 times a day  - Out of bed for toileting  - Record patient progress and toleration of activity level   Outcome: Progressing     Problem: DISCHARGE PLANNING  Goal: Discharge to home or other facility with appropriate resources  Description: INTERVENTIONS:  - Identify barriers to discharge w/patient and caregiver  -  Arrange for needed discharge resources and transportation as appropriate  - Identify discharge learning needs (meds, wound care, etc.)  - Arrange for interpretive services to assist at discharge as needed  - Refer to Case Management Department for coordinating discharge planning if the patient needs post-hospital services based on physician/advanced practitioner order or complex needs related to functional status, cognitive ability, or social support system  Outcome: Progressing     Problem: Knowledge Deficit  Goal: Patient/family/caregiver demonstrates understanding of disease process, treatment plan, medications, and discharge instructions  Description: Complete learning assessment and assess knowledge base.  Interventions:  - Provide teaching at level of understanding  - Provide teaching via preferred learning methods  Outcome: Progressing     Problem: Prexisting or High Potential for Compromised Skin Integrity  Goal: Skin integrity is maintained or improved  Description: INTERVENTIONS:  - Identify patients at risk for skin breakdown  - Assess and monitor skin integrity  - Assess and monitor nutrition and hydration status  - Monitor labs   - Assess for incontinence   - Turn and reposition patient  - Assist with mobility/ambulation  - Relieve pressure over bony prominences  - Avoid friction and shearing  - Provide appropriate hygiene as needed including keeping skin clean and dry  - Evaluate need for skin moisturizer/barrier cream  - Collaborate with interdisciplinary team   - Patient/family teaching  - Consider wound care consult   Outcome: Progressing

## 2024-12-02 NOTE — ASSESSMENT & PLAN NOTE
Euvolemic on exam  Most recent echo April 2024 revealing LVEF 60% with mild diastolic dysfunction  Follows with cardiology as outpatient  Monitor I's and O's and daily weights  Will proceed with gentle IVF hydration to avoid hypervolemia  Wt Readings from Last 3 Encounters:   10/07/24 49.4 kg (109 lb)   08/26/24 45 kg (99 lb 3.3 oz)   08/15/24 49.4 kg (109 lb)

## 2024-12-02 NOTE — CODE DOCUMENTATION
Call bell ringing, light on in hallway. Shahla FONTAINE went into room to address call bell, found patient lying supine on floor in pool/puddle of stool. RRT initiated.    Subsequent actions: C-spine collar applied to patient's neck, patient lifted back to bed via hovermat pad and assist of five. Patient then transported to CT for scan.

## 2024-12-02 NOTE — NURSING NOTE
Post Fall Note    Post Fall Interventions: Spinal immobilization determined prior to moving patient; Circumstances of fall reviewed and documented; Fall risk precautions implemented/maitained; Neuro checks intiated if indicated; Comforts rounds continued; Need for additional safety measures intiated if necessary      Brief Description of Events  Patient returned to room after CT scan, s/p unwitnessed fall and found on floor by PCA; C-spine collar in place. Call bell in patient's hand for access/use. Bed alarm initiated. Patient instructed to utilize call bell prior for assistance.    Last Recorded Vitals  Blood pressure 137/77, pulse 73, temperature 98.6 °F (37 °C), temperature source Oral, resp. rate 16, SpO2 94%, not currently breastfeeding.      Principal Problem:    Ambulatory dysfunction  Active Problems:    Anxiety    Iron deficiency anemia    Hypothyroidism    Hypertension    Hyperlipidemia    Chronic diastolic CHF (congestive heart failure) (HCC)    Stage 3b chronic kidney disease (HCC)    COVID-19

## 2024-12-02 NOTE — NURSING NOTE
PCA went to answer call bell. Found patient on the floor notified RN. Rapid response team called.

## 2024-12-03 ENCOUNTER — TELEPHONE (OUTPATIENT)
Age: 89
End: 2024-12-03

## 2024-12-03 ENCOUNTER — HOSPITAL ENCOUNTER (INPATIENT)
Facility: HOSPITAL | Age: 89
LOS: 3 days | Discharge: NURSING, REHAB OR LONG-TERM CARE | DRG: 178 | End: 2024-12-07
Attending: EMERGENCY MEDICINE | Admitting: INTERNAL MEDICINE
Payer: MEDICARE

## 2024-12-03 ENCOUNTER — TRANSITIONAL CARE MANAGEMENT (OUTPATIENT)
Age: 89
End: 2024-12-03

## 2024-12-03 VITALS
DIASTOLIC BLOOD PRESSURE: 77 MMHG | SYSTOLIC BLOOD PRESSURE: 185 MMHG | WEIGHT: 105.6 LBS | OXYGEN SATURATION: 94 % | BODY MASS INDEX: 24.54 KG/M2 | RESPIRATION RATE: 16 BRPM | HEART RATE: 81 BPM | TEMPERATURE: 98.9 F

## 2024-12-03 DIAGNOSIS — R53.1 WEAKNESS: ICD-10-CM

## 2024-12-03 DIAGNOSIS — U07.1 ACUTE COVID-19: ICD-10-CM

## 2024-12-03 DIAGNOSIS — I10 PRIMARY HYPERTENSION: ICD-10-CM

## 2024-12-03 DIAGNOSIS — R55 SYNCOPE: Primary | ICD-10-CM

## 2024-12-03 DIAGNOSIS — R26.2 AMBULATORY DYSFUNCTION: ICD-10-CM

## 2024-12-03 PROCEDURE — 99285 EMERGENCY DEPT VISIT HI MDM: CPT

## 2024-12-03 PROCEDURE — 99239 HOSP IP/OBS DSCHRG MGMT >30: CPT | Performed by: INTERNAL MEDICINE

## 2024-12-03 RX ORDER — GUAIFENESIN/DEXTROMETHORPHAN 100-10MG/5
10 SYRUP ORAL EVERY 4 HOURS PRN
Qty: 237 ML | Refills: 0 | Status: SHIPPED | OUTPATIENT
Start: 2024-12-03

## 2024-12-03 RX ADMIN — HEPARIN SODIUM 5000 UNITS: 5000 INJECTION, SOLUTION INTRAVENOUS; SUBCUTANEOUS at 06:00

## 2024-12-03 RX ADMIN — Medication 250 MG: at 08:44

## 2024-12-03 RX ADMIN — ACETAMINOPHEN 650 MG: 325 TABLET, FILM COATED ORAL at 13:09

## 2024-12-03 RX ADMIN — ATENOLOL 50 MG: 50 TABLET ORAL at 08:44

## 2024-12-03 RX ADMIN — PANTOPRAZOLE SODIUM 40 MG: 40 TABLET, DELAYED RELEASE ORAL at 06:00

## 2024-12-03 RX ADMIN — LEVOTHYROXINE SODIUM 50 MCG: 0.05 TABLET ORAL at 06:00

## 2024-12-03 RX ADMIN — LISINOPRIL 5 MG: 5 TABLET ORAL at 08:44

## 2024-12-03 NOTE — TELEPHONE ENCOUNTER
Pts daughter called in to check on the status of her excuse note for work. PCP please call pt once this note is available, daughter states she needs this by tomorrow.

## 2024-12-03 NOTE — PROGRESS NOTES
Patient:    MRN:  [id number]    Janusz Request ID:  [id number]    Level of care reserved:  Home Health Agency    Partner Reserved:  [facility] [place] [address] [phone]    Clinical needs requested:    Geography searched:  [phone]    Start of Service:    Request sent:  1:20pm EST on 12/2/2024 by Barbara Brown    Partner reserved:  11:17am EST on 12/3/2024 by Barbara Brown    Choice list shared:  11:17am EST on 12/3/2024 by Barbara Brown

## 2024-12-03 NOTE — PLAN OF CARE
Problem: PAIN - ADULT  Goal: Verbalizes/displays adequate comfort level or baseline comfort level  Description: Interventions:  - Encourage patient to monitor pain and request assistance  - Assess pain using appropriate pain scale  - Administer analgesics based on type and severity of pain and evaluate response  - Implement non-pharmacological measures as appropriate and evaluate response  - Consider cultural and social influences on pain and pain management  - Notify physician/advanced practitioner if interventions unsuccessful or patient reports new pain  Outcome: Progressing     Problem: INFECTION - ADULT  Goal: Absence or prevention of progression during hospitalization  Description: INTERVENTIONS:  - Assess and monitor for signs and symptoms of infection  - Monitor lab/diagnostic results  - Monitor all insertion sites, i.e. indwelling lines, tubes, and drains  - Monitor endotracheal if appropriate and nasal secretions for changes in amount and color  - [place] appropriate cooling/warming therapies per order  - Administer medications as ordered  - Instruct and encourage patient and family to use good hand hygiene technique  - Identify and instruct in appropriate isolation precautions for identified infection/condition  Outcome: Progressing  Goal: Absence of fever/infection during neutropenic period  Description: INTERVENTIONS:  - Monitor WBC    Outcome: Progressing     Problem: SAFETY ADULT  Goal: Patient will remain free of falls  Description: INTERVENTIONS:  - Educate patient/family on patient safety including physical limitations  - Instruct patient to call for assistance with activity   - Consult OT/PT to assist with strengthening/mobility   - Keep Call bell within reach  - Keep bed low and locked with side rails adjusted as appropriate  - Keep care items and personal belongings within reach  - Initiate and maintain comfort rounds  - Make Fall Risk Sign visible to staff  - Offer Toileting every  Hours,  in advance of need  - Initiate/Maintain alarm  - Obtain necessary fall risk management equipment:   - Apply yellow socks and bracelet for high fall risk patients  - Consider moving patient to room near nurses station  Outcome: Progressing  Goal: Maintain or return to baseline ADL function  Description: INTERVENTIONS:  -  Assess patient's ability to carry out ADLs; assess patient's baseline for ADL function and identify physical deficits which impact ability to perform ADLs (bathing, care of mouth/teeth, toileting, grooming, dressing, etc.)  - Assess/evaluate cause of self-care deficits   - Assess range of motion  - Assess patient's mobility; develop plan if impaired  - Assess patient's need for assistive devices and provide as appropriate  - Encourage maximum independence but intervene and supervise when necessary  - Involve family in performance of ADLs  - Assess for home care needs following discharge   - Consider OT consult to assist with ADL evaluation and planning for discharge  - Provide patient education as appropriate  Outcome: Progressing  Goal: Maintains/Returns to pre admission functional level  Description: INTERVENTIONS:  - Perform AM-PAC 6 Click Basic Mobility/ Daily Activity assessment daily.  - Set and communicate daily mobility goal to care team and patient/family/caregiver.   - Collaborate with rehabilitation services on mobility goals if consulted  - Perform Range of Motion  times a day.  - Reposition patient every  hours.  - Dangle patient  times a day  - Stand patient  times a day  - Ambulate patient  times a day  - Out of bed to chair  times a day   - Out of bed for meals times a day  - Out of bed for toileting  - Record patient progress and toleration of activity level   Outcome: Progressing     Problem: DISCHARGE PLANNING  Goal: Discharge to home or other facility with appropriate resources  Description: INTERVENTIONS:  - Identify barriers to discharge w/patient and caregiver  - Arrange for  needed discharge resources and transportation as appropriate  - Identify discharge learning needs (meds, wound care, etc.)  - Arrange for interpretive services to assist at discharge as needed  - Refer to Case Management Department for coordinating discharge planning if the patient needs post-hospital services based on physician/advanced practitioner order or complex needs related to functional status, cognitive ability, or social support system  Outcome: Progressing

## 2024-12-03 NOTE — DISCHARGE SUMMARY
Discharge Summary - Lakeisha Langston 89 y.o. female MRN: [id number]  Unit/Bed#: -01 Encounter: [id number]    Admission Date:    12/1/2024   Discharge Date:   12/03/24   Admitting Diagnosis:   Hyponatremia [E87.1]  Weakness generalized [R53.1]  Weakness [R53.1]  Fever [R50.9]  Flu-like symptoms [R68.89]  Headache [R51.9]  Ambulatory dysfunction [R26.2]  Acute COVID-19 [U07.1]  Admitting Provider:   Ezekiel Vargas MD  Discharge Provider:   Ezekiel Vargas MD     Primary Care Physician at Discharge:   Ezekiel Vargas MD,[phone]    HPI: From history and physical by Ana Shepherd PA-C  Lakeisha Langston is a 89 y.o. female with a PMH of HTN, anxiety, LARRY, hypothyroidism, HLD, CHF who presents with weakness.  History obtained from patient and daughter at bedside.  Daughter reports patient called her today because she slipped out of bed and could not get herself back up twice today.  States patient normally ambulates on her own throughout the house without use of walker or cane.  Patient denies head strike or loss of consciousness.  She also reports a productive cough since Wednesday.  Admits to fever today with Tmax 103F, states she took Tylenol around 2:30 PM.  Denies any known sick contacts.  Reports headache and weakness.  States she has had poor appetite with poor p.o. intake for past day.  Denies chest pain, shortness of breath, abdominal pain, nausea, vomiting, dysuria, increased frequency or urgency.     Procedures Performed:   Orders Placed This Encounter   Procedures    ED ECG Documentation Only       Hospital Course:   Ambulatory dysfunction-POA, likely related to acute COVID infection  - PT/OT recommendations appreciated  - Patient declines inpatient postacute rehab  - Discharged with home health care     COVID-19-she is outside of the treatment window.  She does not have an oxygen requirement.  - Continue antitussives and supportive care     Anxiety-stable with outpatient Xanax and imipramine      Hypothyroidism-TSH is suppressed at 0.229 however free T4 is in the normal range, will recheck in the outpatient setting     Hyperlipidemia-continue on statin     HFpEF-EF 60% by echo April 2024.  Patient appears euvolemic.  - Discontinue IV fluids      Current Facility-Administered Medications:     acetaminophen (TYLENOL) tablet 650 mg, 650 mg, Oral, Q6H PRN, Ana Shepherd PA-C, 650 mg at 12/02/24 1317    ALPRAZolam (XANAX) tablet 0.5 mg, 0.5 mg, Oral, 4x Daily PRN, Ezekiel Vargas MD, 0.5 mg at 12/02/24 0951    atenolol (TENORMIN) tablet 50 mg, 50 mg, Oral, BID, Ana Shepherd PA-C, 50 mg at 12/03/24 0844    atorvastatin (LIPITOR) tablet 10 mg, 10 mg, Oral, Daily With Dinner, Ana Shepherd PA-C, 10 mg at 12/02/24 1738    dextromethorphan-guaiFENesin (ROBITUSSIN DM) oral syrup 10 mL, 10 mL, Oral, Q4H PRN, Ana Shepherd PA-C, 10 mL at 12/02/24 0840    heparin (porcine) subcutaneous injection 5,000 Units, 5,000 Units, Subcutaneous, Q8H RACHEL, Ana Shepherd PA-C, 5,000 Units at 12/03/24 0600    imipramine (TOFRANIL) tablet 10 mg, 10 mg, Oral, HS, Ana Shepherd PA-C, 10 mg at 12/02/24 2322    influenza vaccine, high-dose (Fluzone High-Dose) IM injection 0.5 mL, 0.5 mL, Intramuscular, Once, Elias ANG MD    latanoprost (XALATAN) 0.005 % ophthalmic solution 1 drop, 1 drop, Both Eyes, HS, Ana Shepherd PA-C, 1 drop at 12/02/24 2322    levothyroxine tablet 50 mcg, 50 mcg, Oral, Early Morning, Ana Shepherd PA-C, 50 mcg at 12/03/24 0600    lisinopril (ZESTRIL) tablet 5 mg, 5 mg, Oral, Daily, KYA Bardales-C, 5 mg at 12/03/24 0844    pantoprazole (PROTONIX) EC tablet 40 mg, 40 mg, Oral, Early Morning, Ana Shepherd PA-C, 40 mg at 12/03/24 0600    saccharomyces boulardii (FLORASTOR) capsule 250 mg, 250 mg, Oral, BID, Ana Shepherd PA-C, 250 mg at 12/03/24 0844    sodium chloride 0.9 % infusion, 125 mL/hr, Intravenous, Continuous, KYA Bardales-BOOGIE, Last  Rate: 125 mL/hr at 12/02/24 0838, 125 mL/hr at 12/02/24 0838      Consulting Providers   None    Significant Findings, Care, Treatment and Services Provided:   Chest x-ray:    IMPRESSION:     No acute cardiopulmonary disease.     Moderate hiatal hernia.      CT brain:  IMPRESSION:     No acute intracranial abnormality.      CT cervical spine:    IMPRESSION:     No cervical spine fracture or traumatic malalignment.    Complications:  None  Physical Exam:  General Appearance:    Alert, cooperative, no distress   Head:    Normocephalic, without obvious abnormality, atraumatic   Eyes:    PERRL, conjunctiva/corneas clear, EOM's intact       Nose:   Moist mucous membranes, no drainage or sinus tenderness   Throat:   No tenderness, no exudates   Neck:   Supple, symmetrical, trachea midline, no JVD   Lungs:     Clear to auscultation bilaterally, respirations unlabored   Heart:    Regular rate and rhythm, S1 and S2 normal, no murmur, rub   or gallop   Abdomen: Soft, non-tender, positive bowel sounds, no masses, no organomegaly   Extremities:  No pedal edema, calf tenderness. Distal pulses palpable.   Neurologic:     CNII-XII intact.    Labs:   Lab Results   Component Value Date    WBC 4.20 (L) 12/02/2024    WBC 6.51 09/10/2015    RBC 3.44 (L) 12/02/2024    RBC 3.67 (L) 09/10/2015    HGB 10.3 (L) 12/02/2024    HGB 10.9 (L) 09/10/2015    HCT 31.6 (L) 12/02/2024    HCT 32.8 (L) 09/10/2015    MCV 92 12/02/2024    MCV 89 09/10/2015    MCH 29.9 12/02/2024    MCH 29.7 09/10/2015    RDW 13.9 12/02/2024    RDW 14.2 09/10/2015     (L) 12/02/2024     09/10/2015     Lab Results   Component Value Date    CREATININE 1.28 12/02/2024    CREATININE 1.14 09/10/2015    BUN 11 12/02/2024    BUN 15 09/10/2015     09/10/2015    K 3.7 12/02/2024    K 3.8 09/10/2015     12/02/2024     09/10/2015    CO2 25 12/02/2024    CO2 28 09/10/2015    GLUCOSE 85 09/10/2015    PROT 7.6 10/01/2015    PROT 7.6 09/10/2015    ALKPHOS  67 12/02/2024    ALKPHOS 96 09/10/2015    ALT 11 12/02/2024    ALT 17 09/10/2015    AST 25 12/02/2024    AST 13 09/10/2015    BILIDIR 0.12 10/07/2024    BILIDIR 0.16 09/10/2015         Discharge Diagnosis:   Principal Problem:    Ambulatory dysfunction  Active Problems:    Anxiety    Iron deficiency anemia    Hypothyroidism    Hypertension    Hyperlipidemia    Chronic diastolic CHF (congestive heart failure) (HCC)    Stage 3b chronic kidney disease (HCC)    COVID-19  Resolved Problems:    * No resolved hospital problems. *      Condition at Discharge:   Good     Code Status: Level 1 - Full Code  Advance Directive and Living Will: <no information>  Power of :    POLST:      Discharge instructions/Information to patient and family:   See after visit summary for information provided to patient and family.      Provisions for Follow-Up Care:  See after visit summary for information related to follow-up care and any pertinent home health orders.      Disposition:   Home    Planned Readmission:   No    Discharge Statement   I spent 35 minutes discharging the patient. This time was spent on the day of discharge. I had direct contact with the patient on the day of discharge. Additional documentation is required if more than 30 minutes were spent on discharge. Greater than 50% of the total time was spent examining patient, answering all patient questions, arranging and discussing plan of care with patient as well as directly providing post-discharge instructions. Additional time then spent on discharge activities.    Discharge Medications:  See after visit summary for reconciled discharge medications provided to patient and family.      Ezekiel Vargas MD  12/3/2024,8:54 AM

## 2024-12-03 NOTE — CASE MANAGEMENT
Case Management Discharge Planning Note    Patient name Lakeisha Langston  Location /-01 MRN [id number]  : 1935 Date 12/3/2024       Current Admission Date: 2024  Current Admission Diagnosis:Ambulatory dysfunction   Patient Active Problem List    Diagnosis Date Noted Date Diagnosed    COVID-19 2024     Weakness 2024     Ambulatory dysfunction 2024     Left-sided back pain 08/15/2024     Depression, recurrent (HCC) 2023     Stage 3b chronic kidney disease (HCC) 02/10/2022     Chronic diastolic CHF (congestive heart failure) ([facility]) 2022     Left knee injury 2021     Ankle swelling 2021     B12 deficiency 2021     Neuropathy 2021     Syncope 2021     Hiatal hernia 2020     Eustachian tube dysfunction, bilateral 2020     Tension-type headache, not intractable 2020     Closed fracture of tenth thoracic vertebra with routine healing 10/16/2019     Fall as cause of accidental injury in home as place of occurrence 10/16/2019     Age-related osteoporosis without current pathological fracture 2019     Mild intermittent asthma without complication 2019     Impaired fasting glucose 2018     Venous insufficiency 2018     AVM (arteriovenous malformation) 2018     Chest heaviness 2018     Anxiety 2018     Abnormal EKG 2018     Iron deficiency anemia 2016     Atrophic vaginitis 2016     GERD without esophagitis 2014     Vitamin D deficiency 2014     Irritable bowel syndrome 2014     Hypothyroidism 2014     Hypertension 2014     Hyperlipidemia 2014     Benign familial tremor 2014     Dyslipidemia, goal to be determined 2007     Generalized anxiety disorder 2007     Osteoporosis 2007       LOS (days): 1  Geometric Mean LOS (GMLOS) (days):   Days to GMLOS:     OBJECTIVE:  Risk of Unplanned Readmission Score: 18.51          Current admission status: Inpatient   Preferred Pharmacy:   [id number] - [place], [place] - [address]  [facility] [address]  Phone: [phone] Fax: [phone]    Primary Care Provider: Ezekiel Vargas MD    Primary Insurance: MEDICARE  Secondary Insurance: AARP    DISCHARGE DETAILS:  Message received from Codi Naheed via epic [facility] CM called pt dtr Katreine and reviewed pt choice. Pt dtr choice was [facility] which was reserved. AVS updated.              Requested Home Health Care         Is the patient interested in HHC at discharge?: Yes  Home Health Discipline requested:: Occupational Therapy, Physical Therapy  Home Health Agency Name:: [facility]  HHA External Referral Reason (only applicable if external HHA name selected): Patient has established relationship with provider  Home Health Follow-Up Provider:: PCP  Home Health Services Needed:: Administration of IV, IM or SC Medications, Strengthening/Theraputic Exercises to Improve Function, Evaluate Functional Status and Safety, Gait/ADL Training  Homebound Criteria Met:: Requires the Assistance of Another Person for Safe Ambulation or to Leave the Home, Uses an Assist Device (i.e. cane, walker, etc)  Supporting Clincal Findings:: Limited Endurance, Fatigues Easliy in Short Distances

## 2024-12-03 NOTE — PROGRESS NOTES
Patient:    MRN:  [id number]    Janusz Request ID:  [id number]    Level of care reserved:  Home Health Agency    Partner Reserved:  [facility], [place] [address] [phone]    Clinical needs requested:    Geography searched:  [phone]    Start of Service:    Request sent:  1:20pm EST on 12/2/2024 by Barbara Brown    Partner reserved:  8:55am EST on 12/3/2024 by Barbara Brown    Choice list shared:  8:54am EST on 12/3/2024 by Barbara Brown

## 2024-12-03 NOTE — TELEPHONE ENCOUNTER
Patient daughter called in requesting a letter for work to inform work that mom cannot be alone. Until she gets things straightened out. Lew will contact her job to see what exactly she needs then call back.

## 2024-12-03 NOTE — PLAN OF CARE
Problem: PAIN - ADULT  Goal: Verbalizes/displays adequate comfort level or baseline comfort level  Description: Interventions:  - Encourage patient to monitor pain and request assistance  - Assess pain using appropriate pain scale  - Administer analgesics based on type and severity of pain and evaluate response  - Implement non-pharmacological measures as appropriate and evaluate response  - Consider cultural and social influences on pain and pain management  - Notify physician/advanced practitioner if interventions unsuccessful or patient reports new pain  Outcome: Progressing     Problem: INFECTION - ADULT  Goal: Absence or prevention of progression during hospitalization  Description: INTERVENTIONS:  - Assess and monitor for signs and symptoms of infection  - Monitor lab/diagnostic results  - Monitor all insertion sites, i.e. indwelling lines, tubes, and drains  - Monitor endotracheal if appropriate and nasal secretions for changes in amount and color  - Madeline appropriate cooling/warming therapies per order  - Administer medications as ordered  - Instruct and encourage patient and family to use good hand hygiene technique  - Identify and instruct in appropriate isolation precautions for identified infection/condition  Outcome: Progressing  Goal: Absence of fever/infection during neutropenic period  Description: INTERVENTIONS:  - Monitor WBC    Outcome: Progressing     Problem: SAFETY ADULT  Goal: Patient will remain free of falls  Description: INTERVENTIONS:  - Educate patient/family on patient safety including physical limitations  - Instruct patient to call for assistance with activity   - Consult OT/PT to assist with strengthening/mobility   - Keep Call bell within reach  - Keep bed low and locked with side rails adjusted as appropriate  - Keep care items and personal belongings within reach  - Initiate and maintain comfort rounds  - Make Fall Risk Sign visible to staff  - Offer Toileting every 2 Hours,  in advance of need  - Initiate/Maintain bed alarm  - Obtain necessary fall risk management equipment:   - Apply yellow socks and bracelet for high fall risk patients  - Consider moving patient to room near nurses station  Outcome: Progressing  Goal: Maintain or return to baseline ADL function  Description: INTERVENTIONS:  -  Assess patient's ability to carry out ADLs; assess patient's baseline for ADL function and identify physical deficits which impact ability to perform ADLs (bathing, care of mouth/teeth, toileting, grooming, dressing, etc.)  - Assess/evaluate cause of self-care deficits   - Assess range of motion  - Assess patient's mobility; develop plan if impaired  - Assess patient's need for assistive devices and provide as appropriate  - Encourage maximum independence but intervene and supervise when necessary  - Involve family in performance of ADLs  - Assess for home care needs following discharge   - Consider OT consult to assist with ADL evaluation and planning for discharge  - Provide patient education as appropriate  Outcome: Progressing  Goal: Maintains/Returns to pre admission functional level  Description: INTERVENTIONS:  - Perform AM-PAC 6 Click Basic Mobility/ Daily Activity assessment daily.  - Set and communicate daily mobility goal to care team and patient/family/caregiver.   - Collaborate with rehabilitation services on mobility goals if consulted  - Perform Range of Motion 2 times a day.  - Reposition patient every 2 hours.  - Dangle patient 2 times a day  - Stand patient 2 times a day  - Ambulate patient 2 times a day  - Out of bed to chair 2 times a day   - Out of bed for meals 2 times a day  - Out of bed for toileting  - Record patient progress and toleration of activity level   Outcome: Progressing     Problem: DISCHARGE PLANNING  Goal: Discharge to home or other facility with appropriate resources  Description: INTERVENTIONS:  - Identify barriers to discharge w/patient and caregiver  -  Arrange for needed discharge resources and transportation as appropriate  - Identify discharge learning needs (meds, wound care, etc.)  - Arrange for interpretive services to assist at discharge as needed  - Refer to Case Management Department for coordinating discharge planning if the patient needs post-hospital services based on physician/advanced practitioner order or complex needs related to functional status, cognitive ability, or social support system  Outcome: Progressing     Problem: Knowledge Deficit  Goal: Patient/family/caregiver demonstrates understanding of disease process, treatment plan, medications, and discharge instructions  Description: Complete learning assessment and assess knowledge base.  Interventions:  - Provide teaching at level of understanding  - Provide teaching via preferred learning methods  Outcome: Progressing     Problem: Prexisting or High Potential for Compromised Skin Integrity  Goal: Skin integrity is maintained or improved  Description: INTERVENTIONS:  - Identify patients at risk for skin breakdown  - Assess and monitor skin integrity  - Assess and monitor nutrition and hydration status  - Monitor labs   - Assess for incontinence   - Turn and reposition patient  - Assist with mobility/ambulation  - Relieve pressure over bony prominences  - Avoid friction and shearing  - Provide appropriate hygiene as needed including keeping skin clean and dry  - Evaluate need for skin moisturizer/barrier cream  - Collaborate with interdisciplinary team   - Patient/family teaching  - Consider wound care consult   Outcome: Progressing

## 2024-12-03 NOTE — CASE MANAGEMENT
Case Management Discharge Planning Note    Patient name Lakeisha Langston  Location /-01 MRN [id number]  : 1935 Date 12/3/2024       Current Admission Date: 2024  Current Admission Diagnosis:Ambulatory dysfunction   Patient Active Problem List    Diagnosis Date Noted Date Diagnosed    COVID-19 2024     Weakness 2024     Ambulatory dysfunction 2024     Left-sided back pain 08/15/2024     Depression, recurrent (HCC) 2023     Stage 3b chronic kidney disease (HCC) 02/10/2022     Chronic diastolic CHF (congestive heart failure) ([facility]) 2022     Left knee injury 2021     Ankle swelling 2021     B12 deficiency 2021     Neuropathy 2021     Syncope 2021     Hiatal hernia 2020     Eustachian tube dysfunction, bilateral 2020     Tension-type headache, not intractable 2020     Closed fracture of tenth thoracic vertebra with routine healing 10/16/2019     Fall as cause of accidental injury in home as place of occurrence 10/16/2019     Age-related osteoporosis without current pathological fracture 2019     Mild intermittent asthma without complication 2019     Impaired fasting glucose 2018     Venous insufficiency 2018     AVM (arteriovenous malformation) 2018     Chest heaviness 2018     Anxiety 2018     Abnormal EKG 2018     Iron deficiency anemia 2016     Atrophic vaginitis 2016     GERD without esophagitis 2014     Vitamin D deficiency 2014     Irritable bowel syndrome 2014     Hypothyroidism 2014     Hypertension 2014     Hyperlipidemia 2014     Benign familial tremor 2014     Dyslipidemia, goal to be determined 2007     Generalized anxiety disorder 2007     Osteoporosis 2007       LOS (days): 1  Geometric Mean LOS (GMLOS) (days):   Days to GMLOS:     OBJECTIVE:  Risk of Unplanned Readmission Score: 18.51          Current admission status: Inpatient   Preferred Pharmacy:   [id number] - [place], [place] - [address]   [address] [facility] [address]  Phone: [phone] Fax: [phone]    Primary Care Provider: Ezekiel Vargas MD    Primary Insurance: MEDICARE  Secondary Insurance: AARP    DISCHARGE DETAILS:    Discharge planning discussed with:: Pt dtr Katerine  Freedom of Choice: Yes  Comments - Freedom of Choice: As per Dr. Vargas, pt is being dc home today. Cm called and spoke with pt dtr and reviewed pt choice list for available HHC. Pt dtr choice was [facility] Agency resreved and AVS updated. CM continue to follow and assist with pt dc plans.  CM contacted family/caregiver?: Yes  Were Treatment Team discharge recommendations reviewed with patient/caregiver?: Yes  Did patient/caregiver verbalize understanding of patient care needs?: Yes  Were patient/caregiver advised of the risks associated with not following Treatment Team discharge recommendations?: Yes    Contacts  Patient Contacts: Katerine (Dtr)  Relationship to Patient:: Family  Contact Method: Phone  Phone Number: [phone]  Reason/Outcome: Continuity of Care, Emergency Contact, Discharge Planning, Referral    Requested Home Health Care         Is the patient interested in HHC at discharge?: Yes  Home Health Discipline requested:: Nursing, Occupational Therapy, Physical Therapy  Home Health Agency Name:: [facility]  HHA External Referral Reason (only applicable if external HHA name selected): Patient has established relationship with provider  Home Health Follow-Up Provider:: PCP  Home Health Services Needed:: Administration of IV, IM or SC Medications, Evaluate Functional Status and Safety, Gait/ADL Training, Strengthening/Theraputic Exercises to Improve Function  Homebound Criteria Met:: Requires the Assistance of Another Person for Safe Ambulation or to Leave the Home, Uses an Assist Device (i.e. cane, walker,  etc)  Supporting Clincal Findings:: Limited Endurance, Fatigues Easliy in Short Distances    DME Referral Provided  Referral made for DME?: No         Would you like to participate in our [facility] service program?  : No - Declined    Treatment Team Recommendation: Home with Home Health Care  Discharge Destination Plan:: Home with Home Health Care  Transport at Discharge : Family            IMM Given (Date):: 12/03/24  IMM Given to:: Family  Family notified:: Pt dtr  Additional Comments: IMM reviewed with pt dtr over the phone. Pt expressed full and complete understanding.Verbal consent obtained. Copy given and original in MR.

## 2024-12-04 LAB
4HR DELTA HS TROPONIN: -1 NG/L
ALBUMIN SERPL BCG-MCNC: 4 G/DL (ref 3.5–5)
ALP SERPL-CCNC: 58 U/L (ref 34–104)
ALT SERPL W P-5'-P-CCNC: 14 U/L (ref 7–52)
ANION GAP SERPL CALCULATED.3IONS-SCNC: 6 MMOL/L (ref 4–13)
ANION GAP SERPL CALCULATED.3IONS-SCNC: 8 MMOL/L (ref 4–13)
AST SERPL W P-5'-P-CCNC: 36 U/L (ref 13–39)
ATRIAL RATE: 62 BPM
ATRIAL RATE: 63 BPM
BASOPHILS # BLD AUTO: 0.01 THOUSANDS/ÂΜL (ref 0–0.1)
BASOPHILS # BLD AUTO: 0.02 THOUSANDS/ÂΜL (ref 0–0.1)
BASOPHILS NFR BLD AUTO: 0 % (ref 0–1)
BASOPHILS NFR BLD AUTO: 1 % (ref 0–1)
BILIRUB SERPL-MCNC: 0.36 MG/DL (ref 0.2–1)
BUN SERPL-MCNC: 12 MG/DL (ref 5–25)
BUN SERPL-MCNC: 13 MG/DL (ref 5–25)
CALCIUM SERPL-MCNC: 8.5 MG/DL (ref 8.4–10.2)
CALCIUM SERPL-MCNC: 8.8 MG/DL (ref 8.4–10.2)
CARDIAC TROPONIN I PNL SERPL HS: 12 NG/L (ref ?–50)
CARDIAC TROPONIN I PNL SERPL HS: 13 NG/L (ref ?–50)
CHLORIDE SERPL-SCNC: 104 MMOL/L (ref 96–108)
CHLORIDE SERPL-SCNC: 99 MMOL/L (ref 96–108)
CO2 SERPL-SCNC: 25 MMOL/L (ref 21–32)
CO2 SERPL-SCNC: 26 MMOL/L (ref 21–32)
CREAT SERPL-MCNC: 1.26 MG/DL (ref 0.6–1.3)
CREAT SERPL-MCNC: 1.37 MG/DL (ref 0.6–1.3)
EOSINOPHIL # BLD AUTO: 0.01 THOUSAND/ÂΜL (ref 0–0.61)
EOSINOPHIL # BLD AUTO: 0.02 THOUSAND/ÂΜL (ref 0–0.61)
EOSINOPHIL NFR BLD AUTO: 0 % (ref 0–6)
EOSINOPHIL NFR BLD AUTO: 1 % (ref 0–6)
ERYTHROCYTE [DISTWIDTH] IN BLOOD BY AUTOMATED COUNT: 13.8 % (ref 11.6–15.1)
ERYTHROCYTE [DISTWIDTH] IN BLOOD BY AUTOMATED COUNT: 14 % (ref 11.6–15.1)
GFR SERPL CREATININE-BSD FRML MDRD: 34 ML/MIN/1.73SQ M
GFR SERPL CREATININE-BSD FRML MDRD: 38 ML/MIN/1.73SQ M
GLUCOSE P FAST SERPL-MCNC: 98 MG/DL (ref 65–99)
GLUCOSE SERPL-MCNC: 112 MG/DL (ref 65–140)
GLUCOSE SERPL-MCNC: 98 MG/DL (ref 65–140)
HCT VFR BLD AUTO: 34.5 % (ref 34.8–46.1)
HCT VFR BLD AUTO: 36.5 % (ref 34.8–46.1)
HGB BLD-MCNC: 11.3 G/DL (ref 11.5–15.4)
HGB BLD-MCNC: 11.8 G/DL (ref 11.5–15.4)
IMM GRANULOCYTES # BLD AUTO: 0 THOUSAND/UL (ref 0–0.2)
IMM GRANULOCYTES # BLD AUTO: 0.02 THOUSAND/UL (ref 0–0.2)
IMM GRANULOCYTES NFR BLD AUTO: 0 % (ref 0–2)
IMM GRANULOCYTES NFR BLD AUTO: 1 % (ref 0–2)
LYMPHOCYTES # BLD AUTO: 1.15 THOUSANDS/ÂΜL (ref 0.6–4.47)
LYMPHOCYTES # BLD AUTO: 1.28 THOUSANDS/ÂΜL (ref 0.6–4.47)
LYMPHOCYTES NFR BLD AUTO: 29 % (ref 14–44)
LYMPHOCYTES NFR BLD AUTO: 34 % (ref 14–44)
MCH RBC QN AUTO: 30 PG (ref 26.8–34.3)
MCH RBC QN AUTO: 30 PG (ref 26.8–34.3)
MCHC RBC AUTO-ENTMCNC: 32.3 G/DL (ref 31.4–37.4)
MCHC RBC AUTO-ENTMCNC: 32.8 G/DL (ref 31.4–37.4)
MCV RBC AUTO: 92 FL (ref 82–98)
MCV RBC AUTO: 93 FL (ref 82–98)
MONOCYTES # BLD AUTO: 0.57 THOUSAND/ÂΜL (ref 0.17–1.22)
MONOCYTES # BLD AUTO: 0.8 THOUSAND/ÂΜL (ref 0.17–1.22)
MONOCYTES NFR BLD AUTO: 17 % (ref 4–12)
MONOCYTES NFR BLD AUTO: 18 % (ref 4–12)
NEUTROPHILS # BLD AUTO: 1.65 THOUSANDS/ÂΜL (ref 1.85–7.62)
NEUTROPHILS # BLD AUTO: 2.28 THOUSANDS/ÂΜL (ref 1.85–7.62)
NEUTS SEG NFR BLD AUTO: 49 % (ref 43–75)
NEUTS SEG NFR BLD AUTO: 50 % (ref 43–75)
NRBC BLD AUTO-RTO: 0 /100 WBCS
NRBC BLD AUTO-RTO: 0 /100 WBCS
P AXIS: 65 DEGREES
P AXIS: 72 DEGREES
PLATELET # BLD AUTO: 114 THOUSANDS/UL (ref 149–390)
PLATELET # BLD AUTO: 114 THOUSANDS/UL (ref 149–390)
PMV BLD AUTO: 10.3 FL (ref 8.9–12.7)
PMV BLD AUTO: 10.5 FL (ref 8.9–12.7)
POTASSIUM SERPL-SCNC: 3.8 MMOL/L (ref 3.5–5.3)
POTASSIUM SERPL-SCNC: 3.9 MMOL/L (ref 3.5–5.3)
PR INTERVAL: 150 MS
PR INTERVAL: 154 MS
PROT SERPL-MCNC: 6.9 G/DL (ref 6.4–8.4)
QRS AXIS: 0 DEGREES
QRS AXIS: 4 DEGREES
QRSD INTERVAL: 82 MS
QRSD INTERVAL: 82 MS
QT INTERVAL: 488 MS
QT INTERVAL: 500 MS
QTC INTERVAL: 495 MS
QTC INTERVAL: 511 MS
RBC # BLD AUTO: 3.77 MILLION/UL (ref 3.81–5.12)
RBC # BLD AUTO: 3.93 MILLION/UL (ref 3.81–5.12)
SODIUM SERPL-SCNC: 132 MMOL/L (ref 135–147)
SODIUM SERPL-SCNC: 136 MMOL/L (ref 135–147)
T WAVE AXIS: 68 DEGREES
T WAVE AXIS: 74 DEGREES
VENTRICULAR RATE: 62 BPM
VENTRICULAR RATE: 63 BPM
WBC # BLD AUTO: 3.39 THOUSAND/UL (ref 4.31–10.16)
WBC # BLD AUTO: 4.42 THOUSAND/UL (ref 4.31–10.16)

## 2024-12-04 PROCEDURE — 99232 SBSQ HOSP IP/OBS MODERATE 35: CPT | Performed by: INTERNAL MEDICINE

## 2024-12-04 PROCEDURE — 85025 COMPLETE CBC W/AUTO DIFF WBC: CPT

## 2024-12-04 PROCEDURE — 85025 COMPLETE CBC W/AUTO DIFF WBC: CPT | Performed by: INTERNAL MEDICINE

## 2024-12-04 PROCEDURE — 93005 ELECTROCARDIOGRAM TRACING: CPT

## 2024-12-04 PROCEDURE — 93010 ELECTROCARDIOGRAM REPORT: CPT | Performed by: INTERNAL MEDICINE

## 2024-12-04 PROCEDURE — 99285 EMERGENCY DEPT VISIT HI MDM: CPT

## 2024-12-04 PROCEDURE — 96360 HYDRATION IV INFUSION INIT: CPT

## 2024-12-04 PROCEDURE — 36415 COLL VENOUS BLD VENIPUNCTURE: CPT

## 2024-12-04 PROCEDURE — 80048 BASIC METABOLIC PNL TOTAL CA: CPT | Performed by: INTERNAL MEDICINE

## 2024-12-04 PROCEDURE — 84484 ASSAY OF TROPONIN QUANT: CPT | Performed by: INTERNAL MEDICINE

## 2024-12-04 PROCEDURE — 84484 ASSAY OF TROPONIN QUANT: CPT

## 2024-12-04 PROCEDURE — 99223 1ST HOSP IP/OBS HIGH 75: CPT | Performed by: INTERNAL MEDICINE

## 2024-12-04 PROCEDURE — 80053 COMPREHEN METABOLIC PANEL: CPT

## 2024-12-04 RX ORDER — PRAVASTATIN SODIUM 40 MG
40 TABLET ORAL
Status: DISCONTINUED | OUTPATIENT
Start: 2024-12-04 | End: 2024-12-05

## 2024-12-04 RX ORDER — ACETAMINOPHEN 325 MG/1
650 TABLET ORAL EVERY 6 HOURS PRN
Status: DISCONTINUED | OUTPATIENT
Start: 2024-12-04 | End: 2024-12-07 | Stop reason: HOSPADM

## 2024-12-04 RX ORDER — GUAIFENESIN 600 MG/1
600 TABLET, EXTENDED RELEASE ORAL EVERY 12 HOURS SCHEDULED
Status: DISCONTINUED | OUTPATIENT
Start: 2024-12-04 | End: 2024-12-04

## 2024-12-04 RX ORDER — IMIPRAMINE HYDROCHLORIDE 10 MG/1
10 TABLET, FILM COATED ORAL
Status: DISCONTINUED | OUTPATIENT
Start: 2024-12-04 | End: 2024-12-07 | Stop reason: HOSPADM

## 2024-12-04 RX ORDER — LEVOTHYROXINE SODIUM 50 UG/1
50 TABLET ORAL
Status: DISCONTINUED | OUTPATIENT
Start: 2024-12-04 | End: 2024-12-07 | Stop reason: HOSPADM

## 2024-12-04 RX ORDER — LISINOPRIL 5 MG/1
5 TABLET ORAL DAILY
Status: DISCONTINUED | OUTPATIENT
Start: 2024-12-04 | End: 2024-12-06

## 2024-12-04 RX ORDER — ATENOLOL 50 MG/1
50 TABLET ORAL 2 TIMES DAILY
Status: DISCONTINUED | OUTPATIENT
Start: 2024-12-04 | End: 2024-12-07 | Stop reason: HOSPADM

## 2024-12-04 RX ORDER — LATANOPROST 50 UG/ML
1 SOLUTION/ DROPS OPHTHALMIC
Status: DISCONTINUED | OUTPATIENT
Start: 2024-12-04 | End: 2024-12-07 | Stop reason: HOSPADM

## 2024-12-04 RX ORDER — GUAIFENESIN/DEXTROMETHORPHAN 100-10MG/5
10 SYRUP ORAL EVERY 6 HOURS PRN
Status: DISCONTINUED | OUTPATIENT
Start: 2024-12-04 | End: 2024-12-07 | Stop reason: HOSPADM

## 2024-12-04 RX ORDER — ALBUTEROL SULFATE 90 UG/1
1 INHALANT RESPIRATORY (INHALATION) EVERY 4 HOURS PRN
Status: DISCONTINUED | OUTPATIENT
Start: 2024-12-04 | End: 2024-12-07 | Stop reason: HOSPADM

## 2024-12-04 RX ORDER — ALPRAZOLAM 0.5 MG
0.5 TABLET ORAL 4 TIMES DAILY PRN
Status: DISCONTINUED | OUTPATIENT
Start: 2024-12-04 | End: 2024-12-07 | Stop reason: HOSPADM

## 2024-12-04 RX ORDER — HEPARIN SODIUM 5000 [USP'U]/ML
5000 INJECTION, SOLUTION INTRAVENOUS; SUBCUTANEOUS EVERY 8 HOURS SCHEDULED
Status: DISCONTINUED | OUTPATIENT
Start: 2024-12-04 | End: 2024-12-07 | Stop reason: HOSPADM

## 2024-12-04 RX ADMIN — GUAIFENESIN AND DEXTROMETHORPHAN 10 ML: 100; 10 SYRUP ORAL at 21:06

## 2024-12-04 RX ADMIN — ATENOLOL 50 MG: 50 TABLET ORAL at 18:18

## 2024-12-04 RX ADMIN — IMIPRAMINE HYDROCHLORIDE 10 MG: 10 TABLET ORAL at 21:06

## 2024-12-04 RX ADMIN — LISINOPRIL 5 MG: 5 TABLET ORAL at 10:16

## 2024-12-04 RX ADMIN — LEVOTHYROXINE SODIUM 50 MCG: 0.05 TABLET ORAL at 06:44

## 2024-12-04 RX ADMIN — SODIUM CHLORIDE 1000 ML: 0.9 INJECTION, SOLUTION INTRAVENOUS at 00:15

## 2024-12-04 RX ADMIN — HEPARIN SODIUM 5000 UNITS: 5000 INJECTION, SOLUTION INTRAVENOUS; SUBCUTANEOUS at 06:44

## 2024-12-04 RX ADMIN — ATENOLOL 50 MG: 50 TABLET ORAL at 10:16

## 2024-12-04 RX ADMIN — HEPARIN SODIUM 5000 UNITS: 5000 INJECTION, SOLUTION INTRAVENOUS; SUBCUTANEOUS at 15:52

## 2024-12-04 RX ADMIN — ACETAMINOPHEN 650 MG: 325 TABLET, FILM COATED ORAL at 03:43

## 2024-12-04 RX ADMIN — PRAVASTATIN SODIUM 40 MG: 40 TABLET ORAL at 18:18

## 2024-12-04 RX ADMIN — HEPARIN SODIUM 5000 UNITS: 5000 INJECTION, SOLUTION INTRAVENOUS; SUBCUTANEOUS at 21:06

## 2024-12-04 RX ADMIN — LATANOPROST 1 DROP: 50 SOLUTION OPHTHALMIC at 21:06

## 2024-12-04 NOTE — H&P
H&P - Hospitalist   Name: Lakeisha Langston 89 y.o. female I MRN: [id number]  Unit/Bed#: ED 13 I Date of Admission: 12/3/2024   Date of Service: 12/4/2024 I Hospital Day: 0     Assessment & Plan  Ambulatory dysfunction  Presented with generalized weakness, fatigue and difficulty ambulating  Was recently discharged yesterday at that time refused rehab now agreeable to rehab placement  Labs otherwise unremarkable  PT/OT eval  Follow-up with case management for placement  Hypothyroidism  Continue Synthroid  Hypertension  BP controlled  Continue beta-blocker, Lisinopril  Mild intermittent asthma without complication  Not in acute exacerbation  Continue as needed inhaler  Stage 3b chronic kidney disease (HCC)  Lab Results   Component Value Date    EGFR 34 12/04/2024    EGFR 37 12/02/2024    EGFR 35 12/01/2024    CREATININE 1.37 (H) 12/04/2024    CREATININE 1.28 12/02/2024    CREATININE 1.33 (H) 12/01/2024   Baseline creatinine approximately 1.3-1.5  Currently at baseline  Continue to monitor  COVID-19  Tested positive on the 1st  Currently asymptomatic  Continue with conservative therapy      VTE Pharmacologic Prophylaxis:   Moderate Risk (Score 3-4) - Pharmacological DVT Prophylaxis Ordered: heparin.  Code Status: Full code  Discussion with family: Patient declined call to .     Anticipated Length of Stay: Patient will be admitted on an observation basis with an anticipated length of stay of less than 2 midnights secondary to  ambulatory dysfunction.    History of Present Illness   Chief Complaint: = Generalized weakness    Lakeisha Langston is a 89 y.o. female with past medical history significant hypertension, CKD initially presented with generalized weakness.  Reports difficulty ambulating and performing ADLs at home recently tested positive for COVID.  Was discharged yesterday.  Now reports swelling to go to rehab.  Denies chest pain, shortness of breath, fevers, chills, cough or any other complaints    Review  of Systems   Constitutional:  Positive for activity change and appetite change. Negative for chills, diaphoresis, fever and unexpected weight change.   HENT:  Negative for congestion, facial swelling and rhinorrhea.    Eyes:  Negative for photophobia and visual disturbance.   Respiratory:  Negative for cough, shortness of breath and wheezing.    Cardiovascular:  Negative for chest pain and palpitations.   Gastrointestinal:  Negative for abdominal pain, blood in stool, constipation, diarrhea, nausea and vomiting.   Genitourinary:  Negative for decreased urine volume, difficulty urinating, dysuria, flank pain, frequency, hematuria and urgency.   Musculoskeletal:  Negative for arthralgias, back pain, joint swelling and myalgias.   Neurological:  Negative for dizziness, syncope, facial asymmetry, light-headedness, numbness and headaches.   Psychiatric/Behavioral:  Negative for confusion and decreased concentration. The patient is not nervous/anxious.        Historical Information   Past Medical History:   Diagnosis Date    Anxiety     Arthritis     Benign essential hypertension     Last assessed: 14    Disease of thyroid gland     Essential tremor     Fibromyalgia     GERD (gastroesophageal reflux disease)     History of nail disorders     Hyperlipidemia     Hypertension     Palpitations     Transient cerebral ischemia      Past Surgical History:   Procedure Laterality Date    APPENDECTOMY      EGD AND COLONOSCOPY  2020    HYSTERECTOMY  2010    [place] LAPS SURG CHOLECYSTECTOMY W/CHOLANGIOGRAPHY N/A 2018    Procedure: LAPAROSCOPIC CHOLECYSTECTOMY WITH IOC;  Surgeon: Yakov Mueller MD;  Location: [facility];  Service: General    TUBAL LIGATION       Social History     Tobacco Use    Smoking status: Former     Current packs/day: 0.00     Average packs/day: 0.1 packs/day for 50.0 years (5.0 ttl pk-yrs)     Types: Cigarettes     Start date: 1963     Quit date:      Years since quittin.9     Smokeless tobacco: Never    Tobacco comments:     1 pack a week    Vaping Use    Vaping status: Never Used   Substance and Sexual Activity    Alcohol use: Not Currently     Comment: 0    Drug use: No    Sexual activity: Not Currently     Partners: Male     E-Cigarette/Vaping    E-Cigarette Use Never User      E-Cigarette/Vaping Substances    Nicotine No     THC No     CBD No     Flavoring No     Other No     Unknown No      Family History   Problem Relation Age of Onset    Stroke Mother         ischemic stroke    Hypertension Mother     Heart disease Father     Cancer Sister     No Known Problems Sister     No Known Problems Daughter     No Known Problems Daughter     No Known Problems Daughter     No Known Problems Daughter     No Known Problems Daughter     No Known Problems Maternal Grandmother     No Known Problems Paternal Grandmother     No Known Problems Paternal Aunt     No Known Problems Paternal Aunt     No Known Problems Paternal Aunt     No Known Problems Paternal Aunt     No Known Problems Paternal Aunt     Breast cancer Neg Hx      Social History:  Marital Status:    Patient Pre-hospital Living Situation: Home  Patient Pre-hospital Level of Mobility:  w assistance  Patient Pre-hospital Diet Restrictions: none    Meds/Allergies   I have reviewed home medications with patient personally.  Prior to Admission medications    Medication Sig Start Date End Date Taking? Authorizing Provider   acetaminophen (TYLENOL) 325 mg tablet Take 1-2 tablets by mouth every 6 (six) hours as needed    Historical Provider, MD   albuterol (PROVENTIL HFA,VENTOLIN HFA) 90 mcg/act inhaler  9/21/23   Historical Provider, MD   ALPRAZolam (XANAX) 0.5 mg tablet TAKE 1 TABLET(0.5 MG) BY MOUTH FOUR TIMES DAILY AS NEEDED FOR ANXIETY 11/1/24   Ezekiel Vargas MD   atenolol (TENORMIN) 50 mg tablet Take 1 tablet (50 mg total) by mouth 2 (two) times a day 12/19/23   Ezekiel Vargas MD   dextromethorphan-guaiFENesin (ROBITUSSIN DM)   mg/5 mL syrup Take 10 mL by mouth every 4 (four) hours as needed for cough 12/3/24   Ezekiel Vargas MD   imipramine (TOFRANIL) 10 mg tablet Take 1 tablet by mouth at bedtime 3/12/24   Trista Goyal PA-C   latanoprost (XALATAN) 0.005 % ophthalmic solution Administer 1 drop to both eyes daily at bedtime 9/27/23   AMIE Malin   levothyroxine 50 mcg tablet take 1 tablet by mouth once daily except Sunday take 2 tabs 6/18/24   Ezekiel Vargsa MD   lisinopril (ZESTRIL) 5 mg tablet TAKE 1 TABLET(5 MG) BY MOUTH DAILY 5/18/24   Ezekiel Vargas MD   Probiotic Product (ALIGN) 4 MG CAPS Take 1 tablet by mouth daily    Historical Provider, MD   risedronate (ACTONEL) 35 mg tablet Take 1 tablet (35 mg total) by mouth every 7 days with water on empty stomach, nothing by mouth or lie down for next 30 minutes. 5/25/23   AMIE Jeter   rosuvastatin (CRESTOR) 5 mg tablet Take 1 tablet (5 mg total) by mouth daily 2/19/24   Trista Goyal PA-C     Allergies   Allergen Reactions    Other Drowsiness     Annotation - [id number]: ANTIDEPRESSANTS       Objective :  Temp:  [98.2 °F (36.8 °C)-98.9 °F (37.2 °C)] 98.2 °F (36.8 °C)  HR:  [62-81] 62  BP: (175-185)/(74-77) 175/74  Resp:  [16-18] 18  SpO2:  [94 %] 94 %  O2 Device: None (Room air)    Physical Exam  Constitutional:       General: She is not in acute distress.     Appearance: She is well-developed. She is not diaphoretic.   HENT:      Head: Normocephalic and atraumatic.      Nose: Nose normal.      Mouth/Throat:      Pharynx: No oropharyngeal exudate.   Eyes:      General: No scleral icterus.        Right eye: No discharge.         Left eye: No discharge.      Conjunctiva/sclera: Conjunctivae normal.   Neck:      Thyroid: No thyromegaly.      Vascular: No JVD.   Cardiovascular:      Rate and Rhythm: Normal rate and regular rhythm.      Heart sounds: Normal heart sounds. No murmur heard.     No friction rub. No gallop.   Pulmonary:      Effort:  Pulmonary effort is normal. No respiratory distress.      Breath sounds: Normal breath sounds. No wheezing or rales.   Chest:      Chest wall: No tenderness.   Abdominal:      General: Bowel sounds are normal. There is no distension.      Palpations: Abdomen is soft.      Tenderness: There is no abdominal tenderness. There is no guarding or rebound.   Musculoskeletal:         General: No tenderness or deformity. Normal range of motion.      Cervical back: Normal range of motion and neck supple.   Skin:     General: Skin is warm and dry.      Findings: No erythema or rash.   Neurological:      Mental Status: She is alert. Mental status is at baseline.      Cranial Nerves: No cranial nerve deficit.      Sensory: No sensory deficit.      Motor: No abnormal muscle tone.      Coordination: Coordination normal.          Lines/Drains:            Lab Results: I have reviewed the following results:  Results from last 7 days   Lab Units 12/04/24  0052   WBC Thousand/uL 4.42   HEMOGLOBIN g/dL 11.8   HEMATOCRIT % 36.5   PLATELETS Thousands/uL 114*   SEGS PCT % 50   LYMPHO PCT % 29   MONO PCT % 18*   EOS PCT % 1     Results from last 7 days   Lab Units 12/04/24  0052   SODIUM mmol/L 132*   POTASSIUM mmol/L 3.8   CHLORIDE mmol/L 99   CO2 mmol/L 25   BUN mg/dL 13   CREATININE mg/dL 1.37*   ANION GAP mmol/L 8   CALCIUM mg/dL 8.8   ALBUMIN g/dL 4.0   TOTAL BILIRUBIN mg/dL 0.36   ALK PHOS U/L 58   ALT U/L 14   AST U/L 36   GLUCOSE RANDOM mg/dL 112             Lab Results   Component Value Date    HGBA1C 5.9 (H) 05/31/2024    HGBA1C 5.9 (H) 10/18/2023    HGBA1C 5.0 03/01/2023     Results from last 7 days   Lab Units 12/02/24  0441 12/01/24  1738   LACTIC ACID mmol/L  --  1.2   PROCALCITONIN ng/ml 0.07 0.08       Imaging Results Review: I personally reviewed the following image studies/reports in PACS and discussed pertinent findings with Radiology: chest xray. My interpretation of the radiology images/reports is:  .  Other Study  Results Review: EKG was reviewed.     Administrative Statements   I have spent a total time of 60 minutes in caring for this patient on the day of the visit/encounter including Diagnostic results.    ** Please Note: This note has been constructed using a voice recognition system. **

## 2024-12-04 NOTE — ASSESSMENT & PLAN NOTE
Lab Results   Component Value Date    EGFR 34 12/04/2024    EGFR 37 12/02/2024    EGFR 35 12/01/2024    CREATININE 1.37 (H) 12/04/2024    CREATININE 1.28 12/02/2024    CREATININE 1.33 (H) 12/01/2024   Baseline creatinine approximately 1.3-1.5  Currently at baseline  Continue to monitor

## 2024-12-04 NOTE — ED ATTENDING ATTESTATION
12/3/2024  I, David Centeno MD, saw and evaluated the patient. I have discussed the patient with the resident/non-physician practitioner and agree with the resident's/non-physician practitioner's findings, Plan of Care, and MDM as documented in the resident's/non-physician practitioner's note, except where noted. All available labs and Radiology studies were reviewed.  I was present for key portions of any procedure(s) performed by the resident/non-physician practitioner and I was immediately available to provide assistance.       At this point I agree with the current assessment done in the Emergency Department.  I have conducted an independent evaluation of this patient a history and physical is as follows:    89-year-old female recently discharged from the hospital after diagnosis of COVID presents to the emergency room after syncopal episode at home witnessed by family.  Family states that she was being guided through the house when she became briefly unresponsive and was guided to the ground.  Patient's family was walking with her and they deny any head strike.     Patient family states she was back to herself shortly after the episode.  Patient is currently at her baseline mental status.    The patient denies any preceding chest pain, palpitations, dyspnea, headache, abdominal pain, or back pain.  The patient mainly complains of copious diarrhea since her diagnosis of COVID-19.    There is no report of any abnormal movements, including head turning, during the episode.    Focused Objective:  PHYSICAL EXAM:  Constitutional :  Non-toxic.  Well developed, well-nourished with no acute distress  Eyes:  PERRL, EOMI.  No resting nystagmus or with gaze fixation.  HENT: Atraumatic.  Neck: no carotid bruit, no tenderness to palpitation  CV:  Regular rate and rhythm, no murmur. Peripheral pulses intact and equal.  Respiratory:  Lungs clear to auscultation bilaterally  Abdomen:  Soft, non-tender, non-distended.  Back:   No vertebral tenderness  Skin:  Normal color, warm and dry  Extremities:  Non-tender, no pedal edema.  Neuro:  Alert and answers questions appropriately.  No focal deficits, strength grossly intact.  Gait testing deferred.  Normal light touch sensory exam.       MDM  Patient presenting with syncope with a broad differential.    Will obtain cardiac evaluation considering recent history.  Considering her diarrhea, concerns for potential orthostasis causing recurrent episodes of syncope.  Fortunately she is hypertensive on initial evaluation.  Will continue patient on cardiac monitoring.    Patient had a fall prior to coming to the initial emergency room encounter, a fall during her hospitalization, and another 1 prior to tonight's encounter.  Patient had been suggested to go to postacute rehabilitation though she had declined.  I discussed with the patient and her daughter and they are in agreement of going to rehabilitation now considering her recurrent falls.      ED Course  ED Course as of 12/05/24 0700   Wed Dec 04, 2024   0038 EKG demonstrates normal sinus rhythm with no acute ST segment elevation.  Borderline QTc at 511, will repeat.         Critical Care Time  Procedures

## 2024-12-04 NOTE — ASSESSMENT & PLAN NOTE
Presented with generalized weakness, fatigue and difficulty ambulating  Was recently discharged yesterday at that time refused rehab now agreeable to rehab placement  Labs otherwise unremarkable  PT/OT eval  Follow-up with case management for placement

## 2024-12-04 NOTE — PROGRESS NOTES
"Progress Note - Lakeisha Langston 89 y.o. female MRN: [id number]  Unit/Bed#: -01 Encounter: [id number]    Subjective:   Still with lingering cough.  Feeling generally weak.  Otherwise no complaints    All other ROS are negative.    Objective:   Vitals: Blood pressure (!) 171/73, pulse 62, temperature 98.1 °F (36.7 °C), resp. rate 16, height 4' 7\" (1.397 m), weight 48 kg (105 lb 13.1 oz), SpO2 93%, not currently breastfeeding.,Body mass index is 24.6 kg/m².  SPO2 RA Rest      Flowsheet Row ED to Hosp-Admission (Current) from 12/3/2024 in [facility] 3rd Floor Med Surg Unit   SpO2 93 %   SpO2 Activity At Rest   O2 Device None (Room air)   O2 Flow Rate --          I&O:   Intake/Output Summary (Last 24 hours) at 12/4/2024 1224  Last data filed at 12/4/2024 0150  Gross per 24 hour   Intake 1000 ml   Output --   Net 1000 ml         Physical Exam:       General Appearance:    Alert, cooperative, no distress   Head:    Normocephalic, without obvious abnormality, atraumatic   Eyes:    PERRL, conjunctiva/corneas clear, EOM's intact       Nose:   Moist mucous membranes, no drainage or sinus tenderness   Throat:   No tenderness, no exudates   Neck:   Supple, symmetrical, trachea midline, no JVD   Lungs:   Left basilar crackles, respirations unlabored   Heart:    Regular rate and rhythm, S1 and S2 normal, no murmur, rub   or gallop   Abdomen: Soft, non-tender, positive bowel sounds, no masses, no organomegaly   Extremities:  No pedal edema, calf tenderness. Distal pulses palpable.   Neurologic:     CNII-XII intact.      Invasive Devices       Peripheral Intravenous Line  Duration             Peripheral IV 12/04/24 Left;Ventral (anterior) Forearm <1 day                          Social History  reviewed  Family History   Problem Relation Age of Onset    Stroke Mother         ischemic stroke    Hypertension Mother     Heart disease Father     Cancer Sister     No Known Problems Sister     No Known Problems Daughter  "    No Known Problems Daughter     No Known Problems Daughter     No Known Problems Daughter     No Known Problems Daughter     No Known Problems Maternal Grandmother     No Known Problems Paternal Grandmother     No Known Problems Paternal Aunt     No Known Problems Paternal Aunt     No Known Problems Paternal Aunt     No Known Problems Paternal Aunt     No Known Problems Paternal Aunt     Breast cancer Neg Hx     reviewed    Meds:  Current Facility-Administered Medications   Medication Dose Route Frequency Provider Last Rate Last Admin    acetaminophen (TYLENOL) tablet 650 mg  650 mg Oral Q6H PRN Carlos Cuevas MD   650 mg at 12/04/24 0343    albuterol (PROVENTIL HFA,VENTOLIN HFA) inhaler 1 puff  1 puff Inhalation Q4H PRN Carlos Cuevas MD        ALPRAZolam (XANAX) tablet 0.5 mg  0.5 mg Oral 4x Daily PRN Ezekiel Vargas MD        atenolol (TENORMIN) tablet 50 mg  50 mg Oral BID Carlos Cuevas MD   50 mg at 12/04/24 1016    heparin (porcine) subcutaneous injection 5,000 Units  5,000 Units Subcutaneous Q8H [facility] Carlos Cuevas MD   5,000 Units at 12/04/24 0644    imipramine (TOFRANIL) tablet 10 mg  10 mg Oral HS Carlos Cuevas MD        latanoprost (XALATAN) 0.005 % ophthalmic solution 1 drop  1 drop Both Eyes HS Carlos Cuevas MD        levothyroxine tablet 50 mcg  50 mcg Oral Early Morning Carlos Cuevas MD   50 mcg at 12/04/24 0644    lisinopril (ZESTRIL) tablet 5 mg  5 mg Oral Daily Carlos Cuevas MD   5 mg at 12/04/24 1016    pravastatin (PRAVACHOL) tablet 40 mg  40 mg Oral Daily With Dinner Carlos Cuevas MD            Medications Prior to Admission:     acetaminophen (TYLENOL) 325 mg tablet    ALPRAZolam (XANAX) 0.5 mg tablet    dextromethorphan-guaiFENesin (ROBITUSSIN DM)  mg/5 mL syrup    latanoprost (XALATAN) 0.005 % ophthalmic solution    levothyroxine 50 mcg tablet    albuterol (PROVENTIL HFA,VENTOLIN HFA) 90 mcg/act inhaler    atenolol (TENORMIN) 50 mg tablet    imipramine (TOFRANIL) 10 mg tablet     lisinopril (ZESTRIL) 5 mg tablet    Probiotic Product (ALIGN) 4 MG CAPS    risedronate (ACTONEL) 35 mg tablet    rosuvastatin (CRESTOR) 5 mg tablet    Labs:  Results from last 7 days   Lab Units 12/04/24 0628 12/04/24 0052 12/02/24 0441 12/01/24  1738   WBC Thousand/uL 3.39* 4.42 4.20* 5.40   HEMOGLOBIN g/dL 11.3* 11.8 10.3* 11.1*   HEMATOCRIT % 34.5* 36.5 31.6* 33.7*   PLATELETS Thousands/uL 114* 114* 122* 143*   SEGS PCT % 49 50  --  70   LYMPHO PCT % 34 29  --  7*   MONO PCT % 17* 18*  --  19*   EOS PCT % 0 1  --  4     Results from last 7 days   Lab Units 12/04/24 0628 12/04/24 0052 12/02/24 0441 12/01/24  1738   POTASSIUM mmol/L 3.9 3.8 3.7 3.6   CHLORIDE mmol/L 104 99 102 97   CO2 mmol/L 26 25 25 27   BUN mg/dL 12 13 11 11   CREATININE mg/dL 1.26 1.37* 1.28 1.33*   CALCIUM mg/dL 8.5 8.8 8.6 9.6   ALK PHOS U/L  --  58 67 80   ALT U/L  --  14 11 11   AST U/L  --  36 25 24     Lab Results   Component Value Date    TROPONINI <0.02 06/14/2021    TROPONINI <0.02 06/13/2021    TROPONINI <0.02 06/13/2021    CKTOTAL 121 05/24/2021    CKTOTAL 113 01/09/2018    CKTOTAL 60 04/06/2016         Lab Results   Component Value Date    BLOODCX No Growth at 48 hrs. 12/01/2024    BLOODCX No Growth at 48 hrs. 12/01/2024    URINECX 10,000-19,000 cfu/ml 12/30/2022    URINECX 10,000-19,000 cfu/ml 06/10/2021    URINECX No Growth <1000 cfu/mL 04/06/2016       Imaging:  Results for orders placed during the hospital encounter of 05/17/23    XR chest 1 view portable    Narrative  CHEST    INDICATION:   trauma. Unwitnessed fall.    COMPARISON: CXR 5/2/2023 and 12/8/2022, chest CT 12/29/2022.    EXAM PERFORMED/VIEWS:  XR CHEST PORTABLE.    FINDINGS:    Cardiomediastinal silhouette normal. Moderate hiatal hernia.    Lungs clear. No effusion or pneumothorax.    Upper abdomen normal. Cholecystectomy. No acute displaced fracture. Moderate curvature of the spine.    Impression  No acute cardiopulmonary disease. No acute displaced  fracture.    Moderate hiatal hernia.      Workstation performed: [id number]    Results for orders placed during the hospital encounter of 12/01/24    XR chest 2 views    Narrative  XR CHEST AP AND LATERAL    INDICATION: fever, cough. COVID-positive today.    COMPARISON: CXR 10/07/2024, chest and abdomen CT 5/17/2023.    FINDINGS:    Clear lungs. No pneumothorax or pleural effusion.    Normal cardiomediastinal silhouette. Moderate hiatal hernia.    Moderate scoliosis. Old compression deformity mid thoracic spine.    Upper abdomen normal. Cholecystectomy. Mild benign eventration of the right hemidiaphragm.    Impression  No acute cardiopulmonary disease.    Moderate hiatal hernia.    Workstation performed: [id number]      VTE Pharmacologic Prophylaxis: Heparin      Code Status:   Level 1 - Full Code    Assessment:  Principal Problem:    Ambulatory dysfunction  Active Problems:    Hypothyroidism    Hypertension    Mild intermittent asthma without complication    Stage 3b chronic kidney disease (HCC)    COVID-19  Resolved Problems:    * No resolved hospital problems. *      Plan:  Ambulatory dysfunction-POA, likely related to acute COVID infection patient refused inpatient rehab yesterday and was discharged home but quickly found that she was unable to manage.  Will need rehab placement.  Patient now agreeable.  - Continue PT/OT     COVID-19-she is outside of the treatment window.  She does not have an oxygen requirement.  - Continue antitussives and supportive care     Anxiety-stable with outpatient Xanax and imipramine     Hypothyroidism-TSH is suppressed at 0.229 however free T4 is in the normal range, will recheck in the outpatient setting     Hyperlipidemia-continue on statin     HFpEF-EF 60% by echo April 2024.  Patient appears euvolemic.    Disposition-will require transition to inpatient for required 3 midnights prior to discharge to skilled nursing facility.    Ezekiel Vargas MD  12/4/2024,12:24 PM

## 2024-12-04 NOTE — ED PROVIDER NOTES
Time reflects when diagnosis was documented in both MDM as applicable and the Disposition within this note       Time User Action Codes Description Comment    12/4/2024  1:34 AM Oliverio, Isabel Add [R55] Syncope     12/4/2024  1:35 AM Oliverio, Isabel Add [R53.1] Weakness     12/4/2024  1:35 AM Isabel Duron Add [U07.1] Acute COVID-19           ED Disposition       ED Disposition   Admit    Condition   Stable    Date/Time   Wed Dec 4, 2024  1:34 AM    Comment   Case was discussed with Dr. Cuevas and the patient's admission status was agreed to be Admission Status: observation status to the service of Dr. Cuevas .               Assessment & Plan       Medical Decision Making  89 y.o. female presents to ED for evaluation of syncopal episode, weakness, +COVID.     Ddx: includes but is not limited to cardiac arrhythmia, dehydration, vasovagal episode, anemia/acute blood loss, orthostasis, medication side effect, hypoglycemia, ICH, anxiety.     Plan: EKG, labs including CBC CMP and troponin.    On re-evaluation: well appearing in NAD, vital signs are normal. A&O x 3, airway patent, no chest pain or respiratory distress, Abdomen non distended, non tender. M/S no gross deformity, CRUZ x 4,GCS 15     Final Evaluation:  (see ED course for additional MDM)  Given history of multiple syncopal episodes and reported weakness within the past couple of days with COVID virus infection, patient recommended for further observation andeventual rehab placement.   Case was discussed with Dr. Cuevas and the patient's admission status was agreed to be Admission Status: observation.    Amount and/or Complexity of Data Reviewed  Labs: ordered.    Risk  Decision regarding hospitalization.             Medications   sodium chloride 0.9 % bolus 1,000 mL (1,000 mL Intravenous New Bag 12/4/24 0015)       ED Risk Strat Scores                           SBIRT 22yo+      Flowsheet Row Most Recent Value   Initial Alcohol Screen: US AUDIT-C     1. How  "often do you have a drink containing alcohol? 0 Filed at: 12/03/2024 2343   2. How many drinks containing alcohol do you have on a typical day you are drinking?  0 Filed at: 12/03/2024 2343   3b. FEMALE Any Age, or MALE 65+: How often do you have 4 or more drinks on one occassion? 0 Filed at: 12/03/2024 2343   Audit-C Score 0 Filed at: 12/03/2024 2343   GILLES: How many times in the past year have you...    Used an illegal drug or used a prescription medication for non-medical reasons? Never Filed at: 12/03/2024 2343                            History of Present Illness       Chief Complaint   Patient presents with    Syncope     Biba from home c/o syncopal episode, pt is A&O4 at present, denies pain, daughter was next to pt helped her on to the floor, denies head injury, pt reports being COVID + and \"feeling weak\"       Past Medical History:   Diagnosis Date    Anxiety 1992    Arthritis 1991    Benign essential hypertension     Last assessed: 9/11/14    Disease of thyroid gland     Essential tremor     Fibromyalgia     GERD (gastroesophageal reflux disease)     History of nail disorders     Hyperlipidemia     Hypertension     Palpitations     Transient cerebral ischemia       Past Surgical History:   Procedure Laterality Date    APPENDECTOMY      EGD AND COLONOSCOPY  07/2020    HYSTERECTOMY  01/01/2010    [place] LAPS SURG CHOLECYSTECTOMY W/CHOLANGIOGRAPHY N/A 4/4/2018    Procedure: LAPAROSCOPIC CHOLECYSTECTOMY WITH IOC;  Surgeon: Yakov Mueller MD;  Location: [facility] OR;  Service: General    TUBAL LIGATION        Family History   Problem Relation Age of Onset    Stroke Mother         ischemic stroke    Hypertension Mother     Heart disease Father     Cancer Sister     No Known Problems Sister     No Known Problems Daughter     No Known Problems Daughter     No Known Problems Daughter     No Known Problems Daughter     No Known Problems Daughter     No Known Problems Maternal Grandmother     No Known Problems Paternal " Grandmother     No Known Problems Paternal Aunt     No Known Problems Paternal Aunt     No Known Problems Paternal Aunt     No Known Problems Paternal Aunt     No Known Problems Paternal Aunt     Breast cancer Neg Hx       Social History     Tobacco Use    Smoking status: Former     Current packs/day: 0.00     Average packs/day: 0.1 packs/day for 50.0 years (5.0 ttl pk-yrs)     Types: Cigarettes     Start date: 1963     Quit date:      Years since quittin.9    Smokeless tobacco: Never    Tobacco comments:     1 pack a week    Vaping Use    Vaping status: Never Used   Substance Use Topics    Alcohol use: Not Currently     Comment: 0    Drug use: No      E-Cigarette/Vaping    E-Cigarette Use Never User       E-Cigarette/Vaping Substances    Nicotine No     THC No     CBD No     Flavoring No     Other No     Unknown No       I have reviewed and agree with the history as documented.     Patient is a 89y/o female with history of diastolic CHF, hyperlipidemia, ambulatory dysfunction presents with generalized weakness and fall 1 hour prior to ED presentation. No head strike. Daughter present, states she helped patient get out of bed this evening to go to the bathroom when patient stated she felt weak on standing, then her legs 'gave out' and she began to fall. Her daughter was holding her and lowered her to the ground, no head strike. Daughter reports no LOC though patient seemed 'dazy and slightly confused' for a couple minutes. Patient reports 2-3 episodes of diarrhea today. States patient has not eaten much today, was discharged from this hospital earlier today for generalized weakness and COVID positive, patient has been mostly sleeping since discharge. Denies chest pain, shortness of breath, palpitations, numbness or tingling, nausea, vomiting, fevers or chills.       Syncope  Associated symptoms: weakness    Associated symptoms: no chest pain, no fever, no palpitations, no seizures, no shortness of  breath and no vomiting        Review of Systems   Constitutional:  Negative for chills and fever.   HENT:  Negative for ear pain and sore throat.    Eyes:  Negative for pain and visual disturbance.   Respiratory:  Negative for cough and shortness of breath.    Cardiovascular:  Positive for syncope. Negative for chest pain and palpitations.   Gastrointestinal:  Negative for abdominal pain and vomiting.   Genitourinary:  Negative for dysuria and hematuria.   Musculoskeletal:  Negative for arthralgias and back pain.   Skin:  Negative for color change and rash.   Neurological:  Positive for syncope, weakness and light-headedness. Negative for seizures.   All other systems reviewed and are negative.          Objective       ED Triage Vitals   Temperature Pulse Blood Pressure Respirations SpO2 Patient Position - Orthostatic VS   12/03/24 2342 12/03/24 2323 12/03/24 2323 12/03/24 2323 12/03/24 2323 --   98.2 °F (36.8 °C) 62 (!) 175/74 18 94 %       Temp Source Heart Rate Source BP Location FiO2 (%) Pain Score    12/03/24 2342 12/03/24 2323 -- -- 12/03/24 2323    Oral Monitor   No Pain      Vitals      Date and Time Temp Pulse SpO2 Resp BP Pain Score FACES Pain Rating User   12/03/24 2342 98.2 °F (36.8 °C) -- -- -- -- -- -- [place]   12/03/24 2323 -- 62 94 % 18 175/74 No Pain -- [place]            Physical Exam  Vitals and nursing note reviewed.   Constitutional:       General: She is not in acute distress.     Appearance: She is not ill-appearing.   HENT:      Head: Normocephalic and atraumatic.      Right Ear: External ear normal.      Left Ear: External ear normal.      Nose: Nose normal.      Mouth/Throat:      Pharynx: Oropharynx is clear. No oropharyngeal exudate or posterior oropharyngeal erythema.   Eyes:      General: No scleral icterus.     Conjunctiva/sclera: Conjunctivae normal.   Cardiovascular:      Rate and Rhythm: Normal rate.      Pulses: Normal pulses.   Pulmonary:      Effort: Pulmonary effort is normal. No  respiratory distress.   Abdominal:      General: There is no distension.      Palpations: Abdomen is soft.      Tenderness: There is no abdominal tenderness. There is no guarding or rebound.   Musculoskeletal:         General: Normal range of motion.      Cervical back: Normal range of motion. No rigidity.      Right lower leg: No edema.      Left lower leg: No edema.   Skin:     General: Skin is warm and dry.      Findings: No erythema.   Neurological:      General: No focal deficit present.      Mental Status: She is alert and oriented to person, place, and time.   Psychiatric:         Mood and Affect: Mood normal.         Behavior: Behavior normal.         Results Reviewed       Procedure Component Value Units Date/Time    HS Troponin 0hr (reflex protocol) [[id number]]  (Normal) Collected: 12/04/24 0052    Lab Status: Final result Specimen: Blood from Arm, Right Updated: 12/04/24 0121     hs TnI 0hr 13 ng/L     HS Troponin I 2hr [[id number]]     Lab Status: No result Specimen: Blood     Comprehensive metabolic panel [[id number]]  (Abnormal) Collected: 12/04/24 0052    Lab Status: Final result Specimen: Blood from Arm, Right Updated: 12/04/24 0114     Sodium 132 mmol/L      Potassium 3.8 mmol/L      Chloride 99 mmol/L      CO2 25 mmol/L      ANION GAP 8 mmol/L      BUN 13 mg/dL      Creatinine 1.37 mg/dL      Glucose 112 mg/dL      Calcium 8.8 mg/dL      AST 36 U/L      ALT 14 U/L      Alkaline Phosphatase 58 U/L      Total Protein 6.9 g/dL      Albumin 4.0 g/dL      Total Bilirubin 0.36 mg/dL      eGFR 34 ml/min/1.73sq m     Narrative:      National Kid[employer] Disease Foundation guidelines for Chronic Kidney Disease (CKD):     Stage 1 with normal or high GFR (GFR > 90 mL/min/1.73 square meters)    Stage 2 Mild CKD (GFR = 60-89 mL/min/1.73 square meters)    Stage 3A Moderate CKD (GFR = 45-59 mL/min/1.73 square meters)    Stage 3B Moderate CKD (GFR = 30-44 mL/min/1.73 square meters)    Stage 4 Severe CKD (GFR = 15-29  mL/min/1.73 square meters)    Stage 5 End Stage CKD (GFR <15 mL/min/1.73 square meters)  Note: GFR calculation is accurate only with a steady state creatinine    CBC and differential [[id number]]  (Abnormal) Collected: 12/04/24 0052    Lab Status: Final result Specimen: Blood from Arm, Right Updated: 12/04/24 0100     WBC 4.42 Thousand/uL      RBC 3.93 Million/uL      Hemoglobin 11.8 g/dL      Hematocrit 36.5 %      MCV 93 fL      MCH 30.0 pg      MCHC 32.3 g/dL      RDW 13.8 %      MPV 10.3 fL      Platelets 114 Thousands/uL      nRBC 0 /100 WBCs      Segmented % 50 %      Immature Grans % 1 %      Lymphocytes % 29 %      Monocytes % 18 %      Eosinophils Relative 1 %      Basophils Relative 1 %      Absolute Neutrophils 2.28 Thousands/µL      Absolute Immature Grans 0.02 Thousand/uL      Absolute Lymphocytes 1.28 Thousands/µL      Absolute Monocytes 0.80 Thousand/µL      Eosinophils Absolute 0.02 Thousand/µL      Basophils Absolute 0.02 Thousands/µL             No orders to display       ECG 12 Lead Documentation Only    Date/Time: 12/4/2024 12:31 AM    Performed by: Isabel Duron PA-C  Authorized by: Isabel Duron PA-C    ECG reviewed by me, the ED Provider: yes    Patient location:  ED  Interpretation:     Interpretation: non-specific    Rate:     ECG rate assessment: normal    Rhythm:     Rhythm: sinus rhythm    Ectopy:     Ectopy: none    ST segments:     ST segments:  Non-specific  Comments:      Qtc borderline prolonged at 511, repeat EKG will be obtained      ED Medication and Procedure Management   Prior to Admission Medications   Prescriptions Last Dose Informant Patient Reported? Taking?   ALPRAZolam (XANAX) 0.5 mg tablet   No No   Sig: TAKE 1 TABLET(0.5 MG) BY MOUTH FOUR TIMES DAILY AS NEEDED FOR ANXIETY   Probiotic Product (ALIGN) 4 MG CAPS  Self Yes No   Sig: Take 1 tablet by mouth daily   acetaminophen (TYLENOL) 325 mg tablet  Self Yes No   Sig: Take 1-2 tablets by mouth every 6 (six) hours as  needed   albuterol (PROVENTIL HFA,VENTOLIN HFA) 90 mcg/act inhaler  Self Yes No   atenolol (TENORMIN) 50 mg tablet  Self No No   Sig: Take 1 tablet (50 mg total) by mouth 2 (two) times a day   dextromethorphan-guaiFENesin (ROBITUSSIN DM)  mg/5 mL syrup   No No   Sig: Take 10 mL by mouth every 4 (four) hours as needed for cough   imipramine (TOFRANIL) 10 mg tablet  Self No No   Sig: Take 1 tablet by mouth at bedtime   latanoprost (XALATAN) 0.005 % ophthalmic solution  Self No No   Sig: Administer 1 drop to both eyes daily at bedtime   levothyroxine 50 mcg tablet   No No   Sig: take 1 tablet by mouth once daily except Sunday take 2 tabs   lisinopril (ZESTRIL) 5 mg tablet   No No   Sig: TAKE 1 TABLET(5 MG) BY MOUTH DAILY   risedronate (ACTONEL) 35 mg tablet  Self No No   Sig: Take 1 tablet (35 mg total) by mouth every 7 days with water on empty stomach, nothing by mouth or lie down for next 30 minutes.   rosuvastatin (CRESTOR) 5 mg tablet  Self No No   Sig: Take 1 tablet (5 mg total) by mouth daily      Facility-Administered Medications: None     Patient's Medications   Discharge Prescriptions    No medications on file     No discharge procedures on file.  ED SEPSIS DOCUMENTATION   Time reflects when diagnosis was documented in both MDM as applicable and the Disposition within this note       Time User Action Codes Description Comment    12/4/2024  1:34 AM Isabel Duron [R55] Syncope     12/4/2024  1:35 AM Isabel Duron [R53.1] Weakness     12/4/2024  1:35 AM Isabel Duron [U07.1] Acute COVID-19                  Isabel Duron PA-C  12/04/24 0147

## 2024-12-05 ENCOUNTER — TELEPHONE (OUTPATIENT)
Age: 89
End: 2024-12-05

## 2024-12-05 PROCEDURE — 99232 SBSQ HOSP IP/OBS MODERATE 35: CPT | Performed by: INTERNAL MEDICINE

## 2024-12-05 PROCEDURE — 97163 PT EVAL HIGH COMPLEX 45 MIN: CPT

## 2024-12-05 PROCEDURE — 97167 OT EVAL HIGH COMPLEX 60 MIN: CPT

## 2024-12-05 RX ADMIN — LISINOPRIL 5 MG: 5 TABLET ORAL at 09:48

## 2024-12-05 RX ADMIN — IMIPRAMINE HYDROCHLORIDE 10 MG: 10 TABLET ORAL at 22:29

## 2024-12-05 RX ADMIN — HEPARIN SODIUM 5000 UNITS: 5000 INJECTION, SOLUTION INTRAVENOUS; SUBCUTANEOUS at 05:24

## 2024-12-05 RX ADMIN — LATANOPROST 1 DROP: 50 SOLUTION OPHTHALMIC at 22:29

## 2024-12-05 RX ADMIN — ATENOLOL 50 MG: 50 TABLET ORAL at 09:48

## 2024-12-05 RX ADMIN — ALPRAZOLAM 0.5 MG: 0.5 TABLET ORAL at 22:29

## 2024-12-05 RX ADMIN — ACETAMINOPHEN 650 MG: 325 TABLET, FILM COATED ORAL at 10:01

## 2024-12-05 RX ADMIN — HEPARIN SODIUM 5000 UNITS: 5000 INJECTION, SOLUTION INTRAVENOUS; SUBCUTANEOUS at 14:21

## 2024-12-05 RX ADMIN — HEPARIN SODIUM 5000 UNITS: 5000 INJECTION, SOLUTION INTRAVENOUS; SUBCUTANEOUS at 22:29

## 2024-12-05 RX ADMIN — GUAIFENESIN AND DEXTROMETHORPHAN 10 ML: 100; 10 SYRUP ORAL at 22:29

## 2024-12-05 RX ADMIN — ATENOLOL 50 MG: 50 TABLET ORAL at 17:14

## 2024-12-05 RX ADMIN — LEVOTHYROXINE SODIUM 50 MCG: 0.05 TABLET ORAL at 05:24

## 2024-12-05 RX ADMIN — GUAIFENESIN AND DEXTROMETHORPHAN 10 ML: 100; 10 SYRUP ORAL at 09:48

## 2024-12-05 NOTE — PROGRESS NOTES
"Progress Note - Lakeisha Langston 89 y.o. female MRN: [id number]  Unit/Bed#: -01 Encounter: [id number]    Subjective:   Feeling about the same, generally weak with arthralgias and nonproductive cough.  No fevers, chills, chest pain or shortness of breath.    All other ROS are negative.    Objective:   Vitals: Blood pressure 149/85, pulse 66, temperature 98.5 °F (36.9 °C), resp. rate 16, height 4' 7\" (1.397 m), weight 50.2 kg (110 lb 10.7 oz), SpO2 97%, not currently breastfeeding.,Body mass index is 25.72 kg/m².  SPO2 RA Rest      Flowsheet Row ED to Hosp-Admission (Current) from 12/3/2024 in [facility] 3rd Floor Med Surg Unit   SpO2 97 %   SpO2 Activity At Rest   O2 Device None (Room air)   O2 Flow Rate --          I&O:   Intake/Output Summary (Last 24 hours) at 12/5/2024 1208  Last data filed at 12/4/2024 2104  Gross per 24 hour   Intake 240 ml   Output 450 ml   Net -210 ml         Physical Exam:       General Appearance:    Alert, cooperative, no distress   Head:    Normocephalic, without obvious abnormality, atraumatic   Eyes:    PERRL, conjunctiva/corneas clear, EOM's intact       Nose:   Moist mucous membranes, no drainage or sinus tenderness   Throat:   No tenderness, no exudates   Neck:   Supple, symmetrical, trachea midline, no JVD   Lungs:   Left basilar crackles, respirations unlabored   Heart:    Regular rate and rhythm, S1 and S2 normal, no murmur, rub   or gallop   Abdomen: Soft, non-tender, positive bowel sounds, no masses, no organomegaly   Extremities:  No pedal edema, calf tenderness. Distal pulses palpable.   Neurologic:     CNII-XII intact.      Invasive Devices       Peripheral Intravenous Line  Duration             Peripheral IV 12/04/24 Left;Ventral (anterior) Forearm 1 day                          Social History  reviewed  Family History   Problem Relation Age of Onset    Stroke Mother         ischemic stroke    Hypertension Mother     Heart disease Father     Cancer Sister  "    No Known Problems Sister     No Known Problems Daughter     No Known Problems Daughter     No Known Problems Daughter     No Known Problems Daughter     No Known Problems Daughter     No Known Problems Maternal Grandmother     No Known Problems Paternal Grandmother     No Known Problems Paternal Aunt     No Known Problems Paternal Aunt     No Known Problems Paternal Aunt     No Known Problems Paternal Aunt     No Known Problems Paternal Aunt     Breast cancer Neg Hx     reviewed    Meds:  Current Facility-Administered Medications   Medication Dose Route Frequency Provider Last Rate Last Admin    acetaminophen (TYLENOL) tablet 650 mg  650 mg Oral Q6H PRN Carlos Cuevas MD   650 mg at 12/05/24 1001    albuterol (PROVENTIL HFA,VENTOLIN HFA) inhaler 1 puff  1 puff Inhalation Q4H PRN Carlos Cuevas MD        ALPRAZolam (XANAX) tablet 0.5 mg  0.5 mg Oral 4x Daily PRN Ezekiel Vargas MD        atenolol (TENORMIN) tablet 50 mg  50 mg Oral BID Carlos Cuevas MD   50 mg at 12/05/24 0948    dextromethorphan-guaiFENesin (ROBITUSSIN DM) oral syrup 10 mL  10 mL Oral Q6H PRN Mirna Mclain PA-C   10 mL at 12/05/24 0948    heparin (porcine) subcutaneous injection 5,000 Units  5,000 Units Subcutaneous Q8H [facility] Carlos Cuevas MD   5,000 Units at 12/05/24 0524    imipramine (TOFRANIL) tablet 10 mg  10 mg Oral HS Carlos Cuevas MD   10 mg at 12/04/24 2106    latanoprost (XALATAN) 0.005 % ophthalmic solution 1 drop  1 drop Both Eyes  Carlos Cuevas MD   1 drop at 12/04/24 2106    levothyroxine tablet 50 mcg  50 mcg Oral Early Morning Carlos Cuevas MD   50 mcg at 12/05/24 0524    lisinopril (ZESTRIL) tablet 5 mg  5 mg Oral Daily Carlos Cuevas MD   5 mg at 12/05/24 0948        Medications Prior to Admission:     acetaminophen (TYLENOL) 325 mg tablet    ALPRAZolam (XANAX) 0.5 mg tablet    dextromethorphan-guaiFENesin (ROBITUSSIN DM)  mg/5 mL syrup    latanoprost (XALATAN) 0.005 % ophthalmic solution    levothyroxine 50 mcg tablet     albuterol (PROVENTIL HFA,VENTOLIN HFA) 90 mcg/act inhaler    atenolol (TENORMIN) 50 mg tablet    imipramine (TOFRANIL) 10 mg tablet    lisinopril (ZESTRIL) 5 mg tablet    Probiotic Product (ALIGN) 4 MG CAPS    risedronate (ACTONEL) 35 mg tablet    rosuvastatin (CRESTOR) 5 mg tablet    Labs:  Results from last 7 days   Lab Units 12/04/24 0628 12/04/24 0052 12/02/24 0441 12/01/24  1738   WBC Thousand/uL 3.39* 4.42 4.20* 5.40   HEMOGLOBIN g/dL 11.3* 11.8 10.3* 11.1*   HEMATOCRIT % 34.5* 36.5 31.6* 33.7*   PLATELETS Thousands/uL 114* 114* 122* 143*   SEGS PCT % 49 50  --  70   LYMPHO PCT % 34 29  --  7*   MONO PCT % 17* 18*  --  19*   EOS PCT % 0 1  --  4     Results from last 7 days   Lab Units 12/04/24 0628 12/04/24 0052 12/02/24 0441 12/01/24  1738   POTASSIUM mmol/L 3.9 3.8 3.7 3.6   CHLORIDE mmol/L 104 99 102 97   CO2 mmol/L 26 25 25 27   BUN mg/dL 12 13 11 11   CREATININE mg/dL 1.26 1.37* 1.28 1.33*   CALCIUM mg/dL 8.5 8.8 8.6 9.6   ALK PHOS U/L  --  58 67 80   ALT U/L  --  14 11 11   AST U/L  --  36 25 24     Lab Results   Component Value Date    TROPONINI <0.02 06/14/2021    TROPONINI <0.02 06/13/2021    TROPONINI <0.02 06/13/2021    CKTOTAL 121 05/24/2021    CKTOTAL 113 01/09/2018    CKTOTAL 60 04/06/2016         Lab Results   Component Value Date    BLOODCX No Growth at 72 hrs. 12/01/2024    BLOODCX No Growth at 72 hrs. 12/01/2024    URINECX 10,000-19,000 cfu/ml 12/30/2022    URINECX 10,000-19,000 cfu/ml 06/10/2021    URINECX No Growth <1000 cfu/mL 04/06/2016       Imaging:  Results for orders placed during the hospital encounter of 05/17/23    XR chest 1 view portable    Narrative  CHEST    INDICATION:   trauma. Unwitnessed fall.    COMPARISON: CXR 5/2/2023 and 12/8/2022, chest CT 12/29/2022.    EXAM PERFORMED/VIEWS:  XR CHEST PORTABLE.    FINDINGS:    Cardiomediastinal silhouette normal. Moderate hiatal hernia.    Lungs clear. No effusion or pneumothorax.    Upper abdomen normal. Cholecystectomy. No  acute displaced fracture. Moderate curvature of the spine.    Impression  No acute cardiopulmonary disease. No acute displaced fracture.    Moderate hiatal hernia.      Workstation performed: [id number]    Results for orders placed during the hospital encounter of 12/01/24    XR chest 2 views    Narrative  XR CHEST AP AND LATERAL    INDICATION: fever, cough. COVID-positive today.    COMPARISON: CXR 10/07/2024, chest and abdomen CT 5/17/2023.    FINDINGS:    Clear lungs. No pneumothorax or pleural effusion.    Normal cardiomediastinal silhouette. Moderate hiatal hernia.    Moderate scoliosis. Old compression deformity mid thoracic spine.    Upper abdomen normal. Cholecystectomy. Mild benign eventration of the right hemidiaphragm.    Impression  No acute cardiopulmonary disease.    Moderate hiatal hernia.    Workstation performed: [id number]      VTE Pharmacologic Prophylaxis: Heparin      Code Status:   Level 1 - Full Code    Assessment:  Principal Problem:    Ambulatory dysfunction  Active Problems:    Hypothyroidism    Hypertension    Mild intermittent asthma without complication    Stage 3b chronic kidney disease (HCC)    COVID-19  Resolved Problems:    * No resolved hospital problems. *      Plan:  Ambulatory dysfunction-POA, likely related to acute COVID infection patient refused inpatient rehab 12/3 and was discharged home but quickly found that she was unable to manage.  Will need rehab placement.  Patient now agreeable.  - Continue PT/OT     COVID-19-she is outside of the treatment window.  She does not have an oxygen requirement.  - Continue antitussives and supportive care     Anxiety-stable with outpatient Xanax and imipramine     Hypothyroidism-TSH is suppressed at 0.229 however free T4 is in the normal range, will recheck in the outpatient setting     Hyperlipidemia-statin discontinued due to profound weakness  - Reassess in outpatient setting     HFpEF-EF 60% by echo April 2024.  Patient appears  euvolemic.     Disposition-awaiting postacute or go rehab    Ezekiel Vargas MD  12/5/2024,12:08 PM

## 2024-12-05 NOTE — PHYSICAL THERAPY NOTE
Physical Therapy Evaluation    Patient's Name: Lakeisha Langston    Admitting Diagnosis  Syncope [R55]  Weakness [R53.1]  Ambulatory dysfunction [R26.2]  Acute COVID-19 [U07.1]    Problem List  Patient Active Problem List   Diagnosis    Chest heaviness    Anxiety    Abnormal EKG    Vitamin D deficiency    Irritable bowel syndrome    Iron deficiency anemia    Hypothyroidism    Hypertension    Hyperlipidemia    GERD without esophagitis    AVM (arteriovenous malformation)    Venous insufficiency    Impaired fasting glucose    Mild intermittent asthma without complication    Age-related osteoporosis without current pathological fracture    Closed fracture of tenth thoracic vertebra with routine healing    Fall as cause of accidental injury in home as place of occurrence    Atrophic vaginitis    Benign familial tremor    Eustachian tube dysfunction, bilateral    Tension-type headache, not intractable    Hiatal hernia    Syncope    B12 deficiency    Neuropathy    Left knee injury    Ankle swelling    Chronic diastolic CHF (congestive heart failure) (HCC)    Stage 3b chronic kidney disease (HCC)    Depression, recurrent (HCC)    Dyslipidemia, goal to be determined    Generalized anxiety disorder    Osteoporosis    Left-sided back pain    COVID-19    Weakness    Ambulatory dysfunction       Past Medical History  Past Medical History:   Diagnosis Date    Anxiety 1992    Arthritis 1991    Benign essential hypertension     Last assessed: 9/11/14    Disease of thyroid gland     Essential tremor     Fibromyalgia     GERD (gastroesophageal reflux disease)     History of nail disorders     Hyperlipidemia     Hypertension     Palpitations     Transient cerebral ischemia        Past Surgical History  Past Surgical History:   Procedure Laterality Date    APPENDECTOMY      EGD AND COLONOSCOPY  07/2020    HYSTERECTOMY  01/01/2010    TN LAPS SURG CHOLECYSTECTOMY W/CHOLANGIOGRAPHY N/A 4/4/2018    Procedure: LAPAROSCOPIC CHOLECYSTECTOMY WITH  "[facility];  Surgeon: Yakov Mueller MD;  Location: [facility] MAIN OR;  Service: General    TUBAL LIGATION         Recent Imaging  No orders to display       Recent Vital Signs  Vitals:    12/04/24 2312 12/05/24 0600 12/05/24 0817 12/05/24 0947   BP: 160/74  167/71 149/85   Pulse:   61 66   Resp:       Temp: 97.8 °F (36.6 °C)  98.5 °F (36.9 °C)    TempSrc:       SpO2:   94% 97%   Weight:  50.2 kg (110 lb 10.7 oz)     Height:              12/05/24 0925   PT Last Visit   PT Visit Date 12/05/24   Note Type   Note type Evaluation   Pain Assessment   Pain Assessment Tool 0-10   Pain Score 8   Pain Location/Orientation Location: Generalized   Patient's Stated Pain Goal No pain   Hospital Pain Intervention(s) Repositioned;Ambulation/increased activity   Restrictions/Precautions   Weight Bearing Precautions Per Order No   Other Precautions Chair Alarm;Bed Alarm;Fall Risk;Pain;Airborne/isolation;Contact/isolation   Home Living   Type of Home House   Home Layout Two level;Performs ADLs on one level;Able to live on main level with bedroom/bathroom  (4 SWETA)   Bathroom Shower/Tub Tub/shower unit   Bathroom Toilet Standard   Bathroom Equipment Grab bars in shower;Shower chair   Bathroom Accessibility Accessible   Home Equipment Walker;Cane   Additional Comments pt reports use of SPC for mobility in the home and use of rollator in the community   Prior Function   Level of [place] Needs assistance with IADLS;Independent with functional mobility   Lives With Family;Daughter   Receives Help From Family   IADLs Family/Friend/Other provides transportation;Family/Friend/Other provides meals;Family/Friend/Other provides medication management   Falls in the last 6 months 1 to 4  (\"4-5\")   Vocational Retired   General   Family/Caregiver Present No   Cognition   Overall Cognitive Status WFL   Attention Within functional limits   Orientation Level Oriented X4   Memory Within functional limits   Following Commands Follows all commands and directions " without difficulty   Comments Pt agreeable to PT evaluation   RLE Assessment   RLE Assessment   (4-/5)   LLE Assessment   LLE Assessment   (4-/5)   Vision-Basic Assessment   Current Vision Does not wear glasses   Coordination   Sensation X  (pt reports neuropathy to B feet)   Light Touch   RLE Light Touch Grossly intact   LLE Light Touch Grossly intact   Bed Mobility   Supine to Sit 3  Moderate assistance   Additional items Assist x 1;Increased time required;Verbal cues;LE management;Bedrails   Transfers   Sit to Stand 4  Minimal assistance   Additional items Assist x 1;Armrests;Increased time required;Verbal cues   Stand to Sit 4  Minimal assistance   Additional items Assist x 1;Increased time required;Verbal cues   Toilet transfer 4  Minimal assistance   Additional items Assist x 1;Increased time required;Verbal cues;Standard toilet   Additional Comments with RW   Ambulation/Elevation   Gait pattern Improper Weight shift;Narrow NAVI;Short stride;Excessively slow;Decreased hip extension;Decreased heel strike;Decreased toe off   Gait Assistance 4  Minimal assist   Additional items Assist x 1;Verbal cues   Assistive Device Rolling walker   Distance 15', 15'   Balance   Static Sitting Fair +   Dynamic Sitting Fair   Static Standing Poor +   Dynamic Standing Poor +   Ambulatory Poor +   Activity Tolerance   Activity Tolerance Patient limited by fatigue   Medical Staff Made Aware TRACY Copeland  (Pt seen as co-evaluation with OT due to pt's co-morbidities, clinically unstable presentation, and present impairments)   Nurse Made Aware BERNARDO Patel   Assessment   Prognosis Good   Problem List Decreased strength;Decreased endurance;Impaired balance;Decreased mobility;Decreased safety awareness   Assessment Lakeisha Langston is a 89 y.o. female admitted to [facility] on 12/3/2024 for Ambulatory dysfunction. Pt  has a past medical history of Anxiety (1992), Arthritis (1991), Benign essential hypertension, Disease of thyroid gland, Essential  tremor, Fibromyalgia, GERD (gastroesophageal reflux disease), History of nail disorders, Hyperlipidemia, Hypertension, Palpitations, and Transient cerebral ischemia.. Order placed for PT eval and tx. PT was consulted and pt was seen on 12/5/2024 for mobility assessment and d/c planning. Chart review and two person identifiers were completed.   Currently pt presents with decreased strength , decreased static sitting balance , decreased dynamic sitting balance , decreased static standing balance, decreased dynamic standing balance , decreased gait speed, decreased step length , and decreased muscular endurance . Due to these impairments, they will require assistance to perform bed mobility, sit to stand , ambulation, stair negotiation, and transfers. Pt is currently functioning at a moderate assistance x1 level for bed mobility, minimum assistance x1 level for transfers, minimum assistance x1 level for ambulation with Rolling Walker.These activity limitations significantly impact their ability to participate in previous home and community roles and responsibilities  and ambulation in home. The patient's AM-PAC Basic Mobility Inpatient Short Form Raw Score is 16. PT recommends level II moderate resource intensity. They will benefit from skilled therapy to to reduce the risk of falls and to maximize functional potential.   Barriers to Discharge Other (Comment)  (decline in functional mobility)   Goals   STG Expiration Date 12/12/24   Short Term Goal #1 In 10 days: Increase bilateral LE strength 1/2 grade to facilitate independent mobility, Perform all bed mobility tasks with distant S to decrease caregiver burden, Perform all transfers with distant S to improve independence, Ambulate > 80 ft. with RW with distant S w/o LOB and w/ normalized gait pattern 100% of the time, Increase all balance 1/2 grade to decrease risk for falls,  Stair training up/down flight 4 step/s with appropriate rail/s and close supervision  assistance x1 for safe access to previous living environment.   PT Treatment Day 1   Plan   Treatment/Interventions Functional transfer training;LE strengthening/ROM;Elevations;Therapeutic exercise;Endurance training;Patient/family training;Equipment eval/education;Bed mobility;Gait training;Continued evaluation;OT   PT Frequency 3-5x/wk   Discharge Recommendation   Rehab Resource Intensity Level, PT II (Moderate Resource Intensity)   Equipment Recommended Walker   Walker Package Recommended Wheeled walker   AM-PAC Basic Mobility Inpatient   Turning in Flat Bed Without Bedrails 3   Lying on Back to Sitting on Edge of Flat Bed Without Bedrails 2   Moving Bed to Chair 3   Standing Up From Chair Using Arms 3   Walk in Room 3   Climb 3-5 Stairs With Railing 2   Basic Mobility Inpatient Raw Score 16   Basic Mobility Standardized Score 38.32   [facility] Highest Level Of Mobility   -HL Goal 5: Stand one or more mins   -HLM Achieved 7: Walk 25 feet or more   End of Consult   Patient Position at End of Consult Bedside chair;Bed/Chair alarm activated;All needs within reach       Recommendations                                                                                                              Pt will benefit from continued skilled IP PT to address the above mentioned impairments in order to maximize recovery and increase functional independence when completing mobility and ADLs. See flow sheet for goals and POC.     PT Evaluation Time: 0925-0942    Héctor Cuevas, PT, DPT

## 2024-12-05 NOTE — CASE MANAGEMENT
Case Management Discharge Planning Note    Patient name Lakeisha Langston  Location /-01 MRN [id number]  : 1935 Date 2024       Current Admission Date: 12/3/2024  Current Admission Diagnosis:Ambulatory dysfunction   Patient Active Problem List    Diagnosis Date Noted Date Diagnosed    COVID-19 2024     Weakness 2024     Ambulatory dysfunction 2024     Left-sided back pain 08/15/2024     Depression, recurrent (HCC) 2023     Stage 3b chronic kidney disease (HCC) 02/10/2022     Chronic diastolic CHF (congestive heart failure) ([facility]) 2022     Left knee injury 2021     Ankle swelling 2021     B12 deficiency 2021     Neuropathy 2021     Syncope 2021     Hiatal hernia 2020     Eustachian tube dysfunction, bilateral 2020     Tension-type headache, not intractable 2020     Closed fracture of tenth thoracic vertebra with routine healing 10/16/2019     Fall as cause of accidental injury in home as place of occurrence 10/16/2019     Age-related osteoporosis without current pathological fracture 2019     Mild intermittent asthma without complication 2019     Impaired fasting glucose 2018     Venous insufficiency 2018     AVM (arteriovenous malformation) 2018     Chest heaviness 2018     Anxiety 2018     Abnormal EKG 2018     Iron deficiency anemia 2016     Atrophic vaginitis 2016     GERD without esophagitis 2014     Vitamin D deficiency 2014     Irritable bowel syndrome 2014     Hypothyroidism 2014     Hypertension 2014     Hyperlipidemia 2014     Benign familial tremor 2014     Dyslipidemia, goal to be determined 2007     Generalized anxiety disorder 2007     Osteoporosis 2007       LOS (days): 1  Geometric Mean LOS (GMLOS) (days): 4.5  Days to GMLOS:3.6     OBJECTIVE:  Risk of Unplanned Readmission Score:  15.02         Current admission status: Inpatient   Preferred Pharmacy:   [id number] - [place], [place] - [address]  [facility] [address]  Phone: [phone] Fax: [phone]    Primary Care Provider: Ezekiel Vargas MD    Primary Insurance: MEDICARE  Secondary Insurance: AARP    DISCHARGE DETAILS:  As per SLIM rounds, patient anticipated discharged today vs 24. Placement pending rehab choice list that was sent to daughter Katerine Curry at [email or web address].     Pending placement. Covid+. Daughter had [facility] as preference but [facility] unable to take Covid+ patients. Dtr to f/u with choice.

## 2024-12-05 NOTE — OCCUPATIONAL THERAPY NOTE
"          Occupational Therapy Evaluation        Patient Name: Lakeisha Langston  Today's Date: 12/5/2024 12/05/24 0945   OT Last Visit   OT Visit Date 12/05/24   Note Type   Note type Evaluation   Pain Assessment   Pain Assessment Tool 0-10   Pain Score 8   Pain Location/Orientation Location: Generalized   Patient's Stated Pain Goal No pain   Hospital Pain Intervention(s) Repositioned;Ambulation/increased activity   Restrictions/Precautions   Weight Bearing Precautions Per Order No   Other Precautions Chair Alarm;Bed Alarm;Fall Risk;Pain;Airborne/isolation;Contact/isolation   Home Living   Type of Home House   Home Layout Two level;Performs ADLs on one level;Able to live on main level with bedroom/bathroom  (4 SWETA)   Bathroom Shower/Tub Tub/shower unit   Bathroom Toilet Standard   Bathroom Equipment Grab bars in shower;Shower chair   Bathroom Accessibility Accessible   Home Equipment Walker;Cane   Additional Comments pt reports use of SPC for mobility in the home and use of rollator in the community   Prior Function   Level of [place] Needs assistance with IADLS;Independent with functional mobility   Lives With Family;Daughter   Receives Help From Family   IADLs Family/Friend/Other provides transportation;Family/Friend/Other provides meals;Family/Friend/Other provides medication management   Falls in the last 6 months 1 to 4  (\"4-5\")   Vocational Retired   Lifestyle   Autonomy Patient reported she was independnet with basic ADLs, daughter assists with bathing, requires assistance with IADLs, ambulatory with a cane indoors, walker for community distances.   Reciprocal Relationships Supportive Family   Service to Others Retired    Intrinsic Gratification enjoys doing puzzles   General   Family/Caregiver Present No   ADL   Where Assessed Edge of bed   Eating Assistance 6  Modified independent   Grooming Assistance 4  Minimal Assistance   UB Bathing Assistance 4  Minimal Assistance   LB Bathing " Assistance 2  Maximal Assistance   UB Dressing Assistance 4  Minimal Assistance   LB Dressing Assistance 2  Maximal Assistance   Toileting Assistance  3  Moderate Assistance   Functional Assistance 3  Moderate Assistance   Bed Mobility   Supine to Sit 3  Moderate assistance   Additional items Assist x 1;HOB elevated;Increased time required;LE management;Verbal cues   Transfers   Sit to Stand 4  Minimal assistance   Additional items Assist x 1;Armrests;Increased time required;Verbal cues   Stand to Sit 4  Minimal assistance   Additional items Assist x 1;Armrests;Increased time required;Verbal cues   Functional Mobility   Functional Mobility 4  Minimal assistance   Additional Comments assist of 1 with RW   Balance   Static Sitting Fair +   Dynamic Sitting Fair   Static Standing Poor +   Dynamic Standing Poor +   Activity Tolerance   Activity Tolerance Patient limited by fatigue   Medical Staff Made Aware Pt seen as a co-eval with PT due to the patient's co-morbidities, clinically unstable presentation, and present impairments which are a regression from the patient's baseline.   RUE Assessment   RUE Assessment X   RUE Strength   RUE Overall Strength Deficits  (3/5)   LUE Assessment   LUE Assessment X   LUE Strength   LUE Overall Strength Deficits  (3/5)   Hand Function   Gross Motor Coordination Functional   Fine Motor Coordination Functional   Sensation   Light Touch No apparent deficits  (BUEs)   Vision-Basic Assessment   Current Vision Does not wear glasses   Psychosocial   Psychosocial (WDL) WDL   Cognition   Overall Cognitive Status WFL   Arousal/Participation Alert;Responsive;Cooperative   Attention Within functional limits   Orientation Level Oriented X4   Memory Within functional limits   Following Commands Follows all commands and directions without difficulty   Assessment   Limitation Decreased ADL status;Decreased UE ROM;Decreased UE strength;Decreased Safe judgement during ADL;Decreased endurance;Decreased  self-care trans;Decreased high-level ADLs   Prognosis Good   Assessment Patient is a 89 y.o. female seen for OT evaluation s/p admit to [facility] on 12/3/2024 w/Ambulatory dysfunction. Commorbidities affecting patient's functional performance at time of assessment include:  Hypothyroidism, HTN, mild intermittent asthma, + Covid 19, Stage 3 CKD, presented to ED with difficulty ambulating and performing ADLs at home recently tested positive for COVID.  Orders placed for OT evaluation and treatment.  Performed at least two patient identifiers during session including name and wristband.  Prior to admission, Patient reported she was independnet with basic ADLs, daughter assists with bathing, requires assistance with IADLs, ambulatory with a cane indoors, walker for community distances.  Personal factors affecting patient at time of initial evaluation include: steps to enter, limited insight into deficits, decreased initiation and engagement, difficulty performing ADLs, and difficulty performing IADLs. Upon evaluation, patient requires minimal  assist for UB ADLs, moderate and maximal assist for LB ADLs, transfers and functional ambulation in room and bathroom with minimal  assist, with the use of Rolling Walker.  Occupational performance is affected by the following deficits: decreased functional use of BUEs, decreased muscle strength, degenerative arthritic joint changes, dynamic sit/ stand balance deficit with poor standing tolerance time for self care and functional mobility, decreased activity tolerance, increased pain, and postural control and postural alignment deficit, requiring external assistance to complete transitional movements.  Patient to benefit from continued Occupational Therapy treatment while in the hospital to address deficits as defined above and maximize level of functional independence with ADLs and functional mobility. Occupational Performance areas to address include: bathing/  shower, dressing, toilet hygiene, transfer to all surfaces, functional mobility, emergency response, health maintenance, and IADLs: safety procedures. From OT standpoint, recommendation at time of d/c would be Level 2.   Plan   Treatment Interventions ADL retraining;Functional transfer training;UE strengthening/ROM;Endurance training;Patient/family training;Equipment evaluation/education;Compensatory technique education;Continued evaluation;Energy conservation   Goal Expiration Date 12/19/24   OT Frequency 3-5x/wk   Discharge Recommendation   Rehab Resource Intensity Level, OT II (Moderate Resource Intensity)   AM-PAC Daily Activity Inpatient   Lower Body Dressing 2   Bathing 2   Toileting 2   Upper Body Dressing 3   Grooming 3   Eating 4   Daily Activity Raw Score 16   Daily Activity Standardized Score (Calc for Raw Score >=11) 35.96   AM-PAC Applied Cognition Inpatient   Following a Speech/Presentation 4   Understanding Ordinary Conversation 4   Taking Medications 2   Remembering Where Things Are Placed or Put Away 3   Remembering List of 4-5 Errands 2   Taking Care of Complicated Tasks 2   Applied Cognition Raw Score 17   Applied Cognition Standardized Score 36.52   Barthel Index   Feeding 5   Bathing 0   Grooming Score 0   Dressing Score 5   Bladder Score 5   Bowels Score 10   Toilet Use Score 5   Transfers (Bed/Chair) Score 10   Mobility (Level Surface) Score 0   Stairs Score 0   Barthel Index Score 40       1 - Patient will verbalize and demonstrate use of energy conservation/ deep breathing technique and work simplification skills during functional activity with no verbal cues.    2 - Patient will verbalize and demonstrate good body mechanics and joint protection techniques during  ADLs/ IADLs with no verbal cues.    3 - Patient will increase OOB/ sitting tolerance to 2-4 hours per day for increased participation in self care and leisure tasks with no s/s of exertion.    4 - Patient will increase standing  tolerance time to 5  minutes with unilateral UE support to complete sink level ADLs@ mod I level.    5 - Patient will increase sitting tolerance at edge of bed to 20 minutes to complete UB ADLs @ set up assist level.    6 - Patient will transfer bed to Chair / toilet at Set up assist level with AD as indicated.     7 - Patient will complete UB ADLs with set up assist.     8 - Patient will complete LB ADLs with min assist with the use of adaptive equipment.     9 - Patient will complete toileting hygiene with set up assist/ supervision for thoroughness    10 - Patient/ Family  will demonstrate competency with UE Home Exercise Program.

## 2024-12-05 NOTE — PLAN OF CARE
Problem: Potential for Falls  Goal: Patient will remain free of falls  Description: INTERVENTIONS:  - Educate patient/family on patient safety including physical limitations  - Instruct patient to call for assistance with activity   - Consult OT/PT to assist with strengthening/mobility   - Keep Call bell within reach  - Keep bed low and locked with side rails adjusted as appropriate  - Keep care items and personal belongings within reach  - Initiate and maintain comfort rounds  - Make Fall Risk Sign visible to staff  - Initiate/Maintain bed/ chair alarm  - Obtain necessary fall risk management equipment: Walker   - Apply yellow socks and bracelet for high fall risk patients  - Consider moving patient to room near nurses station  Outcome: Progressing

## 2024-12-05 NOTE — TELEPHONE ENCOUNTER
Grayson called and is requesting Dr Vargas call her to discuss rehab options for her mother. She is currently in hospital and she would like to discuss options with Dr Vargas. Can call chandler Garcias back at [phone].

## 2024-12-05 NOTE — PLAN OF CARE
Problem: OCCUPATIONAL THERAPY ADULT  Goal: Performs self-care activities at highest level of function for planned discharge setting.  See evaluation for individualized goals.  Description: Treatment Interventions: ADL retraining, Functional transfer training, UE strengthening/ROM, Endurance training, Patient/family training, Equipment evaluation/education, Compensatory technique education, Continued evaluation, Energy conservation          See flowsheet documentation for full assessment, interventions and recommendations.   Note: Limitation: Decreased ADL status, Decreased UE ROM, Decreased UE strength, Decreased Safe judgement during ADL, Decreased endurance, Decreased self-care trans, Decreased high-level ADLs  Prognosis: Good  Assessment: Patient is a 89 y.o. female seen for OT evaluation s/p admit to [facility] on 12/3/2024 w/Ambulatory dysfunction. Commorbidities affecting patient's functional performance at time of assessment include:  Hypothyroidism, HTN, mild intermittent asthma, + Covid 19, Stage 3 CKD, presented to ED with difficulty ambulating and performing ADLs at home recently tested positive for COVID.  Orders placed for OT evaluation and treatment.  Performed at least two patient identifiers during session including name and wristband.  Prior to admission, Patient reported she was independnet with basic ADLs, daughter assists with bathing, requires assistance with IADLs, ambulatory with a cane indoors, walker for community distances.  Personal factors affecting patient at time of initial evaluation include: steps to enter, limited insight into deficits, decreased initiation and engagement, difficulty performing ADLs, and difficulty performing IADLs. Upon evaluation, patient requires minimal  assist for UB ADLs, moderate and maximal assist for LB ADLs, transfers and functional ambulation in room and bathroom with minimal  assist, with the use of Rolling Walker.  Occupational performance  is affected by the following deficits: decreased functional use of BUEs, decreased muscle strength, degenerative arthritic joint changes, dynamic sit/ stand balance deficit with poor standing tolerance time for self care and functional mobility, decreased activity tolerance, increased pain, and postural control and postural alignment deficit, requiring external assistance to complete transitional movements.  Patient to benefit from continued Occupational Therapy treatment while in the hospital to address deficits as defined above and maximize level of functional independence with ADLs and functional mobility. Occupational Performance areas to address include: bathing/ shower, dressing, toilet hygiene, transfer to all surfaces, functional mobility, emergency response, health maintenance, and IADLs: safety procedures. From OT standpoint, recommendation at time of d/c would be Level 2.     Rehab Resource Intensity Level, OT: II (Moderate Resource Intensity)

## 2024-12-05 NOTE — PLAN OF CARE
Problem: PHYSICAL THERAPY ADULT  Goal: Performs mobility at highest level of function for planned discharge setting.  See evaluation for individualized goals.  Description: Treatment/Interventions: Functional transfer training, LE strengthening/ROM, Elevations, Therapeutic exercise, Endurance training, Patient/family training, Equipment eval/education, Bed mobility, Gait training, Continued evaluation, OT  Equipment Recommended: Walker       See flowsheet documentation for full assessment, interventions and recommendations.  Note: Prognosis: Good  Problem List: Decreased strength, Decreased endurance, Impaired balance, Decreased mobility, Decreased safety awareness  Assessment: Lakeisha Langston is a 89 y.o. female admitted to [facility] on 12/3/2024 for Ambulatory dysfunction. Pt  has a past medical history of Anxiety (1992), Arthritis (1991), Benign essential hypertension, Disease of thyroid gland, Essential tremor, Fibromyalgia, GERD (gastroesophageal reflux disease), History of nail disorders, Hyperlipidemia, Hypertension, Palpitations, and Transient cerebral ischemia.. Order placed for PT eval and tx. PT was consulted and pt was seen on 12/5/2024 for mobility assessment and d/c planning. Chart review and two person identifiers were completed.   Currently pt presents with decreased strength , decreased static sitting balance , decreased dynamic sitting balance , decreased static standing balance, decreased dynamic standing balance , decreased gait speed, decreased step length , and decreased muscular endurance . Due to these impairments, they will require assistance to perform bed mobility, sit to stand , ambulation, stair negotiation, and transfers. Pt is currently functioning at a moderate assistance x1 level for bed mobility, minimum assistance x1 level for transfers, minimum assistance x1 level for ambulation with Rolling Walker.These activity limitations significantly impact their ability to participate in previous  home and community roles and responsibilities  and ambulation in home. The patient's AM-PAC Basic Mobility Inpatient Short Form Raw Score is 16. PT recommends level II moderate resource intensity. They will benefit from skilled therapy to to reduce the risk of falls and to maximize functional potential.  Barriers to Discharge: Other (Comment) (decline in functional mobility)     Rehab Resource Intensity Level, PT: II (Moderate Resource Intensity)    See flowsheet documentation for full assessment.

## 2024-12-06 PROCEDURE — 97110 THERAPEUTIC EXERCISES: CPT

## 2024-12-06 PROCEDURE — 99232 SBSQ HOSP IP/OBS MODERATE 35: CPT | Performed by: INTERNAL MEDICINE

## 2024-12-06 PROCEDURE — 97116 GAIT TRAINING THERAPY: CPT

## 2024-12-06 RX ORDER — LISINOPRIL 10 MG/1
10 TABLET ORAL DAILY
Status: DISCONTINUED | OUTPATIENT
Start: 2024-12-07 | End: 2024-12-07 | Stop reason: HOSPADM

## 2024-12-06 RX ORDER — LISINOPRIL 5 MG/1
5 TABLET ORAL ONCE
Status: COMPLETED | OUTPATIENT
Start: 2024-12-06 | End: 2024-12-06

## 2024-12-06 RX ADMIN — ACETAMINOPHEN 650 MG: 325 TABLET, FILM COATED ORAL at 23:41

## 2024-12-06 RX ADMIN — ATENOLOL 50 MG: 50 TABLET ORAL at 18:31

## 2024-12-06 RX ADMIN — HEPARIN SODIUM 5000 UNITS: 5000 INJECTION, SOLUTION INTRAVENOUS; SUBCUTANEOUS at 23:24

## 2024-12-06 RX ADMIN — LEVOTHYROXINE SODIUM 50 MCG: 0.05 TABLET ORAL at 06:03

## 2024-12-06 RX ADMIN — IMIPRAMINE HYDROCHLORIDE 10 MG: 10 TABLET ORAL at 23:24

## 2024-12-06 RX ADMIN — HEPARIN SODIUM 5000 UNITS: 5000 INJECTION, SOLUTION INTRAVENOUS; SUBCUTANEOUS at 14:45

## 2024-12-06 RX ADMIN — HEPARIN SODIUM 5000 UNITS: 5000 INJECTION, SOLUTION INTRAVENOUS; SUBCUTANEOUS at 06:03

## 2024-12-06 RX ADMIN — LISINOPRIL 5 MG: 5 TABLET ORAL at 07:56

## 2024-12-06 RX ADMIN — LISINOPRIL 5 MG: 5 TABLET ORAL at 11:49

## 2024-12-06 RX ADMIN — LATANOPROST 1 DROP: 50 SOLUTION OPHTHALMIC at 23:24

## 2024-12-06 RX ADMIN — ATENOLOL 50 MG: 50 TABLET ORAL at 07:56

## 2024-12-06 NOTE — CASE MANAGEMENT
Case Management Discharge Planning Note    Patient name Lakeisha Langston  Location /-01 MRN [id number]  : 1935 Date 2024       Current Admission Date: 12/3/2024  Current Admission Diagnosis:Ambulatory dysfunction   Patient Active Problem List    Diagnosis Date Noted Date Diagnosed    COVID-19 2024     Weakness 2024     Ambulatory dysfunction 2024     Left-sided back pain 08/15/2024     Depression, recurrent (HCC) 2023     Stage 3b chronic kidney disease (HCC) 02/10/2022     Chronic diastolic CHF (congestive heart failure) ([facility]) 2022     Left knee injury 2021     Ankle swelling 2021     B12 deficiency 2021     Neuropathy 2021     Syncope 2021     Hiatal hernia 2020     Eustachian tube dysfunction, bilateral 2020     Tension-type headache, not intractable 2020     Closed fracture of tenth thoracic vertebra with routine healing 10/16/2019     Fall as cause of accidental injury in home as place of occurrence 10/16/2019     Age-related osteoporosis without current pathological fracture 2019     Mild intermittent asthma without complication 2019     Impaired fasting glucose 2018     Venous insufficiency 2018     AVM (arteriovenous malformation) 2018     Chest heaviness 2018     Anxiety 2018     Abnormal EKG 2018     Iron deficiency anemia 2016     Atrophic vaginitis 2016     GERD without esophagitis 2014     Vitamin D deficiency 2014     Irritable bowel syndrome 2014     Hypothyroidism 2014     Hypertension 2014     Hyperlipidemia 2014     Benign familial tremor 2014     Dyslipidemia, goal to be determined 2007     Generalized anxiety disorder 2007     Osteoporosis 2007       LOS (days): 2  Geometric Mean LOS (GMLOS) (days): 4.5  Days to GMLOS:2.4     OBJECTIVE:  Risk of Unplanned Readmission Score:  15.44         Current admission status: Inpatient   Preferred Pharmacy:   [id number] - [place], [place] - [address]  [address]  [facility] [address]  Phone: [phone] Fax: [phone]    Primary Care Provider: Ezekiel Vargas MD    Primary Insurance: MEDICARE  Secondary Insurance: AARP    DISCHARGE DETAILS:    CM received phone call from daughter Katerine Curry who chose the following facility for care. Family is asking that CM contact niece Linda when patient is being discharged so that she can come and follow patient's ambulance to facility. Linda's number is [phone].     Accepting Facility Name, City & State : [facility],  [address] [place] [address]  Receiving Facility/Agency Phone Number: Phone: [phone]  Facility/Agency Fax Number: Fax: [phone]

## 2024-12-06 NOTE — PHYSICAL THERAPY NOTE
"      12/06/24 1022   PT Last Visit   PT Visit Date 12/06/24   Note Type   Note Type Treatment   Pain Assessment   Pain Assessment Tool 0-10   Pain Score   (Pt did not rate.  Pt reports \"It's not high\")   Restrictions/Precautions   Weight Bearing Precautions Per Order No   Other Precautions Chair Alarm;Bed Alarm;Fall Risk;Pain   General   Chart Reviewed Yes   Response to Previous Treatment Patient with no complaints from previous session.   Family/Caregiver Present No   Cognition   Overall Cognitive Status WFL   Following Commands Follows all commands and directions without difficulty   Subjective   Subjective \" I made it through covid time without getting it, and now i have it\"   Bed Mobility   Supine to Sit 5  Supervision   Additional items Increased time required   Transfers   Sit to Stand 4  Minimal assistance   Additional items Assist x 1   Stand to Sit 4  Minimal assistance   Additional items Assist x 1   Stand pivot 4  Minimal assistance   Additional items Assist x 1  (RW support)   Ambulation/Elevation   Gait pattern Improper Weight shift;Decreased foot clearance;Short stride   Gait Assistance 4  Minimal assist   Additional items Assist x 1   Assistive Device Rolling walker   Distance 25' and 30'   Balance   Dynamic Standing Fair -   Ambulatory Fair -   Endurance Deficit   Endurance Deficit Yes   Endurance Deficit Description decreased activity tolerance   Activity Tolerance   Activity Tolerance Patient limited by fatigue;Patient tolerated treatment well   Exercises   Hip Flexion Sitting;20 reps;AROM;Bilateral   Knee AROM Long Arc Quad Sitting;20 reps;AROM;Bilateral   Ankle Pumps Sitting;20 reps;Bilateral   Assessment   Prognosis Good   Problem List Decreased strength;Decreased endurance;Impaired balance;Decreased mobility;Decreased safety awareness   Assessment Pt tolerated session fairly well.  No LOB .  Pt appears motivated.  Pt expressed concern over going to rehab.   Barriers to Discharge Inaccessible " home environment   Goals   Patient Goals heidi BRAVO Expiration Date 12/12/24   PT Treatment Day 2   Plan   Treatment/Interventions   (Continue per plan of care)   Progress Progressing toward goals   PT Frequency 3-5x/wk   Discharge Recommendation   Rehab Resource Intensity Level, PT II (Moderate Resource Intensity)   Equipment Recommended Walker  (RW)   AM-PAC Basic Mobility Inpatient   Turning in Flat Bed Without Bedrails 3   Lying on Back to Sitting on Edge of Flat Bed Without Bedrails 3   Moving Bed to Chair 3   Standing Up From Chair Using Arms 3   Walk in Room 3   Climb 3-5 Stairs With Railing 2   Basic Mobility Inpatient Raw Score 17   Basic Mobility Standardized Score 39.67   Turning Head Towards Sound 4   Follow Simple Instructions 4   Low Function Basic Mobility Raw Score  25   Low Function Basic Mobility Standardized Score  39.85   [facility] Highest Level Of Mobility   [facility] Goal 5: Stand one or more mins   Education   Education Provided Home exercise program;Mobility training   End of Consult   Patient Position at End of Consult Bed/Chair alarm activated;All needs within reach;Bedside chair

## 2024-12-06 NOTE — PROGRESS NOTES
"Progress Note - Lakeisha Langston 89 y.o. female MRN: [id number]  Unit/Bed#: -01 Encounter: [id number]    Subjective:   Cough seems to be improving.  No chest pains or shortness of breath.  She denies fevers or chills.    All other ROS are negative.    Objective:   Vitals: Blood pressure 165/78, pulse 59, temperature 98.5 °F (36.9 °C), resp. rate 16, height 4' 7\" (1.397 m), weight 50.2 kg (110 lb 10.7 oz), SpO2 95%, not currently breastfeeding.,Body mass index is 25.72 kg/m².  SPO2 RA Rest      Flowsheet Row ED to Hosp-Admission (Current) from 12/3/2024 in [facility] 3rd Floor Med Surg Unit   SpO2 95 %   SpO2 Activity At Rest   O2 Device None (Room air)   O2 Flow Rate --          I&O:   Intake/Output Summary (Last 24 hours) at 12/6/2024 1504  Last data filed at 12/6/2024 1300  Gross per 24 hour   Intake 180 ml   Output --   Net 180 ml         Physical Exam:       General Appearance:    Alert, cooperative, no distress   Head:    Normocephalic, without obvious abnormality, atraumatic   Eyes:    PERRL, conjunctiva/corneas clear, EOM's intact       Nose:   Moist mucous membranes, no drainage or sinus tenderness   Throat:   No tenderness, no exudates   Neck:   Supple, symmetrical, trachea midline, no JVD   Lungs:   Left basilar crackles, respirations unlabored   Heart:    Regular rate and rhythm, S1 and S2 normal, no murmur, rub   or gallop   Abdomen: Soft, non-tender, positive bowel sounds, no masses, no organomegaly   Extremities:  No pedal edema, calf tenderness. Distal pulses palpable.   Neurologic:     CNII-XII intact.      Invasive Devices       Peripheral Intravenous Line  Duration             Peripheral IV 12/04/24 Left;Ventral (anterior) Forearm 2 days                          Social History  reviewed  Family History   Problem Relation Age of Onset    Stroke Mother         ischemic stroke    Hypertension Mother     Heart disease Father     Cancer Sister     No Known Problems Sister     No Known " Problems Daughter     No Known Problems Daughter     No Known Problems Daughter     No Known Problems Daughter     No Known Problems Daughter     No Known Problems Maternal Grandmother     No Known Problems Paternal Grandmother     No Known Problems Paternal Aunt     No Known Problems Paternal Aunt     No Known Problems Paternal Aunt     No Known Problems Paternal Aunt     No Known Problems Paternal Aunt     Breast cancer Neg Hx     reviewed    Meds:  Current Facility-Administered Medications   Medication Dose Route Frequency Provider Last Rate Last Admin    acetaminophen (TYLENOL) tablet 650 mg  650 mg Oral Q6H PRN Carlos Cuevas MD   650 mg at 12/05/24 1001    albuterol (PROVENTIL HFA,VENTOLIN HFA) inhaler 1 puff  1 puff Inhalation Q4H PRN Carlos Cuevas MD        ALPRAZolam (XANAX) tablet 0.5 mg  0.5 mg Oral 4x Daily PRN Ezekiel Vargas MD   0.5 mg at 12/05/24 2229    atenolol (TENORMIN) tablet 50 mg  50 mg Oral BID Carlos Cuevas MD   50 mg at 12/06/24 0756    dextromethorphan-guaiFENesin (ROBITUSSIN DM) oral syrup 10 mL  10 mL Oral Q6H PRN Mirna Mclain PA-C   10 mL at 12/05/24 2229    heparin (porcine) subcutaneous injection 5,000 Units  5,000 Units Subcutaneous Q8H [facility] Carlos Cuevas MD   5,000 Units at 12/06/24 1445    imipramine (TOFRANIL) tablet 10 mg  10 mg Oral  Carlos Cuevas MD   10 mg at 12/05/24 2229    latanoprost (XALATAN) 0.005 % ophthalmic solution 1 drop  1 drop Both Eyes  Carlos Cuevas MD   1 drop at 12/05/24 2229    levothyroxine tablet 50 mcg  50 mcg Oral Early Morning Carlos Cuevas MD   50 mcg at 12/06/24 0603    [START ON 12/7/2024] lisinopril (ZESTRIL) tablet 10 mg  10 mg Oral Daily Ezekiel Vargas MD            Medications Prior to Admission:     acetaminophen (TYLENOL) 325 mg tablet    ALPRAZolam (XANAX) 0.5 mg tablet    dextromethorphan-guaiFENesin (ROBITUSSIN DM)  mg/5 mL syrup    latanoprost (XALATAN) 0.005 % ophthalmic solution    levothyroxine 50 mcg tablet    albuterol  (PROVENTIL HFA,VENTOLIN HFA) 90 mcg/act inhaler    atenolol (TENORMIN) 50 mg tablet    imipramine (TOFRANIL) 10 mg tablet    lisinopril (ZESTRIL) 5 mg tablet    Probiotic Product (ALIGN) 4 MG CAPS    risedronate (ACTONEL) 35 mg tablet    rosuvastatin (CRESTOR) 5 mg tablet    Labs:  Results from last 7 days   Lab Units 12/04/24 0628 12/04/24 0052 12/02/24 0441 12/01/24  1738   WBC Thousand/uL 3.39* 4.42 4.20* 5.40   HEMOGLOBIN g/dL 11.3* 11.8 10.3* 11.1*   HEMATOCRIT % 34.5* 36.5 31.6* 33.7*   PLATELETS Thousands/uL 114* 114* 122* 143*   SEGS PCT % 49 50  --  70   LYMPHO PCT % 34 29  --  7*   MONO PCT % 17* 18*  --  19*   EOS PCT % 0 1  --  4     Results from last 7 days   Lab Units 12/04/24 0628 12/04/24 0052 12/02/24 0441 12/01/24  1738   POTASSIUM mmol/L 3.9 3.8 3.7 3.6   CHLORIDE mmol/L 104 99 102 97   CO2 mmol/L 26 25 25 27   BUN mg/dL 12 13 11 11   CREATININE mg/dL 1.26 1.37* 1.28 1.33*   CALCIUM mg/dL 8.5 8.8 8.6 9.6   ALK PHOS U/L  --  58 67 80   ALT U/L  --  14 11 11   AST U/L  --  36 25 24     Lab Results   Component Value Date    TROPONINI <0.02 06/14/2021    TROPONINI <0.02 06/13/2021    TROPONINI <0.02 06/13/2021    CKTOTAL 121 05/24/2021    CKTOTAL 113 01/09/2018    CKTOTAL 60 04/06/2016         Lab Results   Component Value Date    BLOODCX No Growth After 4 Days. 12/01/2024    BLOODCX No Growth After 4 Days. 12/01/2024    URINECX 10,000-19,000 cfu/ml 12/30/2022    URINECX 10,000-19,000 cfu/ml 06/10/2021    URINECX No Growth <1000 cfu/mL 04/06/2016       Imaging:  Results for orders placed during the hospital encounter of 05/17/23    XR chest 1 view portable    Narrative  CHEST    INDICATION:   trauma. Unwitnessed fall.    COMPARISON: CXR 5/2/2023 and 12/8/2022, chest CT 12/29/2022.    EXAM PERFORMED/VIEWS:  XR CHEST PORTABLE.    FINDINGS:    Cardiomediastinal silhouette normal. Moderate hiatal hernia.    Lungs clear. No effusion or pneumothorax.    Upper abdomen normal. Cholecystectomy. No acute  displaced fracture. Moderate curvature of the spine.    Impression  No acute cardiopulmonary disease. No acute displaced fracture.    Moderate hiatal hernia.      Workstation performed: [id number]    Results for orders placed during the hospital encounter of 12/01/24    XR chest 2 views    Narrative  XR CHEST AP AND LATERAL    INDICATION: fever, cough. COVID-positive today.    COMPARISON: CXR 10/07/2024, chest and abdomen CT 5/17/2023.    FINDINGS:    Clear lungs. No pneumothorax or pleural effusion.    Normal cardiomediastinal silhouette. Moderate hiatal hernia.    Moderate scoliosis. Old compression deformity mid thoracic spine.    Upper abdomen normal. Cholecystectomy. Mild benign eventration of the right hemidiaphragm.    Impression  No acute cardiopulmonary disease.    Moderate hiatal hernia.    Workstation performed: [id number]      VTE Pharmacologic Prophylaxis: Heparin      Code Status:   Level 1 - Full Code    Assessment:  Principal Problem:    Ambulatory dysfunction  Active Problems:    Hypothyroidism    Hypertension    Mild intermittent asthma without complication    Stage 3b chronic kidney disease (HCC)    COVID-19  Resolved Problems:    * No resolved hospital problems. *      Plan:  Ambulatory dysfunction-POA, likely related to acute COVID infection patient refused inpatient rehab 12/3 and was discharged home but quickly found that she was unable to manage.  Will need rehab placement.  Patient now agreeable.  - Continue PT/OT     COVID-19-she is outside of the treatment window.  She does not have an oxygen requirement.  - Continue antitussives and supportive care    Hypertension-blood pressure is elevated, likely related to anxiety regarding her disposition  - Lisinopril increased from 5 to 10 mg daily 12/6  - Monitor blood pressures on this dose     Anxiety-continue on outpatient Xanax and imipramine.  She followed with psychiatry for many years until Dr. Morrison retired.  SSRIs and other  antidepressants have been ineffective.     Hypothyroidism-TSH is suppressed at 0.229 however free T4 is in the normal range, will recheck in the outpatient setting     Hyperlipidemia-statin discontinued due to profound weakness  - Reassess in outpatient setting     HFpEF-EF 60% by echo April 2024.  Patient appears euvolemic.     Disposition-awaiting postacute rehab, discussed with daughter Katerine by phone and case management    Ezekiel Vargas MD  12/6/2024,3:04 PM

## 2024-12-06 NOTE — PLAN OF CARE
Problem: PHYSICAL THERAPY ADULT  Goal: Performs mobility at highest level of function for planned discharge setting.  See evaluation for individualized goals.  Description: Treatment/Interventions: Functional transfer training, LE strengthening/ROM, Elevations, Therapeutic exercise, Endurance training, Patient/family training, Equipment eval/education, Bed mobility, Gait training, Continued evaluation, OT  Equipment Recommended: Walker       See flowsheet documentation for full assessment, interventions and recommendations.  Outcome: Progressing  Note: Prognosis: Good  Problem List: Decreased strength, Decreased endurance, Impaired balance, Decreased mobility, Decreased safety awareness  Assessment: Pt tolerated session fairly well.  No LOB .  Pt appears motivated.  Pt expressed concern over going to rehab.  Barriers to Discharge: Inaccessible home environment     Rehab Resource Intensity Level, PT: II (Moderate Resource Intensity)    See flowsheet documentation for full assessment.

## 2024-12-06 NOTE — PLAN OF CARE
Problem: Potential for Falls  Goal: Patient will remain free of falls  Description: INTERVENTIONS:  - Educate patient/family on patient safety including physical limitations  - Instruct patient to call for assistance with activity   - Consult OT/PT to assist with strengthening/mobility   - Keep Call bell within reach  - Keep bed low and locked with side rails adjusted as appropriate  - Keep care items and personal belongings within reach  - Initiate and maintain comfort rounds  - Make Fall Risk Sign visible to staff  - Apply yellow socks and bracelet for high fall risk patients  - Consider moving patient to room near nurses station  12/6/2024 1159 by Lois Booth RN  Outcome: Progressing  12/6/2024 1159 by Lois Booth RN  Outcome: Progressing     Problem: Prexisting or High Potential for Compromised Skin Integrity  Goal: Skin integrity is maintained or improved  Description: INTERVENTIONS:  - Identify patients at risk for skin breakdown  - Assess and monitor skin integrity  - Assess and monitor nutrition and hydration status  - Monitor labs   - Assess for incontinence   - Turn and reposition patient  - Assist with mobility/ambulation  - Relieve pressure over bony prominences  - Avoid friction and shearing  - Provide appropriate hygiene as needed including keeping skin clean and dry  - Evaluate need for skin moisturizer/barrier cream  - Collaborate with interdisciplinary team   - Patient/family teaching  - Consider wound care consult   12/6/2024 1159 by Lois Booth RN  Outcome: Progressing  12/6/2024 1159 by Lois Booth RN  Outcome: Progressing

## 2024-12-07 VITALS
OXYGEN SATURATION: 94 % | DIASTOLIC BLOOD PRESSURE: 67 MMHG | HEIGHT: 55 IN | TEMPERATURE: 98.5 F | BODY MASS INDEX: 25.71 KG/M2 | HEART RATE: 60 BPM | SYSTOLIC BLOOD PRESSURE: 133 MMHG | RESPIRATION RATE: 20 BRPM | WEIGHT: 111.11 LBS

## 2024-12-07 LAB
BACTERIA BLD CULT: NORMAL
BACTERIA BLD CULT: NORMAL

## 2024-12-07 PROCEDURE — 99239 HOSP IP/OBS DSCHRG MGMT >30: CPT | Performed by: INTERNAL MEDICINE

## 2024-12-07 RX ORDER — LISINOPRIL 10 MG/1
10 TABLET ORAL DAILY
Start: 2024-12-08

## 2024-12-07 RX ADMIN — LISINOPRIL 10 MG: 10 TABLET ORAL at 09:18

## 2024-12-07 RX ADMIN — ATENOLOL 50 MG: 50 TABLET ORAL at 09:18

## 2024-12-07 RX ADMIN — LEVOTHYROXINE SODIUM 50 MCG: 0.05 TABLET ORAL at 06:23

## 2024-12-07 RX ADMIN — HEPARIN SODIUM 5000 UNITS: 5000 INJECTION, SOLUTION INTRAVENOUS; SUBCUTANEOUS at 06:23

## 2024-12-07 NOTE — ASSESSMENT & PLAN NOTE
Lab Results   Component Value Date    EGFR 38 12/04/2024    EGFR 34 12/04/2024    EGFR 37 12/02/2024    CREATININE 1.26 12/04/2024    CREATININE 1.37 (H) 12/04/2024    CREATININE 1.28 12/02/2024   Baseline creatinine approximately 1.3-1.5  Currently at baseline  Continue to monitor

## 2024-12-07 NOTE — ASSESSMENT & PLAN NOTE
Presented with generalized weakness, fatigue and difficulty ambulating  Was recently discharged yesterday at that time refused rehab now agreeable to rehab placement  Labs otherwise unremarkable  PT/OT eval  Follow-up with case management for placement;

## 2024-12-07 NOTE — DISCHARGE SUMMARY
"[facility]   Discharge - Name: Lakeisha Langston I  MRN: [id number]  Unit/Bed#: -01 I Date of Admission: 12/3/2024   Date of Service: 12/7/2024 I Hospital Day: 3    Principal Problem:    Ambulatory dysfunction  Active Problems:    Hypothyroidism    Hypertension    Mild intermittent asthma without complication    Stage 3b chronic kidney disease (HCC)    COVID-19        Discharging Physician / Practitioner: Harpal Rodriguez DO  PCP: Ezekiel Vargas MD  Admission Date:   Admission Orders (From admission, onward)       Ordered        12/04/24 1223  INPATIENT ADMISSION  Once            12/04/24 0136  Place in Observation  Once                          Discharge Date: 12/07/24    Disposition:    Rehab at [facility]    Discharge Diagnoses:   Please see assessment and plan section above for further details regarding discharge diagnoses.     Consultations During Hospital Stay:  IP CONSULT TO CASE MANAGEMENT    Procedures Performed:   None     Significant Findings / Test Results:   No results found.    No Chest XR results available for this patient.       Incidental Findings:   None other than noted above. I reviewed the above mentioned incidental findings with the patient and/or family and they expressed understanding    Test Results Pending at Discharge (will require follow up):   None      Outpatient Tests Requested:  None     Complications:  none     Reason for Admission:   Chief Complaint   Patient presents with    Syncope     Biba from home c/o syncopal episode, pt is A&O4 at present, denies pain, daughter was next to pt helped her on to the floor, denies head injury, pt reports being COVID + and \"feeling weak\"       Hospital Course:      Lakeisha Langston is a 89 y.o. female patient who originally presented to the hospital due to Syncope (Biba from home c/o syncopal episode, pt is A&O4 at present, denies pain, daughter was next to pt helped her on to the floor, denies head injury, pt reports " "being COVID + and \"feeling weak\")    Review of chart showed patient  has a past medical history of Anxiety (1992), Arthritis (1991), Benign essential hypertension, Disease of thyroid gland, Essential tremor, Fibromyalgia, GERD (gastroesophageal reflux disease), History of nail disorders, Hyperlipidemia, Hypertension, Palpitations, and Transient cerebral ischemia.    Admitted for management of generalized weakness.  Recently tested positive for COVID and was admitted for additional supportive care.  Unfortunately was a rapid response on 12/2 during prior admission due to unwitnessed fall with imaging obtained was overall negative for any signs of trauma.  Initially was recommended rehab but declined.  Presented to ED for further workup and placement to rehab facility.  With assistance of case management, patient was able to have placement to rehab facility at [facility].    Condition at Discharge: stable    Discharge Day Visit / Exam:   Subjective: Offers no new complaints at this time. No acute events reported overnight. Understanding of plan.  All questions answered. Eager for discharge.  Vitals: Blood Pressure: 133/67 (12/07/24 0801)  Pulse: 60 (12/07/24 1124)  Temperature: 98.5 °F (36.9 °C) (12/07/24 0801)  Temp Source: Oral (12/06/24 1515)  Respirations: 20 (12/07/24 0801)  Height: 4' 7\" (139.7 cm) (12/04/24 0230)  Weight - Scale: 50.4 kg (111 lb 1.8 oz) (12/07/24 0600)  SpO2: 94 % (12/07/24 1124)  Exam:   Physical Exam  Vitals and nursing note reviewed.   Constitutional:       General: She is not in acute distress.     Appearance: She is ill-appearing. She is not toxic-appearing.   HENT:      Head: Normocephalic.      Nose: Nose normal.      Mouth/Throat:      Mouth: Mucous membranes are dry.   Eyes:      General: No scleral icterus.     Conjunctiva/sclera: Conjunctivae normal.   Cardiovascular:      Rate and Rhythm: Normal rate.      Pulses: Normal pulses.           Radial pulses are 2+ on the right side and " 2+ on the left side.      Heart sounds: Normal heart sounds.   Pulmonary:      Effort: Pulmonary effort is normal.      Breath sounds: Normal breath sounds.   Abdominal:      General: Bowel sounds are normal. There is no distension.      Palpations: Abdomen is soft.      Tenderness: There is no abdominal tenderness.   Musculoskeletal:         General: No tenderness.   Skin:     General: Skin is dry.      Coloration: Skin is not jaundiced.   Neurological:      Mental Status: She is alert.   Psychiatric:         Mood and Affect: Mood normal.         Behavior: Behavior normal. Behavior is cooperative.         Medication Adjustments and Discharge Medications:  Discharge Medication List: See after visit summary for reconciled discharge medications.   Medication Dosing Tapers - Please refer to Discharge Medication List for details on any medication dosing tapers (if applicable to patient).   Summary of Medication Adjustments made as a result of this hospitalization: as noted on med rec  Medications being temporarily held (include recommended restart time): as noted on med rec    Wound Care Recommendations:  When applicable, please see wound care section of After Visit Summary.    Instructions for any Catheters / Lines Present at Discharge (including removal date, if applicable):     Diet Recommendations at Discharge:  Diet -        Diet Orders   (From admission, onward)                 Start     Ordered    12/04/24 0153  Diet Regular; Regular House  Diet effective now        References:    Adult Nutrition Support Algorithm    RD Therapeutic Diet Order Protocol   Question Answer Comment   Diet Type Regular    Regular Regular House    RD to adjust diet per protocol? Yes        12/04/24 0153                    Mobility at time of Discharge:   Basic Mobility Inpatient Raw Score: 17  JH-HLM Goal: 5: Stand one or more mins  JH-HLM Achieved: 6: Walk 10 steps or more  HLM Goal achieved. Continue to encourage appropriate  mobility.    Goals of Care Discussions:  Code Status at Discharge: Level 1 - Full Code  Goals of care were not discussed during this admission.    Discharge instructions/Information to patient and family:   See after visit summary section titled Discharge Instructions for information provided to patient and family.      Planned Readmission: none      Discharge Statement:  I spent 38 minutes discharging the patient. This time was spent on the day of discharge. I had direct contact with the patient on the day of discharge. Greater than 50% of the total time was spent examining patient, answering all patient questions, arranging and discussing plan of care with patient as well as directly providing post-discharge instructions.  Additional time then spent on discharge activities.    **Please Note: This note may have been constructed using a voice recognition system.**

## 2024-12-07 NOTE — CASE MANAGEMENT
Case Management Discharge Planning Note    Patient name Lakeisha Langston  Location /-01 MRN [id number]  : 1935 Date 2024       Current Admission Date: 12/3/2024  Current Admission Diagnosis:Ambulatory dysfunction   Patient Active Problem List    Diagnosis Date Noted Date Diagnosed    COVID-19 2024     Weakness 2024     Ambulatory dysfunction 2024     Left-sided back pain 08/15/2024     Depression, recurrent (HCC) 2023     Stage 3b chronic kidney disease (HCC) 02/10/2022     Chronic diastolic CHF (congestive heart failure) ([facility]) 2022     Left knee injury 2021     Ankle swelling 2021     B12 deficiency 2021     Neuropathy 2021     Syncope 2021     Hiatal hernia 2020     Eustachian tube dysfunction, bilateral 2020     Tension-type headache, not intractable 2020     Closed fracture of tenth thoracic vertebra with routine healing 10/16/2019     Fall as cause of accidental injury in home as place of occurrence 10/16/2019     Age-related osteoporosis without current pathological fracture 2019     Mild intermittent asthma without complication 2019     Impaired fasting glucose 2018     Venous insufficiency 2018     AVM (arteriovenous malformation) 2018     Chest heaviness 2018     Anxiety 2018     Abnormal EKG 2018     Iron deficiency anemia 2016     Atrophic vaginitis 2016     GERD without esophagitis 2014     Vitamin D deficiency 2014     Irritable bowel syndrome 2014     Hypothyroidism 2014     Hypertension 2014     Hyperlipidemia 2014     Benign familial tremor 2014     Dyslipidemia, goal to be determined 2007     Generalized anxiety disorder 2007     Osteoporosis 2007       LOS (days): 3  Geometric Mean LOS (GMLOS) (days): 4.5  Days to GMLOS:1.5     OBJECTIVE:  Risk of Unplanned Readmission Score:  14.14         Current admission status: Inpatient   Preferred Pharmacy:   [id number] - [place], [place] - [address]  [address]  [facility] [address]  Phone: [phone] Fax: [phone]    Primary Care Provider: Ezekiel Vargas MD    Primary Insurance: MEDICARE  Secondary Insurance: AARP    DISCHARGE DETAILS:    Discharge planning discussed with:: Nicole-isadora  Freedom of Choice: Yes  Comments - Freedom of Choice: CM contacted [facility] today and they will accept pt.  CM spoke to granddadaya Rivera and states that she is agreeable to this dcp.  She will come to the hospital to and will follow the stretcher van to the facility.  CM contacted family/caregiver?: Yes  Were Treatment Team discharge recommendations reviewed with patient/caregiver?: Yes  Did patient/caregiver verbalize understanding of patient care needs?: Yes  Were patient/caregiver advised of the risks associated with not following Treatment Team discharge recommendations?: Yes    Contacts  Patient Contacts: Nicole  Relationship to Patient:: Family  Contact Method: Phone  Phone Number: [phone]  Reason/Outcome: Discharge Planning    Requested Home Health Care         Is the patient interested in HHC at discharge?: No    DME Referral Provided  Referral made for DME?: No    Other Referral/Resources/Interventions Provided:  Interventions: Short Term Rehab, Transportation  Referral Comments: [facility] to [facility] today with stretcher van transport (due to extreme fatigue, Covid +, and ambulatory dysfunction) arranged for 13:30 via [employer].  Nursing and [facility] informed    Would you like to participate in our [facility] service program?  : No - Declined    Treatment Team Recommendation: Short Term Rehab  Discharge Destination Plan:: Short Term Rehab  Transport at Discharge : Stretcher van     Number/Name of Dispatcher: Roundtrip  Transported by (Company and Unit #): [employer]  ETA of Transport (Date): 12/07/24  ETA of Transport (Time): 1330              IMM Given (Date):: 12/07/24  IMM Given to:: Family  Family notified:: granddaughter Nicole

## 2024-12-09 ENCOUNTER — PATIENT OUTREACH (OUTPATIENT)
Dept: CASE MANAGEMENT | Facility: OTHER | Age: 89
End: 2024-12-09

## 2024-12-09 NOTE — PROGRESS NOTES
Outpatient Care Management [facility]way. Outpatient care management referral via HRR report 12/4/24. Discharged to [facility] 12/7/24. Email sent to facility to inform them the patient is on the EVAN Pathway and I will be following them during their skilled stay.  This Admin Coordinator will continue to monitor via chart review.

## 2024-12-16 DIAGNOSIS — E03.9 ACQUIRED HYPOTHYROIDISM: ICD-10-CM

## 2024-12-17 RX ORDER — LEVOTHYROXINE SODIUM 50 UG/1
TABLET ORAL
Qty: 102 TABLET | Refills: 1 | Status: SHIPPED | OUTPATIENT
Start: 2024-12-17

## 2024-12-19 ENCOUNTER — PATIENT OUTREACH (OUTPATIENT)
Dept: CASE MANAGEMENT | Facility: OTHER | Age: 89
End: 2024-12-19

## 2024-12-23 ENCOUNTER — PATIENT OUTREACH (OUTPATIENT)
Age: 89
End: 2024-12-23

## 2024-12-23 ENCOUNTER — TELEPHONE (OUTPATIENT)
Age: 89
End: 2024-12-23

## 2024-12-23 DIAGNOSIS — Z71.89 COMPLEX CARE COORDINATION: Primary | ICD-10-CM

## 2024-12-23 NOTE — TELEPHONE ENCOUNTER
If she has not tried miralax, she can start there. If that doesn't work, she can try sennokot or milk of magnesia.

## 2024-12-23 NOTE — TELEPHONE ENCOUNTER
Patient is complaining of constipation since leaving the hospital. Daughter would like suggestions on what she could do?    Please advise and notify.    Thank you.

## 2024-12-23 NOTE — TELEPHONE ENCOUNTER
Patients daughter calling to get her set up for a TCM appointment. Verified with the daughter when the patient would be getting discharged or if she has already been discharged from rehab. Patient has been out of rehab since 12/21/2024 and needs an appointment on a Friday if possible. Patient has been scheduled for 01/03/2025 with Jet

## 2024-12-23 NOTE — TELEPHONE ENCOUNTER
Called the patients daughter and left a detailed message of the instructions that Trista provided and our number for her to call back just in case she has any further questions or concerns

## 2024-12-23 NOTE — PROGRESS NOTES
Update received from [facility] the patient discharged 12/21/24 to Home. I have removed myself off of the care team and sent an inbasket to the appropriate care  to notifying them of the SNF discharge and EVAN/SNF Pathway. Ambulatory referral placed for complex care management.

## 2024-12-24 ENCOUNTER — PATIENT OUTREACH (OUTPATIENT)
Dept: CASE MANAGEMENT | Facility: OTHER | Age: 89
End: 2024-12-24

## 2024-12-27 DIAGNOSIS — F41.9 ANXIETY: ICD-10-CM

## 2024-12-27 NOTE — TELEPHONE ENCOUNTER
Reason for call:   [x] Refill   [] Prior Auth  [] Other:     Office:   [x] PCP/Provider -  [facility] [address]  Authorized By: Ezekiel Vargas MD  [] Specialty/Provider -     Medication:     ALPRAZolam (XANAX) 0.5 mg tablet         Pharmacy: [id number]  [place] - [address]     Does the patient have enough for 3 days?   [] Yes   [x] No - Send as HP to POD

## 2024-12-29 RX ORDER — ALPRAZOLAM 0.5 MG
0.5 TABLET ORAL 4 TIMES DAILY PRN
Qty: 120 TABLET | Refills: 0 | Status: SHIPPED | OUTPATIENT
Start: 2024-12-29

## 2024-12-30 DIAGNOSIS — I10 PRIMARY HYPERTENSION: ICD-10-CM

## 2024-12-30 DIAGNOSIS — I10 ESSENTIAL HYPERTENSION: ICD-10-CM

## 2024-12-31 ENCOUNTER — PATIENT OUTREACH (OUTPATIENT)
Dept: CASE MANAGEMENT | Facility: OTHER | Age: 89
End: 2024-12-31

## 2024-12-31 RX ORDER — ATENOLOL 50 MG/1
50 TABLET ORAL 2 TIMES DAILY
Qty: 180 TABLET | Refills: 0 | OUTPATIENT
Start: 2024-12-31

## 2024-12-31 NOTE — LETTER
Date: 12/31/24    Dear Lakeisha Langston,   My name is Usha Rob; I am a registered nurse care manager working with [employer] [address] [address] [phone].   I have not been able to reach you and would like to set a time that I can talk with you over the phone.  My work is to help patients that have complex medical conditions get the care they need. This includes patients who may have been in the hospital or emergency room.    Please call me with any questions you may have. I look forward to meeting with you.  Sincerely,  Usha Rivas  [phone]  Outpatient Care Manager

## 2024-12-31 NOTE — PROGRESS NOTES
Left message with my contact information for patient to return my call  Unable to reach letter sent

## 2025-01-01 RX ORDER — LISINOPRIL 10 MG/1
10 TABLET ORAL DAILY
Qty: 30 TABLET | Refills: 0 | Status: SHIPPED | OUTPATIENT
Start: 2025-01-01

## 2025-01-03 ENCOUNTER — TELEPHONE (OUTPATIENT)
Age: 89
End: 2025-01-03

## 2025-01-03 ENCOUNTER — OFFICE VISIT (OUTPATIENT)
Age: 89
End: 2025-01-03
Payer: MEDICARE

## 2025-01-03 VITALS
SYSTOLIC BLOOD PRESSURE: 116 MMHG | DIASTOLIC BLOOD PRESSURE: 72 MMHG | WEIGHT: 105 LBS | OXYGEN SATURATION: 98 % | RESPIRATION RATE: 16 BRPM | HEIGHT: 55 IN | HEART RATE: 72 BPM | BODY MASS INDEX: 24.3 KG/M2

## 2025-01-03 DIAGNOSIS — Z13.220 SCREENING FOR LIPID DISORDERS: ICD-10-CM

## 2025-01-03 DIAGNOSIS — R26.2 AMBULATORY DYSFUNCTION: Primary | ICD-10-CM

## 2025-01-03 DIAGNOSIS — R53.83 FATIGUE, UNSPECIFIED TYPE: ICD-10-CM

## 2025-01-03 DIAGNOSIS — I10 PRIMARY HYPERTENSION: ICD-10-CM

## 2025-01-03 DIAGNOSIS — E03.8 OTHER SPECIFIED HYPOTHYROIDISM: ICD-10-CM

## 2025-01-03 DIAGNOSIS — N18.32 STAGE 3B CHRONIC KIDNEY DISEASE (HCC): ICD-10-CM

## 2025-01-03 DIAGNOSIS — I50.32 CHRONIC DIASTOLIC CHF (CONGESTIVE HEART FAILURE) (HCC): ICD-10-CM

## 2025-01-03 DIAGNOSIS — R73.03 PREDIABETES: ICD-10-CM

## 2025-01-03 DIAGNOSIS — Z00.00 MEDICARE ANNUAL WELLNESS VISIT, SUBSEQUENT: ICD-10-CM

## 2025-01-03 PROCEDURE — 99214 OFFICE O/P EST MOD 30 MIN: CPT

## 2025-01-03 PROCEDURE — G0439 PPPS, SUBSEQ VISIT: HCPCS

## 2025-01-03 NOTE — PROGRESS NOTES
Name: Lakeisha Langston      : 1935      MRN: [id number]  Encounter Provider: Jet Brand PA-C  Encounter Date: 1/3/2025   Encounter department: [facility]    Assessment & Plan  Ambulatory dysfunction  Was seen in [facility] on 2024 after slipping out of her bed and not being able to get up.  She was admitted for observation. she was also found to have a fever and productive cough.  Blood work showed mild hyponatremia and hypomagnesemia.  Was COVID-positive.  Evaluated by PT/OT while in the hospital, they recommended rehab facility patient declined.  She was discharged on 12/3/2024 with home health care.  Patient returned back to the ED on 12/3/2024 and was admitted for management of her generalized weakness.  Was again reevaluated by PT/OT who recommended rehab facility placement.  Was then transferred to [facility].  Was at [facility] from 2024 to 2024.  Since then, she has been doing PT 2 times a week.  States she is feeling better however still endorses some fatigue which she attributes to the COVID infection.  Still walking with a walker.  Will continue to monitor       Stage 3b chronic kidney disease (HCC)  Lab Results   Component Value Date    EGFR 38 2024    EGFR 34 2024    EGFR 37 2024    CREATININE 1.26 2024    CREATININE 1.37 (H) 2024    CREATININE 1.28 2024            Other specified hypothyroidism         Primary hypertension  Blood pressure was 116/72 in office.  Stable on current regimen       Medicare annual wellness visit, subsequent  Patient is a pleasant 89-year-old female presenting with her daughter for routine Medicare annual wellness visit.  States she is sleeping well, getting about 8 hours a night.  Diet is well-balanced, eating fruits and vegetables, enjoys eating dark chocolate.  Trying to stay active, walking around the house, doing PT at home 2 times a week.  Due for  mammogram, patient declined at this time.  Patient declined COVID shingles and RSV vaccine  F/u in 6 months.       Prediabetes  Last A1c was 5.9%.  Discussed dietary modifications to improve.  No need for intervention at this time  Orders:    Hemoglobin A1C; Future    Chronic diastolic CHF (congestive heart failure) (HCC)  Wt Readings from Last 3 Encounters:   25 47.6 kg (105 lb)   24 50.4 kg (111 lb 1.8 oz)   24 47.9 kg (105 lb 9.6 oz)   Has appointment with cardiologist on 2025.  Recent echo on 2024 showed EF of 60%, mild mitral regurgitation, and mild tricuspid regurgitation.                   Depression Screening and Follow-up Plan:   Patient's depression screening was negative with an [place]  Depression Scale score of  .     Preventive health issues were discussed with patient, and age appropriate screening tests were ordered as noted in patient's After Visit Summary. Personalized health advice and appropriate referrals for health education or preventive services given if needed, as noted in patient's After Visit Summary.    History of Present Illness     Patient is an 89 year old female presenting for routine AWV     Sleeps well- gets about 8 hours activity  Diet- eats well balanced diet, fruits/veggies  Activity; staying active, walks around house, doing PT 2 times a week at home with home PT    UTD on flu vaccine-got it in the rehab facility    Declines mammogram at this time      Wheezing  Associated symptoms include coughing. Pertinent negatives include no chest pain, dizziness, fatigue, leg swelling, palpitations, rhinorrhea, sore throat or wheezing.      Patient Care Team:  Ezekiel Vargas MD as PCP - General  MD Katy Mascorro, RN as RN Care Manager    Review of Systems   Constitutional:  Negative for chills, fatigue and fever.   HENT:  Negative for ear discharge, ear pain, postnasal drip, rhinorrhea, sinus pressure, sinus pain, sore throat,  tinnitus and trouble swallowing.    Eyes:  Negative for pain, discharge and itching.   Respiratory:  Positive for cough. Negative for shortness of breath and wheezing.    Cardiovascular:  Negative for chest pain, palpitations and leg swelling.   Gastrointestinal:  Negative for abdominal pain, constipation, diarrhea, nausea and vomiting.   Endocrine: Negative for polydipsia, polyphagia and polyuria.   Genitourinary:  Negative for difficulty urinating, frequency, hematuria and urgency.   Musculoskeletal:  Negative for arthralgias, joint swelling and myalgias.   Skin:  Negative for color change.   Allergic/Immunologic: Negative for environmental allergies.   Neurological:  Negative for dizziness, weakness, light-headedness, numbness and headaches.   Hematological:  Negative for adenopathy.   Psychiatric/Behavioral:  Negative for decreased concentration and sleep disturbance. The patient is not nervous/anxious.      Medical History Reviewed by provider this encounter:  Tobacco  Allergies  Meds  Problems  Med Hx  Surg Hx  Fam Hx       Annual Wellness Visit Questionnaire   Lakeisha is here for her Subsequent Wellness visit.     Health Risk Assessment:   Patient rates overall health as good. Patient feels that their physical health rating is slightly worse. Patient is satisfied with their life. Eyesight was rated as same. Hearing was rated as same. Patient feels that their emotional and mental health rating is same. Patients states they are often angry. Patient states they are often unusually tired/fatigued. Pain experienced in the last 7 days has been some. Patient's pain rating has been 7/10.     Fall Risk Screening:   In the past year, patient has experienced: no history of falling in past year      Urinary Incontinence Screening:   Patient has not leaked urine accidently in the last six months.     Home Safety:  Patient does not have trouble with stairs inside or outside of their home. Patient has working smoke  alarms and has working carbon monoxide detector. Home safety hazards include: none.     Nutrition:   Current diet is Regular.     Medications:   Patient is currently taking over-the-counter supplements. OTC medications include: see medication list. Patient is able to manage medications.     Activities of Daily Living (ADLs)/Instrumental Activities of Daily Living (IADLs):   Walk and transfer into and out of bed and chair?: Yes  Dress and groom yourself?: Yes    Bathe or shower yourself?: Yes    Feed yourself? Yes  Do your laundry/housekeeping?: Yes  Manage your money, pay your bills and track your expenses?: Yes  Make your own meals?: Yes    Do your own shopping?: Yes    Previous Hospitalizations:   Any hospitalizations or ED visits within the last 12 months?: Yes    How many hospitalizations have you had in the last year?: 1-2    Advance Care Planning:   Living will: Yes    Advanced directive: Yes    Advanced directive counseling given: Yes      Cognitive Screening:   Provider or family/friend/caregiver concerned regarding cognition?: No    PREVENTIVE SCREENINGS      Cardiovascular Screening:    General: Screening Not Indicated and History Lipid Disorder      Diabetes Screening:     General: Screening Current      Colorectal Cancer Screening:     General: Screening Not Indicated      Cervical Cancer Screening:    General: Screening Not Indicated      Osteoporosis Screening:    General: Screening Not Indicated and History Osteoporosis      Lung Cancer Screening:     General: Screening Not Indicated    Screening, Brief Intervention, and Referral to Treatment (SBIRT)    Screening  Typical number of drinks in a day: 0  Typical number of drinks in a week: 0  Interpretation: Low risk drinking behavior.    Single Item Drug Screening:  How often have you used an illegal drug (including marijuana) or a prescription medication for non-medical reasons in the past year? never    Single Item Drug Screen Score:  "0  Interpretation: Negative screen for possible drug use disorder    Social Drivers of Health     Financial Resource Strain: Low Risk  (5/25/2023)    Overall Financial Resource Strain (CARDIA)     Difficulty of Paying Living Expenses: Not hard at all   Food Insecurity: No Food Insecurity (1/3/2025)    Hunger Vital Sign     Worried About Running Out of Food in the Last Year: Never true     Ran Out of Food in the Last Year: Never true   Transportation Needs: No Transportation Needs (1/3/2025)    PRAPARE - Transportation     Lack of Transportation (Medical): No     Lack of Transportation (Non-Medical): No   Housing Stability: Low Risk  (1/3/2025)    Housing Stability Vital Sign     Unable to Pay for Housing in the Last Year: No     Number of Times Moved in the Last Year: 1     Homeless in the Last Year: No   Utilities: Not At Risk (1/3/2025)    [facility] Utilities     Threatened with loss of utilities: No     No results found.    Objective   /72 (BP Location: Left arm)   Pulse 72   Resp 16   Ht 4' 7\" (1.397 m)   Wt 47.6 kg (105 lb)   SpO2 98%   BMI 24.40 kg/m²     Physical Exam  Vitals and nursing note reviewed.   Constitutional:       General: She is awake. She is not in acute distress.     Appearance: Normal appearance. She is well-developed, well-groomed and normal weight.   HENT:      Head: Normocephalic and atraumatic.      Right Ear: Hearing and external ear normal.      Left Ear: Hearing and external ear normal.      Nose: Nose normal.      Mouth/Throat:      Lips: Pink.      Mouth: Mucous membranes are moist.   Eyes:      General: Lids are normal. Vision grossly intact. Gaze aligned appropriately.      Conjunctiva/sclera: Conjunctivae normal.   Neck:      Vascular: No carotid bruit.      Trachea: Trachea and phonation normal.   Cardiovascular:      Rate and Rhythm: Normal rate and regular rhythm.      Heart sounds: Normal heart sounds, S1 normal and S2 normal. No murmur heard.     No friction rub. No " gallop.   Pulmonary:      Effort: Pulmonary effort is normal. No respiratory distress.      Breath sounds: Normal air entry. No decreased breath sounds, wheezing, rhonchi or rales.   Abdominal:      General: Abdomen is flat. Bowel sounds are normal.      Palpations: Abdomen is soft.      Tenderness: There is no abdominal tenderness.   Musculoskeletal:         General: No swelling.      Cervical back: Neck supple.      Right lower leg: No edema.      Left lower leg: No edema.   Skin:     General: Skin is warm.      Capillary Refill: Capillary refill takes less than 2 seconds.   Neurological:      Mental Status: She is alert.   Psychiatric:         Attention and Perception: Attention and perception normal.         Mood and Affect: Mood and affect normal.         Speech: Speech normal.         Behavior: Behavior normal. Behavior is cooperative.         Thought Content: Thought content normal.         Cognition and Memory: Cognition and memory normal.         Judgment: Judgment normal.

## 2025-01-03 NOTE — ASSESSMENT & PLAN NOTE
Was seen in [facility] on 12/1/2024 after slipping out of her bed and not being able to get up.  She was admitted for observation. she was also found to have a fever and productive cough.  Blood work showed mild hyponatremia and hypomagnesemia.  Was COVID-positive.  Evaluated by PT/OT while in the hospital, they recommended rehab facility patient declined.  She was discharged on 12/3/2024 with home health care.  Patient returned back to the ED on 12/3/2024 and was admitted for management of her generalized weakness.  Was again reevaluated by PT/OT who recommended rehab facility placement.  Was then transferred to [facility].  Was at [facility] from 12/7/2024 to 12/21/2024.  Since then, she has been doing PT 2 times a week.  States she is feeling better however still endorses some fatigue which she attributes to the COVID infection.  Still walking with a walker.  Will continue to monitor

## 2025-01-03 NOTE — TELEPHONE ENCOUNTER
Daughter called stating she got the appointment time for patient confused and they are on the way.  Daughter made aware that if later than 15 minutes appointment may need to be rescheduled. Appointment notes updated and attempted to notify office clerical but was placed on hold.

## 2025-01-03 NOTE — ASSESSMENT & PLAN NOTE
Last A1c was 5.9%.  Discussed dietary modifications to improve.  No need for intervention at this time  Orders:    Hemoglobin A1C; Future

## 2025-01-03 NOTE — ASSESSMENT & PLAN NOTE
Lab Results   Component Value Date    EGFR 38 12/04/2024    EGFR 34 12/04/2024    EGFR 37 12/02/2024    CREATININE 1.26 12/04/2024    CREATININE 1.37 (H) 12/04/2024    CREATININE 1.28 12/02/2024

## 2025-01-03 NOTE — ASSESSMENT & PLAN NOTE
Wt Readings from Last 3 Encounters:   01/03/25 47.6 kg (105 lb)   12/07/24 50.4 kg (111 lb 1.8 oz)   12/03/24 47.9 kg (105 lb 9.6 oz)   Has appointment with cardiologist on 1/31/2025.  Recent echo on 4/16/2024 showed EF of 60%, mild mitral regurgitation, and mild tricuspid regurgitation.

## 2025-01-14 ENCOUNTER — PATIENT OUTREACH (OUTPATIENT)
Dept: CASE MANAGEMENT | Facility: OTHER | Age: 89
End: 2025-01-14

## 2025-01-14 NOTE — PROGRESS NOTES
No response to telephone calls or unable to reach letter will close from care management at this time  Patient attending medical appointments

## 2025-01-17 ENCOUNTER — TELEPHONE (OUTPATIENT)
Age: 89
End: 2025-01-17

## 2025-01-17 NOTE — TELEPHONE ENCOUNTER
[facility] just wanted [facility] let the [facility] provider know the pt canceled her PT visit for today.     Please advise, thank you

## 2025-01-17 NOTE — TELEPHONE ENCOUNTER
Stephany martinez nurse  through [employer] sent a form for a toilet seat to our fax number and then also an email. She said this order was also put through [employer] the first time but the company she originally used doesn't use [employer] so she had to pick another company to go through

## 2025-01-20 ENCOUNTER — TELEPHONE (OUTPATIENT)
Age: 89
End: 2025-01-20

## 2025-01-20 NOTE — TELEPHONE ENCOUNTER
[employer] Called    States, an physicians order was faxed over on 1/18/25 for medical supplies. Nothing seen in media.  Once fax has been received please have pcp sign, date, fax back.    Please be on the watch for fax. Thank you.

## 2025-01-22 ENCOUNTER — TELEPHONE (OUTPATIENT)
Age: 89
End: 2025-01-22

## 2025-01-22 DIAGNOSIS — K44.9 HIATAL HERNIA: Primary | ICD-10-CM

## 2025-01-22 RX ORDER — PANTOPRAZOLE SODIUM 40 MG/1
40 TABLET, DELAYED RELEASE ORAL DAILY
Qty: 100 TABLET | Refills: 3 | Status: SHIPPED | OUTPATIENT
Start: 2025-01-22

## 2025-01-22 NOTE — TELEPHONE ENCOUNTER
Patient called the RX Refill Line. Message is being forwarded to the office.     Patient is requesting  a refill on   pantoprazole (PROTONIX) EC tablet 40 mg  . It is no longer on hr med list and she is unsure why it was discontinued. If appropriate, please send script to [facility]    Please contact patient at [phone] with any questions

## 2025-01-24 NOTE — TELEPHONE ENCOUNTER
Received call from [employer] with [employer] stating they received the fax back, but they are requesting an additional diagnosis code to support patient getting incontinence supplies. They are requesting it to just be added to fax and fax back to [phone].

## 2025-01-28 DIAGNOSIS — E78.2 MIXED HYPERLIPIDEMIA: ICD-10-CM

## 2025-01-28 DIAGNOSIS — I10 PRIMARY HYPERTENSION: ICD-10-CM

## 2025-01-29 RX ORDER — ROSUVASTATIN CALCIUM 5 MG/1
5 TABLET, COATED ORAL DAILY
Qty: 90 TABLET | Refills: 1 | Status: SHIPPED | OUTPATIENT
Start: 2025-01-29

## 2025-01-29 RX ORDER — LISINOPRIL 10 MG/1
10 TABLET ORAL DAILY
Qty: 30 TABLET | Refills: 5 | Status: SHIPPED | OUTPATIENT
Start: 2025-01-29

## 2025-01-30 LAB
DME PARACHUTE DELIVERY DATE REQUESTED: NORMAL
DME PARACHUTE ITEM DESCRIPTION: NORMAL
DME PARACHUTE ORDER STATUS: NORMAL
DME PARACHUTE SUPPLIER NAME: NORMAL
DME PARACHUTE SUPPLIER PHONE: NORMAL

## 2025-01-31 ENCOUNTER — TELEPHONE (OUTPATIENT)
Age: 89
End: 2025-01-31

## 2025-01-31 NOTE — TELEPHONE ENCOUNTER
Mega from [facility] called stating they received the fax from 1/27/25, however she states the new code does not support what the patient needs and is looking for a specific code. Please update and refax ro [facility]

## 2025-01-31 NOTE — TELEPHONE ENCOUNTER
Medical supply company wanted to verify the fax number where the form with the updated code was faxed to. Phone number confirmed by the medical supply company

## 2025-01-31 NOTE — TELEPHONE ENCOUNTER
Carol from [facility] calling to report,    Report:    Patient cancelled her appointment today with her. Her daughter states her arthritis came back and can't participate physical therapy today.    Patient has an upcoming appointment next week.    Please advise. Thank you.

## 2025-02-04 ENCOUNTER — TELEPHONE (OUTPATIENT)
Age: 89
End: 2025-02-04

## 2025-02-04 NOTE — TELEPHONE ENCOUNTER
Toilet commode needs to be ordered to any [employer] company, for some reason not working thru [employer]- patient is still waiting     Call bobby at [employer] w/ any questions   [phone] ext [phone]

## 2025-02-14 ENCOUNTER — TELEPHONE (OUTPATIENT)
Age: 89
End: 2025-02-14

## 2025-02-14 NOTE — TELEPHONE ENCOUNTER
Pt's daughter, Katerine, would like an update on the commode ordered for Lakeisha on 2/5.  She is also requesting that a rolling walker be ordered for her.  Right now she is only using a cane and she needs more support with the walker.  Please call Katerine and give her an update on the commode and confirm the walker will be ordered.

## 2025-02-14 NOTE — TELEPHONE ENCOUNTER
Carol, PT from [facility] called.  States that pt was seen today for therapy and VSS.  However, pt had 3 falls in the last 24 hour period.  States 2 occurred last night and 1 fall the night before.  Pt states she feels fine.      States PCP may want to check pt for UTI.  States pt denies cloudy or malodorous urine.  No fever.  Pt states she thin\ks that her ankle is just buckling.  Pt did not sustain any injury.  States her left leg is just sore d/t how she fell but currently no noted bruises or skin tears.  Please review and advise.

## 2025-02-14 NOTE — TELEPHONE ENCOUNTER
Please have her make an appt for next week to discuss. If she begins to experience any UTI-like symptoms like confusion, dysuria, urinary frequency, urgency, hematuria, fevers, or chills, she needs to go to the ER urgent care.

## 2025-02-17 DIAGNOSIS — F41.9 ANXIETY: ICD-10-CM

## 2025-02-17 RX ORDER — ALPRAZOLAM 0.5 MG
0.5 TABLET ORAL 4 TIMES DAILY PRN
Qty: 120 TABLET | Refills: 0 | Status: SHIPPED | OUTPATIENT
Start: 2025-02-17

## 2025-02-17 NOTE — TELEPHONE ENCOUNTER
Medication:     ALPRAZolam (XANAX) 0.5 mg tablet       Dose/Frequency: Take 1 tablet (0.5 mg total) by mouth 4 (four) times a day as needed for anxiety     Quantity: 120    Pharmacy: [facility]    Office:   [x] PCP/Provider - Dr Vargas  [] Speciality/Provider -     Does the patient have enough for 3 days?   [] Yes   [x] No - Send as HP to POD

## 2025-02-19 DIAGNOSIS — J06.9 UPPER RESPIRATORY TRACT INFECTION, UNSPECIFIED TYPE: Primary | ICD-10-CM

## 2025-02-21 ENCOUNTER — OFFICE VISIT (OUTPATIENT)
Dept: CARDIOLOGY CLINIC | Facility: CLINIC | Age: 89
End: 2025-02-21
Payer: MEDICARE

## 2025-02-21 VITALS
OXYGEN SATURATION: 97 % | DIASTOLIC BLOOD PRESSURE: 72 MMHG | SYSTOLIC BLOOD PRESSURE: 130 MMHG | BODY MASS INDEX: 23.61 KG/M2 | RESPIRATION RATE: 16 BRPM | HEART RATE: 71 BPM | HEIGHT: 55 IN | WEIGHT: 102 LBS

## 2025-02-21 DIAGNOSIS — I50.32 CHRONIC DIASTOLIC CHF (CONGESTIVE HEART FAILURE) (HCC): Primary | ICD-10-CM

## 2025-02-21 DIAGNOSIS — E78.2 MIXED HYPERLIPIDEMIA: ICD-10-CM

## 2025-02-21 DIAGNOSIS — I47.10 PSVT (PAROXYSMAL SUPRAVENTRICULAR TACHYCARDIA) (HCC): ICD-10-CM

## 2025-02-21 DIAGNOSIS — I10 PRIMARY HYPERTENSION: ICD-10-CM

## 2025-02-21 DIAGNOSIS — I10 ESSENTIAL HYPERTENSION: ICD-10-CM

## 2025-02-21 LAB
DME PARACHUTE DELIVERY DATE ACTUAL: NORMAL
DME PARACHUTE DELIVERY DATE REQUESTED: NORMAL
DME PARACHUTE ITEM DESCRIPTION: NORMAL
DME PARACHUTE ORDER STATUS: NORMAL
DME PARACHUTE SUPPLIER NAME: NORMAL
DME PARACHUTE SUPPLIER PHONE: NORMAL

## 2025-02-21 PROCEDURE — 99214 OFFICE O/P EST MOD 30 MIN: CPT | Performed by: INTERNAL MEDICINE

## 2025-02-21 RX ORDER — ATENOLOL 50 MG/1
TABLET ORAL
Start: 2025-02-21

## 2025-02-21 NOTE — PROGRESS NOTES
[facility] [place]  [address]  [place] [address]  Cardiology Follow Up    Lakeisha Langston  1935  [id number]    Assessment & Plan  Chronic diastolic CHF (congestive heart failure) (HCC)  Wt Readings from Last 3 Encounters:   02/21/25 46.3 kg (102 lb)   01/03/25 47.6 kg (105 lb)   12/07/24 50.4 kg (111 lb 1.8 oz)     Patient clinically appears euvolemic.  Importance of salt restriction and compliance with present medications reviewed with patient and family.        Primary hypertension  Blood pressures are stable.  Continue present medications.  I decrease the dosage of atenolol to 50 mg half tablet twice a day as she is actively wheezing with recent COVID infection.  Mixed hyperlipidemia  Continue rosuvastatin 5 mg p.o. daily.  Continue diet and his factor modification.  PSVT (paroxysmal supraventricular tachycardia) (HCC)  She of SVT with no recurrence of palpitations    Patient was recently hospitalized for COVID and subsequently had rehab.  Patient states that she is feeling better.  She still has some wheezing.  She is supposed to see her primary care physician soon.    Echocardiogram will be done to evaluate ejection fraction valves and look for any evidence of pulmonary hypertension.         Chief Complaint   Patient presents with    Follow-up       Interval History:   Patient presents for follow-up visit.  Patient denies any chest pain.  No history of palpitations or dizziness.  Patient was recently hospitalized with COVID and subsequent went to rehab.  Patient still has some shortness of breath and wheezing.  No history of leg edema orthopnea PND.  No history of presyncope syncope.    Patient Active Problem List   Diagnosis    Chest heaviness    Anxiety    Abnormal EKG    Vitamin D deficiency    Irritable bowel syndrome    Iron deficiency anemia    Hypothyroidism    Hypertension    Hyperlipidemia    GERD without esophagitis    AVM (arteriovenous malformation)     Venous insufficiency    Impaired fasting glucose    Mild intermittent asthma without complication    Age-related osteoporosis without current pathological fracture    Closed fracture of tenth thoracic vertebra with routine healing    Fall as cause of accidental injury in home as place of occurrence    Atrophic vaginitis    Benign familial tremor    Eustachian tube dysfunction, bilateral    Tension-type headache, not intractable    Hiatal hernia    Syncope    B12 deficiency    Neuropathy    Left knee injury    Ankle swelling    Chronic diastolic CHF (congestive heart failure) ([facility])    Stage 3b chronic kidney disease ([facility])    Depression, recurrent ([facility])    Dyslipidemia, goal to be determined    Generalized anxiety disorder    Osteoporosis    Left-sided back pain    COVID-19    Weakness    Ambulatory dysfunction     Past Medical History:   Diagnosis Date    Anxiety     Arthritis     Benign essential hypertension     Last assessed: 14    Disease of thyroid gland     Essential tremor     Fibromyalgia     GERD (gastroesophageal reflux disease)     History of nail disorders     Hyperlipidemia     Hypertension     Palpitations     Transient cerebral ischemia      Social History     Socioeconomic History    Marital status:      Spouse name: Not on file    Number of children: Not on file    Years of education: Not on file    Highest education level: Not on file   Occupational History    Occupation: retired    Tobacco Use    Smoking status: Former     Current packs/day: 0.00     Average packs/day: 0.1 packs/day for 50.0 years (5.0 ttl pk-yrs)     Types: Cigarettes     Start date: 1963     Quit date: 2013     Years since quittin.1    Smokeless tobacco: Never    Tobacco comments:     1 pack a week    Vaping Use    Vaping status: Never Used   Substance and Sexual Activity    Alcohol use: Not Currently     Comment: 0    Drug use: No    Sexual activity: Not Currently     Partners: Male   Other Topics  Concern    Not on file   Social History Narrative    Living independently with spouse     Social Drivers of Health     Financial Resource Strain: Low Risk  (5/25/2023)    Overall Financial Resource Strain (CARDIA)     Difficulty of Paying Living Expenses: Not hard at all   Food Insecurity: No Food Insecurity (1/3/2025)    Hunger Vital Sign     Worried About Running Out of Food in the Last Year: Never true     Ran Out of Food in the Last Year: Never true   Transportation Needs: No Transportation Needs (1/3/2025)    PRAPARE - Transportation     Lack of Transportation (Medical): No     Lack of Transportation (Non-Medical): No   Physical Activity: Unknown (8/31/2022)    Exercise Vital Sign     Days of Exercise per Week: 0 days     Minutes of Exercise per Session: Not on file   Stress: No Stress Concern Present (8/16/2022)    [employer] [place] - Occupational Stress Questionnaire     Feeling of Stress : Only a little   Social Connections: Unknown (6/18/2024)    Received from [employer]    Social Cardio control     How often do you feel lonely or isolated from those around you? (Adult - for ages 18 years and over): Not on file   Intimate Partner Violence: Unknown (12/4/2024)    Nursing IPS     Feels Physically and Emotionally Safe: Not on file     Physically Hurt by Someone: Not on file     Humiliated or Emotionally Abused by Someone: Not on file     Physically Hurt by Someone: No     Hurt or Threatened by Someone: No   Housing Stability: Low Risk  (1/3/2025)    Housing Stability Vital Sign     Unable to Pay for Housing in the Last Year: No     Number of Times Moved in the Last Year: 1     Homeless in the Last Year: No      Family History   Problem Relation Age of Onset    Stroke Mother         ischemic stroke    Hypertension Mother     Heart disease Father     Cancer Sister     No Known Problems Sister     No Known Problems Daughter     No Known Problems Daughter     No Known Problems Daughter     No  Known Problems Daughter     No Known Problems Daughter     No Known Problems Maternal Grandmother     No Known Problems Paternal Grandmother     No Known Problems Paternal Aunt     No Known Problems Paternal Aunt     No Known Problems Paternal Aunt     No Known Problems Paternal Aunt     No Known Problems Paternal Aunt     Breast cancer Neg Hx      Past Surgical History:   Procedure Laterality Date    APPENDECTOMY      EGD AND COLONOSCOPY  07/2020    HYSTERECTOMY  01/01/2010    WA LAPS SURG CHOLECYSTECTOMY W/CHOLANGIOGRAPHY N/A 4/4/2018    Procedure: LAPAROSCOPIC CHOLECYSTECTOMY WITH IOC;  Surgeon: Yakov Mueller MD;  Location: [facility];  Service: General    TUBAL LIGATION         Current Outpatient Medications:     acetaminophen (TYLENOL) 325 mg tablet, Take 1-2 tablets by mouth every 6 (six) hours as needed, Disp: , Rfl:     albuterol (PROVENTIL HFA,VENTOLIN HFA) 90 mcg/act inhaler, , Disp: , Rfl:     ALPRAZolam (XANAX) 0.5 mg tablet, Take 1 tablet (0.5 mg total) by mouth 4 (four) times a day as needed for anxiety, Disp: 120 tablet, Rfl: 0    atenolol (TENORMIN) 50 mg tablet, TAKE 1 TABLET(50 MG) BY MOUTH TWICE DAILY, Disp: 180 tablet, Rfl: 1    imipramine (TOFRANIL) 10 mg tablet, Take 1 tablet by mouth at bedtime, Disp: 90 tablet, Rfl: 2    latanoprost (XALATAN) 0.005 % ophthalmic solution, Administer 1 drop to both eyes daily at bedtime, Disp: 7.5 mL, Rfl: 3    levothyroxine 50 mcg tablet, TAKE 1 TABLET BY MOUTH EVERY DAY(MONDAY-SATURDAY), THEN 2 TABLETS SUNDAY, Disp: 102 tablet, Rfl: 1    lisinopril (ZESTRIL) 10 mg tablet, Take 1 tablet (10 mg total) by mouth daily, Disp: 30 tablet, Rfl: 5    pantoprazole (PROTONIX) 40 mg tablet, Take 1 tablet (40 mg total) by mouth daily, Disp: 100 tablet, Rfl: 3    rosuvastatin (CRESTOR) 5 mg tablet, Take 1 tablet (5 mg total) by mouth daily, Disp: 90 tablet, Rfl: 1    Probiotic Product (ALIGN) 4 MG CAPS, Take 1 tablet by mouth daily (Patient not taking: Reported on  "2/21/2025), Disp: , Rfl:   Allergies   Allergen Reactions    Other Drowsiness     Annotation - [id number]: ANTIDEPRESSANTS       Labs:  Telephone on 01/17/2025   Component Date Value    Supplier Name 01/30/2025 [employer]     Supplier Phone Number 01/30/2025 [phone]     Order Status 01/30/2025 Delivery Successful     Delivery Request Date 01/30/2025 01/30/2025     Date Delivered  01/30/2025 02/19/2025     Item Description 01/30/2025 3 in 1 [employer]      Imaging: No results found.    Review of Systems:  Review of Systems  REVIEW OF SYSTEMS:  Constitutional:  Denies fever or chills   Eyes:  Denies change in visual acuity   HENT:  Denies nasal congestion or sore throat   Respiratory:   shortness of breath   Cardiovascular:  Denies chest pain or edema   GI:  Denies abdominal pain, nausea, vomiting, bloody stools or diarrhea   :  Denies dysuria, frequency, difficulty in micturition and nocturia  Musculoskeletal:  Denies back pain or joint pain   Neurologic:  Denies headache, focal weakness or sensory changes   Endocrine:  Denies polyuria or polydipsia   Lymphatic:  Denies swollen glands   Psychiatric:  Denies depression or anxiety    Physical Exam:    /72 (BP Location: Right arm, Patient Position: Sitting, Cuff Size: Standard)   Pulse 71   Resp 16   Ht 4' 7\" (1.397 m)   Wt 46.3 kg (102 lb)   SpO2 97%   BMI 23.71 kg/m²     Physical Exam  PHYSICAL EXAM:  General:  Patient is not in acute distress , frail  Head: Normocephalic, Atraumatic.  HEENT:  Both pupils normal-size atraumatic, normocephalic, nonicteric  Neck:  JVP not raised. Trachea central. No carotid bruit  Respiratory: Bilateral inspiratory rhonchi  Cardiovascular:  Regular rate and rhythm no S3 no murmurs  GI:  Abdomen soft nontender. No organomegaly.   Lymphatic:  No cervical or inguinal lymphadenopathy  Neurologic:  Patient is awake alert, oriented . Grossly nonfocal  Extremities no edema      "

## 2025-02-21 NOTE — ASSESSMENT & PLAN NOTE
Blood pressures are stable.  Continue present medications.  I decrease the dosage of atenolol to 50 mg half tablet twice a day as she is actively wheezing with recent COVID infection.

## 2025-02-21 NOTE — ASSESSMENT & PLAN NOTE
SOB x1day Wt Readings from Last 3 Encounters:   02/21/25 46.3 kg (102 lb)   01/03/25 47.6 kg (105 lb)   12/07/24 50.4 kg (111 lb 1.8 oz)     Patient clinically appears euvolemic.  Importance of salt restriction and compliance with present medications reviewed with patient and family.

## 2025-02-25 ENCOUNTER — TELEPHONE (OUTPATIENT)
Age: 89
End: 2025-02-25

## 2025-02-25 NOTE — TELEPHONE ENCOUNTER
"Patients daughter is concerned because the patient received a letter that she is no longer covered via [employer]. Informed the patient to disregard.     Patient also needs a \"Juvenile Walker\" as per her daughter and was denied the walker when she ordered it. Wanted to know if we could start an order for her mother for the \"Juvenile Walker\" because she needs it right away. Daughter worried she may have a fall ir trip and fall without one, please advise   "

## 2025-02-26 ENCOUNTER — OFFICE VISIT (OUTPATIENT)
Age: 89
End: 2025-02-26
Payer: COMMERCIAL

## 2025-02-26 ENCOUNTER — TELEPHONE (OUTPATIENT)
Age: 89
End: 2025-02-26

## 2025-02-26 VITALS
HEIGHT: 55 IN | SYSTOLIC BLOOD PRESSURE: 120 MMHG | DIASTOLIC BLOOD PRESSURE: 62 MMHG | BODY MASS INDEX: 23.71 KG/M2 | OXYGEN SATURATION: 98 % | HEART RATE: 61 BPM

## 2025-02-26 DIAGNOSIS — R06.2 WHEEZING: Primary | ICD-10-CM

## 2025-02-26 PROCEDURE — G2211 COMPLEX E/M VISIT ADD ON: HCPCS

## 2025-02-26 PROCEDURE — 99213 OFFICE O/P EST LOW 20 MIN: CPT

## 2025-02-26 RX ORDER — PREDNISONE 20 MG/1
40 TABLET ORAL DAILY
Qty: 10 TABLET | Refills: 0 | Status: SHIPPED | OUTPATIENT
Start: 2025-02-26 | End: 2025-03-03

## 2025-02-26 RX ORDER — IPRATROPIUM BROMIDE AND ALBUTEROL SULFATE 2.5; .5 MG/3ML; MG/3ML
3 SOLUTION RESPIRATORY (INHALATION) 4 TIMES DAILY
Qty: 360 ML | Refills: 1 | Status: SHIPPED | OUTPATIENT
Start: 2025-02-26 | End: 2025-02-27

## 2025-02-26 NOTE — PROGRESS NOTES
Name: Lakeisha Langston      : 1935      MRN: [id number]  Encounter Provider: Trista Goyal PA-C  Encounter Date: 2025   Encounter department: [facility] INTERNAL MEDICINE [facility] [facility] [address]  :  Assessment & Plan  Wheezing  O2 98% in office. Recommended obtaining a CXR due to prolonged wheezing and cough >4 weeks. Recommended starting prednisone 20 mg daily and up to 4 duo-neb treatments/day. Reviewed potential adverse effects. Will f/up with CXR results. Continue to use albuterol inhaler every 6 hours prn for wheezing and increasing fluid intake. Notify the office of any worsening cough, wheezing, or SOB.   Orders:    XR chest pa and lateral; Future    predniSONE 20 mg tablet; Take 2 tablets (40 mg total) by mouth daily for 5 days    ipratropium-albuterol (DUO-NEB) 0.5-2.5 mg/3 mL nebulizer solution; Take 3 mL by nebulization 4 (four) times a day           History of Present Illness   Patient is an 89-year-old female that presents today for wheezing that has been present for over 2 months. She was diagnosed with covid . She admits to some SOB at rest and exertion. She denies any chest tightness. She admits to a purulent cough.  She denies any fevers, chills, tobacco use, history of asthma or COPD.    Review of Systems   Constitutional:  Negative for appetite change, chills and fever.   HENT:  Positive for postnasal drip and rhinorrhea. Negative for ear discharge, ear pain, sinus pressure, sinus pain, sore throat and trouble swallowing.    Respiratory:  Positive for cough, chest tightness, shortness of breath and wheezing.    Cardiovascular:  Negative for chest pain.   Gastrointestinal:  Negative for abdominal pain, constipation, diarrhea, nausea and vomiting.   Genitourinary:  Negative for difficulty urinating and dysuria.   Musculoskeletal:  Negative for arthralgias and myalgias.   Skin:  Negative for rash.   Neurological:  Positive for headaches. Negative for dizziness and light-headedness.  "  Psychiatric/Behavioral:  Negative for sleep disturbance.        Objective   /62   Pulse 61   Ht 4' 7\" (1.397 m)   SpO2 98%   BMI 23.71 kg/m²      Physical Exam  Vitals and nursing note reviewed.   Constitutional:       General: She is awake. She is not in acute distress.     Appearance: Normal appearance. She is well-developed, well-groomed and normal weight.   HENT:      Head: Normocephalic and atraumatic.      Right Ear: Hearing and external ear normal.      Left Ear: Hearing and external ear normal.      Nose: Nose normal.      Mouth/Throat:      Lips: Pink.      Mouth: Mucous membranes are moist.   Eyes:      General: Lids are normal. Vision grossly intact. Gaze aligned appropriately.      Conjunctiva/sclera: Conjunctivae normal.   Neck:      Vascular: No carotid bruit.      Trachea: Trachea and phonation normal.   Cardiovascular:      Rate and Rhythm: Normal rate and regular rhythm.      Heart sounds: Normal heart sounds, S1 normal and S2 normal. No murmur heard.     No friction rub. No gallop.   Pulmonary:      Effort: Pulmonary effort is normal. No respiratory distress.      Breath sounds: Normal air entry. Wheezing present. No decreased breath sounds, rhonchi or rales.   Abdominal:      General: Abdomen is flat.   Musculoskeletal:         General: No swelling.      Cervical back: Neck supple.      Right lower leg: No edema.      Left lower leg: No edema.   Skin:     General: Skin is warm.      Capillary Refill: Capillary refill takes less than 2 seconds.   Neurological:      Mental Status: She is alert.   Psychiatric:         Attention and Perception: Attention and perception normal.         Mood and Affect: Mood and affect normal.         Speech: Speech normal.         Behavior: Behavior normal. Behavior is cooperative.         Thought Content: Thought content normal.         Cognition and Memory: Cognition and memory normal.         Judgment: Judgment normal.         "

## 2025-02-26 NOTE — TELEPHONE ENCOUNTER
Katerine daughter called    States,     ipratropium-albuterol (DUO-NEB) 0.5-2.5 mg/3 mL nebulizer solution Take 3 mL by nebulization 4 (four) times a day 360 mL     OOP is going to  cost $110.00 for above medication. Medication is not affordable to patient.    Requesting for Alternative  nebulizer solution to be covered under patient's insurance.    Daughter is requesting a call back    Please advise Trista and notify , thank you  Katerine - [phone]

## 2025-02-26 NOTE — ASSESSMENT & PLAN NOTE
O2 98% in office. Recommended obtaining a CXR due to prolonged wheezing and cough >4 weeks. Recommended starting prednisone 20 mg daily and up to 4 duo-neb treatments/day. Reviewed potential adverse effects. Will f/up with CXR results. Continue to use albuterol inhaler every 6 hours prn for wheezing and increasing fluid intake. Notify the office of any worsening cough, wheezing, or SOB.   Orders:    XR chest pa and lateral; Future    predniSONE 20 mg tablet; Take 2 tablets (40 mg total) by mouth daily for 5 days    ipratropium-albuterol (DUO-NEB) 0.5-2.5 mg/3 mL nebulizer solution; Take 3 mL by nebulization 4 (four) times a day

## 2025-02-27 DIAGNOSIS — R06.2 WHEEZING: Primary | ICD-10-CM

## 2025-02-27 RX ORDER — ALBUTEROL SULFATE 0.83 MG/ML
2.5 SOLUTION RESPIRATORY (INHALATION) EVERY 6 HOURS PRN
Qty: 180 ML | Refills: 5 | Status: SHIPPED | OUTPATIENT
Start: 2025-02-27

## 2025-03-04 ENCOUNTER — RESULTS FOLLOW-UP (OUTPATIENT)
Age: 89
End: 2025-03-04

## 2025-03-04 ENCOUNTER — APPOINTMENT (OUTPATIENT)
Dept: LAB | Facility: HOSPITAL | Age: 89
End: 2025-03-04
Payer: COMMERCIAL

## 2025-03-04 ENCOUNTER — HOSPITAL ENCOUNTER (OUTPATIENT)
Dept: RADIOLOGY | Facility: HOSPITAL | Age: 89
Discharge: HOME | End: 2025-03-04
Payer: COMMERCIAL

## 2025-03-04 DIAGNOSIS — J06.9 UPPER RESPIRATORY TRACT INFECTION, UNSPECIFIED TYPE: ICD-10-CM

## 2025-03-04 DIAGNOSIS — R06.2 WHEEZING: ICD-10-CM

## 2025-03-04 LAB
ALBUMIN SERPL BCG-MCNC: 4.6 G/DL (ref 3.5–5)
ALP SERPL-CCNC: 73 U/L (ref 34–104)
ALT SERPL W P-5'-P-CCNC: 10 U/L (ref 7–52)
ANION GAP SERPL CALCULATED.3IONS-SCNC: 8 MMOL/L (ref 4–13)
AST SERPL W P-5'-P-CCNC: 15 U/L (ref 13–39)
BASOPHILS # BLD AUTO: 0.01 THOUSANDS/ÂΜL (ref 0–0.1)
BASOPHILS NFR BLD AUTO: 0 % (ref 0–1)
BILIRUB SERPL-MCNC: 0.5 MG/DL (ref 0.2–1)
BUN SERPL-MCNC: 26 MG/DL (ref 5–25)
CALCIUM SERPL-MCNC: 9.8 MG/DL (ref 8.4–10.2)
CHLORIDE SERPL-SCNC: 102 MMOL/L (ref 96–108)
CO2 SERPL-SCNC: 27 MMOL/L (ref 21–32)
CREAT SERPL-MCNC: 1.26 MG/DL (ref 0.6–1.3)
EOSINOPHIL # BLD AUTO: 0.02 THOUSAND/ÂΜL (ref 0–0.61)
EOSINOPHIL NFR BLD AUTO: 0 % (ref 0–6)
ERYTHROCYTE [DISTWIDTH] IN BLOOD BY AUTOMATED COUNT: 14.4 % (ref 11.6–15.1)
GFR SERPL CREATININE-BSD FRML MDRD: 38 ML/MIN/1.73SQ M
GLUCOSE P FAST SERPL-MCNC: 83 MG/DL (ref 65–99)
HCT VFR BLD AUTO: 35.1 % (ref 34.8–46.1)
HGB BLD-MCNC: 11.7 G/DL (ref 11.5–15.4)
IMM GRANULOCYTES # BLD AUTO: 0.06 THOUSAND/UL (ref 0–0.2)
IMM GRANULOCYTES NFR BLD AUTO: 1 % (ref 0–2)
LYMPHOCYTES # BLD AUTO: 2.44 THOUSANDS/ÂΜL (ref 0.6–4.47)
LYMPHOCYTES NFR BLD AUTO: 21 % (ref 14–44)
MCH RBC QN AUTO: 31.1 PG (ref 26.8–34.3)
MCHC RBC AUTO-ENTMCNC: 33.3 G/DL (ref 31.4–37.4)
MCV RBC AUTO: 93 FL (ref 82–98)
MONOCYTES # BLD AUTO: 1.12 THOUSAND/ÂΜL (ref 0.17–1.22)
MONOCYTES NFR BLD AUTO: 10 % (ref 4–12)
NEUTROPHILS # BLD AUTO: 8.13 THOUSANDS/ÂΜL (ref 1.85–7.62)
NEUTS SEG NFR BLD AUTO: 68 % (ref 43–75)
NRBC BLD AUTO-RTO: 0 /100 WBCS
PLATELET # BLD AUTO: 256 THOUSANDS/UL (ref 149–390)
PMV BLD AUTO: 9.7 FL (ref 8.9–12.7)
POTASSIUM SERPL-SCNC: 3.9 MMOL/L (ref 3.5–5.3)
PROT SERPL-MCNC: 7.5 G/DL (ref 6.4–8.4)
RBC # BLD AUTO: 3.76 MILLION/UL (ref 3.81–5.12)
SODIUM SERPL-SCNC: 137 MMOL/L (ref 135–147)
WBC # BLD AUTO: 11.78 THOUSAND/UL (ref 4.31–10.16)

## 2025-03-04 PROCEDURE — 71046 X-RAY EXAM CHEST 2 VIEWS: CPT

## 2025-03-04 PROCEDURE — 36415 COLL VENOUS BLD VENIPUNCTURE: CPT

## 2025-03-04 PROCEDURE — 85025 COMPLETE CBC W/AUTO DIFF WBC: CPT

## 2025-03-04 PROCEDURE — 80053 COMPREHEN METABOLIC PANEL: CPT

## 2025-03-05 ENCOUNTER — HOSPITAL ENCOUNTER (EMERGENCY)
Facility: HOSPITAL | Age: 89
Discharge: HOME | End: 2025-03-05
Attending: EMERGENCY MEDICINE
Payer: COMMERCIAL

## 2025-03-05 VITALS
HEART RATE: 67 BPM | SYSTOLIC BLOOD PRESSURE: 156 MMHG | OXYGEN SATURATION: 95 % | TEMPERATURE: 97.5 F | DIASTOLIC BLOOD PRESSURE: 67 MMHG | RESPIRATION RATE: 18 BRPM

## 2025-03-05 DIAGNOSIS — R42 LIGHTHEADEDNESS: ICD-10-CM

## 2025-03-05 DIAGNOSIS — R55 SYNCOPE: Primary | ICD-10-CM

## 2025-03-05 DIAGNOSIS — E86.0 DEHYDRATION: ICD-10-CM

## 2025-03-05 LAB
ANION GAP SERPL CALCULATED.3IONS-SCNC: 10 MMOL/L (ref 4–13)
BASOPHILS # BLD AUTO: 0.01 THOUSANDS/ÂΜL (ref 0–0.1)
BASOPHILS NFR BLD AUTO: 0 % (ref 0–1)
BUN SERPL-MCNC: 24 MG/DL (ref 5–25)
CALCIUM SERPL-MCNC: 8.4 MG/DL (ref 8.4–10.2)
CARDIAC TROPONIN I PNL SERPL HS: 5 NG/L (ref ?–50)
CHLORIDE SERPL-SCNC: 104 MMOL/L (ref 96–108)
CO2 SERPL-SCNC: 20 MMOL/L (ref 21–32)
CREAT SERPL-MCNC: 1.37 MG/DL (ref 0.6–1.3)
DME PARACHUTE DELIVERY DATE ACTUAL: NORMAL
DME PARACHUTE DELIVERY DATE REQUESTED: NORMAL
DME PARACHUTE ITEM DESCRIPTION: NORMAL
DME PARACHUTE ORDER STATUS: NORMAL
DME PARACHUTE SUPPLIER NAME: NORMAL
DME PARACHUTE SUPPLIER PHONE: NORMAL
EOSINOPHIL # BLD AUTO: 0.46 THOUSAND/ÂΜL (ref 0–0.61)
EOSINOPHIL NFR BLD AUTO: 4 % (ref 0–6)
ERYTHROCYTE [DISTWIDTH] IN BLOOD BY AUTOMATED COUNT: 14.6 % (ref 11.6–15.1)
GFR SERPL CREATININE-BSD FRML MDRD: 34 ML/MIN/1.73SQ M
GLUCOSE SERPL-MCNC: 70 MG/DL (ref 65–140)
HCT VFR BLD AUTO: 36.9 % (ref 34.8–46.1)
HGB BLD-MCNC: 12 G/DL (ref 11.5–15.4)
IMM GRANULOCYTES # BLD AUTO: 0.12 THOUSAND/UL (ref 0–0.2)
IMM GRANULOCYTES NFR BLD AUTO: 1 % (ref 0–2)
LYMPHOCYTES # BLD AUTO: 2.76 THOUSANDS/ÂΜL (ref 0.6–4.47)
LYMPHOCYTES NFR BLD AUTO: 23 % (ref 14–44)
MCH RBC QN AUTO: 31.2 PG (ref 26.8–34.3)
MCHC RBC AUTO-ENTMCNC: 32.5 G/DL (ref 31.4–37.4)
MCV RBC AUTO: 96 FL (ref 82–98)
MONOCYTES # BLD AUTO: 1.36 THOUSAND/ÂΜL (ref 0.17–1.22)
MONOCYTES NFR BLD AUTO: 11 % (ref 4–12)
NEUTROPHILS # BLD AUTO: 7.39 THOUSANDS/ÂΜL (ref 1.85–7.62)
NEUTS SEG NFR BLD AUTO: 61 % (ref 43–75)
NRBC BLD AUTO-RTO: 0 /100 WBCS
PLATELET # BLD AUTO: 229 THOUSANDS/UL (ref 149–390)
PMV BLD AUTO: 9.4 FL (ref 8.9–12.7)
POTASSIUM SERPL-SCNC: 4 MMOL/L (ref 3.5–5.3)
RBC # BLD AUTO: 3.85 MILLION/UL (ref 3.81–5.12)
SODIUM SERPL-SCNC: 134 MMOL/L (ref 135–147)
WBC # BLD AUTO: 12.1 THOUSAND/UL (ref 4.31–10.16)

## 2025-03-05 PROCEDURE — 99284 EMERGENCY DEPT VISIT MOD MDM: CPT

## 2025-03-05 PROCEDURE — 85025 COMPLETE CBC W/AUTO DIFF WBC: CPT | Performed by: EMERGENCY MEDICINE

## 2025-03-05 PROCEDURE — 96360 HYDRATION IV INFUSION INIT: CPT

## 2025-03-05 PROCEDURE — 84484 ASSAY OF TROPONIN QUANT: CPT | Performed by: EMERGENCY MEDICINE

## 2025-03-05 PROCEDURE — 99285 EMERGENCY DEPT VISIT HI MDM: CPT | Performed by: EMERGENCY MEDICINE

## 2025-03-05 PROCEDURE — 93005 ELECTROCARDIOGRAM TRACING: CPT

## 2025-03-05 PROCEDURE — 36415 COLL VENOUS BLD VENIPUNCTURE: CPT | Performed by: EMERGENCY MEDICINE

## 2025-03-05 PROCEDURE — 80048 BASIC METABOLIC PNL TOTAL CA: CPT | Performed by: EMERGENCY MEDICINE

## 2025-03-05 RX ADMIN — SODIUM CHLORIDE 500 ML: 0.9 INJECTION, SOLUTION INTRAVENOUS at 15:37

## 2025-03-05 NOTE — ED PROVIDER NOTES
Time reflects when diagnosis was documented in both MDM as applicable and the Disposition within this note       Time User Action Codes Description Comment    3/5/2025  2:37 PM Erne, Samuel Add [R55] Syncope     3/5/2025  2:37 PM Erne, Samuel Add [R42] Lightheadedness     3/5/2025  2:37 PM Erne, Samuel Add [E86.0] Dehydration           ED Disposition       ED Disposition   Discharge    Condition   Stable    Date/Time   Wed Mar 5, 2025  5:08 PM    Comment   Lakeisha Langston discharge to home/self care.                   Assessment & Plan       Medical Decision Making  89-year-old female history of hypertension presenting with reported syncopal event.  Plan for cardiac evaluation including EKG troponin.  Basic labs.  IV fluids.  Reassess.    EKG interpreted by me with normal sinus rhythm and nonspecific ST abnormality.  Labs interpreted by me without significant acute process.  No chest pain.  Symptoms improved.  Patient ambulatory at baseline. Discussed results and recommendations. Advised follow up PCP. Medication recommendations. Given instructions and return precautions. Patient/family at bedside acknowledged understanding of all written and verbal instructions and return precautions. Discharged.     Amount and/or Complexity of Data Reviewed  Labs: ordered.             Medications   sodium chloride 0.9 % bolus 500 mL (0 mL Intravenous Stopped 3/5/25 1640)       ED Risk Strat Scores   HEART Risk Score      Flowsheet Row Most Recent Value   Heart Score Risk Calculator    History 0 Filed at: 03/05/2025 1832   ECG 1 Filed at: 03/05/2025 1832   Age 2 Filed at: 03/05/2025 1832   Risk Factors 1 Filed at: 03/05/2025 1832   Troponin 0 Filed at: 03/05/2025 1832   HEART Score 4 Filed at: 03/05/2025 1832          HEART Risk Score      Flowsheet Row Most Recent Value   Heart Score Risk Calculator    History 0 Filed at: 03/05/2025 1832   ECG 1 Filed at: 03/05/2025 1832   Age 2 Filed at: 03/05/2025 1832   Risk Factors 1 Filed at:  03/05/2025 1832   Troponin 0 Filed at: 03/05/2025 1832   HEART Score 4 Filed at: 03/05/2025 1832                              SBIRT 22yo+      Flowsheet Row Most Recent Value   Initial Alcohol Screen: US AUDIT-C     1. How often do you have a drink containing alcohol? 0 Filed at: 03/05/2025 1426   2. How many drinks containing alcohol do you have on a typical day you are drinking?  0 Filed at: 03/05/2025 142   3b. FEMALE Any Age, or MALE 65+: How often do you have 4 or more drinks on one occassion? 0 Filed at: 03/05/2025 1429   Audit-C Score 0 Filed at: 03/05/2025 1429   GILLES: How many times in the past year have you...    Used an illegal drug or used a prescription medication for non-medical reasons? Never Filed at: 03/05/2025 1427                            History of Present Illness       Chief Complaint   Patient presents with    Syncope     Pt BIB EMS c/o syncope while working with physical therapy, syncope lasted about a min       Past Medical History:   Diagnosis Date    Anxiety 1992    Arthritis 1991    Benign essential hypertension     Last assessed: 9/11/14    Disease of thyroid gland     Essential tremor     Fibromyalgia     GERD (gastroesophageal reflux disease)     History of nail disorders     Hyperlipidemia     Hypertension     Palpitations     Transient cerebral ischemia       Past Surgical History:   Procedure Laterality Date    APPENDECTOMY      EGD AND COLONOSCOPY  07/2020    HYSTERECTOMY  01/01/2010    OH LAPS SURG CHOLECYSTECTOMY W/CHOLANGIOGRAPHY N/A 4/4/2018    Procedure: LAPAROSCOPIC CHOLECYSTECTOMY WITH IOC;  Surgeon: Yakov Mueller MD;  Location: [facility];  Service: General    TUBAL LIGATION        Family History   Problem Relation Age of Onset    Stroke Mother         ischemic stroke    Hypertension Mother     Heart disease Father     Cancer Sister     No Known Problems Sister     No Known Problems Daughter     No Known Problems Daughter     No Known Problems Daughter     No Known  Problems Daughter     No Known Problems Daughter     No Known Problems Maternal Grandmother     No Known Problems Paternal Grandmother     No Known Problems Paternal Aunt     No Known Problems Paternal Aunt     No Known Problems Paternal Aunt     No Known Problems Paternal Aunt     No Known Problems Paternal Aunt     Breast cancer Neg Hx       Social History     Tobacco Use    Smoking status: Former     Current packs/day: 0.00     Average packs/day: 0.1 packs/day for 50.0 years (5.0 ttl pk-yrs)     Types: Cigarettes     Start date: 1963     Quit date:      Years since quittin.1    Smokeless tobacco: Never    Tobacco comments:     1 pack a week    Vaping Use    Vaping status: Never Used   Substance Use Topics    Alcohol use: Not Currently     Comment: 0    Drug use: No      E-Cigarette/Vaping    E-Cigarette Use Never User       E-Cigarette/Vaping Substances    Nicotine No     THC No     CBD No     Flavoring No     Other No     Unknown No       I have reviewed and agree with the history as documented.     89-year-old female history of hypertension presenting with reported syncopal event.  Patient reports that she has not been feeling well and recently just finished a 5-day course of prednisone for cough and wheezing.  She states that she was due to get physical therapy but because she was not feeling well the therapist were not going to do exercises today but when they stood up to weigh her she felt lightheaded and had a brief witnessed syncopal event.  Quick return to baseline.  No fall or head strike.  Denies any chest pain shortness of breath palpitations.  Denies any nausea vomiting diarrhea abdominal pain.  History of similar events.  Patient reports feeling dehydrated and has dry mouth.  Denies any other complaints.  Chart reviewed.    Past Medical History:  1992: Anxiety  1991: Arthritis  No date: Benign essential hypertension      Comment:  Last assessed: 14  No date: Disease of thyroid  gland  No date: Essential tremor  No date: Fibromyalgia  No date: GERD (gastroesophageal reflux disease)  No date: History of nail disorders  No date: Hyperlipidemia  No date: Hypertension  No date: Palpitations  No date: Transient cerebral ischemia  Family History: non-contributory  Social History          Review of Systems   Constitutional:  Negative for appetite change, chills, diaphoresis, fever and unexpected weight change.   HENT:  Negative for congestion and rhinorrhea.    Eyes:  Negative for photophobia and visual disturbance.   Respiratory:  Negative for cough, chest tightness and shortness of breath.    Cardiovascular:  Negative for chest pain, palpitations and leg swelling.   Gastrointestinal:  Negative for abdominal distention, abdominal pain, blood in stool, constipation, diarrhea, nausea and vomiting.   Genitourinary:  Negative for dysuria and hematuria.   Musculoskeletal:  Negative for back pain, joint swelling, neck pain and neck stiffness.   Skin:  Negative for color change, pallor, rash and wound.   Neurological:  Positive for syncope and light-headedness. Negative for dizziness, weakness and headaches.   Psychiatric/Behavioral:  Negative for agitation.    All other systems reviewed and are negative.          Objective       ED Triage Vitals [03/05/25 1427]   Temperature Pulse Blood Pressure Respirations SpO2 Patient Position - Orthostatic VS   97.5 °F (36.4 °C) 67 156/67 18 95 % --      Temp Source Heart Rate Source BP Location FiO2 (%) Pain Score    Oral Monitor Right arm -- --      Vitals      Date and Time Temp Pulse SpO2 Resp BP Pain Score FACES Pain Rating User   03/05/25 1427 97.5 °F (36.4 °C) 67 95 % 18 156/67 -- -- SEAN            Physical Exam  Vitals and nursing note reviewed.   Constitutional:       General: She is not in acute distress.     Appearance: Normal appearance. She is well-developed. She is not ill-appearing, toxic-appearing or diaphoretic.   HENT:      Head: Normocephalic and  atraumatic.      Nose: Nose normal. No congestion or rhinorrhea.      Mouth/Throat:      Mouth: Mucous membranes are dry.      Pharynx: Oropharynx is clear. No oropharyngeal exudate or posterior oropharyngeal erythema.   Eyes:      General: No scleral icterus.        Right eye: No discharge.         Left eye: No discharge.      Extraocular Movements: Extraocular movements intact.      Conjunctiva/sclera: Conjunctivae normal.      Pupils: Pupils are equal, round, and reactive to light.   Neck:      Vascular: No JVD.      Trachea: No tracheal deviation.      Comments: Supple. Normal range of motion.   Cardiovascular:      Rate and Rhythm: Normal rate and regular rhythm.      Heart sounds: Normal heart sounds. No murmur heard.     No friction rub. No gallop.      Comments: Normal rate and regular rhythm  Pulmonary:      Effort: Pulmonary effort is normal. No respiratory distress.      Breath sounds: Normal breath sounds. No stridor. No wheezing or rales.      Comments: Clear to auscultation bilaterally  Chest:      Chest wall: No tenderness.   Abdominal:      General: Bowel sounds are normal. There is no distension.      Palpations: Abdomen is soft.      Tenderness: There is no abdominal tenderness. There is no right CVA tenderness, left CVA tenderness, guarding or rebound.      Comments: Soft, nontender, nondistended.  Normal bowel sounds throughout   Musculoskeletal:         General: No swelling, tenderness, deformity or signs of injury. Normal range of motion.      Cervical back: Normal range of motion and neck supple. No rigidity. No muscular tenderness.      Right lower leg: No edema.      Left lower leg: No edema.   Lymphadenopathy:      Cervical: No cervical adenopathy.   Skin:     General: Skin is warm and dry.      Coloration: Skin is not pale.      Findings: No erythema or rash.   Neurological:      General: No focal deficit present.      Mental Status: She is alert. Mental status is at baseline.       Sensory: No sensory deficit.      Motor: No weakness or abnormal muscle tone.      Coordination: Coordination normal.      Gait: Gait normal.      Comments: Alert.  Strength and sensation grossly intact.  Ambulatory without difficulty at baseline.    Psychiatric:         Behavior: Behavior normal.         Thought Content: Thought content normal.         Results Reviewed       Procedure Component Value Units Date/Time    Basic metabolic panel [[id number]]  (Abnormal) Collected: 03/05/25 1640    Lab Status: Final result Specimen: Blood from Arm, Right Updated: 03/05/25 1706     Sodium 134 mmol/L      Potassium 4.0 mmol/L      Chloride 104 mmol/L      CO2 20 mmol/L      ANION GAP 10 mmol/L      BUN 24 mg/dL      Creatinine 1.37 mg/dL      Glucose 70 mg/dL      Calcium 8.4 mg/dL      eGFR 34 ml/min/1.73sq m     Narrative:      National Kidney Disease Foundation guidelines for Chronic Kidney Disease (CKD):     Stage 1 with normal or high GFR (GFR > 90 mL/min/1.73 square meters)    Stage 2 Mild CKD (GFR = 60-89 mL/min/1.73 square meters)    Stage 3A Moderate CKD (GFR = 45-59 mL/min/1.73 square meters)    Stage 3B Moderate CKD (GFR = 30-44 mL/min/1.73 square meters)    Stage 4 Severe CKD (GFR = 15-29 mL/min/1.73 square meters)    Stage 5 End Stage CKD (GFR <15 mL/min/1.73 square meters)  Note: GFR calculation is accurate only with a steady state creatinine    HS Troponin 0hr (reflex protocol) [[id number]]  (Normal) Collected: 03/05/25 1445    Lab Status: Final result Specimen: Blood from Arm, Right Updated: 03/05/25 1520     hs TnI 0hr 5 ng/L     CBC and differential [[id number]]  (Abnormal) Collected: 03/05/25 1445    Lab Status: Final result Specimen: Blood from Arm, Right Updated: 03/05/25 1459     WBC 12.10 Thousand/uL      RBC 3.85 Million/uL      Hemoglobin 12.0 g/dL      Hematocrit 36.9 %      MCV 96 fL      MCH 31.2 pg      MCHC 32.5 g/dL      RDW 14.6 %      MPV 9.4 fL      Platelets 229 Thousands/uL      nRBC 0 /100  WBCs      Segmented % 61 %      Immature Grans % 1 %      Lymphocytes % 23 %      Monocytes % 11 %      Eosinophils Relative 4 %      Basophils Relative 0 %      Absolute Neutrophils 7.39 Thousands/µL      Absolute Immature Grans 0.12 Thousand/uL      Absolute Lymphocytes 2.76 Thousands/µL      Absolute Monocytes 1.36 Thousand/µL      Eosinophils Absolute 0.46 Thousand/µL      Basophils Absolute 0.01 Thousands/µL             No orders to display       Procedures    ED Medication and Procedure Management   Prior to Admission Medications   Prescriptions Last Dose Informant Patient Reported? Taking?   ALPRAZolam (XANAX) 0.5 mg tablet   No No   Sig: Take 1 tablet (0.5 mg total) by mouth 4 (four) times a day as needed for anxiety   Probiotic Product (ALIGN) 4 MG CAPS  Self Yes No   Sig: Take 1 tablet by mouth daily   Patient not taking: Reported on 2025   acetaminophen (TYLENOL) 325 mg tablet  Self Yes No   Sig: Take 1-2 tablets by mouth every 6 (six) hours as needed   albuterol (2.5 mg/3 mL) 0.083 % nebulizer solution   No No   Sig: Take 3 mL (2.5 mg total) by nebulization every 6 (six) hours as needed for wheezing or shortness of breath   albuterol (PROVENTIL HFA,VENTOLIN HFA) 90 mcg/act inhaler  Self Yes No   atenolol (TENORMIN) 50 mg tablet   No No   Si/2 tab twice a day   imipramine (TOFRANIL) 10 mg tablet  Self No No   Sig: Take 1 tablet by mouth at bedtime   latanoprost (XALATAN) 0.005 % ophthalmic solution  Self No No   Sig: Administer 1 drop to both eyes daily at bedtime   levothyroxine 50 mcg tablet   No No   Sig: TAKE 1 TABLET BY MOUTH EVERY DAY(MONDAY-SATURDAY), THEN 2 TABLETS    lisinopril (ZESTRIL) 10 mg tablet   No No   Sig: Take 1 tablet (10 mg total) by mouth daily   pantoprazole (PROTONIX) 40 mg tablet   No No   Sig: Take 1 tablet (40 mg total) by mouth daily   rosuvastatin (CRESTOR) 5 mg tablet   No No   Sig: Take 1 tablet (5 mg total) by mouth daily      Facility-Administered  Medications: None     Patient's Medications   Discharge Prescriptions    No medications on file     No discharge procedures on file.  ED SEPSIS DOCUMENTATION   Time reflects when diagnosis was documented in both MDM as applicable and the Disposition within this note       Time User Action Codes Description Comment    3/5/2025  2:37 PM Samuel Kruse [R55] Syncope     3/5/2025  2:37 PM Samuel Kruse [R42] Lightheadedness     3/5/2025  2:37 PM Samuel Kruse [E86.0] Dehydration                  Samuel Kruse MD  03/05/25 9081

## 2025-03-06 ENCOUNTER — VBI (OUTPATIENT)
Age: 89
End: 2025-03-06

## 2025-03-06 LAB
ATRIAL RATE: 68 BPM
P AXIS: 45 DEGREES
PR INTERVAL: 148 MS
QRS AXIS: -35 DEGREES
QRSD INTERVAL: 86 MS
QT INTERVAL: 442 MS
QTC INTERVAL: 469 MS
T WAVE AXIS: 64 DEGREES
VENTRICULAR RATE: 68 BPM

## 2025-03-06 PROCEDURE — 93010 ELECTROCARDIOGRAM REPORT: CPT | Performed by: INTERNAL MEDICINE

## 2025-03-06 NOTE — TELEPHONE ENCOUNTER
03/06/25 3:39 PM    Patient contacted post ED visit, first outreach attempt made. Message was left for patient to return a call to the VBI Department at [facility]: Phone [phone].    Thank you.  Tiffani Lazaro MA  [facility]

## 2025-03-07 NOTE — TELEPHONE ENCOUNTER
03/07/25 2:29 PM    Patient contacted post ED visit, outreach attempt made but message could not be left. Additional outreach attempt will be made.     Thank you.  Tiffani Lazaro MA  [facility]

## 2025-03-11 DIAGNOSIS — F41.9 ANXIETY: ICD-10-CM

## 2025-03-11 RX ORDER — IMIPRAMINE HYDROCHLORIDE 10 MG/1
10 TABLET, FILM COATED ORAL
Qty: 90 TABLET | Refills: 1 | Status: SHIPPED | OUTPATIENT
Start: 2025-03-11

## 2025-03-11 NOTE — TELEPHONE ENCOUNTER
03/11/25 2:59 PM    Patient contacted post ED visit, VBI department spoke with patient/caregiver and outreach was successful.    Thank you.  Tiffani Lazaro MA  [facility]

## 2025-03-12 ENCOUNTER — TELEPHONE (OUTPATIENT)
Age: 89
End: 2025-03-12

## 2025-03-12 NOTE — TELEPHONE ENCOUNTER
Carol from [facility] called to report that patient cancelled her physical therapy appointment today.    Please advise, thank you.

## 2025-03-19 ENCOUNTER — OFFICE VISIT (OUTPATIENT)
Age: 89
End: 2025-03-19
Payer: COMMERCIAL

## 2025-03-19 VITALS
SYSTOLIC BLOOD PRESSURE: 110 MMHG | BODY MASS INDEX: 24.07 KG/M2 | HEIGHT: 55 IN | WEIGHT: 104 LBS | DIASTOLIC BLOOD PRESSURE: 78 MMHG | RESPIRATION RATE: 16 BRPM | HEART RATE: 84 BPM | OXYGEN SATURATION: 99 %

## 2025-03-19 DIAGNOSIS — R55 SYNCOPE, UNSPECIFIED SYNCOPE TYPE: Primary | ICD-10-CM

## 2025-03-19 PROBLEM — M12.9 ARTHROPATHY: Status: RESOLVED | Noted: 2025-03-19 | Resolved: 2025-03-19

## 2025-03-19 PROBLEM — D63.1 ANEMIA IN CHRONIC KIDNEY DISEASE: Status: ACTIVE | Noted: 2025-03-19

## 2025-03-19 PROBLEM — N18.9 ANEMIA IN CHRONIC KIDNEY DISEASE: Status: ACTIVE | Noted: 2025-03-19

## 2025-03-19 PROCEDURE — G2211 COMPLEX E/M VISIT ADD ON: HCPCS

## 2025-03-19 PROCEDURE — 99214 OFFICE O/P EST MOD 30 MIN: CPT

## 2025-03-19 NOTE — ASSESSMENT & PLAN NOTE
EKG and labs were unremarkable in ER.  She denies any current symptoms. She is currently on atenolol 25 mg and lisinopril 10 mg daily. Orthostatic vitals are as follows:  Supine: 114/60 and heart rate is 72  Sittin/64 and heart rate is 78  Standin/68 and heart rate is 79  She is currently euvolemic on exam.  She denies any shortness of breath, PND, orthopnea, or lower extremity edema.  Due to her history of CHF, continue salt and fluid restriction. Recommended cutting lisinopril to 5 mg daily and continuing atenolol 25 mg.  Check blood pressures daily for the next 2 weeks and [facility]

## 2025-03-19 NOTE — PROGRESS NOTES
Name: Lakeisha Langston      : 1935      MRN: [id number]  Encounter Provider: Trista Goyal PA-C  Encounter Date: 3/19/2025   Encounter department: [facility]IC[facility] [address]  :  Assessment & Plan  Syncope, unspecified syncope type  EKG and labs were unremarkable in ER.  She denies any current symptoms. She is currently on atenolol 25 mg and lisinopril 10 mg daily. Orthostatic vitals are as follows:  Supine: 114/60 and heart rate is 72  Sittin/64 and heart rate is 78  Standin/68 and heart rate is 79  She is currently euvolemic on exam.  She denies any shortness of breath, PND, orthopnea, or lower extremity edema.  Due to her history of CHF, continue salt and fluid restriction. Recommended cutting lisinopril to 5 mg daily and continuing atenolol 25 mg.  Check blood pressures daily for the next 2 weeks and [facility]              History of Present Illness   Patient is an 89-year-old female that presents today for an ER follow-up.  She presented to the ER 3/5/2025 after a syncopal episode.  Workup in the ER was overall unremarkable.  She was doing physical therapy and stood up too quickly and fell to the ground.  She was on the ground for around 5 to 6 minutes.  She did not lose consciousness, but was confused.  Her daughter called an ambulance.  She does admit to this happening before.  She did eat and drink water that morning.  She denies any current symptoms at this time.      Review of Systems   Constitutional:  Negative for chills, fatigue and fever.   HENT:  Negative for ear discharge, ear pain, postnasal drip, rhinorrhea, sinus pressure, sinus pain, sore throat, tinnitus and trouble swallowing.    Eyes:  Negative for pain, discharge and itching.   Respiratory:  Negative for cough, shortness of breath and wheezing.    Cardiovascular:  Negative for chest pain, palpitations and leg swelling.   Gastrointestinal:  Negative for abdominal pain, constipation,  "diarrhea, nausea and vomiting.   Endocrine: Negative for polydipsia, polyphagia and polyuria.   Genitourinary:  Negative for difficulty urinating, frequency, hematuria and urgency.   Musculoskeletal:  Negative for arthralgias, joint swelling and myalgias.   Skin:  Negative for color change.   Allergic/Immunologic: Negative for environmental allergies.   Neurological:  Negative for dizziness, weakness, light-headedness, numbness and headaches.   Hematological:  Negative for adenopathy.   Psychiatric/Behavioral:  Negative for decreased concentration and sleep disturbance. The patient is not nervous/anxious.        Objective   Resp 16   Ht 4' 7\" (1.397 m)   Wt 47.2 kg (104 lb)   BMI 24.17 kg/m²      Physical Exam  Vitals and nursing note reviewed.   Constitutional:       General: She is awake. She is not in acute distress.     Appearance: Normal appearance. She is well-developed, well-groomed and normal weight.   HENT:      Head: Normocephalic and atraumatic.      Right Ear: Hearing and external ear normal.      Left Ear: Hearing and external ear normal.      Nose: Nose normal.      Mouth/Throat:      Lips: Pink.      Mouth: Mucous membranes are moist.   Eyes:      General: Lids are normal. Vision grossly intact. Gaze aligned appropriately.      Conjunctiva/sclera: Conjunctivae normal.   Neck:      Vascular: No carotid bruit.      Trachea: Trachea and phonation normal.   Cardiovascular:      Rate and Rhythm: Normal rate and regular rhythm.      Heart sounds: Normal heart sounds, S1 normal and S2 normal. No murmur heard.     No friction rub. No gallop.   Pulmonary:      Effort: Pulmonary effort is normal. No respiratory distress.      Breath sounds: Normal breath sounds and air entry. No decreased breath sounds, wheezing, rhonchi or rales.   Abdominal:      General: Abdomen is flat.   Musculoskeletal:         General: No swelling.      Cervical back: Neck supple.      Right lower leg: No edema.      Left lower leg: " No edema.   Skin:     General: Skin is warm.      Capillary Refill: Capillary refill takes less than 2 seconds.   Neurological:      Mental Status: She is alert.   Psychiatric:         Attention and Perception: Attention and perception normal.         Mood and Affect: Mood and affect normal.         Speech: Speech normal.         Behavior: Behavior normal. Behavior is cooperative.         Thought Content: Thought content normal.         Cognition and Memory: Cognition and memory normal.         Judgment: Judgment normal.

## 2025-04-09 ENCOUNTER — TELEPHONE (OUTPATIENT)
Dept: DERMATOLOGY | Facility: CLINIC | Age: 89
End: 2025-04-09

## 2025-04-09 DIAGNOSIS — F41.9 ANXIETY: ICD-10-CM

## 2025-04-09 RX ORDER — ALPRAZOLAM 0.5 MG
0.5 TABLET ORAL 4 TIMES DAILY PRN
Qty: 120 TABLET | Refills: 0 | Status: SHIPPED | OUTPATIENT
Start: 2025-04-09

## 2025-04-09 NOTE — TELEPHONE ENCOUNTER
LVM for PT re:asap referral for rash. Can sched at any time. No ref needed for ins/apt. Closed ref. Sent letter

## 2025-04-09 NOTE — TELEPHONE ENCOUNTER
Reason for call:   [x] Refill   [] Prior Auth  [] Other:     Office:   [x] PCP/Provider - Dr Vargas  [] Specialty/Provider -     Medication: alprazolam     Dose/Frequency: 0.5 mg take 1 tablet 4 times a day as needed     Quantity: 120    Pharmacy: [facility] [address]     [facility]   Does the patient have enough for 3 days?   [] Yes   [x] No - Send as HP to POD    Mail Away Pharmacy   Does the patient have enough for 10 days?   [] Yes   [] No - Send as HP to POD

## 2025-04-22 ENCOUNTER — OFFICE VISIT (OUTPATIENT)
Age: 89
End: 2025-04-22
Payer: MEDICARE

## 2025-04-22 VITALS
DIASTOLIC BLOOD PRESSURE: 64 MMHG | WEIGHT: 104 LBS | SYSTOLIC BLOOD PRESSURE: 102 MMHG | OXYGEN SATURATION: 98 % | HEIGHT: 55 IN | HEART RATE: 84 BPM | RESPIRATION RATE: 16 BRPM | BODY MASS INDEX: 24.07 KG/M2

## 2025-04-22 DIAGNOSIS — R05.1 ACUTE COUGH: Primary | ICD-10-CM

## 2025-04-22 PROCEDURE — 99213 OFFICE O/P EST LOW 20 MIN: CPT

## 2025-04-22 PROCEDURE — G2211 COMPLEX E/M VISIT ADD ON: HCPCS

## 2025-04-22 RX ORDER — IPRATROPIUM BROMIDE AND ALBUTEROL SULFATE 2.5; .5 MG/3ML; MG/3ML
3 SOLUTION RESPIRATORY (INHALATION) 4 TIMES DAILY
Qty: 360 ML | Refills: 1 | Status: SHIPPED | OUTPATIENT
Start: 2025-04-22

## 2025-04-22 NOTE — PROGRESS NOTES
"Name: Lakeisha Langston      : 1935      MRN: [id number]  Encounter Provider: Trista Goyal PA-C  Encounter Date: 2025   Encounter department: [facility] [address]  :  Assessment & Plan  Acute cough  Likely an asthma exacerbation. Decreased breath sounds across all lung fields. Recommended starting duo-neb treatments every 6 hours and continuing to use albuterol inhaler prn. F/up if symptoms worsen.   Orders:    ipratropium-albuterol (DUO-NEB) 0.5-2.5 mg/3 mL nebulizer solution; Take 3 mL by nebulization 4 (four) times a day           History of Present Illness   Patient is an 89 yo female that presents today for cough, shortness of breath, and wheezing that started a few days ago.  She did not COVID test at home.  She denies any sick contacts.  She admits to lack of appetite and thirst.  She is sleeping okay.  She has been using her albuterol inhaler with relief.      Review of Systems   Constitutional:  Positive for appetite change and fatigue. Negative for chills and fever.   HENT:  Negative for ear discharge, ear pain, postnasal drip, rhinorrhea, sinus pressure, sinus pain, sore throat and trouble swallowing.    Respiratory:  Positive for cough (Purulent), shortness of breath and wheezing. Negative for chest tightness.    Cardiovascular:  Negative for chest pain.   Gastrointestinal:  Negative for abdominal pain, constipation, diarrhea, nausea and vomiting.   Musculoskeletal:  Negative for arthralgias and myalgias.   Skin:  Negative for rash.   Neurological:  Negative for dizziness, light-headedness and headaches.   Psychiatric/Behavioral:  Negative for sleep disturbance.        Objective   /64 (BP Location: Left arm)   Pulse 84   Resp 16   Ht 4' 7\" (1.397 m)   Wt 47.2 kg (104 lb)   SpO2 98%   BMI 24.17 kg/m²      Physical Exam  Vitals and nursing note reviewed.   Constitutional:       General: She is awake. She is not in acute distress.     Appearance: " Normal appearance. She is well-developed, well-groomed and normal weight.   HENT:      Head: Normocephalic and atraumatic.      Right Ear: Hearing and external ear normal.      Left Ear: Hearing and external ear normal.      Nose: Nose normal.      Mouth/Throat:      Lips: Pink.      Mouth: Mucous membranes are moist.   Eyes:      General: Lids are normal. Vision grossly intact. Gaze aligned appropriately.      Conjunctiva/sclera: Conjunctivae normal.   Neck:      Vascular: No carotid bruit.      Trachea: Trachea and phonation normal.   Cardiovascular:      Rate and Rhythm: Normal rate and regular rhythm.      Heart sounds: Normal heart sounds, S1 normal and S2 normal. No murmur heard.     No friction rub. No gallop.   Pulmonary:      Effort: Pulmonary effort is normal. No respiratory distress.      Breath sounds: Normal breath sounds. Decreased air movement present. No decreased breath sounds, wheezing, rhonchi or rales.   Abdominal:      General: Abdomen is flat.   Musculoskeletal:         General: No swelling.      Cervical back: Neck supple.      Right lower leg: No edema.      Left lower leg: No edema.   Skin:     General: Skin is warm.      Capillary Refill: Capillary refill takes less than 2 seconds.   Neurological:      Mental Status: She is alert.   Psychiatric:         Attention and Perception: Attention and perception normal.         Mood and Affect: Mood and affect normal.         Speech: Speech normal.         Behavior: Behavior normal. Behavior is cooperative.         Thought Content: Thought content normal.         Cognition and Memory: Cognition and memory normal.         Judgment: Judgment normal.

## 2025-05-06 ENCOUNTER — OFFICE VISIT (OUTPATIENT)
Age: 89
End: 2025-05-06
Payer: MEDICARE

## 2025-05-06 VITALS
WEIGHT: 107 LBS | SYSTOLIC BLOOD PRESSURE: 112 MMHG | HEART RATE: 92 BPM | TEMPERATURE: 98.2 F | OXYGEN SATURATION: 98 % | BODY MASS INDEX: 24.76 KG/M2 | DIASTOLIC BLOOD PRESSURE: 70 MMHG | RESPIRATION RATE: 16 BRPM | HEIGHT: 55 IN

## 2025-05-06 DIAGNOSIS — R05.2 SUBACUTE COUGH: ICD-10-CM

## 2025-05-06 DIAGNOSIS — R05.2 SUBACUTE COUGH: Primary | ICD-10-CM

## 2025-05-06 PROCEDURE — G2211 COMPLEX E/M VISIT ADD ON: HCPCS

## 2025-05-06 PROCEDURE — 99213 OFFICE O/P EST LOW 20 MIN: CPT

## 2025-05-06 RX ORDER — FUROSEMIDE 20 MG/1
20 TABLET ORAL 2 TIMES DAILY
Qty: 60 TABLET | Refills: 0 | Status: SHIPPED | OUTPATIENT
Start: 2025-05-06 | End: 2025-05-08

## 2025-05-06 RX ORDER — PREDNISONE 20 MG/1
40 TABLET ORAL DAILY
Qty: 10 TABLET | Refills: 0 | Status: SHIPPED | OUTPATIENT
Start: 2025-05-06 | End: 2025-05-11

## 2025-05-06 NOTE — PROGRESS NOTES
Name: Lakeisha Langston      : 1935      MRN: [id number]  Encounter Provider: Trista Goyal PA-C  Encounter Date: 2025   Encounter department: [facility] INTERNAL MEDICINE [facility] [address]  :  Assessment & Plan  Subacute cough  O2 is 98%. Weight in office is 107 lbs, baseline weight is typically around 104 lbs. Asthma versus CHF exacerbation. She does admit to weight gain, SOB, PND, and some mild LE edema. Recommended starting lasix 20 mg daily until dry weight is back to 104 lbs. Will plan to repeat BMP in 1 week. Prednisone sent to pharmacy to start if diuresis does not provide relief for her SOB. Reviewed potential adverse effects. F/up in 2-3 days via phone call.   Orders:    furosemide (LASIX) 20 mg tablet; Take 1 tablet (20 mg total) by mouth 2 (two) times a day    Basic metabolic panel; Future    predniSONE 20 mg tablet; Take 2 tablets (40 mg total) by mouth daily for 5 days           History of Present Illness   Patient is an 89 yo female that presents today for a cough that started a few weeks ago. She has been using the duo-neb treatments twice daily with minimal relief. She admits to some weight gain, PND, LE edema, wheezing, and dry cough.  She denies any changes in diet over the past few days.    Cough  Associated symptoms include rhinorrhea (Chronic) and wheezing. Pertinent negatives include no chest pain, chills, ear pain, fever, headaches, myalgias, postnasal drip, sore throat or shortness of breath. There is no history of environmental allergies.     Review of Systems   Constitutional:  Positive for unexpected weight change. Negative for chills, fatigue and fever.   HENT:  Positive for rhinorrhea (Chronic). Negative for ear discharge, ear pain, postnasal drip, sinus pressure, sinus pain, sore throat, tinnitus and trouble swallowing.    Eyes:  Negative for pain, discharge and itching.   Respiratory:  Positive for cough and wheezing. Negative for chest tightness and shortness of  "breath.    Cardiovascular:  Positive for leg swelling. Negative for chest pain and palpitations.   Gastrointestinal:  Negative for abdominal pain, constipation, diarrhea, nausea and vomiting.   Endocrine: Negative for polydipsia, polyphagia and polyuria.   Genitourinary:  Negative for difficulty urinating, frequency, hematuria and urgency.   Musculoskeletal:  Negative for arthralgias, joint swelling and myalgias.   Skin:  Negative for color change.   Allergic/Immunologic: Negative for environmental allergies.   Neurological:  Negative for dizziness, weakness, light-headedness, numbness and headaches.   Hematological:  Negative for adenopathy.   Psychiatric/Behavioral:  Negative for decreased concentration and sleep disturbance. The patient is not nervous/anxious.        Objective   /70 (BP Location: Left arm, Patient Position: Sitting, Cuff Size: Standard)   Pulse 92   Temp 98.2 °F (36.8 °C) (Temporal)   Resp 16   Ht 4' 7\" (1.397 m)   Wt 48.5 kg (107 lb)   SpO2 98%   BMI 24.87 kg/m²      Physical Exam  Vitals and nursing note reviewed.   Constitutional:       General: She is awake. She is not in acute distress.     Appearance: Normal appearance. She is well-developed, well-groomed and normal weight.   HENT:      Head: Normocephalic and atraumatic.      Right Ear: Hearing, tympanic membrane, ear canal and external ear normal.      Left Ear: Hearing, tympanic membrane, ear canal and external ear normal.      Nose: Nose normal.      Mouth/Throat:      Lips: Pink.      Mouth: Mucous membranes are moist.      Pharynx: Uvula midline. No posterior oropharyngeal erythema.   Eyes:      General: Lids are normal. Vision grossly intact. Gaze aligned appropriately.      Conjunctiva/sclera: Conjunctivae normal.   Neck:      Vascular: No carotid bruit.      Trachea: Trachea and phonation normal.   Cardiovascular:      Rate and Rhythm: Normal rate and regular rhythm.      Heart sounds: Normal heart sounds, S1 normal " and S2 normal. No murmur heard.     No friction rub. No gallop.   Pulmonary:      Effort: Pulmonary effort is normal. No respiratory distress.      Breath sounds: Normal breath sounds. Decreased air movement present. No decreased breath sounds, wheezing, rhonchi or rales.   Abdominal:      General: Abdomen is flat.   Musculoskeletal:         General: No swelling.      Cervical back: Neck supple.      Right lower le+ Edema present.      Left lower le+ Edema present.   Skin:     General: Skin is warm.      Capillary Refill: Capillary refill takes less than 2 seconds.   Neurological:      Mental Status: She is alert.   Psychiatric:         Attention and Perception: Attention and perception normal.         Mood and Affect: Mood and affect normal.         Speech: Speech normal.         Behavior: Behavior normal. Behavior is cooperative.         Thought Content: Thought content normal.         Cognition and Memory: Cognition and memory normal.         Judgment: Judgment normal.

## 2025-05-08 RX ORDER — FUROSEMIDE 20 MG/1
20 TABLET ORAL DAILY
Qty: 90 TABLET | Refills: 1 | Status: SHIPPED | OUTPATIENT
Start: 2025-05-08

## 2025-05-12 ENCOUNTER — APPOINTMENT (OUTPATIENT)
Dept: LAB | Facility: CLINIC | Age: 89
End: 2025-05-12
Payer: MEDICARE

## 2025-05-12 ENCOUNTER — OFFICE VISIT (OUTPATIENT)
Age: 89
End: 2025-05-12
Payer: MEDICARE

## 2025-05-12 ENCOUNTER — NURSE TRIAGE (OUTPATIENT)
Age: 89
End: 2025-05-12

## 2025-05-12 VITALS
DIASTOLIC BLOOD PRESSURE: 60 MMHG | HEART RATE: 61 BPM | SYSTOLIC BLOOD PRESSURE: 120 MMHG | BODY MASS INDEX: 22.91 KG/M2 | OXYGEN SATURATION: 99 % | WEIGHT: 99 LBS | HEIGHT: 55 IN

## 2025-05-12 DIAGNOSIS — R05.2 SUBACUTE COUGH: ICD-10-CM

## 2025-05-12 DIAGNOSIS — D50.9 IRON DEFICIENCY ANEMIA, UNSPECIFIED IRON DEFICIENCY ANEMIA TYPE: ICD-10-CM

## 2025-05-12 DIAGNOSIS — R42 DIZZINESS: ICD-10-CM

## 2025-05-12 DIAGNOSIS — I10 ESSENTIAL HYPERTENSION: ICD-10-CM

## 2025-05-12 DIAGNOSIS — R42 DIZZINESS: Primary | ICD-10-CM

## 2025-05-12 LAB
ALBUMIN SERPL BCG-MCNC: 4.6 G/DL (ref 3.5–5)
ALP SERPL-CCNC: 67 U/L (ref 34–104)
ALT SERPL W P-5'-P-CCNC: 8 U/L (ref 7–52)
ANION GAP SERPL CALCULATED.3IONS-SCNC: 9 MMOL/L (ref 4–13)
AST SERPL W P-5'-P-CCNC: 19 U/L (ref 13–39)
BASOPHILS # BLD AUTO: 0.02 THOUSANDS/ÂΜL (ref 0–0.1)
BASOPHILS NFR BLD AUTO: 0 % (ref 0–1)
BILIRUB SERPL-MCNC: 0.43 MG/DL (ref 0.2–1)
BUN SERPL-MCNC: 38 MG/DL (ref 5–25)
CALCIUM SERPL-MCNC: 9.7 MG/DL (ref 8.4–10.2)
CHLORIDE SERPL-SCNC: 93 MMOL/L (ref 96–108)
CO2 SERPL-SCNC: 30 MMOL/L (ref 21–32)
CREAT SERPL-MCNC: 1.75 MG/DL (ref 0.6–1.3)
EOSINOPHIL # BLD AUTO: 0.1 THOUSAND/ÂΜL (ref 0–0.61)
EOSINOPHIL NFR BLD AUTO: 1 % (ref 0–6)
ERYTHROCYTE [DISTWIDTH] IN BLOOD BY AUTOMATED COUNT: 12.8 % (ref 11.6–15.1)
FERRITIN SERPL-MCNC: 79 NG/ML (ref 30–307)
GFR SERPL CREATININE-BSD FRML MDRD: 25 ML/MIN/1.73SQ M
GLUCOSE SERPL-MCNC: 82 MG/DL (ref 65–140)
HCT VFR BLD AUTO: 35.8 % (ref 34.8–46.1)
HGB BLD-MCNC: 11.5 G/DL (ref 11.5–15.4)
IMM GRANULOCYTES # BLD AUTO: 0.03 THOUSAND/UL (ref 0–0.2)
IMM GRANULOCYTES NFR BLD AUTO: 0 % (ref 0–2)
IRON SATN MFR SERPL: 21 % (ref 15–50)
IRON SERPL-MCNC: 82 UG/DL (ref 50–212)
LYMPHOCYTES # BLD AUTO: 2.87 THOUSANDS/ÂΜL (ref 0.6–4.47)
LYMPHOCYTES NFR BLD AUTO: 29 % (ref 14–44)
MCH RBC QN AUTO: 30.2 PG (ref 26.8–34.3)
MCHC RBC AUTO-ENTMCNC: 32.1 G/DL (ref 31.4–37.4)
MCV RBC AUTO: 94 FL (ref 82–98)
MONOCYTES # BLD AUTO: 1.09 THOUSAND/ÂΜL (ref 0.17–1.22)
MONOCYTES NFR BLD AUTO: 11 % (ref 4–12)
NEUTROPHILS # BLD AUTO: 5.87 THOUSANDS/ÂΜL (ref 1.85–7.62)
NEUTS SEG NFR BLD AUTO: 59 % (ref 43–75)
NRBC BLD AUTO-RTO: 0 /100 WBCS
PLATELET # BLD AUTO: 253 THOUSANDS/UL (ref 149–390)
PMV BLD AUTO: 12.3 FL (ref 8.9–12.7)
POTASSIUM SERPL-SCNC: 3.4 MMOL/L (ref 3.5–5.3)
PROT SERPL-MCNC: 7.7 G/DL (ref 6.4–8.4)
RBC # BLD AUTO: 3.81 MILLION/UL (ref 3.81–5.12)
SODIUM SERPL-SCNC: 132 MMOL/L (ref 135–147)
TIBC SERPL-MCNC: 399 UG/DL (ref 250–450)
TRANSFERRIN SERPL-MCNC: 285 MG/DL (ref 203–362)
UIBC SERPL-MCNC: 317 UG/DL (ref 155–355)
WBC # BLD AUTO: 9.98 THOUSAND/UL (ref 4.31–10.16)

## 2025-05-12 PROCEDURE — 36415 COLL VENOUS BLD VENIPUNCTURE: CPT

## 2025-05-12 PROCEDURE — 82728 ASSAY OF FERRITIN: CPT

## 2025-05-12 PROCEDURE — 80053 COMPREHEN METABOLIC PANEL: CPT

## 2025-05-12 PROCEDURE — G2211 COMPLEX E/M VISIT ADD ON: HCPCS

## 2025-05-12 PROCEDURE — 85025 COMPLETE CBC W/AUTO DIFF WBC: CPT

## 2025-05-12 PROCEDURE — 83550 IRON BINDING TEST: CPT

## 2025-05-12 PROCEDURE — 83540 ASSAY OF IRON: CPT

## 2025-05-12 PROCEDURE — 99213 OFFICE O/P EST LOW 20 MIN: CPT

## 2025-05-12 RX ORDER — ATENOLOL 50 MG/1
25 TABLET ORAL DAILY
Qty: 45 TABLET | Refills: 1 | Status: SHIPPED | OUTPATIENT
Start: 2025-05-12

## 2025-05-12 NOTE — PROGRESS NOTES
Name: Lakeisha Langston      : 1935      MRN: [id number]  Encounter Provider: Trista Goyal PA-C  Encounter Date: 2025   Encounter department: [facility] INTERNAL MEDICINE [facility] [address]  :  Assessment & Plan  Dizziness  BP is 120/60 in office.  Reduced skin turgor on exam.  Current weight is 99 pounds, down from 107 pounds last week.  Likely due to overdiuresis from Lasix.  Recommend discontinuing Lasix at this time, increasing fluid intake, and obtaining labs to evaluate kidney function.  Will follow-up with blood work.  Follow-up if you begin to experience any worsening dizziness.  Recommended continuing to monitor weight daily, goal is 104 pounds.  Orders:    CBC and differential; Future    Comprehensive metabolic panel; Future    Iron Panel (Includes Ferritin, Iron Sat%, Iron, and TIBC); Future    Iron deficiency anemia, unspecified iron deficiency anemia type    Orders:    CBC and differential; Future    Iron Panel (Includes Ferritin, Iron Sat%, Iron, and TIBC); Future           History of Present Illness   Patient is an 89-year-old female that presents today for dizziness and lightheadedness that started a few days ago.  She was evaluated in the office last week for cough.  She was started on Lasix due to suspected CHF exacerbation.  She was also recommended to have prednisone if she continued to have wheezing and shortness of breath.  She started the prednisone on Friday.  She notes the cough is almost completely resolved, but she continues to have lightheadedness and weakness.  She admits to a lack of appetite, but is drinking adequate amount of water.  She denies any lower extremity edema, PND, orthopnea at this time.      Review of Systems   Constitutional:  Negative for chills, fatigue and fever.   HENT:  Negative for ear discharge, ear pain, postnasal drip, rhinorrhea, sinus pressure, sinus pain, sore throat, tinnitus and trouble swallowing.    Eyes:  Negative for pain, discharge  "and itching.   Respiratory:  Positive for shortness of breath. Negative for cough, chest tightness and wheezing.    Cardiovascular:  Negative for chest pain, palpitations and leg swelling.   Gastrointestinal:  Negative for abdominal pain, constipation, diarrhea, nausea and vomiting.   Endocrine: Negative for polydipsia, polyphagia and polyuria.   Genitourinary:  Negative for difficulty urinating, frequency, hematuria and urgency.   Musculoskeletal:  Negative for arthralgias, joint swelling and myalgias.   Skin:  Negative for color change.   Allergic/Immunologic: Negative for environmental allergies.   Neurological:  Positive for dizziness, weakness and light-headedness. Negative for numbness and headaches.   Hematological:  Negative for adenopathy.   Psychiatric/Behavioral:  Negative for decreased concentration and sleep disturbance. The patient is not nervous/anxious.        Objective   /60   Pulse 61   Ht 4' 7\" (1.397 m)   SpO2 99%   BMI 24.87 kg/m²      Physical Exam  Vitals and nursing note reviewed.   Constitutional:       General: She is awake. She is not in acute distress.     Appearance: Normal appearance. She is well-developed, well-groomed and normal weight. She is ill-appearing.   HENT:      Head: Normocephalic and atraumatic.      Right Ear: Hearing and external ear normal.      Left Ear: Hearing and external ear normal.      Nose: Nose normal.      Mouth/Throat:      Lips: Pink.      Mouth: Mucous membranes are moist.   Eyes:      General: Lids are normal. Vision grossly intact. Gaze aligned appropriately.      Conjunctiva/sclera: Conjunctivae normal.   Neck:      Vascular: No carotid bruit.      Trachea: Trachea and phonation normal.   Cardiovascular:      Rate and Rhythm: Normal rate and regular rhythm.      Heart sounds: Normal heart sounds, S1 normal and S2 normal. No murmur heard.     No friction rub. No gallop.   Pulmonary:      Effort: Pulmonary effort is normal. No respiratory " distress.      Breath sounds: Normal breath sounds and air entry. No decreased breath sounds, wheezing, rhonchi or rales.   Abdominal:      General: Abdomen is flat.   Musculoskeletal:         General: No swelling.      Cervical back: Neck supple.      Right lower leg: No edema.      Left lower leg: No edema.   Skin:     General: Skin is warm.      Capillary Refill: Capillary refill takes less than 2 seconds.      Comments: Reduced skin turgor.   Neurological:      Mental Status: She is alert.   Psychiatric:         Attention and Perception: Attention and perception normal.         Mood and Affect: Mood and affect normal.         Speech: Speech normal.         Behavior: Behavior normal. Behavior is cooperative.         Thought Content: Thought content normal.         Cognition and Memory: Cognition and memory normal.         Judgment: Judgment normal.

## 2025-05-12 NOTE — TELEPHONE ENCOUNTER
"FOLLOW UP: office visit today    REASON FOR CONVERSATION: Weakness - Generalized    SYMPTOMS: dizziness/general weakness    OTHER: patient agreeable to office visit. Started lasix on Wednesday and Prednisone on Friday.    DISPOSITION: Go to Office Now/Urgent Care      Reason for Disposition   Lightheadedness (dizziness) present now, after 2 hours of rest and fluids    Answer Assessment - Initial Assessment Questions  1. DESCRIPTION: \"Describe how you are feeling.\"      Very weakness, brain fog     2. SEVERITY: \"How bad is it?\"  \"Can you stand and walk?\"      Can walk, moderate.    3. ONSET: \"When did these symptoms begin?\" (e.g., hours, days, weeks, months)      Yesterday    4. CAUSE: \"What do you think is causing the weakness or fatigue?\" (e.g., not drinking enough fluids, medical problem, trouble sleeping)      Could be from new medication - lasix and prednisone    5. NEW MEDICINES:  \"Have you started on any new medicines recently?\" (e.g., opioid pain medicines, benzodiazepines, muscle relaxants, antidepressants, antihistamines, neuroleptics, beta blockers)      Could be from new medication - lasix  Wed and prednisone Friday    6. OTHER SYMPTOMS: \"Do you have any other symptoms?\" (e.g., chest pain, fever, cough, SOB, vomiting, diarrhea, bleeding, other areas of pain)      Dizziness yesterday    Protocols used: Weakness (Generalized) and Fatigue-Adult-OH, Dizziness-Adult-OH    "

## 2025-05-13 ENCOUNTER — RESULTS FOLLOW-UP (OUTPATIENT)
Age: 89
End: 2025-05-13

## 2025-05-13 DIAGNOSIS — N17.9 AKI (ACUTE KIDNEY INJURY) (HCC): Primary | ICD-10-CM

## 2025-05-14 ENCOUNTER — APPOINTMENT (EMERGENCY)
Dept: CT IMAGING | Facility: HOSPITAL | Age: 89
End: 2025-05-14
Payer: MEDICARE

## 2025-05-14 ENCOUNTER — APPOINTMENT (EMERGENCY)
Dept: RADIOLOGY | Facility: HOSPITAL | Age: 89
End: 2025-05-14
Payer: MEDICARE

## 2025-05-14 ENCOUNTER — HOSPITAL ENCOUNTER (EMERGENCY)
Facility: HOSPITAL | Age: 89
Discharge: HOME | End: 2025-05-14
Attending: STUDENT IN AN ORGANIZED HEALTH CARE EDUCATION/TRAINING PROGRAM
Payer: MEDICARE

## 2025-05-14 VITALS
BODY MASS INDEX: 25.31 KG/M2 | HEART RATE: 63 BPM | SYSTOLIC BLOOD PRESSURE: 153 MMHG | OXYGEN SATURATION: 96 % | TEMPERATURE: 97.4 F | WEIGHT: 108.91 LBS | RESPIRATION RATE: 21 BRPM | DIASTOLIC BLOOD PRESSURE: 67 MMHG

## 2025-05-14 DIAGNOSIS — E86.0 DEHYDRATION: ICD-10-CM

## 2025-05-14 DIAGNOSIS — R55 SYNCOPE: Primary | ICD-10-CM

## 2025-05-14 LAB
ALBUMIN SERPL BCG-MCNC: 3.9 G/DL (ref 3.5–5)
ALP SERPL-CCNC: 66 U/L (ref 34–104)
ALT SERPL W P-5'-P-CCNC: 8 U/L (ref 7–52)
ANION GAP SERPL CALCULATED.3IONS-SCNC: 10 MMOL/L (ref 4–13)
AST SERPL W P-5'-P-CCNC: 21 U/L (ref 13–39)
BASOPHILS # BLD AUTO: 0.03 THOUSANDS/ÂΜL (ref 0–0.1)
BASOPHILS NFR BLD AUTO: 0 % (ref 0–1)
BILIRUB SERPL-MCNC: 0.56 MG/DL (ref 0.2–1)
BUN SERPL-MCNC: 33 MG/DL (ref 5–25)
CALCIUM SERPL-MCNC: 8.8 MG/DL (ref 8.4–10.2)
CARDIAC TROPONIN I PNL SERPL HS: 7 NG/L (ref ?–50)
CHLORIDE SERPL-SCNC: 93 MMOL/L (ref 96–108)
CO2 SERPL-SCNC: 24 MMOL/L (ref 21–32)
CREAT SERPL-MCNC: 1.7 MG/DL (ref 0.6–1.3)
EOSINOPHIL # BLD AUTO: 0.8 THOUSAND/ÂΜL (ref 0–0.61)
EOSINOPHIL NFR BLD AUTO: 12 % (ref 0–6)
ERYTHROCYTE [DISTWIDTH] IN BLOOD BY AUTOMATED COUNT: 12.3 % (ref 11.6–15.1)
GFR SERPL CREATININE-BSD FRML MDRD: 26 ML/MIN/1.73SQ M
GLUCOSE SERPL-MCNC: 121 MG/DL (ref 65–140)
HCT VFR BLD AUTO: 32.6 % (ref 34.8–46.1)
HGB BLD-MCNC: 10.8 G/DL (ref 11.5–15.4)
IMM GRANULOCYTES # BLD AUTO: 0.03 THOUSAND/UL (ref 0–0.2)
IMM GRANULOCYTES NFR BLD AUTO: 0 % (ref 0–2)
LYMPHOCYTES # BLD AUTO: 1.64 THOUSANDS/ÂΜL (ref 0.6–4.47)
LYMPHOCYTES NFR BLD AUTO: 24 % (ref 14–44)
MCH RBC QN AUTO: 30.3 PG (ref 26.8–34.3)
MCHC RBC AUTO-ENTMCNC: 33.1 G/DL (ref 31.4–37.4)
MCV RBC AUTO: 92 FL (ref 82–98)
MONOCYTES # BLD AUTO: 0.23 THOUSAND/ÂΜL (ref 0.17–1.22)
MONOCYTES NFR BLD AUTO: 3 % (ref 4–12)
NEUTROPHILS # BLD AUTO: 4.04 THOUSANDS/ÂΜL (ref 1.85–7.62)
NEUTS SEG NFR BLD AUTO: 61 % (ref 43–75)
NRBC BLD AUTO-RTO: 0 /100 WBCS
PLATELET # BLD AUTO: 227 THOUSANDS/UL (ref 149–390)
PMV BLD AUTO: 9.6 FL (ref 8.9–12.7)
POTASSIUM SERPL-SCNC: 3.3 MMOL/L (ref 3.5–5.3)
PROT SERPL-MCNC: 6.8 G/DL (ref 6.4–8.4)
RBC # BLD AUTO: 3.56 MILLION/UL (ref 3.81–5.12)
SODIUM SERPL-SCNC: 127 MMOL/L (ref 135–147)
WBC # BLD AUTO: 6.77 THOUSAND/UL (ref 4.31–10.16)

## 2025-05-14 PROCEDURE — 93005 ELECTROCARDIOGRAM TRACING: CPT

## 2025-05-14 PROCEDURE — 71045 X-RAY EXAM CHEST 1 VIEW: CPT

## 2025-05-14 PROCEDURE — 36415 COLL VENOUS BLD VENIPUNCTURE: CPT | Performed by: STUDENT IN AN ORGANIZED HEALTH CARE EDUCATION/TRAINING PROGRAM

## 2025-05-14 PROCEDURE — 99285 EMERGENCY DEPT VISIT HI MDM: CPT

## 2025-05-14 PROCEDURE — 70450 CT HEAD/BRAIN W/O DYE: CPT

## 2025-05-14 PROCEDURE — 80053 COMPREHEN METABOLIC PANEL: CPT | Performed by: STUDENT IN AN ORGANIZED HEALTH CARE EDUCATION/TRAINING PROGRAM

## 2025-05-14 PROCEDURE — 99284 EMERGENCY DEPT VISIT MOD MDM: CPT | Performed by: STUDENT IN AN ORGANIZED HEALTH CARE EDUCATION/TRAINING PROGRAM

## 2025-05-14 PROCEDURE — 84484 ASSAY OF TROPONIN QUANT: CPT | Performed by: STUDENT IN AN ORGANIZED HEALTH CARE EDUCATION/TRAINING PROGRAM

## 2025-05-14 PROCEDURE — 85025 COMPLETE CBC W/AUTO DIFF WBC: CPT | Performed by: STUDENT IN AN ORGANIZED HEALTH CARE EDUCATION/TRAINING PROGRAM

## 2025-05-14 PROCEDURE — 96360 HYDRATION IV INFUSION INIT: CPT

## 2025-05-14 RX ORDER — SODIUM CHLORIDE 1 G/1
1 TABLET ORAL
Status: DISCONTINUED | OUTPATIENT
Start: 2025-05-15 | End: 2025-05-14

## 2025-05-14 RX ORDER — SODIUM CHLORIDE 1 G/1
0.5 TABLET ORAL ONCE
Status: DISCONTINUED | OUTPATIENT
Start: 2025-05-15 | End: 2025-05-14 | Stop reason: HOSPADM

## 2025-05-14 RX ORDER — SODIUM CHLORIDE 1 G/1
0.5 TABLET ORAL ONCE
Status: COMPLETED | OUTPATIENT
Start: 2025-05-14 | End: 2025-05-14

## 2025-05-14 RX ORDER — SALSALATE 500 MG/1
500 TABLET, FILM COATED ORAL ONCE
Status: DISCONTINUED | OUTPATIENT
Start: 2025-05-14 | End: 2025-05-14

## 2025-05-14 RX ORDER — SODIUM CHLORIDE 1 G/1
1 TABLET ORAL
Status: DISCONTINUED | OUTPATIENT
Start: 2025-05-15 | End: 2025-05-14 | Stop reason: HOSPADM

## 2025-05-14 RX ADMIN — Medication 0.5 G: at 20:20

## 2025-05-14 RX ADMIN — SODIUM CHLORIDE 500 ML: 0.9 INJECTION, SOLUTION INTRAVENOUS at 17:50

## 2025-05-15 ENCOUNTER — PATIENT OUTREACH (OUTPATIENT)
Dept: CASE MANAGEMENT | Facility: OTHER | Age: 89
End: 2025-05-15

## 2025-05-15 DIAGNOSIS — N17.9 AKI (ACUTE KIDNEY INJURY) (HCC): Primary | ICD-10-CM

## 2025-05-15 LAB
ATRIAL RATE: 56 BPM
P AXIS: 40 DEGREES
PR INTERVAL: 166 MS
QRS AXIS: -13 DEGREES
QRSD INTERVAL: 94 MS
QT INTERVAL: 498 MS
QTC INTERVAL: 480 MS
T WAVE AXIS: 34 DEGREES
VENTRICULAR RATE: 56 BPM

## 2025-05-15 PROCEDURE — 93010 ELECTROCARDIOGRAM REPORT: CPT | Performed by: INTERNAL MEDICINE

## 2025-05-15 NOTE — ED PROVIDER NOTES
Time reflects when diagnosis was documented in both MDM as applicable and the Disposition within this note       Time User Action Codes Description Comment    5/14/2025  7:55 PM Miriam Angulo Add [R55] Syncope     5/14/2025  7:55 PM Miriam Angulo Add [E86.0] Dehydration           ED Disposition       ED Disposition   Discharge    Condition   Stable    Date/Time   Wed May 14, 2025  7:55 PM    Comment   Lakeisha Tenorionn discharge to home/self care.                   Assessment & Plan       Medical Decision Making  Differential orthostatic hypotension, vasovagal, arrhythmia    Patient is well-appearing 89-year-old female present emerged from no acute respiratory distress and vital signs unremarkable.  Patient is alert oriented answering all questions appropriately.  Blood pressure in normal limits.  Patient was given 500 cc of fluid.  Chest x-ray shows no acute cardiopulmonary process.  Head CT negative.  Initial labs show a sodium of 127 kidney function slightly improved from previous 2 days prior.  Patient was given a salt tab here.  Discussed continuation of salt tabs at home.  Discussed admission versus outpatient management.  Family opted for management at home.  Discussed need for repeat labs for reassessment to ensure appropriate trending of electrolytes and kidney function.  Discussed symptomatic management as well as return and follow-up precautions.  Disposition discharge    EKG rate 56 sinus bradycardia with PVC.    Amount and/or Complexity of Data Reviewed  Labs: ordered.  Radiology: ordered and independent interpretation performed.  ECG/medicine tests: ordered and independent interpretation performed.    Risk  OTC drugs.             Medications   sodium chloride 0.9 % bolus 500 mL (0 mL Intravenous Stopped 5/14/25 1913)   sodium chloride tablet 0.5 g (0.5 g Oral Given 5/14/25 2020)       ED Risk Strat Scores                    (ISAR) Identification of Seniors at Risk  Before the illness or injury that  brought you to the Emergency, did you need someone to help you on a regular basis?: 0  In the last 24 hours, have you needed more help than usual?: 1  Have you been hospitalized for one or more nights during the past 6 months?: 0  In general, do you see well?: 1  In general, do you have serious problems with your memory?: 0  Do you take more than three different medications every day?: 1  ISAR Score: 3            SBIRT 20yo+      Flowsheet Row Most Recent Value   Initial Alcohol Screen: US AUDIT-C     1. How often do you have a drink containing alcohol? 0 Filed at: 05/14/2025 1745   2. How many drinks containing alcohol do you have on a typical day you are drinking?  0 Filed at: 05/14/2025 1745   3b. FEMALE Any Age, or MALE 65+: How often do you have 4 or more drinks on one occassion? 0 Filed at: 05/14/2025 1745   Audit-C Score 0 Filed at: 05/14/2025 1745   GILLES: How many times in the past year have you...    Used an illegal drug or used a prescription medication for non-medical reasons? Never Filed at: 05/14/2025 1745                            History of Present Illness       Chief Complaint   Patient presents with    Syncope     Pt BIBA from home. Pt called daughter felt like she might pass out and needed to have BM. EMS found her slumped sitting on toilet. Low systolic of 80. Pt had lasix stopped Monday d/t possible dehydration. No head stroke.        Past Medical History:   Diagnosis Date    Anxiety 1992    Arthritis 1991    Arthropathy 03/19/2025    Benign essential hypertension     Last assessed: 9/11/14    Disease of thyroid gland     Essential tremor     Fibromyalgia     GERD (gastroesophageal reflux disease)     History of nail disorders     Hyperlipidemia     Hypertension     Palpitations     Transient cerebral ischemia       Past Surgical History:   Procedure Laterality Date    APPENDECTOMY      EGD AND COLONOSCOPY  07/2020    HYSTERECTOMY  01/01/2010    AL LAPS SURG CHOLECYSTECTOMY W/CHOLANGIOGRAPHY N/A  4/4/2018    Procedure: LAPAROSCOPIC CHOLECYSTECTOMY WITH IOC;  Surgeon: Yakov Mueller MD;  Location: [facility];  Service: General    TUBAL LIGATION        Family History   Problem Relation Age of Onset    Stroke Mother         ischemic stroke    Hypertension Mother     Heart disease Father     Cancer Sister     No Known Problems Sister     No Known Problems Daughter     No Known Problems Daughter     No Known Problems Daughter     No Known Problems Daughter     No Known Problems Daughter     No Known Problems Maternal Grandmother     No Known Problems Paternal Grandmother     No Known Problems Paternal Aunt     No Known Problems Paternal Aunt     No Known Problems Paternal Aunt     No Known Problems Paternal Aunt     No Known Problems Paternal Aunt     Breast cancer Neg Hx       Social History[1]   E-Cigarette/Vaping    E-Cigarette Use Never User       E-Cigarette/Vaping Substances    Nicotine No     THC No     CBD No     Flavoring No     Other No     Unknown No       I have reviewed and agree with the history as documented.     HPI    Patient is an 89-year-old female present emerged apartment for a syncopal episode.  Patient was on the toilet having a bowel movement bearing-down found to be passed out.  Was noted by family.  EMS called and arrived here for evaluation.  Reported to have been on Lasix for several days and became dehydrated.  Since discontinuing patient has been staying hydrated and taking salt tabs.  No noted fevers chills.  Denies any headache vision changes or difficulty ambulating.  Review of systems is otherwise negative.  History includes hypertension, hyperlipidemia, GERD    Review of Systems   Constitutional:  Negative for chills and fever.   HENT:  Negative for ear pain and sore throat.    Eyes:  Negative for pain and visual disturbance.   Respiratory:  Negative for cough and shortness of breath.    Cardiovascular:  Negative for chest pain and palpitations.   Gastrointestinal:  Negative  for abdominal pain and vomiting.   Genitourinary:  Negative for dysuria and hematuria.   Musculoskeletal:  Negative for arthralgias and back pain.   Skin:  Negative for color change and rash.   Neurological:  Positive for weakness. Negative for seizures and syncope.   All other systems reviewed and are negative.          Objective       ED Triage Vitals [05/14/25 1745]   Temperature Pulse Blood Pressure Respirations SpO2 Patient Position - Orthostatic VS   (!) 97.4 °F (36.3 °C) 56 168/70 20 95 % Lying      Temp Source Heart Rate Source BP Location FiO2 (%) Pain Score    Oral Monitor Right arm -- No Pain      Vitals      Date and Time Temp Pulse SpO2 Resp BP Pain Score FACES Pain Rating User   05/14/25 1845 -- 63 96 % -- 153/67 -- -- TW   05/14/25 1800 -- 62 95 % 21 144/69 -- -- FB   05/14/25 1745 97.4 °F (36.3 °C) 56 95 % 20 168/70 No Pain -- FB            Physical Exam  Vitals and nursing note reviewed.   Constitutional:       General: She is not in acute distress.     Appearance: She is well-developed.   HENT:      Head: Normocephalic and atraumatic.      Nose: Nose normal.      Mouth/Throat:      Mouth: Mucous membranes are moist.      Pharynx: Oropharynx is clear.     Eyes:      Extraocular Movements: Extraocular movements intact.      Conjunctiva/sclera: Conjunctivae normal.      Pupils: Pupils are equal, round, and reactive to light.       Cardiovascular:      Rate and Rhythm: Normal rate and regular rhythm.      Heart sounds: No murmur heard.  Pulmonary:      Effort: Pulmonary effort is normal. No respiratory distress.      Breath sounds: Normal breath sounds.   Abdominal:      Palpations: Abdomen is soft.      Tenderness: There is no abdominal tenderness.     Musculoskeletal:         General: No swelling.      Cervical back: Neck supple.     Skin:     General: Skin is warm and dry.      Capillary Refill: Capillary refill takes less than 2 seconds.     Neurological:      General: No focal deficit present.       Mental Status: She is alert and oriented to person, place, and time.      Comments: Cranial nerves II through XII intact.  Strength, sensation range of motion intact in bilateral upper and lower extremities.  Negative finger-nose-finger and heel-to-shin.     Psychiatric:         Mood and Affect: Mood normal.         Results Reviewed       Procedure Component Value Units Date/Time    HS Troponin 0hr (reflex protocol) [[id number]]  (Normal) Collected: 05/14/25 1750    Lab Status: Final result Specimen: Blood from Arm, Left Updated: 05/14/25 1820     hs TnI 0hr 7 ng/L     Comprehensive metabolic panel [[id number]]  (Abnormal) Collected: 05/14/25 1750    Lab Status: Final result Specimen: Blood from Arm, Left Updated: 05/14/25 1815     Sodium 127 mmol/L      Potassium 3.3 mmol/L      Chloride 93 mmol/L      CO2 24 mmol/L      ANION GAP 10 mmol/L      BUN 33 mg/dL      Creatinine 1.70 mg/dL      Glucose 121 mg/dL      Calcium 8.8 mg/dL      AST 21 U/L      ALT 8 U/L      Alkaline Phosphatase 66 U/L      Total Protein 6.8 g/dL      Albumin 3.9 g/dL      Total Bilirubin 0.56 mg/dL      eGFR 26 ml/min/1.73sq m     Narrative:      National Kidney Disease Foundation guidelines for Chronic Kidney Disease (CKD):     Stage 1 with normal or high GFR (GFR > 90 mL/min/1.73 square meters)    Stage 2 Mild CKD (GFR = 60-89 mL/min/1.73 square meters)    Stage 3A Moderate CKD (GFR = 45-59 mL/min/1.73 square meters)    Stage 3B Moderate CKD (GFR = 30-44 mL/min/1.73 square meters)    Stage 4 Severe CKD (GFR = 15-29 mL/min/1.73 square meters)    Stage 5 End Stage CKD (GFR <15 mL/min/1.73 square meters)  Note: GFR calculation is accurate only with a steady state creatinine    CBC and differential [[id number]]  (Abnormal) Collected: 05/14/25 1750    Lab Status: Final result Specimen: Blood from Arm, Left Updated: 05/14/25 1754     WBC 6.77 Thousand/uL      RBC 3.56 Million/uL      Hemoglobin 10.8 g/dL      Hematocrit 32.6 %      MCV 92 fL       MCH 30.3 pg      MCHC 33.1 g/dL      RDW 12.3 %      MPV 9.6 fL      Platelets 227 Thousands/uL      nRBC 0 /100 WBCs      Segmented % 61 %      Immature Grans % 0 %      Lymphocytes % 24 %      Monocytes % 3 %      Eosinophils Relative 12 %      Basophils Relative 0 %      Absolute Neutrophils 4.04 Thousands/µL      Absolute Immature Grans 0.03 Thousand/uL      Absolute Lymphocytes 1.64 Thousands/µL      Absolute Monocytes 0.23 Thousand/µL      Eosinophils Absolute 0.80 Thousand/µL      Basophils Absolute 0.03 Thousands/µL             CT head without contrast   Final Interpretation by Mary Beth Adam MD (05/14 1902)      No acute intracranial abnormality.                  Workstation performed: [id number]         XR chest 1 view portable   Final Interpretation by Rodrigo Fernandez MD (05/14 2337)      No acute cardiopulmonary disease.            Workstation performed: [id number]             Procedures    ED Medication and Procedure Management   Prior to Admission Medications   Prescriptions Last Dose Informant Patient Reported? Taking?   ALPRAZolam (XANAX) 0.5 mg tablet   No No   Sig: Take 1 tablet (0.5 mg total) by mouth 4 (four) times a day as needed for anxiety   acetaminophen (TYLENOL) 325 mg tablet  Self Yes No   Sig: Take 1-2 tablets by mouth every 6 (six) hours as needed   albuterol (2.5 mg/3 mL) 0.083 % nebulizer solution   No No   Sig: Take 3 mL (2.5 mg total) by nebulization every 6 (six) hours as needed for wheezing or shortness of breath   albuterol (PROVENTIL HFA,VENTOLIN HFA) 90 mcg/act inhaler  Self Yes No   atenolol (TENORMIN) 50 mg tablet   No No   Sig: Take 0.5 tablets (25 mg total) by mouth daily 1/2 tab twice a day   furosemide (LASIX) 20 mg tablet   No No   Sig: Take 1 tablet (20 mg total) by mouth daily   imipramine (TOFRANIL) 10 mg tablet   No No   Sig: TAKE 1 TABLET BY MOUTH AT BEDTIME   ipratropium-albuterol (DUO-NEB) 0.5-2.5 mg/3 mL nebulizer solution   No No   Sig: Take 3 mL by  nebulization 4 (four) times a day   latanoprost (XALATAN) 0.005 % ophthalmic solution  Self No No   Sig: Administer 1 drop to both eyes daily at bedtime   levothyroxine 50 mcg tablet   No No   Sig: TAKE 1 TABLET BY MOUTH EVERY DAY(MONDAY-SATURDAY), THEN 2 TABLETS SUNDAY   lisinopril (ZESTRIL) 10 mg tablet   No No   Sig: Take 1 tablet (10 mg total) by mouth daily   pantoprazole (PROTONIX) 40 mg tablet   No No   Sig: Take 1 tablet (40 mg total) by mouth daily   rosuvastatin (CRESTOR) 5 mg tablet   No No   Sig: Take 1 tablet (5 mg total) by mouth daily      Facility-Administered Medications: None     Discharge Medication List as of 5/14/2025  8:01 PM        CONTINUE these medications which have NOT CHANGED    Details   acetaminophen (TYLENOL) 325 mg tablet Take 1-2 tablets by mouth every 6 (six) hours as needed, Historical Med      albuterol (2.5 mg/3 mL) 0.083 % nebulizer solution Take 3 mL (2.5 mg total) by nebulization every 6 (six) hours as needed for wheezing or shortness of breath, Starting Thu 2/27/2025, Normal      albuterol (PROVENTIL HFA,VENTOLIN HFA) 90 mcg/act inhaler Historical Med      ALPRAZolam (XANAX) 0.5 mg tablet Take 1 tablet (0.5 mg total) by mouth 4 (four) times a day as needed for anxiety, Starting Wed 4/9/2025, Normal      atenolol (TENORMIN) 50 mg tablet Take 0.5 tablets (25 mg total) by mouth daily 1/2 tab twice a day, Starting Mon 5/12/2025, Fill Later      furosemide (LASIX) 20 mg tablet Take 1 tablet (20 mg total) by mouth daily, Starting Thu 5/8/2025, Normal      imipramine (TOFRANIL) 10 mg tablet TAKE 1 TABLET BY MOUTH AT BEDTIME, Starting Tue 3/11/2025, Normal      ipratropium-albuterol (DUO-NEB) 0.5-2.5 mg/3 mL nebulizer solution Take 3 mL by nebulization 4 (four) times a day, Starting Tue 4/22/2025, Normal      latanoprost (XALATAN) 0.005 % ophthalmic solution Administer 1 drop to both eyes daily at bedtime, Starting Wed 9/27/2023, Normal      levothyroxine 50 mcg tablet TAKE 1 TABLET  BY MOUTH EVERY DAY(MONDAY-SATURDAY), THEN 2 TABLETS , Normal      lisinopril (ZESTRIL) 10 mg tablet Take 1 tablet (10 mg total) by mouth daily, Starting 2025, Normal      pantoprazole (PROTONIX) 40 mg tablet Take 1 tablet (40 mg total) by mouth daily, Starting 2025, Normal      rosuvastatin (CRESTOR) 5 mg tablet Take 1 tablet (5 mg total) by mouth daily, Starting 2025, Normal           No discharge procedures on file.  ED SEPSIS DOCUMENTATION   Time reflects when diagnosis was documented in both MDM as applicable and the Disposition within this note       Time User Action Codes Description Comment    2025  7:55 PM Miriam Angulo [R55] Syncope     2025  7:55 PM Miriam Angulo [E86.0] Dehydration                    [1]   Social History  Tobacco Use    Smoking status: Former     Current packs/day: 0.00     Average packs/day: 0.1 packs/day for 50.0 years (5.0 ttl pk-yrs)     Types: Cigarettes     Start date: 1963     Quit date:      Years since quittin.3    Smokeless tobacco: Never    Tobacco comments:     1 pack a week    Vaping Use    Vaping status: Never Used   Substance Use Topics    Alcohol use: Not Currently     Comment: 0    Drug use: No        Miriam Angulo DO  05/15/25 0112

## 2025-05-15 NOTE — PROGRESS NOTES
Outpatient Care Management Note:    New  referral received. Patient was in ER on 5/14/25 with syncope. She was alert and oriented. Given fluids.  Labs showed low sodium=127 and she was given a salt tab. On Monday, she had her lasix stopped due to possible dehydration. She is to follow up with her PCP.     Voice mail message left for Lakeisha, with my contact information, introducing myself and requesting a call back.     Voice mail message left for Lakeisha's daughter, Edvin, with my contact information, introducing myself and requesting a call back.     Call received from Katerine. She states her mom is doing well today. She denies any further syncopal episodes.  She will monitor for any nausea, vomiting, confusion, seizures and will call 911 with any concerns.  Katerine called the PCP office and they will be placing blood work orders for Bria to complete tomorrow. Once the labs are reviewed they will decide about PCP appt scheduling.     Katerine did note that her mother is taking lisinopril 10 mg-1/2 tablet daily and atenolol 50 mg-1/2 tablet twice a day. She states these were changed by PCP.  Katerine declined completing a full med review.     Katerine denies any needs and declined ongoing outreach.  CM will close the referral.

## 2025-05-15 NOTE — PROGRESS NOTES
Outpatient Care Management Note:    Voice mail message left for Katerine updating her that Trista Goyal placed the order for her mother's BMP to be completed tomorrow.

## 2025-05-15 NOTE — TELEPHONE ENCOUNTER
Katerine patient daughter called. Pt was seen in ED for Dehydration. States patient in need to have repeat blood work.         Please advise.  Thank you

## 2025-05-27 DIAGNOSIS — F41.9 ANXIETY: ICD-10-CM

## 2025-05-27 RX ORDER — ALPRAZOLAM 0.5 MG
0.5 TABLET ORAL 4 TIMES DAILY PRN
Qty: 120 TABLET | Refills: 0 | Status: SHIPPED | OUTPATIENT
Start: 2025-05-27

## 2025-05-27 NOTE — TELEPHONE ENCOUNTER
Reason for call:   [x] Refill   [] Prior Auth  [] Other:     Office:   [x] PCP/Provider -  [facility] [address]   [] Specialty/Provider -     Medication: Xanax    Dose/Frequency: 0.5 mg     Quantity: #120    Pharmacy: [facility]  [facility]   Does the patient have enough for 3 days?   [] Yes   [x] No - Send as HP to POD    Mail Away Pharmacy   Does the patient have enough for 10 days?   [] Yes   [] No - Send as HP to POD

## 2025-06-02 DIAGNOSIS — I10 PRIMARY HYPERTENSION: ICD-10-CM

## 2025-06-03 DIAGNOSIS — R05.2 SUBACUTE COUGH: ICD-10-CM

## 2025-06-03 RX ORDER — LISINOPRIL 10 MG/1
10 TABLET ORAL DAILY
Qty: 90 TABLET | Refills: 1 | Status: SHIPPED | OUTPATIENT
Start: 2025-06-03 | End: 2025-06-12 | Stop reason: ALTCHOICE

## 2025-06-04 RX ORDER — FUROSEMIDE 20 MG/1
20 TABLET ORAL 2 TIMES DAILY
Qty: 180 TABLET | OUTPATIENT
Start: 2025-06-04

## 2025-06-10 ENCOUNTER — TELEPHONE (OUTPATIENT)
Age: 89
End: 2025-06-10

## 2025-06-10 NOTE — TELEPHONE ENCOUNTER
Called and left message. PT has Medicare and [employer] which Dr. PHILLIPS does take. Need patient to verify if she is switching insurance carriers. I'm not sure how she heard that Dr. PHILLIPS is not taking that insurance anymore however that is incorrect. If pt still has medicare and [employer] they are clear to still see Dr. PHILLIPS

## 2025-06-10 NOTE — TELEPHONE ENCOUNTER
Please offer 1:00 or 3:00 tomorrow with Sam, the current appointments in those slots can be shortened to 20 mins.

## 2025-06-10 NOTE — TELEPHONE ENCOUNTER
Pt's daughter Katerine called to ask who would be reading the patient's ECHO scheduled this Thurs 6/12 due to Dr. PHILLIPS no longer taking the patient's insurance? She was under the impression the patient had a visit that day as well. This was not seen on the schedule as canceled. Advised the patient that if this is the case, another provider would take over the patient's care and would call with results. Please review. The patient has a visit scheduled with Dr. PHILLIPS 8/5/25 that was not canceled or rescheduled with another provider. Please advise.

## 2025-06-10 NOTE — TELEPHONE ENCOUNTER
Patient's daughter, Katerine, scheduled patient to be seen for recurrent cough and wheezing since December 2024.    Offered first available appointment with PA, patient refused.  Patient wants to be seen by PCP.  Scheduled first available with  PCP of 6/26/25 and placed on wait list.    In the meantime, daughter is requesting medication to treat symptoms to be filled with [facility] #[id number].    Daughter states prednisone has been effective in the past, but is open to any medication PCP would recommend.

## 2025-06-11 NOTE — TELEPHONE ENCOUNTER
Lm informing patient that if she has medicare and [employer] we do accept that insurance. If she has different insurance then she is to call the office to verify if we take it. Lm informing patient that we will keep her appt for August if she has the Medicare and [employer]

## 2025-06-12 ENCOUNTER — HOSPITAL ENCOUNTER (OUTPATIENT)
Dept: NON INVASIVE DIAGNOSTICS | Facility: HOSPITAL | Age: 89
Discharge: HOME | End: 2025-06-12
Attending: INTERNAL MEDICINE
Payer: MEDICARE

## 2025-06-12 ENCOUNTER — OFFICE VISIT (OUTPATIENT)
Age: 89
End: 2025-06-12
Payer: MEDICARE

## 2025-06-12 ENCOUNTER — APPOINTMENT (OUTPATIENT)
Dept: LAB | Facility: HOSPITAL | Age: 89
End: 2025-06-12
Payer: MEDICARE

## 2025-06-12 VITALS
BODY MASS INDEX: 24.99 KG/M2 | DIASTOLIC BLOOD PRESSURE: 67 MMHG | SYSTOLIC BLOOD PRESSURE: 153 MMHG | HEIGHT: 55 IN | HEART RATE: 78 BPM | WEIGHT: 108 LBS

## 2025-06-12 VITALS
OXYGEN SATURATION: 98 % | WEIGHT: 102 LBS | TEMPERATURE: 96.7 F | SYSTOLIC BLOOD PRESSURE: 148 MMHG | RESPIRATION RATE: 16 BRPM | HEIGHT: 55 IN | BODY MASS INDEX: 23.61 KG/M2 | HEART RATE: 70 BPM | DIASTOLIC BLOOD PRESSURE: 70 MMHG

## 2025-06-12 DIAGNOSIS — I10 ESSENTIAL HYPERTENSION: ICD-10-CM

## 2025-06-12 DIAGNOSIS — R51.9 CHRONIC HEAD PAIN: ICD-10-CM

## 2025-06-12 DIAGNOSIS — I10 PRIMARY HYPERTENSION: ICD-10-CM

## 2025-06-12 DIAGNOSIS — G89.29 CHRONIC HEAD PAIN: ICD-10-CM

## 2025-06-12 DIAGNOSIS — R73.01 IMPAIRED FASTING GLUCOSE: ICD-10-CM

## 2025-06-12 DIAGNOSIS — E78.2 MIXED HYPERLIPIDEMIA: ICD-10-CM

## 2025-06-12 DIAGNOSIS — K92.1 BLACK STOOL: ICD-10-CM

## 2025-06-12 DIAGNOSIS — J40 BRONCHITIS: ICD-10-CM

## 2025-06-12 DIAGNOSIS — I47.10 PSVT (PAROXYSMAL SUPRAVENTRICULAR TACHYCARDIA) (HCC): ICD-10-CM

## 2025-06-12 DIAGNOSIS — I50.32 CHRONIC DIASTOLIC CHF (CONGESTIVE HEART FAILURE) (HCC): ICD-10-CM

## 2025-06-12 DIAGNOSIS — N17.9 AKI (ACUTE KIDNEY INJURY) (HCC): ICD-10-CM

## 2025-06-12 LAB
ALBUMIN SERPL BCG-MCNC: 4.3 G/DL (ref 3.5–5)
ALP SERPL-CCNC: 88 U/L (ref 34–104)
ALT SERPL W P-5'-P-CCNC: 7 U/L (ref 7–52)
ANION GAP SERPL CALCULATED.3IONS-SCNC: 9 MMOL/L (ref 4–13)
AST SERPL W P-5'-P-CCNC: 14 U/L (ref 13–39)
BASOPHILS # BLD AUTO: 0.02 THOUSANDS/ÂΜL (ref 0–0.1)
BASOPHILS NFR BLD AUTO: 0 % (ref 0–1)
BILIRUB SERPL-MCNC: 0.43 MG/DL (ref 0.2–1)
BUN SERPL-MCNC: 23 MG/DL (ref 5–25)
CALCIUM SERPL-MCNC: 9.1 MG/DL (ref 8.4–10.2)
CHLORIDE SERPL-SCNC: 105 MMOL/L (ref 96–108)
CO2 SERPL-SCNC: 24 MMOL/L (ref 21–32)
CREAT SERPL-MCNC: 1.43 MG/DL (ref 0.6–1.3)
CRP SERPL QL: 1.3 MG/L
EOSINOPHIL # BLD AUTO: 0.13 THOUSAND/ÂΜL (ref 0–0.61)
EOSINOPHIL NFR BLD AUTO: 1 % (ref 0–6)
ERYTHROCYTE [DISTWIDTH] IN BLOOD BY AUTOMATED COUNT: 14.4 % (ref 11.6–15.1)
ERYTHROCYTE [SEDIMENTATION RATE] IN BLOOD: 29 MM/HOUR (ref 0–29)
GFR SERPL CREATININE-BSD FRML MDRD: 32 ML/MIN/1.73SQ M
GLUCOSE SERPL-MCNC: 92 MG/DL (ref 65–140)
HCT VFR BLD AUTO: 30 % (ref 34.8–46.1)
HGB BLD-MCNC: 9.8 G/DL (ref 11.5–15.4)
IMM GRANULOCYTES # BLD AUTO: 0.03 THOUSAND/UL (ref 0–0.2)
IMM GRANULOCYTES NFR BLD AUTO: 0 % (ref 0–2)
LYMPHOCYTES # BLD AUTO: 2.41 THOUSANDS/ÂΜL (ref 0.6–4.47)
LYMPHOCYTES NFR BLD AUTO: 26 % (ref 14–44)
MCH RBC QN AUTO: 30.9 PG (ref 26.8–34.3)
MCHC RBC AUTO-ENTMCNC: 32.7 G/DL (ref 31.4–37.4)
MCV RBC AUTO: 95 FL (ref 82–98)
MONOCYTES # BLD AUTO: 0.98 THOUSAND/ÂΜL (ref 0.17–1.22)
MONOCYTES NFR BLD AUTO: 10 % (ref 4–12)
NEUTROPHILS # BLD AUTO: 5.89 THOUSANDS/ÂΜL (ref 1.85–7.62)
NEUTS SEG NFR BLD AUTO: 63 % (ref 43–75)
NRBC BLD AUTO-RTO: 0 /100 WBCS
PLATELET # BLD AUTO: 235 THOUSANDS/UL (ref 149–390)
PMV BLD AUTO: 10 FL (ref 8.9–12.7)
POTASSIUM SERPL-SCNC: 3.6 MMOL/L (ref 3.5–5.3)
PROT SERPL-MCNC: 7.3 G/DL (ref 6.4–8.4)
RBC # BLD AUTO: 3.17 MILLION/UL (ref 3.81–5.12)
SODIUM SERPL-SCNC: 138 MMOL/L (ref 135–147)
T4 FREE SERPL-MCNC: 0.98 NG/DL (ref 0.61–1.12)
TSH SERPL DL<=0.05 MIU/L-ACNC: 0.2 UIU/ML (ref 0.45–4.5)
WBC # BLD AUTO: 9.46 THOUSAND/UL (ref 4.31–10.16)

## 2025-06-12 PROCEDURE — 85652 RBC SED RATE AUTOMATED: CPT

## 2025-06-12 PROCEDURE — 86140 C-REACTIVE PROTEIN: CPT

## 2025-06-12 PROCEDURE — 93306 TTE W/DOPPLER COMPLETE: CPT

## 2025-06-12 PROCEDURE — 93306 TTE W/DOPPLER COMPLETE: CPT | Performed by: STUDENT IN AN ORGANIZED HEALTH CARE EDUCATION/TRAINING PROGRAM

## 2025-06-12 PROCEDURE — 84439 ASSAY OF FREE THYROXINE: CPT

## 2025-06-12 PROCEDURE — 99214 OFFICE O/P EST MOD 30 MIN: CPT | Performed by: INTERNAL MEDICINE

## 2025-06-12 PROCEDURE — 84443 ASSAY THYROID STIM HORMONE: CPT

## 2025-06-12 PROCEDURE — 80053 COMPREHEN METABOLIC PANEL: CPT

## 2025-06-12 PROCEDURE — G2211 COMPLEX E/M VISIT ADD ON: HCPCS | Performed by: INTERNAL MEDICINE

## 2025-06-12 PROCEDURE — 83036 HEMOGLOBIN GLYCOSYLATED A1C: CPT

## 2025-06-12 PROCEDURE — 36415 COLL VENOUS BLD VENIPUNCTURE: CPT

## 2025-06-12 PROCEDURE — 85025 COMPLETE CBC W/AUTO DIFF WBC: CPT

## 2025-06-12 RX ORDER — VALSARTAN 80 MG/1
80 TABLET ORAL DAILY
Qty: 30 TABLET | Refills: 5 | Status: SHIPPED | OUTPATIENT
Start: 2025-06-12 | End: 2025-06-13

## 2025-06-12 RX ORDER — PREDNISONE 20 MG/1
20 TABLET ORAL DAILY
Qty: 3 TABLET | Refills: 0 | Status: SHIPPED | OUTPATIENT
Start: 2025-06-12 | End: 2025-06-15

## 2025-06-12 NOTE — PATIENT INSTRUCTIONS
Stop taking lisinopril as this may be contributing to your chronic cough. Start valsartan 80 mg by mouth daily.  Take Metamucil daily for your loose consistency bowel movements  For dark/black stools, will check FOBT/FIT test  For your head pains, will recheck ESR and CRP  Complete other labs as

## 2025-06-12 NOTE — PROGRESS NOTES
Name: Lakeisha Langston      : 1935      MRN: [id number]  Encounter Provider: Ezekiel Vargas MD  Encounter Date: 2025   Encounter department: [facility] INTERNAL MEDICINE [facility] [address]  :  Assessment & Plan  Bronchitis  Patient presented with recurrent cough and wheezing. Cough is mildly productive of mucus. No fevers, chills. Symptoms have been alleviated by short courses of prednisone in the past. No apparent benefit from DuoNebs. Due to this, patient had been given 3 days of prednisone until yesterday.   Will prescribed prednisone 20 mg daily for 3 additional days to complete course. If cough and/or wheezing recurs after therapy, may consider addition of ICS.  Given chronic cough, will discontinue lisinopril and assess for improvement.    PFTs showed mild obstructive ventilatory limitation with response to bronchodilator consistent with asthma. If recurrence of symptoms, can also consider repeating PFTs  Orders:    predniSONE 20 mg tablet; Take 1 tablet (20 mg total) by mouth daily for 3 days    Black stool  Dark/black stools in recent weeks  Patient does take an iron supplement, which could be a causative factor  Will check CBC and FIT  For loose/thinner consistency stool, recommended to take Metamucil daily  Orders:    iFOBT/FIT; Future    Chronic head pain  Left-sided head pain and/or scalp tenderness have a chronic issue. Patient now reports experiencing it on the right side as well. Denies fever, photophobia. Phonophobia, neck stiffness, nausea, vomiting.  ESR and CRP in recent past have been within normal limits  Will obtain repeat ESR and CRP to rule out temporal arteritis as a possible cause  Orders:    C-reactive protein; Future    Sedimentation rate, automated; Future    Primary hypertension  In the setting of chronic cough, will discontinue lisinopril  Start valsartan 80 mg daily  Continue atenolol  Orders:    valsartan (DIOVAN) 80 mg tablet; Take 1 tablet (80 mg total) by mouth  "daily    CBC and differential; Future    Comprehensive metabolic panel; Future    TSH, 3rd generation with Free T4 reflex; Future    Impaired fasting glucose    Orders:    Hemoglobin A1C; Future    Mixed hyperlipidemia    Orders:    Lipid Panel with Direct LDL reflex; Future         History of Present Illness   HPI    Patient, accompanied by her daughter, presents for evaluation of the following complaints.     Head pain - States that it's not a headache. She feels that the source of the pain is her skull/cranial bones. It bothers her daily. The mainly left-sided pain has been going on for years, but for the past several months she also experiences it on the right side. She takes extra-strength Tylenol 2-3 times daily which helps. Denies fever, neck stiffness, photophobia, nausea/vomiting.    Cough/wheezing - Ever since having COVID in December, patient has been experiencing recurrent episodes of cough and wheezing. The cough is mildly productive of yellow mucus and the wheezing is described as being louder than the TV. Albuterol inhaler and DuoNebs have not alleviated symptoms. Short courses of steroids have helped in the past. She is not wheezing today, as patient's daughter gave her 3 days of prednisone until yesterday.    Loose bowel movements/black stool - Patient recently experienced constipation. She tried several agents to move her bowels after which she started experiencing diarrhea. For the last two weeks though, her stool has been more \"pasty.\" Denies changes to diet. She is also experiencing urgency to empty bowels and the sensation that she is incompletely emptying them. She has also noted black stools but no blood in the toilet. Takes an iron supplement.    Review of Systems   Constitutional:  Negative for chills and fever.   HENT:  Negative for ear pain and sore throat.    Eyes:  Negative for pain and visual disturbance.   Respiratory:  Positive for cough and wheezing. Negative for shortness of " "breath.    Cardiovascular:  Negative for chest pain and palpitations.   Gastrointestinal:  Negative for abdominal pain and vomiting.   Genitourinary:  Negative for dysuria and hematuria.   Musculoskeletal:  Negative for arthralgias and back pain.   Skin:  Negative for color change and rash.   Neurological:  Positive for headaches. Negative for seizures and syncope.   All other systems reviewed and are negative.      Objective   /70   Pulse 70   Temp (!) 96.7 °F (35.9 °C) (Tympanic)   Resp 16   Ht 4' 7\" (1.397 m)   Wt 46.3 kg (102 lb)   SpO2 98%   BMI 23.71 kg/m²      Physical Exam  Vitals and nursing note reviewed.   Constitutional:       General: She is not in acute distress.     Appearance: She is well-developed.   HENT:      Head: Normocephalic and atraumatic.     Cardiovascular:      Rate and Rhythm: Normal rate and regular rhythm.      Heart sounds: No murmur heard.  Pulmonary:      Effort: Pulmonary effort is normal. No respiratory distress.      Breath sounds: Normal breath sounds. No wheezing or rales.   Abdominal:      Palpations: Abdomen is soft.      Tenderness: There is no abdominal tenderness.     Musculoskeletal:      Cervical back: Neck supple.     Neurological:      Mental Status: She is alert.         "

## 2025-06-12 NOTE — ASSESSMENT & PLAN NOTE
In the setting of chronic cough, will discontinue lisinopril  Start valsartan 80 mg daily  Continue atenolol  Orders:    valsartan (DIOVAN) 80 mg tablet; Take 1 tablet (80 mg total) by mouth daily    CBC and differential; Future    Comprehensive metabolic panel; Future    TSH, 3rd generation with Free T4 reflex; Future

## 2025-06-13 ENCOUNTER — RESULTS FOLLOW-UP (OUTPATIENT)
Age: 89
End: 2025-06-13

## 2025-06-13 LAB
AORTIC ROOT: 2.3 CM
ASCENDING AORTA: 2.5 CM
AV LVOT MEAN GRADIENT: 2 MMHG
AV LVOT PEAK GRADIENT: 3 MMHG
BSA FOR ECHO PROCEDURE: 1.35 M2
DOP CALC LVOT AREA: 2.83 CM2
DOP CALC LVOT CARDIAC INDEX: 3.42 L/MIN/M2
DOP CALC LVOT CARDIAC OUTPUT: 4.62 L/MIN
DOP CALC LVOT DIAMETER: 1.9 CM
DOP CALC LVOT PEAK VEL VTI: 22.19 CM
DOP CALC LVOT PEAK VEL: 0.92 M/S
DOP CALC LVOT STROKE INDEX: 45.2 ML/M2
DOP CALC LVOT STROKE VOLUME: 62.88
DOP CALC MV VTI: 39.42 CM
E WAVE DECELERATION TIME: 233 MS
E/A RATIO: 0.94
EST. AVERAGE GLUCOSE BLD GHB EST-MCNC: 120 MG/DL
FRACTIONAL SHORTENING: 31 (ref 28–44)
HBA1C MFR BLD: 5.8 %
INTERVENTRICULAR SEPTUM IN DIASTOLE (PARASTERNAL SHORT AXIS VIEW): 1.2 CM
INTERVENTRICULAR SEPTUM: 1.2 CM (ref 0.6–1.1)
LAAS-AP2: 10.7 CM2
LAAS-AP4: 15.6 CM2
LEFT ATRIUM SIZE: 2.9 CM
LEFT ATRIUM VOLUME (MOD BIPLANE): 32 ML
LEFT ATRIUM VOLUME INDEX (MOD BIPLANE): 23.7 ML/M2
LEFT INTERNAL DIMENSION IN SYSTOLE: 2.4 CM (ref 2.1–4)
LEFT VENTRICULAR INTERNAL DIMENSION IN DIASTOLE: 3.5 CM (ref 3.5–6)
LEFT VENTRICULAR POSTERIOR WALL IN END DIASTOLE: 1.2 CM
LEFT VENTRICULAR STROKE VOLUME: 31 ML
LV EF US.2D.A4C+ESTIMATED: 69 %
LVSV (TEICH): 31 ML
MV E'TISSUE VEL-LAT: 5 CM/S
MV E'TISSUE VEL-SEP: 5 CM/S
MV MEAN GRADIENT: 4 MMHG
MV PEAK A VEL: 1.28 M/S
MV PEAK E VEL: 120 CM/S
MV PEAK GRADIENT: 8 MMHG
MV STENOSIS PRESSURE HALF TIME: 79 MS
MV VALVE AREA BY CONTINUITY EQUATION: 1.6 CM2
MV VALVE AREA P 1/2 METHOD: 2.78
RA PRESSURE ESTIMATED: 3 MMHG
RIGHT ATRIUM AREA SYSTOLE A4C: 11.8 CM2
RV PSP: 38 MMHG
SL CV LEFT ATRIUM LENGTH A2C: 4.1 CM
SL CV LV EF: 65
SL CV PED ECHO LEFT VENTRICLE DIASTOLIC VOLUME (MOD BIPLANE) 2D: 50 ML
SL CV PED ECHO LEFT VENTRICLE SYSTOLIC VOLUME (MOD BIPLANE) 2D: 19 ML
TR MAX PG: 35 MMHG
TR PEAK VELOCITY: 3 M/S
TRICUSPID ANNULAR PLANE SYSTOLIC EXCURSION: 2.2 CM
TRICUSPID VALVE PEAK REGURGITATION VELOCITY: 2.95 M/S

## 2025-06-13 RX ORDER — VALSARTAN 80 MG/1
80 TABLET ORAL DAILY
Qty: 90 TABLET | Refills: 1 | Status: SHIPPED | OUTPATIENT
Start: 2025-06-13

## 2025-06-13 NOTE — TELEPHONE ENCOUNTER
----- Message from Trista Goyal PA-C sent at 6/13/2025  7:15 AM EDT -----  Kidney function is stable  ----- Message -----  From: Lab, Background User  Sent: 6/12/2025   5:49 PM EDT  To: Trista Goyal PA-C

## 2025-06-15 ENCOUNTER — RESULTS FOLLOW-UP (OUTPATIENT)
Dept: CARDIOLOGY CLINIC | Facility: CLINIC | Age: 89
End: 2025-06-15

## 2025-06-15 DIAGNOSIS — E03.9 ACQUIRED HYPOTHYROIDISM: ICD-10-CM

## 2025-06-15 RX ORDER — LEVOTHYROXINE SODIUM 50 UG/1
TABLET ORAL
Qty: 102 TABLET | Refills: 1 | Status: SHIPPED | OUTPATIENT
Start: 2025-06-15

## 2025-06-16 ENCOUNTER — NURSE TRIAGE (OUTPATIENT)
Age: 89
End: 2025-06-16

## 2025-06-16 NOTE — TELEPHONE ENCOUNTER
"REASON FOR CONVERSATION: Medication Problem    SYMPTOMS: daughter reports patient is experiencing symptoms since starting valsartan a few days ago-- sweating a lot and frequent urination, dizziness, fogginess; blood pressure taken during phone call was 114/53    OTHER HEALTH INFORMATION: valsartan prescribed on 6/13. Daughter states PCP is aware that patient gets side effects from different medications.     PROTOCOL DISPOSITION: Discuss With PCP and Callback by Nurse Within 1 Hour    CARE ADVICE PROVIDED: Daughter would like PCP recommendation    PRACTICE FOLLOW-UP: PCP recommendation      Reason for Disposition   Caller has URGENT medicine question about med that PCP or specialist prescribed and triager unable to answer question    Answer Assessment - Initial Assessment Questions  1. NAME of MEDICINE: \"What medicine(s) are you calling about?\"      Valsartan    2. QUESTION: \"What is your question?\" (e.g., double dose of medicine, side effect)      Side effect    3. PRESCRIBER: \"Who prescribed the medicine?\" Reason: if prescribed by specialist, call should be referred to that group.      PCP    4. SYMPTOMS: \"Do you have any symptoms?\" If Yes, ask: \"What symptoms are you having?\"  \"How bad are the symptoms (e.g., mild, moderate, severe)      Frequent sweating and urination, dizziness, foggy feeling    Protocols used: Medication Question Call-Adult-[place]    "

## 2025-06-16 NOTE — TELEPHONE ENCOUNTER
Please have her try to continue this medication and check back with us in a few days if symptoms fail to improve.

## 2025-06-16 NOTE — TELEPHONE ENCOUNTER
----- Message from Arina Sorensen MD sent at 6/15/2025  7:44 PM EDT -----  Please call the patient.  Normal ejection fraction by echocardiogram with mild to moderate mitral regurgitation.  ----- Message -----  From: Joelle Foster MD  Sent: 6/13/2025   8:47 AM EDT  To: Arina Sorensen MD     No

## 2025-06-17 ENCOUNTER — PATIENT MESSAGE (OUTPATIENT)
Age: 89
End: 2025-06-17

## 2025-06-17 NOTE — TELEPHONE ENCOUNTER
Patient's daughter called and stated that patient does not want to continue medication, she stated that patient will only be wiling to take half of tablet.    Please advise

## 2025-06-18 ENCOUNTER — TELEPHONE (OUTPATIENT)
Age: 89
End: 2025-06-18

## 2025-06-18 NOTE — PATIENT COMMUNICATION
Patient's daughter returned call, states message got cut off. No message to relay in chart. Attempted to warm transfer to staff, was placed on hold and disconnected. Lew requests a call back to her number, [phone], as soon as possible

## 2025-06-18 NOTE — TELEPHONE ENCOUNTER
Patients daughter stopped in and informed me patients bp was low, spoke to dd and he advised her not to take the valsartan.

## 2025-06-25 ENCOUNTER — OFFICE VISIT (OUTPATIENT)
Age: 89
End: 2025-06-25
Payer: MEDICARE

## 2025-06-25 ENCOUNTER — NURSE TRIAGE (OUTPATIENT)
Age: 89
End: 2025-06-25

## 2025-06-25 VITALS
OXYGEN SATURATION: 89 % | SYSTOLIC BLOOD PRESSURE: 112 MMHG | RESPIRATION RATE: 16 BRPM | TEMPERATURE: 98.3 F | WEIGHT: 102 LBS | DIASTOLIC BLOOD PRESSURE: 72 MMHG | BODY MASS INDEX: 23.61 KG/M2 | HEART RATE: 85 BPM | HEIGHT: 55 IN

## 2025-06-25 DIAGNOSIS — J02.9 SORE THROAT: Primary | ICD-10-CM

## 2025-06-25 PROCEDURE — 99213 OFFICE O/P EST LOW 20 MIN: CPT

## 2025-06-25 PROCEDURE — G2211 COMPLEX E/M VISIT ADD ON: HCPCS

## 2025-06-25 PROCEDURE — 87880 STREP A ASSAY W/OPTIC: CPT

## 2025-06-25 NOTE — TELEPHONE ENCOUNTER
"REASON FOR CONVERSATION: Sore Throat, Generalized Body Aches, and Earache    SYMPTOMS: today patient woke up with sore throat, bilateral ear itching with discomfort, post nasal drip, generalized body aches, yellow mucus when she blows her nose. Per daughter denies fever, cough, shortness of breath, but states that yesterday she had some wheezing, none today.     OTHER HEALTH INFORMATION: patient was seen in the office on 6/12 and prescribed prednisone. Daughter states that she has not been expose to anyone sick that its only patient and her that live together. Daughter states her appetite today is poor and has been giving her hot tea and honey.     PROTOCOL DISPOSITION: See Today in Office    CARE ADVICE PROVIDED: patient was given an appointment for today at 3:30 pm with Jet. Advise daughter to continue with the hot tea and encourage hydration and to call back if symptoms worsen from now till appt. Daughter expressed understanding.     PRACTICE FOLLOW-UP: n/a          Reason for Disposition   SEVERE sore throat pain    Answer Assessment - Initial Assessment Questions  1. ONSET: \"When did the throat start hurting?\" (Hours or days ago)       Today   2. SEVERITY: \"How bad is the sore throat?\" (Scale 1-10; mild, moderate or severe)      8-9/10  3. STREP EXPOSURE: \"Has there been any exposure to strep within the past week?\" If Yes, ask: \"What type of contact occurred?\"       Denies   4.  VIRAL SYMPTOMS: \"Are there any symptoms of a cold, such as a runny nose, cough, hoarse voice or red eyes?\"       Bilateral ear itching and discomfort, post nasal drip and patient is blowing nose with yellow mucus  5. FEVER: \"Do you have a fever?\" If Yes, ask: \"What is your temperature, how was it measured, and when did it start?\"      Denies   6. PUS ON THE TONSILS: \"Is there pus on the tonsils in the back of your throat?\"      Denies pus but per daughter the back of her throat seems red  7. OTHER SYMPTOMS: \"Do you have any other " "symptoms?\" (e.g., difficulty breathing, headache, rash)      Generalized body aches, wheezing yesterday    Protocols used: Sore Throat-Adult-[place]    "

## 2025-06-25 NOTE — PROGRESS NOTES
Name: Lakeisha Langston      : 1935      MRN: [id number]  Encounter Provider: Jet Brand PA-C  Encounter Date: 2025   Encounter department: [facility]  :  Assessment & Plan  Sore throat  Patient is an 89-year-old female presenting for acute visit.  Rapid strep in office was negative. endorses symptoms consistent with viral illness.  No cervical lymphadenopathy, afebrile, pharynx normal, lungs clear.  Recommended over-the-counter supportive treatment and follow-up if symptoms do not improve in 1 week  Orders:    POCT rapid ANTIGEN strepA           History of Present Illness   Patient is an 89 year old female presenting for acute visit  -yesterday was very tired  -woke up with a sore throat  -endorses bilateral ear itching, endorses some joint pain as well  -denies stuffy nose, cough  -endorses some mild wheezing  -denies urinary symptoms  -has taken tylenol which did help with the aches    Sore Throat   Associated symptoms include ear pain (bilateral itchy ears) and headaches. Pertinent negatives include no congestion, coughing, diarrhea, shortness of breath or vomiting.   Earache   Associated symptoms include headaches and a sore throat. Pertinent negatives include no coughing, diarrhea or vomiting.   Fatigue  Associated symptoms include arthralgias, fatigue, headaches, nausea and a sore throat. Pertinent negatives include no chest pain, congestion, coughing, fever or vomiting.     Review of Systems   Constitutional:  Positive for fatigue. Negative for appetite change and fever.   HENT:  Positive for ear pain (bilateral itchy ears) and sore throat. Negative for congestion, postnasal drip and tinnitus.    Respiratory:  Negative for cough, shortness of breath and wheezing.    Cardiovascular:  Negative for chest pain and palpitations.   Gastrointestinal:  Positive for nausea. Negative for diarrhea and vomiting.   Genitourinary:  Negative for dysuria and urgency.  "  Musculoskeletal:  Positive for arthralgias.   Neurological:  Positive for headaches.       Objective   /72 (BP Location: Left arm, Patient Position: Sitting, Cuff Size: Standard)   Pulse 85   Temp 98.3 °F (36.8 °C) (Temporal)   Resp 16   Ht 4' 7\" (1.397 m)   Wt 46.3 kg (102 lb)   SpO2 (!) 89%   BMI 23.71 kg/m²      Physical Exam  Constitutional:       General: She is not in acute distress.     Appearance: She is not ill-appearing.   HENT:      Right Ear: Tympanic membrane and ear canal normal. Tympanic membrane is not erythematous.      Left Ear: Tympanic membrane and ear canal normal. Tympanic membrane is not erythematous.      Nose: No congestion or rhinorrhea.      Mouth/Throat:      Mouth: Mucous membranes are moist.      Pharynx: No oropharyngeal exudate or posterior oropharyngeal erythema.      Tonsils: No tonsillar exudate.     Eyes:      Pupils: Pupils are equal, round, and reactive to light.       Cardiovascular:      Rate and Rhythm: Normal rate and regular rhythm.      Heart sounds: No murmur heard.     No friction rub. No gallop.   Pulmonary:      Effort: Pulmonary effort is normal. No respiratory distress.      Breath sounds: No wheezing or rhonchi.   Lymphadenopathy:      Cervical: No cervical adenopathy.     Neurological:      General: No focal deficit present.      Mental Status: She is alert and oriented to person, place, and time.     Psychiatric:         Mood and Affect: Mood normal.         Behavior: Behavior normal.         "

## 2025-07-01 DIAGNOSIS — I10 ESSENTIAL HYPERTENSION: ICD-10-CM

## 2025-07-02 RX ORDER — ATENOLOL 50 MG/1
50 TABLET ORAL 2 TIMES DAILY
Qty: 180 TABLET | Refills: 1 | Status: SHIPPED | OUTPATIENT
Start: 2025-07-02

## 2025-07-08 ENCOUNTER — APPOINTMENT (OUTPATIENT)
Dept: LAB | Facility: HOSPITAL | Age: 89
End: 2025-07-08
Payer: MEDICARE

## 2025-07-08 ENCOUNTER — OFFICE VISIT (OUTPATIENT)
Age: 89
End: 2025-07-08
Payer: MEDICARE

## 2025-07-08 VITALS
DIASTOLIC BLOOD PRESSURE: 60 MMHG | SYSTOLIC BLOOD PRESSURE: 120 MMHG | WEIGHT: 104 LBS | HEIGHT: 55 IN | OXYGEN SATURATION: 96 % | BODY MASS INDEX: 24.07 KG/M2 | HEART RATE: 56 BPM

## 2025-07-08 DIAGNOSIS — E03.9 ACQUIRED HYPOTHYROIDISM: ICD-10-CM

## 2025-07-08 DIAGNOSIS — R73.01 IMPAIRED FASTING GLUCOSE: ICD-10-CM

## 2025-07-08 DIAGNOSIS — E03.8 OTHER SPECIFIED HYPOTHYROIDISM: ICD-10-CM

## 2025-07-08 DIAGNOSIS — J45.20 MILD INTERMITTENT ASTHMA WITHOUT COMPLICATION: Primary | ICD-10-CM

## 2025-07-08 DIAGNOSIS — I87.2 VENOUS INSUFFICIENCY: ICD-10-CM

## 2025-07-08 DIAGNOSIS — G44.209 TENSION-TYPE HEADACHE, NOT INTRACTABLE, UNSPECIFIED CHRONICITY PATTERN: ICD-10-CM

## 2025-07-08 DIAGNOSIS — I50.32 CHRONIC DIASTOLIC CHF (CONGESTIVE HEART FAILURE) (HCC): ICD-10-CM

## 2025-07-08 LAB
CHOLEST SERPL-MCNC: 191 MG/DL (ref ?–200)
HDLC SERPL-MCNC: 71 MG/DL
LDLC SERPL CALC-MCNC: 92 MG/DL (ref 0–100)
TRIGL SERPL-MCNC: 140 MG/DL (ref ?–150)

## 2025-07-08 PROCEDURE — G2211 COMPLEX E/M VISIT ADD ON: HCPCS | Performed by: INTERNAL MEDICINE

## 2025-07-08 PROCEDURE — 99214 OFFICE O/P EST MOD 30 MIN: CPT | Performed by: INTERNAL MEDICINE

## 2025-07-08 PROCEDURE — 80061 LIPID PANEL: CPT

## 2025-07-08 RX ORDER — LEVOTHYROXINE SODIUM 50 UG/1
TABLET ORAL
Start: 2025-07-08

## 2025-07-08 RX ORDER — FLUTICASONE FUROATE AND VILANTEROL 100; 25 UG/1; UG/1
1 POWDER RESPIRATORY (INHALATION) DAILY
Qty: 30 BLISTER | Refills: 5 | Status: SHIPPED | OUTPATIENT
Start: 2025-07-08 | End: 2025-08-07

## 2025-07-08 NOTE — ASSESSMENT & PLAN NOTE
Wt Readings from Last 3 Encounters:   07/08/25 47.2 kg (104 lb)   06/25/25 46.3 kg (102 lb)   06/12/25 49 kg (108 lb)     Well compensated without diuretic

## 2025-07-08 NOTE — ASSESSMENT & PLAN NOTE
Persistent complaint over the last several years, multiple workups for giant cell arteritis.  No improvement with imipramine.  Referral to neurology  Orders:    Ambulatory Referral to Neurology; Future

## 2025-07-08 NOTE — ASSESSMENT & PLAN NOTE
TSH suppressed 0.2 with normal free T4  Had been taking 50 mcg 6 days/week and 100 mcg on the seventh day but recently 50 mcg 7 days/week  - Repeat TSH in 1 month

## 2025-07-08 NOTE — PROGRESS NOTES
Name: Lakeisha Langston      : 1935      MRN: [id number]  Encounter Provider: Ezekiel Vargas MD  Encounter Date: 2025   Encounter department[facility]  :  Assessment & Plan  Mild intermittent asthma without complication  Persistent cough since COVID infection in December  Slightly improved transition from lisinopril to valsartan but still present  Last PFT in 2019 with obstructive lung disease with bronchodilator response consistent with asthma  -Add long-acting beta agonist/corticosteroid  - Follow-up 1 month  Orders:    Fluticasone Furoate-Vilanterol 100-25 mcg/actuation inhaler; Inhale 1 puff daily Rinse mouth after use.    Chronic diastolic CHF (congestive heart failure) (HCC)  Wt Readings from Last 3 Encounters:   25 47.2 kg (104 lb)   25 46.3 kg (102 lb)   25 49 kg (108 lb)     Well compensated without diuretic               Venous insufficiency  Stable off diuretic       Impaired fasting glucose  A1c improved 5.9       Other specified hypothyroidism  TSH suppressed 0.2 with normal free T4  Had been taking 50 mcg 6 days/week and 100 mcg on the seventh day but recently 50 mcg 7 days/week  - Repeat TSH in 1 month         Acquired hypothyroidism  TSH suppressed 0.2 with normal free T4  Had been taking 50 mcg 6 days/week and 100 mcg on the seventh day but recently 50 mcg 7 days/week  - Repeat TSH in 1 month  Orders:    TSH, 3rd generation with Free T4 reflex; Future    levothyroxine 50 mcg tablet; TAKE 1 TABLET BY MOUTH EVERY DAY    Tension-type headache, not intractable, unspecified chronicity pattern  Persistent complaint over the last several years, multiple workups for giant cell arteritis.  No improvement with imipramine.  Referral to neurology  Orders:    Ambulatory Referral to Neurology; Future           History of Present Illness   F/u mmp and review labs  Here w/ shayy  C/o persistent cough, worse since covid in Dec  Still w/ headache.  "      Review of Systems    Objective   /60   Pulse 56   Ht 4' 7\" (1.397 m)   Wt 47.2 kg (104 lb)   SpO2 96%   BMI 24.17 kg/m²      Physical Exam  Constitutional:       Appearance: She is well-developed.   HENT:      Head: Normocephalic and atraumatic.      Nose: Nose normal.     Eyes:      General: No scleral icterus.     Conjunctiva/sclera: Conjunctivae normal.      Pupils: Pupils are equal, round, and reactive to light.     Neck:      Thyroid: No thyromegaly.      Vascular: No JVD.      Trachea: No tracheal deviation.     Cardiovascular:      Rate and Rhythm: Normal rate and regular rhythm.      Heart sounds: No murmur heard.     No friction rub. No gallop.   Pulmonary:      Effort: Pulmonary effort is normal. No respiratory distress.      Breath sounds: Wheezing present. No rales.      Comments: Scattered wheezes, prolonged expiratory phase    Musculoskeletal:         General: No deformity.      Cervical back: Normal range of motion and neck supple.   Lymphadenopathy:      Cervical: No cervical adenopathy.     Skin:     General: Skin is warm and dry.      Coloration: Skin is not pale.      Findings: No erythema or rash.     Neurological:      Mental Status: She is alert and oriented to person, place, and time.      Cranial Nerves: No cranial nerve deficit.     Psychiatric:         Behavior: Behavior normal.         Thought Content: Thought content normal.         Judgment: Judgment normal.         "

## 2025-07-08 NOTE — ASSESSMENT & PLAN NOTE
TSH suppressed 0.2 with normal free T4  Had been taking 50 mcg 6 days/week and 100 mcg on the seventh day but recently 50 mcg 7 days/week  - Repeat TSH in 1 month  Orders:    TSH, 3rd generation with Free T4 reflex; Future    levothyroxine 50 mcg tablet; TAKE 1 TABLET BY MOUTH EVERY DAY

## 2025-07-08 NOTE — ASSESSMENT & PLAN NOTE
Persistent cough since COVID infection in December  Slightly improved transition from lisinopril to valsartan but still present  Last PFT in 2019 with obstructive lung disease with bronchodilator response consistent with asthma  -Add long-acting beta agonist/corticosteroid  - Follow-up 1 month  Orders:    Fluticasone Furoate-Vilanterol 100-25 mcg/actuation inhaler; Inhale 1 puff daily Rinse mouth after use.

## 2025-07-21 DIAGNOSIS — F41.9 ANXIETY: ICD-10-CM

## 2025-07-21 RX ORDER — ALPRAZOLAM 0.5 MG
0.5 TABLET ORAL 4 TIMES DAILY PRN
Qty: 120 TABLET | Refills: 0 | Status: SHIPPED | OUTPATIENT
Start: 2025-07-21

## 2025-07-21 NOTE — TELEPHONE ENCOUNTER
Reason for call:   [x] Refill   [] Prior Auth  [] Other:     Office:   [x] PCP/Provider - [facility] [address]     [] Specialty/Provider -     Medication: ALPRAZolam (XANAX) 0.5 mg tablet    Dose/Frequency:  Take 1 tablet (0.5 mg total) by mouth 4 (four) times a day as needed for anxiety     Quantity: 120    Pharmacy: [id number] - [place] - [address] [place] [address] Pharmacy   Does the patient have enough for 3 days?   [] Yes   [x] No - Send as HP to POD    Mail Away Pharmacy   Does the patient have enough for 10 days?   [] Yes   [] No - Send as HP to POD

## 2025-08-01 DIAGNOSIS — E78.2 MIXED HYPERLIPIDEMIA: ICD-10-CM

## 2025-08-01 RX ORDER — ROSUVASTATIN CALCIUM 5 MG/1
5 TABLET, COATED ORAL DAILY
Qty: 90 TABLET | Refills: 1 | Status: SHIPPED | OUTPATIENT
Start: 2025-08-01

## 2025-08-05 ENCOUNTER — OFFICE VISIT (OUTPATIENT)
Dept: CARDIOLOGY CLINIC | Facility: CLINIC | Age: 89
End: 2025-08-05
Payer: MEDICARE

## 2025-08-05 VITALS
HEIGHT: 55 IN | DIASTOLIC BLOOD PRESSURE: 74 MMHG | RESPIRATION RATE: 16 BRPM | SYSTOLIC BLOOD PRESSURE: 116 MMHG | OXYGEN SATURATION: 97 % | WEIGHT: 105 LBS | HEART RATE: 79 BPM | BODY MASS INDEX: 24.3 KG/M2

## 2025-08-05 DIAGNOSIS — E78.2 MIXED HYPERLIPIDEMIA: ICD-10-CM

## 2025-08-05 DIAGNOSIS — I47.10 PSVT (PAROXYSMAL SUPRAVENTRICULAR TACHYCARDIA) (HCC): ICD-10-CM

## 2025-08-05 DIAGNOSIS — I10 ESSENTIAL HYPERTENSION: ICD-10-CM

## 2025-08-05 DIAGNOSIS — I50.32 CHRONIC DIASTOLIC CHF (CONGESTIVE HEART FAILURE) (HCC): Primary | ICD-10-CM

## 2025-08-05 DIAGNOSIS — I10 PRIMARY HYPERTENSION: ICD-10-CM

## 2025-08-05 PROCEDURE — 99214 OFFICE O/P EST MOD 30 MIN: CPT | Performed by: INTERNAL MEDICINE

## 2025-08-05 RX ORDER — ATENOLOL 50 MG/1
TABLET ORAL
Start: 2025-08-05

## 2025-08-12 ENCOUNTER — OFFICE VISIT (OUTPATIENT)
Age: 89
End: 2025-08-12
Payer: MEDICARE

## 2025-08-12 ENCOUNTER — APPOINTMENT (OUTPATIENT)
Dept: LAB | Facility: HOSPITAL | Age: 89
End: 2025-08-12
Payer: MEDICARE

## (undated) DEVICE — GLOVE SRG BIOGEL ECLIPSE 7.5

## (undated) DEVICE — SYRINGE 10ML LL

## (undated) DEVICE — CHOLE CATH KIT ARROW

## (undated) DEVICE — SUT VICRYL 0 UR-6 27 IN J603H

## (undated) DEVICE — ENDOPOUCH RETRIEVER SPECIMEN RETRIEVAL BAGS: Brand: ENDOPOUCH RETRIEVER

## (undated) DEVICE — 3M™ STERI-STRIP™ REINFORCED ADHESIVE SKIN CLOSURES, R1546, 1/4 IN X 4 IN (6 MM X 100 MM), 10 STRIPS/ENVELOPE: Brand: 3M™ STERI-STRIP™

## (undated) DEVICE — ENDOPATH XCEL UNIVERSAL TROCAR STABLILITY SLEEVES: Brand: ENDOPATH XCEL

## (undated) DEVICE — REM POLYHESIVE ADULT PATIENT RETURN ELECTRODE: Brand: VALLEYLAB

## (undated) DEVICE — LIGAMAX 5 MM ENDOSCOPIC MULTIPLE CLIP APPLIER: Brand: LIGAMAX

## (undated) DEVICE — IV CATH 14 G X 1.75

## (undated) DEVICE — [HIGH FLOW INSUFFLATOR,  DO NOT USE IF PACKAGE IS DAMAGED,  KEEP DRY,  KEEP AWAY FROM SUNLIGHT,  PROTECT FROM HEAT AND RADIOACTIVE SOURCES.]: Brand: PNEUMOSURE

## (undated) DEVICE — INTENDED FOR TISSUE SEPARATION, AND OTHER PROCEDURES THAT REQUIRE A SHARP SURGICAL BLADE TO PUNCTURE OR CUT.: Brand: BARD-PARKER SAFETY BLADES SIZE 15, STERILE

## (undated) DEVICE — SCD SEQUENTIAL COMPRESSION COMFORT SLEEVE MEDIUM KNEE LENGTH: Brand: KENDALL SCD

## (undated) DEVICE — ELECTRODE LAP L WIRE E-Z CLEAN 33CM -0100

## (undated) DEVICE — CHLORAPREP HI-LITE 26ML ORANGE

## (undated) DEVICE — ENDOPATH XCEL BLADELESS TROCARS WITH STABILITY SLEEVES: Brand: ENDOPATH XCEL

## (undated) DEVICE — GAUZE SPONGES,8 PLY: Brand: CURITY

## (undated) DEVICE — GLOVE INDICATOR PI UNDERGLOVE SZ 7.5 BLUE

## (undated) DEVICE — 5 MM CURVED DISSECTORS WITH MONOPOLAR CAUTERY: Brand: ENDOPATH

## (undated) DEVICE — 3M™ TEGADERM™ TRANSPARENT FILM DRESSING FRAME STYLE, 1624W, 2-3/8 IN X 2-3/4 IN (6 CM X 7 CM), 100/CT 4CT/CASE: Brand: 3M™ TEGADERM™

## (undated) DEVICE — ALLENTOWN LAP CHOLE APP PACK: Brand: CARDINAL HEALTH

## (undated) DEVICE — LIGHT HANDLE COVER SLEEVE DISP BLUE STELLAR

## (undated) DEVICE — SUT VICRYL 4-0 PS-2 27 IN J426H

## (undated) DEVICE — IRRIG ENDO FLO TUBING

## (undated) DEVICE — DRAPE C-ARM X-RAY

## (undated) DEVICE — SURGICEL NU-KNIT 3 X 4